# Patient Record
Sex: FEMALE | Race: WHITE | NOT HISPANIC OR LATINO | Employment: OTHER | URBAN - METROPOLITAN AREA
[De-identification: names, ages, dates, MRNs, and addresses within clinical notes are randomized per-mention and may not be internally consistent; named-entity substitution may affect disease eponyms.]

---

## 2017-03-01 ENCOUNTER — HOSPITAL ENCOUNTER (OUTPATIENT)
Dept: CT IMAGING | Facility: HOSPITAL | Age: 62
Discharge: HOME | End: 2017-03-01
Attending: UROLOGY | Admitting: UROLOGY

## 2017-03-16 ENCOUNTER — HOSPITAL ENCOUNTER (OUTPATIENT)
Dept: FAMILY MEDICINE CLINIC | Facility: CLINIC | Age: 62
Setting detail: SPECIMEN
Discharge: HOME | End: 2017-03-16
Attending: PREVENTIVE MEDICINE | Admitting: PREVENTIVE MEDICINE

## 2017-03-16 LAB
ALBUMIN SERPL-MCNC: 3.4 G/DL (ref 3.5–4.8)
ALBUMIN/GLOB SERPL: 1.1 {RATIO} (ref 1–1.7)
ALP SERPL-CCNC: 60 IU/L (ref 32–91)
ALT SERPL-CCNC: 21 IU/L (ref 14–54)
ANION GAP SERPL CALC-SCNC: 14.2 MMOL/L (ref 10–20)
AST SERPL-CCNC: 19 IU/L (ref 15–41)
BASOPHILS # BLD AUTO: 0.1 10*3/UL (ref 0–0.2)
BASOPHILS NFR BLD AUTO: 1 % (ref 0–2)
BILIRUB SERPL-MCNC: 0.6 MG/DL (ref 0.3–1.2)
BUN SERPL-MCNC: 13 MG/DL (ref 8–20)
BUN/CREAT SERPL: 32.5 (ref 5.4–26.2)
CALCIUM SERPL-MCNC: 8.7 MG/DL (ref 8.9–10.3)
CHLORIDE SERPL-SCNC: 101 MMOL/L (ref 101–111)
CHOLEST SERPL-MCNC: 146 MG/DL
CHOLEST/HDLC SERPL: 4 {RATIO}
CONV CO2: 24 MMOL/L (ref 22–32)
CONV LDL CHOLESTEROL DIRECT: 93 MG/DL (ref 0–100)
CONV TOTAL PROTEIN: 6.4 G/DL (ref 6.1–7.9)
CREAT UR-MCNC: 0.4 MG/DL (ref 0.4–1)
DIFFERENTIAL METHOD BLD: (no result)
EOSINOPHIL # BLD AUTO: 0.2 10*3/UL (ref 0–0.3)
EOSINOPHIL # BLD AUTO: 3 % (ref 0–3)
ERYTHROCYTE [DISTWIDTH] IN BLOOD BY AUTOMATED COUNT: 12.7 % (ref 11.5–14.5)
GLOBULIN UR ELPH-MCNC: 3 G/DL (ref 2.5–3.8)
GLUCOSE SERPL-MCNC: 258 MG/DL (ref 65–99)
HCT VFR BLD AUTO: 46.2 % (ref 35–49)
HDLC SERPL-MCNC: 37 MG/DL
HGB BLD-MCNC: 15.9 G/DL (ref 12–15)
LDLC/HDLC SERPL: 2.5 {RATIO}
LIPID INTERPRETATION: ABNORMAL
LYMPHOCYTES # BLD AUTO: 2.9 10*3/UL (ref 0.8–4.8)
LYMPHOCYTES NFR BLD AUTO: 35 % (ref 18–42)
MCH RBC QN AUTO: 30.8 PG (ref 26–32)
MCHC RBC AUTO-ENTMCNC: 34.4 G/DL (ref 32–36)
MCV RBC AUTO: 89.5 FL (ref 80–94)
MONOCYTES # BLD AUTO: 0.5 10*3/UL (ref 0.1–1.3)
MONOCYTES NFR BLD AUTO: 6 % (ref 2–11)
NEUTROPHILS # BLD AUTO: 4.5 10*3/UL (ref 2.3–8.6)
NEUTROPHILS NFR BLD AUTO: 55 % (ref 50–75)
NRBC BLD AUTO-RTO: 0 /100{WBCS}
NRBC/RBC NFR BLD MANUAL: 0 10*3/UL
PLATELET # BLD AUTO: 161 10*3/UL (ref 150–450)
PMV BLD AUTO: 11.5 FL (ref 7.4–10.4)
POTASSIUM SERPL-SCNC: 4.2 MMOL/L (ref 3.6–5.1)
RBC # BLD AUTO: 5.16 10*6/UL (ref 4–5.4)
SODIUM SERPL-SCNC: 135 MMOL/L (ref 136–144)
TRIGL SERPL-MCNC: 109 MG/DL
VIT B12 SERPL-MCNC: 456 PG/ML (ref 180–914)
VLDLC SERPL CALC-MCNC: 16.4 MG/DL
WBC # BLD AUTO: 8.1 10*3/UL (ref 4.5–11.5)

## 2017-03-17 ENCOUNTER — HOSPITAL ENCOUNTER (OUTPATIENT)
Dept: OTHER | Facility: HOSPITAL | Age: 62
Discharge: HOME | End: 2017-03-17
Attending: PREVENTIVE MEDICINE | Admitting: PREVENTIVE MEDICINE

## 2017-06-26 ENCOUNTER — HOSPITAL ENCOUNTER (OUTPATIENT)
Dept: FAMILY MEDICINE CLINIC | Facility: CLINIC | Age: 62
Setting detail: SPECIMEN
Discharge: HOME | End: 2017-06-26
Attending: PREVENTIVE MEDICINE | Admitting: PREVENTIVE MEDICINE

## 2017-06-26 LAB
ALBUMIN SERPL-MCNC: 3.7 G/DL (ref 3.5–4.8)
ALBUMIN/GLOB SERPL: 1.1 {RATIO} (ref 1–1.7)
ALP SERPL-CCNC: 63 IU/L (ref 32–91)
ALT SERPL-CCNC: 19 IU/L (ref 14–54)
ANION GAP SERPL CALC-SCNC: 12.5 MMOL/L (ref 10–20)
AST SERPL-CCNC: 19 IU/L (ref 15–41)
BASOPHILS # BLD AUTO: 0 10*3/UL (ref 0–0.2)
BASOPHILS NFR BLD AUTO: 1 % (ref 0–2)
BILIRUB SERPL-MCNC: 0.4 MG/DL (ref 0.3–1.2)
BUN SERPL-MCNC: 8 MG/DL (ref 8–20)
BUN/CREAT SERPL: 20 (ref 5.4–26.2)
CALCIUM SERPL-MCNC: 9 MG/DL (ref 8.9–10.3)
CHLORIDE SERPL-SCNC: 103 MMOL/L (ref 101–111)
CHOLEST SERPL-MCNC: 144 MG/DL
CHOLEST/HDLC SERPL: 3.7 {RATIO}
CONV CO2: 26 MMOL/L (ref 22–32)
CONV LDL CHOLESTEROL DIRECT: 90 MG/DL (ref 0–100)
CONV MICROALBUM.,U,RANDOM: 8 MG/L
CONV TOTAL PROTEIN: 7 G/DL (ref 6.1–7.9)
CREAT 24H UR-MCNC: 48.1 MG/DL
CREAT UR-MCNC: 0.4 MG/DL (ref 0.4–1)
DIFFERENTIAL METHOD BLD: (no result)
EOSINOPHIL # BLD AUTO: 0.2 10*3/UL (ref 0–0.3)
EOSINOPHIL # BLD AUTO: 2 % (ref 0–3)
ERYTHROCYTE [DISTWIDTH] IN BLOOD BY AUTOMATED COUNT: 13 % (ref 11.5–14.5)
GLOBULIN UR ELPH-MCNC: 3.3 G/DL (ref 2.5–3.8)
GLUCOSE SERPL-MCNC: 269 MG/DL (ref 65–99)
HCT VFR BLD AUTO: 45 % (ref 35–49)
HDLC SERPL-MCNC: 39 MG/DL
HGB BLD-MCNC: 15.4 G/DL (ref 12–15)
LDLC/HDLC SERPL: 2.3 {RATIO}
LIPID INTERPRETATION: ABNORMAL
LYMPHOCYTES # BLD AUTO: 2.5 10*3/UL (ref 0.8–4.8)
LYMPHOCYTES NFR BLD AUTO: 31 % (ref 18–42)
MCH RBC QN AUTO: 31 PG (ref 26–32)
MCHC RBC AUTO-ENTMCNC: 34.3 G/DL (ref 32–36)
MCV RBC AUTO: 90.3 FL (ref 80–94)
MICROALBUMIN/CREAT UR: 16.6 UG/MG
MONOCYTES # BLD AUTO: 0.4 10*3/UL (ref 0.1–1.3)
MONOCYTES NFR BLD AUTO: 5 % (ref 2–11)
NEUTROPHILS # BLD AUTO: 5.1 10*3/UL (ref 2.3–8.6)
NEUTROPHILS NFR BLD AUTO: 61 % (ref 50–75)
NRBC BLD AUTO-RTO: 0 /100{WBCS}
NRBC/RBC NFR BLD MANUAL: 0 10*3/UL
PLATELET # BLD AUTO: 153 10*3/UL (ref 150–450)
PMV BLD AUTO: 10.8 FL (ref 7.4–10.4)
POTASSIUM SERPL-SCNC: 4.5 MMOL/L (ref 3.6–5.1)
RBC # BLD AUTO: 4.98 10*6/UL (ref 4–5.4)
SODIUM SERPL-SCNC: 137 MMOL/L (ref 136–144)
TRIGL SERPL-MCNC: 113 MG/DL
VIT B12 SERPL-MCNC: 528 PG/ML (ref 180–914)
VLDLC SERPL CALC-MCNC: 15.1 MG/DL
WBC # BLD AUTO: 8.3 10*3/UL (ref 4.5–11.5)

## 2017-06-27 LAB
HIV1+2 AB SERPL QL IA: NORMAL

## 2017-06-28 LAB
HCV AB SER DONR QL: NORMAL
HCV AB SER DONR QL: NORMAL

## 2017-08-09 ENCOUNTER — HOSPITAL ENCOUNTER (OUTPATIENT)
Dept: CARDIOLOGY | Facility: HOSPITAL | Age: 62
Discharge: HOME | End: 2017-08-09
Attending: PREVENTIVE MEDICINE | Admitting: PREVENTIVE MEDICINE

## 2017-09-27 ENCOUNTER — HOSPITAL ENCOUNTER (OUTPATIENT)
Dept: LAB | Facility: HOSPITAL | Age: 62
Setting detail: SPECIMEN
Discharge: HOME | End: 2017-09-27
Attending: INTERNAL MEDICINE | Admitting: INTERNAL MEDICINE

## 2017-10-13 ENCOUNTER — HOSPITAL ENCOUNTER (OUTPATIENT)
Dept: FAMILY MEDICINE CLINIC | Facility: CLINIC | Age: 62
Setting detail: SPECIMEN
Discharge: HOME | End: 2017-10-13
Attending: PREVENTIVE MEDICINE | Admitting: PREVENTIVE MEDICINE

## 2017-10-13 LAB
ALBUMIN SERPL-MCNC: 3.9 G/DL (ref 3.5–4.8)
ALBUMIN/GLOB SERPL: 1.3 {RATIO} (ref 1–1.7)
ALP SERPL-CCNC: 58 IU/L (ref 32–91)
ALT SERPL-CCNC: 21 IU/L (ref 14–54)
ANION GAP SERPL CALC-SCNC: 12.2 MMOL/L (ref 10–20)
AST SERPL-CCNC: 21 IU/L (ref 15–41)
BASOPHILS # BLD AUTO: 0.1 10*3/UL (ref 0–0.2)
BASOPHILS NFR BLD AUTO: 1 % (ref 0–2)
BILIRUB SERPL-MCNC: 0.6 MG/DL (ref 0.3–1.2)
BUN SERPL-MCNC: 14 MG/DL (ref 8–20)
BUN/CREAT SERPL: 35 (ref 5.4–26.2)
CALCIUM SERPL-MCNC: 9.4 MG/DL (ref 8.9–10.3)
CHLORIDE SERPL-SCNC: 105 MMOL/L (ref 101–111)
CHOLEST SERPL-MCNC: 161 MG/DL
CHOLEST/HDLC SERPL: 3.7 {RATIO}
CONV CO2: 27 MMOL/L (ref 22–32)
CONV LDL CHOLESTEROL DIRECT: 104 MG/DL (ref 0–100)
CONV MICROALBUM.,U,RANDOM: 4 MG/L
CONV TOTAL PROTEIN: 6.8 G/DL (ref 6.1–7.9)
CREAT 24H UR-MCNC: 22.9 MG/DL
CREAT UR-MCNC: 0.4 MG/DL (ref 0.4–1)
DIFFERENTIAL METHOD BLD: (no result)
EOSINOPHIL # BLD AUTO: 0.2 10*3/UL (ref 0–0.3)
EOSINOPHIL # BLD AUTO: 2 % (ref 0–3)
ERYTHROCYTE [DISTWIDTH] IN BLOOD BY AUTOMATED COUNT: 13 % (ref 11.5–14.5)
GLOBULIN UR ELPH-MCNC: 2.9 G/DL (ref 2.5–3.8)
GLUCOSE SERPL-MCNC: 134 MG/DL (ref 65–99)
HCT VFR BLD AUTO: 44.2 % (ref 35–49)
HDLC SERPL-MCNC: 43 MG/DL
HGB BLD-MCNC: 14.9 G/DL (ref 12–15)
LDLC/HDLC SERPL: 2.4 {RATIO}
LIPID INTERPRETATION: ABNORMAL
LYMPHOCYTES # BLD AUTO: 2.8 10*3/UL (ref 0.8–4.8)
LYMPHOCYTES NFR BLD AUTO: 32 % (ref 18–42)
MCH RBC QN AUTO: 31.4 PG (ref 26–32)
MCHC RBC AUTO-ENTMCNC: 33.8 G/DL (ref 32–36)
MCV RBC AUTO: 93 FL (ref 80–94)
MICROALBUMIN/CREAT UR: 17.5 UG/MG
MONOCYTES # BLD AUTO: 0.4 10*3/UL (ref 0.1–1.3)
MONOCYTES NFR BLD AUTO: 5 % (ref 2–11)
NEUTROPHILS # BLD AUTO: 5.4 10*3/UL (ref 2.3–8.6)
NEUTROPHILS NFR BLD AUTO: 60 % (ref 50–75)
NRBC BLD AUTO-RTO: 0 /100{WBCS}
NRBC/RBC NFR BLD MANUAL: 0 10*3/UL
PLATELET # BLD AUTO: 177 10*3/UL (ref 150–450)
PMV BLD AUTO: 10.7 FL (ref 7.4–10.4)
POTASSIUM SERPL-SCNC: 4.2 MMOL/L (ref 3.6–5.1)
RBC # BLD AUTO: 4.75 10*6/UL (ref 4–5.4)
SODIUM SERPL-SCNC: 140 MMOL/L (ref 136–144)
TRIGL SERPL-MCNC: 124 MG/DL
VIT B12 SERPL-MCNC: 401 PG/ML (ref 180–914)
VLDLC SERPL CALC-MCNC: 13.7 MG/DL
WBC # BLD AUTO: 8.9 10*3/UL (ref 4.5–11.5)

## 2017-11-27 ENCOUNTER — HOSPITAL ENCOUNTER (OUTPATIENT)
Dept: FAMILY MEDICINE CLINIC | Facility: CLINIC | Age: 62
Setting detail: SPECIMEN
Discharge: HOME | End: 2017-11-27
Attending: PREVENTIVE MEDICINE | Admitting: PREVENTIVE MEDICINE

## 2017-11-27 LAB
CHOLEST SERPL-MCNC: 129 MG/DL
CHOLEST/HDLC SERPL: 3 {RATIO}
CONV LDL CHOLESTEROL DIRECT: 70 MG/DL (ref 0–100)
HDLC SERPL-MCNC: 43 MG/DL
LDLC/HDLC SERPL: 1.6 {RATIO}
LIPID INTERPRETATION: NORMAL
TRIGL SERPL-MCNC: 101 MG/DL
VLDLC SERPL CALC-MCNC: 16.3 MG/DL

## 2017-12-20 ENCOUNTER — HOSPITAL ENCOUNTER (OUTPATIENT)
Dept: ONCOLOGY | Facility: HOSPITAL | Age: 62
Discharge: HOME | End: 2017-12-20
Attending: PREVENTIVE MEDICINE | Admitting: PREVENTIVE MEDICINE

## 2018-01-10 ENCOUNTER — HOSPITAL ENCOUNTER (OUTPATIENT)
Dept: OTHER | Facility: HOSPITAL | Age: 63
Discharge: HOME | End: 2018-01-10
Attending: INTERNAL MEDICINE | Admitting: INTERNAL MEDICINE

## 2018-08-16 ENCOUNTER — HOSPITAL ENCOUNTER (OUTPATIENT)
Dept: FAMILY MEDICINE CLINIC | Facility: CLINIC | Age: 63
Setting detail: SPECIMEN
Discharge: HOME | End: 2018-08-16
Attending: PREVENTIVE MEDICINE | Admitting: PREVENTIVE MEDICINE

## 2018-08-16 LAB
ALBUMIN SERPL-MCNC: 3.8 G/DL (ref 3.5–4.8)
ALBUMIN/GLOB SERPL: 1.3 {RATIO} (ref 1–1.7)
ALP SERPL-CCNC: 74 IU/L (ref 32–91)
ALT SERPL-CCNC: 15 IU/L (ref 14–54)
ANION GAP SERPL CALC-SCNC: 12.7 MMOL/L (ref 10–20)
AST SERPL-CCNC: 16 IU/L (ref 15–41)
BASOPHILS # BLD AUTO: 0.1 10*3/UL (ref 0–0.2)
BASOPHILS NFR BLD AUTO: 1 % (ref 0–2)
BILIRUB SERPL-MCNC: 0.7 MG/DL (ref 0.3–1.2)
BUN SERPL-MCNC: 11 MG/DL (ref 8–20)
BUN/CREAT SERPL: 18.3 (ref 5.4–26.2)
CALCIUM SERPL-MCNC: 8.9 MG/DL (ref 8.9–10.3)
CHLORIDE SERPL-SCNC: 103 MMOL/L (ref 101–111)
CHOLEST SERPL-MCNC: 136 MG/DL
CHOLEST/HDLC SERPL: 3.1 {RATIO}
CONV CO2: 25 MMOL/L (ref 22–32)
CONV LDL CHOLESTEROL DIRECT: 75 MG/DL (ref 0–100)
CONV MICROALBUM.,U,RANDOM: 18 MG/L
CONV TOTAL PROTEIN: 6.8 G/DL (ref 6.1–7.9)
CREAT 24H UR-MCNC: 64 MG/DL
CREAT UR-MCNC: 0.6 MG/DL (ref 0.4–1)
DIFFERENTIAL METHOD BLD: (no result)
EOSINOPHIL # BLD AUTO: 0.1 10*3/UL (ref 0–0.3)
EOSINOPHIL # BLD AUTO: 2 % (ref 0–3)
ERYTHROCYTE [DISTWIDTH] IN BLOOD BY AUTOMATED COUNT: 12.7 % (ref 11.5–14.5)
GLOBULIN UR ELPH-MCNC: 3 G/DL (ref 2.5–3.8)
GLUCOSE SERPL-MCNC: 184 MG/DL (ref 65–99)
HCT VFR BLD AUTO: 44.2 % (ref 35–49)
HDLC SERPL-MCNC: 43 MG/DL
HGB BLD-MCNC: 15.2 G/DL (ref 12–15)
LDLC/HDLC SERPL: 1.7 {RATIO}
LIPID INTERPRETATION: NORMAL
LYMPHOCYTES # BLD AUTO: 2.8 10*3/UL (ref 0.8–4.8)
LYMPHOCYTES NFR BLD AUTO: 35 % (ref 18–42)
MAGNESIUM SERPL-MCNC: 1.7 MG/DL (ref 1.8–2.5)
MCH RBC QN AUTO: 31.2 PG (ref 26–32)
MCHC RBC AUTO-ENTMCNC: 34.5 G/DL (ref 32–36)
MCV RBC AUTO: 90.6 FL (ref 80–94)
MICROALBUMIN/CREAT UR: 28.1 UG/MG
MONOCYTES # BLD AUTO: 0.4 10*3/UL (ref 0.1–1.3)
MONOCYTES NFR BLD AUTO: 5 % (ref 2–11)
NEUTROPHILS # BLD AUTO: 4.6 10*3/UL (ref 2.3–8.6)
NEUTROPHILS NFR BLD AUTO: 57 % (ref 50–75)
NRBC BLD AUTO-RTO: 0 /100{WBCS}
NRBC/RBC NFR BLD MANUAL: 0 10*3/UL
PLATELET # BLD AUTO: 176 10*3/UL (ref 150–450)
PMV BLD AUTO: 11 FL (ref 7.4–10.4)
POTASSIUM SERPL-SCNC: 3.7 MMOL/L (ref 3.6–5.1)
RBC # BLD AUTO: 4.88 10*6/UL (ref 4–5.4)
SODIUM SERPL-SCNC: 137 MMOL/L (ref 136–144)
TRIGL SERPL-MCNC: 93 MG/DL
VLDLC SERPL CALC-MCNC: 18.2 MG/DL
WBC # BLD AUTO: 8.1 10*3/UL (ref 4.5–11.5)

## 2018-09-05 ENCOUNTER — HOSPITAL ENCOUNTER (OUTPATIENT)
Dept: GENERAL RADIOLOGY | Facility: HOSPITAL | Age: 63
Discharge: HOME | End: 2018-09-05
Attending: PREVENTIVE MEDICINE | Admitting: PREVENTIVE MEDICINE

## 2018-12-11 ENCOUNTER — HOSPITAL ENCOUNTER (OUTPATIENT)
Dept: FAMILY MEDICINE CLINIC | Facility: CLINIC | Age: 63
Setting detail: SPECIMEN
Discharge: HOME | End: 2018-12-11
Attending: PREVENTIVE MEDICINE | Admitting: PREVENTIVE MEDICINE

## 2018-12-11 LAB
ALBUMIN SERPL-MCNC: 3.9 G/DL (ref 3.5–4.8)
ALBUMIN/GLOB SERPL: 1.2 {RATIO} (ref 1–1.7)
ALP SERPL-CCNC: 75 IU/L (ref 32–91)
ALT SERPL-CCNC: 17 IU/L (ref 14–54)
ANION GAP SERPL CALC-SCNC: 10.9 MMOL/L (ref 10–20)
AST SERPL-CCNC: 17 IU/L (ref 15–41)
BASOPHILS # BLD AUTO: 0.1 10*3/UL (ref 0–0.2)
BASOPHILS NFR BLD AUTO: 1 % (ref 0–2)
BILIRUB SERPL-MCNC: 0.5 MG/DL (ref 0.3–1.2)
BUN SERPL-MCNC: 19 MG/DL (ref 8–20)
BUN/CREAT SERPL: 38 (ref 5.4–26.2)
CALCIUM SERPL-MCNC: 9.3 MG/DL (ref 8.9–10.3)
CHLORIDE SERPL-SCNC: 105 MMOL/L (ref 101–111)
CHOLEST SERPL-MCNC: 147 MG/DL
CHOLEST/HDLC SERPL: 3.1 {RATIO}
CONV CO2: 28 MMOL/L (ref 22–32)
CONV LDL CHOLESTEROL DIRECT: 97 MG/DL (ref 0–100)
CONV MICROALBUM.,U,RANDOM: 55 MG/L
CONV TOTAL PROTEIN: 7.2 G/DL (ref 6.1–7.9)
CREAT 24H UR-MCNC: 70.2 MG/DL
CREAT UR-MCNC: 0.5 MG/DL (ref 0.4–1)
DIFFERENTIAL METHOD BLD: (no result)
EOSINOPHIL # BLD AUTO: 0.2 10*3/UL (ref 0–0.3)
EOSINOPHIL # BLD AUTO: 2 % (ref 0–3)
ERYTHROCYTE [DISTWIDTH] IN BLOOD BY AUTOMATED COUNT: 13.3 % (ref 11.5–14.5)
GLOBULIN UR ELPH-MCNC: 3.3 G/DL (ref 2.5–3.8)
GLUCOSE SERPL-MCNC: 136 MG/DL (ref 65–99)
HCT VFR BLD AUTO: 43.5 % (ref 35–49)
HDLC SERPL-MCNC: 47 MG/DL
HGB BLD-MCNC: 14.9 G/DL (ref 12–15)
LDLC/HDLC SERPL: 2.1 {RATIO}
LIPID INTERPRETATION: NORMAL
LYMPHOCYTES # BLD AUTO: 2.6 10*3/UL (ref 0.8–4.8)
LYMPHOCYTES NFR BLD AUTO: 30 % (ref 18–42)
MAGNESIUM SERPL-MCNC: 1.9 MG/DL (ref 1.8–2.5)
MCH RBC QN AUTO: 30.9 PG (ref 26–32)
MCHC RBC AUTO-ENTMCNC: 34.3 G/DL (ref 32–36)
MCV RBC AUTO: 90.2 FL (ref 80–94)
MICROALBUMIN/CREAT UR: 78.3 UG/MG
MONOCYTES # BLD AUTO: 0.4 10*3/UL (ref 0.1–1.3)
MONOCYTES NFR BLD AUTO: 5 % (ref 2–11)
NEUTROPHILS # BLD AUTO: 5.4 10*3/UL (ref 2.3–8.6)
NEUTROPHILS NFR BLD AUTO: 62 % (ref 50–75)
NRBC BLD AUTO-RTO: 0 /100{WBCS}
NRBC/RBC NFR BLD MANUAL: 0 10*3/UL
PLATELET # BLD AUTO: 184 10*3/UL (ref 150–450)
PMV BLD AUTO: 11.3 FL (ref 7.4–10.4)
POTASSIUM SERPL-SCNC: 3.9 MMOL/L (ref 3.6–5.1)
RBC # BLD AUTO: 4.82 10*6/UL (ref 4–5.4)
SODIUM SERPL-SCNC: 140 MMOL/L (ref 136–144)
TRIGL SERPL-MCNC: 97 MG/DL
VIT B12 SERPL-MCNC: 955 PG/ML (ref 180–914)
VLDLC SERPL CALC-MCNC: 3.7 MG/DL
WBC # BLD AUTO: 8.6 10*3/UL (ref 4.5–11.5)

## 2018-12-12 LAB
25(OH)D3 SERPL-MCNC: 28 NG/ML (ref 30–100)
HBA1C MFR BLD: 7.4 % (ref 0–5.6)

## 2019-01-08 ENCOUNTER — HOSPITAL ENCOUNTER (OUTPATIENT)
Dept: GENERAL RADIOLOGY | Facility: HOSPITAL | Age: 64
Discharge: HOME | End: 2019-01-08
Attending: UROLOGY | Admitting: UROLOGY

## 2019-03-12 ENCOUNTER — HOSPITAL ENCOUNTER (OUTPATIENT)
Dept: FAMILY MEDICINE CLINIC | Facility: CLINIC | Age: 64
Setting detail: SPECIMEN
Discharge: HOME | End: 2019-03-12
Attending: PREVENTIVE MEDICINE | Admitting: PREVENTIVE MEDICINE

## 2019-03-12 LAB
ALBUMIN SERPL-MCNC: 4 G/DL (ref 3.5–4.8)
ALBUMIN/GLOB SERPL: 1.3 {RATIO} (ref 1–1.7)
ALP SERPL-CCNC: 76 IU/L (ref 32–91)
ALT SERPL-CCNC: 21 IU/L (ref 14–54)
ANION GAP SERPL CALC-SCNC: 16.8 MMOL/L (ref 10–20)
AST SERPL-CCNC: 22 IU/L (ref 15–41)
BASOPHILS # BLD AUTO: 0.1 10*3/UL (ref 0–0.2)
BASOPHILS NFR BLD AUTO: 1 % (ref 0–2)
BILIRUB SERPL-MCNC: 0.4 MG/DL (ref 0.3–1.2)
BUN SERPL-MCNC: 15 MG/DL (ref 8–20)
BUN/CREAT SERPL: 30 (ref 5.4–26.2)
CALCIUM SERPL-MCNC: 9.1 MG/DL (ref 8.9–10.3)
CHLORIDE SERPL-SCNC: 102 MMOL/L (ref 101–111)
CHOLEST SERPL-MCNC: 139 MG/DL
CHOLEST/HDLC SERPL: 3.1 {RATIO}
CONV CO2: 25 MMOL/L (ref 22–32)
CONV LDL CHOLESTEROL DIRECT: 82 MG/DL (ref 0–100)
CONV MICROALBUM.,U,RANDOM: 5 MG/L
CONV TOTAL PROTEIN: 7.2 G/DL (ref 6.1–7.9)
CREAT 24H UR-MCNC: 50.5 MG/DL
CREAT UR-MCNC: 0.5 MG/DL (ref 0.4–1)
DIFFERENTIAL METHOD BLD: (no result)
EOSINOPHIL # BLD AUTO: 0.3 10*3/UL (ref 0–0.3)
EOSINOPHIL # BLD AUTO: 3 % (ref 0–3)
ERYTHROCYTE [DISTWIDTH] IN BLOOD BY AUTOMATED COUNT: 13.3 % (ref 11.5–14.5)
GLOBULIN UR ELPH-MCNC: 3.2 G/DL (ref 2.5–3.8)
GLUCOSE SERPL-MCNC: 77 MG/DL (ref 65–99)
HCT VFR BLD AUTO: 45.1 % (ref 35–49)
HDLC SERPL-MCNC: 46 MG/DL
HGB BLD-MCNC: 15.2 G/DL (ref 12–15)
LDLC/HDLC SERPL: 1.8 {RATIO}
LIPID INTERPRETATION: NORMAL
LYMPHOCYTES # BLD AUTO: 2.7 10*3/UL (ref 0.8–4.8)
LYMPHOCYTES NFR BLD AUTO: 29 % (ref 18–42)
MAGNESIUM SERPL-MCNC: 2 MG/DL (ref 1.8–2.5)
MCH RBC QN AUTO: 31.1 PG (ref 26–32)
MCHC RBC AUTO-ENTMCNC: 33.8 G/DL (ref 32–36)
MCV RBC AUTO: 92.2 FL (ref 80–94)
MICROALBUMIN/CREAT UR: 9.9 UG/MG
MONOCYTES # BLD AUTO: 0.4 10*3/UL (ref 0.1–1.3)
MONOCYTES NFR BLD AUTO: 4 % (ref 2–11)
NEUTROPHILS # BLD AUTO: 5.8 10*3/UL (ref 2.3–8.6)
NEUTROPHILS NFR BLD AUTO: 63 % (ref 50–75)
NRBC BLD AUTO-RTO: 0 /100{WBCS}
NRBC/RBC NFR BLD MANUAL: 0 10*3/UL
PLATELET # BLD AUTO: 192 10*3/UL (ref 150–450)
PMV BLD AUTO: 11 FL (ref 7.4–10.4)
POTASSIUM SERPL-SCNC: 3.8 MMOL/L (ref 3.6–5.1)
RBC # BLD AUTO: 4.89 10*6/UL (ref 4–5.4)
SODIUM SERPL-SCNC: 140 MMOL/L (ref 136–144)
TRIGL SERPL-MCNC: 133 MG/DL
VLDLC SERPL CALC-MCNC: 11.5 MG/DL
WBC # BLD AUTO: 9.2 10*3/UL (ref 4.5–11.5)

## 2019-06-23 PROBLEM — J43.9 PULMONARY EMPHYSEMA: Status: ACTIVE | Noted: 2017-12-21

## 2019-06-23 PROBLEM — R06.83 SNORING: Status: ACTIVE | Noted: 2017-06-26

## 2019-06-23 PROBLEM — Z83.3 FAMILY HISTORY OF DIABETES MELLITUS: Status: ACTIVE | Noted: 2019-06-23

## 2019-06-23 PROBLEM — R91.8 MULTIPLE NODULES OF LUNG: Status: ACTIVE | Noted: 2017-12-21

## 2019-06-23 PROBLEM — R68.82 REDUCED LIBIDO: Status: ACTIVE | Noted: 2017-10-13

## 2019-06-23 PROBLEM — E78.5 HYPERLIPIDEMIA: Status: ACTIVE | Noted: 2017-06-26

## 2019-06-23 PROBLEM — E83.42 HYPOMAGNESEMIA: Status: ACTIVE | Noted: 2018-12-11

## 2019-06-23 PROBLEM — E53.8 B12 DEFICIENCY: Status: ACTIVE | Noted: 2017-10-13

## 2019-06-23 PROBLEM — Z83.3 FAMILY HISTORY OF DIABETES MELLITUS: Status: RESOLVED | Noted: 2019-06-23 | Resolved: 2019-06-23

## 2019-06-23 PROBLEM — M79.605 LEG PAIN, BILATERAL: Status: ACTIVE | Noted: 2018-08-16

## 2019-06-23 PROBLEM — E55.9 VITAMIN D DEFICIENCY: Status: ACTIVE | Noted: 2017-06-26

## 2019-06-23 PROBLEM — IMO0002 DIABETES MELLITUS TYPE II, UNCONTROLLED: Status: ACTIVE | Noted: 2017-06-27

## 2019-06-23 PROBLEM — I25.10 CHRONIC CORONARY ARTERY DISEASE: Status: ACTIVE | Noted: 2017-12-21

## 2019-06-23 PROBLEM — F17.200 TOBACCO DEPENDENCE SYNDROME: Status: ACTIVE | Noted: 2017-03-02

## 2019-06-23 PROBLEM — K76.0 FATTY LIVER: Status: ACTIVE | Noted: 2017-12-21

## 2019-06-23 PROBLEM — M79.604 LEG PAIN, BILATERAL: Status: ACTIVE | Noted: 2018-08-16

## 2019-06-23 PROBLEM — Z80.3 FAMILY HISTORY OF MALIGNANT NEOPLASM OF BREAST: Status: ACTIVE | Noted: 2019-06-23

## 2019-06-23 PROBLEM — H91.90 HEARING DEFICIT: Status: ACTIVE | Noted: 2017-03-02

## 2019-06-23 PROBLEM — I10 HYPERTENSION: Status: ACTIVE | Noted: 2019-06-23

## 2019-06-23 PROBLEM — Z82.3 FAMILY HISTORY OF CEREBROVASCULAR ACCIDENT (CVA): Status: ACTIVE | Noted: 2019-06-23

## 2019-06-24 ENCOUNTER — OFFICE VISIT (OUTPATIENT)
Dept: FAMILY MEDICINE CLINIC | Facility: CLINIC | Age: 64
End: 2019-06-24

## 2019-06-24 ENCOUNTER — TELEPHONE (OUTPATIENT)
Dept: FAMILY MEDICINE CLINIC | Facility: CLINIC | Age: 64
End: 2019-06-24

## 2019-06-24 VITALS
WEIGHT: 139.4 LBS | TEMPERATURE: 98 F | DIASTOLIC BLOOD PRESSURE: 70 MMHG | HEIGHT: 64 IN | OXYGEN SATURATION: 95 % | HEART RATE: 85 BPM | RESPIRATION RATE: 16 BRPM | SYSTOLIC BLOOD PRESSURE: 112 MMHG | BODY MASS INDEX: 23.8 KG/M2

## 2019-06-24 DIAGNOSIS — R91.1 LUNG NODULE: ICD-10-CM

## 2019-06-24 DIAGNOSIS — E55.9 VITAMIN D DEFICIENCY: ICD-10-CM

## 2019-06-24 DIAGNOSIS — I10 ESSENTIAL HYPERTENSION: ICD-10-CM

## 2019-06-24 DIAGNOSIS — E78.5 HYPERLIPIDEMIA, UNSPECIFIED HYPERLIPIDEMIA TYPE: ICD-10-CM

## 2019-06-24 DIAGNOSIS — D75.1 POLYCYTHEMIA: ICD-10-CM

## 2019-06-24 DIAGNOSIS — F17.200 TOBACCO DEPENDENCE SYNDROME: ICD-10-CM

## 2019-06-24 DIAGNOSIS — E11.65 UNCONTROLLED TYPE 2 DIABETES MELLITUS WITH HYPERGLYCEMIA (HCC): Primary | ICD-10-CM

## 2019-06-24 DIAGNOSIS — Z12.31 SCREENING MAMMOGRAM, ENCOUNTER FOR: ICD-10-CM

## 2019-06-24 DIAGNOSIS — M80.00XD AGE-RELATED OSTEOPOROSIS WITH CURRENT PATHOLOGICAL FRACTURE WITH ROUTINE HEALING, SUBSEQUENT ENCOUNTER: ICD-10-CM

## 2019-06-24 DIAGNOSIS — E53.8 B12 DEFICIENCY: ICD-10-CM

## 2019-06-24 LAB
ALBUMIN SERPL-MCNC: 3.8 G/DL (ref 3.5–4.8)
ALBUMIN UR-MCNC: <2 MG/L
ALBUMIN/GLOB SERPL: 1.2 G/DL (ref 1–1.7)
ALP SERPL-CCNC: 66 U/L (ref 32–91)
ALT SERPL W P-5'-P-CCNC: 16 U/L (ref 14–54)
ANION GAP SERPL CALCULATED.3IONS-SCNC: 11 MMOL/L (ref 10–20)
ARTICHOKE IGE QN: 65 MG/DL (ref 0–100)
AST SERPL-CCNC: 17 U/L (ref 15–41)
BASOPHILS # BLD AUTO: 0.1 10*3/MM3 (ref 0–0.2)
BASOPHILS NFR BLD AUTO: 1.1 % (ref 0–1.5)
BILIRUB SERPL-MCNC: 0.5 MG/DL (ref 0.3–1.2)
BUN BLD-MCNC: 16 MG/DL (ref 8–20)
BUN/CREAT SERPL: 32 (ref 5.4–26.2)
CALCIUM SPEC-SCNC: 9.1 MG/DL (ref 8.9–10.3)
CHLORIDE SERPL-SCNC: 103 MMOL/L (ref 101–111)
CHOLEST SERPL-MCNC: 125 MG/DL
CO2 SERPL-SCNC: 23 MMOL/L (ref 22–32)
CREAT BLD-MCNC: 0.5 MG/DL (ref 0.4–1)
CREAT UR-MCNC: 38.5 MG/DL
DEPRECATED RDW RBC AUTO: 42.4 FL (ref 37–54)
EOSINOPHIL # BLD AUTO: 0.2 10*3/MM3 (ref 0–0.4)
EOSINOPHIL NFR BLD AUTO: 1.9 % (ref 0.3–6.2)
ERYTHROCYTE [DISTWIDTH] IN BLOOD BY AUTOMATED COUNT: 13.2 % (ref 12.3–15.4)
GFR SERPL CREATININE-BSD FRML MDRD: 125 ML/MIN/1.73
GLOBULIN UR ELPH-MCNC: 3.3 GM/DL (ref 2.5–3.8)
GLUCOSE BLD-MCNC: 110 MG/DL (ref 65–99)
HCT VFR BLD AUTO: 41.9 % (ref 34–46.6)
HDLC SERPL QL: 2.78
HDLC SERPL-MCNC: 45 MG/DL
HGB BLD-MCNC: 14.4 G/DL (ref 12–15.9)
LDLC/HDLC SERPL: 1.17 {RATIO}
LYMPHOCYTES # BLD AUTO: 2.6 10*3/MM3 (ref 0.7–3.1)
LYMPHOCYTES NFR BLD AUTO: 30.8 % (ref 19.6–45.3)
MCH RBC QN AUTO: 31.3 PG (ref 26.6–33)
MCHC RBC AUTO-ENTMCNC: 34.3 G/DL (ref 31.5–35.7)
MCV RBC AUTO: 91.2 FL (ref 79–97)
MICROALBUMIN/CREAT UR: NORMAL MG/G
MONOCYTES # BLD AUTO: 0.4 10*3/MM3 (ref 0.1–0.9)
MONOCYTES NFR BLD AUTO: 4.5 % (ref 5–12)
NEUTROPHILS # BLD AUTO: 5.3 10*3/MM3 (ref 1.7–7)
NEUTROPHILS NFR BLD AUTO: 61.7 % (ref 42.7–76)
NRBC BLD AUTO-RTO: 0 /100 WBC (ref 0–0.2)
PLATELET # BLD AUTO: 179 10*3/MM3 (ref 140–450)
PMV BLD AUTO: 10.9 FL (ref 6–12)
POTASSIUM BLD-SCNC: 4.1 MMOL/L (ref 3.6–5.1)
PROT SERPL-MCNC: 7.1 G/DL (ref 6.1–7.9)
RBC # BLD AUTO: 4.59 10*6/MM3 (ref 3.77–5.28)
SODIUM BLD-SCNC: 137 MMOL/L (ref 136–144)
TRIGL SERPL-MCNC: 137 MG/DL
TSH SERPL DL<=0.05 MIU/L-ACNC: 1.46 MIU/ML (ref 0.34–5.6)
VLDLC SERPL-MCNC: 27.4 MG/DL
WBC NRBC COR # BLD: 8.6 10*3/MM3 (ref 3.4–10.8)

## 2019-06-24 PROCEDURE — 83036 HEMOGLOBIN GLYCOSYLATED A1C: CPT | Performed by: PREVENTIVE MEDICINE

## 2019-06-24 PROCEDURE — 80061 LIPID PANEL: CPT | Performed by: PREVENTIVE MEDICINE

## 2019-06-24 PROCEDURE — 36415 COLL VENOUS BLD VENIPUNCTURE: CPT | Performed by: PREVENTIVE MEDICINE

## 2019-06-24 PROCEDURE — 99213 OFFICE O/P EST LOW 20 MIN: CPT | Performed by: PREVENTIVE MEDICINE

## 2019-06-24 PROCEDURE — 84443 ASSAY THYROID STIM HORMONE: CPT | Performed by: PREVENTIVE MEDICINE

## 2019-06-24 PROCEDURE — 85025 COMPLETE CBC W/AUTO DIFF WBC: CPT | Performed by: PREVENTIVE MEDICINE

## 2019-06-24 PROCEDURE — 82570 ASSAY OF URINE CREATININE: CPT | Performed by: PREVENTIVE MEDICINE

## 2019-06-24 PROCEDURE — 80053 COMPREHEN METABOLIC PANEL: CPT | Performed by: PREVENTIVE MEDICINE

## 2019-06-24 PROCEDURE — 82043 UR ALBUMIN QUANTITATIVE: CPT | Performed by: PREVENTIVE MEDICINE

## 2019-06-24 RX ORDER — PRAMIPEXOLE DIHYDROCHLORIDE 0.12 MG/1
TABLET ORAL
Refills: 3 | COMMUNITY
Start: 2019-04-06 | End: 2019-07-04 | Stop reason: SDUPTHER

## 2019-06-24 RX ORDER — MULTIVITAMIN WITH IRON
1 TABLET ORAL DAILY
Refills: 3 | COMMUNITY
Start: 2019-05-14 | End: 2019-08-17 | Stop reason: SDUPTHER

## 2019-06-24 RX ORDER — LISINOPRIL 20 MG/1
20 TABLET ORAL DAILY
Refills: 3 | COMMUNITY
Start: 2019-04-21 | End: 2019-08-05 | Stop reason: SINTOL

## 2019-06-24 RX ORDER — SIMVASTATIN 40 MG
40 TABLET ORAL
Refills: 3 | COMMUNITY
Start: 2019-04-06 | End: 2019-08-29

## 2019-06-24 RX ORDER — MULTIVIT WITH MINERALS/LUTEIN
1 TABLET ORAL DAILY
COMMUNITY
Start: 2017-06-26 | End: 2019-09-11 | Stop reason: ALTCHOICE

## 2019-06-24 RX ORDER — GLIMEPIRIDE 2 MG/1
TABLET ORAL
Refills: 6 | COMMUNITY
Start: 2019-06-11 | End: 2019-09-07 | Stop reason: SDUPTHER

## 2019-06-24 RX ORDER — DILTIAZEM HYDROCHLORIDE 180 MG/1
1 CAPSULE, COATED, EXTENDED RELEASE ORAL 2 TIMES DAILY
Refills: 1 | COMMUNITY
Start: 2019-05-31 | End: 2019-08-29

## 2019-06-24 RX ORDER — SITAGLIPTIN AND METFORMIN HYDROCHLORIDE 1000; 50 MG/1; MG/1
1 TABLET, FILM COATED, EXTENDED RELEASE ORAL 2 TIMES DAILY
Refills: 1 | COMMUNITY
Start: 2019-04-08 | End: 2019-09-30 | Stop reason: SDUPTHER

## 2019-06-24 NOTE — TELEPHONE ENCOUNTER
Called spoke with Lynette at Dr Manzo's office she said she would fax the last visit and pap to [phone]

## 2019-06-24 NOTE — PROGRESS NOTES
"Subjective   Eugenia Madden is a 63 y.o. female presents for   Chief Complaint   Patient presents with   • Diabetes     Patient is 12 hour fasting    • Hypertension   • Hyperlipidemia     Blood sugars elevated as has been eating wrong.  Will consider eye exam as is overdue.  No low sugars, headaches and increase exercise.  Consider welbutrin to help stop smoking.  History of Present Illness     Vitals:    06/24/19 0850 06/24/19 0852   BP: 122/70 112/70   BP Location: Right arm Left arm   Patient Position: Sitting Sitting   Cuff Size: Adult Adult   Pulse: 85    Resp: 16    Temp: 98 °F (36.7 °C)    TempSrc: Oral    SpO2: 95%    Weight: 63.2 kg (139 lb 6.4 oz)    Height: 162.6 cm (64\")        Current Outpatient Medications on File Prior to Visit   Medication Sig Dispense Refill   • Cholecalciferol (VITAMIN D3) 74968 units tablet Take 1 tablet by mouth Daily.     • diltiaZEM CD (CARDIZEM CD) 180 MG 24 hr capsule Take 1 capsule by mouth 2 (Two) Times a Day.  1   • glimepiride (AMARYL) 2 MG tablet TAKE 1 TABLETS BY MOUTH BEFORE FIRST MAIN MEAL AND 2 TABLET WITH SUPPER.  6   • Ibuprofen-diphenhydrAMINE HCl (ADVIL PM) 200-25 MG capsule Take 1 capsule by mouth Every Night.     • JANUMET XR  MG tablet Take 1 tablet by mouth 2 (Two) Times a Day.  1   • lisinopril (PRINIVIL,ZESTRIL) 20 MG tablet Take 20 mg by mouth Daily.  3   • Magnesium 250 MG tablet Take 1 tablet by mouth Daily.  3   • Multiple Vitamins-Minerals (CENTRUM SILVER) tablet Take 1 tablet by mouth Daily.     • pramipexole (MIRAPEX) 0.125 MG tablet TAKE 1 TABLET BY MOUTH 2 HOURS BEFORE SLEEP & MAY INCREASE EVERY WEEK 1 TAB UP TO 4 TABS BEFORE BED  3   • simvastatin (ZOCOR) 40 MG tablet Take 40 mg by mouth every night at bedtime.  3     No current facility-administered medications on file prior to visit.        The following portions of the patient's history were reviewed and updated as appropriate: allergies, current medications, past family history, past " medical history, past social history, past surgical history and problem list.    Review of Systems   Constitutional: Negative.    HENT: Positive for dental problem. Negative for sinus pressure and sore throat.    Eyes: Negative.    Respiratory: Positive for cough.    Cardiovascular: Negative.    Gastrointestinal: Negative.    Endocrine: Negative.    Genitourinary: Negative.    Musculoskeletal: Negative.    Skin: Negative.    Allergic/Immunologic: Positive for environmental allergies.   Neurological: Negative.    Hematological: Negative.    Psychiatric/Behavioral: Negative.        Objective   Physical Exam   Constitutional: She is oriented to person, place, and time. She appears well-developed.   HENT:   Head: Normocephalic and atraumatic.   Eyes: Conjunctivae and EOM are normal. Pupils are equal, round, and reactive to light.   Neck: Normal range of motion.   Cardiovascular: Normal rate, regular rhythm and intact distal pulses.   Murmur heard.  Grade 1-2 systollic murmur LSB neg echo few years asgo-had since birth   Pulmonary/Chest: Effort normal and breath sounds normal.   Abdominal: Soft. Bowel sounds are normal.   Musculoskeletal: Normal range of motion.   Lymphadenopathy:     She has no cervical adenopathy.   Neurological: She is alert and oriented to person, place, and time.   Skin: Skin is warm and dry.   Psychiatric: She has a normal mood and affect.   Nursing note and vitals reviewed.    PHQ-9 Total Score: 0    Assessment/Plan   Eugenia was seen today for diabetes, hypertension and hyperlipidemia.    Diagnoses and all orders for this visit:    Uncontrolled type 2 diabetes mellitus with hyperglycemia (CMS/[facility])  -     Comprehensive Metabolic Panel  -     Hemoglobin A1c  -     Microalbumin / Creatinine Urine Ratio - Urine, Clean Catch  -     TSH    B12 deficiency  -     Vitamin B12; Future  -     CBC & Differential    Hyperlipidemia, unspecified hyperlipidemia type  -     Lipid Panel    Essential  hypertension    Polycythemia    Tobacco dependence syndrome    Vitamin D deficiency  -     Vitamin D 25 Hydroxy; Future    Other orders  -     DEXA Bone Density Axial        There are no Patient Instructions on file for this visit.

## 2019-06-25 LAB — HBA1C MFR BLD: 6.1 % (ref 3.5–5.6)

## 2019-07-05 RX ORDER — PRAMIPEXOLE DIHYDROCHLORIDE 0.12 MG/1
TABLET ORAL
Qty: 360 TABLET | Refills: 1 | Status: SHIPPED | OUTPATIENT
Start: 2019-07-05 | End: 2020-01-06

## 2019-07-30 ENCOUNTER — TELEPHONE (OUTPATIENT)
Dept: ENDOCRINOLOGY | Facility: CLINIC | Age: 64
End: 2019-07-30

## 2019-08-05 ENCOUNTER — OFFICE VISIT (OUTPATIENT)
Dept: FAMILY MEDICINE CLINIC | Facility: CLINIC | Age: 64
End: 2019-08-05

## 2019-08-05 VITALS
HEART RATE: 86 BPM | OXYGEN SATURATION: 94 % | RESPIRATION RATE: 16 BRPM | BODY MASS INDEX: 24.03 KG/M2 | WEIGHT: 140 LBS | DIASTOLIC BLOOD PRESSURE: 83 MMHG | TEMPERATURE: 98.2 F | SYSTOLIC BLOOD PRESSURE: 145 MMHG

## 2019-08-05 DIAGNOSIS — Z72.0 TOBACCO USE: Primary | ICD-10-CM

## 2019-08-05 DIAGNOSIS — Z12.39 SCREENING FOR BREAST CANCER: ICD-10-CM

## 2019-08-05 DIAGNOSIS — T46.4X5A COUGH DUE TO ACE INHIBITOR: ICD-10-CM

## 2019-08-05 DIAGNOSIS — L50.9 HIVES: ICD-10-CM

## 2019-08-05 DIAGNOSIS — R05.8 COUGH DUE TO ACE INHIBITOR: ICD-10-CM

## 2019-08-05 PROCEDURE — 99214 OFFICE O/P EST MOD 30 MIN: CPT | Performed by: PREVENTIVE MEDICINE

## 2019-08-05 RX ORDER — HYDROXYZINE HYDROCHLORIDE 25 MG/1
25 TABLET, FILM COATED ORAL NIGHTLY PRN
Qty: 30 TABLET | Refills: 1 | Status: SHIPPED | OUTPATIENT
Start: 2019-08-05 | End: 2019-09-02 | Stop reason: SDUPTHER

## 2019-08-05 RX ORDER — LOSARTAN POTASSIUM 50 MG/1
100 TABLET ORAL DAILY
Qty: 30 TABLET | Refills: 1 | Status: SHIPPED | OUTPATIENT
Start: 2019-08-05 | End: 2019-08-29

## 2019-08-05 RX ORDER — FERROUS SULFATE 325(65) MG
325 TABLET ORAL
COMMUNITY
End: 2019-09-11 | Stop reason: ALTCHOICE

## 2019-08-05 NOTE — PATIENT INSTRUCTIONS
Take one Zytec daily and then additiona;lHydroxyzine at night.  Call 3 weeks to report progress and 10 blood pressures.  If two weeks and worse call and we'll send to allergist  Needs eye exam.  Get Td and ShingRix at pharmacy

## 2019-08-05 NOTE — PROGRESS NOTES
Subjective   Eugenia Madden is a 63 y.o. female presents for   Chief Complaint   Patient presents with   • Urticaria     worse   • Cough     takes lisinopril   Urticaria on and off for one year. Cough on and off.    Health Maintenance Due   Topic Date Due   • TDAP/TD VACCINES (1 - Tdap) 09/06/1974   • ZOSTER VACCINE (1 of 2) 09/06/2005   • DIABETIC EYE EXAM  06/06/2019   • DXA SCAN  06/23/2019   • INFLUENZA VACCINE  08/01/2019       History of Present Illness     Vitals:    08/05/19 1521 08/05/19 1523   BP: 138/75 145/83   BP Location: Right arm Left arm   Patient Position: Sitting Sitting   Cuff Size: Adult Adult   Pulse: 86    Resp: 16    Temp: 98.2 °F (36.8 °C)    TempSrc: Oral    SpO2: 94%    Weight: 63.5 kg (140 lb)        Current Outpatient Medications on File Prior to Visit   Medication Sig Dispense Refill   • Cholecalciferol (VITAMIN D3) 05653 units tablet Take 1 tablet by mouth Daily.     • diltiaZEM CD (CARDIZEM CD) 180 MG 24 hr capsule Take 1 capsule by mouth 2 (Two) Times a Day.  1   • ferrous sulfate 325 (65 FE) MG tablet Take 325 mg by mouth Daily With Breakfast.     • glimepiride (AMARYL) 2 MG tablet TAKE 1 TABLETS BY MOUTH BEFORE FIRST MAIN MEAL AND 2 TABLET WITH SUPPER.  6   • Ibuprofen-diphenhydrAMINE HCl (ADVIL PM) 200-25 MG capsule Take 1 capsule by mouth Every Night.     • JANUMET XR  MG tablet Take 1 tablet by mouth 2 (Two) Times a Day.  1   • Magnesium 250 MG tablet Take 1 tablet by mouth Daily.  3   • Multiple Vitamins-Minerals (CENTRUM SILVER) tablet Take 1 tablet by mouth Daily.     • pramipexole (MIRAPEX) 0.125 MG tablet TAKE 1 TABLET BY MOUTH 2 HOURS BEFORE SLEEP & MAY INCREASE EVERY WEEK 1 TAB UP TO 4 TABS BEFORE  tablet 1   • simvastatin (ZOCOR) 40 MG tablet Take 40 mg by mouth every night at bedtime.  3   • [DISCONTINUED] lisinopril (PRINIVIL,ZESTRIL) 20 MG tablet Take 20 mg by mouth Daily.  3     No current facility-administered medications on file prior to visit.         The following portions of the patient's history were reviewed and updated as appropriate: allergies, current medications, past family history, past medical history, past social history, past surgical history and problem list.    Review of Systems   Musculoskeletal: Positive for arthralgias.   Allergic/Immunologic:        Hives       Objective   Physical Exam   Constitutional: She is oriented to person, place, and time. She appears well-developed.   HENT:   Head: Normocephalic and atraumatic.   Eyes: Conjunctivae and EOM are normal. Pupils are equal, round, and reactive to light.   Neck: Normal range of motion.   Cardiovascular: Normal rate and regular rhythm.   Pulmonary/Chest: Effort normal.   Decrease breath counds   Abdominal: Soft. Bowel sounds are normal.   Musculoskeletal: Normal range of motion.   Lymphadenopathy:     She has no cervical adenopathy.   Neurological: She is alert and oriented to person, place, and time.   Skin: Skin is warm and dry.   Hives back   Psychiatric: She has a normal mood and affect.   Nursing note and vitals reviewed.    PHQ-9 Total Score:      Assessment/Plan   Eugenia was seen today for urticaria and cough.    Diagnoses and all orders for this visit:    Tobacco use    Screening for breast cancer    Hives    Cough due to ACE inhibitor        Patient Instructions   Take one Zytec daily and then additiona;lHydroxyzine at night.  Call 3 weeks to report progress and 10 blood pressures.  If two weeks and worse call and we'll send to allergist  Needs eye exam.  Get Td and ShingRix at pharmacy

## 2019-08-18 RX ORDER — MULTIVITAMIN WITH IRON
TABLET ORAL
Qty: 90 EACH | Refills: 3 | Status: SHIPPED | OUTPATIENT
Start: 2019-08-18 | End: 2019-08-26 | Stop reason: SDUPTHER

## 2019-08-26 RX ORDER — MULTIVITAMIN WITH IRON
1 TABLET ORAL DAILY
Qty: 90 EACH | Refills: 3 | Status: SHIPPED | OUTPATIENT
Start: 2019-08-26 | End: 2019-09-11 | Stop reason: ALTCHOICE

## 2019-08-29 ENCOUNTER — OFFICE VISIT (OUTPATIENT)
Dept: FAMILY MEDICINE CLINIC | Facility: CLINIC | Age: 64
End: 2019-08-29

## 2019-08-29 VITALS
BODY MASS INDEX: 24.27 KG/M2 | TEMPERATURE: 98 F | SYSTOLIC BLOOD PRESSURE: 128 MMHG | DIASTOLIC BLOOD PRESSURE: 81 MMHG | RESPIRATION RATE: 16 BRPM | WEIGHT: 141.4 LBS | OXYGEN SATURATION: 94 % | HEART RATE: 88 BPM

## 2019-08-29 DIAGNOSIS — Z00.01 ENCOUNTER FOR ANNUAL GENERAL MEDICAL EXAMINATION WITH ABNORMAL FINDINGS IN ADULT: Primary | ICD-10-CM

## 2019-08-29 DIAGNOSIS — L50.9 HIVES: ICD-10-CM

## 2019-08-29 DIAGNOSIS — M81.0 OSTEOPOROSIS WITHOUT CURRENT PATHOLOGICAL FRACTURE, UNSPECIFIED OSTEOPOROSIS TYPE: ICD-10-CM

## 2019-08-29 PROCEDURE — 99214 OFFICE O/P EST MOD 30 MIN: CPT | Performed by: PREVENTIVE MEDICINE

## 2019-08-29 PROCEDURE — 99396 PREV VISIT EST AGE 40-64: CPT | Performed by: PREVENTIVE MEDICINE

## 2019-08-29 RX ORDER — ATORVASTATIN CALCIUM 40 MG/1
40 TABLET, FILM COATED ORAL DAILY
Qty: 90 TABLET | Refills: 3 | Status: SHIPPED | OUTPATIENT
Start: 2019-08-29 | End: 2019-09-03 | Stop reason: ALTCHOICE

## 2019-08-29 RX ORDER — DILTIAZEM HYDROCHLORIDE EXTENDED-RELEASE TABLETS 420 MG/1
420 TABLET, EXTENDED RELEASE ORAL DAILY
Qty: 30 TABLET | Refills: 11 | Status: SHIPPED | OUTPATIENT
Start: 2019-08-29 | End: 2020-01-05 | Stop reason: SDUPTHER

## 2019-08-29 NOTE — PATIENT INSTRUCTIONS
1/2 Losartan/day for 3 days then off.  Call 3 weeks re: cough and rash.  OK to start Diltiazem 420 when stops Losartan and diltiazem 180-2X/day.  Reschedule lung cancer CT for 2 days or more later.  Get DM eye exam and send me results.  Td, ShingRix, low dose flu in October.  STOP SMOKING-cut down method discussed. STOP SIMVASTATIN-start Atorvastin when get

## 2019-08-29 NOTE — PROGRESS NOTES
Subjective   Eugenia Madden is a 63 y.o. female presents for   Chief Complaint   Patient presents with   • Annual Exam   • Urticaria   Changed to Losartan not helping cough or hives.  Will advise off that and increase Diltiazem.  Have advised will considerstopping janumet if persists.  Will get Dexascan and vaccinations    Health Maintenance Due   Topic Date Due   • TDAP/TD VACCINES (1 - Tdap) 09/06/1974   • ZOSTER VACCINE (1 of 2) 09/06/2005   • DIABETIC EYE EXAM  06/06/2019   • DXA SCAN  06/23/2019   • ANNUAL PHYSICAL  08/17/2019   • INFLUENZA VACCINE  08/01/2019       History of Present Illness     Vitals:    08/29/19 1038 08/29/19 1043   BP: 130/78 128/81   BP Location: Right arm Left arm   Patient Position: Sitting Sitting   Cuff Size: Adult Adult   Pulse: 88    Resp: 16    Temp: 98 °F (36.7 °C)    TempSrc: Oral    SpO2: 94%    Weight: 64.1 kg (141 lb 6.4 oz)        Current Outpatient Medications on File Prior to Visit   Medication Sig Dispense Refill   • Cholecalciferol (VITAMIN D3) 67789 units tablet Take 1 tablet by mouth Daily.     • glimepiride (AMARYL) 2 MG tablet TAKE 1 TABLETS BY MOUTH BEFORE FIRST MAIN MEAL AND 2 TABLET WITH SUPPER.  6   • hydrOXYzine (ATARAX) 25 MG tablet Take 1 tablet by mouth At Night As Needed for Itching (one or two if itching). 30 tablet 1   • Ibuprofen-diphenhydrAMINE HCl (ADVIL PM) 200-25 MG capsule Take 1 capsule by mouth Every Night.     • JANUMET XR  MG tablet Take 1 tablet by mouth 2 (Two) Times a Day.  1   • Magnesium 250 MG tablet Take 1 tablet by mouth Daily. 90 each 3   • Multiple Vitamins-Minerals (CENTRUM SILVER) tablet Take 1 tablet by mouth Daily.     • pramipexole (MIRAPEX) 0.125 MG tablet TAKE 1 TABLET BY MOUTH 2 HOURS BEFORE SLEEP & MAY INCREASE EVERY WEEK 1 TAB UP TO 4 TABS BEFORE  tablet 1   • [DISCONTINUED] diltiaZEM CD (CARDIZEM CD) 180 MG 24 hr capsule Take 1 capsule by mouth 2 (Two) Times a Day.  1   • [DISCONTINUED] losartan (COZAAR) 50 MG  tablet Take 2 tablets by mouth Daily. (Patient taking differently: Take 25 mg by mouth Daily.) 30 tablet 1   • [DISCONTINUED] simvastatin (ZOCOR) 40 MG tablet Take 40 mg by mouth every night at bedtime.  3   • ferrous sulfate 325 (65 FE) MG tablet Take 325 mg by mouth Daily With Breakfast.       No current facility-administered medications on file prior to visit.        The following portions of the patient's history were reviewed and updated as appropriate: allergies, current medications, past family history, past medical history, past social history, past surgical history and problem list.    Review of Systems   Constitutional: Negative.         Joint redness and hives X1 yr.     HENT: Negative.  Negative for sinus pressure and sore throat.    Eyes: Negative.    Respiratory: Negative.  Negative for cough.    Cardiovascular: Negative.    Gastrointestinal: Negative.    Endocrine: Negative.    Genitourinary: Negative.    Musculoskeletal: Negative.    Skin: Negative.    Allergic/Immunologic: Positive for environmental allergies.   Neurological: Negative.    Hematological: Negative.    Psychiatric/Behavioral: Negative.        Objective   Physical Exam   Constitutional: She is oriented to person, place, and time. She appears well-developed.   HENT:   Head: Normocephalic and atraumatic.   Eyes: Conjunctivae and EOM are normal. Pupils are equal, round, and reactive to light.   Neck: Normal range of motion.   Cardiovascular: Normal rate and regular rhythm.   Pulmonary/Chest: Effort normal.   Decreased breath sound riley.   Abdominal: Soft. Bowel sounds are normal.   Musculoskeletal: Normal range of motion.   Lymphadenopathy:     She has no cervical adenopathy.   Neurological: She is alert and oriented to person, place, and time.   Skin: Skin is warm and dry. Rash noted.   Psychiatric: She has a normal mood and affect.   Nursing note and vitals reviewed.    PHQ-9 Total Score:      Assessment/Plan   Eugenia was seen today for  annual exam and urticaria.    Diagnoses and all orders for this visit:    Encounter for annual general medical examination with abnormal findings in adult    Osteoporosis without current pathological fracture, unspecified osteoporosis type  -     DEXA Bone Density Axial; Future    Hives    Other orders  -     diltiazem La (CARDIZEM LA) 420 MG 24 hr tablet; Take 1 tablet by mouth Daily.  -     atorvastatin (LIPITOR) 40 MG tablet; Take 1 tablet by mouth Daily.        Patient Instructions   1/2 Losartan/day for 3 days then off.  Call 3 weeks re: cough and rash.  OK to start Diltiazem 420 when stops Losartan and diltiazem 180-2X/day.  Reschedule lung cancer CT for 2 days or more later.  Get DM eye exam and send me results.  Td, ShingRix, low dose flu in October.  STOP SMOKING-cut down method discussed. STOP SIMVASTATIN-start Atorvastin when get

## 2019-09-03 ENCOUNTER — OFFICE VISIT (OUTPATIENT)
Dept: FAMILY MEDICINE CLINIC | Facility: CLINIC | Age: 64
End: 2019-09-03

## 2019-09-03 VITALS
TEMPERATURE: 98 F | SYSTOLIC BLOOD PRESSURE: 135 MMHG | DIASTOLIC BLOOD PRESSURE: 84 MMHG | RESPIRATION RATE: 16 BRPM | OXYGEN SATURATION: 95 % | BODY MASS INDEX: 24.13 KG/M2 | WEIGHT: 140.6 LBS | HEART RATE: 93 BPM

## 2019-09-03 DIAGNOSIS — L50.9 HIVES: Primary | ICD-10-CM

## 2019-09-03 PROCEDURE — 99213 OFFICE O/P EST LOW 20 MIN: CPT | Performed by: PREVENTIVE MEDICINE

## 2019-09-03 RX ORDER — HYDROXYZINE HYDROCHLORIDE 25 MG/1
25 TABLET, FILM COATED ORAL NIGHTLY PRN
Qty: 30 TABLET | Refills: 1 | Status: SHIPPED | OUTPATIENT
Start: 2019-09-03 | End: 2023-03-20 | Stop reason: SDUPTHER

## 2019-09-03 RX ORDER — ROSUVASTATIN CALCIUM 20 MG/1
20 TABLET, COATED ORAL NIGHTLY
COMMUNITY
Start: 2019-08-31 | End: 2020-03-05 | Stop reason: ALTCHOICE

## 2019-09-03 RX ORDER — CETIRIZINE HYDROCHLORIDE 10 MG/1
10 TABLET ORAL DAILY
COMMUNITY

## 2019-09-03 NOTE — PROGRESS NOTES
Subjective   Eugenia Madden is a 63 y.o. female presents for   Chief Complaint   Patient presents with   • Urticaria       Health Maintenance Due   Topic Date Due   • TDAP/TD VACCINES (1 - Tdap) 09/06/1974   • ZOSTER VACCINE (1 of 2) 09/06/2005   • DIABETIC EYE EXAM  06/06/2019   • DXA SCAN  06/23/2019   • INFLUENZA VACCINE  08/01/2019       History of Present Illness     Vitals:    09/03/19 0837 09/03/19 0841   BP: 143/82 135/84   BP Location: Right arm Left arm   Patient Position: Sitting Sitting   Cuff Size: Adult Adult   Pulse: 93    Resp: 16    Temp: 98 °F (36.7 °C)    TempSrc: Oral    SpO2: 95%    Weight: 63.8 kg (140 lb 9.6 oz)        Current Outpatient Medications on File Prior to Visit   Medication Sig Dispense Refill   • cetirizine (zyrTEC) 10 MG tablet Take 10 mg by mouth Daily.     • Cholecalciferol (VITAMIN D3) 29651 units tablet Take 1 tablet by mouth Daily.     • diltiazem La (CARDIZEM LA) 420 MG 24 hr tablet Take 1 tablet by mouth Daily. 30 tablet 11   • ferrous sulfate 325 (65 FE) MG tablet Take 325 mg by mouth Daily With Breakfast.     • glimepiride (AMARYL) 2 MG tablet TAKE 1 TABLETS BY MOUTH BEFORE FIRST MAIN MEAL AND 2 TABLET WITH SUPPER.  6   • hydrOXYzine (ATARAX) 25 MG tablet TAKE 1 TABLET BY MOUTH AT NIGHT AS NEEDED FOR ITCHING (ONE OR TWO IF ITCHING). 30 tablet 1   • Ibuprofen-diphenhydrAMINE HCl (ADVIL PM) 200-25 MG capsule Take 1 capsule by mouth Every Night.     • JANUMET XR  MG tablet Take 1 tablet by mouth 2 (Two) Times a Day.  1   • Magnesium 250 MG tablet Take 1 tablet by mouth Daily. 90 each 3   • Multiple Vitamins-Minerals (CENTRUM SILVER) tablet Take 1 tablet by mouth Daily.     • pramipexole (MIRAPEX) 0.125 MG tablet TAKE 1 TABLET BY MOUTH 2 HOURS BEFORE SLEEP & MAY INCREASE EVERY WEEK 1 TAB UP TO 4 TABS BEFORE  tablet 1   • rosuvastatin (CRESTOR) 20 MG tablet Take 20 mg by mouth Every Night.     • [DISCONTINUED] atorvastatin (LIPITOR) 40 MG tablet Take 1 tablet by  mouth Daily. 90 tablet 3     No current facility-administered medications on file prior to visit.        The following portions of the patient's history were reviewed and updated as appropriate: allergies, current medications, past family history, past medical history, past social history, past surgical history and problem list.    Review of Systems   Constitutional: Negative.    HENT: Negative.  Negative for sinus pressure and sore throat.    Eyes: Negative.    Respiratory: Positive for cough.    Cardiovascular: Negative.    Gastrointestinal: Negative.    Endocrine: Negative.    Genitourinary: Negative.    Musculoskeletal: Negative.    Skin: Negative.    Allergic/Immunologic: Positive for environmental allergies.   Neurological: Negative.    Hematological: Negative.    Psychiatric/Behavioral: Negative.        Objective   Physical Exam   Constitutional: She is oriented to person, place, and time. She appears well-developed.   HENT:   Head: Normocephalic and atraumatic.   Eyes: Conjunctivae and EOM are normal. Pupils are equal, round, and reactive to light.   Neck: Normal range of motion.   Cardiovascular: Normal rate and regular rhythm.   Pulmonary/Chest: Effort normal.   Decreased breath sound riley     Abdominal: Soft. Bowel sounds are normal.   Musculoskeletal: Normal range of motion.   Lymphadenopathy:     She has no cervical adenopathy.   Neurological: She is alert and oriented to person, place, and time.   Skin: Skin is warm and dry.   Generalized hives   Psychiatric: She has a normal mood and affect.   Nursing note and vitals reviewed.    PHQ-9 Total Score:      Assessment/Plan   Eugenia was seen today for urticaria.    Diagnoses and all orders for this visit:    Hives  -     Ambulatory Referral to Dermatology        There are no Patient Instructions on file for this visit.

## 2019-09-06 ENCOUNTER — TELEPHONE (OUTPATIENT)
Dept: FAMILY MEDICINE CLINIC | Facility: CLINIC | Age: 64
End: 2019-09-06

## 2019-09-06 DIAGNOSIS — L50.9 HIVES: Primary | ICD-10-CM

## 2019-09-06 RX ORDER — BENZONATATE 100 MG/1
100 CAPSULE ORAL 3 TIMES DAILY PRN
Qty: 30 CAPSULE | Refills: 1 | Status: SHIPPED | OUTPATIENT
Start: 2019-09-06 | End: 2020-02-25

## 2019-09-06 NOTE — TELEPHONE ENCOUNTER
Patient is now wanting labs because she was bitten by a spider a couple months ago and her son told her it could be from that. She wants to wait on the referral now please advise.

## 2019-09-06 NOTE — TELEPHONE ENCOUNTER
Referral placed to allergy.  If cough worsens to ER.  Tessalon sent.  Make sure off Losartan and Lisinopril

## 2019-09-06 NOTE — TELEPHONE ENCOUNTER
Patient wants to be referral to an allergic. She went to the dermatologist and they stuff they gave her is not working. She gave her a cream and upped her allergy pill to twice daily. She also wants to know if you would call her in something for the cough she has. She had it when she was here the other day.

## 2019-09-09 RX ORDER — GLIMEPIRIDE 2 MG/1
TABLET ORAL
Qty: 90 TABLET | Refills: 0 | Status: SHIPPED | OUTPATIENT
Start: 2019-09-09 | End: 2019-10-04 | Stop reason: SDUPTHER

## 2019-09-11 ENCOUNTER — HOSPITAL ENCOUNTER (OUTPATIENT)
Dept: PET IMAGING | Facility: HOSPITAL | Age: 64
Discharge: HOME | End: 2019-09-11
Admitting: PREVENTIVE MEDICINE

## 2019-09-11 DIAGNOSIS — Z72.0 TOBACCO USE: ICD-10-CM

## 2019-09-11 DIAGNOSIS — J18.9 PNEUMONIA OF LEFT LOWER LOBE DUE TO INFECTIOUS ORGANISM: Primary | ICD-10-CM

## 2019-09-11 PROCEDURE — G0297 LDCT FOR LUNG CA SCREEN: HCPCS

## 2019-09-11 RX ORDER — DOXYCYCLINE HYCLATE 100 MG
100 TABLET ORAL 2 TIMES DAILY
Qty: 20 TABLET | Refills: 0 | Status: SHIPPED | OUTPATIENT
Start: 2019-09-11 | End: 2019-12-02

## 2019-09-18 ENCOUNTER — HOSPITAL ENCOUNTER (OUTPATIENT)
Dept: BONE DENSITY | Facility: HOSPITAL | Age: 64
Discharge: HOME | End: 2019-09-18

## 2019-09-18 ENCOUNTER — HOSPITAL ENCOUNTER (OUTPATIENT)
Dept: MAMMOGRAPHY | Facility: HOSPITAL | Age: 64
Discharge: HOME | End: 2019-09-18
Admitting: PREVENTIVE MEDICINE

## 2019-09-18 ENCOUNTER — HOSPITAL ENCOUNTER (OUTPATIENT)
Dept: CT IMAGING | Facility: HOSPITAL | Age: 64
Discharge: HOME | End: 2019-09-18

## 2019-09-18 DIAGNOSIS — Z12.31 SCREENING MAMMOGRAM, ENCOUNTER FOR: ICD-10-CM

## 2019-09-18 DIAGNOSIS — R91.1 LUNG NODULE: ICD-10-CM

## 2019-09-18 PROCEDURE — 77080 DXA BONE DENSITY AXIAL: CPT

## 2019-09-18 PROCEDURE — 77067 SCR MAMMO BI INCL CAD: CPT

## 2019-09-18 PROCEDURE — 71250 CT THORAX DX C-: CPT

## 2019-09-18 PROCEDURE — 77063 BREAST TOMOSYNTHESIS BI: CPT

## 2019-09-19 DIAGNOSIS — J18.9 PNEUMONIA OF LEFT LOWER LOBE DUE TO INFECTIOUS ORGANISM: Primary | ICD-10-CM

## 2019-09-24 ENCOUNTER — OFFICE VISIT (OUTPATIENT)
Dept: FAMILY MEDICINE CLINIC | Facility: CLINIC | Age: 64
End: 2019-09-24

## 2019-09-24 VITALS
WEIGHT: 138.4 LBS | SYSTOLIC BLOOD PRESSURE: 154 MMHG | BODY MASS INDEX: 23.76 KG/M2 | DIASTOLIC BLOOD PRESSURE: 93 MMHG | OXYGEN SATURATION: 98 % | TEMPERATURE: 98 F | HEART RATE: 101 BPM | RESPIRATION RATE: 16 BRPM

## 2019-09-24 DIAGNOSIS — J18.9 PNEUMONIA OF RIGHT LOWER LOBE DUE TO INFECTIOUS ORGANISM: Primary | ICD-10-CM

## 2019-09-24 PROCEDURE — 99214 OFFICE O/P EST MOD 30 MIN: CPT | Performed by: PREVENTIVE MEDICINE

## 2019-09-24 RX ORDER — ATORVASTATIN CALCIUM 40 MG/1
40 TABLET, FILM COATED ORAL NIGHTLY
COMMUNITY
End: 2020-08-27

## 2019-09-24 RX ORDER — BENZONATATE 100 MG/1
100 CAPSULE ORAL 3 TIMES DAILY PRN
Qty: 30 CAPSULE | Refills: 0 | Status: SHIPPED | OUTPATIENT
Start: 2019-09-24 | End: 2019-12-02

## 2019-09-24 RX ORDER — DOXYCYCLINE HYCLATE 100 MG
100 TABLET ORAL 2 TIMES DAILY
Qty: 8 TABLET | Refills: 0 | Status: SHIPPED | OUTPATIENT
Start: 2019-09-24 | End: 2019-12-02

## 2019-09-24 RX ORDER — FAMOTIDINE 20 MG/1
20 TABLET, FILM COATED ORAL DAILY
COMMUNITY
Start: 2019-09-23

## 2019-09-24 NOTE — PROGRESS NOTES
Subjective   Eugenia Madden is a 64 y.o. female presents for   Chief Complaint   Patient presents with   • Pneumonia     listen to lungs   No fever but cough returned with med stoppage.  Hives still present.  Labs drawn.    Health Maintenance Due   Topic Date Due   • TDAP/TD VACCINES (1 - Tdap) 09/06/1974   • ZOSTER VACCINE (1 of 2) 09/06/2005   • DIABETIC EYE EXAM  06/06/2019   • INFLUENZA VACCINE  08/01/2019       History of Present Illness     Vitals:    09/24/19 0910 09/24/19 0916   BP: 129/91 154/93   BP Location: Right arm Left arm   Patient Position: Sitting Sitting   Cuff Size: Adult Adult   Pulse: 101    Resp: 16    Temp: 98 °F (36.7 °C)    TempSrc: Oral    SpO2: 98%    Weight: 62.8 kg (138 lb 6.4 oz)        Current Outpatient Medications on File Prior to Visit   Medication Sig Dispense Refill   • benzonatate (TESSALON PERLES) 100 MG capsule Take 1 capsule by mouth 3 (Three) Times a Day As Needed for Cough. 30 capsule 1   • cetirizine (zyrTEC) 10 MG tablet Take 10 mg by mouth Daily.     • famotidine (PEPCID) 20 MG tablet Take 20 mg by mouth Daily.     • atorvastatin (LIPITOR) 20 MG tablet Take 20 mg by mouth Every Night.     • Cholecalciferol (VITAMIN D3) 14703 units tablet Take 1 tablet by mouth Daily.     • diltiazem La (CARDIZEM LA) 420 MG 24 hr tablet Take 1 tablet by mouth Daily. 30 tablet 11   • doxycycline (VIBRAMYICN) 100 MG tablet Take 1 tablet by mouth 2 (Two) Times a Day. 20 tablet 0   • glimepiride (AMARYL) 2 MG tablet TAKE 1 TABLETS BY MOUTH BEFORE FIRST MAIN MEAL AND 2 TABLET WITH SUPPER. 90 tablet 0   • hydrOXYzine (ATARAX) 25 MG tablet TAKE 1 TABLET BY MOUTH AT NIGHT AS NEEDED FOR ITCHING (ONE OR TWO IF ITCHING). 30 tablet 1   • Ibuprofen-diphenhydrAMINE HCl (ADVIL PM) 200-25 MG capsule Take 1 capsule by mouth Every Night.     • JANUMET XR  MG tablet Take 1 tablet by mouth 2 (Two) Times a Day.  1   • pramipexole (MIRAPEX) 0.125 MG tablet TAKE 1 TABLET BY MOUTH 2 HOURS BEFORE SLEEP & MAY  INCREASE EVERY WEEK 1 TAB UP TO 4 TABS BEFORE  tablet 1   • rosuvastatin (CRESTOR) 20 MG tablet Take 20 mg by mouth Every Night.       No current facility-administered medications on file prior to visit.        The following portions of the patient's history were reviewed and updated as appropriate: allergies, current medications, past family history, past medical history, past social history, past surgical history and problem list.    Review of Systems   Constitutional: Negative.    HENT: Negative.  Negative for sinus pressure and sore throat.    Eyes: Negative.    Respiratory: Positive for cough.    Cardiovascular: Negative.    Gastrointestinal: Negative.    Endocrine: Negative.    Genitourinary: Negative.    Musculoskeletal: Negative.    Skin: Positive for rash.   Allergic/Immunologic: Positive for environmental allergies.   Neurological: Negative.    Hematological: Negative.    Psychiatric/Behavioral: Negative.        Objective   Physical Exam   Constitutional: She is oriented to person, place, and time. She appears well-developed.   HENT:   Head: Normocephalic and atraumatic.   Eyes: Conjunctivae and EOM are normal. Pupils are equal, round, and reactive to light.   Cardiovascular: Normal rate and regular rhythm.   Pulmonary/Chest: Effort normal and breath sounds normal.   R Lower lobe rhonchi   Abdominal: Soft. Bowel sounds are normal.   Lymphadenopathy:     She has no cervical adenopathy.   Neurological: She is alert and oriented to person, place, and time.   Skin: Skin is warm and dry. Rash noted.   hives   Psychiatric: She has a normal mood and affect.   Nursing note and vitals reviewed.    PHQ-9 Total Score:      Assessment/Plan   There are no diagnoses linked to this encounter.    Patient Instructions   Advise eye exam PA and lat chest end of next week

## 2019-09-25 RX ORDER — SIMVASTATIN 40 MG
TABLET ORAL
Qty: 90 TABLET | Refills: 3 | OUTPATIENT
Start: 2019-09-25

## 2019-09-25 NOTE — TELEPHONE ENCOUNTER
Please call patient and see if taking Diltiazem and which statin.  May need to change [id number] due to interaction with the two meds.

## 2019-09-25 NOTE — TELEPHONE ENCOUNTER
She is taking diltiazem 460 but remember her saying she was not taking the statin right now due to the hives

## 2019-09-30 RX ORDER — SITAGLIPTIN AND METFORMIN HYDROCHLORIDE 1000; 50 MG/1; MG/1
TABLET, FILM COATED, EXTENDED RELEASE ORAL
Qty: 60 TABLET | Refills: 0 | Status: SHIPPED | OUTPATIENT
Start: 2019-09-30 | End: 2019-12-27 | Stop reason: SDUPTHER

## 2019-10-02 ENCOUNTER — HOSPITAL ENCOUNTER (OUTPATIENT)
Dept: GENERAL RADIOLOGY | Facility: HOSPITAL | Age: 64
Discharge: HOME | End: 2019-10-02
Admitting: PREVENTIVE MEDICINE

## 2019-10-02 DIAGNOSIS — J18.9 PNEUMONIA OF LEFT LOWER LOBE DUE TO INFECTIOUS ORGANISM: ICD-10-CM

## 2019-10-02 PROCEDURE — 71046 X-RAY EXAM CHEST 2 VIEWS: CPT

## 2019-10-04 RX ORDER — GLIMEPIRIDE 2 MG/1
TABLET ORAL
Qty: 90 TABLET | Refills: 0 | Status: SHIPPED | OUTPATIENT
Start: 2019-10-04 | End: 2019-12-27 | Stop reason: SDUPTHER

## 2019-10-10 ENCOUNTER — TELEPHONE (OUTPATIENT)
Dept: FAMILY MEDICINE CLINIC | Facility: CLINIC | Age: 64
End: 2019-10-10

## 2019-10-10 NOTE — TELEPHONE ENCOUNTER
Patient called and states she found out the results of her allergy test and she ended up with a tick bite that cause her to be allergic to beef, pork and lamb.

## 2019-10-11 NOTE — TELEPHONE ENCOUNTER
She states she does [place] was [place]. She [place] was this spring

## 2019-10-29 RX ORDER — SITAGLIPTIN AND METFORMIN HYDROCHLORIDE 1000; 50 MG/1; MG/1
TABLET, FILM COATED, EXTENDED RELEASE ORAL
Qty: 60 TABLET | Refills: 0 | OUTPATIENT
Start: 2019-10-29

## 2019-12-01 RX ORDER — DILTIAZEM HYDROCHLORIDE 180 MG/1
CAPSULE, COATED, EXTENDED RELEASE ORAL
Qty: 180 CAPSULE | Refills: 2 | Status: SHIPPED | OUTPATIENT
Start: 2019-12-01 | End: 2020-03-05

## 2019-12-02 ENCOUNTER — OFFICE VISIT (OUTPATIENT)
Dept: FAMILY MEDICINE CLINIC | Facility: CLINIC | Age: 64
End: 2019-12-02

## 2019-12-02 VITALS
DIASTOLIC BLOOD PRESSURE: 81 MMHG | HEART RATE: 90 BPM | BODY MASS INDEX: 24.11 KG/M2 | SYSTOLIC BLOOD PRESSURE: 122 MMHG | TEMPERATURE: 98.1 F | HEIGHT: 64 IN | RESPIRATION RATE: 16 BRPM | OXYGEN SATURATION: 99 % | WEIGHT: 141.2 LBS

## 2019-12-02 DIAGNOSIS — M19.90 ARTHRITIS: ICD-10-CM

## 2019-12-02 DIAGNOSIS — E11.65 UNCONTROLLED TYPE 2 DIABETES MELLITUS WITH HYPERGLYCEMIA (HCC): ICD-10-CM

## 2019-12-02 DIAGNOSIS — I10 ESSENTIAL HYPERTENSION: ICD-10-CM

## 2019-12-02 DIAGNOSIS — F17.200 TOBACCO DEPENDENCE SYNDROME: ICD-10-CM

## 2019-12-02 DIAGNOSIS — E78.5 HYPERLIPIDEMIA, UNSPECIFIED HYPERLIPIDEMIA TYPE: ICD-10-CM

## 2019-12-02 DIAGNOSIS — E55.9 VITAMIN D DEFICIENCY: ICD-10-CM

## 2019-12-02 DIAGNOSIS — E53.8 B12 DEFICIENCY: Primary | ICD-10-CM

## 2019-12-02 DIAGNOSIS — D75.1 POLYCYTHEMIA: ICD-10-CM

## 2019-12-02 LAB
25(OH)D3 SERPL-MCNC: 41.5 NG/ML (ref 30–100)
ALBUMIN SERPL-MCNC: 4.1 G/DL (ref 3.5–5.2)
ALBUMIN/GLOB SERPL: 1.2 G/DL
ALP SERPL-CCNC: 102 U/L (ref 39–117)
ALT SERPL W P-5'-P-CCNC: 17 U/L (ref 1–33)
ANION GAP SERPL CALCULATED.3IONS-SCNC: 13.1 MMOL/L (ref 5–15)
AST SERPL-CCNC: 12 U/L (ref 1–32)
BASOPHILS # BLD AUTO: 0.08 10*3/MM3 (ref 0–0.2)
BASOPHILS NFR BLD AUTO: 1.2 % (ref 0–1.5)
BILIRUB SERPL-MCNC: 0.3 MG/DL (ref 0.2–1.2)
BUN BLD-MCNC: 17 MG/DL (ref 8–23)
BUN/CREAT SERPL: 27.9 (ref 7–25)
CALCIUM SPEC-SCNC: 9.4 MG/DL (ref 8.6–10.5)
CHLORIDE SERPL-SCNC: 98 MMOL/L (ref 98–107)
CHOLEST SERPL-MCNC: 124 MG/DL (ref 0–200)
CO2 SERPL-SCNC: 25.9 MMOL/L (ref 22–29)
CREAT BLD-MCNC: 0.61 MG/DL (ref 0.57–1)
DEPRECATED RDW RBC AUTO: 39.5 FL (ref 37–54)
EOSINOPHIL # BLD AUTO: 0.1 10*3/MM3 (ref 0–0.4)
EOSINOPHIL NFR BLD AUTO: 1.5 % (ref 0.3–6.2)
ERYTHROCYTE [DISTWIDTH] IN BLOOD BY AUTOMATED COUNT: 12.6 % (ref 12.3–15.4)
ERYTHROCYTE [SEDIMENTATION RATE] IN BLOOD: 21 MM/HR (ref 0–30)
GFR SERPL CREATININE-BSD FRML MDRD: 99 ML/MIN/1.73
GLOBULIN UR ELPH-MCNC: 3.3 GM/DL
GLUCOSE BLD-MCNC: 255 MG/DL (ref 65–99)
HBA1C MFR BLD: 11 % (ref 3.5–5.6)
HCT VFR BLD AUTO: 44.8 % (ref 34–46.6)
HDLC SERPL-MCNC: 40 MG/DL (ref 40–60)
HGB BLD-MCNC: 14.8 G/DL (ref 12–15.9)
IMM GRANULOCYTES # BLD AUTO: 0.19 10*3/MM3 (ref 0–0.05)
IMM GRANULOCYTES NFR BLD AUTO: 2.9 % (ref 0–0.5)
LDLC SERPL CALC-MCNC: 48 MG/DL (ref 0–100)
LDLC/HDLC SERPL: 1.2 {RATIO}
LYMPHOCYTES # BLD AUTO: 2.57 10*3/MM3 (ref 0.7–3.1)
LYMPHOCYTES NFR BLD AUTO: 39.3 % (ref 19.6–45.3)
MCH RBC QN AUTO: 28.7 PG (ref 26.6–33)
MCHC RBC AUTO-ENTMCNC: 33 G/DL (ref 31.5–35.7)
MCV RBC AUTO: 86.8 FL (ref 79–97)
MONOCYTES # BLD AUTO: 0.33 10*3/MM3 (ref 0.1–0.9)
MONOCYTES NFR BLD AUTO: 5 % (ref 5–12)
NEUTROPHILS # BLD AUTO: 3.27 10*3/MM3 (ref 1.7–7)
NEUTROPHILS NFR BLD AUTO: 50.1 % (ref 42.7–76)
NRBC BLD AUTO-RTO: 0 /100 WBC (ref 0–0.2)
PLATELET # BLD AUTO: 180 10*3/MM3 (ref 140–450)
PMV BLD AUTO: 12.5 FL (ref 6–12)
POTASSIUM BLD-SCNC: 4 MMOL/L (ref 3.5–5.2)
PROT SERPL-MCNC: 7.4 G/DL (ref 6–8.5)
RBC # BLD AUTO: 5.16 10*6/MM3 (ref 3.77–5.28)
SODIUM BLD-SCNC: 137 MMOL/L (ref 136–145)
TRIGL SERPL-MCNC: 181 MG/DL (ref 0–150)
TSH SERPL DL<=0.05 MIU/L-ACNC: 1.79 UIU/ML (ref 0.27–4.2)
VIT B12 BLD-MCNC: 511 PG/ML (ref 211–946)
VLDLC SERPL-MCNC: 36.2 MG/DL (ref 5–40)
WBC NRBC COR # BLD: 6.54 10*3/MM3 (ref 3.4–10.8)

## 2019-12-02 PROCEDURE — 82306 VITAMIN D 25 HYDROXY: CPT | Performed by: PREVENTIVE MEDICINE

## 2019-12-02 PROCEDURE — 90674 CCIIV4 VAC NO PRSV 0.5 ML IM: CPT | Performed by: PREVENTIVE MEDICINE

## 2019-12-02 PROCEDURE — 83036 HEMOGLOBIN GLYCOSYLATED A1C: CPT | Performed by: PREVENTIVE MEDICINE

## 2019-12-02 PROCEDURE — 82570 ASSAY OF URINE CREATININE: CPT | Performed by: PREVENTIVE MEDICINE

## 2019-12-02 PROCEDURE — 82043 UR ALBUMIN QUANTITATIVE: CPT | Performed by: PREVENTIVE MEDICINE

## 2019-12-02 PROCEDURE — 80061 LIPID PANEL: CPT | Performed by: PREVENTIVE MEDICINE

## 2019-12-02 PROCEDURE — 99213 OFFICE O/P EST LOW 20 MIN: CPT | Performed by: PREVENTIVE MEDICINE

## 2019-12-02 PROCEDURE — 80053 COMPREHEN METABOLIC PANEL: CPT | Performed by: PREVENTIVE MEDICINE

## 2019-12-02 PROCEDURE — 85025 COMPLETE CBC W/AUTO DIFF WBC: CPT | Performed by: PREVENTIVE MEDICINE

## 2019-12-02 PROCEDURE — 85652 RBC SED RATE AUTOMATED: CPT | Performed by: PREVENTIVE MEDICINE

## 2019-12-02 PROCEDURE — 90471 IMMUNIZATION ADMIN: CPT | Performed by: PREVENTIVE MEDICINE

## 2019-12-02 PROCEDURE — 84443 ASSAY THYROID STIM HORMONE: CPT | Performed by: PREVENTIVE MEDICINE

## 2019-12-02 PROCEDURE — 36415 COLL VENOUS BLD VENIPUNCTURE: CPT | Performed by: PREVENTIVE MEDICINE

## 2019-12-02 PROCEDURE — 82607 VITAMIN B-12: CPT | Performed by: PREVENTIVE MEDICINE

## 2019-12-02 RX ORDER — FERROUS SULFATE 325(65) MG
325 TABLET ORAL
COMMUNITY
End: 2021-12-16

## 2019-12-02 RX ORDER — MULTIVITAMIN WITH IRON
250 TABLET ORAL DAILY
COMMUNITY
End: 2021-12-16

## 2019-12-02 NOTE — PROGRESS NOTES
"Julito Madden is a 64 y.o. female presents for   Chief Complaint   Patient presents with   • Hypertension     fasting    • Hyperlipidemia     Blood pressures and sugars controlled.  Forgot to take BP med yesterday.  Will get eye exam  Health Maintenance Due   Topic Date Due   • TDAP/TD VACCINES (1 - Tdap) 09/06/1974   • ZOSTER VACCINE (1 of 2) 09/06/2005   • DIABETIC EYE EXAM  06/06/2019   • INFLUENZA VACCINE  08/01/2019       History of Present Illness     Vitals:    12/02/19 0807 12/02/19 0816   BP: 132/92 122/81   BP Location: Right arm Left arm   Patient Position: Sitting Sitting   Cuff Size: Adult Adult   Pulse: 90    Resp: 16    Temp: 98.1 °F (36.7 °C)    TempSrc: Oral    SpO2: 99%    Weight: 64 kg (141 lb 3.2 oz)    Height: 162.6 cm (64\")      Body mass index is 24.24 kg/m².    Current Outpatient Medications on File Prior to Visit   Medication Sig Dispense Refill   • atorvastatin (LIPITOR) 40 MG tablet Take 40 mg by mouth Every Night.     • cetirizine (zyrTEC) 10 MG tablet Take 10 mg by mouth Daily.     • Cholecalciferol (VITAMIN D3) 125 MCG (5000 UT) tablet tablet Take 1 tablet by mouth Daily.     • dilTIAZem CD (CARDIZEM CD) 180 MG 24 hr capsule TAKE ONE CAPSULE BY MOUTH EVERY MORNING AND EVERY EVENING 180 capsule 2   • diltiazem La (CARDIZEM LA) 420 MG 24 hr tablet Take 1 tablet by mouth Daily. 30 tablet 11   • famotidine (PEPCID) 20 MG tablet Take 20 mg by mouth 2 (Two) Times a Day.     • ferrous sulfate 325 (65 FE) MG tablet Take 325 mg by mouth Daily With Breakfast.     • glimepiride (AMARYL) 2 MG tablet Take 1  tablets by mouth before first main meal  and 2 tablet with supper.([facility]) 90 tablet 0   • hydrOXYzine (ATARAX) 25 MG tablet TAKE 1 TABLET BY MOUTH AT NIGHT AS NEEDED FOR ITCHING (ONE OR TWO IF ITCHING). 30 tablet 1   • Ibuprofen-diphenhydrAMINE HCl (ADVIL PM) 200-25 MG capsule Take 1 capsule by mouth Every Night.     • JANUMET XR  " MG tablet TAKE 1 TABLET BY MOUTH TWICE A DAY 60 tablet 0   • Magnesium 250 MG tablet Take 250 mg by mouth Daily.     • pramipexole (MIRAPEX) 0.125 MG tablet TAKE 1 TABLET BY MOUTH 2 HOURS BEFORE SLEEP & MAY INCREASE EVERY WEEK 1 TAB UP TO 4 TABS BEFORE  tablet 1   • benzonatate (TESSALON PERLES) 100 MG capsule Take 1 capsule by mouth 3 (Three) Times a Day As Needed for Cough. 30 capsule 1   • rosuvastatin (CRESTOR) 20 MG tablet Take 20 mg by mouth Every Night.     • [DISCONTINUED] benzonatate (TESSALON PERLES) 100 MG capsule Take 1 capsule by mouth 3 (Three) Times a Day As Needed for Cough. 30 capsule 0   • [DISCONTINUED] doxycycline (VIBRAMYICN) 100 MG tablet Take 1 tablet by mouth 2 (Two) Times a Day. 20 tablet 0   • [DISCONTINUED] doxycycline (VIBRAMYICN) 100 MG tablet Take 1 tablet by mouth 2 (Two) Times a Day. 8 tablet 0     No current facility-administered medications on file prior to visit.        The following portions of the patient's history were reviewed and updated as appropriate: allergies, current medications, past family history, past medical history, past social history, past surgical history and problem list.    Review of Systems   Constitutional: Negative.    HENT: Negative.  Negative for sinus pressure and sore throat.    Eyes: Negative.    Respiratory: Negative.  Negative for cough.    Cardiovascular: Negative.    Gastrointestinal: Negative.    Endocrine: Negative.    Genitourinary: Negative.    Musculoskeletal: Negative.    Skin: Negative.         hives   Allergic/Immunologic: Positive for environmental allergies.   Neurological: Negative.    Hematological: Negative.    Psychiatric/Behavioral: Negative.        Objective   Physical Exam   Constitutional: She is oriented to person, place, and time. She appears well-developed.   HENT:   Head: Normocephalic and atraumatic.   Eyes: Conjunctivae and EOM are normal. Pupils are equal, round, and reactive to light.   Neck: Normal range of motion.    Cardiovascular: Normal rate and regular rhythm.   Pulmonary/Chest: Effort normal and breath sounds normal.   Abdominal: Soft. Bowel sounds are normal.   Musculoskeletal: Normal range of motion.   R thumb pain and swelling-FROM  Off for 3 weeks.   Lymphadenopathy:     She has no cervical adenopathy.   Neurological: She is alert and oriented to person, place, and time.   Skin: Skin is warm and dry.   Psychiatric: She has a normal mood and affect.   Nursing note and vitals reviewed.    PHQ-9 Total Score:      Assessment/Plan   Eugenia was seen today for hypertension and hyperlipidemia.    Diagnoses and all orders for this visit:    B12 deficiency  -     Vitamin B12    Uncontrolled type 2 diabetes mellitus with hyperglycemia (CMS/[facility])/advise eye exam  -     Hemoglobin A1c  -     Comprehensive Metabolic Panel  -     Microalbumin / Creatinine Urine Ratio - Urine, Clean Catch  -     TSH    Hyperlipidemia, unspecified hyperlipidemia type  -     Lipid Panel    Essential hypertension/controlled if takes meds    Polycythemia  -     CBC Auto Differential    Tobacco dependence syndrome/Encouraged to stop smoking                                                                                                            Vitamin D deficiency  -     Vitamin D 25 Hydroxy    Arthritis  -     Sedimentation Rate    Other orders  -     Zoster Vac Recomb Adjuvanted (SHINGRIX) 50 MCG/0.5ML reconstituted suspension; Inject 0.5 mL into the appropriate muscle as directed by prescriber 1 (One) Time for 1 dose. To be administered at the pharmacy  -     Tdap (ADACEL) 5-2-15.5 LF-MCG/0.5 injection; Inject 0.5 mL into the appropriate muscle as directed by prescriber 1 (One) Time for 1 dose. To be administered at the pharmacy  -     Flucelvax Quad=>4Years ([id number])        Patient Instructions   Advise eye exam this month.  STOP SMOKING

## 2019-12-03 LAB
ALBUMIN UR-MCNC: <1.2 MG/DL
CREAT UR-MCNC: 97.3 MG/DL
MICROALBUMIN/CREAT UR: NORMAL MG/G{CREAT}

## 2019-12-09 RX ORDER — GLIMEPIRIDE 2 MG/1
TABLET ORAL
Qty: 90 TABLET | Refills: 0 | OUTPATIENT
Start: 2019-12-09

## 2019-12-27 RX ORDER — GLIMEPIRIDE 2 MG/1
TABLET ORAL
Qty: 90 TABLET | Refills: 0 | Status: SHIPPED | OUTPATIENT
Start: 2019-12-27 | End: 2020-01-20

## 2020-01-06 RX ORDER — DILTIAZEM HYDROCHLORIDE EXTENDED-RELEASE TABLETS 420 MG/1
420 TABLET, EXTENDED RELEASE ORAL DAILY
Qty: 90 TABLET | Refills: 2 | Status: SHIPPED | OUTPATIENT
Start: 2020-01-06 | End: 2020-03-05

## 2020-01-06 RX ORDER — PRAMIPEXOLE DIHYDROCHLORIDE 0.12 MG/1
TABLET ORAL
Qty: 360 TABLET | Refills: 1 | Status: SHIPPED | OUTPATIENT
Start: 2020-01-06 | End: 2020-07-02

## 2020-01-20 RX ORDER — GLIMEPIRIDE 2 MG/1
TABLET ORAL
Qty: 90 TABLET | Refills: 3 | Status: SHIPPED | OUTPATIENT
Start: 2020-01-20 | End: 2020-04-09 | Stop reason: SDUPTHER

## 2020-02-25 ENCOUNTER — OFFICE VISIT (OUTPATIENT)
Dept: FAMILY MEDICINE CLINIC | Facility: CLINIC | Age: 65
End: 2020-02-25

## 2020-02-25 VITALS
SYSTOLIC BLOOD PRESSURE: 129 MMHG | OXYGEN SATURATION: 97 % | TEMPERATURE: 98.2 F | DIASTOLIC BLOOD PRESSURE: 69 MMHG | HEART RATE: 74 BPM | WEIGHT: 143.6 LBS | BODY MASS INDEX: 24.52 KG/M2 | HEIGHT: 64 IN | RESPIRATION RATE: 16 BRPM

## 2020-02-25 DIAGNOSIS — J40 BRONCHITIS: Primary | ICD-10-CM

## 2020-02-25 PROCEDURE — 99213 OFFICE O/P EST LOW 20 MIN: CPT | Performed by: NURSE PRACTITIONER

## 2020-02-25 RX ORDER — AMOXICILLIN 875 MG/1
875 TABLET, COATED ORAL 2 TIMES DAILY
Qty: 20 TABLET | Refills: 0 | Status: SHIPPED | OUTPATIENT
Start: 2020-02-25 | End: 2020-04-09

## 2020-02-25 RX ORDER — METHYLPREDNISOLONE 4 MG/1
TABLET ORAL
Qty: 1 EACH | Refills: 0 | Status: SHIPPED | OUTPATIENT
Start: 2020-02-25 | End: 2020-03-05

## 2020-02-25 RX ORDER — BENZONATATE 100 MG/1
100 CAPSULE ORAL 3 TIMES DAILY PRN
Qty: 30 CAPSULE | Refills: 1 | Status: SHIPPED | OUTPATIENT
Start: 2020-02-25 | End: 2020-08-06

## 2020-02-25 NOTE — PATIENT INSTRUCTIONS
Cough, Adult    A cough helps to clear your throat and lungs. A cough may last only 2-3 weeks (acute), or it may last longer than 8 weeks (chronic). Many different things can cause a cough. A cough may be a sign of an illness or another medical condition.  Follow these instructions at home:  · Pay attention to any changes in your cough.  · Take medicines only as told by your doctor.  ? If you were prescribed an antibiotic medicine, take it as told by your doctor. Do not stop taking it even if you start to feel better.  ? Talk with your doctor before you try using a cough medicine.  · Drink enough fluid to keep your pee (urine) clear or pale yellow.  · If the air is dry, use a cold steam vaporizer or humidifier in your home.  · Stay away from things that make you cough at work or at home.  · If your cough is worse at night, try using extra pillows to raise your head up higher while you sleep.  · Do not smoke, and try not to be around smoke. If you need help quitting, ask your doctor.  · Do not have caffeine.  · Do not drink alcohol.  · Rest as needed.  Contact a doctor if:  · You have new problems (symptoms).  · You cough up yellow fluid (pus).  · Your cough does not get better after 2-3 weeks, or your cough gets worse.  · Medicine does not help your cough and you are not sleeping well.  · You have pain that gets worse or pain that is not helped with medicine.  · You have a fever.  · You are losing weight and you do not know why.  · You have night sweats.  Get help right away if:  · You cough up blood.  · You have trouble breathing.  · Your heartbeat is very fast.  This information is not intended to replace advice given to you by your health care provider. Make sure you discuss any questions you have with your health care provider.  Document Released: 08/30/2012 Document Revised: 05/25/2017 Document Reviewed: 02/24/2016  [employer] Interactive Patient Education © 2019 [employer]

## 2020-02-25 NOTE — PROGRESS NOTES
"Subjective   Eugenia Madden is a 64 y.o. female presents for   Chief Complaint   Patient presents with   • URI       Health Maintenance Due   Topic Date Due   • DIABETIC EYE EXAM  06/06/2019   • ZOSTER VACCINE (2 of 2) 01/27/2020       History of Present Illness   Pt reports new onset of cough and shortness of breath 2 days ago.  Pt reports no improvement despite taking mucinex and zyrtec.  Pt also reports congestion and runny nose but denies fever.  Pt treated for pneumonia in the fall 2019 but reports she waited several days to seek treatment.    Vitals:    02/25/20 1150 02/25/20 1153   BP: 123/71 129/69   BP Location: Left arm Right arm   Patient Position: Sitting Sitting   Cuff Size: Adult Adult   Pulse: 78 74   Resp: 16    Temp: 98.2 °F (36.8 °C)    TempSrc: Oral    SpO2: 97%    Weight: 65.1 kg (143 lb 9.6 oz)    Height: 162.6 cm (64\")      Body mass index is 24.65 kg/m².    Current Outpatient Medications on File Prior to Visit   Medication Sig Dispense Refill   • atorvastatin (LIPITOR) 40 MG tablet Take 40 mg by mouth Every Night.     • cetirizine (zyrTEC) 10 MG tablet Take 10 mg by mouth Daily.     • Cholecalciferol (VITAMIN D3) 125 MCG (5000 UT) tablet tablet Take 1 tablet by mouth Daily.     • dilTIAZem CD (CARDIZEM CD) 180 MG 24 hr capsule TAKE ONE CAPSULE BY MOUTH EVERY MORNING AND EVERY EVENING 180 capsule 2   • dilTIAZem La (CARDIZEM LA) 420 MG 24 hr tablet Take 1 tablet by mouth Daily. 90 tablet 2   • famotidine (PEPCID) 20 MG tablet Take 20 mg by mouth 2 (Two) Times a Day.     • ferrous sulfate 325 (65 FE) MG tablet Take 325 mg by mouth Daily With Breakfast.     • glimepiride (AMARYL) 2 MG tablet TAKE 1 TABLETS BY MOUTH BEFORE FIRST MAIN MEAL AND 2 TABLET WITH SUPPER 90 tablet 3   • hydrOXYzine (ATARAX) 25 MG tablet TAKE 1 TABLET BY MOUTH AT NIGHT AS NEEDED FOR ITCHING (ONE OR TWO IF ITCHING). 30 tablet 1   • Ibuprofen-diphenhydrAMINE HCl (ADVIL PM) 200-25 MG capsule Take 1 capsule by mouth Every Night. "     • Magnesium 250 MG tablet Take 250 mg by mouth Daily.     • pramipexole (MIRAPEX) 0.125 MG tablet TAKE 1 TABLET BY MOUTH 2 HOURS BEFORE SLEEP & MAY INCREASE EVERY WEEK 1 TAB UP TO 4 TABS BEFORE  tablet 1   • rosuvastatin (CRESTOR) 20 MG tablet Take 20 mg by mouth Every Night.     • SITagliptin-metFORMIN HCl ER (JANUMET XR)  MG tablet Take 1 tablet by mouth 2 (Two) Times a Day. 180 tablet 0   • [DISCONTINUED] benzonatate (TESSALON PERLES) 100 MG capsule Take 1 capsule by mouth 3 (Three) Times a Day As Needed for Cough. 30 capsule 1     No current facility-administered medications on file prior to visit.        The following portions of the patient's history were reviewed and updated as appropriate: allergies, current medications, past family history, past medical history, past social history, past surgical history and problem list.    Review of Systems   Constitutional: Positive for fatigue. Negative for chills and fever.   HENT: Positive for congestion, rhinorrhea and sinus pressure. Negative for sore throat.    Eyes: Negative for blurred vision.   Respiratory: Positive for cough and shortness of breath.    Cardiovascular: Negative for chest pain.   Gastrointestinal: Negative for abdominal pain.   Endocrine: Negative.    Genitourinary: Negative.    Musculoskeletal: Negative for arthralgias and joint swelling.   Skin: Negative for color change.   Allergic/Immunologic: Positive for environmental allergies and food allergies.   Neurological: Positive for headache. Negative for dizziness.   Psychiatric/Behavioral: Negative for behavioral problems.       Objective   Physical Exam   Constitutional: She is oriented to person, place, and time. She appears well-developed and well-nourished.   HENT:   Head: Normocephalic and atraumatic.   Right Ear: Hearing, tympanic membrane, external ear and ear canal normal.   Left Ear: Hearing, tympanic membrane, external ear and ear canal normal.   Nose: Mucosal edema  and rhinorrhea present. Right sinus exhibits no maxillary sinus tenderness and no frontal sinus tenderness. Left sinus exhibits no maxillary sinus tenderness and no frontal sinus tenderness.   Mouth/Throat: Uvula is midline and mucous membranes are normal. Posterior oropharyngeal erythema present.   Eyes: Pupils are equal, round, and reactive to light. Conjunctivae and EOM are normal.   Neck: Normal range of motion. Neck supple.   Cardiovascular: Normal rate, regular rhythm, normal heart sounds and intact distal pulses.   Pulmonary/Chest: Effort normal. She has wheezes (throughout).   Abdominal: Soft.   Musculoskeletal: Normal range of motion.   Lymphadenopathy:     She has no cervical adenopathy.   Neurological: She is alert and oriented to person, place, and time.   Skin: Skin is warm and dry.   Psychiatric: She has a normal mood and affect. Her behavior is normal. Judgment and thought content normal.   Nursing note and vitals reviewed.    PHQ-9 Total Score:      Assessment/Plan   Eugenia was seen today for uri.    Diagnoses and all orders for this visit:    Bronchitis    Other orders  -     amoxicillin (AMOXIL) 875 MG tablet; Take 1 tablet by mouth 2 (Two) Times a Day.  -     methylPREDNISolone (MEDROL, RADHA,) 4 MG tablet; Take as directed on package instructions.  -     benzonatate (TESSALON PERLES) 100 MG capsule; Take 1 capsule by mouth 3 (Three) Times a Day As Needed for Cough.        Patient Instructions   Cough, Adult    A cough helps to clear your throat and lungs. A cough may last only 2-3 weeks (acute), or it may last longer than 8 weeks (chronic). Many different things can cause a cough. A cough may be a sign of an illness or another medical condition.  Follow these instructions at home:  · Pay attention to any changes in your cough.  · Take medicines only as told by your doctor.  ? If you were prescribed an antibiotic medicine, take it as told by your doctor. Do not stop taking it even if you start to  feel better.  ? Talk with your doctor before you try using a cough medicine.  · Drink enough fluid to keep your pee (urine) clear or pale yellow.  · If the air is dry, use a cold steam vaporizer or humidifier in your home.  · Stay away from things that make you cough at work or at home.  · If your cough is worse at night, try using extra pillows to raise your head up higher while you sleep.  · Do not smoke, and try not to be around smoke. If you need help quitting, ask your doctor.  · Do not have caffeine.  · Do not drink alcohol.  · Rest as needed.  Contact a doctor if:  · You have new problems (symptoms).  · You cough up yellow fluid (pus).  · Your cough does not get better after 2-3 weeks, or your cough gets worse.  · Medicine does not help your cough and you are not sleeping well.  · You have pain that gets worse or pain that is not helped with medicine.  · You have a fever.  · You are losing weight and you do not know why.  · You have night sweats.  Get help right away if:  · You cough up blood.  · You have trouble breathing.  · Your heartbeat is very fast.  This information is not intended to replace advice given to you by your health care provider. Make sure you discuss any questions you have with your health care provider.  Document Released: 08/30/2012 Document Revised: 05/25/2017 Document Reviewed: 02/24/2016  [employer] Interactive Patient Education © 2019 [employer]

## 2020-02-27 ENCOUNTER — HOSPITAL ENCOUNTER (OUTPATIENT)
Dept: GENERAL RADIOLOGY | Facility: HOSPITAL | Age: 65
Discharge: HOME | End: 2020-02-27
Admitting: NURSE PRACTITIONER

## 2020-02-27 ENCOUNTER — TELEPHONE (OUTPATIENT)
Dept: FAMILY MEDICINE CLINIC | Facility: CLINIC | Age: 65
End: 2020-02-27

## 2020-02-27 DIAGNOSIS — R06.89 BREATHING DIFFICULTY: ICD-10-CM

## 2020-02-27 DIAGNOSIS — R05.9 COUGH: ICD-10-CM

## 2020-02-27 DIAGNOSIS — R05.9 COUGH: Primary | ICD-10-CM

## 2020-02-27 PROCEDURE — 71046 X-RAY EXAM CHEST 2 VIEWS: CPT

## 2020-03-05 ENCOUNTER — OFFICE VISIT (OUTPATIENT)
Dept: FAMILY MEDICINE CLINIC | Facility: CLINIC | Age: 65
End: 2020-03-05

## 2020-03-05 VITALS
WEIGHT: 142.8 LBS | OXYGEN SATURATION: 95 % | HEART RATE: 83 BPM | TEMPERATURE: 98.2 F | BODY MASS INDEX: 24.38 KG/M2 | DIASTOLIC BLOOD PRESSURE: 69 MMHG | HEIGHT: 64 IN | RESPIRATION RATE: 16 BRPM | SYSTOLIC BLOOD PRESSURE: 105 MMHG

## 2020-03-05 DIAGNOSIS — K76.0 FATTY LIVER: ICD-10-CM

## 2020-03-05 DIAGNOSIS — R01.1 CARDIAC MURMUR: ICD-10-CM

## 2020-03-05 DIAGNOSIS — F17.200 TOBACCO DEPENDENCE SYNDROME: ICD-10-CM

## 2020-03-05 DIAGNOSIS — I71.20 THORACIC AORTIC ANEURYSM WITHOUT RUPTURE (HCC): ICD-10-CM

## 2020-03-05 DIAGNOSIS — R91.8 MULTIPLE NODULES OF LUNG: ICD-10-CM

## 2020-03-05 DIAGNOSIS — E78.5 HYPERLIPIDEMIA, UNSPECIFIED HYPERLIPIDEMIA TYPE: ICD-10-CM

## 2020-03-05 DIAGNOSIS — G25.81 RESTLESS LEG: ICD-10-CM

## 2020-03-05 DIAGNOSIS — R06.83 SNORING: ICD-10-CM

## 2020-03-05 DIAGNOSIS — E11.65 UNCONTROLLED TYPE 2 DIABETES MELLITUS WITH HYPERGLYCEMIA (HCC): Primary | ICD-10-CM

## 2020-03-05 DIAGNOSIS — D75.1 POLYCYTHEMIA: ICD-10-CM

## 2020-03-05 DIAGNOSIS — I25.10 CHRONIC CORONARY ARTERY DISEASE: ICD-10-CM

## 2020-03-05 DIAGNOSIS — I10 ESSENTIAL HYPERTENSION: ICD-10-CM

## 2020-03-05 PROBLEM — N20.0 KIDNEY STONES: Status: ACTIVE | Noted: 2020-03-05

## 2020-03-05 PROBLEM — M19.90 ARTHRITIS: Status: ACTIVE | Noted: 2020-03-05

## 2020-03-05 LAB — HBA1C MFR BLD: 10.9 % (ref 3.5–5.6)

## 2020-03-05 PROCEDURE — 82043 UR ALBUMIN QUANTITATIVE: CPT | Performed by: PREVENTIVE MEDICINE

## 2020-03-05 PROCEDURE — 80061 LIPID PANEL: CPT | Performed by: PREVENTIVE MEDICINE

## 2020-03-05 PROCEDURE — 82570 ASSAY OF URINE CREATININE: CPT | Performed by: PREVENTIVE MEDICINE

## 2020-03-05 PROCEDURE — 99214 OFFICE O/P EST MOD 30 MIN: CPT | Performed by: PREVENTIVE MEDICINE

## 2020-03-05 PROCEDURE — 83036 HEMOGLOBIN GLYCOSYLATED A1C: CPT | Performed by: PREVENTIVE MEDICINE

## 2020-03-05 PROCEDURE — 85025 COMPLETE CBC W/AUTO DIFF WBC: CPT | Performed by: PREVENTIVE MEDICINE

## 2020-03-05 PROCEDURE — 80053 COMPREHEN METABOLIC PANEL: CPT | Performed by: PREVENTIVE MEDICINE

## 2020-03-05 PROCEDURE — 36415 COLL VENOUS BLD VENIPUNCTURE: CPT | Performed by: PREVENTIVE MEDICINE

## 2020-03-05 RX ORDER — DILTIAZEM HYDROCHLORIDE 180 MG/1
CAPSULE, COATED, EXTENDED RELEASE ORAL
Qty: 270 CAPSULE | Refills: 3
Start: 2020-03-05 | End: 2021-03-04

## 2020-03-05 NOTE — PATIENT INSTRUCTIONS
Call when runs out of Diltiazem  180 and will send 60 mg to take with 420 at night. Take 180 Diltiazem 3 capsules at night.  Call if cough worsens or in one week if we haven't called about echocardiogram.

## 2020-03-05 NOTE — PROGRESS NOTES
"Julito Madden is a 64 y.o. female presents for   Chief Complaint   Patient presents with   • Diabetes     fasting    • Hypertension   • Hyperlipidemia       Health Maintenance Due   Topic Date Due   • DIABETIC EYE EXAM  06/06/2019   • ZOSTER VACCINE (2 of 2) 01/27/2020       Cough is improving and xray normal.  Not checking blood sugars and taking 600Diltiazem. Will decrease to 540.  Also checking on Statin.  Rash improving.  Explained does have smallthorasic aneurysm and wikll follow 9/2020       Vitals:    03/05/20 0822 03/05/20 0829   BP: 112/71 105/69   BP Location: Right arm Left arm   Patient Position: Sitting Sitting   Cuff Size: Adult Adult   Pulse: 82 83   Resp: 16    Temp: 98.2 °F (36.8 °C)    TempSrc: Oral    SpO2: 95%    Weight: 64.8 kg (142 lb 12.8 oz)    Height: 162.6 cm (64\")      Body mass index is 24.51 kg/m².    Current Outpatient Medications on File Prior to Visit   Medication Sig Dispense Refill   • amoxicillin (AMOXIL) 875 MG tablet Take 1 tablet by mouth 2 (Two) Times a Day. 20 tablet 0   • atorvastatin (LIPITOR) 40 MG tablet Take 40 mg by mouth Every Night.     • benzonatate (TESSALON PERLES) 100 MG capsule Take 1 capsule by mouth 3 (Three) Times a Day As Needed for Cough. 30 capsule 1   • cetirizine (zyrTEC) 10 MG tablet Take 10 mg by mouth Daily.     • Cholecalciferol (VITAMIN D3) 125 MCG (5000 UT) tablet tablet Take 1 tablet by mouth Daily.     • famotidine (PEPCID) 20 MG tablet Take 20 mg by mouth 2 (Two) Times a Day.     • ferrous sulfate 325 (65 FE) MG tablet Take 325 mg by mouth Daily With Breakfast.     • glimepiride (AMARYL) 2 MG tablet TAKE 1 TABLETS BY MOUTH BEFORE FIRST MAIN MEAL AND 2 TABLET WITH SUPPER 90 tablet 3   • hydrOXYzine (ATARAX) 25 MG tablet TAKE 1 TABLET BY MOUTH AT NIGHT AS NEEDED FOR ITCHING (ONE OR TWO IF ITCHING). 30 tablet 1   • Ibuprofen-diphenhydrAMINE HCl (ADVIL PM) 200-25 MG capsule Take 1 capsule by mouth Every Night.     • Magnesium 250 MG tablet " Take 250 mg by mouth Daily.     • pramipexole (MIRAPEX) 0.125 MG tablet TAKE 1 TABLET BY MOUTH 2 HOURS BEFORE SLEEP & MAY INCREASE EVERY WEEK 1 TAB UP TO 4 TABS BEFORE  tablet 1   • SITagliptin-metFORMIN HCl ER (JANUMET XR)  MG tablet Take 1 tablet by mouth 2 (Two) Times a Day. 180 tablet 0   • [DISCONTINUED] dilTIAZem CD (CARDIZEM CD) 180 MG 24 hr capsule TAKE ONE CAPSULE BY MOUTH EVERY MORNING AND EVERY EVENING (Patient taking differently: Take 180 mg by mouth Daily.) 180 capsule 2   • [DISCONTINUED] dilTIAZem La (CARDIZEM LA) 420 MG 24 hr tablet Take 1 tablet by mouth Daily. 90 tablet 2   • [DISCONTINUED] methylPREDNISolone (MEDROL, RADHA,) 4 MG tablet Take as directed on package instructions. 1 each 0   • [DISCONTINUED] rosuvastatin (CRESTOR) 20 MG tablet Take 20 mg by mouth Every Night.       No current facility-administered medications on file prior to visit.        The following portions of the patient's history were reviewed and updated as appropriate: allergies, current medications, past family history, past medical history, past social history, past surgical history and problem list.    Review of Systems   Constitutional: Negative.    HENT: Negative.  Negative for sinus pressure and sore throat.    Eyes: Negative.    Respiratory: Positive for cough.    Cardiovascular: Negative.    Gastrointestinal: Negative.    Endocrine: Negative.    Genitourinary: Negative.    Musculoskeletal: Negative.    Skin: Negative.    Allergic/Immunologic: Positive for environmental allergies.   Neurological: Negative.    Hematological: Negative.    Psychiatric/Behavioral: Negative.        Objective   Physical Exam   Constitutional: She is oriented to person, place, and time. She appears well-developed.   HENT:   Head: Normocephalic and atraumatic.   Eyes: Pupils are equal, round, and reactive to light. Conjunctivae and EOM are normal.   Neck: Normal range of motion.   Cardiovascular: Normal rate and regular rhythm.    Blowing heartmurmur L sternum-nonradiating   Pulmonary/Chest: Effort normal and breath sounds normal.   Decreased breath sound riley     Abdominal: Soft. Bowel sounds are normal.   Musculoskeletal: Normal range of motion.   Lymphadenopathy:     She has no cervical adenopathy.   Neurological: She is alert and oriented to person, place, and time.   Skin: Skin is warm and dry.   Psychiatric: She has a normal mood and affect.   Nursing note and vitals reviewed.    PHQ-9 Total Score:      Assessment/Plan   Eugenia was seen today for diabetes, hypertension and hyperlipidemia.    Diagnoses and all orders for this visit:    Uncontrolled type 2 diabetes mellitus with hyperglycemia (CMS/HCC)  Comments:  Blood sugars not checked  Orders:  -     Comprehensive Metabolic Panel  -     Hemoglobin A1c  -     Microalbumin / Creatinine Urine Ratio - Urine, Clean Catch    Hyperlipidemia, unspecified hyperlipidemia type  -     Lipid Panel    Essential hypertension  Comments:  No HA or chest pain.  Has been on 600 of Diltiazem and will decrease to 540    Polycythemia  -     CBC Auto Differential    Tobacco dependence syndrome  Comments:  Trying to stop on own    Cardiac murmur  Comments:  Present since teen and two E[facility] in [employer].  No chest pain or SOA.    Chronic coronary artery disease  Comments:  [place]    Fatty liver  Comments:  Cut fat in diet    Thoracic aortic aneurysm without rupture (CMS/HCC)  Comments:  Discussed and advise stopp smoking    Multiple nodules of lung    Snoring  Comments:  Only when on back    Restless leg  Comments:  controlled    Other orders  -     dilTIAZem CD (CARDIZEM CD) 180 MG 24 hr capsule; Take three capsules at night        Patient Instructions   Call when runs out of Diltiazem  180 and will send 60 mg to take with 420 at night. Take 180 Diltiazem 3 capsules at night.  Call if cough worsens or in one week if we haven't called about echocardiogram.

## 2020-03-06 ENCOUNTER — TELEPHONE (OUTPATIENT)
Dept: FAMILY MEDICINE CLINIC | Facility: CLINIC | Age: 65
End: 2020-03-06

## 2020-03-06 LAB
ALBUMIN SERPL-MCNC: 3.9 G/DL (ref 3.5–5.2)
ALBUMIN UR-MCNC: 3.5 MG/DL
ALBUMIN/GLOB SERPL: 1.3 G/DL
ALP SERPL-CCNC: 92 U/L (ref 39–117)
ALT SERPL W P-5'-P-CCNC: 14 U/L (ref 1–33)
ANION GAP SERPL CALCULATED.3IONS-SCNC: 11.5 MMOL/L (ref 5–15)
AST SERPL-CCNC: 11 U/L (ref 1–32)
BASOPHILS # BLD AUTO: 0.05 10*3/MM3 (ref 0–0.2)
BASOPHILS NFR BLD AUTO: 0.6 % (ref 0–1.5)
BILIRUB SERPL-MCNC: 0.7 MG/DL (ref 0.2–1.2)
BUN BLD-MCNC: 13 MG/DL (ref 8–23)
BUN/CREAT SERPL: 27.1 (ref 7–25)
CALCIUM SPEC-SCNC: 9.4 MG/DL (ref 8.6–10.5)
CHLORIDE SERPL-SCNC: 97 MMOL/L (ref 98–107)
CHOLEST SERPL-MCNC: 103 MG/DL (ref 0–200)
CO2 SERPL-SCNC: 28.5 MMOL/L (ref 22–29)
CREAT BLD-MCNC: 0.48 MG/DL (ref 0.57–1)
CREAT UR-MCNC: 153.8 MG/DL
DEPRECATED RDW RBC AUTO: 41.1 FL (ref 37–54)
EOSINOPHIL # BLD AUTO: 0.12 10*3/MM3 (ref 0–0.4)
EOSINOPHIL NFR BLD AUTO: 1.5 % (ref 0.3–6.2)
ERYTHROCYTE [DISTWIDTH] IN BLOOD BY AUTOMATED COUNT: 12.7 % (ref 12.3–15.4)
GFR SERPL CREATININE-BSD FRML MDRD: 130 ML/MIN/1.73
GLOBULIN UR ELPH-MCNC: 2.9 GM/DL
GLUCOSE BLD-MCNC: 272 MG/DL (ref 65–99)
HCT VFR BLD AUTO: 49.7 % (ref 34–46.6)
HDLC SERPL-MCNC: 37 MG/DL (ref 40–60)
HGB BLD-MCNC: 16.7 G/DL (ref 12–15.9)
LDLC SERPL CALC-MCNC: 44 MG/DL (ref 0–100)
LDLC/HDLC SERPL: 1.18 {RATIO}
LYMPHOCYTES # BLD AUTO: 2.76 10*3/MM3 (ref 0.7–3.1)
LYMPHOCYTES NFR BLD AUTO: 33.5 % (ref 19.6–45.3)
MCH RBC QN AUTO: 29.6 PG (ref 26.6–33)
MCHC RBC AUTO-ENTMCNC: 33.6 G/DL (ref 31.5–35.7)
MCV RBC AUTO: 88 FL (ref 79–97)
MICROALBUMIN/CREAT UR: 22.8 MG/G
MONOCYTES # BLD AUTO: 0.51 10*3/MM3 (ref 0.1–0.9)
MONOCYTES NFR BLD AUTO: 6.2 % (ref 5–12)
NEUTROPHILS # BLD AUTO: 4.68 10*3/MM3 (ref 1.7–7)
NEUTROPHILS NFR BLD AUTO: 56.9 % (ref 42.7–76)
PLATELET # BLD AUTO: 155 10*3/MM3 (ref 140–450)
PMV BLD AUTO: 12.8 FL (ref 6–12)
POTASSIUM BLD-SCNC: 4.2 MMOL/L (ref 3.5–5.2)
PROT SERPL-MCNC: 6.8 G/DL (ref 6–8.5)
RBC # BLD AUTO: 5.65 10*6/MM3 (ref 3.77–5.28)
SODIUM BLD-SCNC: 137 MMOL/L (ref 136–145)
TRIGL SERPL-MCNC: 112 MG/DL (ref 0–150)
VLDLC SERPL-MCNC: 22.4 MG/DL (ref 5–40)
WBC NRBC COR # BLD: 8.23 10*3/MM3 (ref 3.4–10.8)

## 2020-03-06 NOTE — TELEPHONE ENCOUNTER
----- Message from Melisa Taveras MD sent at 3/6/2020  6:41 AM EST -----  Glucose 272 with A1C showing poor control at 10.9.  Would she consider once weekly shot of Ozempic-helps with weightloss-make sure no history of medullary thyroid or multiple endocrine neoplasia.  She can discuss with Dr. Kim 4/9/2020 visit or consider starting now.  HGB is elevated-probably from smoking but make sure labs get repeated in 3 months as if blood becomes too thick, may have to remove some of it.

## 2020-03-06 NOTE — TELEPHONE ENCOUNTER
Spoke with patient voiced she is going to think about the ozempic with dr plascencia in April and she voiced she understood the rest.

## 2020-03-20 RX ORDER — SITAGLIPTIN AND METFORMIN HYDROCHLORIDE 1000; 50 MG/1; MG/1
TABLET, FILM COATED, EXTENDED RELEASE ORAL
Qty: 180 TABLET | Refills: 0 | Status: SHIPPED | OUTPATIENT
Start: 2020-03-20 | End: 2020-06-15

## 2020-04-09 ENCOUNTER — TELEMEDICINE (OUTPATIENT)
Dept: ENDOCRINOLOGY | Facility: CLINIC | Age: 65
End: 2020-04-09

## 2020-04-09 VITALS — HEIGHT: 64 IN | BODY MASS INDEX: 24.51 KG/M2

## 2020-04-09 DIAGNOSIS — I10 ESSENTIAL HYPERTENSION: ICD-10-CM

## 2020-04-09 DIAGNOSIS — E78.2 MIXED HYPERLIPIDEMIA: ICD-10-CM

## 2020-04-09 DIAGNOSIS — E11.65 UNCONTROLLED TYPE 2 DIABETES MELLITUS WITH HYPERGLYCEMIA (HCC): Primary | ICD-10-CM

## 2020-04-09 PROCEDURE — 99214 OFFICE O/P EST MOD 30 MIN: CPT | Performed by: INTERNAL MEDICINE

## 2020-04-09 RX ORDER — CETIRIZINE HYDROCHLORIDE 10 MG/1
10 TABLET ORAL DAILY
COMMUNITY
End: 2020-08-06

## 2020-04-09 RX ORDER — GLIMEPIRIDE 4 MG/1
TABLET ORAL
Qty: 60 TABLET | Refills: 4 | Status: SHIPPED | OUTPATIENT
Start: 2020-04-09 | End: 2020-07-28

## 2020-04-09 NOTE — PROGRESS NOTES
Endocrine Progress Note Outpatient     Patient Care Team:  Melisa Taveras MD as PCP - General    Chief Complaint: Follow-up type 2 diabetes: Visit was conducted through telehealth due to coronavirus pandemic  Unable to complete visit using a video connection to the patient. A phone visit was used to complete this visits. Total time of discussion was 15 minutes.    HPI: 64-year-old female with history of type 2 diabetes, hypertension and hyperlipidemia was last seen in February 2019 was followed through telehealth.  For type 2 diabetes: She is currently on Janumet XR 50/1000, 2 tablets daily with glimepiride 4 mg in the evening with supper and 2 mg with breakfast.  She tells me her blood sugars are running higher than 200 most of the time and has not been following her diet.  She has done diet class in the past.  Hypertension: She tells me her blood pressure has been doing very well  Hyperlipidemia: She is currently on atorvastatin.    Past Medical History:   Diagnosis Date   • Arthritis    • Hyperlipidemia    • Hypertension    • Kidney stones    • Restless leg        Social History     Socioeconomic History   • Marital status:      Spouse name: Not on file   • Number of children: Not on file   • Years of education: Not on file   • Highest education level: Not on file   Tobacco Use   • Smoking status: Current Every Day Smoker     Packs/day: 0.50     Types: Cigarettes   • Smokeless tobacco: Never Used   • Tobacco comment: Passive Smoke: Y   Substance and Sexual Activity   • Alcohol use: No     Frequency: Never     Binge frequency: Never   • Drug use: No   • Sexual activity: Yes     Partners: Male     Birth control/protection: None       Family History   Problem Relation Age of Onset   • Heart disease Mother    • Cancer Mother         Breast Cancer   • Breast cancer Mother 55   • Diabetes Father    • Stroke Father    • Heart disease Father    • Hypertension Father    • Hypertension Sister    • Diabetes  Sister    • Heart disease Sister    • Diabetes Brother    • Hypertension Brother    • Heart disease Brother    • Other Other         Abstraction from [facility] Cholesterol   • Diabetes Brother    • Hypertension Brother    • No Known Problems Brother    • Heart disease Brother    • Cancer Brother         colon   • Other Brother        No Known Allergies    ROS:   Constitutional:  Denies fatigue, tiredness.    Eyes:  Denies change in visual acuity   HENT:  Denies nasal congestion or sore throat   Respiratory: denies cough, shortness of breath.   Cardiovascular:  denies chest pain, edema   GI:  Denies abdominal pain, nausea, vomiting.   Musculoskeletal:  Denies back pain or joint pain   Integument:  Denies dry skin and rash   Neurologic:  Denies headache, focal weakness or sensory changes   Endocrine:  Denies polyuria or polydipsia   Psychiatric:  Denies depression or anxiety      There were no vitals filed for this visit.    Physical Exam:  GEN: NAD, alert and oriented and able to carry on a conversation.  Rest of the exam was deferred due to this being a telehealth visit.      Results Review:     I reviewed the patient's new clinical results.    Lab Results   Component Value Date    HGBA1C 10.9 (H) 03/05/2020    HGBA1C 11.0 (H) 12/02/2019    HGBA1C 6.1 (H) 06/24/2019      Lab Results   Component Value Date    GLUCOSE 272 (H) 03/05/2020    BUN 13 03/05/2020    CREATININE 0.48 (L) 03/05/2020    EGFRIFNONA 130 03/05/2020    BCR 27.1 (H) 03/05/2020    K 4.2 03/05/2020    CO2 28.5 03/05/2020    CALCIUM 9.4 03/05/2020    ALBUMIN 3.90 03/05/2020    LABIL2 1.3 03/12/2019    AST 11 03/05/2020    ALT 14 03/05/2020    CHOL 103 03/05/2020    TRIG 112 03/05/2020    LDL 44 03/05/2020    HDL 37 (L) 03/05/2020     Lab Results   Component Value Date    TSH 1.790 12/02/2019         Medication Review: Reviewed.       Current Outpatient Medications:   •  atorvastatin (LIPITOR) 40 MG tablet, Take 40 mg by mouth Every Night., Disp: ,  Rfl:   •  benzonatate (TESSALON PERLES) 100 MG capsule, Take 1 capsule by mouth 3 (Three) Times a Day As Needed for Cough., Disp: 30 capsule, Rfl: 1  •  cetirizine (zyrTEC) 10 MG tablet, Take 10 mg by mouth Daily., Disp: , Rfl:   •  cetirizine (zyrTEC) 10 MG tablet, Take 10 mg by mouth Daily., Disp: , Rfl:   •  Cholecalciferol (VITAMIN D3) 125 MCG (5000 UT) tablet tablet, Take 1 tablet by mouth Daily., Disp: , Rfl:   •  dilTIAZem CD (CARDIZEM CD) 180 MG 24 hr capsule, Take three capsules at night, Disp: 270 capsule, Rfl: 3  •  famotidine (PEPCID) 20 MG tablet, Take 20 mg by mouth 2 (Two) Times a Day., Disp: , Rfl:   •  ferrous sulfate 325 (65 FE) MG tablet, Take 325 mg by mouth Daily With Breakfast., Disp: , Rfl:   •  glimepiride (AMARYL) 4 MG tablet, 1 tablet twice a day with meals., Disp: 60 tablet, Rfl: 4  •  hydrOXYzine (ATARAX) 25 MG tablet, TAKE 1 TABLET BY MOUTH AT NIGHT AS NEEDED FOR ITCHING (ONE OR TWO IF ITCHING)., Disp: 30 tablet, Rfl: 1  •  Ibuprofen-diphenhydrAMINE HCl (ADVIL PM) 200-25 MG capsule, Take 1 capsule by mouth Every Night., Disp: , Rfl:   •  JANUMET XR  MG tablet, TAKE 1 TABLET BY MOUTH TWICE A DAY, Disp: 180 tablet, Rfl: 0  •  Magnesium 250 MG tablet, Take 250 mg by mouth Daily., Disp: , Rfl:   •  pramipexole (MIRAPEX) 0.125 MG tablet, TAKE 1 TABLET BY MOUTH 2 HOURS BEFORE SLEEP & MAY INCREASE EVERY WEEK 1 TAB UP TO 4 TABS BEFORE BED, Disp: 360 tablet, Rfl: 1  •  Empagliflozin (Jardiance) 10 MG tablet, Take 10 mg by mouth Daily., Disp: 30 tablet, Rfl: 4      Assessment/Plan   1.  Diabetes mellitus type 2: Uncontrolled with worsening of A1c at 10.9%.  We talked about diet, she is going to pay attention to the diet.  I will also add Jardiance 10 mg p.o. daily.  Side effects of yeast infection and dehydration discussed with her and advised to drink plenty of water and keep the genital area clean and dry.  Also will change glimepiride to 4 mg twice a day and continue Janumet XR  50/thousand, 1 tablet twice a day.  She is advised to get regular eye exam.  She is also advised to keep regular glucose source with her in case of low blood sugar.  2.  Hypertension: Well-controlled, continue current medication  3.  Hyperlipidemia: On atorvastatin and well-controlled with LDL 44 and triglycerides 112.  We will continue current dose of atorvastatin.            Lei Kim MD FACE.

## 2020-04-09 NOTE — PATIENT INSTRUCTIONS
Please start Jardiance 10 mg p.o. daily  Please continue Janumet XR 50/1000, 2 tablets p.o. daily  Please change glimepiride to 4 mg twice a day with meals  Always keep glucose source with you in case of low blood sugar  Please work on your diet  Please get follow-up labs in 3 months with follow-up in 3 months  Please get regular eye exam and flu vaccine.

## 2020-04-15 RX ORDER — GLIMEPIRIDE 2 MG/1
TABLET ORAL
Qty: 270 TABLET | Refills: 1 | OUTPATIENT
Start: 2020-04-15

## 2020-06-15 RX ORDER — SITAGLIPTIN AND METFORMIN HYDROCHLORIDE 1000; 50 MG/1; MG/1
TABLET, FILM COATED, EXTENDED RELEASE ORAL
Qty: 180 TABLET | Refills: 1 | Status: SHIPPED | OUTPATIENT
Start: 2020-06-15 | End: 2020-08-06

## 2020-07-02 RX ORDER — PRAMIPEXOLE DIHYDROCHLORIDE 0.12 MG/1
TABLET ORAL
Qty: 360 TABLET | Refills: 1 | Status: SHIPPED | OUTPATIENT
Start: 2020-07-02 | End: 2020-08-06

## 2020-07-28 RX ORDER — GLIMEPIRIDE 4 MG/1
TABLET ORAL
Qty: 180 TABLET | Refills: 1 | Status: SHIPPED | OUTPATIENT
Start: 2020-07-28 | End: 2021-06-10 | Stop reason: DRUGHIGH

## 2020-07-30 ENCOUNTER — LAB (OUTPATIENT)
Dept: LAB | Facility: HOSPITAL | Age: 65
End: 2020-07-30

## 2020-07-30 DIAGNOSIS — E11.65 UNCONTROLLED TYPE 2 DIABETES MELLITUS WITH HYPERGLYCEMIA (HCC): ICD-10-CM

## 2020-07-30 DIAGNOSIS — E78.2 MIXED HYPERLIPIDEMIA: ICD-10-CM

## 2020-07-30 DIAGNOSIS — I10 ESSENTIAL HYPERTENSION: ICD-10-CM

## 2020-07-30 LAB
ALBUMIN SERPL-MCNC: 3.9 G/DL (ref 3.5–5.2)
ALBUMIN UR-MCNC: <1.2 MG/DL
ALBUMIN/GLOB SERPL: 1.4 G/DL
ALP SERPL-CCNC: 103 U/L (ref 39–117)
ALT SERPL W P-5'-P-CCNC: 17 U/L (ref 1–33)
ANION GAP SERPL CALCULATED.3IONS-SCNC: 11.9 MMOL/L (ref 5–15)
AST SERPL-CCNC: 12 U/L (ref 1–32)
BILIRUB SERPL-MCNC: 0.5 MG/DL (ref 0–1.2)
BUN SERPL-MCNC: 12 MG/DL (ref 8–23)
BUN/CREAT SERPL: 23.1 (ref 7–25)
CALCIUM SPEC-SCNC: 9.2 MG/DL (ref 8.6–10.5)
CHLORIDE SERPL-SCNC: 100 MMOL/L (ref 98–107)
CHOLEST SERPL-MCNC: 125 MG/DL (ref 0–200)
CO2 SERPL-SCNC: 25.1 MMOL/L (ref 22–29)
CREAT SERPL-MCNC: 0.52 MG/DL (ref 0.57–1)
CREAT UR-MCNC: 66.2 MG/DL
GFR SERPL CREATININE-BSD FRML MDRD: 119 ML/MIN/1.73
GLOBULIN UR ELPH-MCNC: 2.8 GM/DL
GLUCOSE SERPL-MCNC: 183 MG/DL (ref 65–99)
HBA1C MFR BLD: 10.2 % (ref 3.5–5.6)
HDLC SERPL-MCNC: 32 MG/DL (ref 40–60)
LDLC SERPL CALC-MCNC: 59 MG/DL (ref 0–100)
LDLC/HDLC SERPL: 1.85 {RATIO}
MICROALBUMIN/CREAT UR: NORMAL MG/G{CREAT}
POTASSIUM SERPL-SCNC: 3.7 MMOL/L (ref 3.5–5.2)
PROT SERPL-MCNC: 6.7 G/DL (ref 6–8.5)
SODIUM SERPL-SCNC: 137 MMOL/L (ref 136–145)
TRIGL SERPL-MCNC: 169 MG/DL (ref 0–150)
VLDLC SERPL-MCNC: 33.8 MG/DL (ref 5–40)

## 2020-07-30 PROCEDURE — 80061 LIPID PANEL: CPT

## 2020-07-30 PROCEDURE — 36415 COLL VENOUS BLD VENIPUNCTURE: CPT

## 2020-07-30 PROCEDURE — 83036 HEMOGLOBIN GLYCOSYLATED A1C: CPT

## 2020-07-30 PROCEDURE — 82570 ASSAY OF URINE CREATININE: CPT

## 2020-07-30 PROCEDURE — 82043 UR ALBUMIN QUANTITATIVE: CPT

## 2020-07-30 PROCEDURE — 80053 COMPREHEN METABOLIC PANEL: CPT

## 2020-08-06 ENCOUNTER — TELEMEDICINE (OUTPATIENT)
Dept: ENDOCRINOLOGY | Facility: CLINIC | Age: 65
End: 2020-08-06

## 2020-08-06 VITALS — HEIGHT: 64 IN | BODY MASS INDEX: 23.73 KG/M2 | WEIGHT: 139 LBS

## 2020-08-06 DIAGNOSIS — E78.2 MIXED HYPERLIPIDEMIA: ICD-10-CM

## 2020-08-06 DIAGNOSIS — I10 ESSENTIAL HYPERTENSION: ICD-10-CM

## 2020-08-06 DIAGNOSIS — E11.65 UNCONTROLLED TYPE 2 DIABETES MELLITUS WITH HYPERGLYCEMIA (HCC): Primary | ICD-10-CM

## 2020-08-06 PROCEDURE — 99214 OFFICE O/P EST MOD 30 MIN: CPT | Performed by: INTERNAL MEDICINE

## 2020-08-06 RX ORDER — METFORMIN HYDROCHLORIDE 500 MG/1
TABLET, EXTENDED RELEASE ORAL
Qty: 60 TABLET | Refills: 6 | Status: SHIPPED | OUTPATIENT
Start: 2020-08-06 | End: 2021-02-23

## 2020-08-06 NOTE — PROGRESS NOTES
Endocrine Progress Note Outpatient     Patient Care Team:  Melisa Taveras MD as PCP - General  You have chosen to receive care through a telehealth visit.  Do you consent to use a video/audio connection for your medical care today? Yes.     Chief Complaint: Follow-up type 2 diabetes: Visit was conducted through audio and video conference utilizing [employer].       HPI: 64-year-old female with history of type 2 diabetes, hypertension and hyperlipidemia was last seen in February 2019 was followed through telehealth.    For type 2 diabetes: She is currently on Janumet XR 50/1000, 2 tablets daily with glimepiride 4 mg twice a day with meals along with Jardiance 10 mg p.o. daily.  She has not been following her diet very well.   Hypertension: She tells me her blood pressure has been doing very well  Hyperlipidemia: She is currently on atorvastatin.    Past Medical History:   Diagnosis Date   • Arthritis    • Hyperlipidemia    • Hypertension    • Kidney stones    • Restless leg        Social History     Socioeconomic History   • Marital status:      Spouse name: Not on file   • Number of children: Not on file   • Years of education: Not on file   • Highest education level: Not on file   Tobacco Use   • Smoking status: Current Every Day Smoker     Packs/day: 0.50     Types: Cigarettes   • Smokeless tobacco: Never Used   • Tobacco comment: Passive Smoke: Y   Substance and Sexual Activity   • Alcohol use: No     Frequency: Never     Binge frequency: Never   • Drug use: No   • Sexual activity: Yes     Partners: Male     Birth control/protection: None       Family History   Problem Relation Age of Onset   • Heart disease Mother    • Cancer Mother         Breast Cancer   • Breast cancer Mother 55   • Diabetes Father    • Stroke Father    • Heart disease Father    • Hypertension Father    • Hypertension Sister    • Diabetes Sister    • Heart disease Sister    • Diabetes Brother    • Hypertension Brother    • Heart  disease Brother    • Other Other         [facility] Cholesterol   • Diabetes Brother    • Hypertension Brother    • No Known Problems Brother    • Heart disease Brother    • Cancer Brother         colon   • Other Brother        No Known Allergies    ROS:   Constitutional:  Denies fatigue, tiredness.    Eyes:  Denies change in visual acuity   HENT:  Denies nasal congestion or sore throat   Respiratory: denies cough, shortness of breath.   Cardiovascular:  denies chest pain, edema   GI:  Denies abdominal pain, nausea, vomiting.   Musculoskeletal:  Denies back pain or joint pain   Integument:  Denies dry skin and rash   Neurologic:  Denies headache, focal weakness or sensory changes   Endocrine:  Denies polyuria or polydipsia   Psychiatric:  Denies depression or anxiety      There were no vitals filed for this visit.    Physical Exam:  GEN: NAD, alert and oriented and able to carry on a conversation.  Looks healthy on video.  Alert and oriented and able to carry on conversation.  Breathing effort was normal.      Results Review:     I reviewed the patient's new clinical results.    Lab Results   Component Value Date    HGBA1C 10.2 (H) 07/30/2020    HGBA1C 10.9 (H) 03/05/2020    HGBA1C 11.0 (H) 12/02/2019      Lab Results   Component Value Date    GLUCOSE 183 (H) 07/30/2020    BUN 12 07/30/2020    CREATININE 0.52 (L) 07/30/2020    EGFRIFNONA 119 07/30/2020    BCR 23.1 07/30/2020    K 3.7 07/30/2020    CO2 25.1 07/30/2020    CALCIUM 9.2 07/30/2020    ALBUMIN 3.90 07/30/2020    LABIL2 1.3 03/12/2019    AST 12 07/30/2020    ALT 17 07/30/2020    CHOL 125 07/30/2020    TRIG 169 (H) 07/30/2020    LDL 59 07/30/2020    HDL 32 (L) 07/30/2020     Lab Results   Component Value Date    TSH 1.790 12/02/2019         Medication Review: Reviewed.       Current Outpatient Medications:   •  atorvastatin (LIPITOR) 40 MG tablet, Take 40 mg by mouth Every Night., Disp: , Rfl:   •  benzonatate (TESSALON PERLES) 100 MG  capsule, Take 1 capsule by mouth 3 (Three) Times a Day As Needed for Cough., Disp: 30 capsule, Rfl: 1  •  cetirizine (zyrTEC) 10 MG tablet, Take 10 mg by mouth Daily., Disp: , Rfl:   •  Cholecalciferol (VITAMIN D3) 125 MCG (5000 UT) tablet tablet, Take 1 tablet by mouth Daily., Disp: , Rfl:   •  dilTIAZem CD (CARDIZEM CD) 180 MG 24 hr capsule, Take three capsules at night, Disp: 270 capsule, Rfl: 3  •  Empagliflozin (Jardiance) 10 MG tablet, Take 10 mg by mouth Daily., Disp: 30 tablet, Rfl: 4  •  famotidine (PEPCID) 20 MG tablet, Take 20 mg by mouth Daily., Disp: , Rfl:   •  ferrous sulfate 325 (65 FE) MG tablet, Take 325 mg by mouth Daily With Breakfast., Disp: , Rfl:   •  glimepiride (AMARYL) 4 MG tablet, TAKE 1 TABLET BY MOUTH TWICE A DAY WITH MEALS (Patient taking differently: Take 4 mg by mouth. TAKE 2 TABLET BY MOUTH TWICE A DAY WITH MEALS), Disp: 180 tablet, Rfl: 1  •  hydrOXYzine (ATARAX) 25 MG tablet, TAKE 1 TABLET BY MOUTH AT NIGHT AS NEEDED FOR ITCHING (ONE OR TWO IF ITCHING)., Disp: 30 tablet, Rfl: 1  •  Ibuprofen-diphenhydrAMINE HCl (ADVIL PM) 200-25 MG capsule, Take 1 capsule by mouth Every Night., Disp: , Rfl:   •  JANUMET XR  MG tablet, TAKE 1 TABLET BY MOUTH TWICE A DAY, Disp: 180 tablet, Rfl: 1  •  Magnesium 250 MG tablet, Take 250 mg by mouth Daily., Disp: , Rfl:   •  cetirizine (zyrTEC) 10 MG tablet, Take 10 mg by mouth Daily., Disp: , Rfl:   •  pramipexole (MIRAPEX) 0.125 MG tablet, TAKE 1 TABLET BY MOUTH 2 HOURS BEFORE SLEEP & MAY INCREASE EVERY WEEK 1 TAB UP TO 4 TABS BEFORE BED, Disp: 360 tablet, Rfl: 1      Assessment/Plan   1.  Diabetes mellitus type 2: Uncontrolled with A1c of 10.2%.  Will add Ozempic 0.25 mg subcu weekly for 4 weeks and if able to tolerate then increase the dose to 0.5 mg subcu weekly.  Will DC Janumet and add metformin extended release 500 mg 2 tablets twice a day.  Continue Jardiance and glimepiride.  Continue to work on your diet and activity.  Get annual eye  exam.  She is also advised to keep regular glucose source with her in case of low blood sugar.  2.  Hypertension: Check blood pressure at home and send readings for review.  3.  Hyperlipidemia: On atorvastatin and well-controlled with LDL 59 and triglycerides 169.  We will continue current dose of atorvastatin.            Lei Kim MD FACE.

## 2020-08-06 NOTE — PATIENT INSTRUCTIONS
DC Janumet  Start Ozempic 0.25 mg subcu once a week for 4 weeks and if able to tolerate then increase the dose to 0.5 mg subcu weekly.  Start metformin extended release 500 mg, 2 tablets twice a day  Continue Jardiance and glimepiride  Please work on your diet  Always keep glucose source in case of low blood sugar  Get annual eye exam  Follow-up in 4 months with labs.  Check blood pressure at home and send readings for review.

## 2020-08-20 ENCOUNTER — OFFICE (AMBULATORY)
Dept: URBAN - METROPOLITAN AREA PATHOLOGY 4 | Facility: PATHOLOGY | Age: 65
End: 2020-08-20

## 2020-08-20 ENCOUNTER — ON CAMPUS - OUTPATIENT (AMBULATORY)
Dept: URBAN - METROPOLITAN AREA HOSPITAL 2 | Facility: HOSPITAL | Age: 65
End: 2020-08-20
Payer: COMMERCIAL

## 2020-08-20 VITALS
HEART RATE: 85 BPM | OXYGEN SATURATION: 96 % | SYSTOLIC BLOOD PRESSURE: 111 MMHG | WEIGHT: 135 LBS | SYSTOLIC BLOOD PRESSURE: 110 MMHG | SYSTOLIC BLOOD PRESSURE: 122 MMHG | HEIGHT: 63 IN | DIASTOLIC BLOOD PRESSURE: 84 MMHG | HEART RATE: 99 BPM | SYSTOLIC BLOOD PRESSURE: 158 MMHG | HEART RATE: 86 BPM | HEART RATE: 93 BPM | OXYGEN SATURATION: 100 % | HEART RATE: 90 BPM | OXYGEN SATURATION: 97 % | DIASTOLIC BLOOD PRESSURE: 81 MMHG | DIASTOLIC BLOOD PRESSURE: 90 MMHG | DIASTOLIC BLOOD PRESSURE: 71 MMHG | HEART RATE: 97 BPM | HEART RATE: 89 BPM | DIASTOLIC BLOOD PRESSURE: 80 MMHG | OXYGEN SATURATION: 99 % | RESPIRATION RATE: 16 BRPM | SYSTOLIC BLOOD PRESSURE: 112 MMHG | TEMPERATURE: 96.9 F | SYSTOLIC BLOOD PRESSURE: 150 MMHG | OXYGEN SATURATION: 98 % | DIASTOLIC BLOOD PRESSURE: 79 MMHG | SYSTOLIC BLOOD PRESSURE: 121 MMHG | SYSTOLIC BLOOD PRESSURE: 126 MMHG | RESPIRATION RATE: 18 BRPM | HEART RATE: 88 BPM | SYSTOLIC BLOOD PRESSURE: 125 MMHG | HEART RATE: 80 BPM | DIASTOLIC BLOOD PRESSURE: 72 MMHG | HEART RATE: 87 BPM

## 2020-08-20 DIAGNOSIS — K63.5 POLYP OF COLON: ICD-10-CM

## 2020-08-20 DIAGNOSIS — K64.1 SECOND DEGREE HEMORRHOIDS: ICD-10-CM

## 2020-08-20 DIAGNOSIS — D12.3 BENIGN NEOPLASM OF TRANSVERSE COLON: ICD-10-CM

## 2020-08-20 DIAGNOSIS — Z86.010 PERSONAL HISTORY OF COLONIC POLYPS: ICD-10-CM

## 2020-08-20 DIAGNOSIS — K64.4 RESIDUAL HEMORRHOIDAL SKIN TAGS: ICD-10-CM

## 2020-08-20 DIAGNOSIS — D12.2 BENIGN NEOPLASM OF ASCENDING COLON: ICD-10-CM

## 2020-08-20 DIAGNOSIS — K57.30 DIVERTICULOSIS OF LARGE INTESTINE WITHOUT PERFORATION OR ABS: ICD-10-CM

## 2020-08-20 DIAGNOSIS — K62.1 RECTAL POLYP: ICD-10-CM

## 2020-08-20 LAB
GI HISTOLOGY: A. UNSPECIFIED: (no result)
GI HISTOLOGY: B. UNSPECIFIED: (no result)
GI HISTOLOGY: C. UNSPECIFIED: (no result)
GI HISTOLOGY: PDF REPORT: (no result)

## 2020-08-20 PROCEDURE — 45385 COLONOSCOPY W/LESION REMOVAL: CPT | Mod: 33 | Performed by: INTERNAL MEDICINE

## 2020-08-20 PROCEDURE — 88305 TISSUE EXAM BY PATHOLOGIST: CPT | Mod: 33 | Performed by: INTERNAL MEDICINE

## 2020-08-20 RX ADMIN — INSULIN HUMAN 7 UNITS: 100 INJECTION, SOLUTION PARENTERAL at 11:50

## 2020-08-27 RX ORDER — ATORVASTATIN CALCIUM 40 MG/1
TABLET, FILM COATED ORAL
Qty: 90 TABLET | Refills: 3 | Status: SHIPPED | OUTPATIENT
Start: 2020-08-27 | End: 2021-08-20

## 2020-09-02 ENCOUNTER — OFFICE VISIT (OUTPATIENT)
Dept: FAMILY MEDICINE CLINIC | Facility: CLINIC | Age: 65
End: 2020-09-02

## 2020-09-02 VITALS
DIASTOLIC BLOOD PRESSURE: 87 MMHG | OXYGEN SATURATION: 95 % | TEMPERATURE: 98.2 F | SYSTOLIC BLOOD PRESSURE: 149 MMHG | BODY MASS INDEX: 23.05 KG/M2 | HEIGHT: 64 IN | HEART RATE: 102 BPM | RESPIRATION RATE: 16 BRPM | WEIGHT: 135 LBS

## 2020-09-02 DIAGNOSIS — E11.65 UNCONTROLLED TYPE 2 DIABETES MELLITUS WITH HYPERGLYCEMIA (HCC): ICD-10-CM

## 2020-09-02 DIAGNOSIS — E78.2 MIXED HYPERLIPIDEMIA: Primary | ICD-10-CM

## 2020-09-02 DIAGNOSIS — F17.200 TOBACCO DEPENDENCE SYNDROME: ICD-10-CM

## 2020-09-02 DIAGNOSIS — Z23 NEED FOR IMMUNIZATION AGAINST INFLUENZA: ICD-10-CM

## 2020-09-02 DIAGNOSIS — E53.8 B12 DEFICIENCY: ICD-10-CM

## 2020-09-02 DIAGNOSIS — I10 ESSENTIAL HYPERTENSION: ICD-10-CM

## 2020-09-02 DIAGNOSIS — E55.9 VITAMIN D DEFICIENCY: ICD-10-CM

## 2020-09-02 LAB
25(OH)D3 SERPL-MCNC: 33.9 NG/ML (ref 30–100)
ALBUMIN SERPL-MCNC: 4.1 G/DL (ref 3.5–5.2)
ALBUMIN/GLOB SERPL: 1.3 G/DL
ALP SERPL-CCNC: 122 U/L (ref 39–117)
ALT SERPL W P-5'-P-CCNC: 18 U/L (ref 1–33)
ANION GAP SERPL CALCULATED.3IONS-SCNC: 9.2 MMOL/L (ref 5–15)
AST SERPL-CCNC: 16 U/L (ref 1–32)
BASOPHILS # BLD AUTO: 0.05 10*3/MM3 (ref 0–0.2)
BASOPHILS NFR BLD AUTO: 0.6 % (ref 0–1.5)
BILIRUB SERPL-MCNC: 0.4 MG/DL (ref 0–1.2)
BUN SERPL-MCNC: 13 MG/DL (ref 8–23)
BUN/CREAT SERPL: 21.7 (ref 7–25)
CALCIUM SPEC-SCNC: 9.8 MG/DL (ref 8.6–10.5)
CHLORIDE SERPL-SCNC: 104 MMOL/L (ref 98–107)
CHOLEST SERPL-MCNC: 168 MG/DL (ref 0–200)
CO2 SERPL-SCNC: 26.8 MMOL/L (ref 22–29)
CREAT SERPL-MCNC: 0.6 MG/DL (ref 0.57–1)
DEPRECATED RDW RBC AUTO: 37.9 FL (ref 37–54)
DIFFERENTIAL METHOD BLD: ABNORMAL
EOSINOPHIL # BLD AUTO: 0.24 10*3/MM3 (ref 0–0.4)
EOSINOPHIL NFR BLD AUTO: 3 % (ref 0.3–6.2)
ERYTHROCYTE [DISTWIDTH] IN BLOOD BY AUTOMATED COUNT: 12 % (ref 12.3–15.4)
GFR SERPL CREATININE-BSD FRML MDRD: 101 ML/MIN/1.73
GLOBULIN UR ELPH-MCNC: 3.1 GM/DL
GLUCOSE SERPL-MCNC: 171 MG/DL (ref 65–99)
HCT VFR BLD AUTO: 49.1 % (ref 34–46.6)
HDLC SERPL-MCNC: 38 MG/DL (ref 40–60)
HGB BLD-MCNC: 16.9 G/DL (ref 12–15.9)
IMM GRANULOCYTES # BLD AUTO: 0.02 10*3/MM3 (ref 0–0.05)
IMM GRANULOCYTES NFR BLD AUTO: 0.3 % (ref 0–0.5)
LDLC SERPL CALC-MCNC: 102 MG/DL (ref 0–100)
LDLC/HDLC SERPL: 2.69 {RATIO}
LYMPHOCYTES # BLD AUTO: 2.93 10*3/MM3 (ref 0.7–3.1)
LYMPHOCYTES NFR BLD AUTO: 37.2 % (ref 19.6–45.3)
MCH RBC QN AUTO: 29.9 PG (ref 26.6–33)
MCHC RBC AUTO-ENTMCNC: 34.4 G/DL (ref 31.5–35.7)
MCV RBC AUTO: 86.9 FL (ref 79–97)
MONOCYTES # BLD AUTO: 0.38 10*3/MM3 (ref 0.1–0.9)
MONOCYTES NFR BLD AUTO: 4.8 % (ref 5–12)
NEUTROPHILS NFR BLD AUTO: 4.25 10*3/MM3 (ref 1.7–7)
NEUTROPHILS NFR BLD AUTO: 54.1 % (ref 42.7–76)
NRBC BLD AUTO-RTO: 0 /100 WBC (ref 0–0.2)
PLATELET # BLD AUTO: 183 10*3/MM3 (ref 140–450)
PMV BLD AUTO: 13.2 FL (ref 6–12)
POTASSIUM SERPL-SCNC: 4.7 MMOL/L (ref 3.5–5.2)
PROT SERPL-MCNC: 7.2 G/DL (ref 6–8.5)
RBC # BLD AUTO: 5.65 10*6/MM3 (ref 3.77–5.28)
SODIUM SERPL-SCNC: 140 MMOL/L (ref 136–145)
TRIGL SERPL-MCNC: 138 MG/DL (ref 0–150)
TSH SERPL DL<=0.05 MIU/L-ACNC: 0.99 UIU/ML (ref 0.27–4.2)
VIT B12 BLD-MCNC: 419 PG/ML (ref 211–946)
VLDLC SERPL-MCNC: 27.6 MG/DL (ref 5–40)
WBC # BLD AUTO: 7.87 10*3/MM3 (ref 3.4–10.8)
WBC # BLD AUTO: 7.87 10*3/MM3 (ref 3.4–10.8)

## 2020-09-02 PROCEDURE — G0403 EKG FOR INITIAL PREVENT EXAM: HCPCS | Performed by: NURSE PRACTITIONER

## 2020-09-02 PROCEDURE — 80053 COMPREHEN METABOLIC PANEL: CPT | Performed by: NURSE PRACTITIONER

## 2020-09-02 PROCEDURE — 82570 ASSAY OF URINE CREATININE: CPT | Performed by: NURSE PRACTITIONER

## 2020-09-02 PROCEDURE — 82043 UR ALBUMIN QUANTITATIVE: CPT | Performed by: NURSE PRACTITIONER

## 2020-09-02 PROCEDURE — 83036 HEMOGLOBIN GLYCOSYLATED A1C: CPT | Performed by: NURSE PRACTITIONER

## 2020-09-02 PROCEDURE — 82607 VITAMIN B-12: CPT | Performed by: NURSE PRACTITIONER

## 2020-09-02 PROCEDURE — 90694 VACC AIIV4 NO PRSRV 0.5ML IM: CPT | Performed by: NURSE PRACTITIONER

## 2020-09-02 PROCEDURE — 85025 COMPLETE CBC W/AUTO DIFF WBC: CPT | Performed by: NURSE PRACTITIONER

## 2020-09-02 PROCEDURE — 84443 ASSAY THYROID STIM HORMONE: CPT | Performed by: NURSE PRACTITIONER

## 2020-09-02 PROCEDURE — 99213 OFFICE O/P EST LOW 20 MIN: CPT | Performed by: NURSE PRACTITIONER

## 2020-09-02 PROCEDURE — 80061 LIPID PANEL: CPT | Performed by: NURSE PRACTITIONER

## 2020-09-02 PROCEDURE — 36415 COLL VENOUS BLD VENIPUNCTURE: CPT | Performed by: NURSE PRACTITIONER

## 2020-09-02 PROCEDURE — G0008 ADMIN INFLUENZA VIRUS VAC: HCPCS | Performed by: NURSE PRACTITIONER

## 2020-09-02 PROCEDURE — 82306 VITAMIN D 25 HYDROXY: CPT | Performed by: NURSE PRACTITIONER

## 2020-09-02 PROCEDURE — G0402 INITIAL PREVENTIVE EXAM: HCPCS | Performed by: NURSE PRACTITIONER

## 2020-09-02 NOTE — PATIENT INSTRUCTIONS
Diabetes Mellitus and Exercise  Exercising regularly is important for your overall health, especially when you have diabetes (diabetes mellitus). Exercising is not only about losing weight. It has many other health benefits, such as increasing muscle strength and bone density and reducing body fat and stress. This leads to improved fitness, flexibility, and endurance, all of which result in better overall health.  Exercise has additional benefits for people with diabetes, including:  · Reducing appetite.  · Helping to lower and control blood glucose.  · Lowering blood pressure.  · Helping to control amounts of fatty substances (lipids) in the blood, such as cholesterol and triglycerides.  · Helping the body to respond better to insulin (improving insulin sensitivity).  · Reducing how much insulin the body needs.  · Decreasing the risk for heart disease by:  ? Lowering cholesterol and triglyceride levels.  ? Increasing the levels of good cholesterol.  ? Lowering blood glucose levels.  What is my activity plan?  Your health care provider or certified diabetes educator can help you make a plan for the type and frequency of exercise (activity plan) that works for you. Make sure that you:  · Do at least 150 minutes of moderate-intensity or vigorous-intensity exercise each week. This could be brisk walking, biking, or water aerobics.  ? Do stretching and strength exercises, such as yoga or weightlifting, at least 2 times a week.  ? Spread out your activity over at least 3 days of the week.  · Get some form of physical activity every day.  ? Do not go more than 2 days in a row without some kind of physical activity.  ? Avoid being inactive for more than 30 minutes at a time. Take frequent breaks to walk or stretch.  · Choose a type of exercise or activity that you enjoy, and set realistic goals.  · Start slowly, and gradually increase the intensity of your exercise over time.  What do I need to know about managing my  diabetes?    · Check your blood glucose before and after exercising.  ? If your blood glucose is 240 mg/dL (13.3 mmol/L) or higher before you exercise, check your urine for ketones. If you have ketones in your urine, do not exercise until your blood glucose returns to normal.  ? If your blood glucose is 100 mg/dL (5.6 mmol/L) or lower, eat a snack containing 15-20 grams of carbohydrate. Check your blood glucose 15 minutes after the snack to make sure that your level is above 100 mg/dL (5.6 mmol/L) before you start your exercise.  · Know the symptoms of low blood glucose (hypoglycemia) and how to treat it. Your risk for hypoglycemia increases during and after exercise. Common symptoms of hypoglycemia can include:  ? Hunger.  ? Anxiety.  ? Sweating and feeling clammy.  ? Confusion.  ? Dizziness or feeling light-headed.  ? Increased heart rate or palpitations.  ? Blurry vision.  ? Tingling or numbness around the mouth, lips, or tongue.  ? Tremors or shakes.  ? Irritability.  · Keep a rapid-acting carbohydrate snack available before, during, and after exercise to help prevent or treat hypoglycemia.  · Avoid injecting insulin into areas of the body that are going to be exercised. For example, avoid injecting insulin into:  ? The arms, when playing tennis.  ? The legs, when jogging.  · Keep records of your exercise habits. Doing this can help you and your health care provider adjust your diabetes management plan as needed. Write down:  ? Food that you eat before and after you exercise.  ? Blood glucose levels before and after you exercise.  ? The type and amount of exercise you have done.  ? When your insulin is expected to peak, if you use insulin. Avoid exercising at times when your insulin is peaking.  · When you start a new exercise or activity, work with your health care provider to make sure the activity is safe for you, and to adjust your insulin, medicines, or food intake as needed.  · Drink plenty of water while  you exercise to prevent dehydration or heat stroke. Drink enough fluid to keep your urine clear or pale yellow.  Summary  · Exercising regularly is important for your overall health, especially when you have diabetes (diabetes mellitus).  · Exercising has many health benefits, such as increasing muscle strength and bone density and reducing body fat and stress.  · Your health care provider or certified diabetes educator can help you make a plan for the type and frequency of exercise (activity plan) that works for you.  · When you start a new exercise or activity, work with your health care provider to make sure the activity is safe for you, and to adjust your insulin, medicines, or food intake as needed.  This information is not intended to replace advice given to you by your health care provider. Make sure you discuss any questions you have with your health care provider.  Document Released: 03/09/2005 Document Revised: 07/12/2018 Document Reviewed: 05/29/2017  [employer]vier Patient Education © 2020 [employer]

## 2020-09-02 NOTE — PROGRESS NOTES
"Subjective   Eugenia Madden is a 64 y.o. female presents for   Chief Complaint   Patient presents with   • Welcome To Medicare     fasting   • Diabetes   • Hypertension   • Hyperlipidemia       Health Maintenance Due   Topic Date Due   • DIABETIC EYE EXAM  06/06/2019       History of Present Illness   Patient presents for follow-up on stable chronic medical problems including diabetes, hypertension and hyperlipidemia. Patient denies chest pain and shortness of breath. Patient complains of no significant issue. Patient is here for monitoring of chronic issues due to need for monitoring of renal function, liver function and blood pressure effects of meds.   Pt reports she forgot medication last night, and BP elevated this am.  Has been normal at other recent appointments.     Vitals:    09/02/20 0923 09/02/20 0930   BP: 156/88 149/87   BP Location: Left arm Right arm   Patient Position: Sitting Sitting   Cuff Size: Adult Adult   Pulse: 90 102   Resp: 16    Temp: 98.2 °F (36.8 °C)    TempSrc: Infrared    SpO2: 95%    Weight: 61.2 kg (135 lb)    Height: 162.6 cm (64\")      Body mass index is 23.17 kg/m².    Current Outpatient Medications on File Prior to Visit   Medication Sig Dispense Refill   • atorvastatin (LIPITOR) 40 MG tablet TAKE 1 TABLET BY MOUTH EVERY DAY 90 tablet 3   • cetirizine (zyrTEC) 10 MG tablet Take 10 mg by mouth Daily.     • Cholecalciferol (VITAMIN D3) 125 MCG (5000 UT) tablet tablet Take 1 tablet by mouth Daily.     • dilTIAZem CD (CARDIZEM CD) 180 MG 24 hr capsule Take three capsules at night 270 capsule 3   • famotidine (PEPCID) 20 MG tablet Take 20 mg by mouth Daily.     • ferrous sulfate 325 (65 FE) MG tablet Take 325 mg by mouth Daily With Breakfast.     • glimepiride (AMARYL) 4 MG tablet TAKE 1 TABLET BY MOUTH TWICE A DAY WITH MEALS (Patient taking differently: Take 4 mg by mouth. TAKE 2 TABLET BY MOUTH TWICE A DAY WITH MEALS) 180 tablet 1   • hydrOXYzine (ATARAX) 25 MG tablet TAKE 1 TABLET BY " MOUTH AT NIGHT AS NEEDED FOR ITCHING (ONE OR TWO IF ITCHING). 30 tablet 1   • Ibuprofen-diphenhydrAMINE HCl (ADVIL PM) 200-25 MG capsule Take 1 capsule by mouth Every Night.     • Magnesium 250 MG tablet Take 250 mg by mouth Daily.     • metFORMIN ER (GLUCOPHAGE-XR) 500 MG 24 hr tablet ToothTake 2 tablets p.o. daily 60 tablet 6   • Semaglutide,0.25 or 0.5MG/DOS, (Ozempic, 0.25 or 0.5 MG/DOSE,) 2 MG/1.5ML solution pen-injector 0.25 mg subcu weekly for 4 weeks then increase to 0.5 mg subcu weekly if tolerated. 2 mL 6   • [DISCONTINUED] Empagliflozin (Jardiance) 10 MG tablet Take 10 mg by mouth Daily. 30 tablet 4     No current facility-administered medications on file prior to visit.        The following portions of the patient's history were reviewed and updated as appropriate: allergies, current medications, past family history, past medical history, past social history, past surgical history and problem list.    Review of Systems   Constitutional: Negative for chills and fever.   HENT: Negative for sinus pressure and sore throat.    Eyes: Negative for blurred vision.   Respiratory: Negative for cough and shortness of breath.    Cardiovascular: Negative for chest pain.   Gastrointestinal: Negative for abdominal pain.   Endocrine: Negative.    Genitourinary: Negative.    Musculoskeletal: Negative for arthralgias and joint swelling.   Skin: Negative for color change.   Allergic/Immunologic: Negative.    Neurological: Negative for dizziness.   Hematological: Negative.    Psychiatric/Behavioral: Negative for behavioral problems.       Objective   Physical Exam   Constitutional: She is oriented to person, place, and time. She appears well-developed and well-nourished.   HENT:   Head: Normocephalic and atraumatic.   Right Ear: External ear normal.   Left Ear: External ear normal.   Nose: Nose normal.   Eyes: Pupils are equal, round, and reactive to light. Conjunctivae and EOM are normal.   Neck: Normal range of motion.  Neck supple. No thyromegaly present.   Cardiovascular: Normal rate, regular rhythm, normal heart sounds and intact distal pulses.   Pulmonary/Chest: Effort normal and breath sounds normal.   Abdominal: Soft. Bowel sounds are normal.   Musculoskeletal: Normal range of motion.    Eugenia had a diabetic foot exam performed today.   During the foot exam she had a monofilament test performed.    Neurological Sensory Findings - Unaltered hot/cold right ankle/foot discrimination and unaltered hot/cold left ankle/foot discrimination. Unaltered sharp/dull right ankle/foot discrimination and unaltered sharp/dull left ankle/foot discrimination. No right ankle/foot altered proprioception and no left ankle/foot altered proprioception  Vascular Status -  Her right foot exhibits normal foot vasculature  and no edema. Her left foot exhibits normal foot vasculature  and no edema.  Skin Integrity  -  Her right foot skin is intact.  She has non-callous right foot, no ingrown toenail on right foot and right heel is not dry and cracked.Her left foot skin is intact. She has non-callous left foot, no left ingrown toenail and no left heel dry and cracked..   Foot Structure and Biomechanics -  Her right foot has no hallux valgus present. Her left foot has no hallux valgus.  Lymphadenopathy:     She has no cervical adenopathy.   Neurological: She is alert and oriented to person, place, and time.   Skin: Skin is warm and dry.   Psychiatric: She has a normal mood and affect. Her behavior is normal. Judgment and thought content normal.   Nursing note and vitals reviewed.    PHQ-9 Total Score: 0    Assessment/Plan   Eugenia was seen today for welcome to medicare, diabetes, hypertension and hyperlipidemia.    Diagnoses and all orders for this visit:    Mixed hyperlipidemia  -     Comprehensive Metabolic Panel  -     Lipid Panel    Essential hypertension  Comments:  instructed on importance of taking medication the same time daily and limiting sodium  intake.  pt instructed to monitor outside to appt to monitor for elevation  Orders:  -     CBC Auto Differential  -     Comprehensive Metabolic Panel  -     ECG 12 Lead    Uncontrolled type 2 diabetes mellitus with hyperglycemia (CMS/HCC)  Comments:  pt has stopped drinking soda-encouraged her to continue to limit carbs and exercise  Orders:  -     Comprehensive Metabolic Panel  -     Hemoglobin A1c  -     TSH  -     Microalbumin / Creatinine Urine Ratio - Urine, Clean Catch    B12 deficiency  -     Vitamin B12    Vitamin D deficiency  -     Vitamin D 25 Hydroxy    Need for immunization against influenza  -     Fluad Quad 65+ yrs ([id number])    Tobacco dependence syndrome  Comments:  counseled on smoking cessation.  pt states she will work on the soon as she just gave up soda.        Patient Instructions   Diabetes Mellitus and Exercise  Exercising regularly is important for your overall health, especially when you have diabetes (diabetes mellitus). Exercising is not only about losing weight. It has many other health benefits, such as increasing muscle strength and bone density and reducing body fat and stress. This leads to improved fitness, flexibility, and endurance, all of which result in better overall health.  Exercise has additional benefits for people with diabetes, including:  · Reducing appetite.  · Helping to lower and control blood glucose.  · Lowering blood pressure.  · Helping to control amounts of fatty substances (lipids) in the blood, such as cholesterol and triglycerides.  · Helping the body to respond better to insulin (improving insulin sensitivity).  · Reducing how much insulin the body needs.  · Decreasing the risk for heart disease by:  ? Lowering cholesterol and triglyceride levels.  ? Increasing the levels of good cholesterol.  ? Lowering blood glucose levels.  What is my activity plan?  Your health care provider or certified diabetes educator can help you make a plan for the type and  frequency of exercise (activity plan) that works for you. Make sure that you:  · Do at least 150 minutes of moderate-intensity or vigorous-intensity exercise each week. This could be brisk walking, biking, or water aerobics.  ? Do stretching and strength exercises, such as yoga or weightlifting, at least 2 times a week.  ? Spread out your activity over at least 3 days of the week.  · Get some form of physical activity every day.  ? Do not go more than 2 days in a row without some kind of physical activity.  ? Avoid being inactive for more than 30 minutes at a time. Take frequent breaks to walk or stretch.  · Choose a type of exercise or activity that you enjoy, and set realistic goals.  · Start slowly, and gradually increase the intensity of your exercise over time.  What do I need to know about managing my diabetes?    · Check your blood glucose before and after exercising.  ? If your blood glucose is 240 mg/dL (13.3 mmol/L) or higher before you exercise, check your urine for ketones. If you have ketones in your urine, do not exercise until your blood glucose returns to normal.  ? If your blood glucose is 100 mg/dL (5.6 mmol/L) or lower, eat a snack containing 15-20 grams of carbohydrate. Check your blood glucose 15 minutes after the snack to make sure that your level is above 100 mg/dL (5.6 mmol/L) before you start your exercise.  · Know the symptoms of low blood glucose (hypoglycemia) and how to treat it. Your risk for hypoglycemia increases during and after exercise. Common symptoms of hypoglycemia can include:  ? Hunger.  ? Anxiety.  ? Sweating and feeling clammy.  ? Confusion.  ? Dizziness or feeling light-headed.  ? Increased heart rate or palpitations.  ? Blurry vision.  ? Tingling or numbness around the mouth, lips, or tongue.  ? Tremors or shakes.  ? Irritability.  · Keep a rapid-acting carbohydrate snack available before, during, and after exercise to help prevent or treat hypoglycemia.  · Avoid injecting  insulin into areas of the body that are going to be exercised. For example, avoid injecting insulin into:  ? The arms, when playing tennis.  ? The legs, when jogging.  · Keep records of your exercise habits. Doing this can help you and your health care provider adjust your diabetes management plan as needed. Write down:  ? Food that you eat before and after you exercise.  ? Blood glucose levels before and after you exercise.  ? The type and amount of exercise you have done.  ? When your insulin is expected to peak, if you use insulin. Avoid exercising at times when your insulin is peaking.  · When you start a new exercise or activity, work with your health care provider to make sure the activity is safe for you, and to adjust your insulin, medicines, or food intake as needed.  · Drink plenty of water while you exercise to prevent dehydration or heat stroke. Drink enough fluid to keep your urine clear or pale yellow.  Summary  · Exercising regularly is important for your overall health, especially when you have diabetes (diabetes mellitus).  · Exercising has many health benefits, such as increasing muscle strength and bone density and reducing body fat and stress.  · Your health care provider or certified diabetes educator can help you make a plan for the type and frequency of exercise (activity plan) that works for you.  · When you start a new exercise or activity, work with your health care provider to make sure the activity is safe for you, and to adjust your insulin, medicines, or food intake as needed.  This information is not intended to replace advice given to you by your health care provider. Make sure you discuss any questions you have with your health care provider.  Document Released: 03/09/2005 Document Revised: 07/12/2018 Document Reviewed: 05/29/2017  Elsevier Patient Education © 2020 [employer]

## 2020-09-02 NOTE — PROGRESS NOTES
"Julito Madden is a 64 y.o. female presents for   Chief Complaint   Patient presents with   • Welcome To Medicare     fasting       Health Maintenance Due   Topic Date Due   • DIABETIC EYE EXAM  06/06/2019   • DIABETIC FOOT EXAM  03/12/2020   • INFLUENZA VACCINE  08/01/2020   • ANNUAL PHYSICAL  08/30/2020       History of Present Illness     Vitals:    09/02/20 0923 09/02/20 0930   BP: 156/88 149/87   BP Location: Left arm Right arm   Patient Position: Sitting Sitting   Cuff Size: Adult Adult   Pulse: 90 102   Resp: 16    Temp: 98.2 °F (36.8 °C)    TempSrc: Infrared    SpO2: 95%    Weight: 61.2 kg (135 lb)    Height: 162.6 cm (64\")      Body mass index is 23.17 kg/m².    Current Outpatient Medications on File Prior to Visit   Medication Sig Dispense Refill   • atorvastatin (LIPITOR) 40 MG tablet TAKE 1 TABLET BY MOUTH EVERY DAY 90 tablet 3   • cetirizine (zyrTEC) 10 MG tablet Take 10 mg by mouth Daily.     • Cholecalciferol (VITAMIN D3) 125 MCG (5000 UT) tablet tablet Take 1 tablet by mouth Daily.     • dilTIAZem CD (CARDIZEM CD) 180 MG 24 hr capsule Take three capsules at night 270 capsule 3   • famotidine (PEPCID) 20 MG tablet Take 20 mg by mouth Daily.     • ferrous sulfate 325 (65 FE) MG tablet Take 325 mg by mouth Daily With Breakfast.     • glimepiride (AMARYL) 4 MG tablet TAKE 1 TABLET BY MOUTH TWICE A DAY WITH MEALS (Patient taking differently: Take 4 mg by mouth. TAKE 2 TABLET BY MOUTH TWICE A DAY WITH MEALS) 180 tablet 1   • hydrOXYzine (ATARAX) 25 MG tablet TAKE 1 TABLET BY MOUTH AT NIGHT AS NEEDED FOR ITCHING (ONE OR TWO IF ITCHING). 30 tablet 1   • Ibuprofen-diphenhydrAMINE HCl (ADVIL PM) 200-25 MG capsule Take 1 capsule by mouth Every Night.     • Magnesium 250 MG tablet Take 250 mg by mouth Daily.     • metFORMIN ER (GLUCOPHAGE-XR) 500 MG 24 hr tablet ToothTake 2 tablets p.o. daily 60 tablet 6   • Semaglutide,0.25 or 0.5MG/DOS, (Ozempic, 0.25 or 0.5 MG/DOSE,) 2 MG/1.5ML solution " "pen-injector 0.25 mg subcu weekly for 4 weeks then increase to 0.5 mg subcu weekly if tolerated. 2 mL 6   • [DISCONTINUED] Empagliflozin (Jardiance) 10 MG tablet Take 10 mg by mouth Daily. 30 tablet 4     No current facility-administered medications on file prior to visit.        The following portions of the patient's history were reviewed and updated as appropriate: {history reviewed:20406::\"allergies\",\"current medications\",\"past family history\",\"past medical history\",\"past social history\",\"past surgical history\",\"problem list\"}.    Review of Systems    Objective   Physical Exam  PHQ-9 Total Score: 0    Assessment/Plan   There are no diagnoses linked to this encounter.    There are no Patient Instructions on file for this visit.       "

## 2020-09-02 NOTE — PROGRESS NOTES
The ABCs of the Annual Wellness Visit  Welcome to Medicare Visit    Chief Complaint   Patient presents with   • Welcome To Medicare     fasting       Subjective   History of Present Illness:  Eugenia Madden is a 64 y.o. female who presents for a  Welcome to Medicare Visit.    HEALTH RISK ASSESSMENT    Recent Hospitalizations:  No hospitalization(s) within the last year.    Current Medical Providers:  Patient Care Team:  Melisa Taveras MD as PCP - General    Smoking Status:  Social History     Tobacco Use   Smoking Status Current Every Day Smoker   • Packs/day: 0.50   • Types: Cigarettes   Smokeless Tobacco Never Used   Tobacco Comment    Passive Smoke: Y       Alcohol Consumption:  Social History     Substance and Sexual Activity   Alcohol Use No   • Frequency: Never   • Binge frequency: Never       Depression Screen:   PHQ-2/PHQ-9 Depression Screening 9/2/2020   Little interest or pleasure in doing things 0   Feeling down, depressed, or hopeless 0   Total Score 0       Fall Risk Screen:  ALEKSADI Fall Risk Assessment has not been completed.    Health Habits and Functional and Cognitive Screening:  Functional & Cognitive Status 9/2/2020   Do you have difficulty preparing food and eating? No   Do you have difficulty using the toilet? No   Do you have difficulty moving around from place to place? No   Do you have trouble with steps or getting out of a bed or a chair? No   Current Diet Well Balanced Diet   Dental Exam Not up to date   Eye Exam Not up to date   Exercise (times per week) 7 times per week   Current Exercise Activities Include Walking   Do you need help using the phone?  No   Are you deaf or do you have serious difficulty hearing?  No   Do you need help with transportation? No   Do you need help shopping? No   Do you need help preparing meals?  No   Do you need help with housework?  No   Do you need help with laundry? No   Do you need help taking your medications? No   Do you need help managing money?  No   Do you ever drive or ride in a car without wearing a seat belt? No   Have you felt unusual stress, anger or loneliness in the last month? No   Who do you live with? Spouse   If you need help, do you have trouble finding someone available to you? No   Have you been bothered in the last four weeks by sexual problems? No   Do you have difficulty concentrating, remembering or making decisions? No         Does the patient have evidence of cognitive impairment? No    Asprin use counseling:Start ASA 81 mg daily     Visual Acuity:     Visual Acuity Screening    Right eye Left eye Both eyes   Without correction: 20/25 20/25 20/25   With correction:          Age-appropriate Screening Schedule:  Refer to the list below for future screening recommendations based on patient's age, sex and/or medical conditions. Orders for these recommended tests are listed in the plan section. The patient has been provided with a written plan.    Health Maintenance   Topic Date Due   • DIABETIC EYE EXAM  06/06/2019   • DIABETIC FOOT EXAM  03/12/2020   • INFLUENZA VACCINE  08/01/2020   • MAMMOGRAM  09/19/2020   • HEMOGLOBIN A1C  01/30/2021   • LIPID PANEL  07/30/2021   • URINE MICROALBUMIN  07/30/2021   • DXA SCAN  09/18/2021   • PAP SMEAR  03/13/2022   • TDAP/TD VACCINES (2 - Td) 12/02/2029   • COLONOSCOPY  08/20/2030   • PNEUMOCOCCAL VACCINE (19-64 MEDIUM RISK)  Completed   • ZOSTER VACCINE  Completed          The following portions of the patient's history were reviewed and updated as appropriate: allergies, current medications, past family history, past medical history, past social history, past surgical history and problem list.    Outpatient Medications Prior to Visit   Medication Sig Dispense Refill   • atorvastatin (LIPITOR) 40 MG tablet TAKE 1 TABLET BY MOUTH EVERY DAY 90 tablet 3   • cetirizine (zyrTEC) 10 MG tablet Take 10 mg by mouth Daily.     • Cholecalciferol (VITAMIN D3) 125 MCG (5000 UT) tablet tablet Take 1 tablet by mouth  Daily.     • dilTIAZem CD (CARDIZEM CD) 180 MG 24 hr capsule Take three capsules at night 270 capsule 3   • famotidine (PEPCID) 20 MG tablet Take 20 mg by mouth Daily.     • ferrous sulfate 325 (65 FE) MG tablet Take 325 mg by mouth Daily With Breakfast.     • glimepiride (AMARYL) 4 MG tablet TAKE 1 TABLET BY MOUTH TWICE A DAY WITH MEALS (Patient taking differently: Take 4 mg by mouth. TAKE 2 TABLET BY MOUTH TWICE A DAY WITH MEALS) 180 tablet 1   • hydrOXYzine (ATARAX) 25 MG tablet TAKE 1 TABLET BY MOUTH AT NIGHT AS NEEDED FOR ITCHING (ONE OR TWO IF ITCHING). 30 tablet 1   • Ibuprofen-diphenhydrAMINE HCl (ADVIL PM) 200-25 MG capsule Take 1 capsule by mouth Every Night.     • Magnesium 250 MG tablet Take 250 mg by mouth Daily.     • metFORMIN ER (GLUCOPHAGE-XR) 500 MG 24 hr tablet ToothTake 2 tablets p.o. daily 60 tablet 6   • Semaglutide,0.25 or 0.5MG/DOS, (Ozempic, 0.25 or 0.5 MG/DOSE,) 2 MG/1.5ML solution pen-injector 0.25 mg subcu weekly for 4 weeks then increase to 0.5 mg subcu weekly if tolerated. 2 mL 6   • Empagliflozin (Jardiance) 10 MG tablet Take 10 mg by mouth Daily. 30 tablet 4     No facility-administered medications prior to visit.        Patient Active Problem List   Diagnosis   • B12 deficiency   • Body mass index (BMI) of 24.0-24.9 in adult   • Cardiac murmur   • Chronic coronary artery disease   • Uncontrolled type 2 diabetes mellitus with hyperglycemia (CMS/HCC)   • Family history of cerebrovascular accident (CVA)   • Family history of malignant neoplasm of breast   • Fatty liver   • Hearing deficit   • Mixed hyperlipidemia   • Essential hypertension   • Hypomagnesemia   • Leg pain, bilateral   • Multiple nodules of lung   • Osteoporosis   • Polycythemia   • Polyp of colon   • Pulmonary emphysema (CMS/HCC)   • Reduced libido   • Restless leg   • Snoring   • Tobacco dependence syndrome   • Vitamin D deficiency   • Arthritis   • Kidney stones   • Thoracic aortic aneurysm without rupture (CMS/HCC)  "      Advanced Care Planning:  ACP discussion was held with the patient during this visit. Patient does not have an advance directive, information provided.    Review of Systems    Compared to one year ago, the patient feels her physical health is better.  Compared to one year ago, the patient feels her mental health is better.    Reviewed chart for potential of high risk medication in the elderly: yes  Reviewed chart for potential of harmful drug interactions in the elderly:yes    Objective         Vitals:    09/02/20 0923 09/02/20 0930   BP: 156/88 149/87   BP Location: Left arm Right arm   Patient Position: Sitting Sitting   Cuff Size: Adult Adult   Pulse: 90 102   Resp: 16    Temp: 98.2 °F (36.8 °C)    TempSrc: Infrared    SpO2: 95%    Weight: 61.2 kg (135 lb)    Height: 162.6 cm (64\")    PainSc: 0-No pain        Body mass index is 23.17 kg/m².  Discussed the patient's BMI with her. The BMI is in the acceptable range.    Physical Exam    Lab Results   Component Value Date    TRIG 169 (H) 07/30/2020    HDL 32 (L) 07/30/2020    LDL 59 07/30/2020    VLDL 33.8 07/30/2020    HGBA1C 10.2 (H) 07/30/2020        Procedures    Assessment/Plan   Medicare Risks and Personalized Health Plan  CMS Preventative Services Quick Reference  Advance Directive Discussion  Cardiovascular risk  Diabetic Lab Screening   Immunizations Discussed/Encouraged (specific immunizations; Influenza )    The above risks/problems have been discussed with the patient.  Pertinent information has been shared with the patient in the After Visit Summary.  Follow up plans and orders are seen below in the Assessment/Plan Section.    There are no diagnoses linked to this encounter.    Follow Up:  No follow-ups on file.     An After Visit Summary and PPPS were given to the patient.         "

## 2020-09-02 NOTE — PROGRESS NOTES
Procedure     ECG 12 Lead  Date/Time: 9/2/2020 10:12 AM  Performed by: Bisi Ordaz APRN  Authorized by: Bisi Ordaz APRN   Previous ECG: no previous ECG available  Rhythm: sinus rhythm  Rate: normal  ST Segments: ST segments normal  T Waves: T waves normal  QRS axis: left  Other: no other findings    Clinical impression: abnormal EKG

## 2020-09-03 DIAGNOSIS — D75.1 POLYCYTHEMIA: ICD-10-CM

## 2020-09-03 DIAGNOSIS — R71.8 ELEVATED HEMATOCRIT: Primary | ICD-10-CM

## 2020-09-03 DIAGNOSIS — Z12.39 SCREENING FOR BREAST CANCER: ICD-10-CM

## 2020-09-03 DIAGNOSIS — F17.200 TOBACCO DEPENDENCE SYNDROME: ICD-10-CM

## 2020-09-03 LAB
ALBUMIN UR-MCNC: 1.3 MG/DL
CREAT UR-MCNC: 162.6 MG/DL
HBA1C MFR BLD: 10.2 % (ref 3.5–5.6)
MICROALBUMIN/CREAT UR: 8 MG/G

## 2020-09-09 ENCOUNTER — CLINICAL SUPPORT (OUTPATIENT)
Dept: FAMILY MEDICINE CLINIC | Facility: CLINIC | Age: 65
End: 2020-09-09

## 2020-09-09 DIAGNOSIS — D75.1 POLYCYTHEMIA: ICD-10-CM

## 2020-09-09 PROCEDURE — 81270 JAK2 GENE: CPT

## 2020-09-09 PROCEDURE — 81403 MOPATH PROCEDURE LEVEL 4: CPT | Performed by: NURSE PRACTITIONER

## 2020-09-09 PROCEDURE — 36415 COLL VENOUS BLD VENIPUNCTURE: CPT | Performed by: NURSE PRACTITIONER

## 2020-09-09 NOTE — PROGRESS NOTES
Venipuncture Blood Specimen Collection  Venipuncture performed in right arm by Lynette Bowie MA with good hemostasis. Patient tolerated the procedure well without complications.   09/09/20   TRENT Morales, APRN

## 2020-09-10 ENCOUNTER — HOSPITAL ENCOUNTER (OUTPATIENT)
Dept: MAMMOGRAPHY | Facility: HOSPITAL | Age: 65
Discharge: HOME | End: 2020-09-10
Admitting: NURSE PRACTITIONER

## 2020-09-10 DIAGNOSIS — Z12.39 SCREENING FOR BREAST CANCER: ICD-10-CM

## 2020-09-10 PROCEDURE — 77063 BREAST TOMOSYNTHESIS BI: CPT

## 2020-09-10 PROCEDURE — 77067 SCR MAMMO BI INCL CAD: CPT

## 2020-09-13 RX ORDER — DILTIAZEM HYDROCHLORIDE 420 MG/1
TABLET, EXTENDED RELEASE ORAL
Qty: 90 TABLET | Refills: 2 | OUTPATIENT
Start: 2020-09-13

## 2020-09-13 RX ORDER — LOSARTAN POTASSIUM 50 MG/1
TABLET ORAL
Qty: 30 TABLET | Refills: 0 | OUTPATIENT
Start: 2020-09-13

## 2020-09-14 RX ORDER — LOSARTAN POTASSIUM 50 MG/1
50 TABLET ORAL 2 TIMES DAILY
COMMUNITY
End: 2020-09-14 | Stop reason: SDUPTHER

## 2020-09-14 RX ORDER — LOSARTAN POTASSIUM 50 MG/1
50 TABLET ORAL 2 TIMES DAILY
Qty: 180 TABLET | Refills: 3 | Status: CANCELLED | OUTPATIENT
Start: 2020-09-14

## 2020-09-14 RX ORDER — LOSARTAN POTASSIUM 50 MG/1
50 TABLET ORAL 2 TIMES DAILY
Qty: 180 TABLET | Refills: 3 | Status: SHIPPED | OUTPATIENT
Start: 2020-09-14 | End: 2020-09-24

## 2020-09-14 NOTE — TELEPHONE ENCOUNTER
Jason KENNEDY is the Diltiazem- she does need a refill on this yet but does need refill on the Losartan

## 2020-09-18 LAB
CITATION REF LAB TEST: NORMAL
JAK2 GENE MUT ANL BLD/T: NORMAL
JAK2 P.V617F BLD/T QL: NORMAL
LAB DIRECTOR NAME PROVIDER: NORMAL
LAB DIRECTOR NAME PROVIDER: NORMAL
LABORATORY COMMENT REPORT: NORMAL
LABORATORY COMMENT REPORT: NORMAL
REF LAB TEST METHOD: NORMAL
REFLEX: NORMAL
SPECIMEN PREPARATION: NORMAL

## 2020-09-23 ENCOUNTER — TELEPHONE (OUTPATIENT)
Dept: FAMILY MEDICINE CLINIC | Facility: CLINIC | Age: 65
End: 2020-09-23

## 2020-09-23 ENCOUNTER — HOSPITAL ENCOUNTER (OUTPATIENT)
Dept: PET IMAGING | Facility: HOSPITAL | Age: 65
Discharge: HOME | End: 2020-09-23
Admitting: NURSE PRACTITIONER

## 2020-09-23 DIAGNOSIS — R91.8 MULTIPLE NODULES OF LUNG: ICD-10-CM

## 2020-09-23 DIAGNOSIS — F17.200 TOBACCO DEPENDENCE SYNDROME: ICD-10-CM

## 2020-09-23 DIAGNOSIS — F17.200 TOBACCO DEPENDENCE SYNDROME: Primary | ICD-10-CM

## 2020-09-23 PROCEDURE — G0297 LDCT FOR LUNG CA SCREEN: HCPCS

## 2020-09-23 NOTE — TELEPHONE ENCOUNTER
----- Message from JONATHAN Brown sent at 9/23/2020  2:32 PM EDT -----  Please notify pt that a new pulmonary nodule was found on her CT scan and recommend follow up CT in 6 months to reevaluate.

## 2020-09-23 NOTE — TELEPHONE ENCOUNTER
----- Message from JONATHAN Brown sent at 9/23/2020  2:34 PM EDT -----  Please notify pt that the additional labwork that was drawn to assess her elevated RBC and H and H counts were negative, meaning her labs are likely elevated due to smoking.  Encourage smoking cessation and follow up as needed.

## 2020-09-24 ENCOUNTER — TELEPHONE (OUTPATIENT)
Dept: FAMILY MEDICINE CLINIC | Facility: CLINIC | Age: 65
End: 2020-09-24

## 2020-09-24 RX ORDER — LOSARTAN POTASSIUM 100 MG/1
100 TABLET ORAL DAILY
Qty: 90 TABLET | Refills: 3 | Status: SHIPPED | OUTPATIENT
Start: 2020-09-24 | End: 2021-07-13 | Stop reason: ALTCHOICE

## 2020-09-24 NOTE — TELEPHONE ENCOUNTER
Please call patient and see if wants to try taking Losartan 100 daily instead of 50 mg twice daily as will save money if she can tolerate

## 2020-12-03 ENCOUNTER — LAB (OUTPATIENT)
Dept: LAB | Facility: HOSPITAL | Age: 65
End: 2020-12-03

## 2020-12-03 DIAGNOSIS — I10 ESSENTIAL HYPERTENSION: ICD-10-CM

## 2020-12-03 DIAGNOSIS — E78.2 MIXED HYPERLIPIDEMIA: ICD-10-CM

## 2020-12-03 DIAGNOSIS — E11.65 UNCONTROLLED TYPE 2 DIABETES MELLITUS WITH HYPERGLYCEMIA (HCC): ICD-10-CM

## 2020-12-03 LAB
ALBUMIN SERPL-MCNC: 4.3 G/DL (ref 3.5–5.2)
ALBUMIN UR-MCNC: 1.6 MG/DL
ALBUMIN/GLOB SERPL: 1.8 G/DL
ALP SERPL-CCNC: 96 U/L (ref 39–117)
ALT SERPL W P-5'-P-CCNC: 18 U/L (ref 1–33)
ANION GAP SERPL CALCULATED.3IONS-SCNC: 11.1 MMOL/L (ref 5–15)
AST SERPL-CCNC: 13 U/L (ref 1–32)
BILIRUB SERPL-MCNC: 0.4 MG/DL (ref 0–1.2)
BUN SERPL-MCNC: 25 MG/DL (ref 8–23)
BUN/CREAT SERPL: 43.1 (ref 7–25)
CALCIUM SPEC-SCNC: 9.9 MG/DL (ref 8.6–10.5)
CHLORIDE SERPL-SCNC: 98 MMOL/L (ref 98–107)
CHOLEST SERPL-MCNC: 127 MG/DL (ref 0–200)
CO2 SERPL-SCNC: 25.9 MMOL/L (ref 22–29)
CREAT SERPL-MCNC: 0.58 MG/DL (ref 0.57–1)
CREAT UR-MCNC: 59.6 MG/DL
GFR SERPL CREATININE-BSD FRML MDRD: 104 ML/MIN/1.73
GLOBULIN UR ELPH-MCNC: 2.4 GM/DL
GLUCOSE SERPL-MCNC: 341 MG/DL (ref 65–99)
HBA1C MFR BLD: 9.9 % (ref 3.5–5.6)
HDLC SERPL-MCNC: 42 MG/DL (ref 40–60)
LDLC SERPL CALC-MCNC: 54 MG/DL (ref 0–100)
LDLC/HDLC SERPL: 1.12 {RATIO}
MICROALBUMIN/CREAT UR: 26.8 MG/G
POTASSIUM SERPL-SCNC: 4.3 MMOL/L (ref 3.5–5.2)
PROT SERPL-MCNC: 6.7 G/DL (ref 6–8.5)
SODIUM SERPL-SCNC: 135 MMOL/L (ref 136–145)
TRIGL SERPL-MCNC: 189 MG/DL (ref 0–150)
VLDLC SERPL-MCNC: 31 MG/DL (ref 5–40)

## 2020-12-03 PROCEDURE — 80061 LIPID PANEL: CPT

## 2020-12-03 PROCEDURE — 82570 ASSAY OF URINE CREATININE: CPT

## 2020-12-03 PROCEDURE — 80053 COMPREHEN METABOLIC PANEL: CPT

## 2020-12-03 PROCEDURE — 36415 COLL VENOUS BLD VENIPUNCTURE: CPT

## 2020-12-03 PROCEDURE — 82043 UR ALBUMIN QUANTITATIVE: CPT

## 2020-12-03 PROCEDURE — 83036 HEMOGLOBIN GLYCOSYLATED A1C: CPT

## 2020-12-08 ENCOUNTER — TELEMEDICINE (OUTPATIENT)
Dept: ENDOCRINOLOGY | Facility: CLINIC | Age: 65
End: 2020-12-08

## 2020-12-08 VITALS — HEIGHT: 64 IN | WEIGHT: 136 LBS | BODY MASS INDEX: 23.22 KG/M2

## 2020-12-08 DIAGNOSIS — I10 ESSENTIAL HYPERTENSION: ICD-10-CM

## 2020-12-08 DIAGNOSIS — E78.2 MIXED HYPERLIPIDEMIA: ICD-10-CM

## 2020-12-08 DIAGNOSIS — E11.65 UNCONTROLLED TYPE 2 DIABETES MELLITUS WITH HYPERGLYCEMIA (HCC): Primary | ICD-10-CM

## 2020-12-08 PROCEDURE — 99214 OFFICE O/P EST MOD 30 MIN: CPT | Performed by: INTERNAL MEDICINE

## 2020-12-08 RX ORDER — PRAMIPEXOLE DIHYDROCHLORIDE 0.12 MG/1
TABLET ORAL
COMMUNITY
Start: 2020-10-07 | End: 2021-01-04

## 2020-12-08 RX ORDER — EMPAGLIFLOZIN 10 MG/1
10 TABLET, FILM COATED ORAL DAILY
Qty: 30 TABLET | Refills: 6 | Status: SHIPPED | OUTPATIENT
Start: 2020-12-08 | End: 2021-06-28

## 2020-12-08 RX ORDER — SEMAGLUTIDE 1.34 MG/ML
INJECTION, SOLUTION SUBCUTANEOUS
Qty: 2 ML | Refills: 6 | Status: SHIPPED | OUTPATIENT
Start: 2020-12-08 | End: 2021-04-01

## 2020-12-08 NOTE — PATIENT INSTRUCTIONS
Please resume Jardiance at 10 mg p.o. daily  Please continue Ozempic 0.5 mg subcu weekly  Please continue Metformin and glimepiride  Please continue to work on your diet and activity  Always keep glucose source in case of low blood sugar  Annual eye exam and flu vaccine  Labs before follow-up.

## 2020-12-08 NOTE — PROGRESS NOTES
Endocrine Progress Note Outpatient     Patient Care Team:  Melisa Taveras MD as PCP - General  You have chosen to receive care through a telehealth visit.  Do you consent to use a video/audio connection for your medical care today? Yes.     Chief Complaint: Follow-up type 2 diabetes: Visit was conducted through audio and video conference utilizing [employer].       HPI: 65-year-old female with history of type 2 diabetes, hypertension and hyperlipidemia was last seen in February 2019 was followed through telehealth.    For type 2 diabetes: She is currently on Ozempic 0.25 mg subcu weekly, Metformin and glimepiride.  She stopped Jardiance and did not increase Ozempic as advised on the last visit.  She is tolerating her medications well.       Hypertension: She tells me her blood pressure has been doing very well    Hyperlipidemia: She is currently on atorvastatin.    Past Medical History:   Diagnosis Date   • Arthritis    • Hyperlipidemia    • Hypertension    • Kidney stones    • Restless leg        Social History     Socioeconomic History   • Marital status:      Spouse name: Not on file   • Number of children: Not on file   • Years of education: Not on file   • Highest education level: Not on file   Tobacco Use   • Smoking status: Former Smoker     Packs/day: 0.50     Types: Cigarettes   • Smokeless tobacco: Never Used   • Tobacco comment: Passive Smoke: Y   Substance and Sexual Activity   • Alcohol use: No     Frequency: Never     Binge frequency: Never   • Drug use: No   • Sexual activity: Yes     Partners: Male     Birth control/protection: None       Family History   Problem Relation Age of Onset   • Heart disease Mother    • Cancer Mother         Breast Cancer   • Breast cancer Mother 55   • Diabetes Father    • Stroke Father    • Heart disease Father    • Hypertension Father    • Hypertension Sister    • Diabetes Sister    • Heart disease Sister    • Diabetes Brother    • Hypertension Brother    •  Heart disease Brother    • Other Other         Abstraction from [facility] Cholesterol   • Diabetes Brother    • Hypertension Brother    • No Known Problems Brother    • Heart disease Brother    • Cancer Brother         colon   • Other Brother        No Known Allergies    ROS:   Constitutional:  Denies fatigue, tiredness.    Eyes:  Denies change in visual acuity   HENT:  Denies nasal congestion or sore throat   Respiratory: denies cough, shortness of breath.   Cardiovascular:  denies chest pain, edema   GI:  Denies abdominal pain, nausea, vomiting.   Musculoskeletal:  Denies back pain or joint pain   Integument:  Denies dry skin and rash   Neurologic:  Denies headache, focal weakness or sensory changes   Endocrine:  Denies polyuria or polydipsia   Psychiatric:  Denies depression or anxiety      There were no vitals filed for this visit.    Physical Exam:  GEN: NAD, alert and oriented and able to carry on a conversation.  Looks healthy on video.  Alert and oriented and able to carry on conversation.  Breathing effort was normal.      Results Review:     I reviewed the patient's new clinical results.    Lab Results   Component Value Date    HGBA1C 9.9 (H) 12/03/2020    HGBA1C 10.2 (H) 09/02/2020    HGBA1C 10.2 (H) 07/30/2020      Lab Results   Component Value Date    GLUCOSE 341 (H) 12/03/2020    BUN 25 (H) 12/03/2020    CREATININE 0.58 12/03/2020    EGFRIFNONA 104 12/03/2020    BCR 43.1 (H) 12/03/2020    K 4.3 12/03/2020    CO2 25.9 12/03/2020    CALCIUM 9.9 12/03/2020    ALBUMIN 4.30 12/03/2020    LABIL2 1.3 03/12/2019    AST 13 12/03/2020    ALT 18 12/03/2020    CHOL 127 12/03/2020    TRIG 189 (H) 12/03/2020    LDL 54 12/03/2020    HDL 42 12/03/2020     Lab Results   Component Value Date    TSH 0.987 09/02/2020         Medication Review: Reviewed.       Current Outpatient Medications:   •  atorvastatin (LIPITOR) 40 MG tablet, TAKE 1 TABLET BY MOUTH EVERY DAY, Disp: 90 tablet, Rfl: 3  •  cetirizine (zyrTEC) 10 MG  tablet, Take 10 mg by mouth Daily., Disp: , Rfl:   •  Cholecalciferol (VITAMIN D3) 125 MCG (5000 UT) tablet tablet, Take 5,000 Units by mouth Daily., Disp: , Rfl:   •  dilTIAZem CD (CARDIZEM CD) 180 MG 24 hr capsule, Take three capsules at night, Disp: 270 capsule, Rfl: 3  •  ELDERBERRY PO, Take  by mouth., Disp: , Rfl:   •  famotidine (PEPCID) 20 MG tablet, Take 20 mg by mouth Daily., Disp: , Rfl:   •  ferrous sulfate 325 (65 FE) MG tablet, Take 325 mg by mouth Daily With Breakfast., Disp: , Rfl:   •  glimepiride (AMARYL) 4 MG tablet, TAKE 1 TABLET BY MOUTH TWICE A DAY WITH MEALS (Patient taking differently: Take 4 mg by mouth. TAKE 2 TABLET BY MOUTH TWICE A DAY WITH MEALS), Disp: 180 tablet, Rfl: 1  •  hydrOXYzine (ATARAX) 25 MG tablet, TAKE 1 TABLET BY MOUTH AT NIGHT AS NEEDED FOR ITCHING (ONE OR TWO IF ITCHING)., Disp: 30 tablet, Rfl: 1  •  Ibuprofen-diphenhydrAMINE HCl (ADVIL PM) 200-25 MG capsule, Take 1 capsule by mouth Every Night., Disp: , Rfl:   •  losartan (COZAAR) 100 MG tablet, Take 1 tablet by mouth Daily., Disp: 90 tablet, Rfl: 3  •  Magnesium 250 MG tablet, Take 250 mg by mouth Daily., Disp: , Rfl:   •  metFORMIN ER (GLUCOPHAGE-XR) 500 MG 24 hr tablet, ToothTake 2 tablets p.o. daily, Disp: 60 tablet, Rfl: 6  •  pramipexole (MIRAPEX) 0.125 MG tablet, TAKE 1 TABLET BY MOUTH 2 HOURS BEFORE SLEEP & MAY INCREASE EVERY WEEK 1 TAB UP TO 4 TABS BEFORE BED, Disp: , Rfl:   •  Semaglutide,0.25 or 0.5MG/DOS, (Ozempic, 0.25 or 0.5 MG/DOSE,) 2 MG/1.5ML solution pen-injector, Inject 0.5 mg subcu weekly if tolerated., Disp: 2 mL, Rfl: 6  •  Empagliflozin (Jardiance) 10 MG tablet, Take 10 mg by mouth Daily., Disp: 30 tablet, Rfl: 6      Assessment/Plan   1.  Diabetes mellitus type 2: Uncontrolled with A1c of 9.9%, advised to continue Ozempic 0.5 mg subcu weekly and resume Jardiance at 10 mg p.o. daily and continue Metformin with glimepiride.  She is advised to continue to work on diet and activity.  Get annual eye  exam and flu vaccine.  Labs before follow-up.  She verbalized understanding.    2.  Hypertension: Check blood pressure at home and send readings for review.    3.  Hyperlipidemia: On atorvastatin and well-controlled with LDL 54 and triglycerides 189.  We will continue current dose of atorvastatin.            Lei Kim MD FACE.

## 2021-01-04 RX ORDER — PRAMIPEXOLE DIHYDROCHLORIDE 0.12 MG/1
TABLET ORAL
Qty: 360 TABLET | Refills: 1 | Status: SHIPPED | OUTPATIENT
Start: 2021-01-04 | End: 2021-06-27

## 2021-02-23 RX ORDER — METFORMIN HYDROCHLORIDE 500 MG/1
TABLET, EXTENDED RELEASE ORAL
Qty: 180 TABLET | Refills: 2 | Status: SHIPPED | OUTPATIENT
Start: 2021-02-23 | End: 2021-11-15

## 2021-03-02 NOTE — PATIENT INSTRUCTIONS
Health Maintenance Due   Topic Date Due   • DIABETIC EYE EXAM  06/06/2019     Get CT of chest due to lung nodules and echocardiogram due to heart murmur    Call in one week if we have not called about stress test from 2018.    Stop Diltiazem 420-  Start Diltiazem 180 am and pm and after 1 week.  Send 10 blood pressures and pulses over 2 weeks.    Staff to change CT chest lowdose lung cancer screening to Thorasic aneurysm and lung nodule followup

## 2021-03-04 ENCOUNTER — OFFICE VISIT (OUTPATIENT)
Dept: FAMILY MEDICINE CLINIC | Facility: CLINIC | Age: 66
End: 2021-03-04

## 2021-03-04 VITALS
DIASTOLIC BLOOD PRESSURE: 71 MMHG | WEIGHT: 139.4 LBS | HEIGHT: 64 IN | SYSTOLIC BLOOD PRESSURE: 103 MMHG | BODY MASS INDEX: 23.8 KG/M2 | OXYGEN SATURATION: 96 % | TEMPERATURE: 97.5 F | HEART RATE: 87 BPM

## 2021-03-04 DIAGNOSIS — E53.8 B12 DEFICIENCY: ICD-10-CM

## 2021-03-04 DIAGNOSIS — I71.20 THORACIC AORTIC ANEURYSM WITHOUT RUPTURE (HCC): ICD-10-CM

## 2021-03-04 DIAGNOSIS — R91.8 MULTIPLE NODULES OF LUNG: ICD-10-CM

## 2021-03-04 DIAGNOSIS — E78.2 MIXED HYPERLIPIDEMIA: ICD-10-CM

## 2021-03-04 DIAGNOSIS — R06.83 SNORING: ICD-10-CM

## 2021-03-04 DIAGNOSIS — E11.65 UNCONTROLLED TYPE 2 DIABETES MELLITUS WITH HYPERGLYCEMIA (HCC): Primary | ICD-10-CM

## 2021-03-04 DIAGNOSIS — N20.0 KIDNEY STONES: ICD-10-CM

## 2021-03-04 DIAGNOSIS — I25.10 CHRONIC CORONARY ARTERY DISEASE: ICD-10-CM

## 2021-03-04 DIAGNOSIS — R01.1 HEART MURMUR: ICD-10-CM

## 2021-03-04 DIAGNOSIS — I10 ESSENTIAL HYPERTENSION: ICD-10-CM

## 2021-03-04 DIAGNOSIS — F17.200 TOBACCO DEPENDENCE SYNDROME: ICD-10-CM

## 2021-03-04 DIAGNOSIS — J43.9 PULMONARY EMPHYSEMA, UNSPECIFIED EMPHYSEMA TYPE (HCC): ICD-10-CM

## 2021-03-04 DIAGNOSIS — E83.42 HYPOMAGNESEMIA: ICD-10-CM

## 2021-03-04 LAB
BASOPHILS # BLD AUTO: 0.03 10*3/MM3 (ref 0–0.2)
BASOPHILS NFR BLD AUTO: 0.4 % (ref 0–1.5)
DEPRECATED RDW RBC AUTO: 39.7 FL (ref 37–54)
EOSINOPHIL # BLD AUTO: 0.12 10*3/MM3 (ref 0–0.4)
EOSINOPHIL NFR BLD AUTO: 1.7 % (ref 0.3–6.2)
ERYTHROCYTE [DISTWIDTH] IN BLOOD BY AUTOMATED COUNT: 12.1 % (ref 12.3–15.4)
HBA1C MFR BLD: 9.5 % (ref 3.5–5.6)
HCT VFR BLD AUTO: 47.2 % (ref 34–46.6)
HGB BLD-MCNC: 16.2 G/DL (ref 12–15.9)
IMM GRANULOCYTES # BLD AUTO: 0.05 10*3/MM3 (ref 0–0.05)
IMM GRANULOCYTES NFR BLD AUTO: 0.7 % (ref 0–0.5)
LYMPHOCYTES # BLD AUTO: 1.99 10*3/MM3 (ref 0.7–3.1)
LYMPHOCYTES NFR BLD AUTO: 27.9 % (ref 19.6–45.3)
MCH RBC QN AUTO: 31.3 PG (ref 26.6–33)
MCHC RBC AUTO-ENTMCNC: 34.3 G/DL (ref 31.5–35.7)
MCV RBC AUTO: 91.3 FL (ref 79–97)
MONOCYTES # BLD AUTO: 0.37 10*3/MM3 (ref 0.1–0.9)
MONOCYTES NFR BLD AUTO: 5.2 % (ref 5–12)
NEUTROPHILS NFR BLD AUTO: 4.56 10*3/MM3 (ref 1.7–7)
NEUTROPHILS NFR BLD AUTO: 64.1 % (ref 42.7–76)
NRBC BLD AUTO-RTO: 0 /100 WBC (ref 0–0.2)
PLATELET # BLD AUTO: 231 10*3/MM3 (ref 140–450)
PMV BLD AUTO: 12.2 FL (ref 6–12)
RBC # BLD AUTO: 5.17 10*6/MM3 (ref 3.77–5.28)
WBC # BLD AUTO: 7.12 10*3/MM3 (ref 3.4–10.8)

## 2021-03-04 PROCEDURE — 83036 HEMOGLOBIN GLYCOSYLATED A1C: CPT | Performed by: INTERNAL MEDICINE

## 2021-03-04 PROCEDURE — 82570 ASSAY OF URINE CREATININE: CPT | Performed by: INTERNAL MEDICINE

## 2021-03-04 PROCEDURE — 99214 OFFICE O/P EST MOD 30 MIN: CPT | Performed by: PREVENTIVE MEDICINE

## 2021-03-04 PROCEDURE — 83735 ASSAY OF MAGNESIUM: CPT | Performed by: PREVENTIVE MEDICINE

## 2021-03-04 PROCEDURE — 82607 VITAMIN B-12: CPT | Performed by: PREVENTIVE MEDICINE

## 2021-03-04 PROCEDURE — 80061 LIPID PANEL: CPT | Performed by: PREVENTIVE MEDICINE

## 2021-03-04 PROCEDURE — 80053 COMPREHEN METABOLIC PANEL: CPT | Performed by: PREVENTIVE MEDICINE

## 2021-03-04 PROCEDURE — 84550 ASSAY OF BLOOD/URIC ACID: CPT | Performed by: PREVENTIVE MEDICINE

## 2021-03-04 PROCEDURE — 85025 COMPLETE CBC W/AUTO DIFF WBC: CPT | Performed by: PREVENTIVE MEDICINE

## 2021-03-04 PROCEDURE — 84443 ASSAY THYROID STIM HORMONE: CPT | Performed by: PREVENTIVE MEDICINE

## 2021-03-04 PROCEDURE — 82043 UR ALBUMIN QUANTITATIVE: CPT | Performed by: INTERNAL MEDICINE

## 2021-03-04 RX ORDER — DILTIAZEM HYDROCHLORIDE 180 MG/1
CAPSULE, COATED, EXTENDED RELEASE ORAL
Qty: 270 CAPSULE | Refills: 3
Start: 2021-03-04 | End: 2021-06-10

## 2021-03-04 NOTE — PROGRESS NOTES
"Subjective   Eugenia Madden is a 65 y.o. female presents for   Chief Complaint   Patient presents with   • Diabetes     6 month follow up       Health Maintenance Due   Topic Date Due   • DIABETIC EYE EXAM  06/06/2019       65-year-old white female following up on multiple chronic diseases.  Blood pressure has been low so we have evaluated cutting her diltiazem today.  Patient's heart murmur was also more significant today echocardiogram to reevaluate was ordered today.  Covid vaccination is up-to-date.  Blood sugars are not well controlled and she is following up with endocrinologist.       Vitals:    03/04/21 0854 03/04/21 0855   BP: 98/63 103/71   BP Location: Right arm Left arm   Patient Position: Sitting Sitting   Cuff Size: Adult Adult   Pulse: 87    Temp: 97.5 °F (36.4 °C)    SpO2: 96%    Weight: 63.2 kg (139 lb 6.4 oz)    Height: 162.6 cm (64.02\")      Body mass index is 23.92 kg/m².    Current Outpatient Medications on File Prior to Visit   Medication Sig Dispense Refill   • atorvastatin (LIPITOR) 40 MG tablet TAKE 1 TABLET BY MOUTH EVERY DAY 90 tablet 3   • cetirizine (zyrTEC) 10 MG tablet Take 10 mg by mouth Daily.     • Cholecalciferol (VITAMIN D3) 125 MCG (5000 UT) tablet tablet Take 5,000 Units by mouth Daily.     • ELDERBERRY PO Take  by mouth.     • Empagliflozin (Jardiance) 10 MG tablet Take 10 mg by mouth Daily. 30 tablet 6   • famotidine (PEPCID) 20 MG tablet Take 20 mg by mouth Daily.     • ferrous sulfate 325 (65 FE) MG tablet Take 325 mg by mouth Daily With Breakfast.     • glimepiride (AMARYL) 4 MG tablet TAKE 1 TABLET BY MOUTH TWICE A DAY WITH MEALS (Patient taking differently: Take 4 mg by mouth. TAKE 2 TABLET BY MOUTH TWICE A DAY WITH MEALS) 180 tablet 1   • hydrOXYzine (ATARAX) 25 MG tablet TAKE 1 TABLET BY MOUTH AT NIGHT AS NEEDED FOR ITCHING (ONE OR TWO IF ITCHING). 30 tablet 1   • Ibuprofen-diphenhydrAMINE HCl (ADVIL PM) 200-25 MG capsule Take 1 capsule by mouth Every Night.     • " losartan (COZAAR) 100 MG tablet Take 1 tablet by mouth Daily. 90 tablet 3   • Magnesium 250 MG tablet Take 250 mg by mouth Daily.     • metFORMIN ER (GLUCOPHAGE-XR) 500 MG 24 hr tablet TAKE 2 TABLETS BY MOUTH EVERY  tablet 2   • pramipexole (MIRAPEX) 0.125 MG tablet TAKE 1 TABLET BY MOUTH 2 HOURS BEFORE SLEEP & MAY INCREASE EVERY WEEK 1 TAB UP TO 4 TABS BEFORE  tablet 1   • Semaglutide,0.25 or 0.5MG/DOS, (Ozempic, 0.25 or 0.5 MG/DOSE,) 2 MG/1.5ML solution pen-injector Inject 0.5 mg subcu weekly if tolerated. 2 mL 6   • [DISCONTINUED] dilTIAZem CD (CARDIZEM CD) 180 MG 24 hr capsule Take three capsules at night 270 capsule 3     No current facility-administered medications on file prior to visit.        The following portions of the patient's history were reviewed and updated as appropriate: allergies, current medications, past family history, past medical history, past social history, past surgical history and problem list.    Review of Systems   Constitutional: Negative.    HENT: Negative.  Negative for sinus pressure and sore throat.    Eyes: Negative.    Respiratory: Positive for shortness of breath. Negative for cough.    Cardiovascular: Negative.  Negative for chest pain.   Gastrointestinal: Negative.    Endocrine: Negative.    Genitourinary: Negative.    Musculoskeletal: Negative.    Skin: Negative.    Allergic/Immunologic: Positive for environmental allergies.   Neurological: Positive for light-headedness.   Hematological: Negative.    Psychiatric/Behavioral: Negative.        Objective   Physical Exam  Vitals signs reviewed.   Constitutional:       General: She is not in acute distress.     Appearance: Normal appearance. She is well-developed. She is not ill-appearing or toxic-appearing.   HENT:      Head: Normocephalic and atraumatic.      Right Ear: Tympanic membrane, ear canal and external ear normal.      Left Ear: Tympanic membrane, ear canal and external ear normal.      Nose: Nose normal.    Eyes:      Extraocular Movements: Extraocular movements intact.      Conjunctiva/sclera: Conjunctivae normal.      Pupils: Pupils are equal, round, and reactive to light.   Neck:      Musculoskeletal: Neck supple.   Cardiovascular:      Rate and Rhythm: Normal rate and regular rhythm.      Pulses:           Dorsalis pedis pulses are 1+ on the right side and 1+ on the left side.        Posterior tibial pulses are 1+ on the right side and 1+ on the left side.      Heart sounds: Normal heart sounds.   Pulmonary:      Effort: Pulmonary effort is normal.      Breath sounds: Normal breath sounds.   Abdominal:      General: Bowel sounds are normal. There is no distension.      Palpations: Abdomen is soft. There is no mass.      Tenderness: There is no abdominal tenderness.   Musculoskeletal: Normal range of motion.   Feet:      Right foot:      Skin integrity: Skin integrity normal.      Toenail Condition: Right toenails are normal.      Left foot:      Skin integrity: Skin integrity normal.      Toenail Condition: Left toenails are normal.      Comments: Diabetic Foot Exam Performed    Skin:     General: Skin is warm.   Neurological:      General: No focal deficit present.      Mental Status: She is alert and oriented to person, place, and time.   Psychiatric:         Mood and Affect: Mood normal.         Behavior: Behavior normal.       PHQ-9 Total Score:      Assessment/Plan   Diagnoses and all orders for this visit:    1. Uncontrolled type 2 diabetes mellitus with hyperglycemia (CMS/HCC) (Primary)  Comments:  100-250.. Eye exam UTD follow-up endocrinologist  Orders:  -     Cancel: Comprehensive Metabolic Panel  -     Cancel: Hemoglobin A1c  -     Cancel: Microalbumin / Creatinine Urine Ratio - Urine, Clean Catch  -     TSH  -     Hemoglobin A1c  -     Lipid Panel  -     Comprehensive Metabolic Panel  -     Microalbumin / Creatinine Urine Ratio - Urine, Clean Catch    2. B12 deficiency  Comments:  Takes vitamin B12  daily  Orders:  -     CBC Auto Differential  -     Vitamin B12    3. Mixed hyperlipidemia  Comments:  Trying to eat less saturated fat  Orders:  -     Cancel: Lipid Panel    4. Essential hypertension  Comments:  Light headed in morning will cut Diltiazem dose    5. Tobacco dependence syndrome  Comments:  Stopped 11/20  Orders:  -     CT Chest With Contrast; Future    6. Thoracic aortic aneurysm without rupture (CMS/HCC)  Comments:  Will be surveilled.  Not mentioned on the last CT of the chest but was 3.2 on that CT prior to that CT ordered with contrast  Orders:  -     CT Chest With Contrast; Future    7. Chronic coronary artery disease  Comments:  No chest pain or shortness of breath    8. Multiple nodules of lung  Comments:  CT with contrast pending  Orders:  -     CT Chest With Contrast; Future    9. Pulmonary emphysema, unspecified emphysema type (CMS/HCC)  Comments:  Breathing has been doing fairly well.    10. Snoring  Comments:  Snoring has resolved.    11. Hypomagnesemia  -     Magnesium    12. Kidney stones  Comments:  No recent kidney stones.  Orders:  -     Uric Acid    13. Heart murmur  Comments:  Grade 2 systolic murmur today echocardiogram to reevaluate last was 2019.  Showed trace mitral and tricuspid regurgitation.  Orders:  -     Adult Transthoracic Echo Limited W/ Cont if Necessary Per Protocol; Future    Other orders  -     dilTIAZem CD (Cardizem CD) 180 MG 24 hr capsule; Take one am and pm  Dispense: 270 capsule; Refill: 3        Patient Instructions     Health Maintenance Due   Topic Date Due   • DIABETIC EYE EXAM  06/06/2019     Get CT of chest due to lung nodules and echocardiogram due to heart murmur    Call in one week if we have not called about stress test from 2018.    Stop Diltiazem 420-  Start Diltiazem 180 am and pm and after 1 week.  Send 10 blood pressures and pulses over 2 weeks.    Staff to change CT chest lowdose lung cancer screening to Thorasic aneurysm and lung nodule  followup

## 2021-03-05 ENCOUNTER — TELEPHONE (OUTPATIENT)
Dept: FAMILY MEDICINE CLINIC | Facility: CLINIC | Age: 66
End: 2021-03-05

## 2021-03-05 LAB
ALBUMIN SERPL-MCNC: 4.1 G/DL (ref 3.5–5.2)
ALBUMIN UR-MCNC: <1.2 MG/DL
ALBUMIN/GLOB SERPL: 1.6 G/DL
ALP SERPL-CCNC: 72 U/L (ref 39–117)
ALT SERPL W P-5'-P-CCNC: 23 U/L (ref 1–33)
ANION GAP SERPL CALCULATED.3IONS-SCNC: 12.2 MMOL/L (ref 5–15)
AST SERPL-CCNC: 17 U/L (ref 1–32)
BILIRUB SERPL-MCNC: 0.4 MG/DL (ref 0–1.2)
BUN SERPL-MCNC: 15 MG/DL (ref 8–23)
BUN/CREAT SERPL: 25.9 (ref 7–25)
CALCIUM SPEC-SCNC: 9 MG/DL (ref 8.6–10.5)
CHLORIDE SERPL-SCNC: 102 MMOL/L (ref 98–107)
CHOLEST SERPL-MCNC: 102 MG/DL (ref 0–200)
CO2 SERPL-SCNC: 23.8 MMOL/L (ref 22–29)
CREAT SERPL-MCNC: 0.58 MG/DL (ref 0.57–1)
CREAT UR-MCNC: 67.7 MG/DL
GFR SERPL CREATININE-BSD FRML MDRD: 104 ML/MIN/1.73
GLOBULIN UR ELPH-MCNC: 2.6 GM/DL
GLUCOSE SERPL-MCNC: 133 MG/DL (ref 65–99)
HDLC SERPL-MCNC: 32 MG/DL (ref 40–60)
LDLC SERPL CALC-MCNC: 45 MG/DL (ref 0–100)
LDLC/HDLC SERPL: 1.31 {RATIO}
MAGNESIUM SERPL-MCNC: 1.6 MG/DL (ref 1.6–2.4)
MICROALBUMIN/CREAT UR: NORMAL MG/G{CREAT}
POTASSIUM SERPL-SCNC: 3.4 MMOL/L (ref 3.5–5.2)
PROT SERPL-MCNC: 6.7 G/DL (ref 6–8.5)
SODIUM SERPL-SCNC: 138 MMOL/L (ref 136–145)
TRIGL SERPL-MCNC: 141 MG/DL (ref 0–150)
TSH SERPL DL<=0.05 MIU/L-ACNC: 1.95 UIU/ML (ref 0.27–4.2)
URATE SERPL-MCNC: 2.7 MG/DL (ref 2.4–5.7)
VIT B12 BLD-MCNC: 517 PG/ML (ref 211–946)
VLDLC SERPL-MCNC: 25 MG/DL (ref 5–40)

## 2021-03-05 RX ORDER — POTASSIUM CHLORIDE 750 MG/1
10 TABLET, FILM COATED, EXTENDED RELEASE ORAL DAILY
Qty: 90 TABLET | Refills: 1 | Status: SHIPPED | OUTPATIENT
Start: 2021-03-05 | End: 2021-08-30

## 2021-03-05 NOTE — TELEPHONE ENCOUNTER
HUB TO READ  Glucose 133 but A1C is still 9.5-part of that is coming from large RBC count due to prior smoking-it is improved since she stopped in November but not yet at normal level.  How much Ozempic is she taking weekly?  How long has she been at that level.  She can increase foods rich in potassium-google for a list- or we can give her small amount daily by mouth-let me know what she decides

## 2021-03-05 NOTE — TELEPHONE ENCOUNTER
Patient was on steroids last week. She feels like she ate everything in sight. Ozempic 0.05 for about 2 mths  She will need a RX for the potassium. I googled foods and she said she doesn't eat any of them..

## 2021-03-05 NOTE — PROGRESS NOTES
Glucose 133 but A1C is still 9.5-part of that is coming from large RBC count due to prior smoking-it is improved since she stopped in November but not yet at normal level.  How much Ozempic is she taking weekly?  How long has she been at that level.  She can increase foods rich in potassium-google for a list- or we can give her small amount daily by mouth-let me know what she decides

## 2021-03-12 ENCOUNTER — HOSPITAL ENCOUNTER (OUTPATIENT)
Dept: CARDIOLOGY | Facility: HOSPITAL | Age: 66
Discharge: HOME | End: 2021-03-12

## 2021-03-12 ENCOUNTER — HOSPITAL ENCOUNTER (OUTPATIENT)
Dept: CT IMAGING | Facility: HOSPITAL | Age: 66
Discharge: HOME | End: 2021-03-12

## 2021-03-12 ENCOUNTER — TELEPHONE (OUTPATIENT)
Dept: FAMILY MEDICINE CLINIC | Facility: CLINIC | Age: 66
End: 2021-03-12

## 2021-03-12 VITALS
DIASTOLIC BLOOD PRESSURE: 69 MMHG | BODY MASS INDEX: 23.79 KG/M2 | SYSTOLIC BLOOD PRESSURE: 155 MMHG | WEIGHT: 139.33 LBS | HEIGHT: 64 IN

## 2021-03-12 DIAGNOSIS — F17.200 TOBACCO DEPENDENCE SYNDROME: ICD-10-CM

## 2021-03-12 DIAGNOSIS — R91.8 MULTIPLE NODULES OF LUNG: ICD-10-CM

## 2021-03-12 DIAGNOSIS — I71.20 THORACIC AORTIC ANEURYSM WITHOUT RUPTURE (HCC): ICD-10-CM

## 2021-03-12 DIAGNOSIS — R01.1 HEART MURMUR: ICD-10-CM

## 2021-03-12 PROCEDURE — 93306 TTE W/DOPPLER COMPLETE: CPT

## 2021-03-12 PROCEDURE — 71260 CT THORAX DX C+: CPT

## 2021-03-12 PROCEDURE — 93306 TTE W/DOPPLER COMPLETE: CPT | Performed by: INTERNAL MEDICINE

## 2021-03-12 PROCEDURE — 0 IOPAMIDOL PER 1 ML: Performed by: PREVENTIVE MEDICINE

## 2021-03-12 RX ADMIN — IOPAMIDOL 100 ML: 755 INJECTION, SOLUTION INTRAVENOUS at 12:48

## 2021-03-12 NOTE — PROGRESS NOTES
Aneurysm is same or minimally increased in size 3.2-3.3 and concern begins at 5.0-so will moniter yearly.  Nodules are stable and axillary nodes have decreased in size.

## 2021-03-12 NOTE — TELEPHONE ENCOUNTER
----- Message from Melisa Taveras MD sent at 3/12/2021  1:40 PM EST -----  Aneurysm is same or minimally increased in size 3.2-3.3 and concern begins at 5.0-so will moniter yearly.  Nodules are stable and axillary nodes have decreased in size.

## 2021-03-14 LAB
BH CV ECHO MEAS - ACS: 1.4 CM
BH CV ECHO MEAS - AI DEC SLOPE: 224.5 CM/SEC^2
BH CV ECHO MEAS - AI DEC TIME: 1.9 SEC
BH CV ECHO MEAS - AI MAX PG: 71.7 MMHG
BH CV ECHO MEAS - AI MAX VEL: 423.1 CM/SEC
BH CV ECHO MEAS - AI P1/2T: 552 MSEC
BH CV ECHO MEAS - AO MAX PG (FULL): 7.7 MMHG
BH CV ECHO MEAS - AO MAX PG: 17.5 MMHG
BH CV ECHO MEAS - AO MEAN PG (FULL): 2.7 MMHG
BH CV ECHO MEAS - AO MEAN PG: 8.8 MMHG
BH CV ECHO MEAS - AO ROOT AREA (BSA CORRECTED): 1.8
BH CV ECHO MEAS - AO ROOT AREA: 6.9 CM^2
BH CV ECHO MEAS - AO ROOT DIAM: 3 CM
BH CV ECHO MEAS - AO V2 MAX: 209.1 CM/SEC
BH CV ECHO MEAS - AO V2 MEAN: 139.7 CM/SEC
BH CV ECHO MEAS - AO V2 VTI: 43.9 CM
BH CV ECHO MEAS - ASC AORTA: 3.4 CM
BH CV ECHO MEAS - AVA(I,A): 2.6 CM^2
BH CV ECHO MEAS - AVA(I,D): 2.6 CM^2
BH CV ECHO MEAS - AVA(V,A): 2.4 CM^2
BH CV ECHO MEAS - AVA(V,D): 2.4 CM^2
BH CV ECHO MEAS - BSA(HAYCOCK): 1.7 M^2
BH CV ECHO MEAS - BSA: 1.7 M^2
BH CV ECHO MEAS - BZI_BMI: 23.9 KILOGRAMS/M^2
BH CV ECHO MEAS - BZI_METRIC_HEIGHT: 162.6 CM
BH CV ECHO MEAS - BZI_METRIC_WEIGHT: 63 KG
BH CV ECHO MEAS - EDV(CUBED): 66.2 ML
BH CV ECHO MEAS - EDV(MOD-SP4): 69.8 ML
BH CV ECHO MEAS - EDV(TEICH): 71.9 ML
BH CV ECHO MEAS - EF(CUBED): 80.5 %
BH CV ECHO MEAS - EF(MOD-BP): 70 %
BH CV ECHO MEAS - EF(MOD-SP4): 75.6 %
BH CV ECHO MEAS - EF(TEICH): 73.5 %
BH CV ECHO MEAS - ESV(CUBED): 12.9 ML
BH CV ECHO MEAS - ESV(MOD-SP4): 17 ML
BH CV ECHO MEAS - ESV(TEICH): 19.1 ML
BH CV ECHO MEAS - FS: 42 %
BH CV ECHO MEAS - IVS/LVPW: 1.1
BH CV ECHO MEAS - IVSD: 1.1 CM
BH CV ECHO MEAS - LA DIMENSION: 3.3 CM
BH CV ECHO MEAS - LA/AO: 1.1
BH CV ECHO MEAS - LV DIASTOLIC VOL/BSA (35-75): 41.6 ML/M^2
BH CV ECHO MEAS - LV MASS(C)D: 141.1 GRAMS
BH CV ECHO MEAS - LV MASS(C)DI: 84.2 GRAMS/M^2
BH CV ECHO MEAS - LV MAX PG: 9.8 MMHG
BH CV ECHO MEAS - LV MEAN PG: 6.1 MMHG
BH CV ECHO MEAS - LV SYSTOLIC VOL/BSA (12-30): 10.2 ML/M^2
BH CV ECHO MEAS - LV V1 MAX: 156.5 CM/SEC
BH CV ECHO MEAS - LV V1 MEAN: 116.4 CM/SEC
BH CV ECHO MEAS - LV V1 VTI: 36.3 CM
BH CV ECHO MEAS - LVIDD: 4 CM
BH CV ECHO MEAS - LVIDS: 2.3 CM
BH CV ECHO MEAS - LVOT AREA: 3.2 CM^2
BH CV ECHO MEAS - LVOT DIAM: 2 CM
BH CV ECHO MEAS - LVPWD: 0.99 CM
BH CV ECHO MEAS - MR MAX PG: 157.6 MMHG
BH CV ECHO MEAS - MR MAX VEL: 627.1 CM/SEC
BH CV ECHO MEAS - MV A MAX VEL: 184.8 CM/SEC
BH CV ECHO MEAS - MV DEC SLOPE: 173.6 CM/SEC^2
BH CV ECHO MEAS - MV DEC TIME: 0.6 SEC
BH CV ECHO MEAS - MV E MAX VEL: 104.6 CM/SEC
BH CV ECHO MEAS - MV E/A: 0.57
BH CV ECHO MEAS - MV MAX PG: 12.9 MMHG
BH CV ECHO MEAS - MV MEAN PG: 4.2 MMHG
BH CV ECHO MEAS - MV V2 MAX: 179.5 CM/SEC
BH CV ECHO MEAS - MV V2 MEAN: 92.3 CM/SEC
BH CV ECHO MEAS - MV V2 VTI: 40.1 CM
BH CV ECHO MEAS - MVA(VTI): 2.9 CM^2
BH CV ECHO MEAS - PA ACC TIME: 0.07 SEC
BH CV ECHO MEAS - PA MAX PG: 5.7 MMHG
BH CV ECHO MEAS - PA PR(ACCEL): 48.2 MMHG
BH CV ECHO MEAS - PA V2 MAX: 118.9 CM/SEC
BH CV ECHO MEAS - PI END-D VEL: 129 CM/SEC
BH CV ECHO MEAS - PULM A REVS DUR: 0.12 SEC
BH CV ECHO MEAS - PULM A REVS VEL: 31.2 CM/SEC
BH CV ECHO MEAS - PULM DIAS VEL: 33 CM/SEC
BH CV ECHO MEAS - PULM S/D: 1.8
BH CV ECHO MEAS - PULM SYS VEL: 57.9 CM/SEC
BH CV ECHO MEAS - RAP SYSTOLE: 10 MMHG
BH CV ECHO MEAS - RVSP: 36.8 MMHG
BH CV ECHO MEAS - SI(AO): 181.3 ML/M^2
BH CV ECHO MEAS - SI(CUBED): 31.8 ML/M^2
BH CV ECHO MEAS - SI(LVOT): 68.3 ML/M^2
BH CV ECHO MEAS - SI(MOD-SP4): 31.5 ML/M^2
BH CV ECHO MEAS - SI(TEICH): 31.5 ML/M^2
BH CV ECHO MEAS - SV(AO): 303.8 ML
BH CV ECHO MEAS - SV(CUBED): 53.2 ML
BH CV ECHO MEAS - SV(LVOT): 114.4 ML
BH CV ECHO MEAS - SV(MOD-SP4): 52.8 ML
BH CV ECHO MEAS - SV(TEICH): 52.8 ML
BH CV ECHO MEAS - TAPSE (>1.6): 1.8 CM
BH CV ECHO MEAS - TR MAX VEL: 255.4 CM/SEC
BH CV XLRA - RV BASE: 3.9 CM
BH CV XLRA - RV MID: 1.9 CM
MAXIMAL PREDICTED HEART RATE: 155 BPM
STRESS TARGET HR: 132 BPM

## 2021-03-31 ENCOUNTER — TELEPHONE (OUTPATIENT)
Dept: FAMILY MEDICINE CLINIC | Facility: CLINIC | Age: 66
End: 2021-03-31

## 2021-04-01 RX ORDER — SEMAGLUTIDE 1.34 MG/ML
INJECTION, SOLUTION SUBCUTANEOUS
Qty: 4.5 ML | Refills: 2 | Status: SHIPPED | OUTPATIENT
Start: 2021-04-01 | End: 2022-02-28

## 2021-04-01 NOTE — TELEPHONE ENCOUNTER
HUB TO READ   left message    Valve function is unchanged and calcification rating same.  Pumping better now but thickening of muscle has increased.  All in all about the same

## 2021-04-01 NOTE — TELEPHONE ENCOUNTER
I called to notify pt of recommendations. She is wanting to know if it is stable compared to last result? Or if it has worsened?? She said that knowing this would make it easier to make the decision on whether to just monitor symptoms or if she would like to proceed with seeing the cardiologist. I advised that I would put in a note for clarification.

## 2021-04-01 NOTE — TELEPHONE ENCOUNTER
Valve function is unchanged and calcification rating same.  Pumping better now but thickening of muscle has increased.  All in all about the same

## 2021-04-20 RX ORDER — LOSARTAN POTASSIUM 50 MG/1
TABLET ORAL
COMMUNITY
Start: 2021-03-11 | End: 2021-06-10 | Stop reason: DRUGHIGH

## 2021-05-13 ENCOUNTER — OFFICE VISIT (OUTPATIENT)
Dept: FAMILY MEDICINE CLINIC | Facility: CLINIC | Age: 66
End: 2021-05-13

## 2021-05-13 ENCOUNTER — TELEPHONE (OUTPATIENT)
Dept: FAMILY MEDICINE CLINIC | Facility: CLINIC | Age: 66
End: 2021-05-13

## 2021-05-13 ENCOUNTER — HOSPITAL ENCOUNTER (OUTPATIENT)
Dept: GENERAL RADIOLOGY | Facility: HOSPITAL | Age: 66
Discharge: HOME | End: 2021-05-13
Admitting: NURSE PRACTITIONER

## 2021-05-13 VITALS
TEMPERATURE: 98 F | HEIGHT: 64 IN | SYSTOLIC BLOOD PRESSURE: 130 MMHG | OXYGEN SATURATION: 95 % | DIASTOLIC BLOOD PRESSURE: 69 MMHG | HEART RATE: 93 BPM | BODY MASS INDEX: 23.39 KG/M2 | WEIGHT: 137 LBS

## 2021-05-13 DIAGNOSIS — R35.0 FREQUENT URINATION: ICD-10-CM

## 2021-05-13 DIAGNOSIS — E11.65 UNCONTROLLED TYPE 2 DIABETES MELLITUS WITH HYPERGLYCEMIA (HCC): ICD-10-CM

## 2021-05-13 DIAGNOSIS — M54.50 ACUTE BILATERAL LOW BACK PAIN WITHOUT SCIATICA: ICD-10-CM

## 2021-05-13 DIAGNOSIS — M54.50 ACUTE BILATERAL LOW BACK PAIN WITHOUT SCIATICA: Primary | ICD-10-CM

## 2021-05-13 LAB
BILIRUB BLD-MCNC: NEGATIVE MG/DL
CLARITY, POC: CLEAR
COLOR UR: YELLOW
GLUCOSE UR STRIP-MCNC: ABNORMAL MG/DL
KETONES UR QL: ABNORMAL
LEUKOCYTE EST, POC: NEGATIVE
NITRITE UR-MCNC: NEGATIVE MG/ML
PH UR: 5 [PH] (ref 5–8)
PROT UR STRIP-MCNC: NEGATIVE MG/DL
RBC # UR STRIP: NEGATIVE /UL
SP GR UR: 1.02 (ref 1–1.03)
UROBILINOGEN UR QL: NORMAL

## 2021-05-13 PROCEDURE — 72110 X-RAY EXAM L-2 SPINE 4/>VWS: CPT

## 2021-05-13 PROCEDURE — 81003 URINALYSIS AUTO W/O SCOPE: CPT | Performed by: NURSE PRACTITIONER

## 2021-05-13 PROCEDURE — 99213 OFFICE O/P EST LOW 20 MIN: CPT | Performed by: NURSE PRACTITIONER

## 2021-05-13 RX ORDER — CYCLOBENZAPRINE HCL 10 MG
10 TABLET ORAL 3 TIMES DAILY PRN
Qty: 15 TABLET | Refills: 0 | Status: SHIPPED | OUTPATIENT
Start: 2021-05-13 | End: 2021-07-13

## 2021-05-13 RX ORDER — BLOOD-GLUCOSE METER
1 KIT MISCELLANEOUS DAILY
Qty: 1 EACH | Refills: 0 | Status: SHIPPED | OUTPATIENT
Start: 2021-05-13 | End: 2021-05-13

## 2021-05-13 RX ORDER — BLOOD SUGAR DIAGNOSTIC
STRIP MISCELLANEOUS
Qty: 100 EACH | Refills: 12 | Status: SHIPPED | OUTPATIENT
Start: 2021-05-13 | End: 2021-05-13

## 2021-05-13 RX ORDER — LANCETS
EACH MISCELLANEOUS
Qty: 102 EACH | Refills: 12 | Status: SHIPPED | OUTPATIENT
Start: 2021-05-13 | End: 2021-05-14 | Stop reason: CLARIF

## 2021-05-13 RX ORDER — LANCETS 28 GAUGE
EACH MISCELLANEOUS
Qty: 100 EACH | Refills: 12 | Status: SHIPPED | OUTPATIENT
Start: 2021-05-13 | End: 2021-05-13

## 2021-05-13 RX ORDER — BLOOD SUGAR DIAGNOSTIC
STRIP MISCELLANEOUS
Qty: 100 EACH | Refills: 12 | Status: SHIPPED | OUTPATIENT
Start: 2021-05-13 | End: 2021-05-14 | Stop reason: CLARIF

## 2021-05-13 NOTE — PROGRESS NOTES
"Subjective   Eugenia Madden is a 65 y.o. female presents for   Chief Complaint   Patient presents with   • Back Pain   • Urinary Frequency       Health Maintenance Due   Topic Date Due   • COVID-19 Vaccine (1) Never done   • DIABETIC EYE EXAM  06/06/2019       History of Present Illness   Pt states 3 months ago, she had bronchitis.  Was seen and tested for covid and it was negative.  She was prescribed steroids for bronchitis and states blood sugar was extremely high.  She states after completing steroids, she started to experience back pain.  She states pain was 10/10 2 days ago when leaving work, and took aleve, and states it didn't significantly help with pain.  Pain is in lower back and radiates up to her mid-back.  She denies radiation down her legs.   She states her blood sugars have been better since completing the steroids, but ran out of strips last week.  She also requests prescription for a new meter.      Vitals:    05/13/21 1150 05/13/21 1153   BP: 90/49 130/69   BP Location: Right arm Left arm   Patient Position: Sitting Sitting   Cuff Size: Adult Adult   Pulse: 93    Temp: 98 °F (36.7 °C)    SpO2: 95%    Weight: 62.1 kg (137 lb)    Height: 162.6 cm (64.02\")      Body mass index is 23.5 kg/m².    Current Outpatient Medications on File Prior to Visit   Medication Sig Dispense Refill   • atorvastatin (LIPITOR) 40 MG tablet TAKE 1 TABLET BY MOUTH EVERY DAY 90 tablet 3   • cetirizine (zyrTEC) 10 MG tablet Take 10 mg by mouth Daily.     • Cholecalciferol (VITAMIN D3) 125 MCG (5000 UT) tablet tablet Take 5,000 Units by mouth Daily.     • dilTIAZem CD (Cardizem CD) 180 MG 24 hr capsule Take one am and pm (Patient taking differently: 180 mg Daily. Take one am and pm) 270 capsule 3   • ELDERBERRY PO Take  by mouth.     • Empagliflozin (Jardiance) 10 MG tablet Take 10 mg by mouth Daily. 30 tablet 6   • famotidine (PEPCID) 20 MG tablet Take 20 mg by mouth Daily.     • ferrous sulfate 325 (65 FE) MG tablet Take 325 " mg by mouth Daily With Breakfast.     • glimepiride (AMARYL) 4 MG tablet TAKE 1 TABLET BY MOUTH TWICE A DAY WITH MEALS (Patient taking differently: Take 4 mg by mouth. TAKE 2 TABLET BY MOUTH TWICE A DAY WITH MEALS) 180 tablet 1   • hydrOXYzine (ATARAX) 25 MG tablet TAKE 1 TABLET BY MOUTH AT NIGHT AS NEEDED FOR ITCHING (ONE OR TWO IF ITCHING). 30 tablet 1   • Ibuprofen-diphenhydrAMINE HCl (ADVIL PM) 200-25 MG capsule Take 1 capsule by mouth Every Night.     • Magnesium 250 MG tablet Take 250 mg by mouth Daily.     • metFORMIN ER (GLUCOPHAGE-XR) 500 MG 24 hr tablet TAKE 2 TABLETS BY MOUTH EVERY  tablet 2   • potassium chloride 10 MEQ CR tablet Take 1 tablet by mouth Daily. 90 tablet 1   • pramipexole (MIRAPEX) 0.125 MG tablet TAKE 1 TABLET BY MOUTH 2 HOURS BEFORE SLEEP & MAY INCREASE EVERY WEEK 1 TAB UP TO 4 TABS BEFORE  tablet 1   • Semaglutide,0.25 or 0.5MG/DOS, (Ozempic, 0.25 or 0.5 MG/DOSE,) 2 MG/1.5ML solution pen-injector Inject .05 mg weekly 4.5 mL 2   • losartan (COZAAR) 100 MG tablet Take 1 tablet by mouth Daily. 90 tablet 3   • losartan (COZAAR) 50 MG tablet        No current facility-administered medications on file prior to visit.       The following portions of the patient's history were reviewed and updated as appropriate: allergies, current medications, past family history, past medical history, past social history, past surgical history, and problem list.    Review of Systems   Constitutional: Negative for chills and fever.   HENT: Negative for sinus pressure and sore throat.    Eyes: Negative for blurred vision.   Respiratory: Negative for cough and shortness of breath.    Cardiovascular: Negative for chest pain.   Gastrointestinal: Negative for abdominal pain.   Endocrine: Negative.    Genitourinary: Negative.    Musculoskeletal: Positive for back pain. Negative for arthralgias and joint swelling.   Skin: Negative for color change.   Allergic/Immunologic: Negative.    Neurological:  Negative for dizziness.   Psychiatric/Behavioral: Negative for behavioral problems.       Objective   Physical Exam  Vitals and nursing note reviewed.   Constitutional:       Appearance: Normal appearance. She is well-developed and normal weight.   HENT:      Head: Normocephalic and atraumatic.      Right Ear: Tympanic membrane, ear canal and external ear normal.      Left Ear: Tympanic membrane, ear canal and external ear normal.      Nose: Nose normal.   Eyes:      Extraocular Movements: Extraocular movements intact.      Conjunctiva/sclera: Conjunctivae normal.      Pupils: Pupils are equal, round, and reactive to light.   Cardiovascular:      Rate and Rhythm: Normal rate.   Pulmonary:      Effort: Pulmonary effort is normal.   Genitourinary:     Vagina: Normal.   Musculoskeletal:         General: Tenderness (low back, primarily over bilateral SI joints) present. Normal range of motion.      Cervical back: Normal range of motion and neck supple.      Comments: Lumbar ROM slightly limited in all fields due to pain   Skin:     General: Skin is warm and dry.   Neurological:      General: No focal deficit present.      Mental Status: She is alert and oriented to person, place, and time. Mental status is at baseline.   Psychiatric:         Mood and Affect: Mood normal.         Behavior: Behavior normal.         Thought Content: Thought content normal.         Judgment: Judgment normal.       PHQ-9 Total Score:      Assessment/Plan   Diagnoses and all orders for this visit:    1. Acute bilateral low back pain without sciatica (Primary)  Comments:  back exercises/stretches given, instructed to only take flexeril when at home, also instructed to apply ice/heat  Orders:  -     XR Spine Lumbar Complete 4+VW; Future    2. Uncontrolled type 2 diabetes mellitus with hyperglycemia (CMS/[facility])  -     Blood Glucose Monitoring Suppl (FreeStyle Freedom Lite) w/Device kit; 1 each Daily.  Dispense: 1 each; Refill: 0  -     glucose  blood (FREESTYLE TEST STRIPS) test strip; Check blood sugar twice daily and prn  Dispense: 100 each; Refill: 12  -     Lancets (freestyle) lancets; check twice daily and prn  Dispense: 100 each; Refill: 12  -     cyclobenzaprine (FLEXERIL) 10 MG tablet; Take 1 tablet by mouth 3 (Three) Times a Day As Needed for Muscle Spasms.  Dispense: 15 tablet; Refill: 0    3. Frequent urination  -     POCT urinalysis dipstick, automated        Patient Instructions   Acute Back Pain, Adult  Acute back pain is sudden and usually short-lived. It is often caused by an injury to the muscles and tissues in the back. The injury may result from:  · A muscle or ligament getting overstretched or torn (strained). Ligaments are tissues that connect bones to each other. Lifting something improperly can cause a back strain.  · Wear and tear (degeneration) of the spinal disks. Spinal disks are circular tissue that provide cushioning between the bones of the spine (vertebrae).  · Twisting motions, such as while playing sports or doing yard work.  · A hit to the back.  · Arthritis.  You may have a physical exam, lab tests, and imaging tests to find the cause of your pain. Acute back pain usually goes away with rest and home care.  Follow these instructions at home:  Managing pain, stiffness, and swelling  · Treatment may include medicines for pain and inflammation that are taken by mouth or applied to the skin, prescription pain medicine, or muscle relaxants. Take over-the-counter and prescription medicines only as told by your health care provider.  · Your health care provider may recommend applying ice during the first 24-48 hours after your pain starts. To do this:  ? Put ice in a plastic bag.  ? Place a towel between your skin and the bag.  ? Leave the ice on for 20 minutes, 2-3 times a day.  · If directed, apply heat to the affected area as often as told by your health care provider. Use the heat source that your health care provider  recommends, such as a moist heat pack or a heating pad.  ? Place a towel between your skin and the heat source.  ? Leave the heat on for 20-30 minutes.  ? Remove the heat if your skin turns bright red. This is especially important if you are unable to feel pain, heat, or cold. You have a greater risk of getting burned.  Activity    · Do not stay in bed. Staying in bed for more than 1-2 days can delay your recovery.  · Sit up and stand up straight. Avoid leaning forward when you sit or hunching over when you stand.  ? If you work at a desk, sit close to it so you do not need to lean over. Keep your chin tucked in. Keep your neck drawn back, and keep your elbows bent at a 90-degree angle (right angle).  ? Sit high and close to the steering wheel when you drive. Add lower back (lumbar) support to your car seat, if needed.  · Take short walks on even surfaces as soon as you are able. Try to increase the length of time you walk each day.  · Do not sit, drive, or  one place for more than 30 minutes at a time. Sitting or standing for long periods of time can put stress on your back.  · Do not drive or use heavy machinery while taking prescription pain medicine.  · Use proper lifting techniques. When you bend and lift, use positions that put less stress on your back:  ? Bend your knees.  ? Keep the load close to your body.  ? Avoid twisting.  · Exercise regularly as told by your health care provider. Exercising helps your back heal faster and helps prevent back injuries by keeping muscles strong and flexible.  · Work with a physical therapist to make a safe exercise program, as recommended by your health care provider. Do any exercises as told by your physical therapist.  Lifestyle  · Maintain a healthy weight. Extra weight puts stress on your back and makes it difficult to have good posture.  · Avoid activities or situations that make you feel anxious or stressed. Stress and anxiety increase muscle tension and can  make back pain worse. Learn ways to manage anxiety and stress, such as through exercise.  General instructions  · Sleep on a firm mattress in a comfortable position. Try lying on your side with your knees slightly bent. If you lie on your back, put a pillow under your knees.  · Follow your treatment plan as told by your health care provider. This may include:  ? Cognitive or behavioral therapy.  ? Acupuncture or massage therapy.  ? Meditation or yoga.  Contact a health care provider if:  · You have pain that is not relieved with rest or medicine.  · You have increasing pain going down into your legs or buttocks.  · Your pain does not improve after 2 weeks.  · You have pain at night.  · You lose weight without trying.  · You have a fever or chills.  Get help right away if:  · You develop new bowel or bladder control problems.  · You have unusual weakness or numbness in your arms or legs.  · You develop nausea or vomiting.  · You develop abdominal pain.  · You feel faint.  Summary  · Acute back pain is sudden and usually short-lived.  · Use proper lifting techniques. When you bend and lift, use positions that put less stress on your back.  · Take over-the-counter and prescription medicines and apply heat or ice as directed by your health care provider.  This information is not intended to replace advice given to you by your health care provider. Make sure you discuss any questions you have with your health care provider.  Document Revised: 12/11/2020 Document Reviewed: 08/01/2018  Elsevier Patient Education © 2021 [employer]

## 2021-05-13 NOTE — TELEPHONE ENCOUNTER
I called and notified pt of this. She verbalized understanding. She said that she would like to give the exercises a try before a referral to Physical Therapy. She would like to hold off on that for now.

## 2021-05-13 NOTE — TELEPHONE ENCOUNTER
----- Message from JONATHAN Brown sent at 5/13/2021  2:00 PM EDT -----  Please notify pt her xray indicates advanced degenerative disc disease and arthritis.  She also has mild scoliosis.  Continue treatment discussed at appointment - heat/ice, stretching, aleve prn, and muscle relaxer when at home.  I can also refer to physical therapy, which would likely be beneficial for pain reduction.

## 2021-05-13 NOTE — PATIENT INSTRUCTIONS
Acute Back Pain, Adult  Acute back pain is sudden and usually short-lived. It is often caused by an injury to the muscles and tissues in the back. The injury may result from:  · A muscle or ligament getting overstretched or torn (strained). Ligaments are tissues that connect bones to each other. Lifting something improperly can cause a back strain.  · Wear and tear (degeneration) of the spinal disks. Spinal disks are circular tissue that provide cushioning between the bones of the spine (vertebrae).  · Twisting motions, such as while playing sports or doing yard work.  · A hit to the back.  · Arthritis.  You may have a physical exam, lab tests, and imaging tests to find the cause of your pain. Acute back pain usually goes away with rest and home care.  Follow these instructions at home:  Managing pain, stiffness, and swelling  · Treatment may include medicines for pain and inflammation that are taken by mouth or applied to the skin, prescription pain medicine, or muscle relaxants. Take over-the-counter and prescription medicines only as told by your health care provider.  · Your health care provider may recommend applying ice during the first 24-48 hours after your pain starts. To do this:  ? Put ice in a plastic bag.  ? Place a towel between your skin and the bag.  ? Leave the ice on for 20 minutes, 2-3 times a day.  · If directed, apply heat to the affected area as often as told by your health care provider. Use the heat source that your health care provider recommends, such as a moist heat pack or a heating pad.  ? Place a towel between your skin and the heat source.  ? Leave the heat on for 20-30 minutes.  ? Remove the heat if your skin turns bright red. This is especially important if you are unable to feel pain, heat, or cold. You have a greater risk of getting burned.  Activity    · Do not stay in bed. Staying in bed for more than 1-2 days can delay your recovery.  · Sit up and stand up straight. Avoid  leaning forward when you sit or hunching over when you stand.  ? If you work at a desk, sit close to it so you do not need to lean over. Keep your chin tucked in. Keep your neck drawn back, and keep your elbows bent at a 90-degree angle (right angle).  ? Sit high and close to the steering wheel when you drive. Add lower back (lumbar) support to your car seat, if needed.  · Take short walks on even surfaces as soon as you are able. Try to increase the length of time you walk each day.  · Do not sit, drive, or  one place for more than 30 minutes at a time. Sitting or standing for long periods of time can put stress on your back.  · Do not drive or use heavy machinery while taking prescription pain medicine.  · Use proper lifting techniques. When you bend and lift, use positions that put less stress on your back:  ? Bend your knees.  ? Keep the load close to your body.  ? Avoid twisting.  · Exercise regularly as told by your health care provider. Exercising helps your back heal faster and helps prevent back injuries by keeping muscles strong and flexible.  · Work with a physical therapist to make a safe exercise program, as recommended by your health care provider. Do any exercises as told by your physical therapist.  Lifestyle  · Maintain a healthy weight. Extra weight puts stress on your back and makes it difficult to have good posture.  · Avoid activities or situations that make you feel anxious or stressed. Stress and anxiety increase muscle tension and can make back pain worse. Learn ways to manage anxiety and stress, such as through exercise.  General instructions  · Sleep on a firm mattress in a comfortable position. Try lying on your side with your knees slightly bent. If you lie on your back, put a pillow under your knees.  · Follow your treatment plan as told by your health care provider. This may include:  ? Cognitive or behavioral therapy.  ? Acupuncture or massage therapy.  ? Meditation or  yoga.  Contact a health care provider if:  · You have pain that is not relieved with rest or medicine.  · You have increasing pain going down into your legs or buttocks.  · Your pain does not improve after 2 weeks.  · You have pain at night.  · You lose weight without trying.  · You have a fever or chills.  Get help right away if:  · You develop new bowel or bladder control problems.  · You have unusual weakness or numbness in your arms or legs.  · You develop nausea or vomiting.  · You develop abdominal pain.  · You feel faint.  Summary  · Acute back pain is sudden and usually short-lived.  · Use proper lifting techniques. When you bend and lift, use positions that put less stress on your back.  · Take over-the-counter and prescription medicines and apply heat or ice as directed by your health care provider.  This information is not intended to replace advice given to you by your health care provider. Make sure you discuss any questions you have with your health care provider.  Document Revised: 12/11/2020 Document Reviewed: 08/01/2018  Elsevier Patient Education © 2021 [employer]     Cyclosporine Counseling:  I discussed with the patient the risks of cyclosporine including but not limited to hypertension, gingival hyperplasia,myelosuppression, immunosuppression, liver damage, kidney damage, neurotoxicity, lymphoma, and serious infections. The patient understands that monitoring is required including baseline blood pressure, CBC, CMP, lipid panel and uric acid, and then 1-2 times monthly CMP and blood pressure.

## 2021-06-08 PROBLEM — R91.8 MULTIPLE NODULES OF LUNG: Status: RESOLVED | Noted: 2017-12-21 | Resolved: 2021-06-08

## 2021-06-08 NOTE — PATIENT INSTRUCTIONS
Health Maintenance Due   Topic Date Due   • COVID-19 Vaccine (1) Never done   • DIABETIC EYE EXAM  06/06/2019     Patient to call with list ofmeds    Mri back when approved

## 2021-06-10 ENCOUNTER — OFFICE VISIT (OUTPATIENT)
Dept: FAMILY MEDICINE CLINIC | Facility: CLINIC | Age: 66
End: 2021-06-10

## 2021-06-10 VITALS
TEMPERATURE: 97.4 F | HEART RATE: 89 BPM | SYSTOLIC BLOOD PRESSURE: 125 MMHG | HEIGHT: 64 IN | WEIGHT: 134.2 LBS | BODY MASS INDEX: 22.91 KG/M2 | OXYGEN SATURATION: 94 % | DIASTOLIC BLOOD PRESSURE: 76 MMHG

## 2021-06-10 DIAGNOSIS — I10 ESSENTIAL HYPERTENSION: ICD-10-CM

## 2021-06-10 DIAGNOSIS — F17.200 TOBACCO DEPENDENCE SYNDROME: ICD-10-CM

## 2021-06-10 DIAGNOSIS — R06.83 SNORING: ICD-10-CM

## 2021-06-10 DIAGNOSIS — E83.42 HYPOMAGNESEMIA: ICD-10-CM

## 2021-06-10 DIAGNOSIS — M54.50 ACUTE BILATERAL LOW BACK PAIN WITHOUT SCIATICA: ICD-10-CM

## 2021-06-10 DIAGNOSIS — E78.2 MIXED HYPERLIPIDEMIA: ICD-10-CM

## 2021-06-10 DIAGNOSIS — D75.1 POLYCYTHEMIA: ICD-10-CM

## 2021-06-10 DIAGNOSIS — E11.65 UNCONTROLLED TYPE 2 DIABETES MELLITUS WITH HYPERGLYCEMIA (HCC): Primary | ICD-10-CM

## 2021-06-10 DIAGNOSIS — N20.0 KIDNEY STONES: ICD-10-CM

## 2021-06-10 DIAGNOSIS — J43.9 PULMONARY EMPHYSEMA, UNSPECIFIED EMPHYSEMA TYPE (HCC): ICD-10-CM

## 2021-06-10 DIAGNOSIS — I25.10 CHRONIC CORONARY ARTERY DISEASE: ICD-10-CM

## 2021-06-10 LAB
ALBUMIN SERPL-MCNC: 4.3 G/DL (ref 3.5–5.2)
ALBUMIN/GLOB SERPL: 1.7 G/DL
ALP SERPL-CCNC: 72 U/L (ref 39–117)
ALT SERPL W P-5'-P-CCNC: 16 U/L (ref 1–33)
ANION GAP SERPL CALCULATED.3IONS-SCNC: 11.1 MMOL/L (ref 5–15)
AST SERPL-CCNC: 16 U/L (ref 1–32)
BASOPHILS # BLD AUTO: 0.06 10*3/MM3 (ref 0–0.2)
BASOPHILS NFR BLD AUTO: 0.8 % (ref 0–1.5)
BILIRUB SERPL-MCNC: 0.4 MG/DL (ref 0–1.2)
BUN SERPL-MCNC: 15 MG/DL (ref 8–23)
BUN/CREAT SERPL: 34.9 (ref 7–25)
CALCIUM SPEC-SCNC: 9.3 MG/DL (ref 8.6–10.5)
CHLORIDE SERPL-SCNC: 105 MMOL/L (ref 98–107)
CHOLEST SERPL-MCNC: 106 MG/DL (ref 0–200)
CO2 SERPL-SCNC: 24.9 MMOL/L (ref 22–29)
CREAT SERPL-MCNC: 0.43 MG/DL (ref 0.57–1)
DEPRECATED RDW RBC AUTO: 40.3 FL (ref 37–54)
EOSINOPHIL # BLD AUTO: 0.31 10*3/MM3 (ref 0–0.4)
EOSINOPHIL NFR BLD AUTO: 4 % (ref 0.3–6.2)
ERYTHROCYTE [DISTWIDTH] IN BLOOD BY AUTOMATED COUNT: 12.6 % (ref 12.3–15.4)
GFR SERPL CREATININE-BSD FRML MDRD: 147 ML/MIN/1.73
GLOBULIN UR ELPH-MCNC: 2.6 GM/DL
GLUCOSE SERPL-MCNC: 70 MG/DL (ref 65–99)
HBA1C MFR BLD: 6.5 % (ref 3.5–5.6)
HCT VFR BLD AUTO: 44.8 % (ref 34–46.6)
HDLC SERPL-MCNC: 41 MG/DL (ref 40–60)
HGB BLD-MCNC: 15.3 G/DL (ref 12–15.9)
IMM GRANULOCYTES # BLD AUTO: 0.02 10*3/MM3 (ref 0–0.05)
IMM GRANULOCYTES NFR BLD AUTO: 0.3 % (ref 0–0.5)
LDLC SERPL CALC-MCNC: 50 MG/DL (ref 0–100)
LDLC/HDLC SERPL: 1.24 {RATIO}
LYMPHOCYTES # BLD AUTO: 2.25 10*3/MM3 (ref 0.7–3.1)
LYMPHOCYTES NFR BLD AUTO: 29.4 % (ref 19.6–45.3)
MAGNESIUM SERPL-MCNC: 2 MG/DL (ref 1.6–2.4)
MCH RBC QN AUTO: 30.2 PG (ref 26.6–33)
MCHC RBC AUTO-ENTMCNC: 34.2 G/DL (ref 31.5–35.7)
MCV RBC AUTO: 88.4 FL (ref 79–97)
MONOCYTES # BLD AUTO: 0.5 10*3/MM3 (ref 0.1–0.9)
MONOCYTES NFR BLD AUTO: 6.5 % (ref 5–12)
NEUTROPHILS NFR BLD AUTO: 4.52 10*3/MM3 (ref 1.7–7)
NEUTROPHILS NFR BLD AUTO: 59 % (ref 42.7–76)
NRBC BLD AUTO-RTO: 0 /100 WBC (ref 0–0.2)
PLATELET # BLD AUTO: 203 10*3/MM3 (ref 140–450)
PMV BLD AUTO: 12.6 FL (ref 6–12)
POTASSIUM SERPL-SCNC: 3.8 MMOL/L (ref 3.5–5.2)
PROT SERPL-MCNC: 6.9 G/DL (ref 6–8.5)
RBC # BLD AUTO: 5.07 10*6/MM3 (ref 3.77–5.28)
SODIUM SERPL-SCNC: 141 MMOL/L (ref 136–145)
TRIGL SERPL-MCNC: 71 MG/DL (ref 0–150)
URATE SERPL-MCNC: 2.7 MG/DL (ref 2.4–5.7)
VLDLC SERPL-MCNC: 15 MG/DL (ref 5–40)
WBC # BLD AUTO: 7.66 10*3/MM3 (ref 3.4–10.8)

## 2021-06-10 PROCEDURE — 36415 COLL VENOUS BLD VENIPUNCTURE: CPT | Performed by: PREVENTIVE MEDICINE

## 2021-06-10 PROCEDURE — 80061 LIPID PANEL: CPT | Performed by: PREVENTIVE MEDICINE

## 2021-06-10 PROCEDURE — 82570 ASSAY OF URINE CREATININE: CPT | Performed by: PREVENTIVE MEDICINE

## 2021-06-10 PROCEDURE — 83735 ASSAY OF MAGNESIUM: CPT | Performed by: PREVENTIVE MEDICINE

## 2021-06-10 PROCEDURE — 82043 UR ALBUMIN QUANTITATIVE: CPT | Performed by: PREVENTIVE MEDICINE

## 2021-06-10 PROCEDURE — 84550 ASSAY OF BLOOD/URIC ACID: CPT | Performed by: PREVENTIVE MEDICINE

## 2021-06-10 PROCEDURE — 99214 OFFICE O/P EST MOD 30 MIN: CPT | Performed by: PREVENTIVE MEDICINE

## 2021-06-10 PROCEDURE — 85025 COMPLETE CBC W/AUTO DIFF WBC: CPT | Performed by: PREVENTIVE MEDICINE

## 2021-06-10 PROCEDURE — 80053 COMPREHEN METABOLIC PANEL: CPT | Performed by: PREVENTIVE MEDICINE

## 2021-06-10 PROCEDURE — 83036 HEMOGLOBIN GLYCOSYLATED A1C: CPT | Performed by: PREVENTIVE MEDICINE

## 2021-06-10 RX ORDER — GLIMEPIRIDE 4 MG/1
4 TABLET ORAL
Qty: 180 TABLET | Refills: 3 | Status: SHIPPED | OUTPATIENT
Start: 2021-06-10 | End: 2022-08-11

## 2021-06-10 NOTE — PROGRESS NOTES
"Subjective   Eugenia Madden is a 65 y.o. female presents for   Chief Complaint   Patient presents with   • Diabetes     3 month follow up   • Hypertension   • Hyperlipidemia       Health Maintenance Due   Topic Date Due   • DIABETIC EYE EXAM  06/06/2019       65-year-old white female presents today for follow-up of multiple chronic health conditions.  Patient states that she has noted low blood sugars on several occasions.  She does need to get an eye exam.  She is not sure what medication she is taking for diabetes and will call back with a list of medicines rest of chronic diseases seem to be stable.  Patient is still having low back pain a couple of months after symptoms started she is over 55 and we have advised that if she is still having discomfort that she should undergo an MRI evaluation.  This was ordered today she does understand agree and accept this.  She has had no treatment trouble controlling bowel or bladder no pain down into the legs no fever chills night sweats or weight loss..    Diabetes    Hypertension    Hyperlipidemia         Vitals:    06/10/21 0848 06/10/21 0849   BP: 121/69 125/76   BP Location: Right arm Left arm   Patient Position: Sitting Sitting   Cuff Size: Adult Adult   Pulse: 89    Temp: 97.4 °F (36.3 °C)    SpO2: 94%    Weight: 60.9 kg (134 lb 3.2 oz)    Height: 162.6 cm (64.02\")      Body mass index is 23.02 kg/m².    Current Outpatient Medications on File Prior to Visit   Medication Sig Dispense Refill   • atorvastatin (LIPITOR) 40 MG tablet TAKE 1 TABLET BY MOUTH EVERY DAY 90 tablet 3   • Blood Glucose Monitoring Suppl (Accu-Chek Umu Plus) w/Device kit 1 strip 2 (two) times a day.     • cetirizine (zyrTEC) 10 MG tablet Take 10 mg by mouth Daily.     • Cholecalciferol (VITAMIN D3) 125 MCG (5000 UT) tablet tablet Take 5,000 Units by mouth Daily.     • cyclobenzaprine (FLEXERIL) 10 MG tablet Take 1 tablet by mouth 3 (Three) Times a Day As Needed for Muscle Spasms. (Patient taking " differently: Take 10 mg by mouth 3 (Three) Times a Day As Needed for Muscle Spasms. As needed) 15 tablet 0   • ELDERBERRY PO Take  by mouth.     • Empagliflozin (Jardiance) 10 MG tablet Take 10 mg by mouth Daily. 30 tablet 6   • famotidine (PEPCID) 20 MG tablet Take 20 mg by mouth Daily.     • ferrous sulfate 325 (65 FE) MG tablet Take 325 mg by mouth Daily With Breakfast.     • glucose blood test strip 1 each by Other route 2 (two) times a day.     • hydrOXYzine (ATARAX) 25 MG tablet TAKE 1 TABLET BY MOUTH AT NIGHT AS NEEDED FOR ITCHING (ONE OR TWO IF ITCHING). 30 tablet 1   • Ibuprofen-diphenhydrAMINE HCl (ADVIL PM) 200-25 MG capsule Take 1 capsule by mouth Every Night.     • losartan (COZAAR) 100 MG tablet Take 1 tablet by mouth Daily. 90 tablet 3   • Magnesium 250 MG tablet Take 250 mg by mouth Daily.     • metFORMIN ER (GLUCOPHAGE-XR) 500 MG 24 hr tablet TAKE 2 TABLETS BY MOUTH EVERY  tablet 2   • potassium chloride 10 MEQ CR tablet Take 1 tablet by mouth Daily. 90 tablet 1   • pramipexole (MIRAPEX) 0.125 MG tablet TAKE 1 TABLET BY MOUTH 2 HOURS BEFORE SLEEP & MAY INCREASE EVERY WEEK 1 TAB UP TO 4 TABS BEFORE  tablet 1   • Semaglutide,0.25 or 0.5MG/DOS, (Ozempic, 0.25 or 0.5 MG/DOSE,) 2 MG/1.5ML solution pen-injector Inject .05 mg weekly 4.5 mL 2   • [DISCONTINUED] glimepiride (AMARYL) 4 MG tablet TAKE 1 TABLET BY MOUTH TWICE A DAY WITH MEALS (Patient taking differently: Take 4 mg by mouth. TAKE 2 TABLET BY MOUTH TWICE A DAY WITH MEALS) 180 tablet 1   • losartan (COZAAR) 50 MG tablet      • [DISCONTINUED] dilTIAZem CD (Cardizem CD) 180 MG 24 hr capsule Take one am and pm (Patient taking differently: 180 mg Daily. Take one am and pm) 270 capsule 3     No current facility-administered medications on file prior to visit.       The following portions of the patient's history were reviewed and updated as appropriate: allergies, current medications, past family history, past medical history, past social  history, past surgical history and problem list.    Review of Systems   Constitutional: Negative.    HENT: Negative.  Negative for sinus pressure and sore throat.    Eyes: Negative.    Respiratory: Negative.  Negative for cough.    Cardiovascular: Negative.    Gastrointestinal: Negative.    Endocrine: Negative.         Weak and tremulous at times from low blood sugar   Genitourinary: Negative.    Musculoskeletal: Positive for back pain and gait problem.   Skin: Negative.    Allergic/Immunologic: Positive for environmental allergies.   Hematological: Negative.    Psychiatric/Behavioral: Negative.        Objective   Physical Exam  Vitals reviewed.   Constitutional:       General: She is not in acute distress.     Appearance: Normal appearance. She is well-developed. She is not ill-appearing or toxic-appearing.   HENT:      Head: Normocephalic and atraumatic.      Right Ear: Tympanic membrane, ear canal and external ear normal.      Left Ear: Tympanic membrane, ear canal and external ear normal.      Nose: Nose normal.   Eyes:      Extraocular Movements: Extraocular movements intact.      Conjunctiva/sclera: Conjunctivae normal.      Pupils: Pupils are equal, round, and reactive to light.   Cardiovascular:      Rate and Rhythm: Normal rate and regular rhythm.      Pulses:           Dorsalis pedis pulses are 1+ on the right side and 1+ on the left side.        Posterior tibial pulses are 1+ on the right side and 1+ on the left side.      Heart sounds: Normal heart sounds.   Pulmonary:      Effort: Pulmonary effort is normal.      Comments: Decreased breath sounds bilaterally  Abdominal:      General: Bowel sounds are normal. There is no distension.      Palpations: Abdomen is soft. There is no mass.      Tenderness: There is no abdominal tenderness.   Musculoskeletal:         General: Normal range of motion.      Cervical back: Neck supple.      Comments: Palpable discomfort in bandlike fashion around L5 4.  Toe heel  walk squat was strong straight leg raising was pain positive was negative no pain with hip extension leg sense and adduction was normal.  Right knee felt reflexes plus trace left knee reflex was absent.   Feet:      Right foot:      Skin integrity: Skin integrity normal.      Toenail Condition: Right toenails are normal.      Left foot:      Skin integrity: Skin integrity normal.      Toenail Condition: Left toenails are normal.      Comments: Diabetic Foot Exam Performed    Skin:     General: Skin is warm.   Neurological:      General: No focal deficit present.      Mental Status: She is alert and oriented to person, place, and time.   Psychiatric:         Mood and Affect: Mood normal.         Behavior: Behavior normal.       PHQ-9 Total Score:      Assessment/Plan   Diagnoses and all orders for this visit:    1. Uncontrolled type 2 diabetes mellitus with hyperglycemia (CMS/HCC) (Primary)  Comments:  Blood sugar 84 today and low at times. Just get eye exam.  Patient not sure what medicine she is taking she will call for a list that we may need to reevaluate  Orders:  -     Comprehensive Metabolic Panel  -     Hemoglobin A1c  -     Microalbumin / Creatinine Urine Ratio - Urine, Clean Catch    2. Chronic coronary artery disease  Comments:  No chest pain    3. Mixed hyperlipidemia  Comments:  Trying to eat less saturated fats  Orders:  -     Lipid Panel    4. Essential hypertension  Comments:  Controlled  Orders:  -     CBC Auto Differential    5. Hypomagnesemia  -     Magnesium    6. Polycythemia  Comments:  Stop smoking 7 months ago we will check CBC today    7. Pulmonary emphysema, unspecified emphysema type (CMS/HCC)  Comments:  Breathing has been okay    8. Snoring    9. Tobacco dependence syndrome  Comments:  Stopped 11/2020    10. Kidney stones  Comments:  No known kidney stones  Orders:  -     Uric Acid    11. Acute bilateral low back pain without sciatica  Comments:  MRI as not painfree at rest.  Patient's  back is better no fever chills night sweats or weight loss but still having pain several months after and 65-year-old  Orders:  -     MRI Lumbar Spine Without Contrast; Future    Other orders  -     glimepiride (Amaryl) 4 MG tablet; Take 1 tablet by mouth Every Morning Before Breakfast.  Dispense: 180 tablet; Refill: 3        Patient Instructions     Health Maintenance Due   Topic Date Due   • COVID-19 Vaccine (1) Never done   • DIABETIC EYE EXAM  06/06/2019     Patient to call with list [facility]    Mri back when approved

## 2021-06-11 ENCOUNTER — TELEPHONE (OUTPATIENT)
Dept: FAMILY MEDICINE CLINIC | Facility: CLINIC | Age: 66
End: 2021-06-11

## 2021-06-11 DIAGNOSIS — E11.65 UNCONTROLLED TYPE 2 DIABETES MELLITUS WITH HYPERGLYCEMIA (HCC): Primary | ICD-10-CM

## 2021-06-11 LAB
ALBUMIN UR-MCNC: <1.2 MG/DL
CREAT UR-MCNC: 131.3 MG/DL
MICROALBUMIN/CREAT UR: NORMAL MG/G{CREAT}

## 2021-06-11 NOTE — TELEPHONE ENCOUNTER
HUB TO READ  ----- Message from Melisa Taveras MD sent at 6/11/2021  7:03 AM EDT -----  Glucose 70 and A1C shows good control at 6.5

## 2021-06-11 NOTE — TELEPHONE ENCOUNTER
Caller: Eugenia Madden    Relationship: Self    Best call back number: [phone]    Medication needed:   Requested Prescriptions     Pending Prescriptions Disp Refills   • glucose blood test strip       Si each by Other route 2 (two) times a day.       When do you need the refill by: 21    What additional details did the patient provide when requesting the medication: PATIENT IS REQUESTING TEST STRIPS FOR HER ACCU-CHECK AVIA  MONITOR. PATIENT TAKES BLOOD SUGAR TWICE A DAY SO SHE WOULD NEED HER SUPPLY TO REFLECT THAT.     Does the patient have less than a 3 day supply:  [x] Yes  [] No    What is the patient's preferred pharmacy: [facility]/PHARMACY #[id number] - [place], [place]  [address] - [phone]  - [phone] FX

## 2021-06-14 ENCOUNTER — TELEPHONE (OUTPATIENT)
Dept: FAMILY MEDICINE CLINIC | Facility: CLINIC | Age: 66
End: 2021-06-14

## 2021-06-14 NOTE — TELEPHONE ENCOUNTER
Caller: Eugenia Madden    Relationship: Self    Best call back number: [phone]    What medication are you requesting: ANTIBIOTIC     What are your current symptoms: COUGHING AND WHEEZING       Have you had these symptoms before:    [x] Yes  [] No    Have you been treated for these symptoms before:   [x] Yes  [] No    If a prescription is needed, what is your preferred pharmacy and phone number:      [facility]/pharmacy #[id number] - [place], [place] - [address] - [phone] [facility] [phone]   [phone]

## 2021-06-14 NOTE — TELEPHONE ENCOUNTER
HUB TO READ  Left message to see Bisi Ordaz or to bee seen by [facility] [place]. We do not call out Antibiotic without an Evaluation

## 2021-06-18 ENCOUNTER — OFFICE VISIT (OUTPATIENT)
Dept: FAMILY MEDICINE CLINIC | Facility: CLINIC | Age: 66
End: 2021-06-18

## 2021-06-18 VITALS
SYSTOLIC BLOOD PRESSURE: 127 MMHG | DIASTOLIC BLOOD PRESSURE: 80 MMHG | WEIGHT: 132.4 LBS | TEMPERATURE: 98.2 F | HEIGHT: 64 IN | BODY MASS INDEX: 22.61 KG/M2 | HEART RATE: 91 BPM | OXYGEN SATURATION: 95 %

## 2021-06-18 DIAGNOSIS — R05.9 COUGH: Primary | ICD-10-CM

## 2021-06-18 DIAGNOSIS — M54.50 ACUTE BILATERAL LOW BACK PAIN WITHOUT SCIATICA: ICD-10-CM

## 2021-06-18 DIAGNOSIS — E11.65 UNCONTROLLED TYPE 2 DIABETES MELLITUS WITH HYPERGLYCEMIA (HCC): ICD-10-CM

## 2021-06-18 DIAGNOSIS — J43.9 PULMONARY EMPHYSEMA, UNSPECIFIED EMPHYSEMA TYPE (HCC): ICD-10-CM

## 2021-06-18 PROCEDURE — 99214 OFFICE O/P EST MOD 30 MIN: CPT | Performed by: PREVENTIVE MEDICINE

## 2021-06-18 RX ORDER — ALBUTEROL SULFATE 90 UG/1
2 AEROSOL, METERED RESPIRATORY (INHALATION) EVERY 4 HOURS PRN
Qty: 1 G | Refills: 3 | Status: SHIPPED | OUTPATIENT
Start: 2021-06-18

## 2021-06-18 RX ORDER — BLOOD SUGAR DIAGNOSTIC
STRIP MISCELLANEOUS
Qty: 100 EACH | Refills: 12 | Status: SHIPPED | OUTPATIENT
Start: 2021-06-18 | End: 2021-07-13

## 2021-06-18 RX ORDER — AMOXICILLIN AND CLAVULANATE POTASSIUM 875; 125 MG/1; MG/1
1 TABLET, FILM COATED ORAL 2 TIMES DAILY
Qty: 20 TABLET | Refills: 0 | Status: SHIPPED | OUTPATIENT
Start: 2021-06-18 | End: 2021-07-07 | Stop reason: HOSPADM

## 2021-06-18 NOTE — PROGRESS NOTES
"Subjective   Eugenia Madden is a 65 y.o. female presents for   Chief Complaint   Patient presents with   • Cough     worsened since last week       Health Maintenance Due   Topic Date Due   • DIABETIC EYE EXAM  06/06/2019       Patient has had a cough for the last couple of weeks this has increased in its congestion and she is now coughing up some yellow streaked sputum.  She does feel as though her sinuses are congested does have a mild sore throat.  Has not had a fever with this at all has been doing some wheezing.  She has stopped smoking and has no diarrhea or dysuria.  She had the same sort of thing approximately 6 weeks ago improved with antibiotics but the steroids that she was given caused her blood sugar to go crazy.  Patient has had Covid vaccination.    Cough  Associated symptoms include a sore throat and wheezing. Pertinent negatives include no myalgias or rash.        Vitals:    06/18/21 1530 06/18/21 1533 06/18/21 1825   BP: 124/78 127/80    BP Location: Right arm Left arm    Patient Position: Sitting Sitting    Cuff Size: Adult Adult    Pulse: 91     Temp: 98.2 °F (36.8 °C)     TempSrc: Oral     SpO2: 91%  95%   Weight: 60.1 kg (132 lb 6.4 oz)     Height: 162.6 cm (64.02\")       Body mass index is 22.72 kg/m².    Current Outpatient Medications on File Prior to Visit   Medication Sig Dispense Refill   • atorvastatin (LIPITOR) 40 MG tablet TAKE 1 TABLET BY MOUTH EVERY DAY 90 tablet 3   • Blood Glucose Monitoring Suppl (Accu-Chek Umu Plus) w/Device kit 1 strip 2 (two) times a day.     • cetirizine (zyrTEC) 10 MG tablet Take 10 mg by mouth Daily.     • Cholecalciferol (VITAMIN D3) 125 MCG (5000 UT) tablet tablet Take 5,000 Units by mouth Daily.     • cyclobenzaprine (FLEXERIL) 10 MG tablet Take 1 tablet by mouth 3 (Three) Times a Day As Needed for Muscle Spasms. (Patient taking differently: Take 10 mg by mouth 3 (Three) Times a Day As Needed for Muscle Spasms. As needed) 15 tablet 0   • ELDERBERRY PO Take "  by mouth.     • Empagliflozin (Jardiance) 10 MG tablet Take 10 mg by mouth Daily. 30 tablet 6   • famotidine (PEPCID) 20 MG tablet Take 20 mg by mouth Daily.     • ferrous sulfate 325 (65 FE) MG tablet Take 325 mg by mouth Daily With Breakfast.     • glimepiride (Amaryl) 4 MG tablet Take 1 tablet by mouth Every Morning Before Breakfast. 180 tablet 3   • glucose blood test strip 1 each by Other route 2 (two) times a day. 100 each 3   • hydrOXYzine (ATARAX) 25 MG tablet TAKE 1 TABLET BY MOUTH AT NIGHT AS NEEDED FOR ITCHING (ONE OR TWO IF ITCHING). 30 tablet 1   • Ibuprofen-diphenhydrAMINE HCl (ADVIL PM) 200-25 MG capsule Take 1 capsule by mouth Every Night.     • losartan (COZAAR) 100 MG tablet Take 1 tablet by mouth Daily. 90 tablet 3   • Magnesium 250 MG tablet Take 250 mg by mouth Daily.     • metFORMIN ER (GLUCOPHAGE-XR) 500 MG 24 hr tablet TAKE 2 TABLETS BY MOUTH EVERY  tablet 2   • potassium chloride 10 MEQ CR tablet Take 1 tablet by mouth Daily. 90 tablet 1   • pramipexole (MIRAPEX) 0.125 MG tablet TAKE 1 TABLET BY MOUTH 2 HOURS BEFORE SLEEP & MAY INCREASE EVERY WEEK 1 TAB UP TO 4 TABS BEFORE BED (Patient taking differently: Take 0.125 mg by mouth. Take 4 tablets daily at bedtime.) 360 tablet 1   • Semaglutide,0.25 or 0.5MG/DOS, (Ozempic, 0.25 or 0.5 MG/DOSE,) 2 MG/1.5ML solution pen-injector Inject .05 mg weekly 4.5 mL 2     No current facility-administered medications on file prior to visit.       The following portions of the patient's history were reviewed and updated as appropriate: allergies, current medications, past family history, past medical history, past social history, past surgical history and problem list.    Review of Systems   HENT: Positive for congestion and sore throat.    Respiratory: Positive for cough and wheezing.    Gastrointestinal: Negative for diarrhea.   Genitourinary: Positive for difficulty urinating.   Musculoskeletal: Negative for myalgias.   Skin: Negative for rash.        Objective   Physical Exam  Vitals reviewed.   Constitutional:       General: She is not in acute distress.     Appearance: Normal appearance. She is well-developed. She is not ill-appearing or toxic-appearing.   HENT:      Head: Normocephalic and atraumatic.      Right Ear: Tympanic membrane, ear canal and external ear normal.      Left Ear: Tympanic membrane, ear canal and external ear normal.      Nose: Nose normal.   Eyes:      Extraocular Movements: Extraocular movements intact.      Conjunctiva/sclera: Conjunctivae normal.      Pupils: Pupils are equal, round, and reactive to light.   Cardiovascular:      Rate and Rhythm: Normal rate and regular rhythm.      Heart sounds: Normal heart sounds.   Pulmonary:      Effort: Pulmonary effort is normal.      Breath sounds: Wheezing and rhonchi present.   Abdominal:      General: Bowel sounds are normal. There is no distension.      Palpations: Abdomen is soft. There is no mass.      Tenderness: There is no abdominal tenderness.   Musculoskeletal:         General: Normal range of motion.      Cervical back: Neck supple.   Skin:     General: Skin is warm.   Neurological:      General: No focal deficit present.      Mental Status: She is alert and oriented to person, place, and time.   Psychiatric:         Mood and Affect: Mood normal.         Behavior: Behavior normal.       PHQ-9 Total Score:      Assessment/Plan   Diagnoses and all orders for this visit:    1. Cough (Primary)  Comments:  Cough with yellow-tinged sputum no fever slight sore throat began approximately 2 weeks ago.  Is continuing to get worse no diarrhea or dysuria.    2. Acute bilateral low back pain without sciatica  Comments:  Back pain is resolving    3. Uncontrolled type 2 diabetes mellitus with hyperglycemia (CMS/HCC)  Comments:  Blood sugars have remained stable.    4. Pulmonary emphysema, unspecified emphysema type (CMS/HCC)  Comments:  We have advised her to try starting an oral on a  regular basis and she can use her albuterol as a rescue inhaler.    Other orders  -     glucose blood (Accu-Chek Umu Plus) test strip; Use to test blood sugars twice daily.  Dispense: 100 each; Refill: 12  -     amoxicillin-clavulanate (AUGMENTIN) 875-125 MG per tablet; Take 1 tablet by mouth 2 (Two) Times a Day.  Dispense: 20 tablet; Refill: 0  -     albuterol sulfate  (90 Base) MCG/ACT inhaler; Inhale 2 puffs Every 4 (Four) Hours As Needed for Wheezing.  Dispense: 1 g; Refill: 3  -     umeclidinium-vilanterol (ANORO ELLIPTA) 62.5-25 MCG/INH aerosol powder  inhaler; Inhale 1 puff Daily.  Dispense: 1 each; Refill: 1        Patient Instructions   Rest and drink plenty of fluids.   Take antibiotics till gone.  Call Monday to report progress.

## 2021-06-21 ENCOUNTER — TELEPHONE (OUTPATIENT)
Dept: FAMILY MEDICINE CLINIC | Facility: CLINIC | Age: 66
End: 2021-06-21

## 2021-06-21 NOTE — TELEPHONE ENCOUNTER
Please let patient know that her insurance wants her to try Stiolto instead-let me know what she thinks  Still 2 puffs once daily

## 2021-06-22 ENCOUNTER — TELEPHONE (OUTPATIENT)
Dept: FAMILY MEDICINE CLINIC | Facility: CLINIC | Age: 66
End: 2021-06-22

## 2021-06-22 DIAGNOSIS — R05.9 COUGH: Primary | ICD-10-CM

## 2021-06-22 NOTE — TELEPHONE ENCOUNTER
Xray order placed.  If not improving by Thursday, and no pneumonia, may still want to consider steroid-despite what it will do to blood sugar

## 2021-06-22 NOTE — TELEPHONE ENCOUNTER
Caller: Eugenia Madden    Relationship: Self    Best call back number: [phone]    What orders are you requesting (i.e. lab or imaging): CHEST X-RAY    In what timeframe would the patient need to come in: TODAY    Where will you receive your lab/imaging services: [facility]    Additional notes: PATIENT SAID THAT SHE IS NOT FEELING BETTER, SHE IS ACTUALLY FEELING WORSE. SHE ASKED IF DR. SPEARS COULD ORDER A CHEST X-RAY FOR TODAY.

## 2021-06-23 ENCOUNTER — HOSPITAL ENCOUNTER (OUTPATIENT)
Dept: GENERAL RADIOLOGY | Facility: HOSPITAL | Age: 66
Discharge: HOME | End: 2021-06-23
Admitting: PREVENTIVE MEDICINE

## 2021-06-23 PROCEDURE — 71046 X-RAY EXAM CHEST 2 VIEWS: CPT

## 2021-06-24 NOTE — PROGRESS NOTES
No pneumonia-some left lung scarring versus just not taken a full enough breath.  Rest and fluids and call next week to report progress.  Would advise Covid test today if still severe coughing-come before 3 pm and call when you get to back door-stay in your car and we'll come out to swab.  You have had Covid vaccine, correct?

## 2021-06-27 RX ORDER — PRAMIPEXOLE DIHYDROCHLORIDE 0.12 MG/1
0.12 TABLET ORAL NIGHTLY
Qty: 120 TABLET | Refills: 1 | Status: SHIPPED | OUTPATIENT
Start: 2021-06-27 | End: 2021-07-13 | Stop reason: ALTCHOICE

## 2021-06-28 RX ORDER — EMPAGLIFLOZIN 10 MG/1
TABLET, FILM COATED ORAL
Qty: 30 TABLET | Refills: 11 | Status: SHIPPED | OUTPATIENT
Start: 2021-06-28 | End: 2022-05-31 | Stop reason: SDUPTHER

## 2021-07-02 ENCOUNTER — APPOINTMENT (OUTPATIENT)
Dept: CT IMAGING | Facility: HOSPITAL | Age: 66
End: 2021-07-02

## 2021-07-02 ENCOUNTER — HOSPITAL ENCOUNTER (INPATIENT)
Facility: HOSPITAL | Age: 66
LOS: 2 days | Discharge: HOME | End: 2021-07-07
Attending: EMERGENCY MEDICINE | Admitting: HOSPITALIST

## 2021-07-02 DIAGNOSIS — R09.02 HYPOXEMIA: ICD-10-CM

## 2021-07-02 DIAGNOSIS — J44.1 ACUTE EXACERBATION OF CHRONIC OBSTRUCTIVE PULMONARY DISEASE (COPD) (HCC): Primary | ICD-10-CM

## 2021-07-02 DIAGNOSIS — J98.11: ICD-10-CM

## 2021-07-02 DIAGNOSIS — J18.9 PNEUMONIA OF RIGHT MIDDLE LOBE DUE TO INFECTIOUS ORGANISM: ICD-10-CM

## 2021-07-02 PROBLEM — K21.9 GERD WITHOUT ESOPHAGITIS: Chronic | Status: ACTIVE | Noted: 2021-07-02

## 2021-07-02 PROBLEM — E11.9 TYPE 2 DIABETES MELLITUS: Chronic | Status: ACTIVE | Noted: 2021-07-02

## 2021-07-02 LAB
ALBUMIN SERPL-MCNC: 4.1 G/DL (ref 3.5–5.2)
ALBUMIN/GLOB SERPL: 1.4 G/DL
ALP SERPL-CCNC: 93 U/L (ref 39–117)
ALT SERPL W P-5'-P-CCNC: 16 U/L (ref 1–33)
ANION GAP SERPL CALCULATED.3IONS-SCNC: 12 MMOL/L (ref 5–15)
APTT PPP: 25.9 SECONDS (ref 24–31)
AST SERPL-CCNC: 13 U/L (ref 1–32)
B PARAPERT DNA SPEC QL NAA+PROBE: NOT DETECTED
B PERT DNA SPEC QL NAA+PROBE: NOT DETECTED
BASOPHILS # BLD AUTO: 0.1 10*3/MM3 (ref 0–0.2)
BASOPHILS NFR BLD AUTO: 1.4 % (ref 0–1.5)
BILIRUB SERPL-MCNC: 0.7 MG/DL (ref 0–1.2)
BUN SERPL-MCNC: 20 MG/DL (ref 8–23)
BUN/CREAT SERPL: 38.5 (ref 7–25)
C PNEUM DNA NPH QL NAA+NON-PROBE: NOT DETECTED
CALCIUM SPEC-SCNC: 9.4 MG/DL (ref 8.6–10.5)
CHLORIDE SERPL-SCNC: 106 MMOL/L (ref 98–107)
CO2 SERPL-SCNC: 24 MMOL/L (ref 22–29)
CREAT SERPL-MCNC: 0.52 MG/DL (ref 0.57–1)
D-LACTATE SERPL-SCNC: 1 MMOL/L (ref 0.5–2)
DEPRECATED RDW RBC AUTO: 43.8 FL (ref 37–54)
EOSINOPHIL # BLD AUTO: 0.4 10*3/MM3 (ref 0–0.4)
EOSINOPHIL NFR BLD AUTO: 4.4 % (ref 0.3–6.2)
ERYTHROCYTE [DISTWIDTH] IN BLOOD BY AUTOMATED COUNT: 13.7 % (ref 12.3–15.4)
FLUAV SUBTYP SPEC NAA+PROBE: NOT DETECTED
FLUBV RNA ISLT QL NAA+PROBE: NOT DETECTED
GFR SERPL CREATININE-BSD FRML MDRD: 118 ML/MIN/1.73
GLOBULIN UR ELPH-MCNC: 2.9 GM/DL
GLUCOSE BLDC GLUCOMTR-MCNC: 146 MG/DL (ref 70–105)
GLUCOSE BLDC GLUCOMTR-MCNC: 313 MG/DL (ref 70–105)
GLUCOSE SERPL-MCNC: 183 MG/DL (ref 65–99)
HADV DNA SPEC NAA+PROBE: NOT DETECTED
HCOV 229E RNA SPEC QL NAA+PROBE: NOT DETECTED
HCOV HKU1 RNA SPEC QL NAA+PROBE: NOT DETECTED
HCOV NL63 RNA SPEC QL NAA+PROBE: NOT DETECTED
HCOV OC43 RNA SPEC QL NAA+PROBE: NOT DETECTED
HCT VFR BLD AUTO: 48.9 % (ref 34–46.6)
HGB BLD-MCNC: 16.5 G/DL (ref 12–15.9)
HMPV RNA NPH QL NAA+NON-PROBE: NOT DETECTED
HPIV1 RNA SPEC QL NAA+PROBE: NOT DETECTED
HPIV2 RNA SPEC QL NAA+PROBE: NOT DETECTED
HPIV3 RNA NPH QL NAA+PROBE: NOT DETECTED
HPIV4 P GENE NPH QL NAA+PROBE: NOT DETECTED
INR PPP: 1.02 (ref 0.93–1.1)
L PNEUMO1 AG UR QL IA: NEGATIVE
LYMPHOCYTES # BLD AUTO: 1.4 10*3/MM3 (ref 0.7–3.1)
LYMPHOCYTES NFR BLD AUTO: 17.6 % (ref 19.6–45.3)
M PNEUMO IGG SER IA-ACNC: NOT DETECTED
MCH RBC QN AUTO: 30.3 PG (ref 26.6–33)
MCHC RBC AUTO-ENTMCNC: 33.7 G/DL (ref 31.5–35.7)
MCV RBC AUTO: 89.8 FL (ref 79–97)
MONOCYTES # BLD AUTO: 0.3 10*3/MM3 (ref 0.1–0.9)
MONOCYTES NFR BLD AUTO: 3.9 % (ref 5–12)
NEUTROPHILS NFR BLD AUTO: 5.9 10*3/MM3 (ref 1.7–7)
NEUTROPHILS NFR BLD AUTO: 72.7 % (ref 42.7–76)
NRBC BLD AUTO-RTO: 0.1 /100 WBC (ref 0–0.2)
NT-PROBNP SERPL-MCNC: 127 PG/ML (ref 0–900)
PLATELET # BLD AUTO: 182 10*3/MM3 (ref 140–450)
PMV BLD AUTO: 9.7 FL (ref 6–12)
POTASSIUM SERPL-SCNC: 4.1 MMOL/L (ref 3.5–5.2)
PROT SERPL-MCNC: 7 G/DL (ref 6–8.5)
PROTHROMBIN TIME: 11.3 SECONDS (ref 9.6–11.7)
RBC # BLD AUTO: 5.45 10*6/MM3 (ref 3.77–5.28)
RHINOVIRUS RNA SPEC NAA+PROBE: NOT DETECTED
RSV RNA NPH QL NAA+NON-PROBE: NOT DETECTED
S PNEUM AG SPEC QL LA: NEGATIVE
SARS-COV-2 RNA NPH QL NAA+NON-PROBE: NOT DETECTED
SODIUM SERPL-SCNC: 142 MMOL/L (ref 136–145)
TROPONIN T SERPL-MCNC: <0.01 NG/ML (ref 0–0.03)
WBC # BLD AUTO: 8.2 10*3/MM3 (ref 3.4–10.8)

## 2021-07-02 PROCEDURE — 94799 UNLISTED PULMONARY SVC/PX: CPT

## 2021-07-02 PROCEDURE — G0378 HOSPITAL OBSERVATION PER HR: HCPCS

## 2021-07-02 PROCEDURE — 0 IOPAMIDOL PER 1 ML: Performed by: EMERGENCY MEDICINE

## 2021-07-02 PROCEDURE — 87899 AGENT NOS ASSAY W/OPTIC: CPT | Performed by: PHYSICIAN ASSISTANT

## 2021-07-02 PROCEDURE — 85025 COMPLETE CBC W/AUTO DIFF WBC: CPT | Performed by: EMERGENCY MEDICINE

## 2021-07-02 PROCEDURE — 87040 BLOOD CULTURE FOR BACTERIA: CPT | Performed by: EMERGENCY MEDICINE

## 2021-07-02 PROCEDURE — 99284 EMERGENCY DEPT VISIT MOD MDM: CPT

## 2021-07-02 PROCEDURE — 83880 ASSAY OF NATRIURETIC PEPTIDE: CPT | Performed by: EMERGENCY MEDICINE

## 2021-07-02 PROCEDURE — 94640 AIRWAY INHALATION TREATMENT: CPT

## 2021-07-02 PROCEDURE — 71275 CT ANGIOGRAPHY CHEST: CPT

## 2021-07-02 PROCEDURE — 93005 ELECTROCARDIOGRAM TRACING: CPT | Performed by: EMERGENCY MEDICINE

## 2021-07-02 PROCEDURE — 25010000002 CEFTRIAXONE PER 250 MG: Performed by: EMERGENCY MEDICINE

## 2021-07-02 PROCEDURE — 25010000002 METHYLPREDNISOLONE PER 125 MG: Performed by: EMERGENCY MEDICINE

## 2021-07-02 PROCEDURE — 25010000002 ENOXAPARIN PER 10 MG: Performed by: PHYSICIAN ASSISTANT

## 2021-07-02 PROCEDURE — 83605 ASSAY OF LACTIC ACID: CPT

## 2021-07-02 PROCEDURE — 93005 ELECTROCARDIOGRAM TRACING: CPT

## 2021-07-02 PROCEDURE — 84484 ASSAY OF TROPONIN QUANT: CPT | Performed by: EMERGENCY MEDICINE

## 2021-07-02 PROCEDURE — 82962 GLUCOSE BLOOD TEST: CPT

## 2021-07-02 PROCEDURE — 80053 COMPREHEN METABOLIC PANEL: CPT | Performed by: EMERGENCY MEDICINE

## 2021-07-02 PROCEDURE — 0202U NFCT DS 22 TRGT SARS-COV-2: CPT | Performed by: EMERGENCY MEDICINE

## 2021-07-02 PROCEDURE — 85730 THROMBOPLASTIN TIME PARTIAL: CPT | Performed by: EMERGENCY MEDICINE

## 2021-07-02 PROCEDURE — 85610 PROTHROMBIN TIME: CPT | Performed by: EMERGENCY MEDICINE

## 2021-07-02 RX ORDER — SODIUM CHLORIDE 0.9 % (FLUSH) 0.9 %
10 SYRINGE (ML) INJECTION AS NEEDED
Status: DISCONTINUED | OUTPATIENT
Start: 2021-07-02 | End: 2021-07-07 | Stop reason: HOSPADM

## 2021-07-02 RX ORDER — DEXTROSE MONOHYDRATE 25 G/50ML
25 INJECTION, SOLUTION INTRAVENOUS
Status: DISCONTINUED | OUTPATIENT
Start: 2021-07-02 | End: 2021-07-07 | Stop reason: HOSPADM

## 2021-07-02 RX ORDER — SODIUM CHLORIDE 0.9 % (FLUSH) 0.9 %
10 SYRINGE (ML) INJECTION EVERY 12 HOURS SCHEDULED
Status: DISCONTINUED | OUTPATIENT
Start: 2021-07-02 | End: 2021-07-07 | Stop reason: HOSPADM

## 2021-07-02 RX ORDER — ACETAMINOPHEN 160 MG/5ML
650 SOLUTION ORAL EVERY 4 HOURS PRN
Status: DISCONTINUED | OUTPATIENT
Start: 2021-07-02 | End: 2021-07-07 | Stop reason: HOSPADM

## 2021-07-02 RX ORDER — NITROGLYCERIN 0.4 MG/1
0.4 TABLET SUBLINGUAL
Status: DISCONTINUED | OUTPATIENT
Start: 2021-07-02 | End: 2021-07-07 | Stop reason: HOSPADM

## 2021-07-02 RX ORDER — IPRATROPIUM BROMIDE AND ALBUTEROL SULFATE 2.5; .5 MG/3ML; MG/3ML
3 SOLUTION RESPIRATORY (INHALATION) ONCE
Status: COMPLETED | OUTPATIENT
Start: 2021-07-02 | End: 2021-07-02

## 2021-07-02 RX ORDER — NICOTINE POLACRILEX 4 MG
15 LOZENGE BUCCAL
Status: DISCONTINUED | OUTPATIENT
Start: 2021-07-02 | End: 2021-07-07 | Stop reason: HOSPADM

## 2021-07-02 RX ORDER — METHYLPREDNISOLONE SODIUM SUCCINATE 125 MG/2ML
125 INJECTION, POWDER, LYOPHILIZED, FOR SOLUTION INTRAMUSCULAR; INTRAVENOUS ONCE
Status: COMPLETED | OUTPATIENT
Start: 2021-07-02 | End: 2021-07-02

## 2021-07-02 RX ORDER — BUDESONIDE 0.5 MG/2ML
0.5 INHALANT ORAL
Status: DISCONTINUED | OUTPATIENT
Start: 2021-07-02 | End: 2021-07-07 | Stop reason: HOSPADM

## 2021-07-02 RX ORDER — ONDANSETRON 4 MG/1
4 TABLET, FILM COATED ORAL EVERY 6 HOURS PRN
Status: DISCONTINUED | OUTPATIENT
Start: 2021-07-02 | End: 2021-07-07 | Stop reason: HOSPADM

## 2021-07-02 RX ORDER — ACETAMINOPHEN 650 MG/1
650 SUPPOSITORY RECTAL EVERY 4 HOURS PRN
Status: DISCONTINUED | OUTPATIENT
Start: 2021-07-02 | End: 2021-07-07 | Stop reason: HOSPADM

## 2021-07-02 RX ORDER — BENZONATATE 100 MG/1
200 CAPSULE ORAL 3 TIMES DAILY PRN
Status: DISCONTINUED | OUTPATIENT
Start: 2021-07-02 | End: 2021-07-07 | Stop reason: HOSPADM

## 2021-07-02 RX ORDER — ONDANSETRON 2 MG/ML
4 INJECTION INTRAMUSCULAR; INTRAVENOUS EVERY 6 HOURS PRN
Status: DISCONTINUED | OUTPATIENT
Start: 2021-07-02 | End: 2021-07-07 | Stop reason: HOSPADM

## 2021-07-02 RX ORDER — PREDNISONE 20 MG/1
40 TABLET ORAL
Status: DISCONTINUED | OUTPATIENT
Start: 2021-07-03 | End: 2021-07-03

## 2021-07-02 RX ORDER — CHOLECALCIFEROL (VITAMIN D3) 125 MCG
5 CAPSULE ORAL NIGHTLY PRN
Status: DISCONTINUED | OUTPATIENT
Start: 2021-07-02 | End: 2021-07-07 | Stop reason: HOSPADM

## 2021-07-02 RX ORDER — INSULIN LISPRO 100 [IU]/ML
0-9 INJECTION, SOLUTION INTRAVENOUS; SUBCUTANEOUS
Status: DISCONTINUED | OUTPATIENT
Start: 2021-07-02 | End: 2021-07-04

## 2021-07-02 RX ORDER — AZITHROMYCIN 250 MG/1
250 TABLET, FILM COATED ORAL
Status: COMPLETED | OUTPATIENT
Start: 2021-07-03 | End: 2021-07-06

## 2021-07-02 RX ORDER — GUAIFENESIN 600 MG/1
1200 TABLET, EXTENDED RELEASE ORAL EVERY 12 HOURS
Status: DISCONTINUED | OUTPATIENT
Start: 2021-07-02 | End: 2021-07-07 | Stop reason: HOSPADM

## 2021-07-02 RX ORDER — IPRATROPIUM BROMIDE AND ALBUTEROL SULFATE 2.5; .5 MG/3ML; MG/3ML
3 SOLUTION RESPIRATORY (INHALATION)
Status: DISCONTINUED | OUTPATIENT
Start: 2021-07-02 | End: 2021-07-07 | Stop reason: HOSPADM

## 2021-07-02 RX ORDER — INSULIN LISPRO 100 [IU]/ML
0-9 INJECTION, SOLUTION INTRAVENOUS; SUBCUTANEOUS AS NEEDED
Status: DISCONTINUED | OUTPATIENT
Start: 2021-07-02 | End: 2021-07-04

## 2021-07-02 RX ORDER — AZITHROMYCIN 250 MG/1
500 TABLET, FILM COATED ORAL ONCE
Status: COMPLETED | OUTPATIENT
Start: 2021-07-02 | End: 2021-07-02

## 2021-07-02 RX ORDER — ACETAMINOPHEN 325 MG/1
650 TABLET ORAL EVERY 4 HOURS PRN
Status: DISCONTINUED | OUTPATIENT
Start: 2021-07-02 | End: 2021-07-07 | Stop reason: HOSPADM

## 2021-07-02 RX ADMIN — Medication 10 ML: at 20:30

## 2021-07-02 RX ADMIN — IPRATROPIUM BROMIDE AND ALBUTEROL SULFATE 3 ML: 2.5; .5 SOLUTION RESPIRATORY (INHALATION) at 19:30

## 2021-07-02 RX ADMIN — AZITHROMYCIN MONOHYDRATE 500 MG: 250 TABLET ORAL at 17:08

## 2021-07-02 RX ADMIN — ENOXAPARIN SODIUM 40 MG: 40 INJECTION SUBCUTANEOUS at 18:39

## 2021-07-02 RX ADMIN — METHYLPREDNISOLONE SODIUM SUCCINATE 125 MG: 125 INJECTION, POWDER, FOR SOLUTION INTRAMUSCULAR; INTRAVENOUS at 11:56

## 2021-07-02 RX ADMIN — BENZONATATE 200 MG: 100 CAPSULE ORAL at 20:30

## 2021-07-02 RX ADMIN — GUAIFENESIN 1200 MG: 600 TABLET, EXTENDED RELEASE ORAL at 20:30

## 2021-07-02 RX ADMIN — SODIUM CHLORIDE, POTASSIUM CHLORIDE, SODIUM LACTATE AND CALCIUM CHLORIDE 500 ML: 600; 310; 30; 20 INJECTION, SOLUTION INTRAVENOUS at 16:55

## 2021-07-02 RX ADMIN — IPRATROPIUM BROMIDE AND ALBUTEROL SULFATE 3 ML: 2.5; .5 SOLUTION RESPIRATORY (INHALATION) at 23:52

## 2021-07-02 RX ADMIN — ACETAMINOPHEN 650 MG: 325 TABLET, FILM COATED ORAL at 20:30

## 2021-07-02 RX ADMIN — IOPAMIDOL 100 ML: 755 INJECTION, SOLUTION INTRAVENOUS at 13:04

## 2021-07-02 RX ADMIN — BUDESONIDE 0.5 MG: 0.5 INHALANT RESPIRATORY (INHALATION) at 19:36

## 2021-07-02 RX ADMIN — CEFTRIAXONE SODIUM 1 G: 1 INJECTION, POWDER, FOR SOLUTION INTRAMUSCULAR; INTRAVENOUS at 16:03

## 2021-07-02 RX ADMIN — IPRATROPIUM BROMIDE AND ALBUTEROL SULFATE 3 ML: 2.5; .5 SOLUTION RESPIRATORY (INHALATION) at 12:09

## 2021-07-02 NOTE — ED PROVIDER NOTES
Subjective   65-year-old female with the onset of increasing respiratory problems over the last 10 days.  Patient states she has steadily become more short of breath.  She states she has had a nonproductive cough throughout this.  But has had the onset of yellow sputum in the last 24 hours.  The patient saw her primary care doctor but has not taken steroids or antibiotics with this episode.  The patient reports that she suspects that she has some environmental allergens.  She denies PND or orthopnea.  She denies night sweats or hemoptysis.  She reports that she has had some intermittent pleuritic type pain in the last 10 days          Review of Systems   Constitutional: Positive for fatigue and fever.   HENT: Negative for sore throat.    Eyes: Negative for discharge.   Respiratory: Positive for cough, chest tightness, shortness of breath and wheezing. Negative for choking and stridor.    Cardiovascular: Positive for chest pain. Negative for palpitations and leg swelling.   Gastrointestinal: Negative for nausea.   Hematological: Does not bruise/bleed easily.   All other systems reviewed and are negative.      Past Medical History:   Diagnosis Date   • Arthritis    • Hyperlipidemia    • Hypertension    • Kidney stones    • Restless leg      Patient received 2 shot Covid vaccine in March  No Known Allergies    Past Surgical History:   Procedure Laterality Date   • CERVIX SURGERY  1983    Dysplasia removed from Cervix   • KIDNEY STONE SURGERY  2016    Lithotripsy   • OTHER SURGICAL HISTORY  2007    Pinched nerve arm    • TUBAL ABDOMINAL LIGATION Bilateral        Family History   Problem Relation Age of Onset   • Heart disease Mother    • Cancer Mother         Breast Cancer   • Breast cancer Mother 55   • Diabetes Father    • Stroke Father    • Heart disease Father    • Hypertension Father    • Hypertension Sister    • Diabetes Sister    • Heart disease Sister    • Diabetes Brother    • Hypertension Brother    • Heart  disease Brother    • Other Other         Abstraction from [facility] Cholesterol   • Diabetes Brother    • Hypertension Brother    • No Known Problems Brother    • Heart disease Brother    • Cancer Brother         colon   • Other Brother        Social History     Socioeconomic History   • Marital status:      Spouse name: Not on file   • Number of children: Not on file   • Years of education: Not on file   • Highest education level: Not on file   Tobacco Use   • Smoking status: Former Smoker     Packs/day: 0.50     Years: 46.00     Pack years: 23.00     Types: Cigarettes     Quit date: 2020     Years since quittin.6   • Smokeless tobacco: Never Used   • Tobacco comment: Passive Smoke: Y   Vaping Use   • Vaping Use: Some days   Substance and Sexual Activity   • Alcohol use: No   • Drug use: No   • Sexual activity: Yes     Partners: Male     Birth control/protection: None     Ports no unusual food water travel or activity.      Objective   Physical Exam   on arrival O2 saturations were noted to be in the upper 80s on room air  Alert Manuel Coma Scale 15   HEENT: Pupils equal and reactive to light. Conjunctivae are not injected. normal tympanic membranes. Oropharynx and nares are normal.   Neck: Supple. Midline trachea. No JVD. No goiter.   Chest: Expiratory wheezes and rhonchi bilaterally.  No accessory muscle use or retraction and equal breath sounds bilaterally regular rate and rhythm without murmur or rub.   Abdomen: Positive bowel sounds nontender nondistended. No rebound or peritoneal signs. No CVA tenderness.   Extremities no clubbing cyanosis or edema motor sensory exam is normal the full range of motion is intact   skin: Warm and dry, no rashes or petechia.   Lymphatic: No regional lymphadenopathy. No calf pain, swelling or Shaneka's sign    Procedures           ED Course                                   Labs Reviewed   COMPREHENSIVE METABOLIC PANEL - Abnormal; Notable for the following  components:       Result Value    Glucose 183 (*)     Creatinine 0.52 (*)     BUN/Creatinine Ratio 38.5 (*)     All other components within normal limits    Narrative:     GFR Normal >60  Chronic Kidney Disease <60  Kidney Failure <15     CBC WITH AUTO DIFFERENTIAL - Abnormal; Notable for the following components:    RBC 5.45 (*)     Hemoglobin 16.5 (*)     Hematocrit 48.9 (*)     Lymphocyte % 17.6 (*)     Monocyte % 3.9 (*)     All other components within normal limits   PROTIME-INR - Normal   APTT - Normal   BNP (IN-HOUSE) - Normal    Narrative:     Among patients with dyspnea, NT-proBNP is highly sensitive for the detection of acute congestive heart failure. In addition NT-proBNP of <300 pg/ml effectively rules out acute congestive heart failure with 99% negative predictive value.    Results may be falsely decreased if patient taking Biotin.     TROPONIN (IN-HOUSE) - Normal    Narrative:     Troponin T Reference Range:  <= 0.03 ng/mL-   Negative for AMI  >0.03 ng/mL-     Abnormal for myocardial necrosis.  Clinicians would have to utilize clinical acumen, EKG, Troponin and serial changes to determine if it is an Acute Myocardial Infarction or myocardial injury due to an underlying chronic condition.       Results may be falsely decreased if patient taking Biotin.     POC LACTATE - Normal   BLOOD CULTURE   BLOOD CULTURE   POC LACTATE   CBC AND DIFFERENTIAL    Narrative:     The following orders were created for panel order CBC & Differential.  Procedure                               Abnormality         Status                     ---------                               -----------         ------                     CBC Auto Differential[[id number]]        Abnormal            Final result                 Please view results for these tests on the individual orders.     Medications   azithromycin (ZITHROMAX) tablet 500 mg (has no administration in time range)   cefTRIAXone (ROCEPHIN) 1 g in sodium chloride 0.9 % 100 mL  IVPB (has no administration in time range)   lactated ringers bolus 500 mL (has no administration in time range)   ipratropium-albuterol (DUO-NEB) nebulizer solution 3 mL (3 mL Nebulization Given 7/2/21 1209)   methylPREDNISolone sodium succinate (SOLU-Medrol) injection 125 mg (125 mg Intravenous Given 7/2/21 1156)   iopamidol (ISOVUE-370) 76 % injection 100 mL (100 mL Intravenous Given 7/2/21 1304)     CT Chest Pulmonary Embolism    Result Date: 7/2/2021   1. No evidence of pulmonary embolism 2. Left basilar atelectasis is noted with right middle lobe atelectasis. No obvious pneumonia or gross congestive changes noted. No adenopathy is noted.    Electronically Signed By-Aden Gaffney MD On:7/2/2021 1:14 PM This report was finalized on [id number] by  Aden Gaffney MD.            MDM  Number of Diagnoses or Management Options     Amount and/or Complexity of Data Reviewed  Clinical lab tests: ordered and reviewed  Tests in the radiology section of CPT®: ordered and reviewed  Discuss the patient with other providers: yes  Independent visualization of images, tracings, or specimens: yes    Risk of Complications, Morbidity, and/or Mortality  Presenting problems: high  Diagnostic procedures: high  Management options: high  General comments: Patient was given breathing treatments and steroids emergency department the patient had cultures obtained and will received oral Zithromax and IV ceftriaxone.  The patient will be admitted to ED observations we will continue IV antibiotics and steroids as well as inhalation bronchodilators with oxygen support.  The patient was agreeable to this plan of treatment        Final diagnoses:   Acute exacerbation of chronic obstructive pulmonary disease (COPD) (CMS/HCC)   Pneumonia of right middle lobe due to infectious organism   Plate-like atelectasis   Hypoxemia       ED Disposition  ED Disposition     ED Disposition Condition Comment    Decision to Admit            No follow-up  provider specified.       Medication List      No changes were made to your prescriptions during this visit.          Dillan Hernandez MD  07/02/21 1521

## 2021-07-02 NOTE — CONSULTS
"Diabetes Education  Assessment/Teaching    Patient Name:  Eugenia Madden  YOB: 1955  MRN: [id number]  Admit Date:  7/2/2021      Assessment Date:  7/2/2021    Most Recent Value   General Information    Referral From:  MD martinez [MD consult to teach blood glucose monitoring.]   Height  160 cm (63\")   Height Method  Stated   Weight  57.6 kg (126 lb 15.8 oz)   Weight Method  Standing scale   Pregnancy Assessment   Diabetes History   What type of diabetes do you have?  Type 2   Current DM knowledge  fair   Do you test your blood sugar at home?  yes   Frequency of checks  2X a day   Meter type  using her 's meter   Who performs the test?  patient   Typical readings  <150   Have you had high blood sugar? (>140mg/dl)  yes   How often do you have high blood sugar?  unknown   When was your last high blood sugar?  Admission blood sugar 183   Education Preferences   What areas of diabetes would you like to learn about?  testing my blood sugar at home, diabetes complications   Nutrition Information   Assessment Topics   Reducing Risk - Assessment  Needs education   Monitoring - Assessment  Needs education   DM Goals   Reducing Risk - Goal  Today   Monitoring - Goal  Today            Most Recent Value   DM Education Needs   Meter  Meter provided   Meter Type  Accuchek [Accu-chek Guide Me glucometer given to patient with patient doing return demonstration of using the lancing device, inserting the test strip into the meter, and applying blood to the test strip. Blood sugar was 170.]   Frequency of Testing  2 times a day [Log book given to patient.]   Medication  Oral, Other injectables [Patient stated she takes Glimepiride 4 mg every morning, Jardiance 10 daily, and Ozempic 0.5 mg weekly on Sundays at home.]   Reducing Risks  A1C testing [On 6/10/2021 A1c was 6.5%.]   Discharge Plan  Home   Motivation  Moderate   Teaching Method  Explanation, Discussion, Demonstration, Handouts, Teach back   Patient Response  " Demonstrates adequately, Verbalized understanding            Other Comments:  A1c info sheet given with discussion on A1c target and healthy blood sugar range. Patient stated that she was on Metformin but quit taking when she was getting blood sugar readings in the 70s a couple of days a week.  Discussed with patient that she needed to inform her doctor of discontinuing the Metformin.  Prescriptions started in discharge orders for lancets, test strips, and alcohol wipes. Patient has no further questions or concerns related to diabetes at this time.          Electronically signed by:  Parvin Penny RN  07/02/21 19:19 EDT

## 2021-07-02 NOTE — H&P
[facility] H&P    Patient Name: Eugenia Madden  : 1955  MRN: [id number]  Primary Care Physician: Melisa Taveras MD  Date of admission: 2021     Patient Care Team:  Melisa Taveras MD as PCP - General          Subjective   History Present Illness     Chief Complaint:   Chief Complaint   Patient presents with   • Shortness of Breath     pt c/o SOA for past 2 weeks that has progressed. pt reports recent cough, congestion.      Dyspnea    Obtained from admitting provider note on 2021:  65-year-old female with the onset of increasing respiratory problems over the last 10 days.  Patient states she has steadily become more short of breath.  She states she has had a nonproductive cough throughout this.  But has had the onset of yellow sputum in the last 24 hours.  The patient saw her primary care doctor but has not taken steroids or antibiotics with this episode.  The patient reports that she suspects that she has some environmental allergens.  She denies PND or orthopnea.  She denies night sweats or hemoptysis.  She reports that she has had some intermittent pleuritic type pain in the last 10 days    2021 (post observation admission): Patient confirms HPI noted above including 1 week history of worsening dyspnea with a cough productive of small amount of thick sputum.  She notes that she has become increasingly short of air made worse with exertion and has occasional palpitations when her symptoms are at their most profound.  She denies any fever, chills, nausea or vomiting, peripheral edema.  Patient does not smoke or drink alcohol and does not use oxygen at home.  She reports she has recently been started on a scheduled inhaler by her primary care provider to help manage COPD.         History of Present Illness    Review of Systems   Constitutional: Positive for malaise/fatigue. Negative for chills and fever.   HENT: Negative.    Eyes: Negative.    Cardiovascular: Positive for  palpitations. Negative for chest pain, irregular heartbeat, leg swelling, near-syncope and syncope.   Respiratory: Positive for cough, shortness of breath and sputum production.    Endocrine: Negative.    Skin: Negative.    Musculoskeletal: Negative.    Gastrointestinal: Negative.    Genitourinary: Negative.    Neurological: Negative.    Psychiatric/Behavioral: Negative.            Personal History     Past Medical History:   Past Medical History:   Diagnosis Date   • Arthritis    • Hyperlipidemia    • Hypertension    • Kidney stones    • Restless leg        Surgical History:      Past Surgical History:   Procedure Laterality Date   • CERVIX SURGERY  1983    Dysplasia removed from Cervix   • KIDNEY STONE SURGERY  2016    Lithotripsy   • OTHER SURGICAL HISTORY  2007    Pinched nerve arm    • TUBAL ABDOMINAL LIGATION Bilateral            Family History: family history includes Breast cancer (age of onset: 55) in her mother; Cancer in her brother and mother; Diabetes in her brother, brother, father, and sister; Heart disease in her brother, brother, father, mother, and sister; Hypertension in her brother, brother, father, and sister; No Known Problems in her brother; Other in her brother and another family member; Stroke in her father. Otherwise pertinent FHx was reviewed and unremarkable.     Social History:  reports that she quit smoking about 7 months ago. Her smoking use included cigarettes. She has a 23.00 pack-year smoking history. She has never used smokeless tobacco. She reports that she does not drink alcohol and does not use drugs.      Medications:  Prior to Admission medications    Medication Sig Start Date End Date Taking? Authorizing Provider   albuterol sulfate  (90 Base) MCG/ACT inhaler Inhale 2 puffs Every 4 (Four) Hours As Needed for Wheezing. 6/18/21   Melisa Taveras MD   amoxicillin-clavulanate (AUGMENTIN) 875-125 MG per tablet Take 1 tablet by mouth 2 (Two) Times a Day. 6/18/21    Melisa Taveras MD   atorvastatin (LIPITOR) 40 MG tablet TAKE 1 TABLET BY MOUTH EVERY DAY 8/27/20   Melisa Taveras MD   Blood Glucose Monitoring Suppl (Accu-Chek Umu Plus) w/Device kit 1 strip 2 (two) times a day.    Bisi Ordaz APRN   cetirizine (zyrTEC) 10 MG tablet Take 10 mg by mouth Daily.    Anthony Tapia MD   Cholecalciferol (VITAMIN D3) 125 MCG (5000 UT) tablet tablet Take 5,000 Units by mouth Daily. 8/10/17   Anthony Tapia MD   cyclobenzaprine (FLEXERIL) 10 MG tablet Take 1 tablet by mouth 3 (Three) Times a Day As Needed for Muscle Spasms.  Patient taking differently: Take 10 mg by mouth 3 (Three) Times a Day As Needed for Muscle Spasms. As needed 5/13/21   Bisi Ordaz APRN   ELDERBERRY PO Take  by mouth.    Anthony Tapia MD   famotidine (PEPCID) 20 MG tablet Take 20 mg by mouth Daily. 9/23/19   Anthony Tapia MD   ferrous sulfate 325 (65 FE) MG tablet Take 325 mg by mouth Daily With Breakfast.    Anthony Tapia MD   glimepiride (Amaryl) 4 MG tablet Take 1 tablet by mouth Every Morning Before Breakfast. 6/10/21   Melisa Taveras MD   glucose blood (Accu-Chek Umu Plus) test strip Use to test blood sugars twice daily. 6/18/21   Melisa Taveras MD   glucose blood test strip 1 each by Other route 2 (two) times a day. 6/11/21   Melisa Taveras MD   hydrOXYzine (ATARAX) 25 MG tablet TAKE 1 TABLET BY MOUTH AT NIGHT AS NEEDED FOR ITCHING (ONE OR TWO IF ITCHING). 9/3/19   Melisa Taveras MD   Ibuprofen-diphenhydrAMINE HCl (ADVIL PM) 200-25 MG capsule Take 1 capsule by mouth Every Night. 6/26/17   Anthony Tapia MD   Jardiance 10 MG tablet tablet TAKE 1 TABLET BY MOUTH DAILY 6/28/21   Lei Kim MD   losartan (COZAAR) 100 MG tablet Take 1 tablet by mouth Daily. 9/24/20   Melisa Taveras MD   Magnesium 250 MG tablet Take 250 mg by mouth Daily.    Provider, MD Anthony   metFORMIN ER (GLUCOPHAGE-XR) 500 MG 24  hr tablet TAKE 2 TABLETS BY MOUTH EVERY DAY 2/23/21   Lei Kim MD   potassium chloride 10 MEQ CR tablet Take 1 tablet by mouth Daily. 3/5/21   Melisa Taveras MD   pramipexole (MIRAPEX) 0.125 MG tablet Take 1 tablet by mouth Every Night. Take 4 tablets daily at bedtime. 6/27/21   Melisa Taveras MD   Semaglutide,0.25 or 0.5MG/DOS, (Ozempic, 0.25 or 0.5 MG/DOSE,) 2 MG/1.5ML solution pen-injector Inject .05 mg weekly 4/1/21   Lei Kim MD   tiotropium bromide-olodaterol (STIOLTO RESPIMAT) 2.5-2.5 MCG/ACT aerosol solution inhaler Inhale 2 puffs Daily. 6/21/21   Melisa Taveras MD       Allergies:  No Known Allergies    Objective   Objective     Vital Signs  Temp:  [98.5 °F (36.9 °C)] 98.5 °F (36.9 °C)  Heart Rate:  [] 94  Resp:  [17-22] 17  BP: (120-147)/(55-80) 123/55  SpO2:  [89 %-97 %] 92 %  on  Flow (L/min):  [4-5] 5;   Device (Oxygen Therapy): nasal cannula  Body mass index is 22.42 kg/m².    Physical Exam  Vitals reviewed.   Constitutional:       General: She is not in acute distress.     Appearance: Normal appearance. She is normal weight. She is not ill-appearing, toxic-appearing or diaphoretic.   HENT:      Head: Normocephalic and atraumatic.      Right Ear: External ear normal.      Left Ear: External ear normal.      Nose: Nose normal.      Mouth/Throat:      Mouth: Mucous membranes are moist.   Eyes:      Extraocular Movements: Extraocular movements intact.   Cardiovascular:      Rate and Rhythm: Normal rate and regular rhythm.      Pulses: Normal pulses.      Heart sounds: Normal heart sounds.   Pulmonary:      Effort: Pulmonary effort is normal. No respiratory distress.      Breath sounds: Wheezing present.      Comments: Breath sounds decreased on left  Abdominal:      General: Bowel sounds are normal. There is no distension.      Tenderness: There is no abdominal tenderness.   Musculoskeletal:         General: Normal range of motion.   Skin:     General: Skin is warm  and dry.      Capillary Refill: Capillary refill takes less than 2 seconds.   Neurological:      General: No focal deficit present.      Mental Status: She is alert and oriented to person, place, and time.   Psychiatric:         Mood and Affect: Mood normal.         Behavior: Behavior normal.         Thought Content: Thought content normal.         Judgment: Judgment normal.           Results Review:  I have personally reviewed most recent cardiac tracings, lab results and radiology images and interpretations and agree with findings, most notably: Troponin, proBNP, CBC, CMP, INR/PT, PTT, lactate, chest x-ray, CT of chest and EKG.    Results from last 7 days   Lab Units 07/02/21  1137   WBC 10*3/mm3 8.20   HEMOGLOBIN g/dL 16.5*   HEMATOCRIT % 48.9*   PLATELETS 10*3/mm3 182   INR  1.02     Results from last 7 days   Lab Units 07/02/21  1151 07/02/21  1138   SODIUM mmol/L  --  142   POTASSIUM mmol/L  --  4.1   CHLORIDE mmol/L  --  106   CO2 mmol/L  --  24.0   BUN mg/dL  --  20   CREATININE mg/dL  --  0.52*   GLUCOSE mg/dL  --  183*   CALCIUM mg/dL  --  9.4   ALT (SGPT) U/L  --  16   AST (SGOT) U/L  --  13   TROPONIN T ng/mL  --  <0.010   PROBNP pg/mL  --  127.0   LACTATE mmol/L 1.0  --      Estimated Creatinine Clearance: 63.5 mL/min (A) (by C-G formula based on SCr of 0.52 mg/dL (L)).  Brief Urine Lab Results  (Last result in the past 365 days)      Color   Clarity   Blood   Leuk Est   Nitrite   Protein   CREAT   Urine HCG        06/10/21 0929             131.3             Microbiology Results (last 10 days)     ** No results found for the last 240 hours. **          ECG/EMG Results (most recent)     Procedure Component Value Units Date/Time    ECG 12 Lead [[id number]] Collected: 07/02/21 1115     Updated: 07/02/21 1116     QT Interval 359 ms     Narrative:      HEART RATE= 106  bpm  RR Interval= 568  ms  CA Interval= 170  ms  P Horizontal Axis= 17  deg  P Front Axis= 75  deg  QRSD Interval= 82  ms  QT Interval= 359   ms  QRS Axis= 22  deg  T Wave Axis= 57  deg  - OTHERWISE NORMAL ECG -  Sinus tachycardia  Electronically Signed By:   Date and Time of Study: 2021-07-02 11:15:09              Results for orders placed during the hospital encounter of 03/12/21    Adult Transthoracic Echo Complete w/ Color, Spectral and Contrast if Necessary Per Protocol    Interpretation Summary  · Left ventricular systolic function is normal.  · Left ventricular ejection fraction is 60 to 65%  · Left ventricular wall thickness is consistent with mild concentric hypertrophy.  · Left ventricular diastolic function is consistent with (grade I) impaired relaxation.      CT Chest Pulmonary Embolism    Result Date: 7/2/2021   1. No evidence of pulmonary embolism 2. Left basilar atelectasis is noted with right middle lobe atelectasis. No obvious pneumonia or gross congestive changes noted. No adenopathy is noted.    Electronically Signed By-Aden Gaffney MD On:7/2/2021 1:14 PM This report was finalized on [id number] by  Aden Gaffney MD.        Estimated Creatinine Clearance: 63.5 mL/min (A) (by C-G formula based on SCr of 0.52 mg/dL (L)).    Assessment/Plan   Assessment/Plan       Active Hospital Problems    Diagnosis  POA   • Hypoxemia [R09.02]  Yes     Priority: High   • Plate-like atelectasis [J98.11]  Yes     Priority: High   • Pneumonia of right middle lobe due to infectious organism [J18.9]  Yes     Priority: High   • Acute exacerbation of chronic obstructive pulmonary disease (COPD) (CMS/HCC) [J44.1]  Yes     Priority: High   • Type 2 diabetes mellitus (CMS/[facility]) [E11.9]  Yes     Priority: Medium   • Essential hypertension [I10]  Yes     Priority: Medium   • Pulmonary emphysema (CMS/HCC) [J43.9]  Yes     Priority: Medium   • GERD without esophagitis [K21.9]  Yes     Priority: Low   • Mixed hyperlipidemia [E78.2]  Yes     Priority: Low      Resolved Hospital Problems   No resolved problems to display.     Dyspnea  -Likely multifactorial  related to acute exacerbation of COPD and possible underlying pneumonia  -CT PE protocol shows no evidence of pulmonary embolism with left basilar atelectasis noted with right middle lobe atelectasis with radiologist noting no obvious pneumonia or gross congestive changes or adenopathy.  ED provider notes area of right middle lobe suspicious for pneumonia  -WBCs: 8.20 with a lactate of 1.0  -Check procalcitonin, respiratory viral panel and urine antigens for strep pneumo and Legionella  -Rocephin and azithromycin started in the ED, continue  -Prednisone, DuoNeb and Pulmicort ordered  -Incentive spirometry  -Mucinex and Tessalon as needed  -Continue telemetry, O2 and pulse oximetry    Diabetes mellitus  -Poorly controlled with most recent blood glucose of 183  -Hold metformin and Ozempic  -SSI  -Diabetic diet  -Monitor before meals and at bedtime    Hypertension  -Well controlled with most recent blood pressure of 123/55  -Continue losartan    GERD  -Famotidine    Hyperlipidemia  -Continue statin            VTE Prophylaxis -   Mechanical Order History:     None      Pharmalogical Order History:      Ordered     Dose Route Frequency Stop    Signed and Held  enoxaparin (LOVENOX) syringe 40 mg      40 mg SC Every 24 Hours --                CODE STATUS:    Code Status and Medical Interventions:   Ordered at: 07/02/21 1552     Code Status:    CPR     Medical Interventions (Level of Support Prior to Arrest):    Full           I discussed the patient's findings and my recommendations with patient and nursing staff.      Signature:Electronically signed by Aden Bowie PA-C, 07/02/21, 4:02 PM EDT.

## 2021-07-02 NOTE — PLAN OF CARE
Goal Outcome Evaluation:  Plan of Care Reviewed With: patient        Progress: no change  Outcome Summary: New admit from ED.

## 2021-07-03 ENCOUNTER — APPOINTMENT (OUTPATIENT)
Dept: GENERAL RADIOLOGY | Facility: HOSPITAL | Age: 66
End: 2021-07-03

## 2021-07-03 LAB
ANION GAP SERPL CALCULATED.3IONS-SCNC: 13 MMOL/L (ref 5–15)
ARTERIAL PATENCY WRIST A: POSITIVE
ATMOSPHERIC PRESS: ABNORMAL MM[HG]
BASE EXCESS BLDA CALC-SCNC: 2 MMOL/L (ref 0–3)
BASOPHILS # BLD AUTO: 0.1 10*3/MM3 (ref 0–0.2)
BASOPHILS NFR BLD AUTO: 1.2 % (ref 0–1.5)
BDY SITE: ABNORMAL
BUN SERPL-MCNC: 21 MG/DL (ref 8–23)
BUN/CREAT SERPL: 35 (ref 7–25)
CALCIUM SPEC-SCNC: 9.2 MG/DL (ref 8.6–10.5)
CHLORIDE SERPL-SCNC: 105 MMOL/L (ref 98–107)
CO2 BLDA-SCNC: 28 MMOL/L (ref 22–29)
CO2 SERPL-SCNC: 24 MMOL/L (ref 22–29)
CREAT SERPL-MCNC: 0.6 MG/DL (ref 0.57–1)
D DIMER PPP FEU-MCNC: 0.4 MG/L (FEU) (ref 0–0.59)
DEPRECATED RDW RBC AUTO: 43.8 FL (ref 37–54)
EOSINOPHIL # BLD AUTO: 0 10*3/MM3 (ref 0–0.4)
EOSINOPHIL NFR BLD AUTO: 0.2 % (ref 0.3–6.2)
ERYTHROCYTE [DISTWIDTH] IN BLOOD BY AUTOMATED COUNT: 13.9 % (ref 12.3–15.4)
GFR SERPL CREATININE-BSD FRML MDRD: 100 ML/MIN/1.73
GLUCOSE BLDC GLUCOMTR-MCNC: 172 MG/DL (ref 70–105)
GLUCOSE BLDC GLUCOMTR-MCNC: 270 MG/DL (ref 70–105)
GLUCOSE BLDC GLUCOMTR-MCNC: 324 MG/DL (ref 70–105)
GLUCOSE BLDC GLUCOMTR-MCNC: 85 MG/DL (ref 70–105)
GLUCOSE SERPL-MCNC: 67 MG/DL (ref 65–99)
HCO3 BLDA-SCNC: 26.7 MMOL/L (ref 21–28)
HCT VFR BLD AUTO: 47.7 % (ref 34–46.6)
HEMODILUTION: NO
HGB BLD-MCNC: 16.3 G/DL (ref 12–15.9)
INHALED O2 CONCENTRATION: 34 %
LYMPHOCYTES # BLD AUTO: 2.2 10*3/MM3 (ref 0.7–3.1)
LYMPHOCYTES NFR BLD AUTO: 21.1 % (ref 19.6–45.3)
MAGNESIUM SERPL-MCNC: 2 MG/DL (ref 1.6–2.4)
MCH RBC QN AUTO: 30.7 PG (ref 26.6–33)
MCHC RBC AUTO-ENTMCNC: 34.1 G/DL (ref 31.5–35.7)
MCV RBC AUTO: 90 FL (ref 79–97)
MODALITY: ABNORMAL
MONOCYTES # BLD AUTO: 0.5 10*3/MM3 (ref 0.1–0.9)
MONOCYTES NFR BLD AUTO: 5.1 % (ref 5–12)
NEUTROPHILS NFR BLD AUTO: 7.5 10*3/MM3 (ref 1.7–7)
NEUTROPHILS NFR BLD AUTO: 72.4 % (ref 42.7–76)
NRBC BLD AUTO-RTO: 0.1 /100 WBC (ref 0–0.2)
PCO2 BLDA: 41 MM HG (ref 35–48)
PH BLDA: 7.42 PH UNITS (ref 7.35–7.45)
PLATELET # BLD AUTO: 197 10*3/MM3 (ref 140–450)
PMV BLD AUTO: 10.1 FL (ref 6–12)
PO2 BLDA: 53.4 MM HG (ref 83–108)
POTASSIUM SERPL-SCNC: 3.7 MMOL/L (ref 3.5–5.2)
PROCALCITONIN SERPL-MCNC: 0.06 NG/ML (ref 0–0.25)
RBC # BLD AUTO: 5.3 10*6/MM3 (ref 3.77–5.28)
SAO2 % BLDCOA: 87.9 % (ref 94–98)
SODIUM SERPL-SCNC: 142 MMOL/L (ref 136–145)
TROPONIN T SERPL-MCNC: <0.01 NG/ML (ref 0–0.03)
WBC # BLD AUTO: 10.4 10*3/MM3 (ref 3.4–10.8)

## 2021-07-03 PROCEDURE — 84484 ASSAY OF TROPONIN QUANT: CPT | Performed by: INTERNAL MEDICINE

## 2021-07-03 PROCEDURE — 99223 1ST HOSP IP/OBS HIGH 75: CPT | Performed by: INTERNAL MEDICINE

## 2021-07-03 PROCEDURE — 25010000002 ENOXAPARIN PER 10 MG: Performed by: PHYSICIAN ASSISTANT

## 2021-07-03 PROCEDURE — 85379 FIBRIN DEGRADATION QUANT: CPT | Performed by: PHYSICIAN ASSISTANT

## 2021-07-03 PROCEDURE — 94799 UNLISTED PULMONARY SVC/PX: CPT

## 2021-07-03 PROCEDURE — 83735 ASSAY OF MAGNESIUM: CPT | Performed by: PHYSICIAN ASSISTANT

## 2021-07-03 PROCEDURE — 36600 WITHDRAWAL OF ARTERIAL BLOOD: CPT

## 2021-07-03 PROCEDURE — 82803 BLOOD GASES ANY COMBINATION: CPT

## 2021-07-03 PROCEDURE — 71045 X-RAY EXAM CHEST 1 VIEW: CPT

## 2021-07-03 PROCEDURE — 94640 AIRWAY INHALATION TREATMENT: CPT

## 2021-07-03 PROCEDURE — 94760 N-INVAS EAR/PLS OXIMETRY 1: CPT

## 2021-07-03 PROCEDURE — 25010000002 FUROSEMIDE PER 20 MG: Performed by: INTERNAL MEDICINE

## 2021-07-03 PROCEDURE — 84145 PROCALCITONIN (PCT): CPT | Performed by: PHYSICIAN ASSISTANT

## 2021-07-03 PROCEDURE — G0378 HOSPITAL OBSERVATION PER HR: HCPCS

## 2021-07-03 PROCEDURE — 25010000002 METHYLPREDNISOLONE PER 125 MG: Performed by: INTERNAL MEDICINE

## 2021-07-03 PROCEDURE — 85025 COMPLETE CBC W/AUTO DIFF WBC: CPT | Performed by: PHYSICIAN ASSISTANT

## 2021-07-03 PROCEDURE — 63710000001 INSULIN LISPRO (HUMAN) PER 5 UNITS: Performed by: PHYSICIAN ASSISTANT

## 2021-07-03 PROCEDURE — 82962 GLUCOSE BLOOD TEST: CPT

## 2021-07-03 PROCEDURE — 86431 RHEUMATOID FACTOR QUANT: CPT | Performed by: INTERNAL MEDICINE

## 2021-07-03 PROCEDURE — 25010000002 CEFTRIAXONE PER 250 MG: Performed by: PHYSICIAN ASSISTANT

## 2021-07-03 PROCEDURE — 63710000001 PREDNISONE PER 1 MG: Performed by: PHYSICIAN ASSISTANT

## 2021-07-03 PROCEDURE — 86038 ANTINUCLEAR ANTIBODIES: CPT | Performed by: INTERNAL MEDICINE

## 2021-07-03 PROCEDURE — 80048 BASIC METABOLIC PNL TOTAL CA: CPT | Performed by: PHYSICIAN ASSISTANT

## 2021-07-03 RX ORDER — HYDROXYZINE HYDROCHLORIDE 25 MG/1
25 TABLET, FILM COATED ORAL NIGHTLY PRN
Status: DISCONTINUED | OUTPATIENT
Start: 2021-07-03 | End: 2021-07-04

## 2021-07-03 RX ORDER — PRAMIPEXOLE DIHYDROCHLORIDE 0.25 MG/1
0.5 TABLET ORAL NIGHTLY
Status: DISCONTINUED | OUTPATIENT
Start: 2021-07-03 | End: 2021-07-07 | Stop reason: HOSPADM

## 2021-07-03 RX ORDER — FAMOTIDINE 20 MG/1
20 TABLET, FILM COATED ORAL DAILY
Status: DISCONTINUED | OUTPATIENT
Start: 2021-07-03 | End: 2021-07-07 | Stop reason: HOSPADM

## 2021-07-03 RX ORDER — ATORVASTATIN CALCIUM 40 MG/1
40 TABLET, FILM COATED ORAL NIGHTLY
Status: DISCONTINUED | OUTPATIENT
Start: 2021-07-03 | End: 2021-07-07 | Stop reason: HOSPADM

## 2021-07-03 RX ORDER — FUROSEMIDE 10 MG/ML
20 INJECTION INTRAMUSCULAR; INTRAVENOUS ONCE
Status: COMPLETED | OUTPATIENT
Start: 2021-07-03 | End: 2021-07-03

## 2021-07-03 RX ORDER — CETIRIZINE HYDROCHLORIDE 10 MG/1
10 TABLET ORAL DAILY
Status: DISCONTINUED | OUTPATIENT
Start: 2021-07-03 | End: 2021-07-07 | Stop reason: HOSPADM

## 2021-07-03 RX ORDER — LOSARTAN POTASSIUM 50 MG/1
100 TABLET ORAL DAILY
Status: DISCONTINUED | OUTPATIENT
Start: 2021-07-03 | End: 2021-07-07 | Stop reason: HOSPADM

## 2021-07-03 RX ORDER — METHYLPREDNISOLONE SODIUM SUCCINATE 125 MG/2ML
60 INJECTION, POWDER, LYOPHILIZED, FOR SOLUTION INTRAMUSCULAR; INTRAVENOUS EVERY 12 HOURS
Status: DISCONTINUED | OUTPATIENT
Start: 2021-07-03 | End: 2021-07-06

## 2021-07-03 RX ORDER — GUAIFENESIN/DEXTROMETHORPHAN 100-10MG/5
10 SYRUP ORAL EVERY 4 HOURS PRN
Status: DISCONTINUED | OUTPATIENT
Start: 2021-07-03 | End: 2021-07-07 | Stop reason: HOSPADM

## 2021-07-03 RX ORDER — MONTELUKAST SODIUM 10 MG/1
10 TABLET ORAL NIGHTLY
Status: DISCONTINUED | OUTPATIENT
Start: 2021-07-03 | End: 2021-07-07 | Stop reason: HOSPADM

## 2021-07-03 RX ORDER — POTASSIUM CHLORIDE 750 MG/1
10 TABLET, FILM COATED, EXTENDED RELEASE ORAL DAILY
Refills: 1 | Status: DISCONTINUED | OUTPATIENT
Start: 2021-07-03 | End: 2021-07-07 | Stop reason: HOSPADM

## 2021-07-03 RX ORDER — CYCLOBENZAPRINE HCL 10 MG
10 TABLET ORAL 3 TIMES DAILY PRN
Status: DISCONTINUED | OUTPATIENT
Start: 2021-07-03 | End: 2021-07-07 | Stop reason: HOSPADM

## 2021-07-03 RX ADMIN — BUDESONIDE 0.5 MG: 0.5 INHALANT RESPIRATORY (INHALATION) at 18:51

## 2021-07-03 RX ADMIN — IPRATROPIUM BROMIDE AND ALBUTEROL SULFATE 3 ML: 2.5; .5 SOLUTION RESPIRATORY (INHALATION) at 23:32

## 2021-07-03 RX ADMIN — FUROSEMIDE 20 MG: 10 INJECTION, SOLUTION INTRAMUSCULAR; INTRAVENOUS at 16:04

## 2021-07-03 RX ADMIN — IPRATROPIUM BROMIDE AND ALBUTEROL SULFATE 3 ML: 2.5; .5 SOLUTION RESPIRATORY (INHALATION) at 18:40

## 2021-07-03 RX ADMIN — ENOXAPARIN SODIUM 40 MG: 40 INJECTION SUBCUTANEOUS at 16:03

## 2021-07-03 RX ADMIN — IPRATROPIUM BROMIDE AND ALBUTEROL SULFATE 3 ML: 2.5; .5 SOLUTION RESPIRATORY (INHALATION) at 08:09

## 2021-07-03 RX ADMIN — GUAIFENESIN 1200 MG: 600 TABLET, EXTENDED RELEASE ORAL at 09:18

## 2021-07-03 RX ADMIN — PREDNISONE 40 MG: 20 TABLET ORAL at 09:18

## 2021-07-03 RX ADMIN — CETIRIZINE HYDROCHLORIDE 10 MG: 10 TABLET, FILM COATED ORAL at 16:03

## 2021-07-03 RX ADMIN — BUDESONIDE 0.5 MG: 0.5 INHALANT RESPIRATORY (INHALATION) at 08:06

## 2021-07-03 RX ADMIN — METHYLPREDNISOLONE SODIUM SUCCINATE 60 MG: 125 INJECTION, POWDER, FOR SOLUTION INTRAMUSCULAR; INTRAVENOUS at 16:04

## 2021-07-03 RX ADMIN — IPRATROPIUM BROMIDE AND ALBUTEROL SULFATE 3 ML: 2.5; .5 SOLUTION RESPIRATORY (INHALATION) at 16:17

## 2021-07-03 RX ADMIN — Medication 10 ML: at 09:18

## 2021-07-03 RX ADMIN — FAMOTIDINE 20 MG: 20 TABLET, FILM COATED ORAL at 16:03

## 2021-07-03 RX ADMIN — MONTELUKAST 10 MG: 10 TABLET, FILM COATED ORAL at 20:09

## 2021-07-03 RX ADMIN — MAGNESIUM OXIDE TAB 400 MG (241.3 MG ELEMENTAL MG) 400 MG: 400 (241.3 MG) TAB at 16:03

## 2021-07-03 RX ADMIN — LOSARTAN POTASSIUM 100 MG: 50 TABLET, FILM COATED ORAL at 16:03

## 2021-07-03 RX ADMIN — INSULIN LISPRO 7 UNITS: 100 INJECTION, SOLUTION INTRAVENOUS; SUBCUTANEOUS at 18:45

## 2021-07-03 RX ADMIN — Medication 10 ML: at 20:10

## 2021-07-03 RX ADMIN — THEOPHYLLINE ANHYDROUS 300 MG: 300 CAPSULE, EXTENDED RELEASE ORAL at 16:15

## 2021-07-03 RX ADMIN — ATORVASTATIN CALCIUM 40 MG: 40 TABLET, FILM COATED ORAL at 20:10

## 2021-07-03 RX ADMIN — Medication 10 ML: at 16:03

## 2021-07-03 RX ADMIN — AZITHROMYCIN MONOHYDRATE 250 MG: 250 TABLET ORAL at 16:03

## 2021-07-03 RX ADMIN — CEFTRIAXONE SODIUM 1 G: 1 INJECTION, POWDER, FOR SOLUTION INTRAMUSCULAR; INTRAVENOUS at 16:10

## 2021-07-03 RX ADMIN — PRAMIPEXOLE DIHYDROCHLORIDE 0.5 MG: 0.25 TABLET ORAL at 20:09

## 2021-07-03 RX ADMIN — GUAIFENESIN 1200 MG: 600 TABLET, EXTENDED RELEASE ORAL at 20:09

## 2021-07-03 RX ADMIN — POTASSIUM CHLORIDE 10 MEQ: 750 TABLET, EXTENDED RELEASE ORAL at 16:03

## 2021-07-03 RX ADMIN — BENZONATATE 200 MG: 100 CAPSULE ORAL at 09:26

## 2021-07-03 RX ADMIN — EMPAGLIFLOZIN 10 MG: 10 TABLET, FILM COATED ORAL at 18:45

## 2021-07-03 NOTE — PLAN OF CARE
Problem: Adult Inpatient Plan of Care  Goal: Plan of Care Review  Outcome: Ongoing, Progressing  Flowsheets  Taken 7/3/2021 0315 by Mykel Hsieh, RN  Plan of Care Reviewed With: patient  Taken 7/2/2021 1747 by Aurelia Garcia, RN  Outcome Summary: New admit from ED.   Goal Outcome Evaluation:  Plan of Care Reviewed With: patient            Br tx will continue to monitor

## 2021-07-03 NOTE — PLAN OF CARE
Problem: Adult Inpatient Plan of Care  Goal: Plan of Care Review  Outcome: Met  Flowsheets (Taken 7/3/2021 1804)  Progress: no change  Plan of Care Reviewed With: patient  Outcome Summary: Still on 4L NC, still SOA and coughing, getting steroids/antibiotics/cough meds, SR/ST on the monitor, pulmonology consulted, will monitor  Goal: Patient-Specific Goal (Individualized)  Outcome: Met  Goal: Absence of Hospital-Acquired Illness or Injury  Outcome: Met  Intervention: Identify and Manage Fall Risk  Recent Flowsheet Documentation  Taken 7/3/2021 1500 by Kenia Lemus, RN  Safety Promotion/Fall Prevention: safety round/check completed  Taken 7/3/2021 1300 by Kenia Lemus RN  Safety Promotion/Fall Prevention: safety round/check completed  Taken 7/3/2021 1100 by Kenia Lemus, RN  Safety Promotion/Fall Prevention: safety round/check completed  Taken 7/3/2021 0900 by Kenia Lemus RN  Safety Promotion/Fall Prevention: safety round/check completed  Taken 7/3/2021 0703 by Kenia Lemus RN  Safety Promotion/Fall Prevention:   safety round/check completed   clutter free environment maintained   assistive device/personal items within reach   lighting adjusted   nonskid shoes/slippers when out of bed   room organization consistent  Intervention: Prevent Skin Injury  Recent Flowsheet Documentation  Taken 7/3/2021 0703 by Kenia Lemus, RN  Body Position:   supine   sitting up in bed   position changed independently  Goal: Optimal Comfort and Wellbeing  Outcome: Met  Intervention: Provide Person-Centered Care  Recent Flowsheet Documentation  Taken 7/3/2021 0703 by Kenia Lemus RN  Trust Relationship/Rapport:   care explained   choices provided   emotional support provided   empathic listening provided   questions answered   questions encouraged   reassurance provided   thoughts/feelings acknowledged  Goal: Readiness for Transition of Care  Outcome: Met     Problem: Diabetes Comorbidity  Goal: Blood Glucose  Level Within Desired Range  Outcome: Met  Intervention: Maintain Glycemic Control  Recent Flowsheet Documentation  Taken 7/3/2021 0703 by Kenia Lemus, RN  Glycemic Management:   oral hydration promoted   blood glucose monitoring     Problem: Adjustment to Illness COPD (Chronic Obstructive Pulmonary Disease)  Goal: Optimal Chronic Illness Coping  Outcome: Met  Intervention: Support and Optimize Psychosocial Response  Recent Flowsheet Documentation  Taken 7/3/2021 0703 by Kenia Lemus RN  Supportive Measures: active listening utilized  Family/Support System Care:   self-care encouraged   support provided     Problem: Functional Ability Impaired COPD (Chronic Obstructive Pulmonary Disease)  Goal: Optimal Level of Functional [place]  Outcome: Met  Intervention: Optimize Functional Ability  Recent Flowsheet Documentation  Taken 7/3/2021 0703 by Kenia Lemus, RN  Activity Management:   up ad sawyer   activity adjusted per tolerance   ambulated in [place]   ambulated to bathroom  Environmental Support: calm environment promoted     Problem: Infection COPD (Chronic Obstructive Pulmonary Disease)  Goal: Absence of Infection Signs and Symptoms  Outcome: Met     Problem: Oral Intake Inadequate COPD (Chronic Obstructive Pulmonary Disease)  Goal: Improved Nutrition Intake  Outcome: Met     Problem: Respiratory Compromise COPD (Chronic Obstructive Pulmonary Disease)  Goal: Effective Oxygenation and Ventilation  Outcome: Met  Intervention: Promote Airway Secretion Clearance  Recent Flowsheet Documentation  Taken 7/3/2021 0703 by Kenia Lemus, RN  Activity Management:   up ad sawyer   activity adjusted per tolerance   ambulated in [place]   ambulated to bathroom  Breathing Techniques/Airway Clearance: deep/controlled cough encouraged  Cough And Deep Breathing: done independently per patient  Intervention: Optimize Oxygenation and Ventilation  Recent Flowsheet Documentation  Taken 7/3/2021 0703 by Kenia Lemus RN  Head of  Bed (HOB): HOB at 30-45 degrees  Airway/Ventilation Management:   airway patency maintained   calming measures promoted   Goal Outcome Evaluation:  Plan of Care Reviewed With: patient        Progress: no change  Outcome Summary: Still on 4L NC, still SOA and coughing, getting steroids/antibiotics/cough meds, SR/ST on the monitor, pulmonology consulted, will monitor

## 2021-07-03 NOTE — CONSULTS
[facility]      Patient Name: Eugenia Madden  : 1955  MRN: [id number]  Primary Care Dr. Hernandez ER doctor next: Melisa Taveras MD  Date of admission: 2021    Patient Care Team:  Melisa Taveras MD as PCP - General          Subjective   History Present Illness     Chief Complaint:   Chief Complaint   Patient presents with   • Shortness of Breath     pt c/o SOA for past 2 weeks that has progressed. pt reports recent cough, congestion.      Consulting physician Dr. Hernandez from ER  Reason for consult persistent hypoxemia and respiratory distress      Ms. Madden is a 65 y.o.  presents to [facility] complaining of          History of Present Illness  65-year-old female with history of COPD, DM 2, hypertension, kidney stones, restless leg syndrome, who had worsening respiratory distress 14 days with nonproductive cough but with small amount of yellow sputum in the last 24 hours.  She had seen her primary care physician recently.  She had suspicion of allergies to environmental antigens.  She denies shortness of breath on lying down or by night  She denies any night sweats or hemoptysis.  She reports intermittent chest pleuritic pain.  Work-up in the ER with CT angiogram was negative for PE but with left basilar atelectatic changes right middle lobe without obvious pneumonia or congestive heart failure arthropathy.  She had normal proBNP and negative troponin x1  No leukocytosis but she had erythrocytosis and normal platelet count.  EKG shows sinus tachycardia  She had an echo in March showing normal EF 60% with LVH and diastolic dysfunction grade 1  Respiratory panel was negative on admission including Covid      Review of Systems   All other systems reviewed and are negative.            Personal History     Past Medical History:   Past Medical History:   Diagnosis Date   • Arthritis    • Diabetes mellitus (CMS/HCC)    • Hyperlipidemia    •  Hypertension    • Kidney stones    • Restless leg        Surgical History:      Past Surgical History:   Procedure Laterality Date   • CERVIX SURGERY  1983    Dysplasia removed from Cervix   • KIDNEY STONE SURGERY  2016    Lithotripsy   • OTHER SURGICAL HISTORY  2007    Pinched nerve arm    • TUBAL ABDOMINAL LIGATION Bilateral            Family History: family history includes Breast cancer (age of onset: 55) in her mother; Cancer in her brother and mother; Diabetes in her brother, brother, father, and sister; Heart disease in her brother, brother, father, mother, and sister; Hypertension in her brother, brother, father, and sister; No Known Problems in her brother; Other in her brother and another family member; Stroke in her father. Family History was reviewed.     Social History:  reports that she quit smoking about 8 months ago. Her smoking use included cigarettes. She has a 23.00 pack-year smoking history. She has never used smokeless tobacco. She reports that she does not drink alcohol and does not use drugs.      Medications:  Prior to Admission medications    Medication Sig Start Date End Date Taking? Authorizing Provider   atorvastatin (LIPITOR) 40 MG tablet TAKE 1 TABLET BY MOUTH EVERY DAY 8/27/20  Yes Melisa Taveras MD   cetirizine (zyrTEC) 10 MG tablet Take 10 mg by mouth Daily.   Yes Anthony Tapia MD   Cholecalciferol (VITAMIN D3) 125 MCG (5000 UT) tablet tablet Take 5,000 Units by mouth Daily. 8/10/17  Yes Anthony Tapia MD   cyclobenzaprine (FLEXERIL) 10 MG tablet Take 1 tablet by mouth 3 (Three) Times a Day As Needed for Muscle Spasms.  Patient taking differently: Take 10 mg by mouth 3 (Three) Times a Day As Needed for Muscle Spasms. As needed 5/13/21  Yes Bisi Ordaz APRN ELDERBERRY PO Take  by mouth.   Yes Anthony Tapia MD   famotidine (PEPCID) 20 MG tablet Take 20 mg by mouth Daily. 9/23/19  Yes Anthony Tapia MD   ferrous sulfate 325 (65 FE) MG tablet  Take 325 mg by mouth Daily With Breakfast.   Yes Anthony Tapia MD   glimepiride (Amaryl) 4 MG tablet Take 1 tablet by mouth Every Morning Before Breakfast. 6/10/21  Yes Melisa Taveras MD   hydrOXYzine (ATARAX) 25 MG tablet TAKE 1 TABLET BY MOUTH AT NIGHT AS NEEDED FOR ITCHING (ONE OR TWO IF ITCHING). 9/3/19  Yes Melisa Taveras MD   Ibuprofen-diphenhydrAMINE HCl (ADVIL PM) 200-25 MG capsule Take 1 capsule by mouth Every Night. 6/26/17  Yes Anthony Tapia MD   Jardiance 10 MG tablet tablet TAKE 1 TABLET BY MOUTH DAILY 6/28/21  Yes Lei Kim MD   losartan (COZAAR) 100 MG tablet Take 1 tablet by mouth Daily. 9/24/20  Yes Melisa Taveras MD   Magnesium 250 MG tablet Take 250 mg by mouth Daily.   Yes Anthony Tapia MD   potassium chloride 10 MEQ CR tablet Take 1 tablet by mouth Daily. 3/5/21  Yes Melisa Taveras MD   pramipexole (MIRAPEX) 0.125 MG tablet Take 1 tablet by mouth Every Night. Take 4 tablets daily at bedtime. 6/27/21  Yes Melisa Taveras MD   Semaglutide,0.25 or 0.5MG/DOS, (Ozempic, 0.25 or 0.5 MG/DOSE,) 2 MG/1.5ML solution pen-injector Inject .05 mg weekly  Patient taking differently: Inject .05 mg weekly on sunday 4/1/21  Yes Lei Kim MD   tiotropium bromide-olodaterol (STIOLTO RESPIMAT) 2.5-2.5 MCG/ACT aerosol solution inhaler Inhale 2 puffs Daily. 6/21/21  Yes Melisa Taveras MD   albuterol sulfate  (90 Base) MCG/ACT inhaler Inhale 2 puffs Every 4 (Four) Hours As Needed for Wheezing. 6/18/21   Melisa Taveras MD   amoxicillin-clavulanate (AUGMENTIN) 875-125 MG per tablet Take 1 tablet by mouth 2 (Two) Times a Day. 6/18/21   Melisa Taveras MD   Blood Glucose Monitoring Suppl (Accu-Chek Umu Plus) w/Device kit 1 strip 2 (two) times a day.    Bisi Ordaz APRN   glucose blood (Accu-Chek Umu Plus) test strip Use to test blood sugars twice daily. 6/18/21   Melisa Taveras MD   glucose blood test strip 1  each by Other route 2 (two) times a day. 6/11/21   Melisa Taveras MD   metFORMIN ER (GLUCOPHAGE-XR) 500 MG 24 hr tablet TAKE 2 TABLETS BY MOUTH EVERY DAY 2/23/21   Lei Kim MD       Allergies:  No Known Allergies    Objective   Objective     Vital Signs  Temp:  [97.6 °F (36.4 °C)-98.2 °F (36.8 °C)] 98.2 °F (36.8 °C)  Heart Rate:  [] 102  Resp:  [16-20] 16  BP: (102-168)/(67-88) 145/74  SpO2:  [90 %-97 %] 92 %  on  Flow (L/min):  [4] 4;   Device (Oxygen Therapy): nasal cannula  Body mass index is 22.6 kg/m².    Physical Exam  Vitals and nursing note reviewed.   Constitutional:       General: She is not in acute distress.     Appearance: She is well-developed. She is not ill-appearing, toxic-appearing or diaphoretic.      Comments: tachypnic   HENT:      Head: Normocephalic and atraumatic.      Right Ear: Ear canal and external ear normal.      Left Ear: Ear canal and external ear normal.      Nose: Nose normal. No congestion or rhinorrhea.      Mouth/Throat:      Mouth: Mucous membranes are moist.      Pharynx: No oropharyngeal exudate.   Eyes:      General: No scleral icterus.        Right eye: No discharge.         Left eye: No discharge.      Extraocular Movements: Extraocular movements intact.      Conjunctiva/sclera: Conjunctivae normal.      Pupils: Pupils are equal, round, and reactive to light.   Neck:      Thyroid: No thyromegaly.      Vascular: No carotid bruit or JVD.      Trachea: No tracheal deviation.   Cardiovascular:      Rate and Rhythm: Normal rate and regular rhythm.      Pulses: Normal pulses.      Heart sounds: Normal heart sounds. No murmur heard.   No friction rub. No gallop.    Pulmonary:      Effort: Pulmonary effort is normal. No respiratory distress.      Breath sounds: No stridor. Examination of the right-lower field reveals wheezing. Examination of the left-lower field reveals wheezing. Wheezing present. No rhonchi or rales.   Chest:      Chest wall: No tenderness.    Abdominal:      General: Bowel sounds are normal. There is no distension.      Palpations: Abdomen is soft. There is no mass.      Tenderness: There is no abdominal tenderness. There is no guarding or rebound.      Hernia: No hernia is present.   Musculoskeletal:         General: No swelling, tenderness, deformity or signs of injury. Normal range of motion.      Cervical back: Normal range of motion and neck supple. No rigidity. No muscular tenderness.      Right lower leg: No edema.      Left lower leg: No edema.   Lymphadenopathy:      Cervical: No cervical adenopathy.   Skin:     General: Skin is warm and dry.      Coloration: Skin is not jaundiced or pale.      Findings: No bruising, erythema or rash.   Neurological:      General: No focal deficit present.      Mental Status: She is alert and oriented to person, place, and time. Mental status is at baseline.      Cranial Nerves: No cranial nerve deficit.      Sensory: No sensory deficit.      Motor: No weakness or abnormal muscle tone.      Coordination: Coordination normal.   Psychiatric:         Mood and Affect: Mood normal.         Behavior: Behavior normal.         Thought Content: Thought content normal.         Judgment: Judgment normal.           Results Review:  I have personally reviewed most recent cardiac tracings, lab results, microbiology results, pathology results, radiology images and interpretations and vascular studies and agree with finding.    Results from last 7 days   Lab Units 07/03/21  0459 07/02/21  1137   WBC 10*3/mm3 10.40 8.20   HEMOGLOBIN g/dL 16.3* 16.5*   HEMATOCRIT % 47.7* 48.9*   PLATELETS 10*3/mm3 197 182   INR   --  1.02     Results from last 7 days   Lab Units 07/03/21  0459 07/02/21  1151 07/02/21  1138   SODIUM mmol/L 142  --  142   POTASSIUM mmol/L 3.7  --  4.1   CHLORIDE mmol/L 105  --  106   CO2 mmol/L 24.0  --  24.0   BUN mg/dL 21  --  20   CREATININE mg/dL 0.60  --  0.52*   GLUCOSE mg/dL 67  --  183*   CALCIUM mg/dL  9.2  --  9.4   ALT (SGPT) U/L  --   --  16   AST (SGOT) U/L  --   --  13   TROPONIN T ng/mL  --   --  <0.010   PROBNP pg/mL  --   --  127.0   LACTATE mmol/L  --  1.0  --    PROCALCITONIN ng/mL 0.06  --   --      Estimated Creatinine Clearance: 64.1 mL/min (by C-G formula based on SCr of 0.6 mg/dL).  Brief Urine Lab Results  (Last result in the past 365 days)      Color   Clarity   Blood   Leuk Est   Nitrite   Protein   CREAT   Urine HCG        06/10/21 0929             131.3             Microbiology Results (last 10 days)     Procedure Component Value - Date/Time    S. Pneumo Ag Urine or CSF - Urine, Urine, Clean Catch [[id number]]  (Normal) Collected: 07/02/21 1609    Lab Status: Final result Specimen: Urine, Clean Catch Updated: 07/02/21 1635     Strep Pneumo Ag Negative    Legionella Antigen, Urine - Urine, Urine, Clean Catch [[id number]]  (Normal) Collected: 07/02/21 1609    Lab Status: Final result Specimen: Urine, Clean Catch Updated: 07/02/21 1633     LEGIONELLA ANTIGEN, URINE Negative    Respiratory Panel PCR w/COVID-19(SARS-CoV-2) LAURI/SERENE/PRAVEEN/PAD/COR/MAD/DOROTHY In-House, NP Swab in UTM/VTM, 3-4 HR TAT - Swab, Nasopharynx [[id number]]  (Normal) Collected: 07/02/21 1607    Lab Status: Final result Specimen: Swab from Nasopharynx Updated: 07/02/21 1706     ADENOVIRUS, PCR Not Detected     Coronavirus 229E Not Detected     Coronavirus HKU1 Not Detected     Coronavirus NL63 Not Detected     Coronavirus OC43 Not Detected     COVID19 Not Detected     Human Metapneumovirus Not Detected     Human Rhinovirus/Enterovirus Not Detected     Influenza A PCR Not Detected     Influenza B PCR Not Detected     Parainfluenza Virus 1 Not Detected     Parainfluenza Virus 2 Not Detected     Parainfluenza Virus 3 Not Detected     Parainfluenza Virus 4 Not Detected     RSV, PCR Not Detected     Bordetella pertussis pcr Not Detected     Bordetella parapertussis PCR Not Detected     Chlamydophila pneumoniae PCR Not Detected     Mycoplasma  pneumo by PCR Not Detected    Narrative:      In the setting of a positive respiratory panel with a viral infection PLUS a negative procalcitonin without other underlying concern for bacterial infection, consider observing off antibiotics or discontinuation of antibiotics and continue supportive care. If the respiratory panel is positive for atypical bacterial infection (Bordetella pertussis, Chlamydophila pneumoniae, or Mycoplasma pneumoniae), consider antibiotic de-escalation to target atypical bacterial infection.    Blood Culture - Blood, Arm, Right [[id number]] Collected: 07/02/21 1138    Lab Status: Preliminary result Specimen: Blood from Arm, Right Updated: 07/03/21 1200     Blood Culture No growth at 24 hours    Blood Culture - Blood, Arm, Left [[id number]] Collected: 07/02/21 1137    Lab Status: Preliminary result Specimen: Blood from Arm, Left Updated: 07/03/21 1200     Blood Culture No growth at 24 hours          ECG/EMG Results (most recent)     Procedure Component Value Units Date/Time    ECG 12 Lead [[id number]] Collected: 07/02/21 1115     Updated: 07/02/21 1116     QT Interval 359 ms     Narrative:      HEART RATE= 106  bpm  RR Interval= 568  ms  ND Interval= 170  ms  P Horizontal Axis= 17  deg  P Front Axis= 75  deg  QRSD Interval= 82  ms  QT Interval= 359  ms  QRS Axis= 22  deg  T Wave Axis= 57  deg  - OTHERWISE NORMAL ECG -  Sinus tachycardia  Electronically Signed By:   Date and Time of Study: 2021-07-02 11:15:09              Results for orders placed during the hospital encounter of 03/12/21    Adult Transthoracic Echo Complete w/ Color, Spectral and Contrast if Necessary Per Protocol    Interpretation Summary  · Left ventricular systolic function is normal.  · Left ventricular ejection fraction is 60 to 65%  · Left ventricular wall thickness is consistent with mild concentric hypertrophy.  · Left ventricular diastolic function is consistent with (grade I) impaired relaxation.      CT Chest  Pulmonary Embolism    Result Date: 7/2/2021   1. No evidence of pulmonary embolism 2. Left basilar atelectasis is noted with right middle lobe atelectasis. No obvious pneumonia or gross congestive changes noted. No adenopathy is noted.    Electronically Signed By-Aden Gaffney MD On:7/2/2021 1:14 PM This report was finalized on [id number] by  Aden Gaffney MD.        Estimated Creatinine Clearance: 64.1 mL/min (by C-G formula based on SCr of 0.6 mg/dL).    Assessment/Plan   Assessment/Plan       Active Hospital Problems    Diagnosis  POA   • Hypoxemia [R09.02]  Yes   • Plate-like atelectasis [J98.11]  Yes   • Pneumonia of right middle lobe due to infectious organism [J18.9]  Yes   • Acute exacerbation of chronic obstructive pulmonary disease (COPD) (CMS/HCC) [J44.1]  Yes   • GERD without esophagitis [K21.9]  Yes   • Type 2 diabetes mellitus (CMS/HCC) [E11.9]  Yes   • COPD exacerbation (CMS/HCC) [J44.1]  Yes   • Essential hypertension [I10]  Yes   • Pulmonary emphysema (CMS/HCC) [J43.9]  Yes   • Mixed hyperlipidemia [E78.2]  Yes      Resolved Hospital Problems   No resolved problems to display.         COPD exacerbation  Acute respiratory failure with hypoxemia  No PE on CT angiogram admission.  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  Brother passed away w CF      DM2  Continue correction insulin  Hold glimepiride and Metformin  Continue Jardiance  Ozempic at home    Hypertension on losartan    History of kidney stones  Restless legs syndrome pramipexole    Chronic muscle spasms on Flexeril    Dyslipidemia on Lipitor    VTE Prophylaxis -   Mechanical Order History:     None      Pharmalogical Order History:      Ordered     Dose Route Frequency Stop    07/02/21 9912  enoxaparin (LOVENOX) syringe 40 mg      40 mg  SC Daily --                CODE STATUS:    Code Status and Medical Interventions:   Ordered at: 07/02/21 1552     Code Status:    CPR     Medical Interventions (Level of Support Prior to Arrest):    Full       This patient has been examined wearing appropriate Personal Protective Equipment and discussed with hospital infection control department. 07/03/21      I discussed the patient's findings and my recommendations with patient, family, nursing staff and consulting provider.      Signature:Electronically signed by Nam Momin MD, 07/03/21, 3:02 PM EDT.  [facility]      [facility]

## 2021-07-03 NOTE — CONSULTS
Group: [facility]         CONSULT NOTE    Patient Identification:  Eugenia Madden  65 y.o.  female  1955  [id number]            Requesting physician: Attending physician    Reason for Consultation: Dyspnea      History of Present Illness:  65-year-old female with the onset of increasing respiratory problems over the last 10 days.  Patient states she has steadily become more short of breath.  She states she has had a nonproductive cough throughout this.  But has had the onset of yellow sputum in the last 24 hours.  The patient saw her primary care doctor but has not taken steroids or antibiotics with this episode.  The patient reports that she suspects that she has some environmental allergens.  She denies PND or orthopnea.  She denies night sweats or hemoptysis.  She reports that she has had some intermittent pleuritic type pain in the last 10 days   She denies any fever, chills, nausea or vomiting, peripheral edema.  Patient does not smoke or drink alcohol and does not use oxygen at home.  She reports she has recently been started on a scheduled inhaler by her primary care provider to help manage COPD.        Assessment:    Acute hypoxic respiratory insufficiency  ABG on 7/3/2021, pH 7.42/PCO2 41/PO2 53/HCO3 26   acute COPD exacerbation  Acute bronchitis  No PE on CT 7/2/2021  Fibronodular disease possible related to history of smoking     Echo 3/12/2021  Mild pulmonary hypertension RVSP 36, EF 60% with diastolic dysfunction  History of hyperlipidemia, hypertension, diabetes mellitus, arthritis      Recommendations:    Hypoxia is multifactorial   antibiotics continue azithromycin and Rocephin  Steroids IV Solu-Medrol 60 mg every 12  Start Mucinex  Theophylline  Bronchodilators nebulized Pulmicort and DuoNeb  Patient will benefit from PFT as an outpatient as well as sleep study            Review of Sytems:  Review of Systems   Respiratory: Positive for cough and shortness of breath.        Past  Medical History:  Past Medical History:   Diagnosis Date   • Arthritis    • Diabetes mellitus (CMS/HCC)    • Hyperlipidemia    • Hypertension    • Kidney stones    • Restless leg        Past Surgical History:  Past Surgical History:   Procedure Laterality Date   • CERVIX SURGERY  1983    Dysplasia removed from Cervix   • KIDNEY STONE SURGERY  2016    Lithotripsy   • OTHER SURGICAL HISTORY  2007    Pinched nerve arm    • TUBAL ABDOMINAL LIGATION Bilateral         Home Meds:  Medications Prior to Admission   Medication Sig Dispense Refill Last Dose   • atorvastatin (LIPITOR) 40 MG tablet TAKE 1 TABLET BY MOUTH EVERY DAY 90 tablet 3 7/1/2021 at Unknown time   • cetirizine (zyrTEC) 10 MG tablet Take 10 mg by mouth Daily.   7/1/2021 at Unknown time   • Cholecalciferol (VITAMIN D3) 125 MCG (5000 UT) tablet tablet Take 5,000 Units by mouth Daily.   7/1/2021 at Unknown time   • cyclobenzaprine (FLEXERIL) 10 MG tablet Take 1 tablet by mouth 3 (Three) Times a Day As Needed for Muscle Spasms. (Patient taking differently: Take 10 mg by mouth 3 (Three) Times a Day As Needed for Muscle Spasms. As needed) 15 tablet 0 7/1/2021 at Unknown time   • ELDERBERRY PO Take  by mouth.   7/1/2021 at Unknown time   • famotidine (PEPCID) 20 MG tablet Take 20 mg by mouth Daily.   7/1/2021 at Unknown time   • ferrous sulfate 325 (65 FE) MG tablet Take 325 mg by mouth Daily With Breakfast.   7/1/2021 at Unknown time   • glimepiride (Amaryl) 4 MG tablet Take 1 tablet by mouth Every Morning Before Breakfast. 180 tablet 3 7/1/2021 at Unknown time   • hydrOXYzine (ATARAX) 25 MG tablet TAKE 1 TABLET BY MOUTH AT NIGHT AS NEEDED FOR ITCHING (ONE OR TWO IF ITCHING). 30 tablet 1 7/1/2021 at Unknown time   • Ibuprofen-diphenhydrAMINE HCl (ADVIL PM) 200-25 MG capsule Take 1 capsule by mouth Every Night.   7/1/2021 at Unknown time   • Jardiance 10 MG tablet tablet TAKE 1 TABLET BY MOUTH DAILY 30 tablet 11 7/1/2021 at Unknown time   • losartan (COZAAR) 100 MG  tablet Take 1 tablet by mouth Daily. 90 tablet 3 2021 at Unknown time   • Magnesium 250 MG tablet Take 250 mg by mouth Daily.   2021 at Unknown time   • potassium chloride 10 MEQ CR tablet Take 1 tablet by mouth Daily. 90 tablet 1 2021 at Unknown time   • pramipexole (MIRAPEX) 0.125 MG tablet Take 1 tablet by mouth Every Night. Take 4 tablets daily at bedtime. 120 tablet 1 2021 at Unknown time   • Semaglutide,0.25 or 0.5MG/DOS, (Ozempic, 0.25 or 0.5 MG/DOSE,) 2 MG/1.5ML solution pen-injector Inject .05 mg weekly (Patient taking differently: Inject .05 mg weekly on ) 4.5 mL 2 Past Week at Unknown time   • tiotropium bromide-olodaterol (STIOLTO RESPIMAT) 2.5-2.5 MCG/ACT aerosol solution inhaler Inhale 2 puffs Daily. 1 each 1 2021 at Unknown time   • albuterol sulfate  (90 Base) MCG/ACT inhaler Inhale 2 puffs Every 4 (Four) Hours As Needed for Wheezing. 1 g 3    • amoxicillin-clavulanate (AUGMENTIN) 875-125 MG per tablet Take 1 tablet by mouth 2 (Two) Times a Day. 20 tablet 0    • Blood Glucose Monitoring Suppl (Accu-Chek Umu Plus) w/Device kit 1 strip 2 (two) times a day.      • glucose blood (Accu-Chek Umu Plus) test strip Use to test blood sugars twice daily. 100 each 12    • glucose blood test strip 1 each by Other route 2 (two) times a day. 100 each 3    • metFORMIN ER (GLUCOPHAGE-XR) 500 MG 24 hr tablet TAKE 2 TABLETS BY MOUTH EVERY  tablet 2        Allergies:  No Known Allergies    Social History:   Social History     Socioeconomic History   • Marital status:      Spouse name: Not on file   • Number of children: Not on file   • Years of education: Not on file   • Highest education level: Not on file   Tobacco Use   • Smoking status: Former Smoker     Packs/day: 0.50     Years: 46.00     Pack years: 23.00     Types: Cigarettes     Quit date: 2020     Years since quittin.6   • Smokeless tobacco: Never Used   • Tobacco comment: Passive Smoke: Y   Vaping Use  "  • Vaping Use: Never used   Substance and Sexual Activity   • Alcohol use: No   • Drug use: No   • Sexual activity: Yes     Partners: Male     Birth control/protection: None       Family History:  Family History   Problem Relation Age of Onset   • Heart disease Mother    • Cancer Mother         Breast Cancer   • Breast cancer Mother 55   • Diabetes Father    • Stroke Father    • Heart disease Father    • Hypertension Father    • Hypertension Sister    • Diabetes Sister    • Heart disease Sister    • Diabetes Brother    • Hypertension Brother    • Heart disease Brother    • Other Other         Abstraction from [facility] Cholesterol   • Diabetes Brother    • Hypertension Brother    • No Known Problems Brother    • Heart disease Brother    • Cancer Brother         colon   • Other Brother        Physical Exam:  /78 (BP Location: Right arm, Patient Position: Lying)   Pulse 105   Temp 98.1 °F (36.7 °C) (Oral)   Resp 18   Ht 160 cm (63\")   Wt 57.9 kg (127 lb 9.6 oz)   LMP  (LMP Unknown)   SpO2 92%   BMI 22.60 kg/m²  Body mass index is 22.6 kg/m². 92% 57.9 kg (127 lb 9.6 oz)  Physical Exam  Cardiovascular:      Heart sounds: Murmur heard.     Pulmonary:      Breath sounds: Rhonchi and rales present.         LABS:  Lab Results   Component Value Date    CALCIUM 9.2 07/03/2021     Results from last 7 days   Lab Units 07/03/21  0459 07/02/21  1138 07/02/21  1137   MAGNESIUM mg/dL 2.0  --   --    SODIUM mmol/L 142 142  --    POTASSIUM mmol/L 3.7 4.1  --    CHLORIDE mmol/L 105 106  --    CO2 mmol/L 24.0 24.0  --    BUN mg/dL 21 20  --    CREATININE mg/dL 0.60 0.52*  --    GLUCOSE mg/dL 67 183*  --    CALCIUM mg/dL 9.2 9.4  --    WBC 10*3/mm3 10.40  --  8.20   HEMOGLOBIN g/dL 16.3*  --  16.5*   PLATELETS 10*3/mm3 197  --  182   ALT (SGPT) U/L  --  16  --    AST (SGOT) U/L  --  13  --    PROBNP pg/mL  --  127.0  --    PROCALCITONIN ng/mL 0.06  --   --      Lab Results   Component Value Date    TROPONINT <0.010 " 07/02/2021     Results from last 7 days   Lab Units 07/02/21  1138   TROPONIN T ng/mL <0.010     Results from last 7 days   Lab Units 07/02/21  1138 07/02/21  1137   BLOODCX  No growth at 24 hours No growth at 24 hours     Results from last 7 days   Lab Units 07/03/21  0459 07/02/21  1151   PROCALCITONIN ng/mL 0.06  --    LACTATE mmol/L  --  1.0     Results from last 7 days   Lab Units 07/03/21  1528   PH, ARTERIAL pH units 7.422   PCO2, ARTERIAL mm Hg 41.0   PO2 ART mm Hg 53.4*   O2 SATURATION ART % 87.9*   MODALITY  Cannula     Results from last 7 days   Lab Units 07/02/21  1607   ADENOVIRUS DETECTION BY PCR  Not Detected   CORONAVIRUS 229E  Not Detected   CORONAVIRUS HKU1  Not Detected   CORONAVIRUS NL63  Not Detected   CORONAVIRUS OC43  Not Detected   HUMAN METAPNEUMOVIRUS  Not Detected   HUMAN RHINOVIRUS/ENTEROVIRUS  Not Detected   INFLUENZA B PCR  Not Detected   PARAINFLUENZA 1  Not Detected   PARAINFLUENZA VIRUS 2  Not Detected   PARAINFLUENZA VIRUS 3  Not Detected   PARAINFLUENZA VIRUS 4  Not Detected   BORDETELLA PERTUSSIS PCR  Not Detected   CHLAMYDOPHILA PNEUMONIAE PCR  Not Detected   MYCOPLAMA PNEUMO PCR  Not Detected   INFLUENZA A PCR  Not Detected   RSV, PCR  Not Detected     Results from last 7 days   Lab Units 07/02/21  1137   INR  1.02     Results from last 7 days   Lab Units 07/02/21  1138 07/02/21  1137   BLOODCX  No growth at 24 hours No growth at 24 hours     Lab Results   Component Value Date    TSH 1.950 03/04/2021     Estimated Creatinine Clearance: 64.1 mL/min (by C-G formula based on SCr of 0.6 mg/dL).         Imaging:  Imaging Results (Last 24 Hours)     Procedure Component Value Units Date/Time    XR Chest 1 View [[id number]] Collected: 07/03/21 1529     Updated: 07/03/21 1532    Narrative:      DATE OF EXAM:  7/3/2021 3:20 PM     PROCEDURE:  XR CHEST 1 VW-     INDICATIONS:  Persistent severe hypoxemia and shortness of breath; J44.1-Chronic  obstructive pulmonary disease with (acute)  exacerbation;  J18.9-Pneumonia, unspecified organism; J98.11-Atelectasis;  R09.02-Hypoxemia cough today     COMPARISON:  2 plain film images chest performed on June 23, 2021     TECHNIQUE:   Single radiographic AP view of the chest was obtained.     FINDINGS:  Single frontal view chest reveals that the heart and mediastinum are  normal. There are bilateral diffuse increased lung markings consistent  with chronic lung disease unchanged since previous study. No evidence of  pneumonia or pleural effusion or pneumothorax or mass right or left  lungs.        Impression:         1. Bilateral diffuse increased lung markings consistent with chronic  lung disease unchanged since previous study.  2. No focal arising in the right or left lungs.     Electronically Signed By-Jovanny Aranda MD On:7/3/2021 3:30 PM  This report was finalized on [id number] by  Jovanny Aranda MD.            Current Meds:   SCHEDULE  atorvastatin, 40 mg, Oral, Nightly  azithromycin, 250 mg, Oral, Q24H  budesonide, 0.5 mg, Nebulization, BID - RT  cefTRIAXone, 1 g, Intravenous, Q24H  cetirizine, 10 mg, Oral, Daily  empagliflozin, 10 mg, Oral, Daily  enoxaparin, 40 mg, Subcutaneous, Daily  famotidine, 20 mg, Oral, Daily  furosemide, 20 mg, Intravenous, Once  guaiFENesin, 1,200 mg, Oral, Q12H  insulin lispro, 0-9 Units, Subcutaneous, TID AC  ipratropium-albuterol, 3 mL, Nebulization, Q4H - RT  losartan, 100 mg, Oral, Daily  magnesium oxide, 400 mg, Oral, Daily  methylPREDNISolone sodium succinate, 60 mg, Intravenous, Q12H  potassium chloride, 10 mEq, Oral, Daily  pramipexole, 0.5 mg, Oral, Nightly  sodium chloride, 10 mL, Intravenous, Q12H      Infusions     PRNs  •  acetaminophen **OR** acetaminophen **OR** acetaminophen  •  benzonatate  •  cyclobenzaprine  •  dextrose  •  dextrose  •  doxylamine  •  glucagon (human recombinant)  •  hydrOXYzine  •  insulin lispro **AND** insulin lispro  •  ipratropium-albuterol  •  melatonin  •   nitroglycerin  •  ondansetron **OR** ondansetron  •  sodium chloride        Ronit Lepe MD  7/3/2021  15:47 EDT      Much of this encounter note is an electronic transcription/translation of spoken language to printed text using Dragon Software.

## 2021-07-03 NOTE — PROGRESS NOTES
[facility]    Patient Name: Eugenia Madden  : 1955  MRN: [id number]  Primary Care Physician: Melisa Taveras MD  Date of admission: 2021     Patient Care Team:  Melisa Taveras MD as PCP - General          Subjective   History Present Illness     Chief Complaint:   Chief Complaint   Patient presents with   • Shortness of Breath     pt c/o SOA for past 2 weeks that has progressed. pt reports recent cough, congestion.          Obtained from admitting provider note on 2021:  65-year-old female with the onset of increasing respiratory problems over the last 10 days.  Patient states she has steadily become more short of breath.  She states she has had a nonproductive cough throughout this.  But has had the onset of yellow sputum in the last 24 hours.  The patient saw her primary care doctor but has not taken steroids or antibiotics with this episode.  The patient reports that she suspects that she has some environmental allergens.  She denies PND or orthopnea.  She denies night sweats or hemoptysis.  She reports that she has had some intermittent pleuritic type pain in the last 10 days     2021 (post observation admission): Patient confirms HPI noted above including 1 week history of worsening dyspnea with a cough productive of small amount of thick sputum.  She notes that she has become increasingly short of air made worse with exertion and has occasional palpitations when her symptoms are at their most profound.  She denies any fever, chills, nausea or vomiting, peripheral edema.  Patient does not smoke or drink alcohol and does not use oxygen at home.  She reports she has recently been started on a scheduled inhaler by her primary care provider to help manage COPD.    7/3/2021: Patient reports only mild improvement throughout the night with continued coughing and dyspnea including profound dyspnea with exertion such as walking short distance on level ground from  her hospital room to the restroom.  She notes she feels more fatigued but continues to deny any fever, chills, nausea or vomiting, peripheral edema orthopnea.         History of Present Illness    ROS   Review of Systems   Constitutional: Positive for malaise/fatigue. Negative for chills and fever.   HENT: Negative.    Eyes: Negative.    Cardiovascular: Positive for palpitations. Negative for chest pain, irregular heartbeat, leg swelling, near-syncope and syncope.   Respiratory: Positive for cough, shortness of breath and sputum production.    Endocrine: Negative.    Skin: Negative.    Musculoskeletal: Negative.    Gastrointestinal: Negative.    Genitourinary: Negative.    Neurological: Negative.    Psychiatric/Behavioral: Negative.         Personal History     Past Medical History:   Past Medical History:   Diagnosis Date   • Arthritis    • Diabetes mellitus (CMS/HCC)    • Hyperlipidemia    • Hypertension    • Kidney stones    • Restless leg        Surgical History:      Past Surgical History:   Procedure Laterality Date   • CERVIX SURGERY  1983    Dysplasia removed from Cervix   • KIDNEY STONE SURGERY  2016    Lithotripsy   • OTHER SURGICAL HISTORY  2007    Pinched nerve arm    • TUBAL ABDOMINAL LIGATION Bilateral            Family History: family history includes Breast cancer (age of onset: 55) in her mother; Cancer in her brother and mother; Diabetes in her brother, brother, father, and sister; Heart disease in her brother, brother, father, mother, and sister; Hypertension in her brother, brother, father, and sister; No Known Problems in her brother; Other in her brother and another family member; Stroke in her father. Otherwise pertinent FHx was reviewed and unremarkable.     Social History:  reports that she quit smoking about 8 months ago. Her smoking use included cigarettes. She has a 23.00 pack-year smoking history. She has never used smokeless tobacco. She reports that she does not drink alcohol and does  not use drugs.      Medications:  Prior to Admission medications    Medication Sig Start Date End Date Taking? Authorizing Provider   atorvastatin (LIPITOR) 40 MG tablet TAKE 1 TABLET BY MOUTH EVERY DAY 8/27/20  Yes Melisa Taveras MD   cetirizine (zyrTEC) 10 MG tablet Take 10 mg by mouth Daily.   Yes Anthony Tapia MD   Cholecalciferol (VITAMIN D3) 125 MCG (5000 UT) tablet tablet Take 5,000 Units by mouth Daily. 8/10/17  Yes Anthony Tapia MD   cyclobenzaprine (FLEXERIL) 10 MG tablet Take 1 tablet by mouth 3 (Three) Times a Day As Needed for Muscle Spasms.  Patient taking differently: Take 10 mg by mouth 3 (Three) Times a Day As Needed for Muscle Spasms. As needed 5/13/21  Yes Bisi Ordaz APRN ELDERBERRY PO Take  by mouth.   Yes Anthony Tapia MD   famotidine (PEPCID) 20 MG tablet Take 20 mg by mouth Daily. 9/23/19  Yes Anthony Tapia MD   ferrous sulfate 325 (65 FE) MG tablet Take 325 mg by mouth Daily With Breakfast.   Yes Anthony Tapia MD   glimepiride (Amaryl) 4 MG tablet Take 1 tablet by mouth Every Morning Before Breakfast. 6/10/21  Yes Melisa Taveras MD   hydrOXYzine (ATARAX) 25 MG tablet TAKE 1 TABLET BY MOUTH AT NIGHT AS NEEDED FOR ITCHING (ONE OR TWO IF ITCHING). 9/3/19  Yes Melisa Taveras MD   Ibuprofen-diphenhydrAMINE HCl (ADVIL PM) 200-25 MG capsule Take 1 capsule by mouth Every Night. 6/26/17  Yes Anthony Tapia MD   Jardiance 10 MG tablet tablet TAKE 1 TABLET BY MOUTH DAILY 6/28/21  Yes Lei Kim MD   losartan (COZAAR) 100 MG tablet Take 1 tablet by mouth Daily. 9/24/20  Yes Melisa Taveras MD   Magnesium 250 MG tablet Take 250 mg by mouth Daily.   Yes Anthony Tapia MD   potassium chloride 10 MEQ CR tablet Take 1 tablet by mouth Daily. 3/5/21  Yes Melisa Taveras MD   pramipexole (MIRAPEX) 0.125 MG tablet Take 1 tablet by mouth Every Night. Take 4 tablets daily at bedtime. 6/27/21  Yes Melisa Taveras  MD Milena   Semaglutide,0.25 or 0.5MG/DOS, (Ozempic, 0.25 or 0.5 MG/DOSE,) 2 MG/1.5ML solution pen-injector Inject .05 mg weekly  Patient taking differently: Inject .05 mg weekly on sunday 4/1/21  Yes Lei Kim MD   tiotropium bromide-olodaterol (STIOLTO RESPIMAT) 2.5-2.5 MCG/ACT aerosol solution inhaler Inhale 2 puffs Daily. 6/21/21  Yes Melisa Taveras MD   albuterol sulfate  (90 Base) MCG/ACT inhaler Inhale 2 puffs Every 4 (Four) Hours As Needed for Wheezing. 6/18/21   Melisa Taveras MD   amoxicillin-clavulanate (AUGMENTIN) 875-125 MG per tablet Take 1 tablet by mouth 2 (Two) Times a Day. 6/18/21   Melisa Taveras MD   Blood Glucose Monitoring Suppl (Accu-Chek Umu Plus) w/Device kit 1 strip 2 (two) times a day.    Bisi Ordaz APRN   glucose blood (Accu-Chek Umu Plus) test strip Use to test blood sugars twice daily. 6/18/21   Melisa Taveras MD   glucose blood test strip 1 each by Other route 2 (two) times a day. 6/11/21   Melisa Taveras MD   metFORMIN ER (GLUCOPHAGE-XR) 500 MG 24 hr tablet TAKE 2 TABLETS BY MOUTH EVERY DAY 2/23/21   Lei Kim MD       Allergies:  No Known Allergies    Objective   Objective     Vital Signs  Temp:  [97.6 °F (36.4 °C)-98.5 °F (36.9 °C)] 97.7 °F (36.5 °C)  Heart Rate:  [] 107  Resp:  [17-22] 20  BP: (102-168)/(55-88) 168/87  SpO2:  [89 %-97 %] 90 %  on  Flow (L/min):  [4-5] 4;   Device (Oxygen Therapy): nasal cannula  Body mass index is 22.6 kg/m².    Physical Exam  Vitals reviewed.   Constitutional:       General: She is not in acute distress.     Appearance: Normal appearance. She is normal weight. She is not ill-appearing, toxic-appearing or diaphoretic.   HENT:      Head: Normocephalic and atraumatic.      Right Ear: External ear normal.      Left Ear: External ear normal.      Nose: Nose normal.      Mouth/Throat:      Mouth: Mucous membranes are moist.   Eyes:      Extraocular Movements: Extraocular movements  intact.   Neck:      Comments: No JVD present  Cardiovascular:      Rate and Rhythm: Normal rate and regular rhythm.      Pulses: Normal pulses.      Heart sounds: Normal heart sounds.   Pulmonary:      Effort: Pulmonary effort is normal.      Breath sounds: Wheezing present.      Comments: Decreased breath sounds bilaterally  Abdominal:      General: Bowel sounds are normal. There is no distension.      Tenderness: There is no abdominal tenderness.   Musculoskeletal:         General: Normal range of motion.      Cervical back: Normal range of motion.   Skin:     General: Skin is warm and dry.      Capillary Refill: Capillary refill takes less than 2 seconds.   Neurological:      General: No focal deficit present.      Mental Status: She is alert and oriented to person, place, and time.   Psychiatric:         Mood and Affect: Mood normal.         Behavior: Behavior normal.         Thought Content: Thought content normal.         Judgment: Judgment normal.           Results Review:  I have personally reviewed most recent cardiac tracings, lab results and radiology images and interpretations and agree with findings, most notably: Troponin, proBNP, CBC, CMP, INR/PT, PTT, lactate, chest x-ray, CT of chest and EKG.    Results from last 7 days   Lab Units 07/03/21  0459 07/02/21  1137   WBC 10*3/mm3 10.40 8.20   HEMOGLOBIN g/dL 16.3* 16.5*   HEMATOCRIT % 47.7* 48.9*   PLATELETS 10*3/mm3 197 182   INR   --  1.02     Results from last 7 days   Lab Units 07/03/21  0459 07/02/21  1151 07/02/21  1138   SODIUM mmol/L 142  --  142   POTASSIUM mmol/L 3.7  --  4.1   CHLORIDE mmol/L 105  --  106   CO2 mmol/L 24.0  --  24.0   BUN mg/dL 21  --  20   CREATININE mg/dL 0.60  --  0.52*   GLUCOSE mg/dL 67  --  183*   CALCIUM mg/dL 9.2  --  9.4   ALT (SGPT) U/L  --   --  16   AST (SGOT) U/L  --   --  13   TROPONIN T ng/mL  --   --  <0.010   PROBNP pg/mL  --   --  127.0   LACTATE mmol/L  --  1.0  --    PROCALCITONIN ng/mL 0.06  --   --       Estimated Creatinine Clearance: 64.1 mL/min (by C-G formula based on SCr of 0.6 mg/dL).  Brief Urine Lab Results  (Last result in the past 365 days)      Color   Clarity   Blood   Leuk Est   Nitrite   Protein   CREAT   Urine HCG        06/10/21 0929             131.3             Microbiology Results (last 10 days)     Procedure Component Value - Date/Time    S. Pneumo Ag Urine or CSF - Urine, Urine, Clean Catch [[id number]]  (Normal) Collected: 07/02/21 1609    Lab Status: Final result Specimen: Urine, Clean Catch Updated: 07/02/21 1635     Strep Pneumo Ag Negative    Legionella Antigen, Urine - Urine, Urine, Clean Catch [[id number]]  (Normal) Collected: 07/02/21 1609    Lab Status: Final result Specimen: Urine, Clean Catch Updated: 07/02/21 1633     LEGIONELLA ANTIGEN, URINE Negative    Respiratory Panel PCR w/COVID-19(SARS-CoV-2) LAURI/SERENE/PRAVEEN/PAD/COR/MAD/DOROTHY In-House, NP Swab in UTM/VTM, 3-4 HR TAT - Swab, Nasopharynx [[id number]]  (Normal) Collected: 07/02/21 1607    Lab Status: Final result Specimen: Swab from Nasopharynx Updated: 07/02/21 1706     ADENOVIRUS, PCR Not Detected     Coronavirus 229E Not Detected     Coronavirus HKU1 Not Detected     Coronavirus NL63 Not Detected     Coronavirus OC43 Not Detected     COVID19 Not Detected     Human Metapneumovirus Not Detected     Human Rhinovirus/Enterovirus Not Detected     Influenza A PCR Not Detected     Influenza B PCR Not Detected     Parainfluenza Virus 1 Not Detected     Parainfluenza Virus 2 Not Detected     Parainfluenza Virus 3 Not Detected     Parainfluenza Virus 4 Not Detected     RSV, PCR Not Detected     Bordetella pertussis pcr Not Detected     Bordetella parapertussis PCR Not Detected     Chlamydophila pneumoniae PCR Not Detected     Mycoplasma pneumo by PCR Not Detected    Narrative:      In the setting of a positive respiratory panel with a viral infection PLUS a negative procalcitonin without other underlying concern for bacterial infection,  consider observing off antibiotics or discontinuation of antibiotics and continue supportive care. If the respiratory panel is positive for atypical bacterial infection (Bordetella pertussis, Chlamydophila pneumoniae, or Mycoplasma pneumoniae), consider antibiotic de-escalation to target atypical bacterial infection.          ECG/EMG Results (most recent)     Procedure Component Value Units Date/Time    ECG 12 Lead [[id number]] Collected: 07/02/21 1115     Updated: 07/02/21 1116     QT Interval 359 ms     Narrative:      HEART RATE= 106  bpm  RR Interval= 568  ms  DC Interval= 170  ms  P Horizontal Axis= 17  deg  P Front Axis= 75  deg  QRSD Interval= 82  ms  QT Interval= 359  ms  QRS Axis= 22  deg  T Wave Axis= 57  deg  - OTHERWISE NORMAL ECG -  Sinus tachycardia  Electronically Signed By:   Date and Time of Study: 2021-07-02 11:15:09              Results for orders placed during the hospital encounter of 03/12/21    Adult Transthoracic Echo Complete w/ Color, Spectral and Contrast if Necessary Per Protocol    Interpretation Summary  · Left ventricular systolic function is normal.  · Left ventricular ejection fraction is 60 to 65%  · Left ventricular wall thickness is consistent with mild concentric hypertrophy.  · Left ventricular diastolic function is consistent with (grade I) impaired relaxation.      CT Chest Pulmonary Embolism    Result Date: 7/2/2021   1. No evidence of pulmonary embolism 2. Left basilar atelectasis is noted with right middle lobe atelectasis. No obvious pneumonia or gross congestive changes noted. No adenopathy is noted.    Electronically Signed By-Aden Gaffney MD On:7/2/2021 1:14 PM This report was finalized on [id number] by  Aden Gaffney MD.        Estimated Creatinine Clearance: 64.1 mL/min (by C-G formula based on SCr of 0.6 mg/dL).    Assessment/Plan   Assessment/Plan       Active Hospital Problems    Diagnosis  POA   • Hypoxemia [R09.02]  Yes     Priority: High   • Plate-like  atelectasis [J98.11]  Yes     Priority: High   • Pneumonia of right middle lobe due to infectious organism [J18.9]  Yes     Priority: High   • Acute exacerbation of chronic obstructive pulmonary disease (COPD) (CMS/[facility], located within [facility]) [J44.1]  Yes     Priority: High   • Type 2 diabetes mellitus (CMS/[facility], located within [facility]) [E11.9]  Yes     Priority: Medium   • Essential hypertension [I10]  Yes     Priority: Medium   • Pulmonary emphysema (CMS/[facility], located within [facility]) [J43.9]  Yes     Priority: Medium   • GERD without esophagitis [K21.9]  Yes     Priority: Low   • Mixed hyperlipidemia [E78.2]  Yes     Priority: Low   • COPD exacerbation (CMS/[facility], located [facility]) [J44.1]  Yes      Resolved Hospital Problems   No resolved problems to display.     Dyspnea  -Likely multifactorial related to acute exacerbation of COPD and possible underlying pneumonia  -CT PE protocol shows no evidence of pulmonary embolism with left basilar atelectasis noted with right middle lobe atelectasis with radiologist noting no obvious pneumonia or gross congestive changes or adenopathy.  ED provider notes area of right middle lobe suspicious for pneumonia  -WBCs: 8.20 with a lactate of 1.0  -Procalcitonin: 0.06  -Respiratory viral panel and urine antigens for strep pneumo and Legionella negative  -Rocephin and azithromycin started in the ED, continue  -Prednisone, DuoNeb and Pulmicort ordered  -Incentive spirometry  -Mucinex and Tessalon as needed  -Continue telemetry, O2 and pulse oximetry  -Hospitalist consulted for further management     Diabetes mellitus  -Poorly controlled with most recent blood glucose of 183  -Hold metformin and Ozempic  -SSI  -Diabetic diet  -Monitor before meals and at bedtime     Hypertension  -Well controlled with most recent blood pressure of 123/55  -Continue losartan     GERD  -Famotidine     Hyperlipidemia  -Continue statin            VTE Prophylaxis -   Mechanical Order History:     None      Pharmalogical Order History:      Ordered     Dose Route Frequency Stop    07/02/21 0068   enoxaparin (LOVENOX) syringe 40 mg      40 mg SC Daily --                CODE STATUS:    Code Status and Medical Interventions:   Ordered at: 07/02/21 1552     Code Status:    CPR     Medical Interventions (Level of Support Prior to Arrest):    Full         I discussed the patient's findings and my recommendations with patient and nursing staff.      Signature:Electronically signed by Aden Bowie PA-C, 07/03/21, 1:59 PM EDT.

## 2021-07-04 LAB
ANION GAP SERPL CALCULATED.3IONS-SCNC: 13 MMOL/L (ref 5–15)
BASOPHILS # BLD AUTO: 0.1 10*3/MM3 (ref 0–0.2)
BASOPHILS NFR BLD AUTO: 1.1 % (ref 0–1.5)
BUN SERPL-MCNC: 32 MG/DL (ref 8–23)
BUN/CREAT SERPL: 50.8 (ref 7–25)
CALCIUM SPEC-SCNC: 9.9 MG/DL (ref 8.6–10.5)
CHLORIDE SERPL-SCNC: 100 MMOL/L (ref 98–107)
CHROMATIN AB SERPL-ACNC: 55 IU/ML (ref 0–14)
CO2 SERPL-SCNC: 27 MMOL/L (ref 22–29)
CREAT SERPL-MCNC: 0.63 MG/DL (ref 0.57–1)
DEPRECATED RDW RBC AUTO: 43.3 FL (ref 37–54)
EOSINOPHIL # BLD AUTO: 0 10*3/MM3 (ref 0–0.4)
EOSINOPHIL NFR BLD AUTO: 0 % (ref 0.3–6.2)
ERYTHROCYTE [DISTWIDTH] IN BLOOD BY AUTOMATED COUNT: 13.8 % (ref 12.3–15.4)
ERYTHROCYTE [SEDIMENTATION RATE] IN BLOOD: 28 MM/HR (ref 0–30)
GFR SERPL CREATININE-BSD FRML MDRD: 95 ML/MIN/1.73
GLUCOSE BLDC GLUCOMTR-MCNC: 133 MG/DL (ref 70–105)
GLUCOSE BLDC GLUCOMTR-MCNC: 189 MG/DL (ref 70–105)
GLUCOSE BLDC GLUCOMTR-MCNC: 220 MG/DL (ref 70–105)
GLUCOSE BLDC GLUCOMTR-MCNC: 267 MG/DL (ref 70–105)
GLUCOSE SERPL-MCNC: 126 MG/DL (ref 65–99)
HCT VFR BLD AUTO: 51.3 % (ref 34–46.6)
HGB BLD-MCNC: 17.3 G/DL (ref 12–15.9)
LYMPHOCYTES # BLD AUTO: 2.4 10*3/MM3 (ref 0.7–3.1)
LYMPHOCYTES NFR BLD AUTO: 21 % (ref 19.6–45.3)
MAGNESIUM SERPL-MCNC: 2.2 MG/DL (ref 1.6–2.4)
MCH RBC QN AUTO: 30.4 PG (ref 26.6–33)
MCHC RBC AUTO-ENTMCNC: 33.8 G/DL (ref 31.5–35.7)
MCV RBC AUTO: 90 FL (ref 79–97)
MONOCYTES # BLD AUTO: 0.5 10*3/MM3 (ref 0.1–0.9)
MONOCYTES NFR BLD AUTO: 4.2 % (ref 5–12)
NEUTROPHILS NFR BLD AUTO: 73.7 % (ref 42.7–76)
NEUTROPHILS NFR BLD AUTO: 8.3 10*3/MM3 (ref 1.7–7)
NRBC BLD AUTO-RTO: 0.1 /100 WBC (ref 0–0.2)
PLATELET # BLD AUTO: 226 10*3/MM3 (ref 140–450)
PMV BLD AUTO: 10.5 FL (ref 6–12)
POTASSIUM SERPL-SCNC: 4.4 MMOL/L (ref 3.5–5.2)
PROCALCITONIN SERPL-MCNC: 0.06 NG/ML (ref 0–0.25)
QT INTERVAL: 359 MS
RBC # BLD AUTO: 5.7 10*6/MM3 (ref 3.77–5.28)
SODIUM SERPL-SCNC: 140 MMOL/L (ref 136–145)
TSH SERPL DL<=0.05 MIU/L-ACNC: 0.29 UIU/ML (ref 0.27–4.2)
WBC # BLD AUTO: 11.3 10*3/MM3 (ref 3.4–10.8)

## 2021-07-04 PROCEDURE — 83735 ASSAY OF MAGNESIUM: CPT | Performed by: PHYSICIAN ASSISTANT

## 2021-07-04 PROCEDURE — 25010000002 METHYLPREDNISOLONE PER 125 MG: Performed by: INTERNAL MEDICINE

## 2021-07-04 PROCEDURE — 94799 UNLISTED PULMONARY SVC/PX: CPT

## 2021-07-04 PROCEDURE — 99233 SBSQ HOSP IP/OBS HIGH 50: CPT | Performed by: INTERNAL MEDICINE

## 2021-07-04 PROCEDURE — 84443 ASSAY THYROID STIM HORMONE: CPT | Performed by: INTERNAL MEDICINE

## 2021-07-04 PROCEDURE — 82962 GLUCOSE BLOOD TEST: CPT

## 2021-07-04 PROCEDURE — 80048 BASIC METABOLIC PNL TOTAL CA: CPT | Performed by: PHYSICIAN ASSISTANT

## 2021-07-04 PROCEDURE — G0378 HOSPITAL OBSERVATION PER HR: HCPCS

## 2021-07-04 PROCEDURE — 85652 RBC SED RATE AUTOMATED: CPT | Performed by: INTERNAL MEDICINE

## 2021-07-04 PROCEDURE — 85025 COMPLETE CBC W/AUTO DIFF WBC: CPT | Performed by: PHYSICIAN ASSISTANT

## 2021-07-04 PROCEDURE — 25010000002 ENOXAPARIN PER 10 MG: Performed by: PHYSICIAN ASSISTANT

## 2021-07-04 PROCEDURE — 63710000001 INSULIN LISPRO (HUMAN) PER 5 UNITS: Performed by: INTERNAL MEDICINE

## 2021-07-04 PROCEDURE — 25010000002 CEFTRIAXONE PER 250 MG: Performed by: PHYSICIAN ASSISTANT

## 2021-07-04 PROCEDURE — 63710000001 INSULIN LISPRO (HUMAN) PER 5 UNITS: Performed by: PHYSICIAN ASSISTANT

## 2021-07-04 PROCEDURE — 84145 PROCALCITONIN (PCT): CPT | Performed by: PHYSICIAN ASSISTANT

## 2021-07-04 RX ORDER — INSULIN LISPRO 100 [IU]/ML
0-24 INJECTION, SOLUTION INTRAVENOUS; SUBCUTANEOUS AS NEEDED
Status: DISCONTINUED | OUTPATIENT
Start: 2021-07-04 | End: 2021-07-07 | Stop reason: HOSPADM

## 2021-07-04 RX ORDER — INSULIN LISPRO 100 [IU]/ML
0-24 INJECTION, SOLUTION INTRAVENOUS; SUBCUTANEOUS
Status: DISCONTINUED | OUTPATIENT
Start: 2021-07-04 | End: 2021-07-07 | Stop reason: HOSPADM

## 2021-07-04 RX ORDER — HYDROXYZINE HYDROCHLORIDE 25 MG/1
25 TABLET, FILM COATED ORAL 3 TIMES DAILY PRN
Status: DISCONTINUED | OUTPATIENT
Start: 2021-07-04 | End: 2021-07-07 | Stop reason: HOSPADM

## 2021-07-04 RX ADMIN — BENZONATATE 200 MG: 100 CAPSULE ORAL at 22:22

## 2021-07-04 RX ADMIN — INSULIN LISPRO 4 UNITS: 100 INJECTION, SOLUTION INTRAVENOUS; SUBCUTANEOUS at 12:21

## 2021-07-04 RX ADMIN — METHYLPREDNISOLONE SODIUM SUCCINATE 60 MG: 125 INJECTION, POWDER, FOR SOLUTION INTRAMUSCULAR; INTRAVENOUS at 15:14

## 2021-07-04 RX ADMIN — Medication 10 ML: at 20:16

## 2021-07-04 RX ADMIN — EMPAGLIFLOZIN 10 MG: 10 TABLET, FILM COATED ORAL at 08:29

## 2021-07-04 RX ADMIN — POTASSIUM CHLORIDE 10 MEQ: 750 TABLET, EXTENDED RELEASE ORAL at 08:29

## 2021-07-04 RX ADMIN — IPRATROPIUM BROMIDE AND ALBUTEROL SULFATE 3 ML: 2.5; .5 SOLUTION RESPIRATORY (INHALATION) at 22:35

## 2021-07-04 RX ADMIN — INSULIN LISPRO 12 UNITS: 100 INJECTION, SOLUTION INTRAVENOUS; SUBCUTANEOUS at 18:22

## 2021-07-04 RX ADMIN — IPRATROPIUM BROMIDE AND ALBUTEROL SULFATE 3 ML: 2.5; .5 SOLUTION RESPIRATORY (INHALATION) at 08:45

## 2021-07-04 RX ADMIN — BUDESONIDE 0.5 MG: 0.5 INHALANT RESPIRATORY (INHALATION) at 08:45

## 2021-07-04 RX ADMIN — Medication 10 ML: at 08:28

## 2021-07-04 RX ADMIN — MAGNESIUM OXIDE TAB 400 MG (241.3 MG ELEMENTAL MG) 400 MG: 400 (241.3 MG) TAB at 08:28

## 2021-07-04 RX ADMIN — ATORVASTATIN CALCIUM 40 MG: 40 TABLET, FILM COATED ORAL at 20:16

## 2021-07-04 RX ADMIN — IPRATROPIUM BROMIDE AND ALBUTEROL SULFATE 3 ML: 2.5; .5 SOLUTION RESPIRATORY (INHALATION) at 11:38

## 2021-07-04 RX ADMIN — AZITHROMYCIN MONOHYDRATE 250 MG: 250 TABLET ORAL at 08:28

## 2021-07-04 RX ADMIN — FAMOTIDINE 20 MG: 20 TABLET, FILM COATED ORAL at 08:29

## 2021-07-04 RX ADMIN — BUDESONIDE 0.5 MG: 0.5 INHALANT RESPIRATORY (INHALATION) at 20:29

## 2021-07-04 RX ADMIN — IPRATROPIUM BROMIDE AND ALBUTEROL SULFATE 3 ML: 2.5; .5 SOLUTION RESPIRATORY (INHALATION) at 15:56

## 2021-07-04 RX ADMIN — LOSARTAN POTASSIUM 100 MG: 50 TABLET, FILM COATED ORAL at 08:28

## 2021-07-04 RX ADMIN — METHYLPREDNISOLONE SODIUM SUCCINATE 60 MG: 125 INJECTION, POWDER, FOR SOLUTION INTRAMUSCULAR; INTRAVENOUS at 07:13

## 2021-07-04 RX ADMIN — IPRATROPIUM BROMIDE AND ALBUTEROL SULFATE 3 ML: 2.5; .5 SOLUTION RESPIRATORY (INHALATION) at 20:29

## 2021-07-04 RX ADMIN — GUAIFENESIN 1200 MG: 600 TABLET, EXTENDED RELEASE ORAL at 20:16

## 2021-07-04 RX ADMIN — CEFTRIAXONE SODIUM 1 G: 1 INJECTION, POWDER, FOR SOLUTION INTRAMUSCULAR; INTRAVENOUS at 15:15

## 2021-07-04 RX ADMIN — PRAMIPEXOLE DIHYDROCHLORIDE 0.5 MG: 0.25 TABLET ORAL at 20:16

## 2021-07-04 RX ADMIN — MONTELUKAST 10 MG: 10 TABLET, FILM COATED ORAL at 20:16

## 2021-07-04 RX ADMIN — THEOPHYLLINE ANHYDROUS 300 MG: 300 CAPSULE, EXTENDED RELEASE ORAL at 08:28

## 2021-07-04 RX ADMIN — ENOXAPARIN SODIUM 40 MG: 40 INJECTION SUBCUTANEOUS at 15:15

## 2021-07-04 RX ADMIN — DOXYLAMINE SUCCINATE 12.5 MG: 25 TABLET ORAL at 00:34

## 2021-07-04 RX ADMIN — CETIRIZINE HYDROCHLORIDE 10 MG: 10 TABLET, FILM COATED ORAL at 08:29

## 2021-07-04 RX ADMIN — GUAIFENESIN 1200 MG: 600 TABLET, EXTENDED RELEASE ORAL at 08:28

## 2021-07-04 RX ADMIN — DOXYLAMINE SUCCINATE 12.5 MG: 25 TABLET ORAL at 22:26

## 2021-07-04 RX ADMIN — BENZONATATE 200 MG: 100 CAPSULE ORAL at 07:13

## 2021-07-04 NOTE — PROGRESS NOTES
[facility] Medicine Services Daily Progress Note      Hospitalist Team  LOS 0 days      Patient Care Team:  Melisa Taveras MD as PCP - General    Patient Location: 109/1      Subjective   Subjective     Chief Complaint / Subjective  Chief Complaint   Patient presents with   • Shortness of Breath     pt c/o SOA for past 2 weeks that has progressed. pt reports recent cough, congestion.          Brief Synopsis of Hospital Course/HPI    65-year-old female with history of COPD, DM 2, hypertension, kidney stones, restless leg syndrome, who had worsening respiratory distress 14 days with nonproductive cough but with small amount of yellow sputum in the last 24 hours.  She had seen her primary care physician recently.  She had suspicion of allergies to environmental antigens.  She denies shortness of breath on lying down or by night  She denies any night sweats or hemoptysis.  She reports intermittent chest pleuritic pain.  Work-up in the ER with CT angiogram was negative for PE but with left basilar atelectatic changes right middle lobe without obvious pneumonia or congestive heart failure arthropathy.  She had normal proBNP and negative troponin x1  No leukocytosis but she had erythrocytosis and normal platelet count.  EKG shows sinus tachycardia  She had an echo in March showing normal EF 60% with LVH and diastolic dysfunction grade 1  Respiratory panel was negative on admission including Covid         Date::    7/4  Patient reports short period of sleep yesterday/overnight  She denies any worsening probably had some improvement.  Notes reviewed from Dr. Ute TIMMONS  All other systems reviewed and negative    Objective   Objective      Vital Signs  Temp:  [97.7 °F (36.5 °C)-98.3 °F (36.8 °C)] 97.7 °F (36.5 °C)  Heart Rate:  [] 79  Resp:  [16-20] 16  BP: (109-149)/(74-79) 144/74  Oxygen Therapy  SpO2: 94 %  Pulse Oximetry Type: Intermittent  Device (Oxygen Therapy): nasal  "cannula  Flow (L/min): 4  Oxygen Concentration (%): 36  Flowsheet Rows      First Filed Value   Admission Height  160 cm (63\") Documented at 07/02/2021 1057   Admission Weight  57.4 kg (126 lb 8.7 oz) Documented at 07/02/2021 1057        Intake & Output (last 3 days)       07/01 0701 - 07/02 0700 07/02 0701 - 07/03 0700 07/03 0701 - 07/04 0700 07/04 0701 - 07/05 0700    P.O.  240 1256 360    Total Intake(mL/kg)  240 (4.1) 1256 (21.7) 360 (6.2)    Net  +240 +1256 +360            Urine Unmeasured Occurrence   3 x         Lines, Drains & Airways    Active LDAs     Name:   Placement date:   Placement time:   Site:   Days:    Peripheral IV 07/02/21 1129 Right Antecubital   07/02/21    1129    Antecubital   1                  Physical Exam:    Physical Exam  Vitals and nursing note reviewed.   Constitutional:       General: She is not in acute distress.     Appearance: Normal appearance. She is well-developed. She is not ill-appearing, toxic-appearing or diaphoretic.   HENT:      Head: Normocephalic and atraumatic.      Right Ear: Ear canal and external ear normal.      Left Ear: Ear canal and external ear normal.      Nose: Nose normal. No congestion or rhinorrhea.      Mouth/Throat:      Mouth: Mucous membranes are moist.      Pharynx: No oropharyngeal exudate.   Eyes:      General: No scleral icterus.        Right eye: No discharge.         Left eye: No discharge.      Extraocular Movements: Extraocular movements intact.      Conjunctiva/sclera: Conjunctivae normal.      Pupils: Pupils are equal, round, and reactive to light.   Neck:      Thyroid: No thyromegaly.      Vascular: No carotid bruit or JVD.      Trachea: No tracheal deviation.   Cardiovascular:      Rate and Rhythm: Normal rate and regular rhythm.      Pulses: Normal pulses.      Heart sounds: Normal heart sounds. No murmur heard.   No friction rub. No gallop.    Pulmonary:      Effort: Pulmonary effort is normal. No respiratory distress.      Breath " sounds: Normal breath sounds. No stridor. No wheezing, rhonchi or rales.      Comments: Mild right basilar crackles upper zone  Chest:      Chest wall: No tenderness.   Abdominal:      General: Bowel sounds are normal. There is no distension.      Palpations: Abdomen is soft. There is no mass.      Tenderness: There is no abdominal tenderness. There is no guarding or rebound.      Hernia: No hernia is present.   Musculoskeletal:         General: No swelling, tenderness, deformity or signs of injury. Normal range of motion.      Cervical back: Normal range of motion and neck supple. No rigidity. No muscular tenderness.      Right lower leg: No edema.      Left lower leg: No edema.   Lymphadenopathy:      Cervical: No cervical adenopathy.   Skin:     General: Skin is warm and dry.      Coloration: Skin is not jaundiced or pale.      Findings: No bruising, erythema or rash.   Neurological:      General: No focal deficit present.      Mental Status: She is alert and oriented to person, place, and time. Mental status is at baseline.      Cranial Nerves: No cranial nerve deficit.      Sensory: No sensory deficit.      Motor: No weakness or abnormal muscle tone.      Coordination: Coordination normal.   Psychiatric:         Mood and Affect: Mood normal.         Behavior: Behavior normal.         Thought Content: Thought content normal.         Judgment: Judgment normal.       Intrusive drive cough noted        Procedures:              Results Review:     I reviewed the patient's new clinical results.      Lab Results (last 24 hours)     Procedure Component Value Units Date/Time    POC Glucose Once [[id number]]  (Abnormal) Collected: 07/04/21 0658    Specimen: Blood Updated: 07/04/21 0702     Glucose 133 mg/dL      Comment: Serial Number: [id number]:  [id number]       Basic Metabolic Panel [[id number]]  (Abnormal) Collected: 07/04/21 0520    Specimen: Blood Updated: 07/04/21 0619     Glucose 126 mg/dL      BUN 32 mg/dL   "    Comment: Result checked         Creatinine 0.63 mg/dL      Sodium 140 mmol/L      Potassium 4.4 mmol/L      Chloride 100 mmol/L      CO2 27.0 mmol/L      Calcium 9.9 mg/dL      eGFR Non African Amer 95 mL/min/1.73      BUN/Creatinine Ratio 50.8     Anion Gap 13.0 mmol/L     Narrative:      GFR Normal >60  Chronic Kidney Disease <60  Kidney Failure <15      Procalcitonin [[id number]]  (Normal) Collected: 07/04/21 0520    Specimen: Blood Updated: 07/04/21 0617     Procalcitonin 0.06 ng/mL     Narrative:      As a Marker for Sepsis (Non-Neonates):     1. <0.5 ng/mL represents a low risk of severe sepsis and/or septic shock.  2. >2 ng/mL represents a high risk of severe sepsis and/or septic shock.    As a Marker for Lower Respiratory Tract Infections that require antibiotic therapy:  PCT on Admission     Antibiotic Therapy             6-12 Hrs later  >0.5                          Strongly Recommended            >0.25 - <0.5             Recommended  0.1 - 0.25                  Discouraged                       Remeasure/reassess PCT  <0.1                         Strongly Discouraged         Remeasure/reassess PCT      As 28 day mortality risk marker: \"Change in Procalcitonin Result\" (>80% or <=80%) if Day 0 (or Day 1) and Day 4 values are available. Refer to http://www.[employer]-pct-calculator.com/    Change in PCT <=80 %   A decrease of PCT levels below or equal to 80% defines a positive change in PCT test result representing a higher risk for 28-day all-cause mortality of patients diagnosed with severe sepsis or septic shock.    Change in PCT >80 %   A decrease of PCT levels of more than 80% defines a negative change in PCT result representing a lower risk for 28-day all-cause mortality of patients diagnosed with severe sepsis or septic shock.              Results may be falsely decreased if patient taking Biotin.     Magnesium [[id number]]  (Normal) Collected: 07/04/21 0520    Specimen: Blood Updated: 07/04/21 0612     " Magnesium 2.2 mg/dL     Sedimentation Rate [[id number]]  (Normal) Collected: 07/04/21 0520    Specimen: Blood Updated: 07/04/21 0556     Sed Rate 28 mm/hr     CBC & Differential [[id number]]  (Abnormal) Collected: 07/04/21 0520    Specimen: Blood Updated: 07/04/21 0543    Narrative:      The following orders were created for panel order CBC & Differential.  Procedure                               Abnormality         Status                     ---------                               -----------         ------                     CBC Auto Differential[[id number]]        Abnormal            Final result                 Please view results for these tests on the individual orders.    CBC Auto Differential [[id number]]  (Abnormal) Collected: 07/04/21 0520    Specimen: Blood Updated: 07/04/21 0543     WBC 11.30 10*3/mm3      RBC 5.70 10*6/mm3      Hemoglobin 17.3 g/dL      Hematocrit 51.3 %      MCV 90.0 fL      MCH 30.4 pg      MCHC 33.8 g/dL      RDW 13.8 %      RDW-SD 43.3 fl      MPV 10.5 fL      Platelets 226 10*3/mm3      Neutrophil % 73.7 %      Lymphocyte % 21.0 %      Monocyte % 4.2 %      Eosinophil % 0.0 %      Basophil % 1.1 %      Neutrophils, Absolute 8.30 10*3/mm3      Lymphocytes, Absolute 2.40 10*3/mm3      Monocytes, Absolute 0.50 10*3/mm3      Eosinophils, Absolute 0.00 10*3/mm3      Basophils, Absolute 0.10 10*3/mm3      nRBC 0.1 /100 WBC     POC Glucose Once [[id number]]  (Abnormal) Collected: 07/03/21 2007    Specimen: Blood Updated: 07/03/21 2008     Glucose 270 mg/dL      Comment: Serial Number: [id number]:  [id number]       Troponin [[id number]]  (Normal) Collected: 07/03/21 1632    Specimen: Blood Updated: 07/03/21 1807     Troponin T <0.010 ng/mL     Narrative:      Troponin T Reference Range:  <= 0.03 ng/mL-   Negative for AMI  >0.03 ng/mL-     Abnormal for myocardial necrosis.  Clinicians would have to utilize clinical acumen, EKG, Troponin and serial changes to determine if it is an Acute  Myocardial Infarction or myocardial injury due to an underlying chronic condition.       Results may be falsely decreased if patient taking Biotin.      Rheumatoid Factor [[id number]] Collected: 07/03/21 1632    Specimen: Blood Updated: 07/03/21 1742    HERACLIO [[id number]] Collected: 07/03/21 1632    Specimen: Blood Updated: 07/03/21 1742    POC Glucose Once [[id number]]  (Abnormal) Collected: 07/03/21 1636    Specimen: Blood Updated: 07/03/21 1637     Glucose 324 mg/dL      Comment: Serial Number: [id number]:  [id number]       D-dimer, Quantitative [[id number]]  (Normal) Collected: 07/03/21 1527    Specimen: Blood Updated: 07/03/21 1544     D-Dimer, Quantitative 0.40 mg/L (FEU)     Narrative:      Reference Range  --------------------------------------------------------------------     < 0.50   Negative Predictive Value  0.50-0.59   Indeterminate    >= 0.60   Probable VTE             A very low percentage of patients with DVT may yield D-Dimer results   below the cut-off of 0.50 mg/L FEU.  This is known to be more   prevalent in patients with distal DVT.             Results of this test should always be interpreted in conjunction with   the patient's medical history, clinical presentation and other   findings.  Clinical diagnosis should not be based on the result of   INNOVANCE D-Dimer alone.    Blood Gas, Arterial - [[id number]]  (Abnormal) Collected: 07/03/21 1528    Specimen: Arterial Blood Updated: 07/03/21 1530     Site Right Radial     Ahmet's Test Positive     pH, Arterial 7.422 pH units      pCO2, Arterial 41.0 mm Hg      pO2, Arterial 53.4 mm Hg      HCO3, Arterial 26.7 mmol/L      Base Excess, Arterial 2.0 mmol/L      Comment: Serial Number: [id number]:  [id number]        O2 Saturation, Arterial 87.9 %      CO2 Content 28.0 mmol/L      Barometric Pressure for Blood Gas --     Comment: N/A        Modality Cannula     FIO2 34 %      Hemodilution No    POC Glucose Once [[id number]]  (Abnormal) Collected:  07/03/21 1241    Specimen: Blood Updated: 07/03/21 1242     Glucose 172 mg/dL      Comment: Serial Number: [id number]:  [id number]           No results found for: HGBA1C  Results from last 7 days   Lab Units 07/02/21  1137   INR  1.02       Results from last 7 days   Lab Units 07/03/21  1528   PH, ARTERIAL pH units 7.422   PO2 ART mm Hg 53.4*   PCO2, ARTERIAL mm Hg 41.0   HCO3 ART mmol/L 26.7     No results found for: LIPASE  Lab Results   Component Value Date    CHOL 106 06/10/2021    TRIG 71 06/10/2021    HDL 41 06/10/2021    LDL 50 06/10/2021       No results found for: INTRAOP, PREDX, FINALDX, COMDX    Microbiology Results (last 10 days)     Procedure Component Value - Date/Time    S. Pneumo Ag Urine or CSF - Urine, Urine, Clean Catch [[id number]]  (Normal) Collected: 07/02/21 1609    Lab Status: Final result Specimen: Urine, Clean Catch Updated: 07/02/21 1635     Strep Pneumo Ag Negative    Legionella Antigen, Urine - Urine, Urine, Clean Catch [[id number]]  (Normal) Collected: 07/02/21 1609    Lab Status: Final result Specimen: Urine, Clean Catch Updated: 07/02/21 1633     LEGIONELLA ANTIGEN, URINE Negative    Respiratory Panel PCR w/COVID-19(SARS-CoV-2) LAURI/SERENE/PRAVEEN/PAD/COR/MAD/DOROTHY In-House, NP Swab in UTM/VTM, 3-4 HR TAT - Swab, Nasopharynx [[id number]]  (Normal) Collected: 07/02/21 1607    Lab Status: Final result Specimen: Swab from Nasopharynx Updated: 07/02/21 1706     ADENOVIRUS, PCR Not Detected     Coronavirus 229E Not Detected     Coronavirus HKU1 Not Detected     Coronavirus NL63 Not Detected     Coronavirus OC43 Not Detected     COVID19 Not Detected     Human Metapneumovirus Not Detected     Human Rhinovirus/Enterovirus Not Detected     Influenza A PCR Not Detected     Influenza B PCR Not Detected     Parainfluenza Virus 1 Not Detected     Parainfluenza Virus 2 Not Detected     Parainfluenza Virus 3 Not Detected     Parainfluenza Virus 4 Not Detected     RSV, PCR Not Detected     Bordetella  pertussis pcr Not Detected     Bordetella parapertussis PCR Not Detected     Chlamydophila pneumoniae PCR Not Detected     Mycoplasma pneumo by PCR Not Detected    Narrative:      In the setting of a positive respiratory panel with a viral infection PLUS a negative procalcitonin without other underlying concern for bacterial infection, consider observing off antibiotics or discontinuation of antibiotics and continue supportive care. If the respiratory panel is positive for atypical bacterial infection (Bordetella pertussis, Chlamydophila pneumoniae, or Mycoplasma pneumoniae), consider antibiotic de-escalation to target atypical bacterial infection.    Blood Culture - Blood, Arm, Right [[id number]] Collected: 07/02/21 1138    Lab Status: Preliminary result Specimen: Blood from Arm, Right Updated: 07/03/21 1200     Blood Culture No growth at 24 hours    Blood Culture - Blood, Arm, Left [[id number]] Collected: 07/02/21 1137    Lab Status: Preliminary result Specimen: Blood from Arm, Left Updated: 07/03/21 1200     Blood Culture No growth at 24 hours          ECG/EMG Results (most recent)     Procedure Component Value Units Date/Time    ECG 12 Lead [[id number]] Collected: 07/02/21 1115     Updated: 07/02/21 1116     QT Interval 359 ms     Narrative:      HEART RATE= 106  bpm  RR Interval= 568  ms  IA Interval= 170  ms  P Horizontal Axis= 17  deg  P Front Axis= 75  deg  QRSD Interval= 82  ms  QT Interval= 359  ms  QRS Axis= 22  deg  T Wave Axis= 57  deg  - OTHERWISE NORMAL ECG -  Sinus tachycardia  Electronically Signed By:   Date and Time of Study: 2021-07-02 11:15:09              Results for orders placed during the hospital encounter of 03/12/21    Adult Transthoracic Echo Complete w/ Color, Spectral and Contrast if Necessary Per Protocol    Interpretation Summary  · Left ventricular systolic function is normal.  · Left ventricular ejection fraction is 60 to 65%  · Left ventricular wall thickness is consistent with mild  concentric hypertrophy.  · Left ventricular diastolic function is consistent with (grade I) impaired relaxation.      XR Chest 1 View    Result Date: 7/3/2021   1. Bilateral diffuse increased lung markings consistent with chronic lung disease unchanged since previous study. 2. No focal arising in the right or left lungs.  Electronically Signed By-Jovanny Aranda MD On:7/3/2021 3:30 PM This report was finalized on [id number] by  Jovanny Aranda MD.    CT Chest Pulmonary Embolism    Result Date: 7/2/2021   1. No evidence of pulmonary embolism 2. Left basilar atelectasis is noted with right middle lobe atelectasis. No obvious pneumonia or gross congestive changes noted. No adenopathy is noted.    Electronically Signed By-Aden Gaffney MD On:7/2/2021 1:14 PM This report was finalized on [id number] by  Aden Gaffney MD.          Xrays, labs reviewed personally by physician.    Medication Review:   I have reviewed the patient's current medication list      Scheduled Meds  atorvastatin, 40 mg, Oral, Nightly  azithromycin, 250 mg, Oral, Q24H  budesonide, 0.5 mg, Nebulization, BID - RT  cefTRIAXone, 1 g, Intravenous, Q24H  cetirizine, 10 mg, Oral, Daily  empagliflozin, 10 mg, Oral, Daily  enoxaparin, 40 mg, Subcutaneous, Daily  famotidine, 20 mg, Oral, Daily  guaiFENesin, 1,200 mg, Oral, Q12H  insulin lispro, 0-9 Units, Subcutaneous, TID AC  ipratropium-albuterol, 3 mL, Nebulization, Q4H - RT  losartan, 100 mg, Oral, Daily  magnesium oxide, 400 mg, Oral, Daily  methylPREDNISolone sodium succinate, 60 mg, Intravenous, Q12H  montelukast, 10 mg, Oral, Nightly  potassium chloride, 10 mEq, Oral, Daily  pramipexole, 0.5 mg, Oral, Nightly  sodium chloride, 10 mL, Intravenous, Q12H  theophylline, 300 mg, Oral, Q24H        Meds Infusions       Meds PRN  •  acetaminophen **OR** acetaminophen **OR** acetaminophen  •  benzonatate  •  cyclobenzaprine  •  dextrose  •  dextrose  •  doxylamine  •  glucagon (human  recombinant)  •  guaiFENesin-dextromethorphan  •  hydrOXYzine  •  insulin lispro **AND** insulin lispro  •  ipratropium-albuterol  •  melatonin  •  nitroglycerin  •  ondansetron **OR** ondansetron  •  sodium chloride        Assessment/Plan   Assessment/Plan     Active Hospital Problems    Diagnosis  POA   • Hypoxemia [R09.02]  Yes   • Plate-like atelectasis [J98.11]  Yes   • Pneumonia of right middle lobe due to infectious organism [J18.9]  Yes   • Acute exacerbation of chronic obstructive pulmonary disease (COPD) (CMS/[facility]) [J44.1]  Yes   • GERD without esophagitis [K21.9]  Yes   • Type 2 diabetes mellitus (CMS/[facility]) [E11.9]  Yes   • COPD exacerbation (CMS/[facility]) [J44.1]  Yes   • Essential hypertension [I10]  Yes   • Pulmonary emphysema (CMS/[facility]) [J43.9]  Yes   • Mixed hyperlipidemia [E78.2]  Yes      Resolved Hospital Problems   No resolved problems to display.       MEDICAL DECISION MAKING COMPLEXITY BY PROBLEM:     COPD exacerbation  Acute respiratory failure with hypoxemia  No PE on CT angiogram admission.    Atelectasis noted on CT right middle lobe  Continue steroids and antibiotics and bronchodilators  Repeat imaging with chest x-ray showing some congestion.  Given 1 dose of Lasix.  Blood cultures negative  Urine antigens negative  Respiratory panel negative including COVID-19  Reviewed  ABG  Stiolto on discharge  Antibiotics with Rocephin and Zithromax  Theophylline added by Dr. Lepe  Outpatient PFT.      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  We will monitor  Troponin negative x2  Recent [id number] March    History of kidney stones  Restless legs syndrome pramipexole     Chronic muscle spasms on Flexeril     Dyslipidemia on Lipitor    History of skin allergies  requiring multiple medications last year.    VTE Prophylaxis -   Mechanical Order History:     None      Pharmalogical Order History:      Ordered     Dose Route Frequency Stop    07/02/21 7002  enoxaparin (LOVENOX) syringe 40 mg      40 mg SC Daily --                  Code Status -   Code Status and Medical Interventions:   Ordered at: 07/02/21 155     Code Status:    CPR     Medical Interventions (Level of Support Prior to Arrest):    Full       This patient has been examined wearing appropriate Personal Protective Equipment and discussed with hospital infection control department. 07/04/21        Discharge Planning  Home        Electronically signed by Nam Momin MD, 07/04/21, 08:08 EDT.  [facility] Hospitalist Team

## 2021-07-04 NOTE — PLAN OF CARE
Problem: Adult Inpatient Plan of Care  Goal: Plan of Care Review  Recent Flowsheet Documentation  Taken 7/4/2021 1040 by Choco Hart, RN  Progress: improving  Plan of Care Reviewed With: patient  Outcome Summary: States feels better I now have her on 3L NC cont to have a cough but says she does not feel as tight as yesterday  Goal: Absence of Hospital-Acquired Illness or Injury  Intervention: Identify and Manage Fall Risk  Recent Flowsheet Documentation  Taken 7/4/2021 0730 by Choco Hart RN  Safety Promotion/Fall Prevention: nonskid shoes/slippers when out of bed   Goal Outcome Evaluation:  Plan of Care Reviewed With: patient        Progress: improving  Outcome Summary: States feels better I now have her on 3L NC cont to have a cough but says she does not feel as tight as yesterday

## 2021-07-04 NOTE — PROGRESS NOTES
Daily Progress Note    Mixed hyperlipidemia    Essential hypertension    Pulmonary emphysema (CMS/HCC)    Hypoxemia    Plate-like atelectasis    Pneumonia of right middle lobe due to infectious organism    Acute exacerbation of chronic obstructive pulmonary disease (COPD) (CMS/HCC)    GERD without esophagitis    Type 2 diabetes mellitus (CMS/HCC)    COPD exacerbation (CMS/HCC)    Assessment    Acute hypoxic respiratory insufficiency  ABG on 7/3/2021, pH 7.42/PCO2 41/PO2 53/HCO3 26   acute COPD exacerbation  Acute bronchitis  No PE on CT 7/2/2021  Fibronodular disease possible related to history of smoking      Echo 3/12/2021  Mild pulmonary hypertension RVSP 36, EF 60% with diastolic dysfunction  History of hyperlipidemia, hypertension, diabetes mellitus, arthritis      Recommendations:     Hypoxia is multifactorial   Antibiotics continue azithromycin and Rocephin  Steroids IV Solu-Medrol 60 mg every 12  Mucinex  Theophylline  Bronchodilators nebulized Pulmicort and DuoNeb    Patient will benefit from PFT as an outpatient as well as sleep study                LOS: 0 days     Subjective     Objective     Vital signs for last 24 hours:  Vitals:    07/04/21 0849 07/04/21 1002 07/04/21 1138 07/04/21 1143   BP:  136/75     BP Location:  Right arm     Patient Position:  Lying     Pulse: 107 115 119 119   Resp: 16 20 18 18   Temp:  98 °F (36.7 °C)     TempSrc:  Oral     SpO2:  91% 94%    Weight:       Height:           Intake/Output last 3 shifts:  I/O last 3 completed shifts:  In: 1496 [P.O.:1496]  Out: -   Intake/Output this shift:  I/O this shift:  In: 600 [P.O.:600]  Out: -       Radiology  Imaging Results (Last 24 Hours)     ** No results found for the last 24 hours. **          Labs:  Results from last 7 days   Lab Units 07/04/21  0520   WBC 10*3/mm3 11.30*   HEMOGLOBIN g/dL 17.3*   HEMATOCRIT % 51.3*   PLATELETS 10*3/mm3 226     Results from last 7 days   Lab Units 07/04/21  0520 07/02/21  1138   SODIUM mmol/L 140  142   POTASSIUM mmol/L 4.4 4.1   CHLORIDE mmol/L 100 106   CO2 mmol/L 27.0 24.0   BUN mg/dL 32* 20   CREATININE mg/dL 0.63 0.52*   CALCIUM mg/dL 9.9 9.4   BILIRUBIN mg/dL  --  0.7   ALK PHOS U/L  --  93   ALT (SGPT) U/L  --  16   AST (SGOT) U/L  --  13   GLUCOSE mg/dL 126* 183*     Results from last 7 days   Lab Units 07/03/21  1528   PH, ARTERIAL pH units 7.422   PO2 ART mm Hg 53.4*   PCO2, ARTERIAL mm Hg 41.0   HCO3 ART mmol/L 26.7     Results from last 7 days   Lab Units 07/02/21  1138   ALBUMIN g/dL 4.10     Results from last 7 days   Lab Units 07/03/21  1632 07/02/21  1138   TROPONIN T ng/mL <0.010 <0.010         Results from last 7 days   Lab Units 07/04/21  0520   MAGNESIUM mg/dL 2.2     Results from last 7 days   Lab Units 07/02/21  1137   INR  1.02   APTT seconds 25.9     Results from last 7 days   Lab Units 07/04/21  0520   TSH uIU/mL 0.291           Meds:   SCHEDULE  atorvastatin, 40 mg, Oral, Nightly  azithromycin, 250 mg, Oral, Q24H  budesonide, 0.5 mg, Nebulization, BID - RT  cefTRIAXone, 1 g, Intravenous, Q24H  cetirizine, 10 mg, Oral, Daily  empagliflozin, 10 mg, Oral, Daily  enoxaparin, 40 mg, Subcutaneous, Daily  famotidine, 20 mg, Oral, Daily  guaiFENesin, 1,200 mg, Oral, Q12H  insulin lispro, 0-24 Units, Subcutaneous, TID AC  ipratropium-albuterol, 3 mL, Nebulization, Q4H - RT  losartan, 100 mg, Oral, Daily  magnesium oxide, 400 mg, Oral, Daily  methylPREDNISolone sodium succinate, 60 mg, Intravenous, Q12H  montelukast, 10 mg, Oral, Nightly  potassium chloride, 10 mEq, Oral, Daily  pramipexole, 0.5 mg, Oral, Nightly  sodium chloride, 10 mL, Intravenous, Q12H  theophylline, 300 mg, Oral, Q24H      Infusions  Pharmacy Consult - Steroid Insulin Protocol,       PRNs  •  acetaminophen **OR** acetaminophen **OR** acetaminophen  •  benzonatate  •  cyclobenzaprine  •  dextrose  •  dextrose  •  doxylamine  •  glucagon (human recombinant)  •  guaiFENesin-dextromethorphan  •  hydrOXYzine  •  insulin lispro  **AND** insulin lispro  •  ipratropium-albuterol  •  melatonin  •  nitroglycerin  •  ondansetron **OR** ondansetron  •  Pharmacy Consult - Steroid Insulin Protocol  •  sodium chloride    Physical Exam:  Physical Exam  Vitals reviewed.   Pulmonary:      Breath sounds: Wheezing present.   Skin:     General: Skin is warm and dry.   Neurological:      Mental Status: She is alert and oriented to person, place, and time.         ROS  Review of Systems   Constitutional: Positive for activity change.   Respiratory: Positive for cough and shortness of breath.        I reviewed the patient's new clinical results.      Electronically signed by JONATHAN Hendrix, 07/04/21 at 14:51 EDT.

## 2021-07-04 NOTE — PLAN OF CARE
Problem: Hypertension Comorbidity  Goal: Blood Pressure in Desired Range  Outcome: Ongoing, Progressing     Problem: Adult Inpatient Plan of Care  Goal: Absence of Hospital-Acquired Illness or Injury  Intervention: Identify and Manage Fall Risk  Recent Flowsheet Documentation  Taken 7/3/2021 2017 by Avery Saini RN  Safety Promotion/Fall Prevention: safety round/check completed  Goal: Optimal Comfort and Wellbeing  Intervention: Provide Person-Centered Care  Recent Flowsheet Documentation  Taken 7/3/2021 2017 by Avery Saini RN  Trust Relationship/Rapport:   care explained   choices provided   emotional support provided   empathic listening provided   questions answered   questions encouraged   reassurance provided     Problem: Adjustment to Illness COPD (Chronic Obstructive Pulmonary Disease)  Goal: Optimal Chronic Illness Coping  Intervention: Support and Optimize Psychosocial Response  Recent Flowsheet Documentation  Taken 7/3/2021 2017 by Avery Saini RN  Supportive Measures: active listening utilized     Problem: Functional Ability Impaired COPD (Chronic Obstructive Pulmonary Disease)  Goal: Optimal Level of Functional [place]  Intervention: Optimize Functional Ability  Recent Flowsheet Documentation  Taken 7/3/2021 2017 by Avery Saini RN  Environmental Support: calm environment promoted   Goal Outcome Evaluation:

## 2021-07-05 LAB
ANA SER QL: NEGATIVE
ANION GAP SERPL CALCULATED.3IONS-SCNC: 12 MMOL/L (ref 5–15)
BASOPHILS # BLD AUTO: 0 10*3/MM3 (ref 0–0.2)
BASOPHILS NFR BLD AUTO: 0.3 % (ref 0–1.5)
BUN SERPL-MCNC: 36 MG/DL (ref 8–23)
BUN/CREAT SERPL: 55.4 (ref 7–25)
CALCIUM SPEC-SCNC: 9 MG/DL (ref 8.6–10.5)
CHLORIDE SERPL-SCNC: 103 MMOL/L (ref 98–107)
CO2 SERPL-SCNC: 25 MMOL/L (ref 22–29)
CREAT SERPL-MCNC: 0.65 MG/DL (ref 0.57–1)
DEPRECATED RDW RBC AUTO: 42.9 FL (ref 37–54)
EOSINOPHIL # BLD AUTO: 0 10*3/MM3 (ref 0–0.4)
EOSINOPHIL NFR BLD AUTO: 0.2 % (ref 0.3–6.2)
ERYTHROCYTE [DISTWIDTH] IN BLOOD BY AUTOMATED COUNT: 13.6 % (ref 12.3–15.4)
GFR SERPL CREATININE-BSD FRML MDRD: 91 ML/MIN/1.73
GLUCOSE BLDC GLUCOMTR-MCNC: 164 MG/DL (ref 70–105)
GLUCOSE BLDC GLUCOMTR-MCNC: 179 MG/DL (ref 70–105)
GLUCOSE BLDC GLUCOMTR-MCNC: 216 MG/DL (ref 70–105)
GLUCOSE BLDC GLUCOMTR-MCNC: 273 MG/DL (ref 70–105)
GLUCOSE SERPL-MCNC: 183 MG/DL (ref 65–99)
HCT VFR BLD AUTO: 48.2 % (ref 34–46.6)
HGB BLD-MCNC: 16.2 G/DL (ref 12–15.9)
LYMPHOCYTES # BLD AUTO: 1.3 10*3/MM3 (ref 0.7–3.1)
LYMPHOCYTES NFR BLD AUTO: 12.1 % (ref 19.6–45.3)
MAGNESIUM SERPL-MCNC: 2.2 MG/DL (ref 1.6–2.4)
MCH RBC QN AUTO: 30.4 PG (ref 26.6–33)
MCHC RBC AUTO-ENTMCNC: 33.5 G/DL (ref 31.5–35.7)
MCV RBC AUTO: 90.6 FL (ref 79–97)
MONOCYTES # BLD AUTO: 0.3 10*3/MM3 (ref 0.1–0.9)
MONOCYTES NFR BLD AUTO: 2.6 % (ref 5–12)
NEUTROPHILS NFR BLD AUTO: 8.9 10*3/MM3 (ref 1.7–7)
NEUTROPHILS NFR BLD AUTO: 84.8 % (ref 42.7–76)
NRBC BLD AUTO-RTO: 0.1 /100 WBC (ref 0–0.2)
PLATELET # BLD AUTO: 206 10*3/MM3 (ref 140–450)
PMV BLD AUTO: 10 FL (ref 6–12)
POTASSIUM SERPL-SCNC: 4.4 MMOL/L (ref 3.5–5.2)
PROCALCITONIN SERPL-MCNC: 0.05 NG/ML (ref 0–0.25)
RBC # BLD AUTO: 5.32 10*6/MM3 (ref 3.77–5.28)
SODIUM SERPL-SCNC: 140 MMOL/L (ref 136–145)
WBC # BLD AUTO: 10.5 10*3/MM3 (ref 3.4–10.8)

## 2021-07-05 PROCEDURE — G0378 HOSPITAL OBSERVATION PER HR: HCPCS

## 2021-07-05 PROCEDURE — 25010000002 ENOXAPARIN PER 10 MG: Performed by: PHYSICIAN ASSISTANT

## 2021-07-05 PROCEDURE — 94799 UNLISTED PULMONARY SVC/PX: CPT

## 2021-07-05 PROCEDURE — 82962 GLUCOSE BLOOD TEST: CPT

## 2021-07-05 PROCEDURE — 83735 ASSAY OF MAGNESIUM: CPT | Performed by: PHYSICIAN ASSISTANT

## 2021-07-05 PROCEDURE — 84145 PROCALCITONIN (PCT): CPT | Performed by: PHYSICIAN ASSISTANT

## 2021-07-05 PROCEDURE — 25010000002 METHYLPREDNISOLONE PER 125 MG: Performed by: INTERNAL MEDICINE

## 2021-07-05 PROCEDURE — 85025 COMPLETE CBC W/AUTO DIFF WBC: CPT | Performed by: PHYSICIAN ASSISTANT

## 2021-07-05 PROCEDURE — 63710000001 INSULIN LISPRO (HUMAN) PER 5 UNITS: Performed by: INTERNAL MEDICINE

## 2021-07-05 PROCEDURE — 80048 BASIC METABOLIC PNL TOTAL CA: CPT | Performed by: PHYSICIAN ASSISTANT

## 2021-07-05 PROCEDURE — 25010000002 CEFTRIAXONE PER 250 MG: Performed by: PHYSICIAN ASSISTANT

## 2021-07-05 PROCEDURE — 99232 SBSQ HOSP IP/OBS MODERATE 35: CPT | Performed by: HOSPITALIST

## 2021-07-05 PROCEDURE — 63710000001 INSULIN REGULAR HUMAN PER 5 UNITS: Performed by: HOSPITALIST

## 2021-07-05 RX ORDER — CALCIUM CARBONATE 200(500)MG
2 TABLET,CHEWABLE ORAL 3 TIMES DAILY PRN
Status: DISCONTINUED | OUTPATIENT
Start: 2021-07-05 | End: 2021-07-07 | Stop reason: HOSPADM

## 2021-07-05 RX ORDER — CALCIUM CARBONATE 200(500)MG
2 TABLET,CHEWABLE ORAL ONCE
Status: DISCONTINUED | OUTPATIENT
Start: 2021-07-05 | End: 2021-07-07 | Stop reason: HOSPADM

## 2021-07-05 RX ADMIN — ATORVASTATIN CALCIUM 40 MG: 40 TABLET, FILM COATED ORAL at 20:40

## 2021-07-05 RX ADMIN — PRAMIPEXOLE DIHYDROCHLORIDE 0.5 MG: 0.25 TABLET ORAL at 20:40

## 2021-07-05 RX ADMIN — IPRATROPIUM BROMIDE AND ALBUTEROL SULFATE 3 ML: 2.5; .5 SOLUTION RESPIRATORY (INHALATION) at 19:00

## 2021-07-05 RX ADMIN — LOSARTAN POTASSIUM 100 MG: 50 TABLET, FILM COATED ORAL at 08:13

## 2021-07-05 RX ADMIN — INSULIN LISPRO 4 UNITS: 100 INJECTION, SOLUTION INTRAVENOUS; SUBCUTANEOUS at 12:08

## 2021-07-05 RX ADMIN — AZITHROMYCIN MONOHYDRATE 250 MG: 250 TABLET ORAL at 08:13

## 2021-07-05 RX ADMIN — BUDESONIDE 0.5 MG: 0.5 INHALANT RESPIRATORY (INHALATION) at 08:44

## 2021-07-05 RX ADMIN — METHYLPREDNISOLONE SODIUM SUCCINATE 60 MG: 125 INJECTION, POWDER, FOR SOLUTION INTRAMUSCULAR; INTRAVENOUS at 03:16

## 2021-07-05 RX ADMIN — BUDESONIDE 0.5 MG: 0.5 INHALANT RESPIRATORY (INHALATION) at 19:08

## 2021-07-05 RX ADMIN — BENZONATATE 200 MG: 100 CAPSULE ORAL at 20:40

## 2021-07-05 RX ADMIN — Medication 10 ML: at 20:53

## 2021-07-05 RX ADMIN — GUAIFENESIN 1200 MG: 600 TABLET, EXTENDED RELEASE ORAL at 08:13

## 2021-07-05 RX ADMIN — INSULIN LISPRO 8 UNITS: 100 INJECTION, SOLUTION INTRAVENOUS; SUBCUTANEOUS at 08:13

## 2021-07-05 RX ADMIN — ENOXAPARIN SODIUM 40 MG: 40 INJECTION SUBCUTANEOUS at 17:28

## 2021-07-05 RX ADMIN — GUAIFENESIN 1200 MG: 600 TABLET, EXTENDED RELEASE ORAL at 20:40

## 2021-07-05 RX ADMIN — INSULIN HUMAN 3 UNITS: 100 INJECTION, SOLUTION PARENTERAL at 17:27

## 2021-07-05 RX ADMIN — Medication 10 ML: at 08:13

## 2021-07-05 RX ADMIN — IPRATROPIUM BROMIDE AND ALBUTEROL SULFATE 3 ML: 2.5; .5 SOLUTION RESPIRATORY (INHALATION) at 16:24

## 2021-07-05 RX ADMIN — METHYLPREDNISOLONE SODIUM SUCCINATE 60 MG: 125 INJECTION, POWDER, FOR SOLUTION INTRAMUSCULAR; INTRAVENOUS at 17:27

## 2021-07-05 RX ADMIN — CEFTRIAXONE SODIUM 1 G: 1 INJECTION, POWDER, FOR SOLUTION INTRAMUSCULAR; INTRAVENOUS at 15:16

## 2021-07-05 RX ADMIN — IPRATROPIUM BROMIDE AND ALBUTEROL SULFATE 3 ML: 2.5; .5 SOLUTION RESPIRATORY (INHALATION) at 12:33

## 2021-07-05 RX ADMIN — EMPAGLIFLOZIN 10 MG: 10 TABLET, FILM COATED ORAL at 08:13

## 2021-07-05 RX ADMIN — GUAIFENESIN AND DEXTROMETHORPHAN 10 ML: 100; 10 SYRUP ORAL at 23:01

## 2021-07-05 RX ADMIN — FAMOTIDINE 20 MG: 20 TABLET, FILM COATED ORAL at 08:13

## 2021-07-05 RX ADMIN — CETIRIZINE HYDROCHLORIDE 10 MG: 10 TABLET, FILM COATED ORAL at 08:13

## 2021-07-05 RX ADMIN — MAGNESIUM OXIDE TAB 400 MG (241.3 MG ELEMENTAL MG) 400 MG: 400 (241.3 MG) TAB at 08:13

## 2021-07-05 RX ADMIN — INSULIN LISPRO 4 UNITS: 100 INJECTION, SOLUTION INTRAVENOUS; SUBCUTANEOUS at 17:27

## 2021-07-05 RX ADMIN — IPRATROPIUM BROMIDE AND ALBUTEROL SULFATE 3 ML: 2.5; .5 SOLUTION RESPIRATORY (INHALATION) at 08:44

## 2021-07-05 RX ADMIN — THEOPHYLLINE ANHYDROUS 300 MG: 300 CAPSULE, EXTENDED RELEASE ORAL at 08:13

## 2021-07-05 RX ADMIN — MONTELUKAST 10 MG: 10 TABLET, FILM COATED ORAL at 20:40

## 2021-07-05 RX ADMIN — POTASSIUM CHLORIDE 10 MEQ: 750 TABLET, EXTENDED RELEASE ORAL at 08:13

## 2021-07-05 NOTE — PROGRESS NOTES
"PULMONARY CRITICAL CARE Progress  NOTE      PATIENT IDENTIFICATION:  Name: Eugenia Madden  MRN: [id number]  :  1955     Age: 65 y.o.  Sex: female    DATE OF Note:  2021   Referring Physician: No admitting provider for patient encounter.                  Subjective:   Feeling better, no new issue, no SOB no chest pain, no nausea or vomiting, no change in bowel habit, no dysuria,  no new  skin rash or itching.      Objective:  tMax 24 hrs: Temp (24hrs), Av °F (36.7 °C), Min:97.4 °F (36.3 °C), Max:98.4 °F (36.9 °C)      Vitals Ranges:   Temp:  [97.4 °F (36.3 °C)-98.4 °F (36.9 °C)] 97.4 °F (36.3 °C)  Heart Rate:  [] 102  Resp:  [18-20] 18  BP: (104-131)/(64-80) 104/65    Intake and Output Last 3 Shifts:   I/O last 3 completed shifts:  In: 1120 [P.O.:1080; I.V.:40]  Out: -     Exam:  /65 (BP Location: Right arm, Patient Position: Lying)   Pulse 102   Temp 97.4 °F (36.3 °C) (Oral)   Resp 18   Ht 160 cm (63\")   Wt 57.9 kg (127 lb 10.3 oz)   LMP  (LMP Unknown)   SpO2 92%   BMI 22.61 kg/m²     General Appearance:     HEENT:  Normocephalic, without obvious abnormality, Conjunctiva/corneas clear,.  Normal external ear canals, Nares normal, no drainage     Neck:  Supple, symmetrical, trachea midline. No JVD.  Lungs /Chest wall:   Bilateral basal rhonchi, respirations unlabored symmetrical wall movement.     Heart:  Regular rate and rhythm, systolic murmur PMI left sternal border  Abdomen: Soft, non-tender, no masses, no organomegaly.    Extremities: Trace edema no clubbing or Cyanosis        Medications:    Current Facility-Administered Medications:   •  acetaminophen (TYLENOL) tablet 650 mg, 650 mg, Oral, Q4H PRN, 650 mg at 21 **OR** acetaminophen (TYLENOL) 160 MG/5ML solution 650 mg, 650 mg, Oral, Q4H PRN **OR** acetaminophen (TYLENOL) suppository 650 mg, 650 mg, Rectal, Q4H PRN, Aden Bowie PA-C  •  atorvastatin (LIPITOR) tablet 40 mg, 40 mg, Oral, Nightly, Nam Momin " Clair Morse MD, 40 mg at 07/04/21 2016  •  azithromycin (ZITHROMAX) tablet 250 mg, 250 mg, Oral, Q24H, Aden Bowie PA-C, 250 mg at 07/05/21 0813  •  benzonatate (TESSALON) capsule 200 mg, 200 mg, Oral, TID PRN, Aden Bowie PA-C, 200 mg at 07/04/21 2222  •  budesonide (PULMICORT) nebulizer solution 0.5 mg, 0.5 mg, Nebulization, BID - RT, Aden Bowie PA-C, 0.5 mg at 07/05/21 0844  •  calcium carbonate (TUMS) chewable tablet 500 mg (200 mg elemental), 2 tablet, Oral, Once, Lora Farooq MD  •  calcium carbonate (TUMS) chewable tablet 500 mg (200 mg elemental), 2 tablet, Oral, TID PRN, Lora Farooq MD  •  cefTRIAXone (ROCEPHIN) 1 g in sodium chloride 0.9 % 100 mL IVPB, 1 g, Intravenous, Q24H, Aden Bowie PA-C, Stopped at 07/04/21 1600  •  cetirizine (zyrTEC) tablet 10 mg, 10 mg, Oral, Daily, Merrill, Nam Morse MD, 10 mg at 07/05/21 0813  •  cyclobenzaprine (FLEXERIL) tablet 10 mg, 10 mg, Oral, TID PRN, Nam Momin MD  •  dextrose (D50W) 25 g/ 50mL Intravenous Solution 25 g, 25 g, Intravenous, Q15 Min PRN, Aden Bowie PA-C  •  dextrose (GLUTOSE) oral gel 15 g, 15 g, Oral, Q15 Min PRN, Aden Bowie PA-C  •  doxylamine (UNISOM) tablet 12.5 mg, 12.5 mg, Oral, Nightly PRN, Nam Momin MD, 12.5 mg at 07/04/21 2226  •  empagliflozin (JARDIANCE) tablet 10 mg, 10 mg, Oral, Daily, Merrill, Nma Morse MD, 10 mg at 07/05/21 0813  •  enoxaparin (LOVENOX) syringe 40 mg, 40 mg, Subcutaneous, Daily, Aden Bowie PA-C, 40 mg at 07/04/21 1515  •  famotidine (PEPCID) tablet 20 mg, 20 mg, Oral, Daily, Nam Momin MD, 20 mg at 07/05/21 0813  •  glucagon (human recombinant) (GLUCAGEN DIAGNOSTIC) injection 1 mg, 1 mg, Subcutaneous, Q15 Min PRN, Aden Bowie PA-C  •  guaiFENesin (MUCINEX) 12 hr tablet 1,200 mg, 1,200 mg, Oral, Q12H, Aden Bowie PA-C, 1,200 mg  at 07/05/21 0813  •  guaiFENesin-dextromethorphan (ROBITUSSIN DM) 100-10 MG/5ML syrup 10 mL, 10 mL, Oral, Q4H PRN, Carlos Claire MD, Stopped at 07/04/21 0234  •  hydrOXYzine (ATARAX) tablet 25 mg, 25 mg, Oral, TID PRN, Nam Momin MD  •  insulin lispro (ADMELOG) injection 0-24 Units, 0-24 Units, Subcutaneous, TID AC, 4 Units at 07/05/21 1208 **AND** insulin lispro (ADMELOG) injection 0-24 Units, 0-24 Units, Subcutaneous, PRN, Nam Momin MD  •  ipratropium-albuterol (DUO-NEB) nebulizer solution 3 mL, 3 mL, Nebulization, Q4H - RT, Aden Bowie PA-C, 3 mL at 07/05/21 1233  •  ipratropium-albuterol (DUO-NEB) nebulizer solution 3 mL, 3 mL, Nebulization, Q2H PRN, Aden Bowie PA-C  •  losartan (COZAAR) tablet 100 mg, 100 mg, Oral, Daily, Nam Momin MD, 100 mg at 07/05/21 0813  •  magnesium oxide (MAG-OX) tablet 400 mg, 400 mg, Oral, Daily, Nam Momin MD, 400 mg at 07/05/21 0813  •  melatonin tablet 5 mg, 5 mg, Oral, Nightly PRN, Aden Bowie PA-C  •  methylPREDNISolone sodium succinate (SOLU-Medrol) injection 60 mg, 60 mg, Intravenous, Q12H, Nam Momin MD, 60 mg at 07/05/21 0316  •  montelukast (SINGULAIR) tablet 10 mg, 10 mg, Oral, Nightly, DrawRonit MD, 10 mg at 07/04/21 2016  •  nitroglycerin (NITROSTAT) SL tablet 0.4 mg, 0.4 mg, Sublingual, Q5 Min PRN, Aden Bowie PA-C  •  ondansetron (ZOFRAN) tablet 4 mg, 4 mg, Oral, Q6H PRN **OR** ondansetron (ZOFRAN) injection 4 mg, 4 mg, Intravenous, Q6H PRN, Aden Bowie PA-C  •  Pharmacy Consult - Steroid Insulin Protocol, , Does not apply, Continuous PRN, Nam Momin MD  •  potassium chloride (K-DUR,KLOR-CON) ER tablet 10 mEq, 10 mEq, Oral, Daily, Nam Momin MD, 10 mEq at 07/05/21 0813  •  pramipexole (MIRAPEX) tablet 0.5 mg, 0.5 mg, Oral, Nightly, Nam Momin  MD Lito, 0.5 mg at 07/04/21 2016  •  sodium chloride 0.9 % flush 10 mL, 10 mL, Intravenous, Q12H, Aden Bowie PA-C, 10 mL at 07/05/21 0813  •  sodium chloride 0.9 % flush 10 mL, 10 mL, Intravenous, PRN, Aden Bowie PA-C  •  theophylline (SUKI-24) 24 hr capsule 300 mg, 300 mg, Oral, Q24H, DrawRonit MD, 300 mg at 07/05/21 0813    Data Review:  All labs (24hrs):   Recent Results (from the past 24 hour(s))   POC Glucose Once    Collection Time: 07/04/21  6:08 PM    Specimen: Blood   Result Value Ref Range    Glucose 267 (H) 70 - 105 mg/dL   POC Glucose Once    Collection Time: 07/04/21  8:48 PM    Specimen: Blood   Result Value Ref Range    Glucose 189 (H) 70 - 105 mg/dL   Magnesium    Collection Time: 07/05/21  3:50 AM    Specimen: Blood   Result Value Ref Range    Magnesium 2.2 1.6 - 2.4 mg/dL   Basic Metabolic Panel    Collection Time: 07/05/21  5:33 AM    Specimen: Blood   Result Value Ref Range    Glucose 183 (H) 65 - 99 mg/dL    BUN 36 (H) 8 - 23 mg/dL    Creatinine 0.65 0.57 - 1.00 mg/dL    Sodium 140 136 - 145 mmol/L    Potassium 4.4 3.5 - 5.2 mmol/L    Chloride 103 98 - 107 mmol/L    CO2 25.0 22.0 - 29.0 mmol/L    Calcium 9.0 8.6 - 10.5 mg/dL    eGFR Non African Amer 91 >60 mL/min/1.73    BUN/Creatinine Ratio 55.4 (H) 7.0 - 25.0    Anion Gap 12.0 5.0 - 15.0 mmol/L   Procalcitonin    Collection Time: 07/05/21  5:33 AM    Specimen: Blood   Result Value Ref Range    Procalcitonin 0.05 0.00 - 0.25 ng/mL   CBC Auto Differential    Collection Time: 07/05/21  5:33 AM    Specimen: Blood   Result Value Ref Range    WBC 10.50 3.40 - 10.80 10*3/mm3    RBC 5.32 (H) 3.77 - 5.28 10*6/mm3    Hemoglobin 16.2 (H) 12.0 - 15.9 g/dL    Hematocrit 48.2 (H) 34.0 - 46.6 %    MCV 90.6 79.0 - 97.0 fL    MCH 30.4 26.6 - 33.0 pg    MCHC 33.5 31.5 - 35.7 g/dL    RDW 13.6 12.3 - 15.4 %    RDW-SD 42.9 37.0 - 54.0 fl    MPV 10.0 6.0 - 12.0 fL    Platelets 206 140 - 450 10*3/mm3    Neutrophil % 84.8 (H) 42.7 - 76.0 %     Lymphocyte % 12.1 (L) 19.6 - 45.3 %    Monocyte % 2.6 (L) 5.0 - 12.0 %    Eosinophil % 0.2 (L) 0.3 - 6.2 %    Basophil % 0.3 0.0 - 1.5 %    Neutrophils, Absolute 8.90 (H) 1.70 - 7.00 10*3/mm3    Lymphocytes, Absolute 1.30 0.70 - 3.10 10*3/mm3    Monocytes, Absolute 0.30 0.10 - 0.90 10*3/mm3    Eosinophils, Absolute 0.00 0.00 - 0.40 10*3/mm3    Basophils, Absolute 0.00 0.00 - 0.20 10*3/mm3    nRBC 0.1 0.0 - 0.2 /100 WBC   POC Glucose Once    Collection Time: 07/05/21  7:10 AM    Specimen: Blood   Result Value Ref Range    Glucose 216 (H) 70 - 105 mg/dL   POC Glucose Once    Collection Time: 07/05/21 11:34 AM    Specimen: Blood   Result Value Ref Range    Glucose 179 (H) 70 - 105 mg/dL        Imaging:  XR Chest 1 View  Narrative: DATE OF EXAM:  7/3/2021 3:20 PM     PROCEDURE:  XR CHEST 1 VW-     INDICATIONS:  Persistent severe hypoxemia and shortness of breath; J44.1-Chronic  obstructive pulmonary disease with (acute) exacerbation;  J18.9-Pneumonia, unspecified organism; J98.11-Atelectasis;  R09.02-Hypoxemia cough today     COMPARISON:  2 plain film images chest performed on June 23, 2021     TECHNIQUE:   Single radiographic AP view of the chest was obtained.     FINDINGS:  Single frontal view chest reveals that the heart and mediastinum are  normal. There are bilateral diffuse increased lung markings consistent  with chronic lung disease unchanged since previous study. No evidence of  pneumonia or pleural effusion or pneumothorax or mass right or left  lungs.      Impression:    1. Bilateral diffuse increased lung markings consistent with chronic  lung disease unchanged since previous study.  2. No focal arising in the right or left lungs.     Electronically Signed By-Jovanny Aranda MD On:7/3/2021 3:30 PM  This report was finalized on [id number] by  Jovanny Aranda MD.       ASSESSMENT:     COPD exacerbation (CMS/HCC)   Mixed hyperlipidemia    Essential hypertension       Hypoxemia    Plate-like  atelectasis    Pneumonia of right middle lobe due to infectious organism    Acute exacerbation of chronic obstructive pulmonary disease (COPD) (CMS/[facility])    GERD without esophagitis    Type 2 diabetes mellitus (CMS/[facility])         PLAN:  Encouraged use I-S more frequently flutter valve  O2 support as needed  Continue with  Bronchodilator  Inhaled corticosteroids  Electrolytes/ glycemic control  DVT and GI prophylaxis.    Total Critical care time in direct medical management (   ) minutes  Chip David MD. D, ABSM.     7/5/2021  13:54 EDT

## 2021-07-05 NOTE — PLAN OF CARE
Goal Outcome Evaluation:                   Patient is here for COPD exacerbation, patient is on IV antibiotics and IV steroids. Patient is getting breathing treatments and is currently on 4L oxygen but is not on any at home. No complaints this shift, will continue to monitor.

## 2021-07-05 NOTE — CASE MANAGEMENT/SOCIAL WORK
Discharge Planning Assessment  [facility]     Patient Name: Eugenia Madden  MRN: [id number]  Today's Date: 7/5/2021    Admit Date: 7/2/2021    Discharge Needs Assessment     Row Name 07/05/21 1713       Living Environment    Lives With  spouse    Name(s) of Who Lives With Patient  [place]    Current Living Arrangements  home/apartment/condo    Primary Care Provided by  self    Provides Primary Care For  no one    Quality of Family Relationships  unable to assess    Able to Return to Prior Arrangements  yes       Resource/Environmental Concerns    Resource/Environmental Concerns  none       Transition Planning    Patient/Family Anticipates Transition to  home with family    Patient/Family Anticipated Services at Transition  none    Transportation Anticipated  family or friend will provide       Discharge Needs Assessment    Readmission Within the Last 30 Days  no previous admission in last 30 days    Equipment Currently Used at Home  glucometer    Concerns to be Addressed  denies needs/concerns at this time    Concerns Comments  No home O2 or nebulizer.    Anticipated Changes Related to Illness  none    Discharge Coordination/Progress  DC Plan: Return home with spouse. No home O2 or nebulizer.        Discharge Plan     Row Name 07/05/21 1716       Plan    Plan  DC Plan: Return home with spouse. No home O2 or nebulizer.    Plan Comments  IV antibiotics and steroids. O2 4L pnc. IADLs. Transportation home per spouse. Pharmacy [facility].        Continued Care and Services - Admitted Since 7/2/2021    Coordination has not been started for this encounter.         Demographic Summary     Row Name 07/05/21 1717       General Information    Admission Type  observation    Arrived From  emergency department    Required Notices Provided  Observation Status Notice    Referral Source  admission list    Reason for Consult  discharge planning    Preferred Language  English     Used During This Interaction  no         Functional Status     Row Name 07/05/21 1713       Functional Status    Usual Activity Tolerance  good    Current Activity Tolerance  moderate       Functional Status, IADL    Medications  independent    Meal Preparation  independent    Housekeeping  independent    Laundry  independent    Shopping  independent       Mental Status    General Appearance WDL  WDL       Mental Status Summary    Recent Changes in Mental Status/Cognitive Functioning  no changes        Met with patient in room wearing PPE: mask, goggles.      Maintained distance greater than six feet and spent less than 15 minutes in the room.               Liliya Mena RN

## 2021-07-05 NOTE — PROGRESS NOTES
[facility] Medicine [facility] Daily Progress Note      Hospitalist Team  LOS 0 days      Patient Care Team:  Melisa Taveras MD as PCP - General    Patient Location: 247/1      Subjective   Subjective     Chief Complaint / Subjective  Chief Complaint   Patient presents with   • Shortness of Breath     pt c/o SOA for past 2 weeks that has progressed. pt reports recent cough, congestion.          Brief Synopsis of Hospital Course/HPI    65-year-old female with history of COPD, DM 2, hypertension, kidney stones, restless leg syndrome, who had worsening respiratory distress 14 days with nonproductive cough but with small amount of yellow sputum in the last 24 hours.  She had seen her primary care physician recently.  She had suspicion of allergies to environmental antigens.  She denies shortness of breath on lying down or by night  She denies any night sweats or hemoptysis.  She reports intermittent chest pleuritic pain.  Work-up in the ER with CT angiogram was negative for PE but with left basilar atelectatic changes right middle lobe without obvious pneumonia or congestive heart failure arthropathy.  She had normal proBNP and negative troponin x1  No leukocytosis but she had erythrocytosis and normal platelet count.  EKG shows sinus tachycardia.  She had an echo in March showing normal EF 60% with LVH and diastolic dysfunction grade 1.  Respiratory panel was negative on admission including Covid.         Date:  7/4  Patient reports short period of sleep yesterday/overnight  She denies any worsening probably had some improvement.  Notes reviewed from Dr. Lepe.  7/5  Patient reported that she slept much better last night with decreased shortness of air.  She complained of heartburn to the nursing staff and was given Tums.    ROS  12 point review of systems was reviewed and was negative except as above.    Objective   Objective      Vital Signs  Temp:  [98 °F (36.7 °C)-98.4 °F (36.9 °C)] 98  "°F (36.7 °C)  Heart Rate:  [] 88  Resp:  [18-20] 18  BP: (108-131)/(64-80) 131/80  Oxygen Therapy  SpO2: 95 %  Pulse Oximetry Type: Intermittent  Device (Oxygen Therapy): nasal cannula  Flow (L/min): 4  Oxygen Concentration (%): 36  Flowsheet Rows      First Filed Value   Admission Height  160 cm (63\") Documented at 07/02/2021 1057   Admission Weight  57.4 kg (126 lb 8.7 oz) Documented at 07/02/2021 1057        Intake & Output (last 3 days)       07/02 0701 - 07/03 0700 07/03 0701 - 07/04 0700 07/04 0701 - 07/05 0700 07/05 0701 - 07/06 0700    P.O. 240 1256 1080 240    I.V. (mL/kg)   40 (0.7)     Total Intake(mL/kg) 240 (4.1) 1256 (21.7) 1120 (19.3) 240 (4.1)    Net +240 +1256 +1120 +240            Urine Unmeasured Occurrence  3 x      Stool Unmeasured Occurrence   1 x         Lines, Drains & Airways    Active LDAs     Name:   Placement date:   Placement time:   Site:   Days:    Peripheral IV 07/02/21 1129 Right Antecubital   07/02/21    1129    Antecubital   1                  Physical Exam:    Physical Exam  Vital signs and nurses notes reviewed.  Well-developed well-nourished in no acute distress sitting up in bed awake and alert; mucous membranes moist; sclerae anicteric; lungs with bibasilar rhonchi and otherwise clear to auscultation with decreased air entry bilaterally; CV regular rate and rhythm; abdomen soft nontender nondistended with active bowel sounds; extremities with no edema, cyanosis or calf tenderness; palpable pedal pulses bilaterally; no Banks catheter.        Procedures:              Results Review:     I reviewed the patient's new clinical results.      Lab Results (last 24 hours)     Procedure Component Value Units Date/Time    Blood Culture - Blood, Arm, Right [[id number]] Collected: 07/02/21 1138    Specimen: Blood from Arm, Right Updated: 07/05/21 1200     Blood Culture No growth at 3 days    Blood Culture - Blood, Arm, Left [[id number]] Collected: 07/02/21 1137    Specimen: Blood from " "Arm, Left Updated: 07/05/21 1200     Blood Culture No growth at 3 days    POC Glucose Once [[id number]]  (Abnormal) Collected: 07/05/21 1134    Specimen: Blood Updated: 07/05/21 1136     Glucose 179 mg/dL      Comment: Serial Number: [id number]:  [id number]       HERACLIO [[id number]]  (Normal) Collected: 07/03/21 1632    Specimen: Blood Updated: 07/05/21 1028     Antinuclear Antibodies (HERACLIO) Negative    POC Glucose Once [[id number]]  (Abnormal) Collected: 07/05/21 0710    Specimen: Blood Updated: 07/05/21 0711     Glucose 216 mg/dL      Comment: Serial Number: [id number]:  [id number]       Procalcitonin [[id number]]  (Normal) Collected: 07/05/21 0533    Specimen: Blood Updated: 07/05/21 0605     Procalcitonin 0.05 ng/mL     Narrative:      As a Marker for Sepsis (Non-Neonates):     1. <0.5 ng/mL represents a low risk of severe sepsis and/or septic shock.  2. >2 ng/mL represents a high risk of severe sepsis and/or septic shock.    As a Marker for Lower Respiratory Tract Infections that require antibiotic therapy:  PCT on Admission     Antibiotic Therapy             6-12 Hrs later  >0.5                          Strongly Recommended            >0.25 - <0.5             Recommended  0.1 - 0.25                  Discouraged                       Remeasure/reassess PCT  <0.1                         Strongly Discouraged         Remeasure/reassess PCT      As 28 day mortality risk marker: \"Change in Procalcitonin Result\" (>80% or <=80%) if Day 0 (or Day 1) and Day 4 values are available. Refer to http://www.[employer]-pct-calculator.com/    Change in PCT <=80 %   A decrease of PCT levels below or equal to 80% defines a positive change in PCT test result representing a higher risk for 28-day all-cause mortality of patients diagnosed with severe sepsis or septic shock.    Change in PCT >80 %   A decrease of PCT levels of more than 80% defines a negative change in PCT result representing a lower risk for 28-day all-cause " mortality of patients diagnosed with severe sepsis or septic shock.              Results may be falsely decreased if patient taking Biotin.     Basic Metabolic Panel [[id number]]  (Abnormal) Collected: 07/05/21 0533    Specimen: Blood Updated: 07/05/21 0558     Glucose 183 mg/dL      BUN 36 mg/dL      Creatinine 0.65 mg/dL      Sodium 140 mmol/L      Potassium 4.4 mmol/L      Comment: Slight hemolysis detected by analyzer. Results may be affected.        Chloride 103 mmol/L      CO2 25.0 mmol/L      Calcium 9.0 mg/dL      eGFR Non African Amer 91 mL/min/1.73      BUN/Creatinine Ratio 55.4     Anion Gap 12.0 mmol/L     Narrative:      GFR Normal >60  Chronic Kidney Disease <60  Kidney Failure <15      CBC & Differential [[id number]]  (Abnormal) Collected: 07/05/21 0533    Specimen: Blood Updated: 07/05/21 0541    Narrative:      The following orders were created for panel order CBC & Differential.  Procedure                               Abnormality         Status                     ---------                               -----------         ------                     CBC Auto Differential[[id number]]        Abnormal            Final result                 Please view results for these tests on the individual orders.    CBC Auto Differential [[id number]]  (Abnormal) Collected: 07/05/21 0533    Specimen: Blood Updated: 07/05/21 0541     WBC 10.50 10*3/mm3      RBC 5.32 10*6/mm3      Hemoglobin 16.2 g/dL      Hematocrit 48.2 %      MCV 90.6 fL      MCH 30.4 pg      MCHC 33.5 g/dL      RDW 13.6 %      RDW-SD 42.9 fl      MPV 10.0 fL      Platelets 206 10*3/mm3      Neutrophil % 84.8 %      Lymphocyte % 12.1 %      Monocyte % 2.6 %      Eosinophil % 0.2 %      Basophil % 0.3 %      Neutrophils, Absolute 8.90 10*3/mm3      Lymphocytes, Absolute 1.30 10*3/mm3      Monocytes, Absolute 0.30 10*3/mm3      Eosinophils, Absolute 0.00 10*3/mm3      Basophils, Absolute 0.00 10*3/mm3      nRBC 0.1 /100 WBC     Magnesium [[id number]]   (Normal) Collected: 07/05/21 0350    Specimen: Blood Updated: 07/05/21 0457     Magnesium 2.2 mg/dL     POC Glucose Once [[id number]]  (Abnormal) Collected: 07/04/21 2048    Specimen: Blood Updated: 07/04/21 2049     Glucose 189 mg/dL      Comment: Serial Number: [id number]:  [id number]       POC Glucose Once [[id number]]  (Abnormal) Collected: 07/04/21 1808    Specimen: Blood Updated: 07/04/21 1809     Glucose 267 mg/dL      Comment: Serial Number: [id number]:  [id number]           No results found for: HGBA1C  Results from last 7 days   Lab Units 07/02/21  1137   INR  1.02       Results from last 7 days   Lab Units 07/03/21  1528   PH, ARTERIAL pH units 7.422   PO2 ART mm Hg 53.4*   PCO2, ARTERIAL mm Hg 41.0   HCO3 ART mmol/L 26.7     No results found for: LIPASE  Lab Results   Component Value Date    CHOL 106 06/10/2021    TRIG 71 06/10/2021    HDL 41 06/10/2021    LDL 50 06/10/2021       No results found for: INTRAOP, PREDX, FINALDX, COMDX    Microbiology Results (last 10 days)     Procedure Component Value - Date/Time    S. Pneumo Ag Urine or CSF - Urine, Urine, Clean Catch [[id number]]  (Normal) Collected: 07/02/21 1609    Lab Status: Final result Specimen: Urine, Clean Catch Updated: 07/02/21 1635     Strep Pneumo Ag Negative    Legionella Antigen, Urine - Urine, Urine, Clean Catch [[id number]]  (Normal) Collected: 07/02/21 1609    Lab Status: Final result Specimen: Urine, Clean Catch Updated: 07/02/21 1633     LEGIONELLA ANTIGEN, URINE Negative    Respiratory Panel PCR w/COVID-19(SARS-CoV-2) LAURI/SERENE/PRAVEEN/PAD/COR/MAD/DOROTHY In-House, NP Swab in UTM/VTM, 3-4 HR TAT - Swab, Nasopharynx [[id number]]  (Normal) Collected: 07/02/21 1607    Lab Status: Final result Specimen: Swab from Nasopharynx Updated: 07/02/21 1706     ADENOVIRUS, PCR Not Detected     Coronavirus 229E Not Detected     Coronavirus HKU1 Not Detected     Coronavirus NL63 Not Detected     Coronavirus OC43 Not Detected     COVID19 Not Detected      Human Metapneumovirus Not Detected     Human Rhinovirus/Enterovirus Not Detected     Influenza A PCR Not Detected     Influenza B PCR Not Detected     Parainfluenza Virus 1 Not Detected     Parainfluenza Virus 2 Not Detected     Parainfluenza Virus 3 Not Detected     Parainfluenza Virus 4 Not Detected     RSV, PCR Not Detected     Bordetella pertussis pcr Not Detected     Bordetella parapertussis PCR Not Detected     Chlamydophila pneumoniae PCR Not Detected     Mycoplasma pneumo by PCR Not Detected    Narrative:      In the setting of a positive respiratory panel with a viral infection PLUS a negative procalcitonin without other underlying concern for bacterial infection, consider observing off antibiotics or discontinuation of antibiotics and continue supportive care. If the respiratory panel is positive for atypical bacterial infection (Bordetella pertussis, Chlamydophila pneumoniae, or Mycoplasma pneumoniae), consider antibiotic de-escalation to target atypical bacterial infection.    Blood Culture - Blood, Arm, Right [[id number]] Collected: 07/02/21 1138    Lab Status: Preliminary result Specimen: Blood from Arm, Right Updated: 07/05/21 1200     Blood Culture No growth at 3 days    Blood Culture - Blood, Arm, Left [[id number]] Collected: 07/02/21 1137    Lab Status: Preliminary result Specimen: Blood from Arm, Left Updated: 07/05/21 1200     Blood Culture No growth at 3 days          ECG/EMG Results (most recent)     Procedure Component Value Units Date/Time    ECG 12 Lead [[id number]] Collected: 07/02/21 1115     Updated: 07/04/21 0952     QT Interval 359 ms     Narrative:      HEART RATE= 106  bpm  RR Interval= 568  ms  IA Interval= 170  ms  P Horizontal Axis= 17  deg  P Front Axis= 75  deg  QRSD Interval= 82  ms  QT Interval= 359  ms  QRS Axis= 22  deg  T Wave Axis= 57  deg  - OTHERWISE NORMAL ECG -  Sinus tachycardia  No previous ECG available for comparison  Electronically Signed By: Dillan Hernandez (Paramjit)  04-Jul-2021 09:37:53  Date and Time of Study: 2021-07-02 11:15:09              Results for orders placed during the hospital encounter of 03/12/21    Adult Transthoracic Echo Complete w/ Color, Spectral and Contrast if Necessary Per Protocol    Interpretation Summary  · Left ventricular systolic function is normal.  · Left ventricular ejection fraction is 60 to 65%  · Left ventricular wall thickness is consistent with mild concentric hypertrophy.  · Left ventricular diastolic function is consistent with (grade I) impaired relaxation.      XR Chest 1 View    Result Date: 7/3/2021   1. Bilateral diffuse increased lung markings consistent with chronic lung disease unchanged since previous study. 2. No focal arising in the right or left lungs.  Electronically Signed By-Jovanny Aranda MD On:7/3/2021 3:30 PM This report was finalized on [id number] by  Jovanny Aranda MD.    CT Chest Pulmonary Embolism    Result Date: 7/2/2021   1. No evidence of pulmonary embolism 2. Left basilar atelectasis is noted with right middle lobe atelectasis. No obvious pneumonia or gross congestive changes noted. No adenopathy is noted.    Electronically Signed By-Aden Gaffney MD On:7/2/2021 1:14 PM This report was finalized on [id number] by  Aden Gaffney MD.          Xrays, labs reviewed personally by physician.    Medication Review:   I have reviewed the patient's current medication list      Scheduled Meds  atorvastatin, 40 mg, Oral, Nightly  azithromycin, 250 mg, Oral, Q24H  budesonide, 0.5 mg, Nebulization, BID - RT  calcium carbonate, 2 tablet, Oral, Once  cefTRIAXone, 1 g, Intravenous, Q24H  cetirizine, 10 mg, Oral, Daily  empagliflozin, 10 mg, Oral, Daily  enoxaparin, 40 mg, Subcutaneous, Daily  famotidine, 20 mg, Oral, Daily  guaiFENesin, 1,200 mg, Oral, Q12H  insulin lispro, 0-24 Units, Subcutaneous, TID AC  ipratropium-albuterol, 3 mL, Nebulization, Q4H - RT  losartan, 100 mg, Oral, Daily  magnesium oxide,  400 mg, Oral, Daily  methylPREDNISolone sodium succinate, 60 mg, Intravenous, Q12H  montelukast, 10 mg, Oral, Nightly  potassium chloride, 10 mEq, Oral, Daily  pramipexole, 0.5 mg, Oral, Nightly  sodium chloride, 10 mL, Intravenous, Q12H  theophylline, 300 mg, Oral, Q24H        Meds Infusions  Pharmacy Consult - Steroid Insulin Protocol,         Meds PRN  •  acetaminophen **OR** acetaminophen **OR** acetaminophen  •  benzonatate  •  calcium carbonate  •  cyclobenzaprine  •  dextrose  •  dextrose  •  doxylamine  •  glucagon (human recombinant)  •  guaiFENesin-dextromethorphan  •  hydrOXYzine  •  insulin lispro **AND** insulin lispro  •  ipratropium-albuterol  •  melatonin  •  nitroglycerin  •  ondansetron **OR** ondansetron  •  Pharmacy Consult - Steroid Insulin Protocol  •  sodium chloride        Assessment/Plan   Assessment/Plan     Active Hospital Problems    Diagnosis  POA   • Hypoxemia [R09.02]  Yes   • Plate-like atelectasis [J98.11]  Yes   • Pneumonia of right middle lobe due to infectious organism [J18.9]  Yes   • Acute exacerbation of chronic obstructive pulmonary disease (COPD) (CMS/[facility]) [J44.1]  Yes   • GERD without esophagitis [K21.9]  Yes   • Type 2 diabetes mellitus (CMS/[facility]) [E11.9]  Yes   • COPD exacerbation (CMS/[facility]) [J44.1]  Yes   • Essential hypertension [I10]  Yes   • Pulmonary emphysema (CMS/[facility]) [J43.9]  Yes   • Mixed hyperlipidemia [E78.2]  Yes      Resolved Hospital Problems   No resolved problems to display.       MEDICAL DECISION MAKING COMPLEXITY BY PROBLEM:     Acute COPD exacerbation  Acute respiratory failure with hypoxemia  Acute bronchitis  -No PE on CT angiogram   -Atelectasis noted on CT right middle lobe  -Continue steroids and antibiotics and bronchodilators  -Repeat imaging with chest x-ray showing some congestion.  Given 1 dose of Lasix.  -Blood cultures negative  -Urine antigens negative  -Respiratory panel negative including COVID-19  -Reviewed  ABG  -Stiolto on  discharge  -Antibiotics with Rocephin and Zithromax  -Theophylline added by Dr. 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  -Brother passed away w CF        DM type 2  -Continue correction insulin  -Hold glimepiride and Metformin  -Continue Jardiance  -Ozempic at home  -steroid correction    Essential hypertension, chronic and controlled   -Continue losartan    Mild sinus tachycardia probably related to her respiratory disease  Chronic diastolic congestive heart failure secondary to hypertensive heart disease  -Troponin negative x2  -Recent echo 3/2021 showed grade 1 diastolic dysfunction with impaired relaxation, LVEF 60 to 65%, mild concentric hypertrophy    History of kidney stones    Restless legs syndrome  -Continue pramipexole     Chronic muscle spasms   -Continue Flexeril     Dyslipidemia   -Continue Lipitor    History of skin allergies requiring multiple medications last year.    VTE Prophylaxis -   Mechanical Order History:     None      Pharmalogical Order History:      Ordered     Dose Route Frequency Stop    07/02/21 8429  enoxaparin (LOVENOX) syringe 40 mg      40 mg SC Daily --                  Code Status -   Code Status and Medical Interventions:   Ordered at: 07/02/21 9042     Code Status:    CPR     Medical Interventions (Level of Support Prior to Arrest):    Full       This patient has been examined wearing appropriate Personal Protective Equipment and discussed with hospital infection control department. 07/05/21        Discharge Planning  Home        Electronically signed by Lora Farooq MD, 07/05/21, 12:19 EDT.  [facility]ist Team

## 2021-07-05 NOTE — CONSULTS
"Nutrition Services    Patient Name: Eugenia Madden  YOB: 1955  MRN: [id number]  Admission date: 7/2/2021    Remove Healthy Heart diet restrictions  Adding Boost pudding BID    PPE Documentation        PPE Worn By Provider Mask, eyewear, gloves   PPE Worn By Patient  None     CLINICAL NUTRITION ASSESSMENT       Reason for Assessment 7/5: MST 2     H&P:      Past Medical History:   Diagnosis Date   • Arthritis    • Diabetes mellitus (CMS/HCC)    • Hyperlipidemia    • Hypertension    • Kidney stones    • Restless leg        Past Surgical History:   Procedure Laterality Date   • CERVIX SURGERY  1983    Dysplasia removed from Cervix   • KIDNEY STONE SURGERY  2016    Lithotripsy   • OTHER SURGICAL HISTORY  2007    Pinched nerve arm    • TUBAL ABDOMINAL LIGATION Bilateral         Current Problems:   COPD exacerbation  Acute respiratory failure with hypoxemia  Pneumonia of right middle lobe due to infectious organism   Repeat imaging with chest x-ray showing some congestion.    Respiratory panel negative including COVID-19    Type 2 diabetes mellitus  Essential hypertension         Nutrition/Diet History         Narrative     7/5: RD visited patient at bedside. Pt reports eating at least 2 meals per day, with protein at each meal. States she has always been slender but has lost some weight recently. Pt states her appetite has increased since admission, EMR confirmed pt eating well. Will continue to monitor. NFPE conducted revealing no signs of malnutrition at this time. Note* pt reported very slender at baseline, though adequate muscle remains upon NFPE. RD offered boost pudding/magic cup to help deter any further weight loss and patient agreed, BID.       Functional Status Independent     Food Allergies NKFA     Factors Affecting   Nutritional Intake Poor appetite reported, SOB/COPD     Anthropometrics        Current Height, Weight Height: 160 cm (63\")  Weight: 57.9 kg (127 lb 10.3 oz) (07/05/21 0600)        " "Admit Height, Weight -    Flowsheet Rows      First Filed Value   Admission Height  160 cm (63\") Documented at 07/02/2021 1057   Admission Weight  57.4 kg (126 lb 8.7 oz) Documented at 07/02/2021 1057             Ideal Body Weight (IBW) 115lbs, 52.2kg   % Ideal Body Weight 110%       Usual Body Weight UTD   % Usual Body Weight UTD   Wt Change Observation 7/5: Weight stable this admission.     PTA:  Weight loss of 8.6% in 4 months, 3.2% weight loss in one month.   Weight Hx    Wt Readings from Last 30 Encounters:   07/05/21 0600 57.9 kg (127 lb 10.3 oz)   07/03/21 0614 57.9 kg (127 lb 9.6 oz)   07/02/21 1725 57.6 kg (126 lb 15.8 oz)   07/02/21 1057 57.4 kg (126 lb 8.7 oz)   06/18/21 1530 60.1 kg (132 lb 6.4 oz)   06/10/21 0848 60.9 kg (134 lb 3.2 oz)   05/13/21 1150 62.1 kg (137 lb)   03/12/21 1242 63.2 kg (139 lb 5.3 oz)   03/04/21 0854 63.2 kg (139 lb 6.4 oz)   12/08/20 1409 61.7 kg (136 lb)   09/02/20 0923 61.2 kg (135 lb)   08/06/20 1025 63 kg (139 lb)   03/05/20 0822 64.8 kg (142 lb 12.8 oz)   02/25/20 1150 65.1 kg (143 lb 9.6 oz)   12/02/19 0807 64 kg (141 lb 3.2 oz)   09/24/19 0910 62.8 kg (138 lb 6.4 oz)   09/03/19 0837 63.8 kg (140 lb 9.6 oz)   08/29/19 1038 64.1 kg (141 lb 6.4 oz)   08/05/19 1521 63.5 kg (140 lb)   06/24/19 0850 63.2 kg (139 lb 6.4 oz)   03/12/19 1051 63.3 kg (139 lb 8 oz)   02/14/19 1350 63.5 kg (140 lb)   12/11/18 1023 61.2 kg (135 lb)   08/16/18 1103 60.9 kg (134 lb 4 oz)   01/03/18 1437 60.3 kg (133 lb)   10/13/17 0922 60.5 kg (133 lb 6.1 oz)   10/04/17 1121 59.9 kg (132 lb)   08/10/17 1003 60.3 kg (133 lb)   06/26/17 0845 61 kg (134 lb 6.1 oz)   03/02/17 1007 61.4 kg (135 lb 6.1 oz)        BMI kg/m2 Body mass index is 22.61 kg/m².       Labs/Medications         Pertinent Labs Reviewed   Results from last 7 days   Lab Units 07/05/21  0533 07/04/21  0520 07/03/21  0459 07/02/21  1138   SODIUM mmol/L 140 140 142 142   POTASSIUM mmol/L 4.4 4.4 3.7 4.1   CHLORIDE mmol/L 103 100 105 106 " "  CO2 mmol/L 25.0 27.0 24.0 24.0   BUN mg/dL 36* 32* 21 20   CREATININE mg/dL 0.65 0.63 0.60 0.52*   CALCIUM mg/dL 9.0 9.9 9.2 9.4   BILIRUBIN mg/dL  --   --   --  0.7   ALK PHOS U/L  --   --   --  93   ALT (SGPT) U/L  --   --   --  16   AST (SGOT) U/L  --   --   --  13   GLUCOSE mg/dL 183* 126* 67 183*     Results from last 7 days   Lab Units 07/05/21  0533 07/05/21  0350 07/04/21  0520 07/03/21  0459   MAGNESIUM mg/dL  --  2.2 2.2 2.0   HEMOGLOBIN g/dL 16.2*  --  17.3* 16.3*   HEMATOCRIT % 48.2*  --  51.3* 47.7*     COVID19   Date Value Ref Range Status   07/02/2021 Not Detected Not Detected - Ref. Range Final     Lab Results   Component Value Date    HGBA1C 6.5 (H) 06/10/2021         Pertinent Medications Zithromax, Jardiance, Pepcid, Admelog, Cozaar, Mag-Ox, Solumedrol, K-DUR, Jonas-24     Physical Findings        Overall Physical   Appearance, MSA 7/5: Pt is slender at baseline. NFPE reveals no signs of malnutrition at this time, adequate muscle stores palpable.    -  Edema  None recorded     Gastrointestinal BM 7/4     Tubes N/A     Oral/Mouth Cavity WNL     Skin In tact       Estimated/Assessed Needs       Energy Requirements    Height for Calculation  Height: 160 cm (63\")   Weight for Calculation -   Method for Estimation  -   EST Needs (kcal/day) -       Protein Requirements    Weight for Calculation -   EST Protein Needs (g/kg) -   EST Daily Needs (g/day) -       Fluid Requirements     Estimated Needs (mL/day) -       Fluid Deficit    Current Na Level (mEq/L) -   Desired Na Level (mEq/L) -   Estimated Fluid Deficit (L)  -     Current Nutrition Orders & Evaluation of Received Nutrient/Fluid Intake       Oral Nutrition     Current PO Diet Diet Cardiac, Diabetic/Consistent Carbs; Healthy Heart; Diabetic - Consistent Carb   Supplement None   PO Evaluation     % PO Intake %    -  Enteral Nutrition    Enteral Route -   TF Modular -   TF Delivery Method -   Current Ordered TF  -   Current Ordered H2O flush  " -    TF Observation  -   EN Evaluation     TF Changes -    TF Residual -    TF Tolerance -    Average EN Delivered -       Parenteral Nutrition     TPN Route -   Current Ordered TPN VOL  -   Dextrose (g/kcals)  -   Amino Acid (g/kcals) -   Lipids (mL/Concentration/FREQ)  -   MVI Frequency  -   Trace Element Frequency  -   TPN Observation  -    TPN Evaluation    Total # Days on TPN -   -  Clinical Course    Nutrition Course Details CC, HH since admission     Nutritional Risk Screening        NRS-2002 Score   -       Nutrition Diagnosis         Nutrition Dx Problem 1 Unintentional weight loss r/t decreased intake in the setting of chronic illness and appetite loss AEB weight loss of 8.6% in 4 month.      Nutrition Dx Problem 2 -       Intervention Goal         Intervention Goal(s) Continue PO %     Nutrition Intervention        RD/Tech Action Remove HH diet order    Adding ONS regimen     Nutrition Prescription          Diet Prescription Consistent carb   Supplement Prescription Boost Pudding BID   -  Enteral Prescription -       TPN Prescription -     Monitor/Evaluation        Monitor PO intake, BM, labs, weight     Electronically signed by:  Judie Wilburn RD  07/05/21 11:25 EDT

## 2021-07-05 NOTE — PLAN OF CARE
Problem: Hypertension Comorbidity  Goal: Blood Pressure in Desired Range  Outcome: Ongoing, Progressing  Intervention: Maintain Hypertension-Management Strategies  Recent Flowsheet Documentation  Taken 7/4/2021 1916 by Sheng Sims RN  Syncope Management: head lowered  Medication Review/Management: medications reviewed     Problem: Adult Inpatient Plan of Care  Goal: Plan of Care Review  Outcome: Ongoing, Progressing  Goal: Patient-Specific Goal (Individualized)  Outcome: Ongoing, Progressing  Goal: Absence of Hospital-Acquired Illness or Injury  Outcome: Ongoing, Progressing  Intervention: Identify and Manage Fall Risk  Recent Flowsheet Documentation  Taken 7/4/2021 1916 by Sheng Sims RN  Safety Promotion/Fall Prevention:   activity supervised   assistive device/personal items within reach   clutter free environment maintained   fall prevention program maintained   safety round/check completed   room organization consistent  Intervention: Prevent Skin Injury  Recent Flowsheet Documentation  Taken 7/4/2021 1916 by Sheng Sims RN  Body Position: position changed independently  Skin Protection:   adhesive use limited   skin-to-device areas padded   skin-to-skin areas padded  Intervention: Prevent Infection  Recent Flowsheet Documentation  Taken 7/4/2021 1916 by Sheng Sims RN  Infection Prevention:   environmental surveillance performed   equipment surfaces disinfected   hand hygiene promoted   rest/sleep promoted   single patient room provided  Goal: Optimal Comfort and Wellbeing  Outcome: Ongoing, Progressing  Intervention: Provide Person-Centered Care  Recent Flowsheet Documentation  Taken 7/4/2021 1916 by Sheng Sims RN  Trust Relationship/Rapport: care explained  Goal: Readiness for Transition of Care  Outcome: Ongoing, Progressing   Goal Outcome Evaluation:

## 2021-07-06 LAB
GLUCOSE BLDC GLUCOMTR-MCNC: 163 MG/DL (ref 70–105)
GLUCOSE BLDC GLUCOMTR-MCNC: 228 MG/DL (ref 70–105)
GLUCOSE BLDC GLUCOMTR-MCNC: 266 MG/DL (ref 70–105)
PROCALCITONIN SERPL-MCNC: 0.06 NG/ML (ref 0–0.25)

## 2021-07-06 PROCEDURE — 25010000002 CEFTRIAXONE PER 250 MG: Performed by: PHYSICIAN ASSISTANT

## 2021-07-06 PROCEDURE — 84145 PROCALCITONIN (PCT): CPT | Performed by: PHYSICIAN ASSISTANT

## 2021-07-06 PROCEDURE — 25010000002 METHYLPREDNISOLONE PER 125 MG: Performed by: INTERNAL MEDICINE

## 2021-07-06 PROCEDURE — 94799 UNLISTED PULMONARY SVC/PX: CPT

## 2021-07-06 PROCEDURE — 99232 SBSQ HOSP IP/OBS MODERATE 35: CPT | Performed by: HOSPITALIST

## 2021-07-06 PROCEDURE — 82962 GLUCOSE BLOOD TEST: CPT

## 2021-07-06 PROCEDURE — 63710000001 INSULIN REGULAR HUMAN PER 5 UNITS: Performed by: HOSPITALIST

## 2021-07-06 PROCEDURE — 25010000002 ENOXAPARIN PER 10 MG: Performed by: PHYSICIAN ASSISTANT

## 2021-07-06 PROCEDURE — 63710000001 INSULIN LISPRO (HUMAN) PER 5 UNITS: Performed by: INTERNAL MEDICINE

## 2021-07-06 RX ORDER — METHYLPREDNISOLONE SODIUM SUCCINATE 40 MG/ML
40 INJECTION, POWDER, LYOPHILIZED, FOR SOLUTION INTRAMUSCULAR; INTRAVENOUS EVERY 12 HOURS
Status: DISCONTINUED | OUTPATIENT
Start: 2021-07-07 | End: 2021-07-07 | Stop reason: HOSPADM

## 2021-07-06 RX ADMIN — IPRATROPIUM BROMIDE AND ALBUTEROL SULFATE 3 ML: 2.5; .5 SOLUTION RESPIRATORY (INHALATION) at 19:57

## 2021-07-06 RX ADMIN — THEOPHYLLINE ANHYDROUS 300 MG: 300 CAPSULE, EXTENDED RELEASE ORAL at 08:31

## 2021-07-06 RX ADMIN — ENOXAPARIN SODIUM 40 MG: 40 INJECTION SUBCUTANEOUS at 16:46

## 2021-07-06 RX ADMIN — FAMOTIDINE 20 MG: 20 TABLET, FILM COATED ORAL at 08:31

## 2021-07-06 RX ADMIN — EMPAGLIFLOZIN 10 MG: 10 TABLET, FILM COATED ORAL at 08:31

## 2021-07-06 RX ADMIN — INSULIN LISPRO 4 UNITS: 100 INJECTION, SOLUTION INTRAVENOUS; SUBCUTANEOUS at 08:30

## 2021-07-06 RX ADMIN — Medication 10 ML: at 21:06

## 2021-07-06 RX ADMIN — MAGNESIUM OXIDE TAB 400 MG (241.3 MG ELEMENTAL MG) 400 MG: 400 (241.3 MG) TAB at 08:31

## 2021-07-06 RX ADMIN — GUAIFENESIN 1200 MG: 600 TABLET, EXTENDED RELEASE ORAL at 21:06

## 2021-07-06 RX ADMIN — IPRATROPIUM BROMIDE AND ALBUTEROL SULFATE 3 ML: 2.5; .5 SOLUTION RESPIRATORY (INHALATION) at 11:52

## 2021-07-06 RX ADMIN — CEFTRIAXONE SODIUM 1 G: 1 INJECTION, POWDER, FOR SOLUTION INTRAMUSCULAR; INTRAVENOUS at 16:46

## 2021-07-06 RX ADMIN — BUDESONIDE 0.5 MG: 0.5 INHALANT RESPIRATORY (INHALATION) at 19:56

## 2021-07-06 RX ADMIN — INSULIN LISPRO 12 UNITS: 100 INJECTION, SOLUTION INTRAVENOUS; SUBCUTANEOUS at 12:06

## 2021-07-06 RX ADMIN — MONTELUKAST 10 MG: 10 TABLET, FILM COATED ORAL at 21:06

## 2021-07-06 RX ADMIN — ATORVASTATIN CALCIUM 40 MG: 40 TABLET, FILM COATED ORAL at 21:06

## 2021-07-06 RX ADMIN — GUAIFENESIN AND DEXTROMETHORPHAN 10 ML: 100; 10 SYRUP ORAL at 04:19

## 2021-07-06 RX ADMIN — CETIRIZINE HYDROCHLORIDE 10 MG: 10 TABLET, FILM COATED ORAL at 08:31

## 2021-07-06 RX ADMIN — INSULIN LISPRO 8 UNITS: 100 INJECTION, SOLUTION INTRAVENOUS; SUBCUTANEOUS at 17:07

## 2021-07-06 RX ADMIN — Medication 10 ML: at 08:31

## 2021-07-06 RX ADMIN — BUDESONIDE 0.5 MG: 0.5 INHALANT RESPIRATORY (INHALATION) at 08:15

## 2021-07-06 RX ADMIN — IPRATROPIUM BROMIDE AND ALBUTEROL SULFATE 3 ML: 2.5; .5 SOLUTION RESPIRATORY (INHALATION) at 08:15

## 2021-07-06 RX ADMIN — IPRATROPIUM BROMIDE AND ALBUTEROL SULFATE 3 ML: 2.5; .5 SOLUTION RESPIRATORY (INHALATION) at 14:27

## 2021-07-06 RX ADMIN — IPRATROPIUM BROMIDE AND ALBUTEROL SULFATE 3 ML: 2.5; .5 SOLUTION RESPIRATORY (INHALATION) at 23:36

## 2021-07-06 RX ADMIN — INSULIN HUMAN 3 UNITS: 100 INJECTION, SOLUTION PARENTERAL at 04:17

## 2021-07-06 RX ADMIN — DOXYLAMINE SUCCINATE 12.5 MG: 25 TABLET ORAL at 00:04

## 2021-07-06 RX ADMIN — GUAIFENESIN 1200 MG: 600 TABLET, EXTENDED RELEASE ORAL at 08:31

## 2021-07-06 RX ADMIN — BENZONATATE 200 MG: 100 CAPSULE ORAL at 21:11

## 2021-07-06 RX ADMIN — AZITHROMYCIN MONOHYDRATE 250 MG: 250 TABLET ORAL at 08:31

## 2021-07-06 RX ADMIN — PRAMIPEXOLE DIHYDROCHLORIDE 0.5 MG: 0.25 TABLET ORAL at 21:06

## 2021-07-06 RX ADMIN — POTASSIUM CHLORIDE 10 MEQ: 750 TABLET, EXTENDED RELEASE ORAL at 08:31

## 2021-07-06 RX ADMIN — LOSARTAN POTASSIUM 100 MG: 50 TABLET, FILM COATED ORAL at 08:31

## 2021-07-06 RX ADMIN — METHYLPREDNISOLONE SODIUM SUCCINATE 60 MG: 125 INJECTION, POWDER, FOR SOLUTION INTRAMUSCULAR; INTRAVENOUS at 04:17

## 2021-07-06 NOTE — PLAN OF CARE
Goal Outcome Evaluation:  Plan of Care Reviewed With: patient        Progress: improving  Outcome Summary: pt still on 4l n/c, pt complaint of cough, gave cough medicine with codeine per mar along with tessalon perles, pt aa&ox4, vss, will continue to monitor

## 2021-07-06 NOTE — PROGRESS NOTES
[facility] Daily Progress Note      Hospitalist Team  LOS 0 days      Patient Care Team:  Melisa Taveras MD as PCP - General    Patient Location: 247/1      Subjective   Subjective     Chief Complaint / Subjective  Chief Complaint   Patient presents with   • Shortness of Breath     pt c/o SOA for past 2 weeks that has progressed. pt reports recent cough, congestion.          Brief Synopsis of Hospital Course/HPI    65-year-old female with history of COPD, DM 2, hypertension, kidney stones, restless leg syndrome, who had worsening respiratory distress 14 days with nonproductive cough but with small amount of yellow sputum in the last 24 hours.  She had seen her primary care physician recently.  She had suspicion of allergies to environmental antigens.  She denies shortness of breath on lying down or by night  She denies any night sweats or hemoptysis.  She reports intermittent chest pleuritic pain.  Work-up in the ER with CT angiogram was negative for PE but with left basilar atelectatic changes right middle lobe without obvious pneumonia or congestive heart failure arthropathy.  She had normal proBNP and negative troponin x1  No leukocytosis but she had erythrocytosis and normal platelet count.  EKG shows sinus tachycardia.  She had an echo in March showing normal EF 60% with LVH and diastolic dysfunction grade 1.  Respiratory panel was negative on admission including Covid.         Date:  7/4  Patient reports short period of sleep yesterday/overnight  She denies any worsening probably had some improvement.  Notes reviewed from Dr. Lepe.  7/5  Patient reported that she slept much better last night with decreased shortness of air.  She complained of heartburn to the nursing staff and was given Tums.  7/6  Patient reports feeling much better today.  She reports that she was able to get a large clump of thick phlegm out from her lungs which has significantly improved  "her ability to breathe deeply.  She is hoping to get to go home soon.      ROS  12 point review of systems was reviewed and was negative except as above.    Objective   Objective      Vital Signs  Temp:  [97.4 °F (36.3 °C)-98.2 °F (36.8 °C)] 97.8 °F (36.6 °C)  Heart Rate:  [] 99  Resp:  [15-19] 19  BP: (104-138)/(64-80) 138/73  Oxygen Therapy  SpO2: 94 %  Pulse Oximetry Type: Intermittent  Device (Oxygen Therapy): nasal cannula  Flow (L/min): 2  Oxygen Concentration (%): 36  Flowsheet Rows      First Filed Value   Admission Height  160 cm (63\") Documented at 07/02/2021 1057   Admission Weight  57.4 kg (126 lb 8.7 oz) Documented at 07/02/2021 1057        Intake & Output (last 3 days)       07/03 0701 - 07/04 0700 07/04 0701 - 07/05 0700 07/05 0701 - 07/06 0700 07/06 0701 - 07/07 0700    P.O. 1256 1080 720 240    I.V. (mL/kg)  40 (0.7)      Total Intake(mL/kg) 1256 (21.7) 1120 (19.3) 720 (12.5) 240 (4.2)    Net +1256 +1120 +720 +240            Urine Unmeasured Occurrence 3 x       Stool Unmeasured Occurrence  1 x          Lines, Drains & Airways    Active LDAs     Name:   Placement date:   Placement time:   Site:   Days:    Peripheral IV 07/02/21 1129 Right Antecubital   07/02/21    1129    Antecubital   1                  Physical Exam:    Physical Exam  Vital signs and nurses notes reviewed.  Well-developed well-nourished in no acute distress sitting up in bed awake and alert; mucous membranes moist; sclerae anicteric; lungs with decreased air entry and clear to auscultation bilaterally; CV regular rate and rhythm; abdomen soft nontender nondistended with active bowel sounds; extremities with no edema, cyanosis or calf tenderness; palpable pedal pulses bilaterally; no Banks catheter.        Procedures:              Results Review:     I reviewed the patient's new clinical results.      Lab Results (last 24 hours)     Procedure Component Value Units Date/Time    POC Glucose Once [[id number]]  (Abnormal) " "Collected: 07/06/21 0728    Specimen: Blood Updated: 07/06/21 0729     Glucose 163 mg/dL      Comment: Serial Number: [id number]:  [id number]       Procalcitonin [[id number]]  (Normal) Collected: 07/06/21 0228    Specimen: Blood Updated: 07/06/21 0321     Procalcitonin 0.06 ng/mL     Narrative:      As a Marker for Sepsis (Non-Neonates):     1. <0.5 ng/mL represents a low risk of severe sepsis and/or septic shock.  2. >2 ng/mL represents a high risk of severe sepsis and/or septic shock.    As a Marker for Lower Respiratory Tract Infections that require antibiotic therapy:  PCT on Admission     Antibiotic Therapy             6-12 Hrs later  >0.5                          Strongly Recommended            >0.25 - <0.5             Recommended  0.1 - 0.25                  Discouraged                       Remeasure/reassess PCT  <0.1                         Strongly Discouraged         Remeasure/reassess PCT      As 28 day mortality risk marker: \"Change in Procalcitonin Result\" (>80% or <=80%) if Day 0 (or Day 1) and Day 4 values are available. Refer to http://[email or web address]-pct-[email or web address]/    Change in PCT <=80 %   A decrease of PCT levels below or equal to 80% defines a positive change in PCT test result representing a higher risk for 28-day all-cause mortality of patients diagnosed with severe sepsis or septic shock.    Change in PCT >80 %   A decrease of PCT levels of more than 80% defines a negative change in PCT result representing a lower risk for 28-day all-cause mortality of patients diagnosed with severe sepsis or septic shock.              Results may be falsely decreased if patient taking Biotin.     POC Glucose Once [[id number]]  (Abnormal) Collected: 07/05/21 2050    Specimen: Blood Updated: 07/05/21 2051     Glucose 273 mg/dL      Comment: Serial Number: [id number]:  [id number]       POC Glucose Once [[id number]]  (Abnormal) Collected: 07/05/21 1649    Specimen: Blood Updated: 07/05/21 1650     " Glucose 164 mg/dL      Comment: Serial Number: [id number]:  [id number]           No results found for: HGBA1C  Results from last 7 days   Lab Units 07/02/21  1137   INR  1.02       Results from last 7 days   Lab Units 07/03/21  1528   PH, ARTERIAL pH units 7.422   PO2 ART mm Hg 53.4*   PCO2, ARTERIAL mm Hg 41.0   HCO3 ART mmol/L 26.7     No results found for: LIPASE  Lab Results   Component Value Date    CHOL 106 06/10/2021    TRIG 71 06/10/2021    HDL 41 06/10/2021    LDL 50 06/10/2021       No results found for: INTRAOP, PREDX, FINALDX, COMDX    Microbiology Results (last 10 days)     Procedure Component Value - Date/Time    S. Pneumo Ag Urine or CSF - Urine, Urine, Clean Catch [[id number]]  (Normal) Collected: 07/02/21 1609    Lab Status: Final result Specimen: Urine, Clean Catch Updated: 07/02/21 1635     Strep Pneumo Ag Negative    Legionella Antigen, Urine - Urine, Urine, Clean Catch [[id number]]  (Normal) Collected: 07/02/21 1609    Lab Status: Final result Specimen: Urine, Clean Catch Updated: 07/02/21 1633     LEGIONELLA ANTIGEN, URINE Negative    Respiratory Panel PCR w/COVID-19(SARS-CoV-2) LAURI/SERENE/PRAVEEN/PAD/COR/MAD/DOROTHY In-House, NP Swab in UTM/VTM, 3-4 HR TAT - Swab, Nasopharynx [[id number]]  (Normal) Collected: 07/02/21 1607    Lab Status: Final result Specimen: Swab from Nasopharynx Updated: 07/02/21 1706     ADENOVIRUS, PCR Not Detected     Coronavirus 229E Not Detected     Coronavirus HKU1 Not Detected     Coronavirus NL63 Not Detected     Coronavirus OC43 Not Detected     COVID19 Not Detected     Human Metapneumovirus Not Detected     Human Rhinovirus/Enterovirus Not Detected     Influenza A PCR Not Detected     Influenza B PCR Not Detected     Parainfluenza Virus 1 Not Detected     Parainfluenza Virus 2 Not Detected     Parainfluenza Virus 3 Not Detected     Parainfluenza Virus 4 Not Detected     RSV, PCR Not Detected     Bordetella pertussis pcr Not Detected     Bordetella parapertussis PCR Not  Detected     Chlamydophila pneumoniae PCR Not Detected     Mycoplasma pneumo by PCR Not Detected    Narrative:      In the setting of a positive respiratory panel with a viral infection PLUS a negative procalcitonin without other underlying concern for bacterial infection, consider observing off antibiotics or discontinuation of antibiotics and continue supportive care. If the respiratory panel is positive for atypical bacterial infection (Bordetella pertussis, Chlamydophila pneumoniae, or Mycoplasma pneumoniae), consider antibiotic de-escalation to target atypical bacterial infection.    Blood Culture - Blood, Arm, Right [[id number]] Collected: 07/02/21 1138    Lab Status: Preliminary result Specimen: Blood from Arm, Right Updated: 07/05/21 1200     Blood Culture No growth at 3 days    Blood Culture - Blood, Arm, Left [[id number]] Collected: 07/02/21 1137    Lab Status: Preliminary result Specimen: Blood from Arm, Left Updated: 07/05/21 1200     Blood Culture No growth at 3 days          ECG/EMG Results (most recent)     Procedure Component Value Units Date/Time    ECG 12 Lead [[id number]] Collected: 07/02/21 1115     Updated: 07/04/21 0952     QT Interval 359 ms     Narrative:      HEART RATE= 106  bpm  RR Interval= 568  ms  PA Interval= 170  ms  P Horizontal Axis= 17  deg  P Front Axis= 75  deg  QRSD Interval= 82  ms  QT Interval= 359  ms  QRS Axis= 22  deg  T Wave Axis= 57  deg  - OTHERWISE NORMAL ECG -  Sinus tachycardia  No previous ECG available for comparison  Electronically Signed By: Dillan Hernandez (Paramjit) 04-Jul-2021 09:37:53  Date and Time of Study: 2021-07-02 11:15:09              Results for orders placed during the hospital encounter of 03/12/21    Adult Transthoracic Echo Complete w/ Color, Spectral and Contrast if Necessary Per Protocol    Interpretation Summary  · Left ventricular systolic function is normal.  · Left ventricular ejection fraction is 60 to 65%  · Left ventricular wall thickness is  consistent with mild concentric hypertrophy.  · Left ventricular diastolic function is consistent with (grade I) impaired relaxation.      XR Chest 1 View    Result Date: 7/3/2021   1. Bilateral diffuse increased lung markings consistent with chronic lung disease unchanged since previous study. 2. No focal arising in the right or left lungs.  Electronically Signed By-Jovanny Aranda MD On:7/3/2021 3:30 PM This report was finalized on [id number] by  Jovanny Aranda MD.    CT Chest Pulmonary Embolism    Result Date: 7/2/2021   1. No evidence of pulmonary embolism 2. Left basilar atelectasis is noted with right middle lobe atelectasis. No obvious pneumonia or gross congestive changes noted. No adenopathy is noted.    Electronically Signed By-Aden Gaffney MD On:7/2/2021 1:14 PM This report was finalized on [id number] by  Aden Gaffney MD.          Xrays, labs reviewed personally by physician.    Medication Review:   I have reviewed the patient's current medication list      Scheduled Meds  atorvastatin, 40 mg, Oral, Nightly  budesonide, 0.5 mg, Nebulization, BID - RT  calcium carbonate, 2 tablet, Oral, Once  cefTRIAXone, 1 g, Intravenous, Q24H  cetirizine, 10 mg, Oral, Daily  empagliflozin, 10 mg, Oral, Daily  enoxaparin, 40 mg, Subcutaneous, Daily  famotidine, 20 mg, Oral, Daily  guaiFENesin, 1,200 mg, Oral, Q12H  insulin lispro, 0-24 Units, Subcutaneous, TID AC  insulin regular, 6 Units, Subcutaneous, Q12H  ipratropium-albuterol, 3 mL, Nebulization, Q4H - RT  losartan, 100 mg, Oral, Daily  magnesium oxide, 400 mg, Oral, Daily  methylPREDNISolone sodium succinate, 60 mg, Intravenous, Q12H  montelukast, 10 mg, Oral, Nightly  potassium chloride, 10 mEq, Oral, Daily  pramipexole, 0.5 mg, Oral, Nightly  sodium chloride, 10 mL, Intravenous, Q12H  theophylline, 300 mg, Oral, Q24H        Meds Infusions  Pharmacy Consult - Steroid Insulin Protocol,         Meds PRN  •  acetaminophen **OR**  acetaminophen **OR** acetaminophen  •  benzonatate  •  calcium carbonate  •  cyclobenzaprine  •  dextrose  •  dextrose  •  doxylamine  •  glucagon (human recombinant)  •  guaiFENesin-dextromethorphan  •  hydrOXYzine  •  insulin lispro **AND** insulin lispro  •  ipratropium-albuterol  •  melatonin  •  nitroglycerin  •  ondansetron **OR** ondansetron  •  Pharmacy Consult - Steroid Insulin Protocol  •  sodium chloride        Assessment/Plan   Assessment/Plan     Active Hospital Problems    Diagnosis  POA   • Hypoxemia [R09.02]  Yes   • Plate-like atelectasis [J98.11]  Yes   • Pneumonia of right middle lobe due to infectious organism [J18.9]  Yes   • Acute exacerbation of chronic obstructive pulmonary disease (COPD) (CMS/[facility]) [J44.1]  Yes   • GERD without esophagitis [K21.9]  Yes   • Type 2 diabetes mellitus (CMS/[facility]) [E11.9]  Yes   • COPD exacerbation (CMS/[facility]) [J44.1]  Yes   • Essential hypertension [I10]  Yes   • Pulmonary emphysema (CMS/[facility]) [J43.9]  Yes   • Mixed hyperlipidemia [E78.2]  Yes      Resolved Hospital Problems   No resolved problems to display.       MEDICAL DECISION MAKING COMPLEXITY BY PROBLEM:     Acute COPD exacerbation  Acute respiratory failure with hypoxemia  Acute bronchitis  Right middle lobe pneumonia (per pulmonary progress note)  -No PE on CT angiogram   -Atelectasis noted on CT right middle lobe  -Continue steroids and antibiotics and bronchodilators  -Repeat imaging with chest x-ray showing some congestion.  Given 1 dose of Lasix.  -Blood cultures negative  -Urine antigens negative  -Respiratory panel negative including COVID-19  -Reviewed  ABG  -Stiolto on discharge  -Antibiotics with Rocephin and Zithromax  -Theophylline added by Dr. 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  -Brother passed away w CF        DM type 2  -Accu-Cheks before meals and at bedtime with SSI  -Hold glimepiride and Metformin  -Continue Jardiance  -Ozempic at home  -Steroid insulin protocol  ordered    Essential hypertension, chronic and controlled   -Continue losartan    Mild sinus tachycardia probably related to her respiratory disease  Chronic diastolic congestive heart failure secondary to hypertensive heart disease  -Troponin negative x2  -Recent echo 3/2021 showed grade 1 diastolic dysfunction with impaired relaxation, LVEF 60 to 65%, mild concentric hypertrophy    History of kidney stones    Restless legs syndrome  -Continue pramipexole     Chronic muscle spasms   -Continue Flexeril     Dyslipidemia   -Continue Lipitor    History of skin allergies requiring multiple medications last year.    VTE Prophylaxis -   Mechanical Order History:     None      Pharmalogical Order History:      Ordered     Dose Route Frequency Stop    07/02/21 1740  enoxaparin (LOVENOX) syringe 40 mg      40 mg SC Daily --                  Code Status -   Code Status and Medical Interventions:   Ordered at: 07/02/21 1552     Code Status:    CPR     Medical Interventions (Level of Support Prior to Arrest):    Full       This patient has been examined wearing appropriate Personal Protective Equipment and discussed with hospital infection control department. 07/06/21        Discharge Planning  Home        Electronically signed by Lora Farooq MD, 07/06/21, 09:49 EDT.  [facility]ist [facility]

## 2021-07-06 NOTE — CASE MANAGEMENT/SOCIAL WORK
Continued Stay Note  [facility]     Patient Name: Eugenia Madden  MRN: [id number]  Today's Date: 7/6/2021    Admit Date: 7/2/2021    Discharge Plan     Row Name 07/06/21 1232       Plan    Plan  Anticipate routine home. Will require walking oximetry prior to d/c.    Plan Comments  DC barriers: IV steroids, IV abx, 4 L NC        Phone communication or documentation only - no physical contact with patient or family.      Juany Baig RN

## 2021-07-06 NOTE — PROGRESS NOTES
"PULMONARY CRITICAL CARE Progress  NOTE      PATIENT IDENTIFICATION:  Name: Eugenia Madden  MRN: [id number]  :  1955     Age: 65 y.o.  Sex: female    DATE OF Note:  2021   Referring Physician: No admitting provider for patient encounter.                  Subjective:   Coughing more but able to bring sputum up  No SOB no chest pain, no nausea or vomiting, no change in bowel habit, no dysuria,  no new  skin rash or itching.      Objective:  tMax 24 hrs: Temp (24hrs), Av.9 °F (36.6 °C), Min:97.4 °F (36.3 °C), Max:98.2 °F (36.8 °C)      Vitals Ranges:   Temp:  [97.4 °F (36.3 °C)-98.2 °F (36.8 °C)] 97.8 °F (36.6 °C)  Heart Rate:  [] 84  Resp:  [15-18] 15  BP: (104-138)/(64-80) 138/73    Intake and Output Last 3 Shifts:   I/O last 3 completed shifts:  In: 1240 [P.O.:1200; I.V.:40]  Out: -     Exam:  /73 (BP Location: Left arm, Patient Position: Lying)   Pulse 84   Temp 97.8 °F (36.6 °C) (Oral)   Resp 15   Ht 160 cm (63\")   Wt 57.8 kg (127 lb 6.8 oz)   LMP  (LMP Unknown)   SpO2 96%   BMI 22.57 kg/m²     General Appearance: Alert awake oriented  HEENT:  Normocephalic, without obvious abnormality, Conjunctiva/corneas clear,.  Normal external ear canals, Nares normal, no drainage     Neck:  Supple, symmetrical, trachea midline. No JVD.  Lungs /Chest wall:   Bilateral basal rhonchi, respirations unlabored symmetrical wall movement.     Heart:  Regular rate and rhythm, systolic murmur PMI left sternal border  Abdomen: Soft, non-tender, no masses, no organomegaly.    Extremities: Trace edema no clubbing or Cyanosis        Medications:    Current Facility-Administered Medications:   •  acetaminophen (TYLENOL) tablet 650 mg, 650 mg, Oral, Q4H PRN, 650 mg at 21 **OR** acetaminophen (TYLENOL) 160 MG/5ML solution 650 mg, 650 mg, Oral, Q4H PRN **OR** acetaminophen (TYLENOL) suppository 650 mg, 650 mg, Rectal, Q4H PRN, Aden Bowie PA-C  •  atorvastatin (LIPITOR) tablet 40 mg, 40 " mg, Oral, Nightly, Nam Momin MD, 40 mg at 07/05/21 2040  •  azithromycin (ZITHROMAX) tablet 250 mg, 250 mg, Oral, Q24H, Aden Bowie PA-C, 250 mg at 07/05/21 0813  •  benzonatate (TESSALON) capsule 200 mg, 200 mg, Oral, TID PRN, Aden Bowie PA-C, 200 mg at 07/05/21 2040  •  budesonide (PULMICORT) nebulizer solution 0.5 mg, 0.5 mg, Nebulization, BID - RT, Aden Bowie PA-C, 0.5 mg at 07/05/21 1908  •  calcium carbonate (TUMS) chewable tablet 500 mg (200 mg elemental), 2 tablet, Oral, Once, Lora Farooq MD  •  calcium carbonate (TUMS) chewable tablet 500 mg (200 mg elemental), 2 tablet, Oral, TID PRN, Lora Farooq MD  •  cefTRIAXone (ROCEPHIN) 1 g in sodium chloride 0.9 % 100 mL IVPB, 1 g, Intravenous, Q24H, Aden Bowie PA-C, Stopped at 07/05/21 1900  •  cetirizine (zyrTEC) tablet 10 mg, 10 mg, Oral, Daily, Nam Momin MD, 10 mg at 07/05/21 0813  •  cyclobenzaprine (FLEXERIL) tablet 10 mg, 10 mg, Oral, TID PRN, Nam Momin MD  •  dextrose (D50W) 25 g/ 50mL Intravenous Solution 25 g, 25 g, Intravenous, Q15 Min PRN, Aden Bowie PA-C  •  dextrose (GLUTOSE) oral gel 15 g, 15 g, Oral, Q15 Min PRN, Aden Bowie PA-C  •  doxylamine (UNISOM) tablet 12.5 mg, 12.5 mg, Oral, Nightly PRN, Nam Moimn MD, 12.5 mg at 07/06/21 0004  •  empagliflozin (JARDIANCE) tablet 10 mg, 10 mg, Oral, Daily, Nam Momin MD, 10 mg at 07/05/21 0813  •  enoxaparin (LOVENOX) syringe 40 mg, 40 mg, Subcutaneous, Daily, Aden Bowie PA-C, 40 mg at 07/05/21 1728  •  famotidine (PEPCID) tablet 20 mg, 20 mg, Oral, Daily, Nam Momin MD, 20 mg at 07/05/21 0813  •  glucagon (human recombinant) (GLUCAGEN DIAGNOSTIC) injection 1 mg, 1 mg, Subcutaneous, Q15 Min PRN, Aden Bowie PA-C  •  guaiFENesin (MUCINEX) 12 hr tablet 1,200 mg, 1,200 mg, Oral, Q12H,  Aden Bowie PA-C, 1,200 mg at 07/05/21 2040  •  guaiFENesin-dextromethorphan (ROBITUSSIN DM) 100-10 MG/5ML syrup 10 mL, 10 mL, Oral, Q4H PRN, Carlos Claire MD, 10 mL at 07/06/21 0419  •  hydrOXYzine (ATARAX) tablet 25 mg, 25 mg, Oral, TID PRN, Nam Momin MD  •  insulin lispro (ADMELOG) injection 0-24 Units, 0-24 Units, Subcutaneous, TID AC, 4 Units at 07/05/21 1727 **AND** insulin lispro (ADMELOG) injection 0-24 Units, 0-24 Units, Subcutaneous, PRN, Merrill, Nam Morse MD  •  insulin regular (humuLIN R,novoLIN R) injection 3 Units, 3 Units, Subcutaneous, Q12H, Lora Farooq MD, 3 Units at 07/06/21 0417  •  ipratropium-albuterol (DUO-NEB) nebulizer solution 3 mL, 3 mL, Nebulization, Q4H - RT, Aden Bowie PA-C, 3 mL at 07/05/21 1900  •  ipratropium-albuterol (DUO-NEB) nebulizer solution 3 mL, 3 mL, Nebulization, Q2H PRN, Aden Bowie PA-C  •  losartan (COZAAR) tablet 100 mg, 100 mg, Oral, Daily, Nam Momin MD, 100 mg at 07/05/21 0813  •  magnesium oxide (MAG-OX) tablet 400 mg, 400 mg, Oral, Daily, Nam Momin MD, 400 mg at 07/05/21 0813  •  melatonin tablet 5 mg, 5 mg, Oral, Nightly PRN, Aden Bowie PA-C  •  methylPREDNISolone sodium succinate (SOLU-Medrol) injection 60 mg, 60 mg, Intravenous, Q12H, Nam Momin MD, 60 mg at 07/06/21 0417  •  montelukast (SINGULAIR) tablet 10 mg, 10 mg, Oral, Nightly, Draw, MD Ronit, 10 mg at 07/05/21 2040  •  nitroglycerin (NITROSTAT) SL tablet 0.4 mg, 0.4 mg, Sublingual, Q5 Min PRN, Aden Bowie PA-C  •  ondansetron (ZOFRAN) tablet 4 mg, 4 mg, Oral, Q6H PRN **OR** ondansetron (ZOFRAN) injection 4 mg, 4 mg, Intravenous, Q6H PRN, Aden Bowie PA-C  •  Pharmacy Consult - Steroid Insulin Protocol, , Does not apply, Continuous PRN, Nam Momin MD  •  potassium chloride (K-DUR,KLOR-CON) ER tablet 10 mEq, 10 mEq,  Oral, Daily, Nam Momin MD, 10 mEq at 07/05/21 0813  •  pramipexole (MIRAPEX) tablet 0.5 mg, 0.5 mg, Oral, Nightly, Nam Momin MD, 0.5 mg at 07/05/21 2040  •  sodium chloride 0.9 % flush 10 mL, 10 mL, Intravenous, Q12H, Aden Bowie PA-C, 10 mL at 07/05/21 2053  •  sodium chloride 0.9 % flush 10 mL, 10 mL, Intravenous, PRN, Aden Bowie PA-C  •  theophylline (SUKI-24) 24 hr capsule 300 mg, 300 mg, Oral, Q24H, Ronit Lepe MD, 300 mg at 07/05/21 0813    Data Review:  All labs (24hrs):   Recent Results (from the past 24 hour(s))   POC Glucose Once    Collection Time: 07/05/21 11:34 AM    Specimen: Blood   Result Value Ref Range    Glucose 179 (H) 70 - 105 mg/dL   POC Glucose Once    Collection Time: 07/05/21  4:49 PM    Specimen: Blood   Result Value Ref Range    Glucose 164 (H) 70 - 105 mg/dL   POC Glucose Once    Collection Time: 07/05/21  8:50 PM    Specimen: Blood   Result Value Ref Range    Glucose 273 (H) 70 - 105 mg/dL   Procalcitonin    Collection Time: 07/06/21  2:28 AM    Specimen: Blood   Result Value Ref Range    Procalcitonin 0.06 0.00 - 0.25 ng/mL   POC Glucose Once    Collection Time: 07/06/21  7:28 AM    Specimen: Blood   Result Value Ref Range    Glucose 163 (H) 70 - 105 mg/dL        Imaging:  XR Chest 1 View  Narrative: DATE OF EXAM:  7/3/2021 3:20 PM     PROCEDURE:  XR CHEST 1 VW-     INDICATIONS:  Persistent severe hypoxemia and shortness of breath; J44.1-Chronic  obstructive pulmonary disease with (acute) exacerbation;  J18.9-Pneumonia, unspecified organism; J98.11-Atelectasis;  R09.02-Hypoxemia cough today     COMPARISON:  2 plain film images chest performed on June 23, 2021     TECHNIQUE:   Single radiographic AP view of the chest was obtained.     FINDINGS:  Single frontal view chest reveals that the heart and mediastinum are  normal. There are bilateral diffuse increased lung markings consistent  with chronic lung disease unchanged  since previous study. No evidence of  pneumonia or pleural effusion or pneumothorax or mass right or left  lungs.      Impression:    1. Bilateral diffuse increased lung markings consistent with chronic  lung disease unchanged since previous study.  2. No focal arising in the right or left lungs.     Electronically Signed By-Jovanny Aranda MD On:7/3/2021 3:30 PM  This report was finalized on [id number] by  Jovanny Aranda MD.       ASSESSMENT:     COPD exacerbation (CMS/[facility])   Mixed hyperlipidemia    Essential hypertension       Hypoxemia    Plate-like atelectasis    Pneumonia of right middle lobe due to infectious organism    Acute exacerbation of chronic obstructive pulmonary disease (COPD) (CMS/[facility])    GERD without esophagitis    Type 2 diabetes mellitus (CMS/[facility])         PLAN:  Continue  Encouraged use I-S more frequently flutter valve  O2 support as needed  Continue with  Bronchodilator  Inhaled corticosteroids  Electrolytes/ glycemic control  DVT and GI prophylaxis.    Total Critical care time in direct medical management (   ) minutes  Chip David MD. D, ABSM.     7/6/2021  08:00 EDT

## 2021-07-07 ENCOUNTER — READMISSION MANAGEMENT (OUTPATIENT)
Dept: CALL CENTER | Facility: HOSPITAL | Age: 66
End: 2021-07-07

## 2021-07-07 VITALS
TEMPERATURE: 98.5 F | OXYGEN SATURATION: 91 % | WEIGHT: 127.43 LBS | BODY MASS INDEX: 22.58 KG/M2 | HEIGHT: 63 IN | HEART RATE: 112 BPM | DIASTOLIC BLOOD PRESSURE: 74 MMHG | SYSTOLIC BLOOD PRESSURE: 121 MMHG | RESPIRATION RATE: 22 BRPM

## 2021-07-07 PROBLEM — E83.42 HYPOMAGNESEMIA: Status: RESOLVED | Noted: 2018-12-11 | Resolved: 2021-07-07

## 2021-07-07 PROBLEM — M79.605 LEG PAIN, BILATERAL: Status: RESOLVED | Noted: 2018-08-16 | Resolved: 2021-07-07

## 2021-07-07 PROBLEM — M79.604 LEG PAIN, BILATERAL: Status: RESOLVED | Noted: 2018-08-16 | Resolved: 2021-07-07

## 2021-07-07 PROBLEM — I50.32 CHRONIC DIASTOLIC CHF (CONGESTIVE HEART FAILURE): Status: ACTIVE | Noted: 2021-07-07

## 2021-07-07 PROBLEM — Z87.891 FORMER SMOKER: Status: ACTIVE | Noted: 2021-07-07

## 2021-07-07 PROBLEM — J96.01 ACUTE RESPIRATORY FAILURE WITH HYPOXIA: Status: ACTIVE | Noted: 2021-07-07

## 2021-07-07 LAB
ANION GAP SERPL CALCULATED.3IONS-SCNC: 11 MMOL/L (ref 5–15)
BACTERIA SPEC AEROBE CULT: NORMAL
BACTERIA SPEC AEROBE CULT: NORMAL
BASOPHILS # BLD AUTO: 0.1 10*3/MM3 (ref 0–0.2)
BASOPHILS NFR BLD AUTO: 0.9 % (ref 0–1.5)
BUN SERPL-MCNC: 32 MG/DL (ref 8–23)
BUN/CREAT SERPL: 37.6 (ref 7–25)
CALCIUM SPEC-SCNC: 9.2 MG/DL (ref 8.6–10.5)
CHLORIDE SERPL-SCNC: 100 MMOL/L (ref 98–107)
CO2 SERPL-SCNC: 28 MMOL/L (ref 22–29)
CREAT SERPL-MCNC: 0.85 MG/DL (ref 0.57–1)
DEPRECATED RDW RBC AUTO: 44.2 FL (ref 37–54)
EOSINOPHIL # BLD AUTO: 0.1 10*3/MM3 (ref 0–0.4)
EOSINOPHIL NFR BLD AUTO: 0.8 % (ref 0.3–6.2)
ERYTHROCYTE [DISTWIDTH] IN BLOOD BY AUTOMATED COUNT: 14 % (ref 12.3–15.4)
GFR SERPL CREATININE-BSD FRML MDRD: 67 ML/MIN/1.73
GLUCOSE BLDC GLUCOMTR-MCNC: 191 MG/DL (ref 70–105)
GLUCOSE BLDC GLUCOMTR-MCNC: 298 MG/DL (ref 70–105)
GLUCOSE SERPL-MCNC: 208 MG/DL (ref 65–99)
HCT VFR BLD AUTO: 50 % (ref 34–46.6)
HGB BLD-MCNC: 16.7 G/DL (ref 12–15.9)
LYMPHOCYTES # BLD AUTO: 4.3 10*3/MM3 (ref 0.7–3.1)
LYMPHOCYTES NFR BLD AUTO: 37.3 % (ref 19.6–45.3)
MCH RBC QN AUTO: 29.8 PG (ref 26.6–33)
MCHC RBC AUTO-ENTMCNC: 33.3 G/DL (ref 31.5–35.7)
MCV RBC AUTO: 89.6 FL (ref 79–97)
MONOCYTES # BLD AUTO: 0.8 10*3/MM3 (ref 0.1–0.9)
MONOCYTES NFR BLD AUTO: 6.9 % (ref 5–12)
NEUTROPHILS NFR BLD AUTO: 54.1 % (ref 42.7–76)
NEUTROPHILS NFR BLD AUTO: 6.3 10*3/MM3 (ref 1.7–7)
NRBC BLD AUTO-RTO: 0.1 /100 WBC (ref 0–0.2)
PLATELET # BLD AUTO: 198 10*3/MM3 (ref 140–450)
PMV BLD AUTO: 10.1 FL (ref 6–12)
POTASSIUM SERPL-SCNC: 4.2 MMOL/L (ref 3.5–5.2)
PROCALCITONIN SERPL-MCNC: 0.06 NG/ML (ref 0–0.25)
RBC # BLD AUTO: 5.58 10*6/MM3 (ref 3.77–5.28)
SODIUM SERPL-SCNC: 139 MMOL/L (ref 136–145)
WBC # BLD AUTO: 11.6 10*3/MM3 (ref 3.4–10.8)

## 2021-07-07 PROCEDURE — 94618 PULMONARY STRESS TESTING: CPT

## 2021-07-07 PROCEDURE — 94799 UNLISTED PULMONARY SVC/PX: CPT

## 2021-07-07 PROCEDURE — 63710000001 INSULIN REGULAR HUMAN PER 5 UNITS: Performed by: HOSPITALIST

## 2021-07-07 PROCEDURE — 82962 GLUCOSE BLOOD TEST: CPT

## 2021-07-07 PROCEDURE — 63710000001 INSULIN LISPRO (HUMAN) PER 5 UNITS: Performed by: INTERNAL MEDICINE

## 2021-07-07 PROCEDURE — 80048 BASIC METABOLIC PNL TOTAL CA: CPT | Performed by: HOSPITALIST

## 2021-07-07 PROCEDURE — 99238 HOSP IP/OBS DSCHRG MGMT 30/<: CPT | Performed by: HOSPITALIST

## 2021-07-07 PROCEDURE — 25010000002 METHYLPREDNISOLONE PER 40 MG: Performed by: HOSPITALIST

## 2021-07-07 PROCEDURE — 84145 PROCALCITONIN (PCT): CPT | Performed by: PHYSICIAN ASSISTANT

## 2021-07-07 PROCEDURE — 85025 COMPLETE CBC W/AUTO DIFF WBC: CPT | Performed by: HOSPITALIST

## 2021-07-07 RX ORDER — MONTELUKAST SODIUM 10 MG/1
10 TABLET ORAL NIGHTLY
Qty: 30 TABLET | Refills: 0 | Status: SHIPPED | OUTPATIENT
Start: 2021-07-07 | End: 2021-10-25

## 2021-07-07 RX ORDER — LANCETS
1 EACH MISCELLANEOUS DAILY
Qty: 100 EACH | Refills: 0 | Status: SHIPPED | OUTPATIENT
Start: 2021-07-07

## 2021-07-07 RX ORDER — GUAIFENESIN 600 MG/1
1200 TABLET, EXTENDED RELEASE ORAL EVERY 12 HOURS
Qty: 40 TABLET | Refills: 0 | Status: SHIPPED | OUTPATIENT
Start: 2021-07-07 | End: 2021-07-17

## 2021-07-07 RX ORDER — IPRATROPIUM BROMIDE AND ALBUTEROL SULFATE 2.5; .5 MG/3ML; MG/3ML
3 SOLUTION RESPIRATORY (INHALATION)
Qty: 360 ML | Refills: 0 | Status: SHIPPED | OUTPATIENT
Start: 2021-07-07 | End: 2021-09-30

## 2021-07-07 RX ORDER — PREDNISONE 10 MG/1
TABLET ORAL
Qty: 30 TABLET | Refills: 0 | Status: SHIPPED | OUTPATIENT
Start: 2021-07-07 | End: 2021-09-30

## 2021-07-07 RX ORDER — BLOOD SUGAR DIAGNOSTIC
1 STRIP MISCELLANEOUS DAILY
Qty: 100 EACH | Refills: 0 | Status: SHIPPED | OUTPATIENT
Start: 2021-07-07 | End: 2022-05-03 | Stop reason: SDUPTHER

## 2021-07-07 RX ADMIN — CETIRIZINE HYDROCHLORIDE 10 MG: 10 TABLET, FILM COATED ORAL at 08:41

## 2021-07-07 RX ADMIN — IPRATROPIUM BROMIDE AND ALBUTEROL SULFATE 3 ML: 2.5; .5 SOLUTION RESPIRATORY (INHALATION) at 03:23

## 2021-07-07 RX ADMIN — MAGNESIUM OXIDE TAB 400 MG (241.3 MG ELEMENTAL MG) 400 MG: 400 (241.3 MG) TAB at 08:46

## 2021-07-07 RX ADMIN — EMPAGLIFLOZIN 10 MG: 10 TABLET, FILM COATED ORAL at 08:41

## 2021-07-07 RX ADMIN — POTASSIUM CHLORIDE 10 MEQ: 750 TABLET, EXTENDED RELEASE ORAL at 08:42

## 2021-07-07 RX ADMIN — GUAIFENESIN AND DEXTROMETHORPHAN 10 ML: 100; 10 SYRUP ORAL at 05:17

## 2021-07-07 RX ADMIN — Medication 10 ML: at 08:40

## 2021-07-07 RX ADMIN — IPRATROPIUM BROMIDE AND ALBUTEROL SULFATE 3 ML: 2.5; .5 SOLUTION RESPIRATORY (INHALATION) at 11:26

## 2021-07-07 RX ADMIN — INSULIN LISPRO 4 UNITS: 100 INJECTION, SOLUTION INTRAVENOUS; SUBCUTANEOUS at 08:41

## 2021-07-07 RX ADMIN — INSULIN LISPRO 12 UNITS: 100 INJECTION, SOLUTION INTRAVENOUS; SUBCUTANEOUS at 11:54

## 2021-07-07 RX ADMIN — INSULIN HUMAN 4 UNITS: 100 INJECTION, SOLUTION PARENTERAL at 05:11

## 2021-07-07 RX ADMIN — LOSARTAN POTASSIUM 100 MG: 50 TABLET, FILM COATED ORAL at 08:42

## 2021-07-07 RX ADMIN — METHYLPREDNISOLONE SODIUM SUCCINATE 40 MG: 40 INJECTION, POWDER, FOR SOLUTION INTRAMUSCULAR; INTRAVENOUS at 05:17

## 2021-07-07 RX ADMIN — FAMOTIDINE 20 MG: 20 TABLET, FILM COATED ORAL at 08:42

## 2021-07-07 RX ADMIN — THEOPHYLLINE ANHYDROUS 300 MG: 300 CAPSULE, EXTENDED RELEASE ORAL at 08:46

## 2021-07-07 RX ADMIN — GUAIFENESIN 1200 MG: 600 TABLET, EXTENDED RELEASE ORAL at 08:42

## 2021-07-07 NOTE — CASE MANAGEMENT/SOCIAL WORK
Continued Stay Note  TASIA Vickers     Patient Name: Eugenia Madden  MRN: [id number]  Today's Date: 7/7/2021    Admit Date: 7/2/2021    Discharge Plan     Row Name 07/07/21 1137       Plan    Plan  Anticipate routine home    Plan Comments  Did not qualify for home oxygen on 7/7. Nebulizer ordered, referral to [facility].     Final Discharge Disposition Code  01 - home or self-care    Final Note  Home         Juany Baig RN

## 2021-07-07 NOTE — DISCHARGE PLACEMENT REQUEST
"Eugenia Chappell (65 y.o. Female)     Date of Birth Social Security Number Address Home Phone MRN    1955  [facility] [place] [address] [phone] [id number]    [place] Marital Status          Jew        Admission Date Admission Type Admitting Provider Attending Provider Department, Room/Bed    7/2/21 Emergency Lora Farooq MD Hall, Kelli G, MD [facility] 2C MEDICAL INPATIENT, 247/1    Discharge Date Discharge Disposition Discharge Destination         Home or Self Care              Attending Provider: Lora Farooq MD    Allergies: No Known Allergies    Isolation: None   Infection: None   Code Status: CPR    Ht: 160 cm (63\")   Wt: 57.8 kg (127 lb 6.8 oz)    Admission Cmt: None   Principal Problem: Acute respiratory failure with hypoxia (CMS/HCC) [J96.01]                 Active Insurance as of 7/2/2021     Primary Coverage     Payor Plan Insurance Group Employer/Plan Group    HUMANA MEDICARE REPLACEMENT HUMANA MEDICARE REPLACEMENT [id number]     Payor Plan Address Payor Plan Phone Number Payor Plan Fax Number Effective Dates    [address] [address] [phone]  1/1/2021 - None Entered    [facility] [phone]       Subscriber Name Subscriber Birth Date Member ID       EUGENIA CHAPPELL 1955 [id number]                 Emergency Contacts      (Rel.) Home Phone Work Phone Mobile Phone    LA NENA CHAPPELL (Spouse) [phone] -- --              "

## 2021-07-07 NOTE — PROGRESS NOTES
Exercise Oximetry    Patient Name:Eugenia Madden   MRN: [id number]   Date: 07/07/21             ROOM AIR BASELINE   SpO2% 89   Heart Rate 101   Blood Pressure      EXERCISE ON ROOM AIR SpO2% EXERCISE ON O2 @  LPM SpO2%   1 MINUTE 89 1 MINUTE    2 MINUTES 90 2 MINUTES    3 MINUTES 93 3 MINUTES    4 MINUTES 93 4 MINUTES    5 MINUTES 90 5 MINUTES    6 MINUTES 90 6 MINUTES               Distance Walked   Distance Walked   Dyspnea (Diana Scale)  0 Dyspnea (Diana Scale)   Fatigue (Diana Scale)  0 Fatigue (Diana Scale)   SpO2% Post Exercise  93 SpO2% Post Exercise   HR Post Exercise  120 HR Post Exercise   Time to Recovery   Time to Recovery     Comments: Patient does not need home oxygen    Josette Roth, CRT

## 2021-07-07 NOTE — OUTREACH NOTE
Prep Survey      Responses   [facility] patient discharged from?  [facility]   Is LACE score < 7 ?  No   Emergency Room discharge w/ pulse ox?  No   Eligibility  [facility]   Date of Admission  07/02/21   Date of Discharge  07/07/21   Discharge Disposition  Home or Self Care   Discharge diagnosis  Acute respiratory failure with hypoxia    Does the patient have one of the following disease processes/diagnoses(primary or secondary)?  Other   Does the patient have Home health ordered?  No   Is there a DME ordered?  Yes   What DME was ordered?  [facility]   O2?   Prep survey completed?  Yes          Parvin Castro RN

## 2021-07-07 NOTE — PROGRESS NOTES
"PULMONARY CRITICAL CARE Progress  NOTE      PATIENT IDENTIFICATION:  Name: Eugenia Madden  MRN: [id number]  :  1955     Age: 65 y.o.  Sex: female    DATE OF Note:  2021   Referring Physician: Lora Farooq MD                  Subjective:   Off O2, coughing more dry,   No SOB no chest pain, no nausea or vomiting, no change in bowel habit, no dysuria,  no new  skin rash or itching.      Objective:  tMax 24 hrs: Temp (24hrs), Av.2 °F (36.8 °C), Min:97.5 °F (36.4 °C), Max:98.5 °F (36.9 °C)      Vitals Ranges:   Temp:  [97.5 °F (36.4 °C)-98.5 °F (36.9 °C)] 98.5 °F (36.9 °C)  Heart Rate:  [] 112  Resp:  [16-22] 22  BP: ()/(63-74) 121/74    Intake and Output Last 3 Shifts:   I/O last 3 completed shifts:  In: 240 [P.O.:240]  Out: -     Exam:  /74 (BP Location: Right arm, Patient Position: Lying)   Pulse 112   Temp 98.5 °F (36.9 °C) (Oral)   Resp 22   Ht 160 cm (63\")   Wt 57.8 kg (127 lb 6.8 oz)   LMP  (LMP Unknown)   SpO2 91%   BMI 22.57 kg/m²     General Appearance: Alert awake oriented  HEENT:  Normocephalic, without obvious abnormality, Conjunctiva/corneas clear,.  Normal external ear canals, Nares normal, no drainage     Neck:  Supple, symmetrical, trachea midline. No JVD.  Lungs /Chest wall:   Bilateral basal rhonchi, respirations unlabored symmetrical wall movement.     Heart:  Regular rate and rhythm, systolic murmur PMI left sternal border  Abdomen: Soft, non-tender, no masses, no organomegaly.    Extremities: Trace edema no clubbing or Cyanosis        Medications:    Current Facility-Administered Medications:   •  acetaminophen (TYLENOL) tablet 650 mg, 650 mg, Oral, Q4H PRN, 650 mg at 21 **OR** acetaminophen (TYLENOL) 160 MG/5ML solution 650 mg, 650 mg, Oral, Q4H PRN **OR** acetaminophen (TYLENOL) suppository 650 mg, 650 mg, Rectal, Q4H PRN, Aden Bowie PA-C  •  atorvastatin (LIPITOR) tablet 40 mg, 40 mg, Oral, Nightly, Nam Momin, " MD, 40 mg at 07/06/21 2106  •  benzonatate (TESSALON) capsule 200 mg, 200 mg, Oral, TID PRN, Aden Bowie PA-C, 200 mg at 07/06/21 2111  •  budesonide (PULMICORT) nebulizer solution 0.5 mg, 0.5 mg, Nebulization, BID - RT, Aden Bowie PA-C, 0.5 mg at 07/06/21 1956  •  calcium carbonate (TUMS) chewable tablet 500 mg (200 mg elemental), 2 tablet, Oral, Once, Lora Farooq MD  •  calcium carbonate (TUMS) chewable tablet 500 mg (200 mg elemental), 2 tablet, Oral, TID PRN, Lora Farooq MD  •  cefTRIAXone (ROCEPHIN) 1 g in sodium chloride 0.9 % 100 mL IVPB, 1 g, Intravenous, Q24H, Aden Bowie PA-C, Last Rate: 200 mL/hr at 07/06/21 1646, 1 g at 07/06/21 1646  •  cetirizine (zyrTEC) tablet 10 mg, 10 mg, Oral, Daily, Nam Momin MD, 10 mg at 07/07/21 0841  •  cyclobenzaprine (FLEXERIL) tablet 10 mg, 10 mg, Oral, TID PRN, Nam Momin MD  •  dextrose (D50W) 25 g/ 50mL Intravenous Solution 25 g, 25 g, Intravenous, Q15 Min PRN, Aden Bowie PA-C  •  dextrose (GLUTOSE) oral gel 15 g, 15 g, Oral, Q15 Min PRN, Aden Bowie PA-C  •  doxylamine (UNISOM) tablet 12.5 mg, 12.5 mg, Oral, Nightly PRN, Nam Momin MD, 12.5 mg at 07/06/21 0004  •  empagliflozin (JARDIANCE) tablet 10 mg, 10 mg, Oral, Daily, Nam Momin MD, 10 mg at 07/07/21 0841  •  enoxaparin (LOVENOX) syringe 40 mg, 40 mg, Subcutaneous, Daily, Aden Bowie PA-C, 40 mg at 07/06/21 1646  •  famotidine (PEPCID) tablet 20 mg, 20 mg, Oral, Daily, Merrill, Nam Morse MD, 20 mg at 07/07/21 0842  •  glucagon (human recombinant) (GLUCAGEN DIAGNOSTIC) injection 1 mg, 1 mg, Subcutaneous, Q15 Min PRN, Aden Bowie PA-C  •  guaiFENesin (MUCINEX) 12 hr tablet 1,200 mg, 1,200 mg, Oral, Q12H, Aden Bowie PA-C, 1,200 mg at 07/07/21 0842  •  guaiFENesin-dextromethorphan (ROBITUSSIN DM) 100-10 MG/5ML syrup 10 mL, 10 mL,  Oral, Q4H PRN, Carlos Claire MD, 10 mL at 07/07/21 0517  •  hydrOXYzine (ATARAX) tablet 25 mg, 25 mg, Oral, TID PRN, Merrill, Nam Morse MD  •  insulin lispro (ADMELOG) injection 0-24 Units, 0-24 Units, Subcutaneous, TID AC, 4 Units at 07/07/21 0841 **AND** insulin lispro (ADMELOG) injection 0-24 Units, 0-24 Units, Subcutaneous, PRN, Merrill, Nam Morse MD  •  insulin regular (humuLIN R,novoLIN R) injection 4 Units, 4 Units, Subcutaneous, Q12H, Lora Farooq MD, 4 Units at 07/07/21 0511  •  ipratropium-albuterol (DUO-NEB) nebulizer solution 3 mL, 3 mL, Nebulization, Q4H - RT, Aden Bowie PA-C, 3 mL at 07/07/21 1126  •  ipratropium-albuterol (DUO-NEB) nebulizer solution 3 mL, 3 mL, Nebulization, Q2H PRN, Aden Bowie PA-C  •  losartan (COZAAR) tablet 100 mg, 100 mg, Oral, Daily, Nam Momin MD, 100 mg at 07/07/21 0842  •  magnesium oxide (MAG-OX) tablet 400 mg, 400 mg, Oral, Daily, Nam Momin MD, 400 mg at 07/07/21 0846  •  melatonin tablet 5 mg, 5 mg, Oral, Nightly PRN, Aden Bowie PA-C  •  methylPREDNISolone sodium succinate (SOLU-Medrol) injection 40 mg, 40 mg, Intravenous, Q12H, Lora Farooq MD, 40 mg at 07/07/21 0517  •  montelukast (SINGULAIR) tablet 10 mg, 10 mg, Oral, Nightly, Draw, MD Ronit, 10 mg at 07/06/21 2106  •  nitroglycerin (NITROSTAT) SL tablet 0.4 mg, 0.4 mg, Sublingual, Q5 Min PRN, Aden Bowie PA-C  •  ondansetron (ZOFRAN) tablet 4 mg, 4 mg, Oral, Q6H PRN **OR** ondansetron (ZOFRAN) injection 4 mg, 4 mg, Intravenous, Q6H PRN, Aden Bowie PA-C  •  Pharmacy Consult - Steroid Insulin Protocol, , Does not apply, Continuous PRN, Nam Momin MD  •  potassium chloride (K-DUR,KLOR-CON) ER tablet 10 mEq, 10 mEq, Oral, Daily, Nam Momin MD, 10 mEq at 07/07/21 0842  •  pramipexole (MIRAPEX) tablet 0.5 mg, 0.5 mg, Oral, Nightly, Nam Momin  Nani Morse MD, 0.5 mg at 07/06/21 2106  •  sodium chloride 0.9 % flush 10 mL, 10 mL, Intravenous, Q12H, Aden Bowie PA-C, 10 mL at 07/07/21 0840  •  sodium chloride 0.9 % flush 10 mL, 10 mL, Intravenous, PRN, Aden Bowie PA-C  •  theophylline (SUKI-24) 24 hr capsule 300 mg, 300 mg, Oral, Q24H, Ronit Lepe MD, 300 mg at 07/07/21 0846    Data Review:  All labs (24hrs):   Recent Results (from the past 24 hour(s))   POC Glucose Once    Collection Time: 07/06/21  4:51 PM    Specimen: Blood   Result Value Ref Range    Glucose 228 (H) 70 - 105 mg/dL   Procalcitonin    Collection Time: 07/07/21  4:06 AM    Specimen: Blood   Result Value Ref Range    Procalcitonin 0.06 0.00 - 0.25 ng/mL   Basic Metabolic Panel    Collection Time: 07/07/21  4:06 AM    Specimen: Blood   Result Value Ref Range    Glucose 208 (H) 65 - 99 mg/dL    BUN 32 (H) 8 - 23 mg/dL    Creatinine 0.85 0.57 - 1.00 mg/dL    Sodium 139 136 - 145 mmol/L    Potassium 4.2 3.5 - 5.2 mmol/L    Chloride 100 98 - 107 mmol/L    CO2 28.0 22.0 - 29.0 mmol/L    Calcium 9.2 8.6 - 10.5 mg/dL    eGFR Non African Amer 67 >60 mL/min/1.73    BUN/Creatinine Ratio 37.6 (H) 7.0 - 25.0    Anion Gap 11.0 5.0 - 15.0 mmol/L   CBC Auto Differential    Collection Time: 07/07/21  4:06 AM    Specimen: Blood   Result Value Ref Range    WBC 11.60 (H) 3.40 - 10.80 10*3/mm3    RBC 5.58 (H) 3.77 - 5.28 10*6/mm3    Hemoglobin 16.7 (H) 12.0 - 15.9 g/dL    Hematocrit 50.0 (H) 34.0 - 46.6 %    MCV 89.6 79.0 - 97.0 fL    MCH 29.8 26.6 - 33.0 pg    MCHC 33.3 31.5 - 35.7 g/dL    RDW 14.0 12.3 - 15.4 %    RDW-SD 44.2 37.0 - 54.0 fl    MPV 10.1 6.0 - 12.0 fL    Platelets 198 140 - 450 10*3/mm3    Neutrophil % 54.1 42.7 - 76.0 %    Lymphocyte % 37.3 19.6 - 45.3 %    Monocyte % 6.9 5.0 - 12.0 %    Eosinophil % 0.8 0.3 - 6.2 %    Basophil % 0.9 0.0 - 1.5 %    Neutrophils, Absolute 6.30 1.70 - 7.00 10*3/mm3    Lymphocytes, Absolute 4.30 (H) 0.70 - 3.10 10*3/mm3    Monocytes, Absolute  0.80 0.10 - 0.90 10*3/mm3    Eosinophils, Absolute 0.10 0.00 - 0.40 10*3/mm3    Basophils, Absolute 0.10 0.00 - 0.20 10*3/mm3    nRBC 0.1 0.0 - 0.2 /100 WBC   POC Glucose Once    Collection Time: 07/07/21  8:12 AM    Specimen: Blood   Result Value Ref Range    Glucose 191 (H) 70 - 105 mg/dL   POC Glucose Once    Collection Time: 07/07/21 11:45 AM    Specimen: Blood   Result Value Ref Range    Glucose 298 (H) 70 - 105 mg/dL        Imaging:  XR Chest 1 View  Narrative: DATE OF EXAM:  7/3/2021 3:20 PM     PROCEDURE:  XR CHEST 1 VW-     INDICATIONS:  Persistent severe hypoxemia and shortness of breath; J44.1-Chronic  obstructive pulmonary disease with (acute) exacerbation;  J18.9-Pneumonia, unspecified organism; J98.11-Atelectasis;  R09.02-Hypoxemia cough today     COMPARISON:  2 plain film images chest performed on June 23, 2021     TECHNIQUE:   Single radiographic AP view of the chest was obtained.     FINDINGS:  Single frontal view chest reveals that the heart and mediastinum are  normal. There are bilateral diffuse increased lung markings consistent  with chronic lung disease unchanged since previous study. No evidence of  pneumonia or pleural effusion or pneumothorax or mass right or left  lungs.      Impression:    1. Bilateral diffuse increased lung markings consistent with chronic  lung disease unchanged since previous study.  2. No focal arising in the right or left lungs.     Electronically Signed By-Jovanny Aranda MD On:7/3/2021 3:30 PM  This report was finalized on [id number] by  Jovanny Aranda MD.       ASSESSMENT:     COPD exacerbation (CMS/HCC)   Mixed hyperlipidemia    Essential hypertension       Hypoxemia    Plate-like atelectasis    Pneumonia of right middle lobe due to infectious organism    Acute exacerbation of chronic obstructive pulmonary disease (COPD) (CMS/HCC)    GERD without esophagitis    Type 2 diabetes mellitus (CMS/HCC)         PLAN:  Continue  Encouraged use I-S more  frequently flutter valve  F/u as out pt   Continue with  Bronchodilator  Inhaled corticosteroids  Electrolytes/ glycemic control  DVT and GI prophylaxis.    Total Critical care time in direct medical management (   ) minutes  Chip David MD. D, ABSM.     7/7/2021  11:51 EDT

## 2021-07-07 NOTE — CASE MANAGEMENT/SOCIAL WORK
Continued Stay Note   [facility]     Patient Name: Eugenia Madden  MRN: [id number]  Today's Date: 7/7/2021    Admit Date: 7/2/2021    Discharge Plan     Row Name 07/07/21 1055       Plan    Plan  Anticiapte routine home. Walking oximetry ordered 7/7, pending.    Plan Comments  Discharge orders noted. Walking oximetry ordered, pending.        Phone communication or documentation only - no physical contact with patient or family.      Juany Baig RN

## 2021-07-07 NOTE — DISCHARGE SUMMARY
[facility] Medicine Services  DISCHARGE SUMMARY        Prepared For PCP:  Melisa Taveras MD    Patient Name: Eugenia Madden  : 1955  MRN: [id number]      Date of Admission:   2021    Date of Discharge:  2021    Length of stay:  LOS: 1 day     Hospital Course     Presenting Problem:   Hypoxemia [R09.02]  Plate-like atelectasis [J98.11]  Acute exacerbation of chronic obstructive pulmonary disease (COPD) (CMS/[facility]) [J44.1]  Pneumonia of right middle lobe due to infectious organism [J18.9]      Active Hospital Problems    Diagnosis  POA   • **Acute respiratory failure with hypoxia (CMS/[facility]) [J96.01]  Yes     Priority: High   • Pneumonia of right middle lobe due to infectious organism [J18.9]  Yes     Priority: High   • Acute exacerbation of chronic obstructive pulmonary disease (COPD) (CMS/[facility]) [J44.1]  Yes     Priority: High   • Former smoker [Z87.891]  Not Applicable   • Chronic diastolic CHF (congestive heart failure) (CMS/[facility]) [I50.32]  Yes   • GERD without esophagitis [K21.9]  Yes   • Type 2 diabetes mellitus (CMS/[facility]) [E11.9]  Yes   • Essential hypertension [I10]  Yes   • Pulmonary emphysema (CMS/[facility]) [J43.9]  Yes   • Chronic coronary artery disease [I25.10]  Yes   • Mixed hyperlipidemia [E78.2]  Yes      Resolved Hospital Problems   No resolved problems to display.     Assessment and plan:    Acute COPD exacerbation  Acute respiratory failure with hypoxemia  Acute bronchitis  Right middle lobe pneumonia (per pulmonary progress note)  -No PE on CT angiogram   -Atelectasis noted on CT right middle lobe  -Continue steroids and antibiotics and bronchodilators  -Repeat imaging with chest x-ray showing some congestion.  Given 1 dose of Lasix.  -Blood cultures negative  -Urine antigens negative  -Respiratory panel negative including COVID-19  -Reviewed  ABG  -Stiolto on discharge  -Patient received Rocephin and Zithromax  -Theophylline added by Dr. Lepe  -Outpatient  PFTs     Carrier of cystic fibrosis  -Brother passed away with CF    DM type 2  -Accu-Cheks before meals and at bedtime with SSI  -Hold glimepiride and Metformin  -Continue Jardiance  -Ozempic at home  -Steroid insulin protocol was given during the hospital stay     Essential hypertension, chronic and controlled   -Continue losartan     Mild sinus tachycardia probably related to her respiratory disease  Chronic diastolic congestive heart failure secondary to hypertensive heart disease  -Troponin negative x2  -Recent echo 3/2021 showed grade 1 diastolic dysfunction with impaired relaxation, LVEF 60 to 65%, mild concentric hypertrophy     History of kidney stones     Restless legs syndrome  -Continue pramipexole     Chronic muscle spasms   -Continue Flexeril     Dyslipidemia   -Continue Lipitor     History of skin allergies requiring multiple medications last year.         Hospital Course:  Eugenia Madden is a 65 y.o. female with history of COPD, DM 2, hypertension, kidney stones, restless leg syndrome, who had worsening respiratory distress 14 days with nonproductive cough but with small amount of yellow sputum in the last 24 hours.  She had seen her primary care physician recently.  She had suspicion of allergies to environmental antigens.  She denies shortness of breath on lying down or by night  She denies any night sweats or hemoptysis.  She reports intermittent chest pleuritic pain.  Work-up in the ER with CT angiogram was negative for PE but with left basilar atelectatic changes right middle lobe without obvious pneumonia or congestive heart failure arthropathy.  She had normal proBNP and negative troponin x1  No leukocytosis but she had erythrocytosis and normal platelet count.  EKG shows sinus tachycardia.  She had an echo in March showing normal EF 60% with LVH and diastolic dysfunction grade 1.  Respiratory panel was negative on admission including Covid.           Date:  7/4  Patient reports short period of  sleep yesterday/overnight  She denies any worsening probably had some improvement.  Notes reviewed from Dr. Lepe.  7/5  Patient reported that she slept much better last night with decreased shortness of air.  She complained of heartburn to the nursing staff and was given Tums.  7/6  Patient reports feeling much better today.  She reports that she was able to get a large clump of thick phlegm out from her lungs which has significantly improved her ability to breathe deeply.  She is hoping to get to go home soon.  7/7  Patient was able to be weaned off of oxygen.  She denies shortness of breath or chest pain, and her cough is more dry.  She is now felt to be stable for discharge.      Day of Discharge       Vital Signs:   Temp:  [97.3 °F (36.3 °C)-98.5 °F (36.9 °C)] 98.5 °F (36.9 °C)  Heart Rate:  [] 98  Resp:  [16-18] 16  BP: ()/(63-72) 111/72     Physical Exam:  Physical Exam   Vital signs and nurses notes reviewed.  Well-developed well-nourished in no acute distress sitting up in bed awake and alert; mucous membranes moist; sclerae anicteric; lungs with rhonchi in the bases posteriorly bilaterally; respirations unlabored;; CV regular rate and rhythm; abdomen soft nontender nondistended with active bowel sounds; extremities with no edema, cyanosis or calf tenderness; palpable pedal pulses bilaterally; no Banks catheter.    Pertinent  and/or Most Recent Results     Results from last 7 days   Lab Units 07/07/21  0406 07/05/21  0533 07/04/21  0520 07/03/21  0459 07/02/21  1138 07/02/21  1137   WBC 10*3/mm3 11.60* 10.50 11.30* 10.40  --  8.20   HEMOGLOBIN g/dL 16.7* 16.2* 17.3* 16.3*  --  16.5*   HEMATOCRIT % 50.0* 48.2* 51.3* 47.7*  --  48.9*   PLATELETS 10*3/mm3 198 206 226 197  --  182   SODIUM mmol/L 139 140 140 142 142  --    POTASSIUM mmol/L 4.2 4.4 4.4 3.7 4.1  --    CHLORIDE mmol/L 100 103 100 105 106  --    CO2 mmol/L 28.0 25.0 27.0 24.0 24.0  --    BUN mg/dL 32* 36* 32* 21 20  --    CREATININE  mg/dL 0.85 0.65 0.63 0.60 0.52*  --    GLUCOSE mg/dL 208* 183* 126* 67 183*  --    CALCIUM mg/dL 9.2 9.0 9.9 9.2 9.4  --      Results from last 7 days   Lab Units 07/02/21  1138 07/02/21  1137   BILIRUBIN mg/dL 0.7  --    ALK PHOS U/L 93  --    ALT (SGPT) U/L 16  --    AST (SGOT) U/L 13  --    PROTIME Seconds  --  11.3   INR   --  1.02   APTT seconds  --  25.9           Invalid input(s): TG, LDLCALC, LDLREALC  Results from last 7 days   Lab Units 07/07/21  0406 07/06/21  0228 07/05/21  0533 07/04/21  0520 07/03/21  1632 07/02/21  1151 07/02/21  1138   TSH uIU/mL  --   --   --  0.291  --   --   --    PROBNP pg/mL  --   --   --   --   --   --  127.0   TROPONIN T ng/mL  --   --   --   --  <0.010  --  <0.010   PROCALCITONIN ng/mL 0.06 0.06 0.05 0.06  --   --   --    LACTATE mmol/L  --   --   --   --   --  1.0  --        Brief Urine Lab Results  (Last result in the past 365 days)      Color   Clarity   Blood   Leuk Est   Nitrite   Protein   CREAT   Urine HCG        06/10/21 0929             131.3             Microbiology Results Abnormal     Procedure Component Value - Date/Time    Blood Culture - Blood, Arm, Right [[id number]] Collected: 07/02/21 1138    Lab Status: Preliminary result Specimen: Blood from Arm, Right Updated: 07/06/21 1200     Blood Culture No growth at 4 days    Blood Culture - Blood, Arm, Left [[id number]] Collected: 07/02/21 1137    Lab Status: Preliminary result Specimen: Blood from Arm, Left Updated: 07/06/21 1200     Blood Culture No growth at 4 days    Respiratory Panel PCR w/COVID-19(SARS-CoV-2) [facility] In-House, NP Swab in UTM/VTM, 3-4 HR TAT - Swab, Nasopharynx [[id number]]  (Normal) Collected: 07/02/21 1607    Lab Status: Final result Specimen: Swab from Nasopharynx Updated: 07/02/21 1706     ADENOVIRUS, PCR Not Detected     Coronavirus 229E Not Detected     Coronavirus HKU1 Not Detected     Coronavirus NL63 Not Detected     Coronavirus OC43 Not Detected     COVID19 Not  Detected     Human Metapneumovirus Not Detected     Human Rhinovirus/Enterovirus Not Detected     Influenza A PCR Not Detected     Influenza B PCR Not Detected     Parainfluenza Virus 1 Not Detected     Parainfluenza Virus 2 Not Detected     Parainfluenza Virus 3 Not Detected     Parainfluenza Virus 4 Not Detected     RSV, PCR Not Detected     Bordetella pertussis pcr Not Detected     Bordetella parapertussis PCR Not Detected     Chlamydophila pneumoniae PCR Not Detected     Mycoplasma pneumo by PCR Not Detected    Narrative:      In the setting of a positive respiratory panel with a viral infection PLUS a negative procalcitonin without other underlying concern for bacterial infection, consider observing off antibiotics or discontinuation of antibiotics and continue supportive care. If the respiratory panel is positive for atypical bacterial infection (Bordetella pertussis, Chlamydophila pneumoniae, or Mycoplasma pneumoniae), consider antibiotic de-escalation to target atypical bacterial infection.    S. Pneumo Ag Urine or CSF - Urine, Urine, Clean Catch [[id number]]  (Normal) Collected: 07/02/21 1609    Lab Status: Final result Specimen: Urine, Clean Catch Updated: 07/02/21 1635     Strep Pneumo Ag Negative    Legionella Antigen, Urine - Urine, Urine, Clean Catch [[id number]]  (Normal) Collected: 07/02/21 1609    Lab Status: Final result Specimen: Urine, Clean Catch Updated: 07/02/21 1633     LEGIONELLA ANTIGEN, URINE Negative          XR Chest 1 View    Result Date: 7/3/2021  Impression:  1. Bilateral diffuse increased lung markings consistent with chronic lung disease unchanged since previous study. 2. No focal arising in the right or left lungs.  Electronically Signed By-Jovanny Aranda MD On:7/3/2021 3:30 PM This report was finalized on [id number] by  Jovanny Aranda MD.    CT Chest Pulmonary Embolism    Result Date: 7/2/2021  Impression:  1. No evidence of pulmonary embolism 2. Left basilar  atelectasis is noted with right middle lobe atelectasis. No obvious pneumonia or gross congestive changes noted. No adenopathy is noted.    Electronically Signed By-Aden Gaffney MD On:7/2/2021 1:14 PM This report was finalized on [id number] by  Aden Gaffney MD.    XR Chest PA & Lateral    Result Date: 6/24/2021  Impression:   1.  Minimal linear atelectasis or scarring within the lateral aspect of the left midlung.  No other acute cardiopulmonary disease identified.   Electronically Signed By-Kurtis Donahue MD On:6/24/2021 9:20 AM This report was finalized on [id number] by  Kurtis Donahue MD.              Results for orders placed during the hospital encounter of 03/12/21    Adult Transthoracic Echo Complete w/ Color, Spectral and Contrast if Necessary Per Protocol    Interpretation Summary  · Left ventricular systolic function is normal.  · Left ventricular ejection fraction is 60 to 65%  · Left ventricular wall thickness is consistent with mild concentric hypertrophy.  · Left ventricular diastolic function is consistent with (grade I) impaired relaxation.              Test Results Pending at Discharge  Pending Labs     Order Current Status    Blood Culture - Blood, Arm, Left Preliminary result    Blood Culture - Blood, Arm, Right Preliminary result            Procedures Performed           Consults:   Consults     Date and Time Order Name Status Description    7/3/2021  3:33 PM Inpatient Pulmonology Consult Completed     7/3/2021  2:00 PM Inpatient Hospitalist Consult Completed             Discharge Details        Discharge Medications      New Medications      Instructions Start Date   Accu-Chek Softclix Lancets lancets   1 each, Other, Daily, Use as instructed      guaiFENesin 600 MG 12 hr tablet  Commonly known as: MUCINEX   1,200 mg, Oral, Every 12 Hours      ipratropium-albuterol 0.5-2.5 mg/3 ml nebulizer  Commonly known as: DUO-NEB   3 mL, Nebulization, Every 4 Hours - RT      Isopropyl  Alcohol 70 % misc  Commonly known as: [employer] Isopropyl Alcohol Wipes   1 each, Apply externally, Daily      montelukast 10 MG tablet  Commonly known as: SINGULAIR   10 mg, Oral, Nightly      predniSONE 10 MG tablet  Commonly known as: DELTASONE   40 mg po qd x 3 d, 30 mg qd x 3 d, 20mg  qd x 3 d, 10mg  qd x 3 d, then d/c      theophylline 300 MG 24 hr capsule  Commonly known as: SUKI-24   300 mg, Oral, Every 24 Hours Scheduled   Start Date: July 8, 2021        Changes to Medications      Instructions Start Date   cyclobenzaprine 10 MG tablet  Commonly known as: FLEXERIL  What changed: additional instructions   10 mg, Oral, 3 Times Daily PRN      glucose blood test strip  What changed: Another medication with the same name was added. Make sure you understand how and when to take each.   1 each, Other, 2 times daily      Accu-Chek Umu Plus test strip  Generic drug: glucose blood  What changed: Another medication with the same name was added. Make sure you understand how and when to take each.   Use to test blood sugars twice daily.      Accu-Chek Guide test strip  Generic drug: glucose blood  What changed: You were already taking a medication with the same name, and this prescription was added. Make sure you understand how and when to take each.   1 each, Other, Daily, Use as instructed      Ozempic (0.25 or 0.5 MG/DOSE) 2 MG/1.5ML solution pen-injector  Generic drug: Semaglutide(0.25 or 0.5MG/DOS)  What changed: additional instructions   Inject .05 mg weekly         Continue These Medications      Instructions Start Date   Accu-Chek Umu Plus w/Device kit   1 strip, Does not apply, 2 times daily      Advil -25 MG capsule  Generic drug: Ibuprofen-diphenhydrAMINE HCl   1 capsule, Oral, Nightly      albuterol sulfate  (90 Base) MCG/ACT inhaler  Commonly known as: PROVENTIL HFA;VENTOLIN HFA;PROAIR HFA   2 puffs, Inhalation, Every 4 Hours PRN      atorvastatin 40 MG tablet  Commonly known as: LIPITOR   TAKE 1  TABLET BY MOUTH EVERY DAY      cetirizine 10 MG tablet  Commonly known as: zyrTEC   10 mg, Oral, Daily      famotidine 20 MG tablet  Commonly known as: PEPCID   20 mg, Oral, Daily      ferrous sulfate 325 (65 FE) MG tablet   325 mg, Oral, Daily With Breakfast      glimepiride 4 MG tablet  Commonly known as: Amaryl   4 mg, Oral, Every Morning Before Breakfast      hydrOXYzine 25 MG tablet  Commonly known as: ATARAX   25 mg, Oral, Nightly PRN      Jardiance 10 MG tablet tablet  Generic drug: empagliflozin   TAKE 1 TABLET BY MOUTH DAILY      losartan 100 MG tablet  Commonly known as: COZAAR   100 mg, Oral, Daily      Magnesium 250 MG tablet   250 mg, Oral, Daily      metFORMIN  MG 24 hr tablet  Commonly known as: GLUCOPHAGE-XR   TAKE 2 TABLETS BY MOUTH EVERY DAY      potassium chloride 10 MEQ CR tablet   10 mEq, Oral, Daily      pramipexole 0.125 MG tablet  Commonly known as: MIRAPEX   0.125 mg, Oral, Nightly, Take 4 tablets daily at bedtime.      tiotropium bromide-olodaterol 2.5-2.5 MCG/ACT aerosol solution inhaler  Commonly known as: STIOLTO RESPIMAT   2 puffs, Inhalation, Daily - RT      vitamin D3 125 MCG (5000 UT) tablet tablet   5,000 Units, Oral, Daily         Stop These Medications    amoxicillin-clavulanate 875-125 MG per tablet  Commonly known as: AUGMENTIN     ELDERBERRY PO            No Known Allergies      Discharge Disposition:  Home or Self Care    Diet:  Hospital:  Diet Order   Procedures   • Diet Diabetic/Consistent Carbs; Diabetic - Consistent Carb         Discharge Activity:   Activity Instructions     Activity as Tolerated              CODE STATUS:    Code Status and Medical Interventions:   Ordered at: 07/02/21 1552     Code Status:    CPR     Medical Interventions (Level of Support Prior to Arrest):    Full         Follow-up Appointments  Future Appointments   Date Time Provider Department Center   7/12/2021  3:00 PM [facility]   9/13/2021  9:15 AM Melisa Taveras MD [facility]  [facility]   9/30/2021  3:15 PM Lei Kim MD [facility]       Additional Instructions for the Follow-ups that You Need to Schedule     Call MD With Problems / Concerns   As directed      Instructions: Call [phone] or email [email or web address] for problems or concerns.    Order Comments: Instructions: Call [phone] or email [email or web address] for problems or concerns.          Discharge Follow-up with PCP   As directed       Currently Documented PCP:    Melisa Taveras MD    PCP Phone Number:    [phone]     Follow Up Details: 2-3 business days via telehealth if possible                 Condition on Discharge:      Stable      This patient has been examined wearing appropriate Personal Protective Equipment. 07/07/21      Electronically signed by Lora Farooq MD, 07/07/21, 10:28 AM EDT.      Time: I spent 25  minutes on this discharge activity which included face-to-face encounter with the patient/reviewing the data in the system/coordination of the care with the nursing staff as well as consultants/documentation/entering orders.

## 2021-07-08 ENCOUNTER — TRANSITIONAL CARE MANAGEMENT TELEPHONE ENCOUNTER (OUTPATIENT)
Dept: CALL CENTER | Facility: HOSPITAL | Age: 66
End: 2021-07-08

## 2021-07-08 NOTE — OUTREACH NOTE
Call Center TCM Note      Responses   [facility] [place] [facility] from?  [facility]   Does the patient have one of the following disease processes/diagnoses(primary or secondary)?  Other   TCM attempt successful?  No   Unsuccessful attempts  Attempt 2          Parvin Baeza MA    7/8/2021, 16:15 EDT

## 2021-07-08 NOTE — OUTREACH NOTE
Call Center TCM Note      Responses   [facility] patient discharged from?  [facility]   Does the patient have one of the following disease processes/diagnoses(primary or secondary)?  Other   TCM attempt successful?  No   Unsuccessful attempts  Attempt 1          Parvin Baeza MA    7/8/2021, 15:08 EDT

## 2021-07-09 ENCOUNTER — TRANSITIONAL CARE MANAGEMENT TELEPHONE ENCOUNTER (OUTPATIENT)
Dept: CALL CENTER | Facility: HOSPITAL | Age: 66
End: 2021-07-09

## 2021-07-09 NOTE — OUTREACH NOTE
Call Center TCM Note      Responses   [facility] patient discharged from?  [facility]   Does the patient have one of the following disease processes/diagnoses(primary or secondary)?  Other   TCM attempt successful?  No   Unsuccessful attempts  Attempt 3 [Outdated PCP verbal release. ]          Parvin Ledesma RN    7/9/2021, 08:29 EDT

## 2021-07-12 ENCOUNTER — HOSPITAL ENCOUNTER (OUTPATIENT)
Dept: MRI IMAGING | Facility: HOSPITAL | Age: 66
Discharge: HOME | End: 2021-07-12
Admitting: PREVENTIVE MEDICINE

## 2021-07-12 DIAGNOSIS — M54.50 ACUTE BILATERAL LOW BACK PAIN WITHOUT SCIATICA: ICD-10-CM

## 2021-07-12 PROCEDURE — 72148 MRI LUMBAR SPINE W/O DYE: CPT

## 2021-07-12 NOTE — PROGRESS NOTES
MRI shows severe nerve root pressure Right L4/L5-advise referral to neurosurgeon if she is agreeable-let me know.

## 2021-07-13 ENCOUNTER — TELEPHONE (OUTPATIENT)
Dept: FAMILY MEDICINE CLINIC | Facility: CLINIC | Age: 66
End: 2021-07-13

## 2021-07-13 ENCOUNTER — OFFICE VISIT (OUTPATIENT)
Dept: FAMILY MEDICINE CLINIC | Facility: CLINIC | Age: 66
End: 2021-07-13

## 2021-07-13 VITALS
HEIGHT: 64 IN | HEART RATE: 96 BPM | OXYGEN SATURATION: 97 % | WEIGHT: 126 LBS | BODY MASS INDEX: 21.51 KG/M2 | DIASTOLIC BLOOD PRESSURE: 83 MMHG | SYSTOLIC BLOOD PRESSURE: 127 MMHG | TEMPERATURE: 98 F

## 2021-07-13 DIAGNOSIS — J44.1 ACUTE EXACERBATION OF CHRONIC OBSTRUCTIVE PULMONARY DISEASE (COPD) (HCC): Primary | ICD-10-CM

## 2021-07-13 DIAGNOSIS — M54.50 ACUTE BILATERAL LOW BACK PAIN WITHOUT SCIATICA: ICD-10-CM

## 2021-07-13 DIAGNOSIS — R71.8 ELEVATED HEMATOCRIT: ICD-10-CM

## 2021-07-13 DIAGNOSIS — E11.65 UNCONTROLLED TYPE 2 DIABETES MELLITUS WITH HYPERGLYCEMIA (HCC): ICD-10-CM

## 2021-07-13 PROCEDURE — 99495 TRANSJ CARE MGMT MOD F2F 14D: CPT | Performed by: NURSE PRACTITIONER

## 2021-07-13 PROCEDURE — 1111F DSCHRG MED/CURRENT MED MERGE: CPT | Performed by: NURSE PRACTITIONER

## 2021-07-13 RX ORDER — ASPIRIN 81 MG/1
81 TABLET, CHEWABLE ORAL DAILY
COMMUNITY

## 2021-07-13 RX ORDER — LOSARTAN POTASSIUM 50 MG/1
50 TABLET ORAL DAILY
COMMUNITY
End: 2021-09-27

## 2021-07-13 RX ORDER — PRAMIPEXOLE DIHYDROCHLORIDE 0.12 MG/1
0.12 TABLET ORAL
COMMUNITY
End: 2021-07-26

## 2021-07-13 NOTE — TELEPHONE ENCOUNTER
HUB TO READ    ----- Message from Melisa Taveras MD sent at 7/12/2021  5:26 PM EDT -----    MRI shows severe nerve root pressure Right L4/L5-advise referral to neurosurgeon if she is agreeable-let me know.

## 2021-07-13 NOTE — PROGRESS NOTES
"Subjective   Eugenia Madden is a 65 y.o. female presents for   Chief Complaint   Patient presents with   • Hospital Follow Up Visit     Pneumonia & COPD admitted 07/02/2021-07/07/2021.       Health Maintenance Due   Topic Date Due   • DIABETIC EYE EXAM  06/06/2019       History of Present Illness   Pt present to follow up hospital stay for pneumonia and copd exacerbation from 7/2/21 to 7/7/21.  She states she is feeling much better, but is still tired and weak.  She also received MRI results today and would like to follow up with neurosurgeon.  She states she is currently taking medications as directed and denies problems or side effects at this time.  She states she stopped all BP medications due to low BP and feeling symptomatic with it.  bloodsugars are also well controlled on ozempic and pt denies side effects.  Pt is currently taking prednisone and tolerating it well.      Vitals:    07/13/21 1439 07/13/21 1442   BP: 120/79 127/83   BP Location: Right arm Left arm   Patient Position: Sitting Sitting   Cuff Size: Adult Adult   Pulse: 96    Temp: 98 °F (36.7 °C)    TempSrc: Oral    SpO2: 97%    Weight: 57.2 kg (126 lb)    Height: 162.6 cm (64.02\")      Body mass index is 21.62 kg/m².    Current Outpatient Medications on File Prior to Visit   Medication Sig Dispense Refill   • Accu-Chek Softclix Lancets lancets 1 each by Other route Daily. Use as instructed 100 each 0   • albuterol sulfate  (90 Base) MCG/ACT inhaler Inhale 2 puffs Every 4 (Four) Hours As Needed for Wheezing. 1 g 3   • aspirin 81 MG chewable tablet Chew 81 mg Daily.     • atorvastatin (LIPITOR) 40 MG tablet TAKE 1 TABLET BY MOUTH EVERY DAY 90 tablet 3   • cetirizine (zyrTEC) 10 MG tablet Take 10 mg by mouth Daily.     • Cholecalciferol (VITAMIN D3) 125 MCG (5000 UT) tablet tablet Take 5,000 Units by mouth Daily.     • famotidine (PEPCID) 20 MG tablet Take 20 mg by mouth Daily.     • ferrous sulfate 325 (65 FE) MG tablet Take 325 mg by mouth " Daily With Breakfast.     • glimepiride (Amaryl) 4 MG tablet Take 1 tablet by mouth Every Morning Before Breakfast. 180 tablet 3   • glucose blood (Accu-Chek Guide) test strip 1 each by Other route Daily. Use as instructed (Patient taking differently: 1 each by Other route Daily. Use to test blood sugars twice daily) 100 each 0   • guaiFENesin (MUCINEX) 600 MG 12 hr tablet Take 2 tablets by mouth Every 12 (Twelve) Hours for 10 days. 40 tablet 0   • hydrOXYzine (ATARAX) 25 MG tablet TAKE 1 TABLET BY MOUTH AT NIGHT AS NEEDED FOR ITCHING (ONE OR TWO IF ITCHING). (Patient taking differently: Take 50 mg by mouth 1 (One) Time. Take 2 tablets daily at bedtime) 30 tablet 1   • Ibuprofen-diphenhydrAMINE HCl (ADVIL PM) 200-25 MG capsule Take 1 capsule by mouth Every Night.     • ipratropium-albuterol (DUO-NEB) 0.5-2.5 mg/3 ml nebulizer Take 3 mL by nebulization Every 4 (Four) Hours. 360 mL 0   • Isopropyl Alcohol ([employer] Isopropyl Alcohol Wipes) 70 % misc Apply 1 each topically Daily. 100 each 0   • Jardiance 10 MG tablet tablet TAKE 1 TABLET BY MOUTH DAILY 30 tablet 11   • losartan (COZAAR) 50 MG tablet Take 50 mg by mouth Daily. Take 1 tablet daily     • Magnesium 250 MG tablet Take 250 mg by mouth Daily.     • metFORMIN ER (GLUCOPHAGE-XR) 500 MG 24 hr tablet TAKE 2 TABLETS BY MOUTH EVERY  tablet 2   • montelukast (SINGULAIR) 10 MG tablet Take 1 tablet by mouth Every Night. 30 tablet 0   • potassium chloride 10 MEQ CR tablet Take 1 tablet by mouth Daily. 90 tablet 1   • pramipexole (MIRAPEX) 0.125 MG tablet Take 0.125 mg by mouth. Take 4 tablets 2 hours prior to bed daily     • predniSONE (DELTASONE) 10 MG tablet 40 mg po qd x 3 d, 30 mg qd x 3 d, 20mg  qd x 3 d, 10mg  qd x 3 d, then d/c 30 tablet 0   • Semaglutide,0.25 or 0.5MG/DOS, (Ozempic, 0.25 or 0.5 MG/DOSE,) 2 MG/1.5ML solution pen-injector Inject .05 mg weekly (Patient taking differently: Inject .05 mg weekly on sunday) 4.5 mL 2   • tiotropium bromide-olodaterol  (STIOLTO RESPIMAT) 2.5-2.5 MCG/ACT aerosol solution inhaler Inhale 2 puffs Daily. 1 each 1   • Unable to find Take 1 each by mouth 1 (One) Time. Med Name: [facility] Advanced Blood Sugar Support Formula     • [DISCONTINUED] pramipexole (MIRAPEX) 0.125 MG tablet Take 1 tablet by mouth Every Night. Take 4 tablets daily at bedtime. 120 tablet 1   • [DISCONTINUED] Blood Glucose Monitoring Suppl (Accu-Chek Umu Plus) w/Device kit 1 strip 2 (two) times a day.     • [DISCONTINUED] cyclobenzaprine (FLEXERIL) 10 MG tablet Take 1 tablet by mouth 3 (Three) Times a Day As Needed for Muscle Spasms. (Patient taking differently: Take 10 mg by mouth 3 (Three) Times a Day As Needed for Muscle Spasms. As needed) 15 tablet 0   • [DISCONTINUED] glucose blood (Accu-Chek Umu Plus) test strip Use to test blood sugars twice daily. 100 each 12   • [DISCONTINUED] glucose blood test strip 1 each by Other route 2 (two) times a day. 100 each 3   • [DISCONTINUED] losartan (COZAAR) 100 MG tablet Take 1 tablet by mouth Daily. (Patient taking differently: Take 50 mg by mouth Daily. Take 1 tablet daily) 90 tablet 3   • [DISCONTINUED] theophylline (SUKI-24) 300 MG 24 hr capsule Take 1 capsule by mouth Daily. 30 capsule 0     No current facility-administered medications on file prior to visit.       The following portions of the patient's history were reviewed and updated as appropriate: allergies, current medications, past family history, past medical history, past social history, past surgical history, and problem list.    Review of Systems   Constitutional: Positive for fatigue. Negative for chills and fever.   HENT: Negative for sinus pressure and sore throat.    Eyes: Negative for blurred vision.   Respiratory: Positive for shortness of breath. Negative for cough.    Cardiovascular: Negative for chest pain.   Gastrointestinal: Negative for abdominal pain.   Endocrine: Negative.    Genitourinary: Negative.    Musculoskeletal: Positive for back  pain (low back pain). Negative for arthralgias and joint swelling.   Skin: Negative for color change.   Allergic/Immunologic: Negative.    Neurological: Negative for dizziness.   Hematological: Negative.    Psychiatric/Behavioral: Negative for behavioral problems.       Objective   Physical Exam  Vitals and nursing note reviewed.   Constitutional:       Appearance: Normal appearance. She is well-developed.   HENT:      Head: Normocephalic and atraumatic.      Right Ear: Tympanic membrane, ear canal and external ear normal.      Left Ear: Tympanic membrane, ear canal and external ear normal.      Nose: Nose normal.   Eyes:      Extraocular Movements: Extraocular movements intact.      Conjunctiva/sclera: Conjunctivae normal.      Pupils: Pupils are equal, round, and reactive to light.   Cardiovascular:      Rate and Rhythm: Normal rate and regular rhythm.      Pulses: Normal pulses.      Heart sounds: Normal heart sounds.   Pulmonary:      Effort: Pulmonary effort is normal.      Breath sounds: Normal breath sounds.   Abdominal:      General: Bowel sounds are normal.      Palpations: Abdomen is soft.   Genitourinary:     Vagina: Normal.   Musculoskeletal:         General: Normal range of motion.      Cervical back: Normal range of motion and neck supple.   Skin:     General: Skin is warm and dry.   Neurological:      General: No focal deficit present.      Mental Status: She is alert and oriented to person, place, and time.   Psychiatric:         Mood and Affect: Mood normal.         Behavior: Behavior normal.         Thought Content: Thought content normal.         Judgment: Judgment normal.       PHQ-9 Total Score:      Assessment/Plan   Diagnoses and all orders for this visit:    1. Acute exacerbation of chronic obstructive pulmonary disease (COPD) (CMS/[facility]) (Primary)  Comments:  stable, complete prednisone taper as directed.  call if sx worsen when complete. continue inhalers/nebulizers as directed.     2. Acute  bilateral low back pain without sciatica  -     Ambulatory Referral to Neurosurgery    3. Elevated hematocrit  -     CBC Auto Differential; Future    4. Uncontrolled type 2 diabetes mellitus with hyperglycemia (CMS/[facility])  Comments:  continue to monitor bloodsugars, call if elevated while on prednisone  Orders:  -     Comprehensive Metabolic Panel; Future        There are no Patient Instructions on file for this visit.

## 2021-07-13 NOTE — TELEPHONE ENCOUNTER
Caller: Eugenia Madden    Relationship to patient: Self    Best call back number: [phone]    Patient is needing: PATIENT RETURNED CALL. MESSAGE RELAYED PATIENT EXPRESSED UNDERSTANDING.     PATIENT IS OPEN TO NEUROSURGEON REFERRAL, BUT WOULD LIKE A REFERRAL TO A PROVIDER IN [place] AND NOT [place].     PATIENT HAS APPT WITH ENDER WHITE TODAY

## 2021-07-14 ENCOUNTER — READMISSION MANAGEMENT (OUTPATIENT)
Dept: CALL CENTER | Facility: HOSPITAL | Age: 66
End: 2021-07-14

## 2021-07-14 NOTE — OUTREACH NOTE
Medical Week 2 Survey      Responses   [facility] patient discharged from?  [facility]   Does the patient have one of the following disease processes/diagnoses(primary or secondary)?  Other   Week 2 attempt successful?  Yes   Call start time  1734   Call end time  1737   Is patient permission given to speak with other caregiver?  No   Meds reviewed with patient/caregiver?  Yes   Is the patient having any side effects they believe may be caused by any medication additions or changes?  No   Does the patient have all medications ordered at discharge?  Yes   Is the patient taking all medications as directed (includes completed medication regime)?  Yes   Does the patient have a primary care provider?   Yes   Does the patient have an appointment with their PCP within 7 days of discharge?  Yes   Comments regarding PCP  Has seen  Melisa Adler    Has the patient kept scheduled appointments due by today?  Yes   Psychosocial issues?  No   Did the patient receive a copy of their discharge instructions?  Yes   Nursing interventions  Reviewed instructions with patient, Educated on MyChart   What is the patient's perception of their health status since discharge?  Improving   Is the patient/caregiver able to teach back signs and symptoms related to disease process for when to call PCP?  Yes   Is the patient/caregiver able to teach back signs and symptoms related to disease process for when to call 911?  Yes   Is the patient/caregiver able to teach back the hierarchy of who to call/visit for symptoms/problems? PCP, Specialist, Home health nurse, Urgent Care, ED, 911  Yes   If the patient is a current smoker, are they able to teach back resources for cessation?  Not a smoker   Week 2 Call Completed?  Yes   Wrap up additional comments  Had MRI yesterday, of back, no new issues           Natalie Sahu RN

## 2021-07-20 ENCOUNTER — READMISSION MANAGEMENT (OUTPATIENT)
Dept: CALL CENTER | Facility: HOSPITAL | Age: 66
End: 2021-07-20

## 2021-07-20 NOTE — OUTREACH NOTE
Medical Week 3 Survey      Responses   [facility] patient discharged from?  [facility]   Does the patient have one of the following disease processes/diagnoses(primary or secondary)?  Other   Week 3 attempt successful?  No   Unsuccessful attempts  Attempt 1          Adriana Mcqueen LPN

## 2021-07-26 RX ORDER — PRAMIPEXOLE DIHYDROCHLORIDE 0.12 MG/1
TABLET ORAL
Qty: 120 TABLET | Refills: 1 | Status: SHIPPED | OUTPATIENT
Start: 2021-07-26 | End: 2021-08-25

## 2021-08-03 ENCOUNTER — CLINICAL SUPPORT (OUTPATIENT)
Dept: FAMILY MEDICINE CLINIC | Facility: CLINIC | Age: 66
End: 2021-08-03

## 2021-08-03 DIAGNOSIS — R71.8 ELEVATED HEMATOCRIT: ICD-10-CM

## 2021-08-03 DIAGNOSIS — E11.65 UNCONTROLLED TYPE 2 DIABETES MELLITUS WITH HYPERGLYCEMIA (HCC): ICD-10-CM

## 2021-08-03 LAB
ALBUMIN SERPL-MCNC: 3.9 G/DL (ref 3.5–5.2)
ALBUMIN/GLOB SERPL: 1.6 G/DL
ALP SERPL-CCNC: 65 U/L (ref 39–117)
ALT SERPL W P-5'-P-CCNC: 17 U/L (ref 1–33)
ANION GAP SERPL CALCULATED.3IONS-SCNC: 7 MMOL/L (ref 5–15)
AST SERPL-CCNC: 10 U/L (ref 1–32)
BASOPHILS # BLD AUTO: 0.03 10*3/MM3 (ref 0–0.2)
BASOPHILS NFR BLD AUTO: 0.5 % (ref 0–1.5)
BILIRUB SERPL-MCNC: 0.3 MG/DL (ref 0–1.2)
BUN SERPL-MCNC: 18 MG/DL (ref 8–23)
BUN/CREAT SERPL: 36.7 (ref 7–25)
CALCIUM SPEC-SCNC: 9.3 MG/DL (ref 8.6–10.5)
CHLORIDE SERPL-SCNC: 101 MMOL/L (ref 98–107)
CO2 SERPL-SCNC: 28 MMOL/L (ref 22–29)
CREAT SERPL-MCNC: 0.49 MG/DL (ref 0.57–1)
DEPRECATED RDW RBC AUTO: 40.3 FL (ref 37–54)
EOSINOPHIL # BLD AUTO: 0.11 10*3/MM3 (ref 0–0.4)
EOSINOPHIL NFR BLD AUTO: 1.9 % (ref 0.3–6.2)
ERYTHROCYTE [DISTWIDTH] IN BLOOD BY AUTOMATED COUNT: 12.4 % (ref 12.3–15.4)
GFR SERPL CREATININE-BSD FRML MDRD: 127 ML/MIN/1.73
GLOBULIN UR ELPH-MCNC: 2.5 GM/DL
GLUCOSE SERPL-MCNC: 112 MG/DL (ref 65–99)
HCT VFR BLD AUTO: 44.9 % (ref 34–46.6)
HGB BLD-MCNC: 14.9 G/DL (ref 12–15.9)
IMM GRANULOCYTES # BLD AUTO: 0.05 10*3/MM3 (ref 0–0.05)
IMM GRANULOCYTES NFR BLD AUTO: 0.8 % (ref 0–0.5)
LYMPHOCYTES # BLD AUTO: 2.17 10*3/MM3 (ref 0.7–3.1)
LYMPHOCYTES NFR BLD AUTO: 36.8 % (ref 19.6–45.3)
MCH RBC QN AUTO: 30.1 PG (ref 26.6–33)
MCHC RBC AUTO-ENTMCNC: 33.2 G/DL (ref 31.5–35.7)
MCV RBC AUTO: 90.7 FL (ref 79–97)
MONOCYTES # BLD AUTO: 0.4 10*3/MM3 (ref 0.1–0.9)
MONOCYTES NFR BLD AUTO: 6.8 % (ref 5–12)
NEUTROPHILS NFR BLD AUTO: 3.13 10*3/MM3 (ref 1.7–7)
NEUTROPHILS NFR BLD AUTO: 53.2 % (ref 42.7–76)
NRBC BLD AUTO-RTO: 0 /100 WBC (ref 0–0.2)
PLAT MORPH BLD: NORMAL
PLATELET # BLD AUTO: 206 10*3/MM3 (ref 140–450)
PMV BLD AUTO: 12.2 FL (ref 6–12)
POTASSIUM SERPL-SCNC: 3.8 MMOL/L (ref 3.5–5.2)
PROT SERPL-MCNC: 6.4 G/DL (ref 6–8.5)
RBC # BLD AUTO: 4.95 10*6/MM3 (ref 3.77–5.28)
RBC MORPH BLD: NORMAL
SODIUM SERPL-SCNC: 136 MMOL/L (ref 136–145)
WBC # BLD AUTO: 5.89 10*3/MM3 (ref 3.4–10.8)
WBC MORPH BLD: NORMAL

## 2021-08-03 PROCEDURE — 85007 BL SMEAR W/DIFF WBC COUNT: CPT

## 2021-08-03 PROCEDURE — 80053 COMPREHEN METABOLIC PANEL: CPT | Performed by: NURSE PRACTITIONER

## 2021-08-03 PROCEDURE — 36415 COLL VENOUS BLD VENIPUNCTURE: CPT | Performed by: NURSE PRACTITIONER

## 2021-08-03 PROCEDURE — 85025 COMPLETE CBC W/AUTO DIFF WBC: CPT | Performed by: NURSE PRACTITIONER

## 2021-08-03 NOTE — PROGRESS NOTES
Venipuncture Blood Specimen Collection  Venipuncture performed in left arm by Virginia Rojas MA with good hemostasis. Patient tolerated the procedure well without complications.   08/03/21   TRENT St MD

## 2021-08-04 ENCOUNTER — TELEPHONE (OUTPATIENT)
Dept: FAMILY MEDICINE CLINIC | Facility: CLINIC | Age: 66
End: 2021-08-04

## 2021-08-04 NOTE — TELEPHONE ENCOUNTER
HUB TO READ    ----- Message from JONATHAN Brown sent at 8/4/2021  8:14 AM EDT -----  Labs are normal this time, and blood sugars have improved.  Will continue to monitor.

## 2021-08-18 RX ORDER — TIOTROPIUM BROMIDE AND OLODATEROL 3.124; 2.736 UG/1; UG/1
SPRAY, METERED RESPIRATORY (INHALATION)
Qty: 3 EACH | Refills: 3 | Status: SHIPPED | OUTPATIENT
Start: 2021-08-18 | End: 2022-08-17 | Stop reason: SDUPTHER

## 2021-08-20 RX ORDER — ATORVASTATIN CALCIUM 40 MG/1
TABLET, FILM COATED ORAL
Qty: 90 TABLET | Refills: 3 | Status: SHIPPED | OUTPATIENT
Start: 2021-08-20 | End: 2022-05-23

## 2021-08-25 RX ORDER — PRAMIPEXOLE DIHYDROCHLORIDE 0.12 MG/1
TABLET ORAL
Qty: 120 TABLET | Refills: 1 | Status: SHIPPED | OUTPATIENT
Start: 2021-08-25 | End: 2021-09-18

## 2021-08-30 RX ORDER — POTASSIUM CHLORIDE 750 MG/1
TABLET, FILM COATED, EXTENDED RELEASE ORAL
Qty: 90 TABLET | Refills: 1 | Status: SHIPPED | OUTPATIENT
Start: 2021-08-30 | End: 2021-12-07

## 2021-09-12 PROBLEM — J96.01 ACUTE RESPIRATORY FAILURE WITH HYPOXIA: Status: RESOLVED | Noted: 2021-07-07 | Resolved: 2021-09-12

## 2021-09-12 NOTE — PROGRESS NOTES
The ABCs of the Annual Wellness Visit  Subsequent Medicare Wellness Visit  Patient presents today for an age-specific physical and has been advised to wear sunscreen and seatbelt.  She is also here to follow-up on multiple chronic health conditions most of which are stableShe is presently undergoing PT for acute low back pain and does seem to be helping to the point where she may consider coming out of skilled nursing and going back to work.  She continues to be without smoking breathing and hypertension seem to be under good control.  She did have calcification on CT in the coronary arteries and she had a negative echo and stress test with Dr. No.  Chief Complaint   Patient presents with   • Medicare Wellness-subsequent   • Diabetes   • Hypertension   • Hyperlipidemia      Subjective    History of Present Illness:  Eugenia Madden is a 66 y.o. female who presents for a Subsequent Medicare Wellness Visit.    The following portions of the patient's history were reviewed and   updated as appropriate: allergies, current medications, past family history, past medical history, past social history, past surgical history and problem list.    Compared to one year ago, the patient feels her physical   health is better.    Compared to one year ago, the patient feels her mental   health is better.    Recent Hospitalizations:  This patient has had a [facility] admission record on file within the last 365 days.    Current Medical Providers:  Patient Care Team:  Melisa Taveras MD as PCP - General    Outpatient Medications Prior to Visit   Medication Sig Dispense Refill   • Accu-Chek Softclix Lancets lancets 1 each by Other route Daily. Use as instructed 100 each 0   • albuterol sulfate  (90 Base) MCG/ACT inhaler Inhale 2 puffs Every 4 (Four) Hours As Needed for Wheezing. 1 g 3   • aspirin 81 MG chewable tablet Chew 81 mg Daily.     • atorvastatin (LIPITOR) 40 MG tablet TAKE 1 TABLET BY MOUTH EVERY DAY 90 tablet 3    • cetirizine (zyrTEC) 10 MG tablet Take 10 mg by mouth Daily.     • Cholecalciferol (VITAMIN D3) 125 MCG (5000 UT) tablet tablet Take 5,000 Units by mouth Daily.     • famotidine (PEPCID) 20 MG tablet Take 20 mg by mouth Daily.     • ferrous sulfate 325 (65 FE) MG tablet Take 325 mg by mouth Daily With Breakfast.     • glimepiride (Amaryl) 4 MG tablet Take 1 tablet by mouth Every Morning Before Breakfast. 180 tablet 3   • glucose blood (Accu-Chek Guide) test strip 1 each by Other route Daily. Use as instructed (Patient taking differently: 1 each by Other route Daily. Use to test blood sugars twice daily) 100 each 0   • hydrOXYzine (ATARAX) 25 MG tablet TAKE 1 TABLET BY MOUTH AT NIGHT AS NEEDED FOR ITCHING (ONE OR TWO IF ITCHING). (Patient taking differently: Take 50 mg by mouth 1 (One) Time. Take 2 tablets daily at bedtime) 30 tablet 1   • Ibuprofen-diphenhydrAMINE HCl (ADVIL PM) 200-25 MG capsule Take 1 capsule by mouth Every Night.     • ipratropium-albuterol (DUO-NEB) 0.5-2.5 mg/3 ml nebulizer Take 3 mL by nebulization Every 4 (Four) Hours. 360 mL 0   • Isopropyl Alcohol ([employer] Isopropyl Alcohol Wipes) 70 % misc Apply 1 each topically Daily. 100 each 0   • Jardiance 10 MG tablet tablet TAKE 1 TABLET BY MOUTH DAILY 30 tablet 11   • losartan (COZAAR) 50 MG tablet Take 50 mg by mouth Daily. Take 1 tablet daily     • Magnesium 250 MG tablet Take 250 mg by mouth Daily.     • meloxicam (MOBIC) 7.5 MG tablet      • metFORMIN ER (GLUCOPHAGE-XR) 500 MG 24 hr tablet TAKE 2 TABLETS BY MOUTH EVERY  tablet 2   • potassium chloride 10 MEQ CR tablet TAKE 1 TABLET BY MOUTH EVERY DAY 90 tablet 1   • pramipexole (MIRAPEX) 0.125 MG tablet TAKE 4 TABLETS BY MOUTH DAILY AT BEDTIME 120 tablet 1   • Semaglutide,0.25 or 0.5MG/DOS, (Ozempic, 0.25 or 0.5 MG/DOSE,) 2 MG/1.5ML solution pen-injector Inject .05 mg weekly (Patient taking differently: Inject .05 mg weekly on sunday) 4.5 mL 2   • Stiolto Respimat 2.5-2.5 MCG/ACT aerosol  solution inhaler INHALE 2 PUFFS BY MOUTH DAILY 3 each 3   • Unable to find Take 1 each by mouth 1 (One) Time. Med Name: [facility] Advanced Blood Sugar Support Formula     • montelukast (SINGULAIR) 10 MG tablet Take 1 tablet by mouth Every Night. 30 tablet 0   • predniSONE (DELTASONE) 10 MG tablet 40 mg po qd x 3 d, 30 mg qd x 3 d, 20mg  qd x 3 d, 10mg  qd x 3 d, then d/c 30 tablet 0   • atorvastatin (LIPITOR) 40 MG tablet        No facility-administered medications prior to visit.       No opioid medication identified on active medication list. I have reviewed chart for other potential  high risk medication/s and harmful drug interactions in the elderly.          Aspirin is on active medication list. Aspirin use is indicated based on review of current medical condition/s. Pros and cons of this therapy have been discussed today. Benefits of this medication outweigh potential harm.  Patient has been encouraged to continue taking this medication.  .      Patient Active Problem List   Diagnosis   • B12 deficiency   • Cardiac murmur   • Chronic coronary artery disease   • Uncontrolled type 2 diabetes mellitus with hyperglycemia (CMS/HCC)   • Family history of cerebrovascular accident (CVA)   • Family history of malignant neoplasm of breast   • Fatty liver   • Hearing deficit   • Mixed hyperlipidemia   • Essential hypertension   • Osteoporosis   • Polycythemia   • Polyp of colon   • Pulmonary emphysema (CMS/HCC)   • Reduced libido   • Restless leg   • Snoring   • Vitamin D deficiency   • Arthritis   • Thoracic aortic aneurysm without rupture (CMS/HCC)   • Acute bilateral low back pain without sciatica   • Pneumonia of right middle lobe due to infectious organism   • GERD without esophagitis   • Former smoker   • Chronic diastolic CHF (congestive heart failure) (CMS/HCC)     Advance Care Planning  Advance Directive is not on file.  ACP discussion was held with the patient during this visit. Patient does not have an advance  "directive, information provided.    Review of Systems   Genitourinary: Positive for urgency.   Musculoskeletal: Positive for back pain.        Objective    Vitals:    09/13/21 0906 09/13/21 0907   BP: 110/70 125/75   BP Location: Right arm Left arm   Patient Position: Sitting Sitting   Cuff Size: Adult Adult   Pulse: 90    Temp: 96.9 °F (36.1 °C)    SpO2: 93%    Weight: 60.8 kg (134 lb)    Height: 162.6 cm (64.02\")    PainSc: 0-No pain      BMI Readings from Last 1 Encounters:   09/13/21 22.99 kg/m²   BMI is within normal parameters. No follow-up required.    Does the patient have evidence of cognitive impairment? No    Physical Exam  Vitals reviewed.   Constitutional:       General: She is not in acute distress.     Appearance: Normal appearance. She is well-developed. She is not ill-appearing or toxic-appearing.   HENT:      Head: Normocephalic and atraumatic.      Right Ear: Tympanic membrane, ear canal and external ear normal.      Left Ear: Tympanic membrane, ear canal and external ear normal.      Nose: Nose normal.   Eyes:      Extraocular Movements: Extraocular movements intact.      Conjunctiva/sclera: Conjunctivae normal.      Pupils: Pupils are equal, round, and reactive to light.   Cardiovascular:      Rate and Rhythm: Normal rate and regular rhythm.      Pulses:           Dorsalis pedis pulses are 1+ on the right side and 1+ on the left side.        Posterior tibial pulses are 1+ on the right side and 1+ on the left side.      Heart sounds: Normal heart sounds.   Pulmonary:      Effort: Pulmonary effort is normal.      Comments: Decreased breath sounds bilaterally  Abdominal:      General: Bowel sounds are normal. There is no distension.      Palpations: Abdomen is soft. There is no mass.      Tenderness: There is no abdominal tenderness.   Musculoskeletal:         General: No tenderness. Normal range of motion.      Cervical back: Neck supple.   Feet:      Right foot:      Skin integrity: Skin " integrity normal.      Toenail Condition: Right toenails are normal.      Left foot:      Protective Sensation: 10 sites tested.      Skin integrity: Skin integrity normal.      Toenail Condition: Left toenails are normal.      Comments:     Skin:     General: Skin is warm.   Neurological:      General: No focal deficit present.      Mental Status: She is alert and oriented to person, place, and time.   Psychiatric:         Mood and Affect: Mood normal.         Behavior: Behavior normal.       Lab Results   Component Value Date    HGBA1C 7.2 (H) 2021            HEALTH RISK ASSESSMENT    Smoking Status:  Social History     Tobacco Use   Smoking Status Former Smoker   • Packs/day: 0.50   • Years: 46.00   • Pack years: 23.00   • Types: Cigarettes   • Quit date: 2020   • Years since quittin.8   Smokeless Tobacco Never Used   Tobacco Comment    Passive Smoke: Y     Alcohol Consumption:  Social History     Substance and Sexual Activity   Alcohol Use No     Fall Risk Screen:    KLEVER Fall Risk Assessment was completed, and patient is at LOW risk for falls.Assessment completed on:2021    Depression Screening:  PHQ-2/PHQ-9 Depression Screening 2021   Little interest or pleasure in doing things 0   Feeling down, depressed, or hopeless 0   Total Score 0       Health Habits and Functional and Cognitive Screening:  Functional & Cognitive Status 2021   Do you have difficulty preparing food and eating? No   Do you have difficulty bathing yourself, getting dressed or grooming yourself? No   Do you have difficulty using the toilet? No   Do you have difficulty moving around from place to place? No   Do you have trouble with steps or getting out of a bed or a chair? No   Current Diet Well Balanced Diet   Dental Exam Up to date   Eye Exam Up to date   Exercise (times per week) 4 times per week   Current Exercises Include Walking   Current Exercise Activities Include -   Do you need help using the phone?  No    Are you deaf or do you have serious difficulty hearing?  Yes   Do you need help with transportation? No   Do you need help shopping? No   Do you need help preparing meals?  No   Do you need help with housework?  No   Do you need help with laundry? No   Do you need help taking your medications? No   Do you need help managing money? No   Do you ever drive or ride in a car without wearing a seat belt? No   Have you felt unusual stress, anger or loneliness in the last month? -   Who do you live with? -   If you need help, do you have trouble finding someone available to you? -   Have you been bothered in the last four weeks by sexual problems? -   Do you have difficulty concentrating, remembering or making decisions? -       Age-appropriate Screening Schedule:  Refer to the list below for future screening recommendations based on patient's age, sex and/or medical conditions. Orders for these recommended tests are listed in the plan section. The patient has been provided with a written plan.    Health Maintenance   Topic Date Due   • DIABETIC EYE EXAM  06/06/2019   • MAMMOGRAM  09/10/2021   • DXA SCAN  09/18/2021   • INFLUENZA VACCINE  10/01/2021   • PAP SMEAR  03/13/2022   • HEMOGLOBIN A1C  03/13/2022   • DIABETIC FOOT EXAM  06/10/2022   • LIPID PANEL  06/10/2022   • URINE MICROALBUMIN  06/10/2022   • TDAP/TD VACCINES (2 - Td or Tdap) 12/02/2029   • ZOSTER VACCINE  Completed              Assessment/Plan   CMS Preventative Services Quick Reference  Risk Factors Identified During Encounter  Cardiovascular Disease  The above risks/problems have been discussed with the patient.  Follow up actions/plans if indicated are seen below in the Assessment/Plan Section.  Pertinent information has been shared with the patient in the After Visit Summary.    Diagnoses and all orders for this visit:    1. Encounter for annual general medical examination with abnormal findings in adult (Primary)  Comments:  Knows to wear sun screen and  seat belt  Orders:  -     ECG 12 Lead    2. Encounter for screening mammogram for malignant neoplasm of breast  Comments:  Again encouraged mammogram  Orders:  -     Mammo Screening Digital Tomosynthesis Bilateral With CAD; Future    3. Acute bilateral low back pain without sciatica  Comments:  In PT 4 weeks.  Has retired but may go BTW    4. Kidney stones  Comments:  Gone    5. Vitamin D deficiency  Comments:  Takes daily  Orders:  -     Vitamin D 25 Hydroxy    6. Tobacco dependence syndrome  Comments:  Stopped 10 months ago    7. Snoring  Comments:  Minimal snoring    8. Pulmonary emphysema, unspecified emphysema type (CMS/HCC)  Comments:  Seems to be breathing much better after she stopped smoking    9. Essential hypertension  Comments:  = Controlled    10. Mixed hyperlipidemia  Comments:  Trying to eat less saturated fats  Orders:  -     Lipid Panel    11. Uncontrolled type 2 diabetes mellitus with hyperglycemia (CMS/HCC)  Comments:  Eye exam up-to-date no low or high blood sugars  Orders:  -     Hemoglobin A1c  -     Microalbumin / Creatinine Urine Ratio - Urine, Clean Catch  -     TSH    12. Chronic coronary artery disease  Comments:  Calcifications on CT-workup Dr. Elkins negative Echo and stress test    13. Osteoporosis without current pathological fracture, unspecified osteoporosis type  Comments:  Last DEXA scan showed osteopenia encouraged 20 minutes of daily weightbearing exercise    14. Former smoker  Comments:  Not smoking for 10 months    15. B12 deficiency  Comments:  No longer taking vitamin B12  Orders:  -     Vitamin B12    16. Pneumonia of right middle lobe due to infectious organism  Comments:  No cough or chest pain last chest x-ray before she left the hospital showed no pneumonia    17. GERD without esophagitis  Comments:  Controlled with intermittent famotidine  Orders:  -     Magnesium    18. Post-menopausal  Comments:  DEXA scan scheduled.  Orders:  -     DEXA Bone Density Axial;  Future    Other orders  -     Cancel: CBC Auto Differential  -     Cancel: Comprehensive Metabolic Panel        Follow Up:   No follow-ups on file.     An After Visit Summary and PPPS were made available to the patient.

## 2021-09-12 NOTE — PATIENT INSTRUCTIONS
Health Maintenance Due   Topic Date Due   • DIABETIC EYE EXAM  06/06/2019   • ANNUAL WELLNESS VISIT  09/02/2021   • MAMMOGRAM  09/10/2021     Call if would consider sleep study    Get mamogram and DXA done.    Patient to visit [employer] website (https://www.ibanet.org/) for information reguarding advanced directive and living will.  Advance Directive       Advance directives are legal documents that let you make choices ahead of time about your health care and medical treatment in case you become unable to communicate for yourself. Advance directives are a way for you to communicate your wishes to family, friends, and health care providers. This can help convey your decisions about end-of-life care if you become unable to communicate.  Discussing and writing advance directives should happen over time rather than all at once. Advance directives can be changed depending on your situation and what you want, even after you have signed the advance directives.  If you do not have an advance directive, some states assign family decision makers to act on your behalf based on how closely you are related to them. Each state has its own laws regarding advance directives. You may want to check with your health care provider, , or state representative about the laws in your state. There are different types of advance directives, such as:  · Medical power of .  · Living will.  · Do not resuscitate (DNR) or do not attempt resuscitation (DNAR) order.  Health care proxy and medical power of   A health care proxy, also called a health care agent, is a person who is appointed to make medical decisions for you in cases in which you are unable to make the decisions yourself. Generally, people choose someone they know well and trust to represent their preferences. Make sure to ask this person for an agreement to act as your proxy. A proxy may have to exercise judgment in the event of a medical  decision for which your wishes are not known.  A medical power of  is a legal document that names your health care proxy. Depending on the laws in your state, after the document is written, it may also need to be:  · Signed.  · Notarized.  · Dated.  · Copied.  · Witnessed.  · Incorporated into your medical record.  You may also want to appoint someone to manage your financial affairs in a situation in which you are unable to do so. This is called a durable power of  for finances. It is a separate legal document from the durable power of  for health care. You may choose the same person or someone different from your health care proxy to act as your agent in financial matters.  If you do not appoint a proxy, or if there is a concern that the proxy is not acting in your best interests, a court-appointed guardian may be designated to act on your behalf.  Living will  A living will is a set of instructions documenting your wishes about medical care when you cannot express them yourself. Health care providers should keep a copy of your living will in your medical record. You may want to give a copy to family members or friends. To alert caregivers in case of an emergency, you can place a card in your wallet to let them know that you have a living will and where they can find it. A living will is used if you become:  · Terminally ill.  · Incapacitated.  · Unable to communicate or make decisions.  Items to consider in your living will include:  · The use or non-use of life-sustaining equipment, such as dialysis machines and breathing machines (ventilators).  · A DNR or DNAR order, which is the instruction not to use cardiopulmonary resuscitation (CPR) if breathing or heartbeat stops.  · The use or non-use of tube feeding.  · Withholding of food and fluids.  · Comfort (palliative) care when the goal becomes comfort rather than a cure.  · Organ and tissue donation.  A living will does not give  instructions for distributing your money and property if you should pass away. It is recommended that you seek the advice of a  when writing a will. Decisions about taxes, beneficiaries, and asset distribution will be legally binding. This process can relieve your family and friends of any concerns surrounding disputes or questions that may come up about the distribution of your assets.  DNR or DNAR  A DNR or DNAR order is a request not to have CPR in the event that your heart stops beating or you stop breathing. If a DNR or DNAR order has not been made and shared, a health care provider will try to help any patient whose heart has stopped or who has stopped breathing. If you plan to have surgery, talk with your health care provider about how your DNR or DNAR order will be followed if problems occur.  Summary  · Advance directives are the legal documents that allow you to make choices ahead of time about your health care and medical treatment in case you become unable to communicate for yourself.  · The process of discussing and writing advance directives should happen over time. You can change the advance directives, even after you have signed them.  · Advance directives include DNR or DNAR orders, living multani, and designating an agent as your medical power of .  This information is not intended to replace advice given to you by your health care provider. Make sure you discuss any questions you have with your health care provider.  Document Released: 03/26/2009 Document Revised: 11/06/2017 Document Reviewed: 11/06/2017  [employer] Patient Education © 2019 [employer]

## 2021-09-13 ENCOUNTER — OFFICE VISIT (OUTPATIENT)
Dept: FAMILY MEDICINE CLINIC | Facility: CLINIC | Age: 66
End: 2021-09-13

## 2021-09-13 VITALS
TEMPERATURE: 96.9 F | BODY MASS INDEX: 22.88 KG/M2 | HEART RATE: 90 BPM | OXYGEN SATURATION: 93 % | HEIGHT: 64 IN | WEIGHT: 134 LBS | DIASTOLIC BLOOD PRESSURE: 75 MMHG | SYSTOLIC BLOOD PRESSURE: 125 MMHG

## 2021-09-13 DIAGNOSIS — K21.9 GERD WITHOUT ESOPHAGITIS: Chronic | ICD-10-CM

## 2021-09-13 DIAGNOSIS — E11.65 UNCONTROLLED TYPE 2 DIABETES MELLITUS WITH HYPERGLYCEMIA (HCC): ICD-10-CM

## 2021-09-13 DIAGNOSIS — J43.9 PULMONARY EMPHYSEMA, UNSPECIFIED EMPHYSEMA TYPE (HCC): ICD-10-CM

## 2021-09-13 DIAGNOSIS — M81.0 OSTEOPOROSIS WITHOUT CURRENT PATHOLOGICAL FRACTURE, UNSPECIFIED OSTEOPOROSIS TYPE: ICD-10-CM

## 2021-09-13 DIAGNOSIS — E55.9 VITAMIN D DEFICIENCY: ICD-10-CM

## 2021-09-13 DIAGNOSIS — Z12.31 ENCOUNTER FOR SCREENING MAMMOGRAM FOR MALIGNANT NEOPLASM OF BREAST: ICD-10-CM

## 2021-09-13 DIAGNOSIS — M54.50 ACUTE BILATERAL LOW BACK PAIN WITHOUT SCIATICA: ICD-10-CM

## 2021-09-13 DIAGNOSIS — I25.10 CHRONIC CORONARY ARTERY DISEASE: ICD-10-CM

## 2021-09-13 DIAGNOSIS — J18.9 PNEUMONIA OF RIGHT MIDDLE LOBE DUE TO INFECTIOUS ORGANISM: ICD-10-CM

## 2021-09-13 DIAGNOSIS — Z78.0 POST-MENOPAUSAL: ICD-10-CM

## 2021-09-13 DIAGNOSIS — E78.2 MIXED HYPERLIPIDEMIA: ICD-10-CM

## 2021-09-13 DIAGNOSIS — N20.0 KIDNEY STONES: ICD-10-CM

## 2021-09-13 DIAGNOSIS — R06.83 SNORING: ICD-10-CM

## 2021-09-13 DIAGNOSIS — F17.200 TOBACCO DEPENDENCE SYNDROME: ICD-10-CM

## 2021-09-13 DIAGNOSIS — Z00.01 ENCOUNTER FOR ANNUAL GENERAL MEDICAL EXAMINATION WITH ABNORMAL FINDINGS IN ADULT: Primary | ICD-10-CM

## 2021-09-13 DIAGNOSIS — I10 ESSENTIAL HYPERTENSION: ICD-10-CM

## 2021-09-13 DIAGNOSIS — E53.8 B12 DEFICIENCY: ICD-10-CM

## 2021-09-13 DIAGNOSIS — Z87.891 FORMER SMOKER: ICD-10-CM

## 2021-09-13 LAB
25(OH)D3 SERPL-MCNC: 61.7 NG/ML
CHOLEST SERPL-MCNC: 140 MG/DL (ref 0–200)
HBA1C MFR BLD: 7.2 % (ref 3.5–5.6)
HDLC SERPL-MCNC: 47 MG/DL (ref 40–60)
LDLC SERPL CALC-MCNC: 71 MG/DL (ref 0–100)
LDLC/HDLC SERPL: 1.46 {RATIO}
MAGNESIUM SERPL-MCNC: 2 MG/DL (ref 1.6–2.4)
TRIGL SERPL-MCNC: 121 MG/DL (ref 0–150)
TSH SERPL DL<=0.05 MIU/L-ACNC: 1.68 UIU/ML (ref 0.27–4.2)
VIT B12 BLD-MCNC: 307 PG/ML (ref 211–946)
VLDLC SERPL-MCNC: 22 MG/DL (ref 5–40)

## 2021-09-13 PROCEDURE — 93000 ELECTROCARDIOGRAM COMPLETE: CPT | Performed by: PREVENTIVE MEDICINE

## 2021-09-13 PROCEDURE — 84443 ASSAY THYROID STIM HORMONE: CPT | Performed by: PREVENTIVE MEDICINE

## 2021-09-13 PROCEDURE — 1126F AMNT PAIN NOTED NONE PRSNT: CPT | Performed by: PREVENTIVE MEDICINE

## 2021-09-13 PROCEDURE — 82607 VITAMIN B-12: CPT | Performed by: PREVENTIVE MEDICINE

## 2021-09-13 PROCEDURE — 83036 HEMOGLOBIN GLYCOSYLATED A1C: CPT | Performed by: PREVENTIVE MEDICINE

## 2021-09-13 PROCEDURE — 96160 PT-FOCUSED HLTH RISK ASSMT: CPT | Performed by: PREVENTIVE MEDICINE

## 2021-09-13 PROCEDURE — 82043 UR ALBUMIN QUANTITATIVE: CPT | Performed by: PREVENTIVE MEDICINE

## 2021-09-13 PROCEDURE — 99397 PER PM REEVAL EST PAT 65+ YR: CPT | Performed by: PREVENTIVE MEDICINE

## 2021-09-13 PROCEDURE — 36415 COLL VENOUS BLD VENIPUNCTURE: CPT | Performed by: PREVENTIVE MEDICINE

## 2021-09-13 PROCEDURE — 82306 VITAMIN D 25 HYDROXY: CPT | Performed by: PREVENTIVE MEDICINE

## 2021-09-13 PROCEDURE — 83735 ASSAY OF MAGNESIUM: CPT | Performed by: PREVENTIVE MEDICINE

## 2021-09-13 PROCEDURE — 1159F MED LIST DOCD IN RCRD: CPT | Performed by: PREVENTIVE MEDICINE

## 2021-09-13 PROCEDURE — 80061 LIPID PANEL: CPT | Performed by: PREVENTIVE MEDICINE

## 2021-09-13 PROCEDURE — 1170F FXNL STATUS ASSESSED: CPT | Performed by: PREVENTIVE MEDICINE

## 2021-09-13 PROCEDURE — 82570 ASSAY OF URINE CREATININE: CPT | Performed by: PREVENTIVE MEDICINE

## 2021-09-13 PROCEDURE — G0438 PPPS, INITIAL VISIT: HCPCS | Performed by: PREVENTIVE MEDICINE

## 2021-09-13 RX ORDER — MELOXICAM 7.5 MG/1
TABLET ORAL
COMMUNITY
Start: 2021-09-12

## 2021-09-13 RX ORDER — ATORVASTATIN CALCIUM 40 MG/1
TABLET, FILM COATED ORAL
COMMUNITY
Start: 2021-08-24 | End: 2021-09-13 | Stop reason: SDUPTHER

## 2021-09-13 NOTE — PROGRESS NOTES
Procedure     ECG 12 Lead    Date/Time: 9/13/2021 5:52 PM  Performed by: Melisa Taveras MD  Authorized by: Melisa Taveras MD   Comparison: compared with previous ECG from 9/2/2020  Similar to previous ECG  Rhythm: sinus rhythm  Rate: normal  Conduction: conduction normal  ST Segments: ST segments normal  T inversion: III  QRS axis: normal  Other: no other findings  Other findings: non-specific ST-T wave changes    Clinical impression: abnormal EKG

## 2021-09-14 ENCOUNTER — TELEPHONE (OUTPATIENT)
Dept: FAMILY MEDICINE CLINIC | Facility: CLINIC | Age: 66
End: 2021-09-14

## 2021-09-14 LAB
ALBUMIN UR-MCNC: 3.5 MG/DL
CREAT UR-MCNC: 66.4 MG/DL
MICROALBUMIN/CREAT UR: 52.7 MG/G

## 2021-09-14 NOTE — TELEPHONE ENCOUNTER
HUB TO READ  ----- Message from Melisa Taveras MD sent at 9/14/2021  7:18 AM EDT -----  Spilling some protein in urine so lets take care to control Blood sugar and blood pressure so we don't have to worry about kidney function.  Vitamin B12 is low so increase otc use. A1C is 7.2 shows DM not well controlled-keep F/U with Dr. Kim

## 2021-09-14 NOTE — PROGRESS NOTES
Spilling some protein in urine so lets take care to control Blood sugar and blood pressure so we don't have to worry about kidney function.  Vitamin B12 is low so increase otc use. A1C is 7.2 shows DM not well controlled-keep F/U with Dr. Kim

## 2021-09-18 RX ORDER — PRAMIPEXOLE DIHYDROCHLORIDE 0.12 MG/1
TABLET ORAL
Qty: 120 TABLET | Refills: 1 | Status: SHIPPED | OUTPATIENT
Start: 2021-09-18 | End: 2021-10-12

## 2021-09-20 ENCOUNTER — HOSPITAL ENCOUNTER (OUTPATIENT)
Dept: BONE DENSITY | Facility: HOSPITAL | Age: 66
Discharge: HOME | End: 2021-09-20

## 2021-09-20 ENCOUNTER — TELEPHONE (OUTPATIENT)
Dept: FAMILY MEDICINE CLINIC | Facility: CLINIC | Age: 66
End: 2021-09-20

## 2021-09-20 ENCOUNTER — HOSPITAL ENCOUNTER (OUTPATIENT)
Dept: MAMMOGRAPHY | Facility: HOSPITAL | Age: 66
Discharge: HOME | End: 2021-09-20

## 2021-09-20 DIAGNOSIS — Z12.31 ENCOUNTER FOR SCREENING MAMMOGRAM FOR MALIGNANT NEOPLASM OF BREAST: ICD-10-CM

## 2021-09-20 DIAGNOSIS — Z78.0 POST-MENOPAUSAL: ICD-10-CM

## 2021-09-20 PROCEDURE — 77080 DXA BONE DENSITY AXIAL: CPT

## 2021-09-20 PROCEDURE — 77063 BREAST TOMOSYNTHESIS BI: CPT

## 2021-09-20 PROCEDURE — 77067 SCR MAMMO BI INCL CAD: CPT

## 2021-09-20 NOTE — PROGRESS NOTES
Patient should get appointment with Dentist to get lower teeth checked for health and set up appointment to come in and se us for discussion of which treatment she wants for osteoporosis.  Vitamin D and Calcium are normal and should continue to get 20 minutes weightbearing exercise daily

## 2021-09-20 NOTE — TELEPHONE ENCOUNTER
HUB TO READ  ----- Message from Melisa Taveras MD sent at 9/20/2021  4:59 PM EDT -----  Mammogram looked good.  Continue BSE and repeat one year    Patient should get appointment with Dentist to get lower teeth checked for health and set up appointment to come in and se us for discussion of which treatment she wants for osteoporosis.  Vitamin D and Calcium are normal and should continue to get 20 minutes weightbearing exercise daily

## 2021-09-20 NOTE — TELEPHONE ENCOUNTER
----- Message from Melisa Taveras MD sent at 9/20/2021  5:02 PM EDT -----  Patient should get appointment with Dentist to get lower teeth checked for health and set up appointment to come in and se us for discussion of which treatment she wants for osteoporosis.  Vitamin D and Calcium are normal and should continue to get 20 minutes weightbearing exercise daily

## 2021-09-21 NOTE — TELEPHONE ENCOUNTER
HUB RELAYED MESSAGE LABELED HUB TO READ,  PATIENT VOICED UNDERSTANDING     Caller: Eugenia Madden    Relationship to patient: Self    Best call back number: [phone]    Patient is needing: PATIENT HAD NO FURTHER QUESTIONS, WILL CALL IN October TO SCHEDULE APPOINTMENT TO DISCUSS OSTEOPOROSIS

## 2021-09-27 RX ORDER — LOSARTAN POTASSIUM 50 MG/1
TABLET ORAL
Qty: 180 TABLET | Refills: 3 | Status: SHIPPED | OUTPATIENT
Start: 2021-09-27 | End: 2022-08-17 | Stop reason: SDUPTHER

## 2021-09-29 RX ORDER — PRAMIPEXOLE DIHYDROCHLORIDE 0.12 MG/1
TABLET ORAL
COMMUNITY
Start: 2021-09-18 | End: 2021-10-12 | Stop reason: SDUPTHER

## 2021-09-30 ENCOUNTER — OFFICE VISIT (OUTPATIENT)
Dept: ENDOCRINOLOGY | Facility: CLINIC | Age: 66
End: 2021-09-30

## 2021-09-30 VITALS
DIASTOLIC BLOOD PRESSURE: 70 MMHG | HEART RATE: 103 BPM | BODY MASS INDEX: 23.22 KG/M2 | OXYGEN SATURATION: 96 % | WEIGHT: 136 LBS | HEIGHT: 64 IN | SYSTOLIC BLOOD PRESSURE: 125 MMHG | TEMPERATURE: 97.2 F

## 2021-09-30 DIAGNOSIS — E11.65 TYPE 2 DIABETES MELLITUS WITH HYPERGLYCEMIA, WITHOUT LONG-TERM CURRENT USE OF INSULIN (HCC): Primary | ICD-10-CM

## 2021-09-30 DIAGNOSIS — I10 ESSENTIAL HYPERTENSION: ICD-10-CM

## 2021-09-30 DIAGNOSIS — E78.2 MIXED HYPERLIPIDEMIA: ICD-10-CM

## 2021-09-30 LAB — GLUCOSE BLDC GLUCOMTR-MCNC: 132 MG/DL (ref 70–105)

## 2021-09-30 PROCEDURE — 99214 OFFICE O/P EST MOD 30 MIN: CPT | Performed by: INTERNAL MEDICINE

## 2021-09-30 PROCEDURE — 82962 GLUCOSE BLOOD TEST: CPT | Performed by: INTERNAL MEDICINE

## 2021-09-30 RX ORDER — GUAIFENESIN 600 MG/1
1200 TABLET, EXTENDED RELEASE ORAL 2 TIMES DAILY
COMMUNITY
End: 2023-03-20

## 2021-09-30 NOTE — PATIENT INSTRUCTIONS
Continue current medications  If you start having low blood sugars then decrease glimepiride to just 1 tablet p.o. daily with breakfast  Always keep glucose source in case of low blood sugar  Annual eye exam and flu vaccine  Labs before follow-up.

## 2021-09-30 NOTE — PROGRESS NOTES
Endocrine Progress Note Outpatient     Patient Care Team:  Melisa Taveras MD as PCP - General    Chief Complaint: Follow-up type 2 diabetes          HPI: This is 66-year-old female with history of type 2 diabetes, hypertension and hyperlipidemia is here for follow-up.  For type 2 diabetes: Currently on Ozempic and 5 mg once a week along with Metformin and Jardiance and glimepiride 4 mg twice a day.  She tells me the blood sugars usually runs well at home.  Hypertension: Well-controlled  Hyperlipidemia: Currently on atorvastatin.    Past Medical History:   Diagnosis Date   • Arthritis    • Kidney stones    • Restless leg        Social History     Socioeconomic History   • Marital status:      Spouse name: Not on file   • Number of children: Not on file   • Years of education: Not on file   • Highest education level: Not on file   Tobacco Use   • Smoking status: Former Smoker     Packs/day: 0.50     Years: 46.00     Pack years: 23.00     Types: Cigarettes     Quit date: 2020     Years since quittin.9   • Smokeless tobacco: Never Used   • Tobacco comment: Passive Smoke: Y   Vaping Use   • Vaping Use: Never used   Substance and Sexual Activity   • Alcohol use: No   • Drug use: No   • Sexual activity: Yes     Partners: Male     Birth control/protection: None       Family History   Problem Relation Age of Onset   • Heart disease Mother    • Cancer Mother         Breast Cancer   • Breast cancer Mother 55   • Diabetes Father    • Stroke Father    • Heart disease Father    • Hypertension Father    • Hypertension Sister    • Diabetes Sister    • Heart disease Sister    • Diabetes Brother    • Hypertension Brother    • Heart disease Brother    • Other Other         Abstraction from [facility] Cholesterol   • Diabetes Brother    • Hypertension Brother    • No Known Problems Brother    • Heart disease Brother    • Cancer Brother         colon   • Other Brother        No Known Allergies    ROS:    Constitutional:  Denies fatigue, tiredness.    Eyes:  Denies change in visual acuity   HENT:  Denies nasal congestion or sore throat   Respiratory: denies cough, shortness of breath.   Cardiovascular:  denies chest pain, edema   GI:  Denies abdominal pain, nausea, vomiting.   Musculoskeletal:  Denies back pain or joint pain   Integument:  Denies dry skin and rash   Neurologic:  Denies headache, focal weakness or sensory changes   Endocrine:  Denies polyuria or polydipsia   Psychiatric:  Denies depression or anxiety      Vitals:    09/30/21 1450   BP: 125/70   Pulse: 103   Temp: 97.2 °F (36.2 °C)   SpO2: 96%     Body mass index is 23.33 kg/m².     Physical Exam:  GEN: NAD, conversant  EYES: EOMI, PERRL, no conjunctival erythema  NECK: no thyromegaly, full ROM   CV: RRR, no murmurs/rubs/gallops, no peripheral edema  LUNG: CTAB, no wheezes/rales/ronchi  SKIN: no rashes, no acanthosis  MSK: no deformities, full ROM of all extremities  NEURO: no tremors, DTR normal  PSYCH: AOX3, appropriate mood, affect normal      Results Review:     I reviewed the patient's new clinical results.    Lab Results   Component Value Date    HGBA1C 7.2 (H) 09/13/2021    HGBA1C 6.5 (H) 06/10/2021    HGBA1C 9.5 (H) 03/04/2021      Lab Results   Component Value Date    GLUCOSE 112 (H) 08/03/2021    BUN 18 08/03/2021    CREATININE 0.49 (L) 08/03/2021    EGFRIFNONA 127 08/03/2021    BCR 36.7 (H) 08/03/2021    K 3.8 08/03/2021    CO2 28.0 08/03/2021    CALCIUM 9.3 08/03/2021    ALBUMIN 3.90 08/03/2021    LABIL2 1.3 03/12/2019    AST 10 08/03/2021    ALT 17 08/03/2021    CHOL 140 09/13/2021    TRIG 121 09/13/2021    LDL 71 09/13/2021    HDL 47 09/13/2021     Lab Results   Component Value Date    TSH 1.680 09/13/2021         Medication Review: Reviewed.       Current Outpatient Medications:   •  Accu-Chek Softclix Lancets lancets, 1 each by Other route Daily. Use as instructed, Disp: 100 each, Rfl: 0  •  albuterol sulfate  (90 Base) MCG/ACT  inhaler, Inhale 2 puffs Every 4 (Four) Hours As Needed for Wheezing., Disp: 1 g, Rfl: 3  •  aspirin 81 MG chewable tablet, Chew 81 mg Daily., Disp: , Rfl:   •  atorvastatin (LIPITOR) 40 MG tablet, TAKE 1 TABLET BY MOUTH EVERY DAY, Disp: 90 tablet, Rfl: 3  •  cetirizine (zyrTEC) 10 MG tablet, Take 10 mg by mouth Daily., Disp: , Rfl:   •  Cholecalciferol (VITAMIN D3) 125 MCG (5000 UT) tablet tablet, Take 5,000 Units by mouth Daily., Disp: , Rfl:   •  famotidine (PEPCID) 20 MG tablet, Take 20 mg by mouth Daily., Disp: , Rfl:   •  ferrous sulfate 325 (65 FE) MG tablet, Take 325 mg by mouth Daily With Breakfast., Disp: , Rfl:   •  glimepiride (Amaryl) 4 MG tablet, Take 1 tablet by mouth Every Morning Before Breakfast., Disp: 180 tablet, Rfl: 3  •  glucose blood (Accu-Chek Guide) test strip, 1 each by Other route Daily. Use as instructed (Patient taking differently: 1 each by Other route Daily. Use to test blood sugars twice daily), Disp: 100 each, Rfl: 0  •  guaiFENesin (MUCINEX) 600 MG 12 hr tablet, Take 1,200 mg by mouth 2 (Two) Times a Day. As needed, Disp: , Rfl:   •  hydrOXYzine (ATARAX) 25 MG tablet, TAKE 1 TABLET BY MOUTH AT NIGHT AS NEEDED FOR ITCHING (ONE OR TWO IF ITCHING). (Patient taking differently: Take 50 mg by mouth 1 (One) Time. Take 2 tablets daily at bedtime), Disp: 30 tablet, Rfl: 1  •  Ibuprofen-diphenhydrAMINE HCl (ADVIL PM) 200-25 MG capsule, Take 1 capsule by mouth Every Night., Disp: , Rfl:   •  Isopropyl Alcohol ([facility] Isopropyl Alcohol Wipes) 70 % misc, Apply 1 each topically Daily., Disp: 100 each, Rfl: 0  •  Jardiance 10 MG tablet tablet, TAKE 1 TABLET BY MOUTH DAILY, Disp: 30 tablet, Rfl: 11  •  losartan (COZAAR) 50 MG tablet, TAKE 1 TABLET BY MOUTH TWICE A DAY, Disp: 180 tablet, Rfl: 3  •  Magnesium 250 MG tablet, Take 250 mg by mouth Daily., Disp: , Rfl:   •  meloxicam (MOBIC) 7.5 MG tablet, , Disp: , Rfl:   •  metFORMIN ER (GLUCOPHAGE-XR) 500 MG 24 hr tablet, TAKE 2 TABLETS BY MOUTH EVERY  DAY, Disp: 180 tablet, Rfl: 2  •  montelukast (SINGULAIR) 10 MG tablet, Take 1 tablet by mouth Every Night., Disp: 30 tablet, Rfl: 0  •  potassium chloride 10 MEQ CR tablet, TAKE 1 TABLET BY MOUTH EVERY DAY, Disp: 90 tablet, Rfl: 1  •  pramipexole (MIRAPEX) 0.125 MG tablet, TAKE 4 TABLETS BY MOUTH DAILY AT BEDTIME, Disp: 120 tablet, Rfl: 1  •  pramipexole (MIRAPEX) 0.125 MG tablet, , Disp: , Rfl:   •  Semaglutide,0.25 or 0.5MG/DOS, (Ozempic, 0.25 or 0.5 MG/DOSE,) 2 MG/1.5ML solution pen-injector, Inject .05 mg weekly (Patient taking differently: Inject .05 mg weekly on sunday), Disp: 4.5 mL, Rfl: 2  •  Stiolto Respimat 2.5-2.5 MCG/ACT aerosol solution inhaler, INHALE 2 PUFFS BY MOUTH DAILY, Disp: 3 each, Rfl: 3  •  Unable to find, Take 1 each by mouth 1 (One) Time. Med Name: [facility] Advanced Blood Sugar Support Formula, Disp: , Rfl:       Assessment/Plan   1.  Diabetes mellitus type II: Fair control, A1c at 7.2%.  We will continue current management with Ozempic along with Metformin, Jardiance and glimepiride.  Advised to watch for diet and low blood sugars.  If she continues to have low blood sugar then she will decrease the glimepiride to just 1 tablet once a day with breakfast.  We will follow blood sugars and A1c.  Recommend annual eye exam and flu vaccine.  2.  Hypertension: Well-controlled, continue current medication  3.  Hyperlipidemia: Currently on atorvastatin will continue that and follow lipid panel.            Lei Kim MD FACE.

## 2021-10-12 RX ORDER — PRAMIPEXOLE DIHYDROCHLORIDE 0.12 MG/1
TABLET ORAL
Qty: 120 TABLET | Refills: 1 | Status: SHIPPED | OUTPATIENT
Start: 2021-10-12 | End: 2021-11-14

## 2021-10-25 ENCOUNTER — OFFICE VISIT (OUTPATIENT)
Dept: FAMILY MEDICINE CLINIC | Facility: CLINIC | Age: 66
End: 2021-10-25

## 2021-10-25 VITALS
WEIGHT: 136 LBS | HEIGHT: 64 IN | HEART RATE: 88 BPM | TEMPERATURE: 97.3 F | SYSTOLIC BLOOD PRESSURE: 114 MMHG | BODY MASS INDEX: 23.22 KG/M2 | DIASTOLIC BLOOD PRESSURE: 78 MMHG | OXYGEN SATURATION: 91 % | RESPIRATION RATE: 17 BRPM

## 2021-10-25 DIAGNOSIS — M54.50 ACUTE BILATERAL LOW BACK PAIN WITHOUT SCIATICA: ICD-10-CM

## 2021-10-25 DIAGNOSIS — R05.9 COUGH: ICD-10-CM

## 2021-10-25 DIAGNOSIS — K21.9 GERD WITHOUT ESOPHAGITIS: Chronic | ICD-10-CM

## 2021-10-25 DIAGNOSIS — E78.2 MIXED HYPERLIPIDEMIA: ICD-10-CM

## 2021-10-25 DIAGNOSIS — I10 ESSENTIAL HYPERTENSION: ICD-10-CM

## 2021-10-25 DIAGNOSIS — E11.65 TYPE 2 DIABETES MELLITUS WITH HYPERGLYCEMIA, WITHOUT LONG-TERM CURRENT USE OF INSULIN (HCC): Primary | ICD-10-CM

## 2021-10-25 DIAGNOSIS — E53.8 B12 DEFICIENCY: ICD-10-CM

## 2021-10-25 DIAGNOSIS — I25.10 CHRONIC CORONARY ARTERY DISEASE: ICD-10-CM

## 2021-10-25 DIAGNOSIS — M81.0 OSTEOPOROSIS WITHOUT CURRENT PATHOLOGICAL FRACTURE, UNSPECIFIED OSTEOPOROSIS TYPE: ICD-10-CM

## 2021-10-25 PROCEDURE — 99214 OFFICE O/P EST MOD 30 MIN: CPT | Performed by: PREVENTIVE MEDICINE

## 2021-10-25 RX ORDER — CEFDINIR 300 MG/1
300 CAPSULE ORAL 2 TIMES DAILY
Qty: 20 CAPSULE | Refills: 0 | Status: SHIPPED | OUTPATIENT
Start: 2021-10-25 | End: 2021-12-16

## 2021-10-25 RX ORDER — CYCLOBENZAPRINE HCL 5 MG
TABLET ORAL
Qty: 20 TABLET | Refills: 0 | Status: SHIPPED | OUTPATIENT
Start: 2021-10-25 | End: 2021-11-23

## 2021-10-25 NOTE — PROGRESS NOTES
"Subjective   Eugenia Madden is a 66 y.o. female presents for No chief complaint on file.      Health Maintenance Due   Topic Date Due   • INFLUENZA VACCINE  08/01/2021   Patient is here to discuss treatment for osteoporosis.  Forms of treatment asked and she chose Prolia we will see if her insurance will approve of that.  Also she is at been advised to get 20 minutes of weightbearing exercise daily and we will continue to monitor calcium and vitamin D.  Secondly she has a new cough producing yellow sputum for the last 2 weeks and does not seem to be improving so we will try some cefdinir 300 mg twice daily and she will call back toward the end of the week if cough persists we will get a chest x-ray.  Also follow-up of multiple chronic health conditions most of which are stable the exception is her acute low back pain and we will give her 20 more Flexeril if she requires any more than that over the next 6 months she will be referred to pain management.    History of Present Illness     Vitals:    10/25/21 1145 10/25/21 1146   BP: 106/67 114/78   BP Location: Right arm Left arm   Pulse: 88    Resp: 17    Temp: 97.3 °F (36.3 °C)    SpO2: 91%    Weight: 61.7 kg (136 lb)    Height: 162.6 cm (64\")      Body mass index is 23.34 kg/m².    Current Outpatient Medications on File Prior to Visit   Medication Sig Dispense Refill   • Accu-Chek Softclix Lancets lancets 1 each by Other route Daily. Use as instructed 100 each 0   • albuterol sulfate  (90 Base) MCG/ACT inhaler Inhale 2 puffs Every 4 (Four) Hours As Needed for Wheezing. 1 g 3   • aspirin 81 MG chewable tablet Chew 81 mg Daily.     • atorvastatin (LIPITOR) 40 MG tablet TAKE 1 TABLET BY MOUTH EVERY DAY 90 tablet 3   • cetirizine (zyrTEC) 10 MG tablet Take 10 mg by mouth Daily.     • Cholecalciferol (VITAMIN D3) 125 MCG (5000 UT) tablet tablet Take 5,000 Units by mouth Daily.     • famotidine (PEPCID) 20 MG tablet Take 20 mg by mouth Daily.     • ferrous sulfate 325 " (65 FE) MG tablet Take 325 mg by mouth Daily With Breakfast.     • glimepiride (Amaryl) 4 MG tablet Take 1 tablet by mouth Every Morning Before Breakfast. (Patient taking differently: Take 8 mg by mouth Every Morning Before Breakfast.) 180 tablet 3   • glucose blood (Accu-Chek Guide) test strip 1 each by Other route Daily. Use as instructed (Patient taking differently: 1 each by Other route Daily. Use to test blood sugars twice daily) 100 each 0   • guaiFENesin (MUCINEX) 600 MG 12 hr tablet Take 1,200 mg by mouth 2 (Two) Times a Day. As needed     • hydrOXYzine (ATARAX) 25 MG tablet TAKE 1 TABLET BY MOUTH AT NIGHT AS NEEDED FOR ITCHING (ONE OR TWO IF ITCHING). (Patient taking differently: Take 50 mg by mouth 1 (One) Time. Take 2 tablets daily at bedtime) 30 tablet 1   • Ibuprofen-diphenhydrAMINE HCl (ADVIL PM) 200-25 MG capsule Take 1 capsule by mouth Every Night.     • Isopropyl Alcohol ([facility] Isopropyl Alcohol Wipes) 70 % misc Apply 1 each topically Daily. 100 each 0   • Jardiance 10 MG tablet tablet TAKE 1 TABLET BY MOUTH DAILY 30 tablet 11   • losartan (COZAAR) 50 MG tablet TAKE 1 TABLET BY MOUTH TWICE A  tablet 3   • Magnesium 250 MG tablet Take 250 mg by mouth Daily.     • meloxicam (MOBIC) 7.5 MG tablet      • metFORMIN ER (GLUCOPHAGE-XR) 500 MG 24 hr tablet TAKE 2 TABLETS BY MOUTH EVERY  tablet 2   • potassium chloride 10 MEQ CR tablet TAKE 1 TABLET BY MOUTH EVERY DAY 90 tablet 1   • Semaglutide,0.25 or 0.5MG/DOS, (Ozempic, 0.25 or 0.5 MG/DOSE,) 2 MG/1.5ML solution pen-injector Inject .05 mg weekly (Patient taking differently: Inject .05 mg weekly on sunday) 4.5 mL 2   • Stiolto Respimat 2.5-2.5 MCG/ACT aerosol solution inhaler INHALE 2 PUFFS BY MOUTH DAILY 3 each 3   • Unable to find Take 1 each by mouth 1 (One) Time. Med Name: [facility] Advanced Blood Sugar Support Formula     • montelukast (SINGULAIR) 10 MG tablet Take 1 tablet by mouth Every Night. 30 tablet 0   • pramipexole (MIRAPEX) 0.125  MG tablet TAKE 4 TABLETS BY MOUTH AT BEDTIME 120 tablet 1     No current facility-administered medications on file prior to visit.       The following portions of the patient's history were reviewed and updated as appropriate: allergies, current medications, past family history, past medical history, past social history, past surgical history and problem list.    Review of Systems   Respiratory: Positive for cough.    Musculoskeletal: Positive for back pain.       Objective   Physical Exam  Vitals reviewed.   Constitutional:       General: She is not in acute distress.     Appearance: Normal appearance. She is well-developed. She is not ill-appearing or toxic-appearing.   HENT:      Head: Normocephalic and atraumatic.      Right Ear: Tympanic membrane, ear canal and external ear normal.      Left Ear: Tympanic membrane, ear canal and external ear normal.      Nose: Nose normal.   Eyes:      Extraocular Movements: Extraocular movements intact.      Conjunctiva/sclera: Conjunctivae normal.      Pupils: Pupils are equal, round, and reactive to light.   Cardiovascular:      Rate and Rhythm: Normal rate and regular rhythm.      Pulses:           Dorsalis pedis pulses are 1+ on the right side and 1+ on the left side.        Posterior tibial pulses are 1+ on the right side and 1+ on the left side.      Heart sounds: Normal heart sounds.   Pulmonary:      Effort: Pulmonary effort is normal.      Breath sounds: Rhonchi present.      Comments: Decreased breath sounds bilaterally  Abdominal:      General: Bowel sounds are normal. There is no distension.      Palpations: Abdomen is soft. There is no mass.      Tenderness: There is no abdominal tenderness.   Musculoskeletal:         General: Normal range of motion.      Cervical back: Neck supple.   Feet:      Right foot:      Skin integrity: Skin integrity normal.      Toenail Condition: Right toenails are normal.      Left foot:      Skin integrity: Skin integrity normal.       Toenail Condition: Left toenails are normal.      Comments: Diabetic Foot Exam Performed    Skin:     General: Skin is warm.   Neurological:      General: No focal deficit present.      Mental Status: She is alert and oriented to person, place, and time.   Psychiatric:         Mood and Affect: Mood normal.         Behavior: Behavior normal.       PHQ-9 Total Score:      Assessment/Plan   Diagnoses and all orders for this visit:    1. Type 2 diabetes mellitus with hyperglycemia, without long-term current use of insulin (HCC) (Primary)  Comments:  Patient states she has had no high or low sugars.    2. Essential hypertension  Comments:  Blood pressure under good control    3. Mixed hyperlipidemia  Comments:  To eat less saturated fat    4. Chronic coronary artery disease  Comments:  Chest pressure or heart failure    5. B12 deficiency  Comments:  Takes vitamin D regularly    6. GERD without esophagitis  Comments:  Controlled with medicine    7. Acute bilateral low back pain without sciatica  Comments:  Back pain is improving.  We gave her 20 more Flexeril and if she requires anything more than this over the next 6 months and she will need to be followed by [facility]    8. Cough  Comments:  Cough last 2 weeks now turning up yellow creamy sputum no fever.  Cefdinir 300 mg twice daily for 10 days she will call back the end of this week if cough persi    9. Osteoporosis without current pathological fracture, unspecified osteoporosis type  Comments:  [facility] different forms of treatment for osteoporosis she did choose the Prolia shot.  We will try to get permission from her insurance carrier to take the injecti    Other orders  -     cefdinir (OMNICEF) 300 MG capsule; Take 1 capsule by mouth 2 (Two) Times a Day.  Dispense: 20 capsule; Refill: 0  -     cyclobenzaprine (FLEXERIL) 5 MG tablet; Infrequent use of one nightly for severe back pain  Dispense: 20 tablet; Refill: 0  -     denosumab (PROLIA) 60 MG/ML solution prefilled  syringe syringe; Inject 1 mL under the skin into the appropriate area as directed 1 (One) Time for 1 dose.  Dispense: 1 mL; Refill: 1        Patient Instructions     Health Maintenance Due   Topic Date Due   • INFLUENZA VACCINE  08/01/2021     Call end of week about cough and will xray if persists.    Take antibiotics till gone.    Set up appointment with Dr. Lepe-staff to give info

## 2021-10-25 NOTE — PATIENT INSTRUCTIONS
Health Maintenance Due   Topic Date Due   • INFLUENZA VACCINE  08/01/2021     Call end of week about cough and will xray if persists.    Take antibiotics till gone.    Set up appointment with Dr. Lepe-staff to give info

## 2021-10-28 ENCOUNTER — TELEPHONE (OUTPATIENT)
Dept: FAMILY MEDICINE CLINIC | Facility: CLINIC | Age: 66
End: 2021-10-28

## 2021-10-28 NOTE — TELEPHONE ENCOUNTER
Caller: [id number] - [place], [place] - [address] [facility] - [phone]  - [phone] FX    Relationship: Pharmacy    Best call back number:  [phone]    What is the best time to reach you: BEFORE 8PM    Who are you requesting to speak with (clinical staff, provider,  specific staff member): CLINICAL     What was the call regarding: PHARMACY NEEDS PRIOR AUTHORIZATION TO FILL PATIENTS denosumab (PROLIA) 60 MG/ML solution prefilled syringe syringe () AND CLARIFY THE FREQUENCY     Do you require a callback: YES

## 2021-11-08 ENCOUNTER — TELEPHONE (OUTPATIENT)
Dept: FAMILY MEDICINE CLINIC | Facility: CLINIC | Age: 66
End: 2021-11-08

## 2021-11-08 NOTE — TELEPHONE ENCOUNTER
Caller: Eugenia Madden    Relationship: Self    Best call back number: [phone]     What is the best time to reach you: ANY    Who are you requesting to speak with (clinical staff, provider,  specific staff member): CLINICAL    What was the call regarding: PATIENT RAN OUT OF THE ANTIBIOTICS REQUEST CHEST XRAY.  PLEASE ADVISE    Do you require a callback: YES

## 2021-11-09 DIAGNOSIS — R05.9 COUGH: Primary | ICD-10-CM

## 2021-11-09 NOTE — TELEPHONE ENCOUNTER
Last night and today she is doing better. No fever/chills, no SOA, nothing comes up, but when it does- clear.  Better today- still taking mucinex. Not tested for Covid or Flu. She does not feel at this point like she needs the xray or antibiotics. She will call if she starts feeling sick again.

## 2021-11-09 NOTE — TELEPHONE ENCOUNTER
Chest xray ordered.  Any fever, chills, increased shortness of air?  What does sputum look like?  Did she get any better on antibiotics?  Anyone else sick?  Has she had covid or been covid and flu tested?

## 2021-11-14 RX ORDER — PRAMIPEXOLE DIHYDROCHLORIDE 0.12 MG/1
TABLET ORAL
Qty: 120 TABLET | Refills: 1 | Status: SHIPPED | OUTPATIENT
Start: 2021-11-14 | End: 2021-12-21 | Stop reason: SDUPTHER

## 2021-11-15 RX ORDER — METFORMIN HYDROCHLORIDE 500 MG/1
TABLET, EXTENDED RELEASE ORAL
Qty: 180 TABLET | Refills: 2 | Status: SHIPPED | OUTPATIENT
Start: 2021-11-15 | End: 2022-08-17

## 2021-11-23 DIAGNOSIS — M54.50 ACUTE BILATERAL LOW BACK PAIN WITHOUT SCIATICA: Primary | ICD-10-CM

## 2021-11-23 RX ORDER — CYCLOBENZAPRINE HCL 5 MG
TABLET ORAL
Qty: 20 TABLET | Refills: 0 | Status: SHIPPED | OUTPATIENT
Start: 2021-11-23 | End: 2021-12-07

## 2021-11-24 NOTE — TELEPHONE ENCOUNTER
Just MARLI. I spoke with pt today concerning pain management and asked if she had a preference on where she goes. Pt stated she is not going to do pain management at this time. I just wanted both of you to be aware of this. Thanks

## 2021-11-24 NOTE — TELEPHONE ENCOUNTER
I will refill this time, however, review of prior note with Dr. Taveras states she will refer to pain management if ongoing need for flexeril needed.  Referral also entered.  Please ask pt if she has a preference for pain management.

## 2021-12-03 NOTE — TELEPHONE ENCOUNTER
Caller: Eugenia Madden    Relationship to patient: Self    Best call back number: [phone]    Patient is needing: PATIENT CALLED IN AND WANTS TO GET THE CHEST X-RAY AS SOON AS POSSIBLE. SHE SAID THAT SHE IS FEELING WORSE AND THE COUGH IS BACK. PLEASE CALL PATIENT AND ADVISE.

## 2021-12-06 ENCOUNTER — TELEPHONE (OUTPATIENT)
Dept: FAMILY MEDICINE CLINIC | Facility: CLINIC | Age: 66
End: 2021-12-06

## 2021-12-06 ENCOUNTER — HOSPITAL ENCOUNTER (OUTPATIENT)
Dept: GENERAL RADIOLOGY | Facility: HOSPITAL | Age: 66
Discharge: HOME | End: 2021-12-06
Admitting: PREVENTIVE MEDICINE

## 2021-12-06 DIAGNOSIS — J18.9 PNEUMONIA OF LEFT LUNG DUE TO INFECTIOUS ORGANISM, UNSPECIFIED PART OF LUNG: Primary | ICD-10-CM

## 2021-12-06 PROCEDURE — 71046 X-RAY EXAM CHEST 2 VIEWS: CPT

## 2021-12-06 NOTE — TELEPHONE ENCOUNTER
Caller: Eugenia Madden    Relationship: Self    Best call back number: [phone]    What was the call regarding: PATIENT WOULD LIKE TO KNOW IF HER XRAY ORDERS HAVE BEEN SENT. SHE GETS THEM DONE AT [facility], [place], operated by [facility] IN [place]. PLEASE CALL TO ADVISE SO SHE CAN GO GET IT DONE. PATIENT SPOKE TO SOMEONE ON Friday BUT NEVER HEARD BACK.     Do you require a callback: YES

## 2021-12-07 RX ORDER — POTASSIUM CHLORIDE 750 MG/1
TABLET, FILM COATED, EXTENDED RELEASE ORAL
Qty: 90 TABLET | Refills: 1 | Status: SHIPPED | OUTPATIENT
Start: 2021-12-07 | End: 2022-08-17 | Stop reason: SDUPTHER

## 2021-12-07 RX ORDER — CYCLOBENZAPRINE HCL 5 MG
TABLET ORAL
Qty: 20 TABLET | Refills: 0 | Status: SHIPPED | OUTPATIENT
Start: 2021-12-07 | End: 2022-05-01

## 2021-12-07 RX ORDER — LEVOFLOXACIN 500 MG/1
500 TABLET, FILM COATED ORAL DAILY
Qty: 5 TABLET | Refills: 0 | Status: SHIPPED | OUTPATIENT
Start: 2021-12-07 | End: 2021-12-16

## 2021-12-07 NOTE — TELEPHONE ENCOUNTER
HUB TO READ  ----- Message from Melisa Taveras MD sent at 12/6/2021  4:44 PM EST -----  LEFT LINGULAR LUNG HAS INFECTION STILL.  Did she take all of the Cefdinir that we gave her last?  Did she get better end of October and early November and is now worse or did she never get better?

## 2021-12-07 NOTE — TELEPHONE ENCOUNTER
Spoke to patient. She did take all the medication you prescribed her. She said she got a little better Oct/Nov but she never did quit coughing. Now she is still coughing and she feels sick again.

## 2021-12-07 NOTE — TELEPHONE ENCOUNTER
Would advise 5 days of new antibiotic-take one daily for 5 days and then get chest CT to see if has cleared in 2 weeks.  Stop iron and magnesium while taking new antibiotic and moniter sugars closely as the antibiotics may cause sugar to drop.

## 2021-12-13 ENCOUNTER — TELEPHONE (OUTPATIENT)
Dept: FAMILY MEDICINE CLINIC | Facility: CLINIC | Age: 66
End: 2021-12-13

## 2021-12-13 ENCOUNTER — HOSPITAL ENCOUNTER (OUTPATIENT)
Dept: CT IMAGING | Facility: HOSPITAL | Age: 66
Discharge: HOME | End: 2021-12-13
Admitting: PREVENTIVE MEDICINE

## 2021-12-13 DIAGNOSIS — J18.9 PNEUMONIA OF LEFT LUNG DUE TO INFECTIOUS ORGANISM, UNSPECIFIED PART OF LUNG: ICD-10-CM

## 2021-12-13 PROCEDURE — 71250 CT THORAX DX C-: CPT

## 2021-12-13 NOTE — TELEPHONE ENCOUNTER
HUB TO READ  ----- Message from Melisa Taveras MD sent at 12/13/2021 12:59 PM EST -----  Chest CT shows no more pneumonia but does show changes of CopD from smoking and calcified buildup in coronary arteries.  Should recheck if not improved.

## 2021-12-13 NOTE — PROGRESS NOTES
Chest CT shows no more pneumonia but does show changes of CopD from smoking and calcified buildup in coronary arteries.  Should recheck if not improved.

## 2021-12-16 ENCOUNTER — OFFICE VISIT (OUTPATIENT)
Dept: FAMILY MEDICINE CLINIC | Facility: CLINIC | Age: 66
End: 2021-12-16

## 2021-12-16 ENCOUNTER — TELEPHONE (OUTPATIENT)
Dept: FAMILY MEDICINE CLINIC | Facility: CLINIC | Age: 66
End: 2021-12-16

## 2021-12-16 VITALS
TEMPERATURE: 97.8 F | BODY MASS INDEX: 23.29 KG/M2 | DIASTOLIC BLOOD PRESSURE: 74 MMHG | HEIGHT: 64 IN | WEIGHT: 136.4 LBS | OXYGEN SATURATION: 93 % | SYSTOLIC BLOOD PRESSURE: 112 MMHG | HEART RATE: 88 BPM

## 2021-12-16 DIAGNOSIS — M54.50 ACUTE BILATERAL LOW BACK PAIN WITHOUT SCIATICA: ICD-10-CM

## 2021-12-16 DIAGNOSIS — E78.2 MIXED HYPERLIPIDEMIA: ICD-10-CM

## 2021-12-16 DIAGNOSIS — I10 ESSENTIAL HYPERTENSION: ICD-10-CM

## 2021-12-16 DIAGNOSIS — I71.20 THORACIC AORTIC ANEURYSM WITHOUT RUPTURE (HCC): ICD-10-CM

## 2021-12-16 DIAGNOSIS — K21.9 GERD WITHOUT ESOPHAGITIS: Primary | Chronic | ICD-10-CM

## 2021-12-16 DIAGNOSIS — E11.65 TYPE 2 DIABETES MELLITUS WITH HYPERGLYCEMIA, WITHOUT LONG-TERM CURRENT USE OF INSULIN (HCC): ICD-10-CM

## 2021-12-16 PROBLEM — J18.9 LINGULAR PNEUMONIA: Status: ACTIVE | Noted: 2021-12-16

## 2021-12-16 PROBLEM — I50.32 CHRONIC DIASTOLIC CHF (CONGESTIVE HEART FAILURE) (HCC): Status: RESOLVED | Noted: 2021-07-07 | Resolved: 2021-12-16

## 2021-12-16 LAB
ALBUMIN SERPL-MCNC: 3.9 G/DL (ref 3.5–5.2)
ALBUMIN UR-MCNC: 2.5 MG/DL
ALBUMIN/GLOB SERPL: 1.4 G/DL
ALP SERPL-CCNC: 83 U/L (ref 39–117)
ALT SERPL W P-5'-P-CCNC: 13 U/L (ref 1–33)
ANION GAP SERPL CALCULATED.3IONS-SCNC: 9.2 MMOL/L (ref 5–15)
AST SERPL-CCNC: 10 U/L (ref 1–32)
BILIRUB SERPL-MCNC: 0.2 MG/DL (ref 0–1.2)
BUN SERPL-MCNC: 12 MG/DL (ref 8–23)
BUN/CREAT SERPL: 23.5 (ref 7–25)
CALCIUM SPEC-SCNC: 9 MG/DL (ref 8.6–10.5)
CHLORIDE SERPL-SCNC: 107 MMOL/L (ref 98–107)
CHOLEST SERPL-MCNC: 113 MG/DL (ref 0–200)
CO2 SERPL-SCNC: 26.8 MMOL/L (ref 22–29)
CREAT SERPL-MCNC: 0.51 MG/DL (ref 0.57–1)
CREAT UR-MCNC: 102.8 MG/DL
GFR SERPL CREATININE-BSD FRML MDRD: 121 ML/MIN/1.73
GLOBULIN UR ELPH-MCNC: 2.7 GM/DL
GLUCOSE SERPL-MCNC: 98 MG/DL (ref 65–99)
HBA1C MFR BLD: 6.8 % (ref 3.5–5.6)
HDLC SERPL-MCNC: 37 MG/DL (ref 40–60)
LDLC SERPL CALC-MCNC: 51 MG/DL (ref 0–100)
LDLC/HDLC SERPL: 1.29 {RATIO}
MICROALBUMIN/CREAT UR: 24.3 MG/G
POTASSIUM SERPL-SCNC: 3.9 MMOL/L (ref 3.5–5.2)
PROT SERPL-MCNC: 6.6 G/DL (ref 6–8.5)
SODIUM SERPL-SCNC: 143 MMOL/L (ref 136–145)
TRIGL SERPL-MCNC: 142 MG/DL (ref 0–150)
VLDLC SERPL-MCNC: 25 MG/DL (ref 5–40)

## 2021-12-16 PROCEDURE — 99214 OFFICE O/P EST MOD 30 MIN: CPT | Performed by: PREVENTIVE MEDICINE

## 2021-12-16 PROCEDURE — 80061 LIPID PANEL: CPT | Performed by: INTERNAL MEDICINE

## 2021-12-16 PROCEDURE — 83036 HEMOGLOBIN GLYCOSYLATED A1C: CPT | Performed by: INTERNAL MEDICINE

## 2021-12-16 PROCEDURE — 82570 ASSAY OF URINE CREATININE: CPT | Performed by: PREVENTIVE MEDICINE

## 2021-12-16 PROCEDURE — 36415 COLL VENOUS BLD VENIPUNCTURE: CPT | Performed by: PREVENTIVE MEDICINE

## 2021-12-16 PROCEDURE — 82043 UR ALBUMIN QUANTITATIVE: CPT | Performed by: PREVENTIVE MEDICINE

## 2021-12-16 PROCEDURE — 80053 COMPREHEN METABOLIC PANEL: CPT | Performed by: INTERNAL MEDICINE

## 2021-12-16 RX ORDER — DOXYCYCLINE 100 MG/1
100 CAPSULE ORAL 2 TIMES DAILY
Qty: 20 CAPSULE | Refills: 0 | Status: SHIPPED | OUTPATIENT
Start: 2021-12-16 | End: 2022-05-09

## 2021-12-16 NOTE — PROGRESS NOTES
Venipuncture Blood Specimen Collection  Venipuncture performed in right arm by Renetta Tran MA with good hemostasis. Patient tolerated the procedure well without complications.   12/16/21   Renetta Tran MA\

## 2021-12-16 NOTE — PROGRESS NOTES
"Subjective   Eugenia Madden is a 66 y.o. female presents for   Chief Complaint   Patient presents with   • Diabetes     NO additional concerns     Patient presents today for follow-up of multiple chronic health conditions her diabetes is stable.  She states that she is still bringing up some thick phlegmFrom her lingular pneumonia.  After chest x-ray showed consolidation remain she was treated with 5 days of Levaquin and CT of the chest showed clearing but again today I am hearing voices in the airways of the left mid lung field.  We will continue 10 days worth of doxycycline and then she should return for auscultation.  Patient is feeling much improved.  Patient was advised that she is overdue for Covid vaccine and she is getting that today along with her flu vaccine.  Health Maintenance Due   Topic Date Due   • INFLUENZA VACCINE  08/01/2021   • COVID-19 Vaccine (3 - Booster for Moderna series) 09/17/2021       History of Present Illness     Vitals:    12/16/21 0925 12/16/21 0927   BP: 109/70 112/74   BP Location: Right arm Left arm   Patient Position: Sitting Sitting   Cuff Size: Adult Adult   Pulse: 88    Temp: 97.8 °F (36.6 °C)    TempSrc: Temporal    SpO2: 93%    Weight: 61.9 kg (136 lb 6.4 oz)    Height: 162.6 cm (64\")      Body mass index is 23.41 kg/m².    Current Outpatient Medications on File Prior to Visit   Medication Sig Dispense Refill   • Accu-Chek Softclix Lancets lancets 1 each by Other route Daily. Use as instructed 100 each 0   • albuterol sulfate  (90 Base) MCG/ACT inhaler Inhale 2 puffs Every 4 (Four) Hours As Needed for Wheezing. 1 g 3   • aspirin 81 MG chewable tablet Chew 81 mg Daily.     • atorvastatin (LIPITOR) 40 MG tablet TAKE 1 TABLET BY MOUTH EVERY DAY 90 tablet 3   • cetirizine (zyrTEC) 10 MG tablet Take 10 mg by mouth Daily.     • Cholecalciferol (VITAMIN D3) 125 MCG (5000 UT) tablet tablet Take 5,000 Units by mouth Daily.     • cyclobenzaprine (FLEXERIL) 5 MG tablet TAKE 1 " TABLET BY MOUTH NIGHTLY FOR SEVERE BACK PAIN 20 tablet 0   • famotidine (PEPCID) 20 MG tablet Take 20 mg by mouth Daily.     • glimepiride (Amaryl) 4 MG tablet Take 1 tablet by mouth Every Morning Before Breakfast. (Patient taking differently: Take 8 mg by mouth Every Morning Before Breakfast.) 180 tablet 3   • glucose blood (Accu-Chek Guide) test strip 1 each by Other route Daily. Use as instructed (Patient taking differently: 1 each by Other route Daily. Use to test blood sugars twice daily) 100 each 0   • guaiFENesin (MUCINEX) 600 MG 12 hr tablet Take 1,200 mg by mouth 2 (Two) Times a Day. As needed     • hydrOXYzine (ATARAX) 25 MG tablet TAKE 1 TABLET BY MOUTH AT NIGHT AS NEEDED FOR ITCHING (ONE OR TWO IF ITCHING). (Patient taking differently: Take 50 mg by mouth 1 (One) Time. Take 2 tablets daily at bedtime) 30 tablet 1   • Ibuprofen-diphenhydrAMINE HCl (ADVIL PM) 200-25 MG capsule Take 1 capsule by mouth Every Night.     • Isopropyl Alcohol ([employer] Isopropyl Alcohol Wipes) 70 % misc Apply 1 each topically Daily. 100 each 0   • Jardiance 10 MG tablet tablet TAKE 1 TABLET BY MOUTH DAILY 30 tablet 11   • losartan (COZAAR) 50 MG tablet TAKE 1 TABLET BY MOUTH TWICE A  tablet 3   • meloxicam (MOBIC) 7.5 MG tablet      • metFORMIN ER (GLUCOPHAGE-XR) 500 MG 24 hr tablet TAKE 2 TABLETS BY MOUTH EVERY  tablet 2   • potassium chloride 10 MEQ CR tablet TAKE 1 TABLET BY MOUTH EVERY DAY 90 tablet 1   • pramipexole (MIRAPEX) 0.125 MG tablet TAKE 4 TABLETS BY MOUTH AT BEDTIME 120 tablet 1   • Semaglutide,0.25 or 0.5MG/DOS, (Ozempic, 0.25 or 0.5 MG/DOSE,) 2 MG/1.5ML solution pen-injector Inject .05 mg weekly (Patient taking differently: Inject .05 mg weekly on sunday) 4.5 mL 2   • Stiolto Respimat 2.5-2.5 MCG/ACT aerosol solution inhaler INHALE 2 PUFFS BY MOUTH DAILY 3 each 3   • Unable to find Take 1 each by mouth 1 (One) Time. Med Name: [facility] Advanced Blood Sugar Support Formula     • [DISCONTINUED] ferrous  sulfate 325 (65 FE) MG tablet Take 325 mg by mouth Daily With Breakfast.     • [DISCONTINUED] Magnesium 250 MG tablet Take 250 mg by mouth Daily.     • denosumab (PROLIA) 60 MG/ML solution prefilled syringe syringe Inject 1 mL under the skin into the appropriate area as directed 1 (One) Time for 1 dose. 1 mL 1   • [DISCONTINUED] cefdinir (OMNICEF) 300 MG capsule Take 1 capsule by mouth 2 (Two) Times a Day. 20 capsule 0   • [DISCONTINUED] levoFLOXacin (Levaquin) 500 MG tablet Take 1 tablet by mouth Daily. 5 tablet 0     No current facility-administered medications on file prior to visit.       The following portions of the patient's history were reviewed and updated as appropriate: allergies, current medications, past family history, past medical history, past social history, past surgical history and problem list.    Review of Systems   Respiratory: Positive for cough.        Objective   Physical Exam  Vitals reviewed.   Constitutional:       General: She is not in acute distress.     Appearance: Normal appearance. She is well-developed. She is not ill-appearing or toxic-appearing.   HENT:      Head: Normocephalic and atraumatic.      Right Ear: Tympanic membrane, ear canal and external ear normal.      Left Ear: Tympanic membrane, ear canal and external ear normal.      Nose: Nose normal.   Eyes:      Extraocular Movements: Extraocular movements intact.      Conjunctiva/sclera: Conjunctivae normal.      Pupils: Pupils are equal, round, and reactive to light.   Cardiovascular:      Rate and Rhythm: Normal rate and regular rhythm.      Pulses:           Dorsalis pedis pulses are 1+ on the right side and 1+ on the left side.        Posterior tibial pulses are 1+ on the right side and 1+ on the left side.      Heart sounds: Normal heart sounds.   Pulmonary:      Effort: Pulmonary effort is normal.      Breath sounds: Wheezing and rhonchi present.      Comments: Left midlung field.  Decreased breath sounds  bilaterally.  Abdominal:      General: Bowel sounds are normal. There is no distension.      Palpations: Abdomen is soft. There is no mass.      Tenderness: There is no abdominal tenderness.   Musculoskeletal:         General: Normal range of motion.      Cervical back: Neck supple.   Feet:      Right foot:      Skin integrity: Skin integrity normal.      Left foot:      Skin integrity: Skin breakdown present.      Comments: Diabetic Foot Exam Performed  Advised Lamisil LGT  Skin:     General: Skin is warm.   Neurological:      General: No focal deficit present.      Mental Status: She is alert and oriented to person, place, and time.   Psychiatric:         Mood and Affect: Mood normal.         Behavior: Behavior normal.       PHQ-9 Total Score:      Assessment/Plan   Diagnoses and all orders for this visit:    1. GERD without esophagitis (Primary)  Comments:  Famotide takes when symptoms monthly    2. Type 2 diabetes mellitus with hyperglycemia, without long-term current use of insulin (HCC)  Comments:  No sugars below 100 or over 200 eye exam up-to-date  Orders:  -     Hemoglobin A1c  -     Lipid Panel  -     Comprehensive Metabolic Panel  -     Microalbumin / Creatinine Urine Ratio - Urine, Clean Catch    3. Thoracic aortic aneurysm without rupture (HCC)  Comments:  Minimal change of the last couple of years we will remonitor again in 2022    4. Essential hypertension  Comments:  Controlled    5. Mixed hyperlipidemia  Comments:  Trying to eat less saturated fat    6. Acute bilateral low back pain without sciatica  Comments:  Back pain has pretty much resolved.    Other orders  -     doxycycline (MONODOX) 100 MG capsule; Take 1 capsule by mouth 2 (Two) Times a Day.  Dispense: 20 capsule; Refill: 0        Patient Instructions     Stop  magnesiom and iron while taking Doxycycline  Health Maintenance Due   Topic Date Due   • INFLUENZA VACCINE  08/01/2021   • COVID-19 Vaccine (3 - Booster for Moderna series)  09/17/2021

## 2021-12-16 NOTE — PATIENT INSTRUCTIONS
Stop  magnesiom and iron while taking Doxycycline  Health Maintenance Due   Topic Date Due   • INFLUENZA VACCINE  08/01/2021   • COVID-19 Vaccine (3 - Booster for Moderna series) 09/17/2021

## 2021-12-21 RX ORDER — PRAMIPEXOLE DIHYDROCHLORIDE 0.12 MG/1
0.5 TABLET ORAL
Qty: 120 TABLET | Refills: 1 | Status: SHIPPED | OUTPATIENT
Start: 2021-12-21 | End: 2022-03-04

## 2021-12-21 RX ORDER — PRAMIPEXOLE DIHYDROCHLORIDE 0.12 MG/1
TABLET ORAL
Qty: 120 TABLET | Refills: 1 | OUTPATIENT
Start: 2021-12-21

## 2021-12-21 RX ORDER — CYCLOBENZAPRINE HCL 5 MG
TABLET ORAL
Qty: 20 TABLET | Refills: 0 | OUTPATIENT
Start: 2021-12-21

## 2021-12-22 NOTE — TELEPHONE ENCOUNTER
"Pt does not need this medications and does not pain management because she does not want the \"heavy drugs\" they are going to place her on.  "

## 2021-12-22 NOTE — TELEPHONE ENCOUNTER
I should have said I'll refer to Pain Management as we don't prescribe Flexeril on chronic basis.  Can put in referral and will give her a supply till she can get in to see them.  We are not set up to do random drug screening so can't prescribe on regular basis

## 2022-02-28 RX ORDER — SEMAGLUTIDE 1.34 MG/ML
INJECTION, SOLUTION SUBCUTANEOUS
Qty: 4.5 ML | Refills: 2 | Status: SHIPPED | OUTPATIENT
Start: 2022-02-28 | End: 2022-11-28

## 2022-03-04 RX ORDER — PRAMIPEXOLE DIHYDROCHLORIDE 0.12 MG/1
0.5 TABLET ORAL
Qty: 120 TABLET | Refills: 1 | Status: SHIPPED | OUTPATIENT
Start: 2022-03-04 | End: 2022-03-30 | Stop reason: SDUPTHER

## 2022-03-14 NOTE — PATIENT INSTRUCTIONS
Health Maintenance Due   Topic Date Due    PAP SMEAR  03/13/2022    Check blood pressure cuff for accuracy and send 10 blood pressures over 2 weeks.  Watch sodium, alcohol and weight   Increase fluids first thing in morning    Patient to call insurance to see where Prolia comes from and have it sent to us

## 2022-03-15 ENCOUNTER — OFFICE VISIT (OUTPATIENT)
Dept: FAMILY MEDICINE CLINIC | Facility: CLINIC | Age: 67
End: 2022-03-15

## 2022-03-15 VITALS
BODY MASS INDEX: 22.91 KG/M2 | HEIGHT: 64 IN | WEIGHT: 134.2 LBS | DIASTOLIC BLOOD PRESSURE: 74 MMHG | OXYGEN SATURATION: 94 % | HEART RATE: 105 BPM | TEMPERATURE: 97.1 F | SYSTOLIC BLOOD PRESSURE: 119 MMHG

## 2022-03-15 DIAGNOSIS — K21.9 GERD WITHOUT ESOPHAGITIS: Chronic | ICD-10-CM

## 2022-03-15 DIAGNOSIS — I71.20 THORACIC AORTIC ANEURYSM WITHOUT RUPTURE: ICD-10-CM

## 2022-03-15 DIAGNOSIS — J43.9 PULMONARY EMPHYSEMA, UNSPECIFIED EMPHYSEMA TYPE: ICD-10-CM

## 2022-03-15 DIAGNOSIS — M79.671 PAIN IN BOTH FEET: ICD-10-CM

## 2022-03-15 DIAGNOSIS — Z12.4 SCREENING FOR CERVICAL CANCER: ICD-10-CM

## 2022-03-15 DIAGNOSIS — E78.2 MIXED HYPERLIPIDEMIA: ICD-10-CM

## 2022-03-15 DIAGNOSIS — S00.01XA ABRASION OF SCALP, INITIAL ENCOUNTER: ICD-10-CM

## 2022-03-15 DIAGNOSIS — E53.8 B12 DEFICIENCY: ICD-10-CM

## 2022-03-15 DIAGNOSIS — E11.65 TYPE 2 DIABETES MELLITUS WITH HYPERGLYCEMIA, WITHOUT LONG-TERM CURRENT USE OF INSULIN: Primary | ICD-10-CM

## 2022-03-15 DIAGNOSIS — R06.83 SNORING: ICD-10-CM

## 2022-03-15 DIAGNOSIS — I25.10 CHRONIC CORONARY ARTERY DISEASE: ICD-10-CM

## 2022-03-15 DIAGNOSIS — J18.9 LINGULAR PNEUMONIA: ICD-10-CM

## 2022-03-15 DIAGNOSIS — I10 ESSENTIAL HYPERTENSION: ICD-10-CM

## 2022-03-15 DIAGNOSIS — M79.672 PAIN IN BOTH FEET: ICD-10-CM

## 2022-03-15 LAB
ALBUMIN SERPL-MCNC: 3.9 G/DL (ref 3.5–5.2)
ALBUMIN UR-MCNC: 1.5 MG/DL
ALBUMIN/GLOB SERPL: 1.3 G/DL
ALP SERPL-CCNC: 81 U/L (ref 39–117)
ALT SERPL W P-5'-P-CCNC: 16 U/L (ref 1–33)
ANION GAP SERPL CALCULATED.3IONS-SCNC: 14.7 MMOL/L (ref 5–15)
AST SERPL-CCNC: 14 U/L (ref 1–32)
BASOPHILS # BLD AUTO: 0.07 10*3/MM3 (ref 0–0.2)
BASOPHILS NFR BLD AUTO: 0.9 % (ref 0–1.5)
BILIRUB SERPL-MCNC: 0.5 MG/DL (ref 0–1.2)
BUN SERPL-MCNC: 19 MG/DL (ref 8–23)
BUN/CREAT SERPL: 34.5 (ref 7–25)
CALCIUM SPEC-SCNC: 9.3 MG/DL (ref 8.6–10.5)
CHLORIDE SERPL-SCNC: 105 MMOL/L (ref 98–107)
CHOLEST SERPL-MCNC: 130 MG/DL (ref 0–200)
CO2 SERPL-SCNC: 22.3 MMOL/L (ref 22–29)
CREAT SERPL-MCNC: 0.55 MG/DL (ref 0.57–1)
CREAT UR-MCNC: 96.4 MG/DL
DEPRECATED RDW RBC AUTO: 38.6 FL (ref 37–54)
EGFRCR SERPLBLD CKD-EPI 2021: 101.2 ML/MIN/1.73
EOSINOPHIL # BLD AUTO: 0.49 10*3/MM3 (ref 0–0.4)
EOSINOPHIL NFR BLD AUTO: 6.2 % (ref 0.3–6.2)
ERYTHROCYTE [DISTWIDTH] IN BLOOD BY AUTOMATED COUNT: 12 % (ref 12.3–15.4)
GLOBULIN UR ELPH-MCNC: 3 GM/DL
GLUCOSE SERPL-MCNC: 150 MG/DL (ref 65–99)
HCT VFR BLD AUTO: 47.1 % (ref 34–46.6)
HDLC SERPL-MCNC: 40 MG/DL (ref 40–60)
HGB BLD-MCNC: 15.9 G/DL (ref 12–15.9)
IMM GRANULOCYTES # BLD AUTO: 0.02 10*3/MM3 (ref 0–0.05)
IMM GRANULOCYTES NFR BLD AUTO: 0.3 % (ref 0–0.5)
LDLC SERPL CALC-MCNC: 67 MG/DL (ref 0–100)
LDLC/HDLC SERPL: 1.6 {RATIO}
LYMPHOCYTES # BLD AUTO: 1.6 10*3/MM3 (ref 0.7–3.1)
LYMPHOCYTES NFR BLD AUTO: 20.1 % (ref 19.6–45.3)
MAGNESIUM SERPL-MCNC: 2 MG/DL (ref 1.6–2.4)
MCH RBC QN AUTO: 29.8 PG (ref 26.6–33)
MCHC RBC AUTO-ENTMCNC: 33.8 G/DL (ref 31.5–35.7)
MCV RBC AUTO: 88.4 FL (ref 79–97)
MICROALBUMIN/CREAT UR: 15.6 MG/G
MONOCYTES # BLD AUTO: 0.53 10*3/MM3 (ref 0.1–0.9)
MONOCYTES NFR BLD AUTO: 6.7 % (ref 5–12)
NEUTROPHILS NFR BLD AUTO: 5.24 10*3/MM3 (ref 1.7–7)
NEUTROPHILS NFR BLD AUTO: 65.8 % (ref 42.7–76)
NRBC BLD AUTO-RTO: 0 /100 WBC (ref 0–0.2)
PLATELET # BLD AUTO: 203 10*3/MM3 (ref 140–450)
PMV BLD AUTO: 12.2 FL (ref 6–12)
POTASSIUM SERPL-SCNC: 4 MMOL/L (ref 3.5–5.2)
PROT SERPL-MCNC: 6.9 G/DL (ref 6–8.5)
RBC # BLD AUTO: 5.33 10*6/MM3 (ref 3.77–5.28)
SODIUM SERPL-SCNC: 142 MMOL/L (ref 136–145)
TRIGL SERPL-MCNC: 130 MG/DL (ref 0–150)
TSH SERPL DL<=0.05 MIU/L-ACNC: 1.63 UIU/ML (ref 0.27–4.2)
URATE SERPL-MCNC: 3.1 MG/DL (ref 2.4–5.7)
VIT B12 BLD-MCNC: 269 PG/ML (ref 211–946)
VLDLC SERPL-MCNC: 23 MG/DL (ref 5–40)
WBC NRBC COR # BLD: 7.95 10*3/MM3 (ref 3.4–10.8)

## 2022-03-15 PROCEDURE — 83036 HEMOGLOBIN GLYCOSYLATED A1C: CPT | Performed by: PREVENTIVE MEDICINE

## 2022-03-15 PROCEDURE — 84443 ASSAY THYROID STIM HORMONE: CPT | Performed by: PREVENTIVE MEDICINE

## 2022-03-15 PROCEDURE — 82570 ASSAY OF URINE CREATININE: CPT | Performed by: PREVENTIVE MEDICINE

## 2022-03-15 PROCEDURE — 80053 COMPREHEN METABOLIC PANEL: CPT | Performed by: PREVENTIVE MEDICINE

## 2022-03-15 PROCEDURE — 80061 LIPID PANEL: CPT | Performed by: PREVENTIVE MEDICINE

## 2022-03-15 PROCEDURE — 83735 ASSAY OF MAGNESIUM: CPT | Performed by: PREVENTIVE MEDICINE

## 2022-03-15 PROCEDURE — 82043 UR ALBUMIN QUANTITATIVE: CPT | Performed by: PREVENTIVE MEDICINE

## 2022-03-15 PROCEDURE — 82607 VITAMIN B-12: CPT | Performed by: PREVENTIVE MEDICINE

## 2022-03-15 PROCEDURE — 85025 COMPLETE CBC W/AUTO DIFF WBC: CPT | Performed by: PREVENTIVE MEDICINE

## 2022-03-15 PROCEDURE — 99214 OFFICE O/P EST MOD 30 MIN: CPT | Performed by: PREVENTIVE MEDICINE

## 2022-03-15 PROCEDURE — 36415 COLL VENOUS BLD VENIPUNCTURE: CPT | Performed by: PREVENTIVE MEDICINE

## 2022-03-15 PROCEDURE — 84550 ASSAY OF BLOOD/URIC ACID: CPT | Performed by: PREVENTIVE MEDICINE

## 2022-03-15 RX ORDER — KETOCONAZOLE 20 MG/ML
SHAMPOO TOPICAL 2 TIMES WEEKLY
Qty: 8 EACH | Refills: 1 | Status: SHIPPED | OUTPATIENT
Start: 2022-03-17 | End: 2022-05-01

## 2022-03-15 RX ORDER — INFLUENZA A VIRUS A/MICHIGAN/45/2015 X-275 (H1N1) ANTIGEN (FORMALDEHYDE INACTIVATED), INFLUENZA A VIRUS A/SINGAPORE/INFIMH-16-0019/2016 IVR-186 (H3N2) ANTIGEN (FORMALDEHYDE INACTIVATED), INFLUENZA B VIRUS B/PHUKET/3073/2013 ANTIGEN (FORMALDEHYDE INACTIVATED), AND INFLUENZA B VIRUS B/MARYLAND/15/2016 BX-69A ANTIGEN (FORMALDEHYDE INACTIVATED) 60; 60; 60; 60 UG/.7ML; UG/.7ML; UG/.7ML; UG/.7ML
INJECTION, SUSPENSION INTRAMUSCULAR
COMMUNITY
Start: 2021-12-16 | End: 2022-09-20

## 2022-03-15 NOTE — PROGRESS NOTES
"Subjective   Eugenia Madden is a 66 y.o. female presents for   Chief Complaint   Patient presents with   • Diabetes     3 month f/u with fasting labs   • Hypertension   • Hyperlipidemia   Patient presents today for follow-up on multiple chronic health conditions most of which are stable.  Blood pressure was a little low this morning and she felt dizzy that is the first morning that she has been low but will check blood pressure over the next 4 to 5 days in the morning and call us with results may need to cut the losartan back.  Patient is also having new foot pain with swelling in the first MTP joint of the left foot and bilateral plantar surface pain.  Podiatry referral and uric acid obtained arch supports are not helping so she will need evaluation finally thoracic aortic aneurysm needs to be surveilled the 20-year since the last service we will order chest CT with contrast.  Patient also has a new problem with scalp irritation she has been letting her new puppies scratch her scalp and she has developed some abrasions which are pruritic and then some hives on the back of her neck she will stop allowing puppies to do this use of Nizoral shampoo and some hydrocortisone on the hives in the back of her neck.    Health Maintenance Due   Topic Date Due   • PAP SMEAR  03/13/2022       History of Present Illness     Vitals:    03/15/22 0906 03/15/22 0908   BP: 120/76 119/74   BP Location: Left arm Right arm   Patient Position: Sitting Sitting   Cuff Size: Large Adult Large Adult   Pulse: 105    Temp: 97.1 °F (36.2 °C)    TempSrc: Temporal    SpO2: 94%    Weight: 60.9 kg (134 lb 3.2 oz)    Height: 162.6 cm (64\")      Body mass index is 23.04 kg/m².    Current Outpatient Medications on File Prior to Visit   Medication Sig Dispense Refill   • Accu-Chek Softclix Lancets lancets 1 each by Other route Daily. Use as instructed 100 each 0   • albuterol sulfate  (90 Base) MCG/ACT inhaler Inhale 2 puffs Every 4 (Four) Hours As " Needed for Wheezing. 1 g 3   • aspirin 81 MG chewable tablet Chew 81 mg Daily.     • atorvastatin (LIPITOR) 40 MG tablet TAKE 1 TABLET BY MOUTH EVERY DAY 90 tablet 3   • cetirizine (zyrTEC) 10 MG tablet Take 10 mg by mouth Daily.     • Cholecalciferol (VITAMIN D3) 125 MCG (5000 UT) tablet tablet Take 5,000 Units by mouth Daily.     • cyclobenzaprine (FLEXERIL) 5 MG tablet TAKE 1 TABLET BY MOUTH NIGHTLY FOR SEVERE BACK PAIN 20 tablet 0   • doxycycline (MONODOX) 100 MG capsule Take 1 capsule by mouth 2 (Two) Times a Day. 20 capsule 0   • famotidine (PEPCID) 20 MG tablet Take 20 mg by mouth Daily.     • Fluzone High-Dose Quadrivalent 0.7 ML suspension prefilled syringe injection      • glimepiride (Amaryl) 4 MG tablet Take 1 tablet by mouth Every Morning Before Breakfast. (Patient taking differently: Take 8 mg by mouth Every Morning Before Breakfast.) 180 tablet 3   • glucose blood (Accu-Chek Guide) test strip 1 each by Other route Daily. Use as instructed (Patient taking differently: 1 each by Other route Daily. Use to test blood sugars twice daily) 100 each 0   • guaiFENesin (MUCINEX) 600 MG 12 hr tablet Take 1,200 mg by mouth 2 (Two) Times a Day. As needed     • hydrOXYzine (ATARAX) 25 MG tablet TAKE 1 TABLET BY MOUTH AT NIGHT AS NEEDED FOR ITCHING (ONE OR TWO IF ITCHING). (Patient taking differently: Take 50 mg by mouth 1 (One) Time. Take 2 tablets daily at bedtime) 30 tablet 1   • Ibuprofen-diphenhydrAMINE HCl 200-25 MG capsule Take 1 capsule by mouth Every Night.     • Isopropyl Alcohol ([employer] Isopropyl Alcohol Wipes) 70 % misc Apply 1 each topically Daily. 100 each 0   • Jardiance 10 MG tablet tablet TAKE 1 TABLET BY MOUTH DAILY 30 tablet 11   • losartan (COZAAR) 50 MG tablet TAKE 1 TABLET BY MOUTH TWICE A  tablet 3   • meloxicam (MOBIC) 7.5 MG tablet      • metFORMIN ER (GLUCOPHAGE-XR) 500 MG 24 hr tablet TAKE 2 TABLETS BY MOUTH EVERY  tablet 2   • potassium chloride 10 MEQ CR tablet TAKE 1 TABLET  BY MOUTH EVERY DAY 90 tablet 1   • pramipexole (MIRAPEX) 0.125 MG tablet TAKE 4 TABLETS BY MOUTH EVERY NIGHT AT BEDTIME. 120 tablet 1   • Semaglutide,0.25 or 0.5MG/DOS, (Ozempic, 0.25 or 0.5 MG/DOSE,) 2 MG/1.5ML solution pen-injector Inject .05 mg weekly on sunday 4.5 mL 2   • Stiolto Respimat 2.5-2.5 MCG/ACT aerosol solution inhaler INHALE 2 PUFFS BY MOUTH DAILY 3 each 3   • [DISCONTINUED] Unable to find Take 1 each by mouth 1 (One) Time. Med Name: [facility] Advanced Blood Sugar Support Formula     • [DISCONTINUED] denosumab (PROLIA) 60 MG/ML solution prefilled syringe syringe Inject 1 mL under the skin into the appropriate area as directed 1 (One) Time for 1 dose. 1 mL 1     No current facility-administered medications on file prior to visit.       The following portions of the patient's history were reviewed and updated as appropriate: allergies, current medications, past family history, past medical history, past social history, past surgical history and problem list.    Review of Systems   Musculoskeletal: Positive for arthralgias, gait problem and joint swelling.   Skin: Positive for rash.       Objective   Physical Exam  Vitals reviewed.   Constitutional:       General: She is not in acute distress.     Appearance: Normal appearance. She is well-developed. She is not ill-appearing or toxic-appearing.   HENT:      Head: Normocephalic and atraumatic.      Right Ear: Tympanic membrane, ear canal and external ear normal.      Left Ear: Tympanic membrane, ear canal and external ear normal.      Nose: Nose normal.   Eyes:      Extraocular Movements: Extraocular movements intact.      Conjunctiva/sclera: Conjunctivae normal.      Pupils: Pupils are equal, round, and reactive to light.   Cardiovascular:      Rate and Rhythm: Normal rate and regular rhythm.      Pulses:           Dorsalis pedis pulses are 1+ on the right side and 1+ on the left side.        Posterior tibial pulses are 1+ on the right side and 1+ on the  left side.      Heart sounds: Normal heart sounds.   Pulmonary:      Effort: Pulmonary effort is normal.      Comments: Decreased breath sounds bilaterally  Abdominal:      General: Bowel sounds are normal. There is no distension.      Palpations: Abdomen is soft. There is no mass.      Tenderness: There is no abdominal tenderness.   Musculoskeletal:         General: Swelling and tenderness present. No signs of injury. Normal range of motion.      Cervical back: Neck supple.      Left foot: Bunion present.   Feet:      Right foot:      Skin integrity: Callus present.      Toenail Condition: Right toenails are normal.      Left foot:      Skin integrity: Callus present.      Toenail Condition: Left toenails are normal.      Comments: Diabetic Foot Exam Performed    Skin:     General: Skin is warm.      Findings: Erythema, lesion and rash present.   Neurological:      General: No focal deficit present.      Mental Status: She is alert and oriented to person, place, and time.   Psychiatric:         Mood and Affect: Mood normal.         Behavior: Behavior normal.       PHQ-9 Total Score: 0    Assessment/Plan   Diagnoses and all orders for this visit:    1. Type 2 diabetes mellitus with hyperglycemia, without long-term current use of insulin (HCC) (Primary)  Comments:  100-200.  Eye exam UTD  Orders:  -     Comprehensive Metabolic Panel  -     Hemoglobin A1c  -     Microalbumin / Creatinine Urine Ratio - Urine, Clean Catch  -     TSH    2. Chronic coronary artery disease  Comments:  No chest pain or shortness of breath follows regularly with a cardiologist    3. Essential hypertension  Comments:  Controlled.  Low this morning 90/60 Will home monitor and may need tocut Losartan  Orders:  -     CBC Auto Differential    4. B12 deficiency  Comments:  Takes B12 regularly  Orders:  -     Vitamin B12    5. GERD without esophagitis  Comments:  Controlled  Orders:  -     Magnesium    6. Lingular pneumonia  Comments:  Cough  resolved.  Patient is no longer coughing and breath sounds are normal in the lingular area  Orders:  -     CT Chest With Contrast; Future    7. Mixed hyperlipidemia  Comments:  Trying to eat less saturated fats  Orders:  -     Lipid Panel    8. Pulmonary emphysema, unspecified emphysema type (HCC)  Comments:  : Patient is no longer smoking    9. Snoring  Comments:  Resolved    10. Thoracic aortic aneurysm without rupture (HCC)  Comments:  CT with contrast pending  Orders:  -     CT Chest With Contrast; Future    11. Screening for cervical cancer  Comments:  Sees / [facility]-will see them soon  Orders:  -     Ambulatory Referral to Gynecology    12. Pain in both feet  Comments:  New swelling of the left first MTP joint does get red and hot at times uric acid ordered.  Bilateral plantar foot pain.  Referred to podiatry  Orders:  -     Uric Acid  -     Ambulatory Referral to Podiatry    13. Abrasion of scalp, initial encounter  Comments:  Has been allowing new puppies to scratch and bite her scalp now is having some itching and has some abrasions will walk scrutinize oral and treat some of the ir    Other orders  -     ketoconazole (Nizoral) 2 % shampoo; Apply  topically to the appropriate area as directed 2 (Two) Times a Week.  Dispense: 8 each; Refill: 1  -     denosumab (PROLIA) 60 MG/ML solution prefilled syringe syringe; Inject 1 mL under the skin into the appropriate area as directed 1 (One) Time for 1 dose.  Dispense: 1 mL; Refill: 1        Patient Instructions     Health Maintenance Due   Topic Date Due   • PAP SMEAR  03/13/2022    Check blood pressure cuff for accuracy and send 10 blood pressures over 2 weeks.  Watch sodium, alcohol and weight   Increase fluids first thing in morning    Patient to call insurance to see where Prolia comes from and have it sent to us

## 2022-03-15 NOTE — PROGRESS NOTES
Venipuncture Blood Specimen Collection  Venipuncture performed in left arm by Clement Diggs MA with good hemostasis. Patient tolerated the procedure well without complications.   03/15/2022  Clement Diggs MA

## 2022-03-15 NOTE — PROGRESS NOTES
Venipuncture Blood Specimen Collection  Venipuncture performed in left arm by Clement Diggs MA with good hemostasis. Patient tolerated the procedure well without complications.   03/15/2022  Clement Diggs MA    Left urine for Microal

## 2022-03-16 LAB — HBA1C MFR BLD: 6.6 % (ref 3.5–5.6)

## 2022-03-17 ENCOUNTER — TELEPHONE (OUTPATIENT)
Dept: FAMILY MEDICINE CLINIC | Facility: CLINIC | Age: 67
End: 2022-03-17

## 2022-03-17 NOTE — TELEPHONE ENCOUNTER
Caller: Eugenia Madden    Relationship to patient: Self    Best call back number:[phone]    Patient is needing:     HUB TO READ:  ----- Message from Melisa Taveras MD sent at 3/17/2022  7:52 AM EDT -----  Glucose is 150 with A1c showing good control at 6.6 vitamin B12 is still low so I would increase her dose over-the-counter can consider shots if she is unable to get a vitamin B12.     HUB READ FOLLOWING MESSAGE TO PATIENT. PATIENT THAT NOT CURRENTLY TAKING VITAMIN B-12. PATIENT STATED SHE WOULD LIKE TO KNOW HOW MANY MILLIGRAMS TO TAKE. PLEASE CALL PATIENT AND ADVISE

## 2022-03-17 NOTE — TELEPHONE ENCOUNTER
HUB TO READ:  ----- Message from Melisa Taveras MD sent at 3/17/2022  7:52 AM EDT -----  Glucose is 150 with A1c showing good control at 6.6 vitamin B12 is still low so I would increase her dose over-the-counter can consider shots if she is unable to get a vitamin B12.

## 2022-03-17 NOTE — PROGRESS NOTES
Glucose is 150 with A1c showing good control at 6.6 vitamin B12 is still low so I would increase her dose over-the-counter can consider shots if she is unable to get a vitamin B12.

## 2022-03-21 ENCOUNTER — HOSPITAL ENCOUNTER (OUTPATIENT)
Dept: CT IMAGING | Facility: HOSPITAL | Age: 67
Discharge: HOME | End: 2022-03-21
Admitting: PREVENTIVE MEDICINE

## 2022-03-21 ENCOUNTER — TELEPHONE (OUTPATIENT)
Dept: FAMILY MEDICINE CLINIC | Facility: CLINIC | Age: 67
End: 2022-03-21

## 2022-03-21 DIAGNOSIS — E04.2 MULTIPLE THYROID NODULES: Primary | ICD-10-CM

## 2022-03-21 DIAGNOSIS — J18.9 LINGULAR PNEUMONIA: ICD-10-CM

## 2022-03-21 DIAGNOSIS — I71.20 THORACIC AORTIC ANEURYSM WITHOUT RUPTURE: ICD-10-CM

## 2022-03-21 PROCEDURE — 71260 CT THORAX DX C+: CPT

## 2022-03-21 PROCEDURE — 0 IOPAMIDOL PER 1 ML: Performed by: PREVENTIVE MEDICINE

## 2022-03-21 RX ADMIN — IOPAMIDOL 100 ML: 755 INJECTION, SOLUTION INTRAVENOUS at 12:51

## 2022-03-21 NOTE — PROGRESS NOTES
Looks like pneumonia is gone.  Aneurysms have not changed in size there is some calcification in the coronary arteries so if she develops any chest pain make sure that she does not ignore those.= There are also multiple thyroid nodules or cysts we should evaluate the thyroid with ultrasound orders placed

## 2022-03-21 NOTE — TELEPHONE ENCOUNTER
----- Message from Melisa Taveras MD sent at 3/21/2022  3:59 PM EDT -----    HUB TO READ    Looks like pneumonia is gone.  Aneurysms have not changed in size there is some calcification in the coronary arteries so if she develops any chest pain make sure that she does not ignore those.= There are also multiple thyroid nodules or cysts we should evaluate the thyroid with ultrasound orders placed

## 2022-03-30 ENCOUNTER — HOSPITAL ENCOUNTER (OUTPATIENT)
Dept: ULTRASOUND IMAGING | Facility: HOSPITAL | Age: 67
Discharge: HOME | End: 2022-03-30
Admitting: PREVENTIVE MEDICINE

## 2022-03-30 DIAGNOSIS — E04.2 MULTIPLE THYROID NODULES: ICD-10-CM

## 2022-03-30 PROCEDURE — 76536 US EXAM OF HEAD AND NECK: CPT

## 2022-03-30 RX ORDER — PRAMIPEXOLE DIHYDROCHLORIDE 0.12 MG/1
0.5 TABLET ORAL
Qty: 120 TABLET | Refills: 1 | Status: SHIPPED | OUTPATIENT
Start: 2022-03-30 | End: 2022-04-24 | Stop reason: SDUPTHER

## 2022-03-31 ENCOUNTER — TELEPHONE (OUTPATIENT)
Dept: FAMILY MEDICINE CLINIC | Facility: CLINIC | Age: 67
End: 2022-03-31

## 2022-03-31 NOTE — PROGRESS NOTES
Thyroid ultrasound shows a normal size gland with multiple small nodules.  None raise alarm and would make us advise fine-needle biopsy.  The ultrasound should however be repeated again in a year.

## 2022-04-07 ENCOUNTER — TELEPHONE (OUTPATIENT)
Dept: FAMILY MEDICINE CLINIC | Facility: CLINIC | Age: 67
End: 2022-04-07

## 2022-04-07 NOTE — TELEPHONE ENCOUNTER
PATIENTS PROLIA AVRIVED TODAY HERE AT THE OFFICE.   PLEASE REVIEW LABS. IS SHE OK TO SCHEDULE FOR PROLIA INJ?

## 2022-04-08 ENCOUNTER — CLINICAL SUPPORT (OUTPATIENT)
Dept: FAMILY MEDICINE CLINIC | Facility: CLINIC | Age: 67
End: 2022-04-08

## 2022-04-08 DIAGNOSIS — M81.0 OSTEOPOROSIS WITHOUT CURRENT PATHOLOGICAL FRACTURE, UNSPECIFIED OSTEOPOROSIS TYPE: Primary | ICD-10-CM

## 2022-04-08 PROCEDURE — 96372 THER/PROPH/DIAG INJ SC/IM: CPT | Performed by: PREVENTIVE MEDICINE

## 2022-04-11 ENCOUNTER — PATIENT ROUNDING (BHMG ONLY) (OUTPATIENT)
Dept: PODIATRY | Facility: CLINIC | Age: 67
End: 2022-04-11

## 2022-04-11 ENCOUNTER — OFFICE VISIT (OUTPATIENT)
Dept: PODIATRY | Facility: CLINIC | Age: 67
End: 2022-04-11

## 2022-04-11 VITALS
SYSTOLIC BLOOD PRESSURE: 137 MMHG | DIASTOLIC BLOOD PRESSURE: 80 MMHG | BODY MASS INDEX: 22.88 KG/M2 | HEIGHT: 64 IN | WEIGHT: 134 LBS | HEART RATE: 99 BPM

## 2022-04-11 DIAGNOSIS — M77.41 METATARSALGIA OF BOTH FEET: ICD-10-CM

## 2022-04-11 DIAGNOSIS — M20.21 HALLUX RIGIDUS OF BOTH FEET: ICD-10-CM

## 2022-04-11 DIAGNOSIS — M77.42 METATARSALGIA OF BOTH FEET: ICD-10-CM

## 2022-04-11 DIAGNOSIS — M79.672 BILATERAL FOOT PAIN: Primary | ICD-10-CM

## 2022-04-11 DIAGNOSIS — M79.671 BILATERAL FOOT PAIN: Primary | ICD-10-CM

## 2022-04-11 DIAGNOSIS — M20.22 HALLUX RIGIDUS OF BOTH FEET: ICD-10-CM

## 2022-04-11 PROCEDURE — 99203 OFFICE O/P NEW LOW 30 MIN: CPT | Performed by: PODIATRIST

## 2022-04-11 NOTE — PROGRESS NOTES
A my chart message has been sent to the patient for PATIENT ROUNDING with [facility]

## 2022-04-12 NOTE — PROGRESS NOTES
04/11/2022  Foot and Ankle Surgery - New Patient   Provider: Dr. Yo Leon DPM  Location: [facility]    Subjective:  Eugenia Madden is a 66 y.o. female.     Chief Complaint   Patient presents with   • Right Foot - Pain   • Left Foot - Pain   • Initial Evaluation     Last pcp appt with maricruz Taveras md 3/15/2022       HPI: Patient is a pleasant 66-year-old female that presents with issues involving both feet, left greater than right.  She complains of burning and discomfort involving her feet with some numbness at times.  She feels odd sensations and states that the issues are worse with increased weightbearing activities and better with rest.  Intermittently she has had discomfort involving her left first metatarsophalangeal joint region.  She has had redness and limitation in the past.  Patient has not been formally evaluated for these issues.  She has discussed with her primary care physician who has referred her to me for further management.  Patient is unaware of any recent injuries to her feet.    No Known Allergies    Past Medical History:   Diagnosis Date   • Arthritis    • Kidney stones    • Restless leg        Past Surgical History:   Procedure Laterality Date   • CERVIX SURGERY  1983    Dysplasia removed from Cervix   • KIDNEY STONE SURGERY  2016    Lithotripsy   • OTHER SURGICAL HISTORY  2007    Pinched nerve arm    • TUBAL ABDOMINAL LIGATION Bilateral        Family History   Problem Relation Age of Onset   • Heart disease Mother    • Cancer Mother         Breast Cancer   • Breast cancer Mother 55   • Diabetes Father    • Stroke Father    • Heart disease Father    • Hypertension Father    • Hypertension Sister    • Diabetes Sister    • Heart disease Sister    • Diabetes Brother    • Hypertension Brother    • Heart disease Brother    • Other Other         Abstraction from [employer] Cholesterol   • Diabetes Brother    • Hypertension Brother    • No Known Problems Brother    • Heart disease  Brother    • Cancer Brother         colon   • Other Brother        Social History     Socioeconomic History   • Marital status:    Tobacco Use   • Smoking status: Former Smoker     Packs/day: 0.50     Years: 46.00     Pack years: 23.00     Types: Cigarettes     Quit date: 2020     Years since quittin.4   • Smokeless tobacco: Never Used   • Tobacco comment: Passive Smoke: Y   Vaping Use   • Vaping Use: Never used   Substance and Sexual Activity   • Alcohol use: No   • Drug use: No   • Sexual activity: Yes     Partners: Male     Birth control/protection: None        Current Outpatient Medications on File Prior to Visit   Medication Sig Dispense Refill   • Accu-Chek Softclix Lancets lancets 1 each by Other route Daily. Use as instructed 100 each 0   • albuterol sulfate  (90 Base) MCG/ACT inhaler Inhale 2 puffs Every 4 (Four) Hours As Needed for Wheezing. 1 g 3   • aspirin 81 MG chewable tablet Chew 81 mg Daily.     • atorvastatin (LIPITOR) 40 MG tablet TAKE 1 TABLET BY MOUTH EVERY DAY 90 tablet 3   • cetirizine (zyrTEC) 10 MG tablet Take 10 mg by mouth Daily.     • Cholecalciferol (VITAMIN D3) 125 MCG (5000 UT) tablet tablet Take 5,000 Units by mouth Daily.     • cyclobenzaprine (FLEXERIL) 5 MG tablet TAKE 1 TABLET BY MOUTH NIGHTLY FOR SEVERE BACK PAIN 20 tablet 0   • doxycycline (MONODOX) 100 MG capsule Take 1 capsule by mouth 2 (Two) Times a Day. 20 capsule 0   • famotidine (PEPCID) 20 MG tablet Take 20 mg by mouth Daily.     • Fluzone High-Dose Quadrivalent 0.7 ML suspension prefilled syringe injection      • glimepiride (Amaryl) 4 MG tablet Take 1 tablet by mouth Every Morning Before Breakfast. (Patient taking differently: Take 8 mg by mouth Every Morning Before Breakfast.) 180 tablet 3   • glucose blood (Accu-Chek Guide) test strip 1 each by Other route Daily. Use as instructed (Patient taking differently: 1 each by Other route Daily. Use to test blood sugars twice daily) 100 each 0   •  guaiFENesin (MUCINEX) 600 MG 12 hr tablet Take 1,200 mg by mouth 2 (Two) Times a Day. As needed     • hydrOXYzine (ATARAX) 25 MG tablet TAKE 1 TABLET BY MOUTH AT NIGHT AS NEEDED FOR ITCHING (ONE OR TWO IF ITCHING). (Patient taking differently: Take 50 mg by mouth 1 (One) Time. Take 2 tablets daily at bedtime) 30 tablet 1   • Ibuprofen-diphenhydrAMINE HCl 200-25 MG capsule Take 1 capsule by mouth Every Night.     • Isopropyl Alcohol ([employer] Isopropyl Alcohol Wipes) 70 % misc Apply 1 each topically Daily. 100 each 0   • Jardiance 10 MG tablet tablet TAKE 1 TABLET BY MOUTH DAILY 30 tablet 11   • ketoconazole (Nizoral) 2 % shampoo Apply  topically to the appropriate area as directed 2 (Two) Times a Week. 8 each 1   • losartan (COZAAR) 50 MG tablet TAKE 1 TABLET BY MOUTH TWICE A  tablet 3   • meloxicam (MOBIC) 7.5 MG tablet      • metFORMIN ER (GLUCOPHAGE-XR) 500 MG 24 hr tablet TAKE 2 TABLETS BY MOUTH EVERY  tablet 2   • potassium chloride 10 MEQ CR tablet TAKE 1 TABLET BY MOUTH EVERY DAY 90 tablet 1   • pramipexole (MIRAPEX) 0.125 MG tablet TAKE 4 TABLETS BY MOUTH EVERY NIGHT AT BEDTIME. 120 tablet 1   • Semaglutide,0.25 or 0.5MG/DOS, (Ozempic, 0.25 or 0.5 MG/DOSE,) 2 MG/1.5ML solution pen-injector Inject .05 mg weekly on sunday 4.5 mL 2   • Stiolto Respimat 2.5-2.5 MCG/ACT aerosol solution inhaler INHALE 2 PUFFS BY MOUTH DAILY 3 each 3   • denosumab (PROLIA) 60 MG/ML solution prefilled syringe syringe Inject 1 mL under the skin into the appropriate area as directed 1 (One) Time for 1 dose. 1 mL 1     No current facility-administered medications on file prior to visit.       Review of Systems:  General: Denies fever, chills, fatigue, and weakness.  Eyes: Denies vision loss, blurry vision, and excessive redness.  ENT: Denies hearing issues and difficulty swallowing.  Cardiovascular: Denies palpitations, chest pain, or syncopal episodes.  Respiratory: Denies shortness of breath, wheezing, and coughing.  GI:  "Denies abdominal pain, nausea, and vomiting.   : Denies frequency, hematuria, and urgency.  Musculoskeletal: + Bilateral foot pain and limitation  Derm: Denies rash, open wounds, or suspicious lesions.  Neuro: Denies headaches, numbness, loss of coordination, and tremors.  Psych: Denies anxiety and depression.  Endocrine: Denies temperature intolerance and changes in appetite.  Heme: Denies bleeding disorders or abnormal bruising.     Objective   /80   Pulse 99   Ht 162.6 cm (64\")   Wt 60.8 kg (134 lb)   LMP  (LMP Unknown)   BMI 23.00 kg/m²     Foot/Ankle Exam:       General:   Appearance: appears stated age and healthy    Orientation: AAOx3    Affect: appropriate    Gait: antalgic    Gait comment:  Mildly apropulsive gait    VASCULAR      Right Foot Vascularity   Normal vascular exam    Dorsalis pedis:  2+  Posterior tibial:  2+  Skin Temperature: warm    Edema Grading:  None  CFT:  < 3 seconds  Pedal Hair Growth:  Present  Varicosities: none       Left Foot Vascularity   Normal vascular exam    Dorsalis pedis:  2+  Posterior tibial:  2+  Skin Temperature: warm    Edema Grading:  None  CFT:  < 3 seconds  Pedal Hair Growth:  Present  Varicosities: none        NEUROLOGIC     Right Foot Neurologic   Light touch sensation:  Normal  Hot/Cold sensation: normal    Achilles reflex:  2+     Left Foot Neurologic   Light touch sensation:  Normal  Hot/cold sensation: normal    Achilles reflex:  2+     MUSCULOSKELETAL      Right Foot Musculoskeletal   Ecchymosis:  None  Tenderness: lesser metatarsophalangeal joints    Arch:  Normal  Hallux limitus: Yes       Left Foot Musculoskeletal   Ecchymosis:  None  Tenderness: great toe metatarsophalangeal joint and lesser metatarsophalangeal joints    Arch:  Normal  Hallux limitus: Yes (Moderate limitation.  Discomfort with palpation)       MUSCLE STRENGTH     Right Foot Muscle Strength   Normal strength    Foot dorsiflexion:  5  Foot plantar flexion:  5  Foot inversion:  " 5  Foot eversion:  5     Left Foot Muscle Strength   Normal strength    Foot dorsiflexion:  5  Foot plantar flexion:  5  Foot inversion:  5  Foot eversion:  5     DERMATOLOGIC     Right Foot Dermatologic   Skin: skin intact       Left Foot Dermatologic   Skin: skin intact       TESTS     Right Foot Tests   Anterior drawer: negative    Varus tilt: negative       Left Foot Tests   Anterior drawer: negative    Varus tilt: negative        Right Foot Additional Comments Increased soft tissue rigidity with equinus contracture to both lower extremities.      Left Foot Additional Comments: Limitation with first MTPJ joint range of motion.  No significant signs of inflammation.      Assessment/Plan   Diagnoses and all orders for this visit:    1. Bilateral foot pain (Primary)  -     XR Foot 3+ View Bilateral    2. Hallux rigidus of both feet    3. Metatarsalgia of both feet      Patient presents with bilateral foot issues, left greater than right.  She complains of discomfort involving the forefoot region.  Symptoms are worse with increased activity and typically improved with rest.  Imaging was obtained and independently reviewed showing moderate degenerative changes involving the left first MTPJ region.  Changes are also noticed to the right first MTPJ.  I explained that symptoms are consistent with hallux rigidus.  This could be causing increased pressure to the lesser metatarsophalangeal joints leading to her symptoms.  We did discuss conservative care options and I have recommended that she obtain a pair of power step inserts with metatarsal pad for forefoot offloading.  I would also like her to start stretching manual therapy exercises.  I have asked that she monitor closely and call with any progressive issues or concerns.  I would like to see her in 4 weeks for reevaluation.  May consider further discussion about cheilectomy and Cartiva implant if necessary.  Greater than 30 minutes was spent before, during, and  after evaluation for patient care.    Orders Placed This Encounter   Procedures   • XR Foot 3+ View Bilateral     Order Specific Question:   Reason for Exam:     Answer:   riley foot pain at ball of feet, swelling  room 13 wb     Order Specific Question:   Does this patient have a diabetic monitoring/medication delivering device on?     Answer:   No     Order Specific Question:   Release to patient     Answer:   Immediate        Note is dictated utilizing voice recognition software. Unfortunately this leads to occasional typographical errors. I apologize in advance if the situation occurs. If questions occur please do not hesitate to call our office.

## 2022-04-24 RX ORDER — PRAMIPEXOLE DIHYDROCHLORIDE 0.12 MG/1
0.5 TABLET ORAL
Qty: 120 TABLET | Refills: 1 | Status: SHIPPED | OUTPATIENT
Start: 2022-04-24 | End: 2022-05-23

## 2022-05-01 RX ORDER — KETOCONAZOLE 20 MG/ML
SHAMPOO TOPICAL
Qty: 120 ML | Refills: 1 | Status: SHIPPED | OUTPATIENT
Start: 2022-05-01 | End: 2022-05-31

## 2022-05-01 RX ORDER — CYCLOBENZAPRINE HCL 5 MG
TABLET ORAL
Qty: 20 TABLET | Refills: 0 | Status: SHIPPED | OUTPATIENT
Start: 2022-05-01

## 2022-05-03 ENCOUNTER — TELEPHONE (OUTPATIENT)
Dept: FAMILY MEDICINE CLINIC | Facility: CLINIC | Age: 67
End: 2022-05-03

## 2022-05-03 RX ORDER — BLOOD SUGAR DIAGNOSTIC
1 STRIP MISCELLANEOUS DAILY
Qty: 100 EACH | Refills: 3 | Status: SHIPPED | OUTPATIENT
Start: 2022-05-03

## 2022-05-03 NOTE — TELEPHONE ENCOUNTER
Caller: Eugenia Madden    Relationship: Self    Best call back number: [phone]       Requested Prescriptions:   Requested Prescriptions     Pending Prescriptions Disp Refills   • glucose blood (Accu-Chek Guide) test strip 100 each 0     Si each by Other route Daily. Use as instructed        Pharmacy where request should be sent: [id number] - [place], [place] - [address] - [phone]  - [phone] FX     Additional details provided by patient: PATIENT IS OUT OF TEST STIRPS     Does the patient have less than a 3 day supply:  [x] Yes  [] No    Tod Sanders Rep   22 09:10 EDT

## 2022-05-03 NOTE — TELEPHONE ENCOUNTER
Since you are not on insulin they will only allow you 1 strip per day from a [employer] you can call and see if she can get this increased to 2 let me know.

## 2022-05-09 ENCOUNTER — OFFICE VISIT (OUTPATIENT)
Dept: PODIATRY | Facility: CLINIC | Age: 67
End: 2022-05-09

## 2022-05-09 VITALS
DIASTOLIC BLOOD PRESSURE: 85 MMHG | BODY MASS INDEX: 22.88 KG/M2 | HEART RATE: 88 BPM | WEIGHT: 134 LBS | HEIGHT: 64 IN | SYSTOLIC BLOOD PRESSURE: 132 MMHG

## 2022-05-09 DIAGNOSIS — M79.672 BILATERAL FOOT PAIN: Primary | ICD-10-CM

## 2022-05-09 DIAGNOSIS — M79.671 BILATERAL FOOT PAIN: Primary | ICD-10-CM

## 2022-05-09 DIAGNOSIS — M20.22 HALLUX RIGIDUS OF BOTH FEET: ICD-10-CM

## 2022-05-09 DIAGNOSIS — M77.41 METATARSALGIA OF BOTH FEET: ICD-10-CM

## 2022-05-09 DIAGNOSIS — M20.21 HALLUX RIGIDUS OF BOTH FEET: ICD-10-CM

## 2022-05-09 DIAGNOSIS — M77.42 METATARSALGIA OF BOTH FEET: ICD-10-CM

## 2022-05-09 PROCEDURE — 99212 OFFICE O/P EST SF 10 MIN: CPT | Performed by: PODIATRIST

## 2022-05-09 NOTE — PROGRESS NOTES
05/09/2022  Foot and Ankle Surgery - Established Patient/Follow-up  Provider: Dr. Yo Leon DPM  Location: [facility]    Subjective:  Eugenia Madden is a 66 y.o. female.     Chief Complaint   Patient presents with   • Right Foot - Pain   • Left Foot - Pain   • Follow-up     LAST PCP APPT WITH WALLY HARRISON MD        HPI: The patient presents to the office today for a follow up of bilateral foot pain.     The patient presented to the office last month for bilateral foot pain. At that time, we discussed orthotic inserts and stretching exercises as well as some other options. She states her bilateral foot pain has improved since her last visit. She notes progressive improvement in her pain after wearing the inserts for a couple of days. The patient states she is 75 to 80 percent improved. She is wearing sandals today; however, she states she only wears sandals occasionally. She notes that she has been having to transfer the inserts to different shoes. She is going to obtain a new pair of inserts today.     No Known Allergies    Current Outpatient Medications on File Prior to Visit   Medication Sig Dispense Refill   • Accu-Chek Softclix Lancets lancets 1 each by Other route Daily. Use as instructed 100 each 0   • albuterol sulfate  (90 Base) MCG/ACT inhaler Inhale 2 puffs Every 4 (Four) Hours As Needed for Wheezing. 1 g 3   • aspirin 81 MG chewable tablet Chew 81 mg Daily.     • atorvastatin (LIPITOR) 40 MG tablet TAKE 1 TABLET BY MOUTH EVERY DAY 90 tablet 3   • cetirizine (zyrTEC) 10 MG tablet Take 10 mg by mouth Daily.     • Cholecalciferol (VITAMIN D3) 125 MCG (5000 UT) tablet tablet Take 5,000 Units by mouth Daily.     • cyclobenzaprine (FLEXERIL) 5 MG tablet TAKE 1 TABLET BY MOUTH NIGHTLY FOR SEVERE BACK PAIN 20 tablet 0   • famotidine (PEPCID) 20 MG tablet Take 20 mg by mouth Daily.     • Fluzone High-Dose Quadrivalent 0.7 ML suspension prefilled syringe injection      • glimepiride (Amaryl) 4  "MG tablet Take 1 tablet by mouth Every Morning Before Breakfast. (Patient taking differently: Take 8 mg by mouth Every Morning Before Breakfast.) 180 tablet 3   • glucose blood (Accu-Chek Guide) test strip 1 each by Other route Daily. Use as instructed 100 each 3   • guaiFENesin (MUCINEX) 600 MG 12 hr tablet Take 1,200 mg by mouth 2 (Two) Times a Day. As needed     • hydrOXYzine (ATARAX) 25 MG tablet TAKE 1 TABLET BY MOUTH AT NIGHT AS NEEDED FOR ITCHING (ONE OR TWO IF ITCHING). (Patient taking differently: Take 50 mg by mouth 1 (One) Time. Take 2 tablets daily at bedtime) 30 tablet 1   • Ibuprofen-diphenhydrAMINE HCl 200-25 MG capsule Take 1 capsule by mouth Every Night.     • Isopropyl Alcohol ([employer] Isopropyl Alcohol Wipes) 70 % misc Apply 1 each topically Daily. 100 each 0   • Jardiance 10 MG tablet tablet TAKE 1 TABLET BY MOUTH DAILY 30 tablet 11   • ketoconazole (NIZORAL) 2 % shampoo APPLY TOPICALLY TO APPROPRIATE AREA AS DIRECTED TWICE WEEKLY 120 mL 1   • losartan (COZAAR) 50 MG tablet TAKE 1 TABLET BY MOUTH TWICE A  tablet 3   • meloxicam (MOBIC) 7.5 MG tablet      • metFORMIN ER (GLUCOPHAGE-XR) 500 MG 24 hr tablet TAKE 2 TABLETS BY MOUTH EVERY  tablet 2   • potassium chloride 10 MEQ CR tablet TAKE 1 TABLET BY MOUTH EVERY DAY 90 tablet 1   • pramipexole (MIRAPEX) 0.125 MG tablet TAKE 4 TABLETS BY MOUTH EVERY NIGHT AT BEDTIME. 120 tablet 1   • Semaglutide,0.25 or 0.5MG/DOS, (Ozempic, 0.25 or 0.5 MG/DOSE,) 2 MG/1.5ML solution pen-injector Inject .05 mg weekly on sunday 4.5 mL 2   • Stiolto Respimat 2.5-2.5 MCG/ACT aerosol solution inhaler INHALE 2 PUFFS BY MOUTH DAILY 3 each 3   • denosumab (PROLIA) 60 MG/ML solution prefilled syringe syringe Inject 1 mL under the skin into the appropriate area as directed 1 (One) Time for 1 dose. 1 mL 1     No current facility-administered medications on file prior to visit.       Objective   /85   Pulse 88   Ht 162.6 cm (64\")   Wt 60.8 kg (134 lb)   LMP  " (LMP Unknown)   BMI 23.00 kg/m²     Foot/Ankle Exam:       General:   Appearance: appears stated age and healthy    Orientation: AAOx3    Affect: appropriate    Gait: antalgic    Gait comment:  Mildly apropulsive gait    VASCULAR      Right Foot Vascularity   Normal vascular exam    Dorsalis pedis:  2+  Posterior tibial:  2+  Skin Temperature: warm    Edema Grading:  None  CFT:  < 3 seconds  Pedal Hair Growth:  Present  Varicosities: none       Left Foot Vascularity   Normal vascular exam    Dorsalis pedis:  2+  Posterior tibial:  2+  Skin Temperature: warm    Edema Grading:  None  CFT:  < 3 seconds  Pedal Hair Growth:  Present  Varicosities: none        NEUROLOGIC     Right Foot Neurologic   Light touch sensation:  Normal  Hot/Cold sensation: normal    Achilles reflex:  2+     Left Foot Neurologic   Light touch sensation:  Normal  Hot/cold sensation: normal    Achilles reflex:  2+     MUSCULOSKELETAL      Right Foot Musculoskeletal   Ecchymosis:  None  Tenderness: lesser metatarsophalangeal joints    Arch:  Normal  Hallux limitus: Yes       Left Foot Musculoskeletal   Ecchymosis:  None  Tenderness: great toe metatarsophalangeal joint and lesser metatarsophalangeal joints    Arch:  Normal  Hallux limitus: Yes (Moderate limitation.  Discomfort with palpation)       MUSCLE STRENGTH     Right Foot Muscle Strength   Normal strength    Foot dorsiflexion:  5  Foot plantar flexion:  5  Foot inversion:  5  Foot eversion:  5     Left Foot Muscle Strength   Normal strength    Foot dorsiflexion:  5  Foot plantar flexion:  5  Foot inversion:  5  Foot eversion:  5     DERMATOLOGIC     Right Foot Dermatologic   Skin: skin intact       Left Foot Dermatologic   Skin: skin intact       TESTS     Right Foot Tests   Anterior drawer: negative    Varus tilt: negative       Left Foot Tests   Anterior drawer: negative    Varus tilt: negative        Right Foot Additional Comments Increased soft tissue rigidity with equinus contracture to  both lower extremities.      Left Foot Additional Comments: Limitation with first MTPJ joint range of motion.  No significant signs of inflammation.        Diagnoses and all orders for this visit:    1. Bilateral foot pain (Primary)    2. Metatarsalgia of both feet    3. Hallux rigidus of both feet        We discussed the etiology of her bilateral foot pain. I advised continued stretching exercises and use of supportive shoes. We discussed the avoidance of flip flops, sandals and flats and I advised her to only wear these types of shoes in moderation. I recommend low-impact exercises and stretching exercises with a tennis ball. The patient will monitor her symptoms and return if needed.     No orders of the defined types were placed in this encounter.         Note is dictated utilizing voice recognition software. Unfortunately this leads to occasional typographical errors. I apologize in advance if the situation occurs. If questions occur please do not hesitate to call our office.    Transcribed from ambient dictation for DIPTI Leon DPM by Christen Portillo.  05/09/22   13:33 EDT    Patient verbalized consent to the visit recording.

## 2022-05-23 RX ORDER — PRAMIPEXOLE DIHYDROCHLORIDE 0.12 MG/1
0.5 TABLET ORAL
Qty: 120 TABLET | Refills: 1 | Status: SHIPPED | OUTPATIENT
Start: 2022-05-23 | End: 2022-06-14

## 2022-05-23 RX ORDER — ATORVASTATIN CALCIUM 40 MG/1
TABLET, FILM COATED ORAL
Qty: 90 TABLET | Refills: 3 | Status: SHIPPED | OUTPATIENT
Start: 2022-05-23

## 2022-05-31 ENCOUNTER — OFFICE VISIT (OUTPATIENT)
Dept: ENDOCRINOLOGY | Facility: CLINIC | Age: 67
End: 2022-05-31

## 2022-05-31 VITALS
BODY MASS INDEX: 22.88 KG/M2 | HEART RATE: 91 BPM | OXYGEN SATURATION: 96 % | SYSTOLIC BLOOD PRESSURE: 120 MMHG | DIASTOLIC BLOOD PRESSURE: 80 MMHG | WEIGHT: 134 LBS | HEIGHT: 64 IN

## 2022-05-31 DIAGNOSIS — I10 ESSENTIAL HYPERTENSION: ICD-10-CM

## 2022-05-31 DIAGNOSIS — E78.2 MIXED HYPERLIPIDEMIA: ICD-10-CM

## 2022-05-31 DIAGNOSIS — E11.65 TYPE 2 DIABETES MELLITUS WITH HYPERGLYCEMIA, WITHOUT LONG-TERM CURRENT USE OF INSULIN: Primary | ICD-10-CM

## 2022-05-31 LAB — GLUCOSE BLDC GLUCOMTR-MCNC: 184 MG/DL (ref 70–105)

## 2022-05-31 PROCEDURE — 82962 GLUCOSE BLOOD TEST: CPT | Performed by: INTERNAL MEDICINE

## 2022-05-31 PROCEDURE — 99214 OFFICE O/P EST MOD 30 MIN: CPT | Performed by: INTERNAL MEDICINE

## 2022-05-31 NOTE — PROGRESS NOTES
Endocrine Progress Note Outpatient     Patient Care Team:  Melisa Taveras MD as PCP - General    Chief Complaint: Follow-up type 2 diabetes      HPI: This is 66-year-old female with history of type 2 diabetes, hypertension and hyperlipidemia is here for follow-up.    For type 2 diabetes: Currently on Ozempic and 0.55 mg once a week along with Metformin and Jardiance and glimepiride 4 mg twice a day.  She tells me the blood sugars usually runs well at home.  Hypertension: Well-controlled  Hyperlipidemia: Currently on atorvastatin.    Past Medical History:   Diagnosis Date   • Arthritis    • Kidney stones    • Restless leg        Social History     Socioeconomic History   • Marital status:      Spouse name: Braeden   • Number of children: 4   • Years of education: 12   Tobacco Use   • Smoking status: Former Smoker     Packs/day: 0.50     Years: 46.00     Pack years: 23.00     Types: Cigarettes     Quit date: 2020     Years since quittin.5   • Smokeless tobacco: Never Used   • Tobacco comment: Passive Smoke: Y   Vaping Use   • Vaping Use: Never used   Substance and Sexual Activity   • Alcohol use: No   • Drug use: No   • Sexual activity: Yes     Partners: Male     Birth control/protection: None       Family History   Problem Relation Age of Onset   • Heart disease Mother    • Cancer Mother         Breast Cancer   • Breast cancer Mother 55   • Diabetes Father    • Stroke Father    • Heart disease Father    • Hypertension Father    • Hypertension Sister    • Diabetes Sister    • Heart disease Sister    • Diabetes Brother    • Hypertension Brother    • Heart disease Brother    • Other Other         [facility]   • Diabetes Brother    • Hypertension Brother    • No Known Problems Brother    • Heart disease Brother    • Cancer Brother         colon   • Other Brother        No Known Allergies    ROS:   Constitutional:  Denies fatigue, tiredness.    Eyes:  Denies change in visual  acuity   HENT:  Denies nasal congestion or sore throat   Respiratory: denies cough, shortness of breath.   Cardiovascular:  denies chest pain, edema   GI:  Denies abdominal pain, nausea, vomiting.   Musculoskeletal:  Denies back pain or joint pain   Integument:  Denies dry skin and rash   Neurologic:  Denies headache, focal weakness or sensory changes   Endocrine:  Denies polyuria or polydipsia   Psychiatric:  Denies depression or anxiety      Vitals:    05/31/22 1250   BP: 120/80   Pulse: 91   SpO2: 96%     Body mass index is 23 kg/m².     Physical Exam:  GEN: NAD, conversant  EYES: EOMI, PERRL, no conjunctival erythema  NECK: no thyromegaly, full ROM   CV: RRR, no murmurs/rubs/gallops, no peripheral edema  LUNG: CTAB, no wheezes/rales/ronchi  SKIN: no rashes, no acanthosis  MSK: no deformities, full ROM of all extremities  NEURO: no tremors, DTR normal  PSYCH: AOX3, appropriate mood, affect normal      Results Review:     I reviewed the patient's new clinical results.    Lab Results   Component Value Date    HGBA1C 6.6 (H) 03/15/2022    HGBA1C 6.8 (H) 12/16/2021    HGBA1C 7.2 (H) 09/13/2021      Lab Results   Component Value Date    GLUCOSE 150 (H) 03/15/2022    BUN 19 03/15/2022    CREATININE 0.55 (L) 03/15/2022    EGFRIFNONA 121 12/16/2021    BCR 34.5 (H) 03/15/2022    K 4.0 03/15/2022    CO2 22.3 03/15/2022    CALCIUM 9.3 03/15/2022    ALBUMIN 3.90 03/15/2022    LABIL2 1.3 03/12/2019    AST 14 03/15/2022    ALT 16 03/15/2022    CHOL 130 03/15/2022    TRIG 130 03/15/2022    LDL 67 03/15/2022    HDL 40 03/15/2022     Lab Results   Component Value Date    TSH 1.630 03/15/2022         Medication Review: Reviewed.       Current Outpatient Medications:   •  Accu-Chek Softclix Lancets lancets, 1 each by Other route Daily. Use as instructed, Disp: 100 each, Rfl: 0  •  albuterol sulfate  (90 Base) MCG/ACT inhaler, Inhale 2 puffs Every 4 (Four) Hours As Needed for Wheezing., Disp: 1 g, Rfl: 3  •  aspirin 81 MG  chewable tablet, Chew 81 mg Daily., Disp: , Rfl:   •  atorvastatin (LIPITOR) 40 MG tablet, TAKE 1 TABLET BY MOUTH EVERY DAY, Disp: 90 tablet, Rfl: 3  •  cetirizine (zyrTEC) 10 MG tablet, Take 10 mg by mouth Daily., Disp: , Rfl:   •  Cholecalciferol (VITAMIN D3) 125 MCG (5000 UT) tablet tablet, Take 5,000 Units by mouth Daily., Disp: , Rfl:   •  cyclobenzaprine (FLEXERIL) 5 MG tablet, TAKE 1 TABLET BY MOUTH NIGHTLY FOR SEVERE BACK PAIN, Disp: 20 tablet, Rfl: 0  •  famotidine (PEPCID) 20 MG tablet, Take 20 mg by mouth Daily., Disp: , Rfl:   •  Fluzone High-Dose Quadrivalent 0.7 ML suspension prefilled syringe injection, , Disp: , Rfl:   •  glimepiride (Amaryl) 4 MG tablet, Take 1 tablet by mouth Every Morning Before Breakfast. (Patient taking differently: Take 8 mg by mouth Every Morning Before Breakfast.), Disp: 180 tablet, Rfl: 3  •  glucose blood (Accu-Chek Guide) test strip, 1 each by Other route Daily. Use as instructed, Disp: 100 each, Rfl: 3  •  guaiFENesin (MUCINEX) 600 MG 12 hr tablet, Take 1,200 mg by mouth 2 (Two) Times a Day. As needed, Disp: , Rfl:   •  hydrOXYzine (ATARAX) 25 MG tablet, TAKE 1 TABLET BY MOUTH AT NIGHT AS NEEDED FOR ITCHING (ONE OR TWO IF ITCHING). (Patient taking differently: Take 50 mg by mouth 1 (One) Time. Take 2 tablets daily at bedtime), Disp: 30 tablet, Rfl: 1  •  Ibuprofen-diphenhydrAMINE HCl 200-25 MG capsule, Take 1 capsule by mouth Every Night., Disp: , Rfl:   •  Isopropyl Alcohol ([employer] Isopropyl Alcohol Wipes) 70 % misc, Apply 1 each topically Daily., Disp: 100 each, Rfl: 0  •  Jardiance 10 MG tablet tablet, TAKE 1 TABLET BY MOUTH DAILY, Disp: 30 tablet, Rfl: 11  •  losartan (COZAAR) 50 MG tablet, TAKE 1 TABLET BY MOUTH TWICE A DAY, Disp: 180 tablet, Rfl: 3  •  meloxicam (MOBIC) 7.5 MG tablet, , Disp: , Rfl:   •  metFORMIN ER (GLUCOPHAGE-XR) 500 MG 24 hr tablet, TAKE 2 TABLETS BY MOUTH EVERY DAY, Disp: 180 tablet, Rfl: 2  •  potassium chloride 10 MEQ CR tablet, TAKE 1 TABLET BY  MOUTH EVERY DAY, Disp: 90 tablet, Rfl: 1  •  pramipexole (MIRAPEX) 0.125 MG tablet, TAKE 4 TABLETS BY MOUTH EVERY NIGHT AT BEDTIME., Disp: 120 tablet, Rfl: 1  •  Semaglutide,0.25 or 0.5MG/DOS, (Ozempic, 0.25 or 0.5 MG/DOSE,) 2 MG/1.5ML solution pen-injector, Inject .05 mg weekly on sunday, Disp: 4.5 mL, Rfl: 2  •  Stiolto Respimat 2.5-2.5 MCG/ACT aerosol solution inhaler, INHALE 2 PUFFS BY MOUTH DAILY, Disp: 3 each, Rfl: 3  •  denosumab (PROLIA) 60 MG/ML solution prefilled syringe syringe, Inject 1 mL under the skin into the appropriate area as directed 1 (One) Time for 1 dose., Disp: 1 mL, Rfl: 1      Assessment & Plan   1.  Diabetes mellitus type II: Well-controlled with A1c at 6.6%.  No low blood sugars.  We will continue current management with Ozempic along with Metformin, Jardiance and glimepiride.  Advised to watch for diet and low blood sugars.  If she continues to have low blood sugar then she will decrease the glimepiride to just 1 tablet once a day with breakfast.  We will follow blood sugars and A1c.  Recommend annual eye exam and flu vaccine.    2.  Hypertension: Well-controlled, continue current medication    3.  Hyperlipidemia: Currently on atorvastatin will continue that and follow lipid panel.    Assessment and plan from September 30, 2021 reviewed and updated.            Lei Kim MD FACE.

## 2022-06-14 RX ORDER — PRAMIPEXOLE DIHYDROCHLORIDE 0.12 MG/1
0.5 TABLET ORAL
Qty: 120 TABLET | Refills: 1 | Status: SHIPPED | OUTPATIENT
Start: 2022-06-14 | End: 2022-07-10

## 2022-06-20 PROBLEM — J18.9 LINGULAR PNEUMONIA: Status: RESOLVED | Noted: 2021-12-16 | Resolved: 2022-06-20

## 2022-06-20 NOTE — PATIENT INSTRUCTIONS
Health Maintenance Due   Topic Date Due    PAP SMEAR  03/13/2022    COVID-19 Vaccine (4 - Booster for Moderna series) 04/16/2022

## 2022-06-21 ENCOUNTER — OFFICE VISIT (OUTPATIENT)
Dept: FAMILY MEDICINE CLINIC | Facility: CLINIC | Age: 67
End: 2022-06-21

## 2022-06-21 VITALS
SYSTOLIC BLOOD PRESSURE: 107 MMHG | HEART RATE: 86 BPM | HEIGHT: 64 IN | OXYGEN SATURATION: 96 % | WEIGHT: 135.4 LBS | BODY MASS INDEX: 23.12 KG/M2 | DIASTOLIC BLOOD PRESSURE: 70 MMHG | TEMPERATURE: 96.8 F

## 2022-06-21 DIAGNOSIS — I25.10 CHRONIC CORONARY ARTERY DISEASE: Chronic | ICD-10-CM

## 2022-06-21 DIAGNOSIS — E11.65 TYPE 2 DIABETES MELLITUS WITH HYPERGLYCEMIA, WITHOUT LONG-TERM CURRENT USE OF INSULIN: ICD-10-CM

## 2022-06-21 DIAGNOSIS — J43.9 PULMONARY EMPHYSEMA, UNSPECIFIED EMPHYSEMA TYPE: ICD-10-CM

## 2022-06-21 DIAGNOSIS — R01.1 CARDIAC MURMUR: ICD-10-CM

## 2022-06-21 DIAGNOSIS — Z12.4 SCREENING FOR CERVICAL CANCER: Primary | ICD-10-CM

## 2022-06-21 DIAGNOSIS — M81.0 OSTEOPOROSIS WITHOUT CURRENT PATHOLOGICAL FRACTURE, UNSPECIFIED OSTEOPOROSIS TYPE: ICD-10-CM

## 2022-06-21 DIAGNOSIS — E53.8 B12 DEFICIENCY: ICD-10-CM

## 2022-06-21 DIAGNOSIS — I71.20 THORACIC AORTIC ANEURYSM WITHOUT RUPTURE: ICD-10-CM

## 2022-06-21 DIAGNOSIS — I10 ESSENTIAL HYPERTENSION: ICD-10-CM

## 2022-06-21 DIAGNOSIS — R06.83 SNORING: ICD-10-CM

## 2022-06-21 DIAGNOSIS — S00.01XA ABRASION OF SCALP, INITIAL ENCOUNTER: ICD-10-CM

## 2022-06-21 DIAGNOSIS — E78.2 MIXED HYPERLIPIDEMIA: ICD-10-CM

## 2022-06-21 DIAGNOSIS — K21.9 GERD WITHOUT ESOPHAGITIS: Chronic | ICD-10-CM

## 2022-06-21 LAB — HBA1C MFR BLD: 7 % (ref 3.5–5.6)

## 2022-06-21 PROCEDURE — 99214 OFFICE O/P EST MOD 30 MIN: CPT | Performed by: PREVENTIVE MEDICINE

## 2022-06-21 PROCEDURE — 83036 HEMOGLOBIN GLYCOSYLATED A1C: CPT | Performed by: PREVENTIVE MEDICINE

## 2022-06-21 PROCEDURE — 83735 ASSAY OF MAGNESIUM: CPT | Performed by: PREVENTIVE MEDICINE

## 2022-06-21 PROCEDURE — 85025 COMPLETE CBC W/AUTO DIFF WBC: CPT | Performed by: PREVENTIVE MEDICINE

## 2022-06-21 PROCEDURE — 36415 COLL VENOUS BLD VENIPUNCTURE: CPT | Performed by: PREVENTIVE MEDICINE

## 2022-06-21 PROCEDURE — 84443 ASSAY THYROID STIM HORMONE: CPT | Performed by: PREVENTIVE MEDICINE

## 2022-06-21 PROCEDURE — 82043 UR ALBUMIN QUANTITATIVE: CPT | Performed by: PREVENTIVE MEDICINE

## 2022-06-21 PROCEDURE — 80061 LIPID PANEL: CPT | Performed by: PREVENTIVE MEDICINE

## 2022-06-21 PROCEDURE — 82570 ASSAY OF URINE CREATININE: CPT | Performed by: PREVENTIVE MEDICINE

## 2022-06-21 PROCEDURE — 82607 VITAMIN B-12: CPT | Performed by: PREVENTIVE MEDICINE

## 2022-06-21 PROCEDURE — 80053 COMPREHEN METABOLIC PANEL: CPT | Performed by: PREVENTIVE MEDICINE

## 2022-06-21 NOTE — PROGRESS NOTES
"Subjective   Eugenia Madden is a 66 y.o. female presents for   Chief Complaint   Patient presents with   • Hyperlipidemia     3 MONTH    • Hypertension     3 MONTH    • Diabetes     3 MONTH      Patient presents today for follow-up of multiple chronic health conditions most of which are stable blood pressure was controlled she has recently had a check of her thoracic aortic aneurysm and the abrasions of her scalp have gone.  She is getting 30 minutes of weightbearing exercise daily.  Health Maintenance Due   Topic Date Due   • PAP SMEAR  03/13/2022   • COVID-19 Vaccine (4 - Booster for Moderna series) 04/16/2022       History of Present Illness     Vitals:    06/21/22 0931 06/21/22 0933   BP: 104/63 107/70   BP Location: Right arm Left arm   Patient Position: Sitting Sitting   Cuff Size: Adult Adult   Pulse: 86    Temp: 96.8 °F (36 °C)    TempSrc: Temporal    SpO2: 96%    Weight: 61.4 kg (135 lb 6.4 oz)    Height: 162.6 cm (64\")      Body mass index is 23.24 kg/m².    Current Outpatient Medications on File Prior to Visit   Medication Sig Dispense Refill   • Accu-Chek Softclix Lancets lancets 1 each by Other route Daily. Use as instructed 100 each 0   • albuterol sulfate  (90 Base) MCG/ACT inhaler Inhale 2 puffs Every 4 (Four) Hours As Needed for Wheezing. 1 g 3   • aspirin 81 MG chewable tablet Chew 81 mg Daily.     • atorvastatin (LIPITOR) 40 MG tablet TAKE 1 TABLET BY MOUTH EVERY DAY 90 tablet 3   • cetirizine (zyrTEC) 10 MG tablet Take 10 mg by mouth Daily.     • Cholecalciferol (VITAMIN D3) 125 MCG (5000 UT) tablet tablet Take 5,000 Units by mouth Daily.     • cyclobenzaprine (FLEXERIL) 5 MG tablet TAKE 1 TABLET BY MOUTH NIGHTLY FOR SEVERE BACK PAIN 20 tablet 0   • empagliflozin (Jardiance) 10 MG tablet tablet Take 1 tablet by mouth Daily. 90 tablet 4   • famotidine (PEPCID) 20 MG tablet Take 20 mg by mouth Daily.     • glimepiride (Amaryl) 4 MG tablet Take 1 tablet by mouth Every Morning Before Breakfast. " (Patient taking differently: Take 8 mg by mouth Every Morning Before Breakfast.) 180 tablet 3   • glucose blood (Accu-Chek Guide) test strip 1 each by Other route Daily. Use as instructed 100 each 3   • guaiFENesin (MUCINEX) 600 MG 12 hr tablet Take 1,200 mg by mouth 2 (Two) Times a Day. As needed     • hydrOXYzine (ATARAX) 25 MG tablet TAKE 1 TABLET BY MOUTH AT NIGHT AS NEEDED FOR ITCHING (ONE OR TWO IF ITCHING). (Patient taking differently: Take 50 mg by mouth 1 (One) Time. Take 2 tablets daily at bedtime) 30 tablet 1   • Ibuprofen-diphenhydrAMINE HCl 200-25 MG capsule Take 1 capsule by mouth Every Night.     • Isopropyl Alcohol ([employer] Isopropyl Alcohol Wipes) 70 % misc Apply 1 each topically Daily. 100 each 0   • losartan (COZAAR) 50 MG tablet TAKE 1 TABLET BY MOUTH TWICE A  tablet 3   • meloxicam (MOBIC) 7.5 MG tablet      • metFORMIN ER (GLUCOPHAGE-XR) 500 MG 24 hr tablet TAKE 2 TABLETS BY MOUTH EVERY  tablet 2   • potassium chloride 10 MEQ CR tablet TAKE 1 TABLET BY MOUTH EVERY DAY 90 tablet 1   • pramipexole (MIRAPEX) 0.125 MG tablet TAKE 4 TABLETS BY MOUTH EVERY NIGHT AT BEDTIME 120 tablet 1   • Semaglutide,0.25 or 0.5MG/DOS, (Ozempic, 0.25 or 0.5 MG/DOSE,) 2 MG/1.5ML solution pen-injector Inject .05 mg weekly on sunday 4.5 mL 2   • Stiolto Respimat 2.5-2.5 MCG/ACT aerosol solution inhaler INHALE 2 PUFFS BY MOUTH DAILY 3 each 3   • denosumab (PROLIA) 60 MG/ML solution prefilled syringe syringe Inject 1 mL under the skin into the appropriate area as directed 1 (One) Time for 1 dose. 1 mL 1   • Fluzone High-Dose Quadrivalent 0.7 ML suspension prefilled syringe injection        No current facility-administered medications on file prior to visit.       The following portions of the patient's history were reviewed and updated as appropriate: allergies, current medications, past family history, past medical history, past social history, past surgical history and problem list.    Review of Systems    Respiratory: Negative for shortness of breath.    Cardiovascular: Negative for chest pain.   Gastrointestinal: Negative for GERD.       Objective   Physical Exam  Vitals reviewed.   Constitutional:       General: She is not in acute distress.     Appearance: Normal appearance. She is well-developed. She is not ill-appearing or toxic-appearing.   HENT:      Head: Normocephalic and atraumatic.      Right Ear: Tympanic membrane, ear canal and external ear normal.      Left Ear: Tympanic membrane, ear canal and external ear normal.      Nose: Nose normal.   Eyes:      Extraocular Movements: Extraocular movements intact.      Conjunctiva/sclera: Conjunctivae normal.      Pupils: Pupils are equal, round, and reactive to light.   Cardiovascular:      Rate and Rhythm: Normal rate and regular rhythm.      Pulses:           Dorsalis pedis pulses are 1+ on the right side and 1+ on the left side.        Posterior tibial pulses are 1+ on the right side and 1+ on the left side.      Heart sounds: Normal heart sounds.   Pulmonary:      Effort: Pulmonary effort is normal.      Comments: Decreased breath sounds bilaterally  Abdominal:      General: Bowel sounds are normal. There is no distension.      Palpations: Abdomen is soft. There is no mass.      Tenderness: There is no abdominal tenderness.   Musculoskeletal:         General: Normal range of motion.      Cervical back: Neck supple.   Feet:      Right foot:      Protective Sensation: 10 sites tested. 10 sites sensed.      Skin integrity: Skin integrity normal.      Toenail Condition: Right toenails are normal.      Left foot:      Protective Sensation: 10 sites tested. 10 sites sensed.      Skin integrity: Skin integrity normal.      Toenail Condition: Left toenails are normal.      Comments: Diabetic Foot Exam Performed and Monofilament Test Performed    Skin:     General: Skin is warm.   Neurological:      General: No focal deficit present.      Mental Status: She is alert  and oriented to person, place, and time.   Psychiatric:         Mood and Affect: Mood normal.         Behavior: Behavior normal.       PHQ-9 Total Score:      Assessment & Plan   Diagnoses and all orders for this visit:    1. Screening for cervical cancer (Primary)  Comments:  gETTING NEXT 2 MONTHS naa Leonora    2. Type 2 diabetes mellitus with hyperglycemia, without long-term current use of insulin (HCC)  Comments:   and eye exam UTD  Orders:  -     Comprehensive Metabolic Panel  -     Hemoglobin A1c  -     TSH  -     Microalbumin / Creatinine Urine Ratio - Urine, Clean Catch    3. Thoracic aortic aneurysm without rupture (HCC)  Comments:  Stable last check  3/21/21    4. Snoring  Comments:  lightly    5. Pulmonary emphysema, unspecified emphysema type (HCC)  Comments:  Breathing ok    6. Mixed hyperlipidemia  Comments:  Trying to eat less sat fats  Orders:  -     Lipid Panel    7. GERD without esophagitis  Comments:  Controlled with episodic Famotine  Orders:  -     CBC Auto Differential  -     Magnesium    8. Essential hypertension  Comments:  Controlled    9. Chronic coronary artery disease  Comments:  No chest pain    10. B12 deficiency  -     Vitamin B12    11. Abrasion of scalp, initial encounter  Comments:  gone      12. Osteoporosis without current pathological fracture, unspecified osteoporosis type  Comments:  Patient is getting 30 minutes of weightbearing exercise daily.  Patient gets Prolia shot every 6 months.    13. Cardiac murmur  Comments:  No murmur ascultated.        Patient Instructions     Health Maintenance Due   Topic Date Due   • PAP SMEAR  03/13/2022   • COVID-19 Vaccine (4 - Booster for Moderna series) 04/16/2022

## 2022-06-21 NOTE — PROGRESS NOTES
Venipuncture Blood Specimen Collection  Venipuncture performed in left arm by Renetta Tran MA with good hemostasis. Patient tolerated the procedure well without complications.   06/21/22   Renetta Tran MA

## 2022-06-22 ENCOUNTER — TELEPHONE (OUTPATIENT)
Dept: FAMILY MEDICINE CLINIC | Facility: CLINIC | Age: 67
End: 2022-06-22

## 2022-06-22 LAB
ALBUMIN SERPL-MCNC: 4.3 G/DL (ref 3.5–5.2)
ALBUMIN UR-MCNC: 1.8 MG/DL
ALBUMIN/GLOB SERPL: 1.7 G/DL
ALP SERPL-CCNC: 62 U/L (ref 39–117)
ALT SERPL W P-5'-P-CCNC: 20 U/L (ref 1–33)
ANION GAP SERPL CALCULATED.3IONS-SCNC: 12 MMOL/L (ref 5–15)
AST SERPL-CCNC: 14 U/L (ref 1–32)
BASOPHILS # BLD AUTO: 0.06 10*3/MM3 (ref 0–0.2)
BASOPHILS NFR BLD AUTO: 0.8 % (ref 0–1.5)
BILIRUB SERPL-MCNC: 0.4 MG/DL (ref 0–1.2)
BUN SERPL-MCNC: 19 MG/DL (ref 8–23)
BUN/CREAT SERPL: 38.8 (ref 7–25)
CALCIUM SPEC-SCNC: 9.3 MG/DL (ref 8.6–10.5)
CHLORIDE SERPL-SCNC: 104 MMOL/L (ref 98–107)
CHOLEST SERPL-MCNC: 138 MG/DL (ref 0–200)
CO2 SERPL-SCNC: 23 MMOL/L (ref 22–29)
CREAT SERPL-MCNC: 0.49 MG/DL (ref 0.57–1)
CREAT UR-MCNC: 56.5 MG/DL
DEPRECATED RDW RBC AUTO: 38.7 FL (ref 37–54)
EGFRCR SERPLBLD CKD-EPI 2021: 104.1 ML/MIN/1.73
EOSINOPHIL # BLD AUTO: 0.43 10*3/MM3 (ref 0–0.4)
EOSINOPHIL NFR BLD AUTO: 5.9 % (ref 0.3–6.2)
ERYTHROCYTE [DISTWIDTH] IN BLOOD BY AUTOMATED COUNT: 12.4 % (ref 12.3–15.4)
GLOBULIN UR ELPH-MCNC: 2.6 GM/DL
GLUCOSE SERPL-MCNC: 129 MG/DL (ref 65–99)
HCT VFR BLD AUTO: 47.1 % (ref 34–46.6)
HDLC SERPL-MCNC: 44 MG/DL (ref 40–60)
HGB BLD-MCNC: 15.9 G/DL (ref 12–15.9)
IMM GRANULOCYTES # BLD AUTO: 0.01 10*3/MM3 (ref 0–0.05)
IMM GRANULOCYTES NFR BLD AUTO: 0.1 % (ref 0–0.5)
LDLC SERPL CALC-MCNC: 70 MG/DL (ref 0–100)
LDLC/HDLC SERPL: 1.53 {RATIO}
LYMPHOCYTES # BLD AUTO: 1.93 10*3/MM3 (ref 0.7–3.1)
LYMPHOCYTES NFR BLD AUTO: 26.5 % (ref 19.6–45.3)
MAGNESIUM SERPL-MCNC: 2.6 MG/DL (ref 1.6–2.4)
MCH RBC QN AUTO: 29.6 PG (ref 26.6–33)
MCHC RBC AUTO-ENTMCNC: 33.8 G/DL (ref 31.5–35.7)
MCV RBC AUTO: 87.5 FL (ref 79–97)
MICROALBUMIN/CREAT UR: 31.9 MG/G
MONOCYTES # BLD AUTO: 0.46 10*3/MM3 (ref 0.1–0.9)
MONOCYTES NFR BLD AUTO: 6.3 % (ref 5–12)
NEUTROPHILS NFR BLD AUTO: 4.4 10*3/MM3 (ref 1.7–7)
NEUTROPHILS NFR BLD AUTO: 60.4 % (ref 42.7–76)
NRBC BLD AUTO-RTO: 0 /100 WBC (ref 0–0.2)
PLATELET # BLD AUTO: 197 10*3/MM3 (ref 140–450)
PMV BLD AUTO: 12.5 FL (ref 6–12)
POTASSIUM SERPL-SCNC: 4.1 MMOL/L (ref 3.5–5.2)
PROT SERPL-MCNC: 6.9 G/DL (ref 6–8.5)
RBC # BLD AUTO: 5.38 10*6/MM3 (ref 3.77–5.28)
SODIUM SERPL-SCNC: 139 MMOL/L (ref 136–145)
TRIGL SERPL-MCNC: 134 MG/DL (ref 0–150)
TSH SERPL DL<=0.05 MIU/L-ACNC: 1.23 UIU/ML (ref 0.27–4.2)
VIT B12 BLD-MCNC: 369 PG/ML (ref 211–946)
VLDLC SERPL-MCNC: 24 MG/DL (ref 5–40)
WBC NRBC COR # BLD: 7.29 10*3/MM3 (ref 3.4–10.8)

## 2022-06-22 NOTE — PROGRESS NOTES
Glucose was 129 with A1c showing fair control at 7.0.  Vitamin B12 is low I would increase 1000 units 3 days a week.  Finally magnesium is elevated she may need to cut the dose a couple of days per week.  Call if any other questions or concerns.

## 2022-06-22 NOTE — TELEPHONE ENCOUNTER
HUB TO READ:  ----- Message from Melisa Taveras MD sent at 6/22/2022  9:03 AM EDT -----  Glucose was 129 with A1c showing fair control at 7.0.  Vitamin B12 is low I would increase 1000 units 3 days a week.  Finally magnesium is elevated she may need to cut the dose a couple of days per week.  Call if any other questions or concerns.

## 2022-06-22 NOTE — TELEPHONE ENCOUNTER
If presently not taking B12 would start with 1000 daily   What Type Of Note Output Would You Prefer (Optional)?: Bullet Format How Severe Are Your Spot(S)?: mild Have Your Spot(S) Been Treated In The Past?: has not been treated Hpi Title: Evaluation of a Skin Lesion

## 2022-07-10 RX ORDER — PRAMIPEXOLE DIHYDROCHLORIDE 0.12 MG/1
0.5 TABLET ORAL
Qty: 120 TABLET | Refills: 1 | Status: SHIPPED | OUTPATIENT
Start: 2022-07-10 | End: 2022-08-04

## 2022-08-04 RX ORDER — PRAMIPEXOLE DIHYDROCHLORIDE 0.12 MG/1
0.5 TABLET ORAL
Qty: 120 TABLET | Refills: 1 | Status: SHIPPED | OUTPATIENT
Start: 2022-08-04 | End: 2022-08-29

## 2022-08-11 RX ORDER — GLIMEPIRIDE 4 MG/1
8 TABLET ORAL
Qty: 180 TABLET | Refills: 1 | Status: SHIPPED | OUTPATIENT
Start: 2022-08-11 | End: 2022-12-30 | Stop reason: SDUPTHER

## 2022-08-17 RX ORDER — TIOTROPIUM BROMIDE AND OLODATEROL 3.124; 2.736 UG/1; UG/1
SPRAY, METERED RESPIRATORY (INHALATION)
Qty: 90 EACH | Refills: 3 | Status: SHIPPED | OUTPATIENT
Start: 2022-08-17 | End: 2023-03-03

## 2022-08-17 RX ORDER — POTASSIUM CHLORIDE 750 MG/1
TABLET, FILM COATED, EXTENDED RELEASE ORAL
Qty: 90 TABLET | Refills: 1 | Status: SHIPPED | OUTPATIENT
Start: 2022-08-17 | End: 2023-02-13 | Stop reason: SDUPTHER

## 2022-08-17 RX ORDER — METFORMIN HYDROCHLORIDE 500 MG/1
TABLET, EXTENDED RELEASE ORAL
Qty: 180 TABLET | Refills: 2 | Status: SHIPPED | OUTPATIENT
Start: 2022-08-17

## 2022-08-17 RX ORDER — LOSARTAN POTASSIUM 50 MG/1
TABLET ORAL
Qty: 180 TABLET | Refills: 3 | Status: SHIPPED | OUTPATIENT
Start: 2022-08-17

## 2022-08-29 RX ORDER — PRAMIPEXOLE DIHYDROCHLORIDE 0.12 MG/1
0.5 TABLET ORAL
Qty: 120 TABLET | Refills: 1 | Status: SHIPPED | OUTPATIENT
Start: 2022-08-29 | End: 2022-09-25

## 2022-09-19 NOTE — PROGRESS NOTES
The ABCs of the Annual Wellness Visit  Subsequent Medicare Wellness Visit  Patient also presents for an age-specific physical and has been advised to wear sunscreen and seatbelt.  She is here to follow-up on multiple chronic health conditions most of which are stable her blood sugars have been running between 80 and 200 she will schedule an eye exam.  She has had problems with swelling and redness into fingers wrists knees and ankles.  X-rays of the feet showed no bony erosions but we will order some more arthritis tests she will take 2 Aleve in the morning and at night she was cautioned if she gets any GI upset or dark or red stools.  The medication.  Blood pressure was well controlledShe is using Pepcid for her GERD and has agreed to breast and Pap smear.  Has been advised to get her COVID booster.  Chief Complaint   Patient presents with   • Medicare Wellness-subsequent     Patient is fasting       Subjective    History of Present Illness:  Eugenia Madden is a 67 y.o. female who presents for a Subsequent Medicare Wellness Visit.    The following portions of the patient's history were reviewed and   updated as appropriate: allergies, current medications, past family history, past medical history, past social history, past surgical history and problem list.    Compared to one year ago, the patient feels her physical   health is worse.    Compared to one year ago, the patient feels her mental   health is better.    Recent Hospitalizations:  She was not admitted to the hospital during the last year.       Current Medical Providers:  Patient Care Team:  Melisa Taveras MD as PCP - General    Outpatient Medications Prior to Visit   Medication Sig Dispense Refill   • Accu-Chek Softclix Lancets lancets 1 each by Other route Daily. Use as instructed 100 each 0   • albuterol sulfate  (90 Base) MCG/ACT inhaler Inhale 2 puffs Every 4 (Four) Hours As Needed for Wheezing. 1 g 3   • aspirin 81 MG chewable tablet Chew  81 mg Daily.     • atorvastatin (LIPITOR) 40 MG tablet TAKE 1 TABLET BY MOUTH EVERY DAY 90 tablet 3   • cetirizine (zyrTEC) 10 MG tablet Take 10 mg by mouth Daily.     • Cholecalciferol (VITAMIN D3) 125 MCG (5000 UT) tablet tablet Take 5,000 Units by mouth Daily.     • cyclobenzaprine (FLEXERIL) 5 MG tablet TAKE 1 TABLET BY MOUTH NIGHTLY FOR SEVERE BACK PAIN 20 tablet 0   • empagliflozin (Jardiance) 10 MG tablet tablet Take 1 tablet by mouth Daily. 90 tablet 4   • famotidine (PEPCID) 20 MG tablet Take 20 mg by mouth Daily.     • glimepiride (AMARYL) 4 MG tablet Take 2 tablets by mouth Every Morning Before Breakfast. 180 tablet 1   • glucose blood (Accu-Chek Guide) test strip 1 each by Other route Daily. Use as instructed 100 each 3   • guaiFENesin (MUCINEX) 600 MG 12 hr tablet Take 1,200 mg by mouth 2 (Two) Times a Day. As needed     • hydrOXYzine (ATARAX) 25 MG tablet TAKE 1 TABLET BY MOUTH AT NIGHT AS NEEDED FOR ITCHING (ONE OR TWO IF ITCHING). (Patient taking differently: Take 50 mg by mouth 1 (One) Time. Take 2 tablets daily at bedtime) 30 tablet 1   • Isopropyl Alcohol ([employer] Isopropyl Alcohol Wipes) 70 % misc Apply 1 each topically Daily. 100 each 0   • losartan (COZAAR) 50 MG tablet TAKE 1 TABLET BY MOUTH TWICE A  tablet 3   • meloxicam (MOBIC) 7.5 MG tablet      • metFORMIN ER (GLUCOPHAGE-XR) 500 MG 24 hr tablet TAKE 2 TABLETS BY MOUTH EVERY  tablet 2   • potassium chloride 10 MEQ CR tablet TAKE 1 TABLET BY MOUTH EVERY DAY 90 tablet 1   • pramipexole (MIRAPEX) 0.125 MG tablet TAKE 4 TABLETS BY MOUTH EVERY NIGHT AT BEDTIME 120 tablet 1   • Semaglutide,0.25 or 0.5MG/DOS, (Ozempic, 0.25 or 0.5 MG/DOSE,) 2 MG/1.5ML solution pen-injector Inject .05 mg weekly on sunday 4.5 mL 2   • Stiolto Respimat 2.5-2.5 MCG/ACT aerosol solution inhaler INHALE 2 PUFFS BY MOUTH EVERY DAY 90 each 3   • denosumab (PROLIA) 60 MG/ML solution prefilled syringe syringe Inject 1 mL under the skin into the appropriate area as  directed 1 (One) Time for 1 dose. 1 mL 1   • Ibuprofen-diphenhydrAMINE HCl 200-25 MG capsule Take 1 capsule by mouth Every Night.     • Fluzone High-Dose Quadrivalent 0.7 ML suspension prefilled syringe injection        No facility-administered medications prior to visit.       No opioid medication identified on active medication list. I have reviewed chart for other potential  high risk medication/s and harmful drug interactions in the elderly.          Aspirin is on active medication list. Aspirin use is indicated based on review of current medical condition/s. Pros and cons of this therapy have been discussed today. Benefits of this medication outweigh potential harm.  Patient has been encouraged to continue taking this medication.  .      Patient Active Problem List   Diagnosis   • B12 deficiency   • Cardiac murmur   • Chronic coronary artery disease   • Type 2 diabetes mellitus with hyperglycemia, without long-term current use of insulin (HCC)   • Family history of cerebrovascular accident (CVA)   • Family history of malignant neoplasm of breast   • Fatty liver   • Hearing deficit   • Mixed hyperlipidemia   • Essential hypertension   • Osteoporosis   • Polycythemia   • Polyp of colon   • Pulmonary emphysema (HCC)   • Reduced libido   • Restless leg   • Snoring   • Vitamin D deficiency   • Arthritis   • Thoracic aortic aneurysm without rupture (HCC)   • Acute bilateral low back pain without sciatica   • Pneumonia of right middle lobe due to infectious organism   • GERD without esophagitis   • Former smoker     Advance Care Planning  Advance Directive is not on file.  ACP discussion was held with the patient during this visit. Patient has an advance directive (not in EMR), copy requested.    Review of Systems   Respiratory: Positive for cough and shortness of breath.    Musculoskeletal: Positive for arthralgias, joint swelling and myalgias.   Psychiatric/Behavioral: Positive for sleep disturbance.       "  Objective    Vitals:    09/20/22 1010 09/20/22 1015   BP: 111/74 121/76   BP Location: Right arm Left arm   Patient Position: Sitting Sitting   Cuff Size: Adult Adult   Pulse: 98    Temp: 98 °F (36.7 °C)    TempSrc: Temporal    SpO2: 94%    Weight: 63.1 kg (139 lb 3.2 oz)    Height: 162.6 cm (64.02\")    PainSc:   2      Estimated body mass index is 23.88 kg/m² as calculated from the following:    Height as of this encounter: 162.6 cm (64.02\").    Weight as of this encounter: 63.1 kg (139 lb 3.2 oz).    BMI is within normal parameters. No other follow-up for BMI required.      Does the patient have evidence of cognitive impairment? No    Physical Exam  Vitals reviewed.   Constitutional:       General: She is not in acute distress.     Appearance: Normal appearance. She is well-developed. She is not ill-appearing or toxic-appearing.   HENT:      Head: Normocephalic and atraumatic.      Right Ear: Tympanic membrane, ear canal and external ear normal.      Left Ear: Tympanic membrane, ear canal and external ear normal.      Nose: Nose normal.      Mouth/Throat:      Mouth: Mucous membranes are moist.      Pharynx: No posterior oropharyngeal erythema.   Eyes:      Extraocular Movements: Extraocular movements intact.      Conjunctiva/sclera: Conjunctivae normal.      Pupils: Pupils are equal, round, and reactive to light.   Cardiovascular:      Rate and Rhythm: Normal rate and regular rhythm.      Pulses:           Dorsalis pedis pulses are 1+ on the right side and 1+ on the left side.        Posterior tibial pulses are 1+ on the right side and 1+ on the left side.      Heart sounds: Normal heart sounds.   Pulmonary:      Effort: Pulmonary effort is normal.      Comments: Decreased breath sounds bilaterally  Abdominal:      General: Bowel sounds are normal. There is no distension.      Palpations: Abdomen is soft. There is no mass.      Tenderness: There is no abdominal tenderness.   Musculoskeletal:         General: " Swelling, tenderness and deformity present.      Cervical back: Neck supple.   Feet:      Right foot:      Protective Sensation: 10 sites tested. 9 sites sensed.      Skin integrity: Callus present.      Toenail Condition: Right toenails are normal.      Left foot:      Protective Sensation: 10 sites tested. 9 sites sensed.      Skin integrity: Callus present.      Toenail Condition: Left toenails are normal.      Comments: Diabetic Foot Exam Performed and Monofilament Test Performed    Skin:     General: Skin is warm.   Neurological:      General: No focal deficit present.      Mental Status: She is alert and oriented to person, place, and time.   Psychiatric:         Mood and Affect: Mood normal.         Behavior: Behavior normal.                 HEALTH RISK ASSESSMENT    Smoking Status:  Social History     Tobacco Use   Smoking Status Former Smoker   • Packs/day: 0.50   • Years: 46.00   • Pack years: 23.00   • Types: Cigarettes   • Quit date: 2020   • Years since quittin.8   Smokeless Tobacco Never Used   Tobacco Comment    Passive Smoke: Y     Alcohol Consumption:  Social History     Substance and Sexual Activity   Alcohol Use No     Fall Risk Screen:    STEADI Fall Risk Assessment was completed, and patient is at LOW risk for falls.Assessment completed on:2022    Depression Screening:  PHQ-2/PHQ-9 Depression Screening 2022   Retired PHQ-9 Total Score -   Retired Total Score -   Little Interest or Pleasure in Doing Things 0-->not at all   Feeling Down, Depressed or Hopeless 0-->not at all   PHQ-9: Brief Depression Severity Measure Score 0       Health Habits and Functional and Cognitive Screening:  Functional & Cognitive Status 2022   Do you have difficulty preparing food and eating? No   Do you have difficulty bathing yourself, getting dressed or grooming yourself? No   Do you have difficulty using the toilet? No   Do you have difficulty moving around from place to place? No   Do you have  trouble with steps or getting out of a bed or a chair? No   Current Diet Well Balanced Diet   Dental Exam Up to date   Eye Exam Not up to date   Exercise (times per week) 7 times per week   Current Exercises Include House Cleaning;Walking;Yard Work   Current Exercise Activities Include -   Do you need help using the phone?  No   Are you deaf or do you have serious difficulty hearing?  No   Do you need help with transportation? No   Do you need help shopping? No   Do you need help preparing meals?  No   Do you need help with housework?  No   Do you need help with laundry? No   Do you need help taking your medications? No   Do you need help managing money? No   Do you ever drive or ride in a car without wearing a seat belt? No   Have you felt unusual stress, anger or loneliness in the last month? No   Who do you live with? Spouse   If you need help, do you have trouble finding someone available to you? No   Have you been bothered in the last four weeks by sexual problems? No   Do you have difficulty concentrating, remembering or making decisions? No       Age-appropriate Screening Schedule:  Refer to the list below for future screening recommendations based on patient's age, sex and/or medical conditions. Orders for these recommended tests are listed in the plan section. The patient has been provided with a written plan.    Health Maintenance   Topic Date Due   • PAP SMEAR  03/13/2022   • DIABETIC EYE EXAM  07/01/2022   • MAMMOGRAM  09/20/2022   • INFLUENZA VACCINE  10/01/2022   • HEMOGLOBIN A1C  12/21/2022   • LIPID PANEL  06/21/2023   • URINE MICROALBUMIN  06/21/2023   • DIABETIC FOOT EXAM  09/20/2023   • DXA SCAN  09/20/2023   • TDAP/TD VACCINES (2 - Td or Tdap) 12/02/2029   • ZOSTER VACCINE  Completed              Assessment & Plan   CMS Preventative Services Quick Reference  Risk Factors Identified During Encounter  None Identified  The above risks/problems have been discussed with the patient.  Follow up  actions/plans if indicated are seen below in the Assessment/Plan Section.  Pertinent information has been shared with the patient in the After Visit Summary.    Diagnoses and all orders for this visit:    1. Encounter for annual general medical examination with abnormal findings in adult (Primary)  Comments:  Patient has been advised to wear sunscreen and a seatbelt    2. Screening for cervical cancer  Comments:  Referral for Pap smear made  Orders:  -     Ambulatory Referral to Gynecology    3. B12 deficiency  Comments:  Takes vitamin B12 regularly  Orders:  -     CBC Auto Differential; Future  -     Vitamin B12; Future  -     CBC Auto Differential  -     Vitamin B12    4. Chronic coronary artery disease  Comments:  No increase in shortness of breath and no chest pain    5. Essential hypertension  Comments:  Controlled  Orders:  -     Comprehensive Metabolic Panel; Future  -     Comprehensive Metabolic Panel    6. Former smoker  Comments:  No smoking X2 years    7. GERD without esophagitis  Comments:  Pepcid helps-GI warning about Aleve  Orders:  -     Magnesium; Future  -     CBC Auto Differential; Future  -     Magnesium  -     Cancel: CBC Auto Differential    8. Mixed hyperlipidemia  Comments:  Try to eat less saturated fat  Orders:  -     Lipid Panel; Future  -     Lipid Panel; Future  -     Lipid Panel  -     Cancel: Lipid Panel    9. Type 2 diabetes mellitus with hyperglycemia, without long-term current use of insulin (HCC)  Comments:  .  Eye exam needs to be scheuduled  Orders:  -     Hemoglobin A1c; Future  -     Microalbumin / Creatinine Urine Ratio - Urine, Clean Catch; Future  -     TSH; Future  -     Comprehensive Metabolic Panel; Future  -     Hemoglobin A1c; Future  -     Microalbumin / Creatinine Urine Ratio - Urine, Clean Catch; Future  -     Hemoglobin A1c  -     Microalbumin / Creatinine Urine Ratio - Urine, Clean Catch  -     TSH  -     Cancel: Comprehensive Metabolic Panel  -     Cancel:  Hemoglobin A1c  -     Cancel: Microalbumin / Creatinine Urine Ratio - Urine, Clean Catch    10. Vitamin D deficiency  Comments:  Takes daily  Orders:  -     Vitamin D 25 Hydroxy; Future  -     Vitamin D 25 Hydroxy    11. Arthritis  Comments:  Patient had noticed swelling and erythema of fingers and also of wrist and at times knees and ankles.  No erosions in the small joints of the feet labs pending  Orders:  -     Sedimentation Rate; Future  -     Creatinine, Serum; Future  -     Sedimentation Rate  -     Cancel: Creatinine, Serum    12. Encounter for screening mammogram for malignant neoplasm of breast  Comments:  Patient agrees to mammogram.  Orders:  -     Mammo Screening Digital Tomosynthesis Bilateral With CAD; Future    Other orders  -     Fluzone High-Dose 65+yrs ([id number])        Follow Up:   Return in about 6 months (around 3/20/2023).     An After Visit Summary and PPPS were made available to the patient.

## 2022-09-19 NOTE — PATIENT INSTRUCTIONS
Health Maintenance Due   Topic Date Due    PAP SMEAR  03/13/2022    COVID-19 Vaccine (4 - Booster for Moderna series) 04/16/2022    DIABETIC EYE EXAM  07/01/2022    ANNUAL WELLNESS VISIT  09/13/2022    Patient to call Dr. Manzo and set up PAP    Patient to visit [employer] website (https://www.ibanet.[email or web address]/) for information reguarding advanced directive and living will.  Advance Directive       Advance directives are legal documents that let you make choices ahead of time about your health care and medical treatment in case you become unable to communicate for yourself. Advance directives are a way for you to communicate your wishes to family, friends, and health care providers. This can help convey your decisions about end-of-life care if you become unable to communicate.  Discussing and writing advance directives should happen over time rather than all at once. Advance directives can be changed depending on your situation and what you want, even after you have signed the advance directives.  If you do not have an advance directive, some states assign family decision makers to act on your behalf based on how closely you are related to them. Each state has its own laws regarding advance directives. You may want to check with your health care provider, , or state representative about the laws in your state. There are different types of advance directives, such as:  Medical power of .  Living will.  Do not resuscitate (DNR) or do not attempt resuscitation (DNAR) order.  Health care proxy and medical power of   A health care proxy, also called a health care agent, is a person who is appointed to make medical decisions for you in cases in which you are unable to make the decisions yourself. Generally, people choose someone they know well and trust to represent their preferences. Make sure to ask this person for an agreement to act as your proxy. A proxy may have to exercise judgment in  the event of a medical decision for which your wishes are not known.  A medical power of  is a legal document that names your health care proxy. Depending on the laws in your state, after the document is written, it may also need to be:  Signed.  Notarized.  Dated.  Copied.  Witnessed.  Incorporated into your medical record.  You may also want to appoint someone to manage your financial affairs in a situation in which you are unable to do so. This is called a durable power of  for finances. It is a separate legal document from the durable power of  for health care. You may choose the same person or someone different from your health care proxy to act as your agent in financial matters.  If you do not appoint a proxy, or if there is a concern that the proxy is not acting in your best interests, a court-appointed guardian may be designated to act on your behalf.  Living will  A living will is a set of instructions documenting your wishes about medical care when you cannot express them yourself. Health care providers should keep a copy of your living will in your medical record. You may want to give a copy to family members or friends. To alert caregivers in case of an emergency, you can place a card in your wallet to let them know that you have a living will and where they can find it. A living will is used if you become:  Terminally ill.  Incapacitated.  Unable to communicate or make decisions.  Items to consider in your living will include:  The use or non-use of life-sustaining equipment, such as dialysis machines and breathing machines (ventilators).  A DNR or DNAR order, which is the instruction not to use cardiopulmonary resuscitation (CPR) if breathing or heartbeat stops.  The use or non-use of tube feeding.  Withholding of food and fluids.  Comfort (palliative) care when the goal becomes comfort rather than a cure.  Organ and tissue donation.  A living will does not give instructions  for distributing your money and property if you should pass away. It is recommended that you seek the advice of a  when writing a will. Decisions about taxes, beneficiaries, and asset distribution will be legally binding. This process can relieve your family and friends of any concerns surrounding disputes or questions that may come up about the distribution of your assets.  DNR or DNAR  A DNR or DNAR order is a request not to have CPR in the event that your heart stops beating or you stop breathing. If a DNR or DNAR order has not been made and shared, a health care provider will try to help any patient whose heart has stopped or who has stopped breathing. If you plan to have surgery, talk with your health care provider about how your DNR or DNAR order will be followed if problems occur.  Summary  Advance directives are the legal documents that allow you to make choices ahead of time about your health care and medical treatment in case you become unable to communicate for yourself.  The process of discussing and writing advance directives should happen over time. You can change the advance directives, even after you have signed them.  Advance directives include DNR or DNAR orders, living multani, and designating an agent as your medical power of .  This information is not intended to replace advice given to you by your health care provider. Make sure you discuss any questions you have with your health care provider.  Document Released: 03/26/2009 Document Revised: 11/06/2017 Document Reviewed: 11/06/2017  [employer] Patient Education © 2019 [employer]    Patient to take 2 Aleve every 12 hours on full stomach and stop if GI upset or dark or red stools or stomach pain    Ey exam Dr. Farooq at Dr. Park

## 2022-09-20 ENCOUNTER — OFFICE VISIT (OUTPATIENT)
Dept: FAMILY MEDICINE CLINIC | Facility: CLINIC | Age: 67
End: 2022-09-20

## 2022-09-20 ENCOUNTER — TELEPHONE (OUTPATIENT)
Dept: FAMILY MEDICINE CLINIC | Facility: CLINIC | Age: 67
End: 2022-09-20

## 2022-09-20 VITALS
DIASTOLIC BLOOD PRESSURE: 76 MMHG | OXYGEN SATURATION: 94 % | SYSTOLIC BLOOD PRESSURE: 121 MMHG | HEART RATE: 98 BPM | BODY MASS INDEX: 23.76 KG/M2 | TEMPERATURE: 98 F | WEIGHT: 139.2 LBS | HEIGHT: 64 IN

## 2022-09-20 DIAGNOSIS — Z12.31 ENCOUNTER FOR SCREENING MAMMOGRAM FOR MALIGNANT NEOPLASM OF BREAST: ICD-10-CM

## 2022-09-20 DIAGNOSIS — E55.9 VITAMIN D DEFICIENCY: ICD-10-CM

## 2022-09-20 DIAGNOSIS — E11.65 TYPE 2 DIABETES MELLITUS WITH HYPERGLYCEMIA, WITHOUT LONG-TERM CURRENT USE OF INSULIN: ICD-10-CM

## 2022-09-20 DIAGNOSIS — K21.9 GERD WITHOUT ESOPHAGITIS: Chronic | ICD-10-CM

## 2022-09-20 DIAGNOSIS — I10 ESSENTIAL HYPERTENSION: ICD-10-CM

## 2022-09-20 DIAGNOSIS — M19.90 ARTHRITIS: ICD-10-CM

## 2022-09-20 DIAGNOSIS — E53.8 B12 DEFICIENCY: ICD-10-CM

## 2022-09-20 DIAGNOSIS — Z00.01 ENCOUNTER FOR ANNUAL GENERAL MEDICAL EXAMINATION WITH ABNORMAL FINDINGS IN ADULT: Primary | ICD-10-CM

## 2022-09-20 DIAGNOSIS — Z87.891 FORMER SMOKER: ICD-10-CM

## 2022-09-20 DIAGNOSIS — Z12.4 SCREENING FOR CERVICAL CANCER: ICD-10-CM

## 2022-09-20 DIAGNOSIS — E78.2 MIXED HYPERLIPIDEMIA: ICD-10-CM

## 2022-09-20 DIAGNOSIS — I25.10 CHRONIC CORONARY ARTERY DISEASE: ICD-10-CM

## 2022-09-20 LAB
ALBUMIN SERPL-MCNC: 3.9 G/DL (ref 3.5–5.2)
ALBUMIN UR-MCNC: <1.2 MG/DL
ALBUMIN/GLOB SERPL: 1.3 G/DL
ALP SERPL-CCNC: 73 U/L (ref 39–117)
ALT SERPL W P-5'-P-CCNC: 17 U/L (ref 1–33)
ANION GAP SERPL CALCULATED.3IONS-SCNC: 14 MMOL/L (ref 5–15)
AST SERPL-CCNC: 17 U/L (ref 1–32)
BASOPHILS # BLD AUTO: 0.06 10*3/MM3 (ref 0–0.2)
BASOPHILS NFR BLD AUTO: 0.7 % (ref 0–1.5)
BILIRUB SERPL-MCNC: 0.4 MG/DL (ref 0–1.2)
BUN SERPL-MCNC: 17 MG/DL (ref 8–23)
BUN/CREAT SERPL: 27.9 (ref 7–25)
CALCIUM SPEC-SCNC: 9.5 MG/DL (ref 8.6–10.5)
CHLORIDE SERPL-SCNC: 103 MMOL/L (ref 98–107)
CHOLEST SERPL-MCNC: 147 MG/DL (ref 0–200)
CO2 SERPL-SCNC: 22 MMOL/L (ref 22–29)
CREAT SERPL-MCNC: 0.61 MG/DL (ref 0.57–1)
CREAT UR-MCNC: 56.7 MG/DL
DEPRECATED RDW RBC AUTO: 40.1 FL (ref 37–54)
EGFRCR SERPLBLD CKD-EPI 2021: 98.1 ML/MIN/1.73
EOSINOPHIL # BLD AUTO: 0.49 10*3/MM3 (ref 0–0.4)
EOSINOPHIL NFR BLD AUTO: 5.6 % (ref 0.3–6.2)
ERYTHROCYTE [DISTWIDTH] IN BLOOD BY AUTOMATED COUNT: 12.3 % (ref 12.3–15.4)
ERYTHROCYTE [SEDIMENTATION RATE] IN BLOOD: 34 MM/HR (ref 0–30)
GLOBULIN UR ELPH-MCNC: 2.9 GM/DL
GLUCOSE SERPL-MCNC: 173 MG/DL (ref 65–99)
HBA1C MFR BLD: 7 % (ref 3.5–5.6)
HCT VFR BLD AUTO: 47.4 % (ref 34–46.6)
HDLC SERPL-MCNC: 50 MG/DL (ref 40–60)
HGB BLD-MCNC: 15.9 G/DL (ref 12–15.9)
IMM GRANULOCYTES # BLD AUTO: 0.03 10*3/MM3 (ref 0–0.05)
IMM GRANULOCYTES NFR BLD AUTO: 0.3 % (ref 0–0.5)
LDLC SERPL CALC-MCNC: 76 MG/DL (ref 0–100)
LDLC/HDLC SERPL: 1.46 {RATIO}
LYMPHOCYTES # BLD AUTO: 2.17 10*3/MM3 (ref 0.7–3.1)
LYMPHOCYTES NFR BLD AUTO: 24.7 % (ref 19.6–45.3)
MAGNESIUM SERPL-MCNC: 2.1 MG/DL (ref 1.6–2.4)
MCH RBC QN AUTO: 30.3 PG (ref 26.6–33)
MCHC RBC AUTO-ENTMCNC: 33.5 G/DL (ref 31.5–35.7)
MCV RBC AUTO: 90.5 FL (ref 79–97)
MICROALBUMIN/CREAT UR: NORMAL MG/G{CREAT}
MONOCYTES # BLD AUTO: 0.51 10*3/MM3 (ref 0.1–0.9)
MONOCYTES NFR BLD AUTO: 5.8 % (ref 5–12)
NEUTROPHILS NFR BLD AUTO: 5.51 10*3/MM3 (ref 1.7–7)
NEUTROPHILS NFR BLD AUTO: 62.9 % (ref 42.7–76)
NRBC BLD AUTO-RTO: 0 /100 WBC (ref 0–0.2)
PLATELET # BLD AUTO: 203 10*3/MM3 (ref 140–450)
PMV BLD AUTO: 12.2 FL (ref 6–12)
POTASSIUM SERPL-SCNC: 4.4 MMOL/L (ref 3.5–5.2)
PROT SERPL-MCNC: 6.8 G/DL (ref 6–8.5)
RBC # BLD AUTO: 5.24 10*6/MM3 (ref 3.77–5.28)
SODIUM SERPL-SCNC: 139 MMOL/L (ref 136–145)
TRIGL SERPL-MCNC: 119 MG/DL (ref 0–150)
TSH SERPL DL<=0.05 MIU/L-ACNC: 1.84 UIU/ML (ref 0.27–4.2)
VLDLC SERPL-MCNC: 21 MG/DL (ref 5–40)
WBC NRBC COR # BLD: 8.77 10*3/MM3 (ref 3.4–10.8)

## 2022-09-20 PROCEDURE — 82570 ASSAY OF URINE CREATININE: CPT | Performed by: PREVENTIVE MEDICINE

## 2022-09-20 PROCEDURE — 82043 UR ALBUMIN QUANTITATIVE: CPT | Performed by: PREVENTIVE MEDICINE

## 2022-09-20 PROCEDURE — 85025 COMPLETE CBC W/AUTO DIFF WBC: CPT | Performed by: PREVENTIVE MEDICINE

## 2022-09-20 PROCEDURE — 85652 RBC SED RATE AUTOMATED: CPT | Performed by: PREVENTIVE MEDICINE

## 2022-09-20 PROCEDURE — 1170F FXNL STATUS ASSESSED: CPT | Performed by: PREVENTIVE MEDICINE

## 2022-09-20 PROCEDURE — 83036 HEMOGLOBIN GLYCOSYLATED A1C: CPT | Performed by: PREVENTIVE MEDICINE

## 2022-09-20 PROCEDURE — 1125F AMNT PAIN NOTED PAIN PRSNT: CPT | Performed by: PREVENTIVE MEDICINE

## 2022-09-20 PROCEDURE — 99213 OFFICE O/P EST LOW 20 MIN: CPT | Performed by: PREVENTIVE MEDICINE

## 2022-09-20 PROCEDURE — 36415 COLL VENOUS BLD VENIPUNCTURE: CPT | Performed by: PREVENTIVE MEDICINE

## 2022-09-20 PROCEDURE — 90662 IIV NO PRSV INCREASED AG IM: CPT | Performed by: PREVENTIVE MEDICINE

## 2022-09-20 PROCEDURE — 99397 PER PM REEVAL EST PAT 65+ YR: CPT | Performed by: PREVENTIVE MEDICINE

## 2022-09-20 PROCEDURE — 80061 LIPID PANEL: CPT | Performed by: PREVENTIVE MEDICINE

## 2022-09-20 PROCEDURE — 96160 PT-FOCUSED HLTH RISK ASSMT: CPT | Performed by: PREVENTIVE MEDICINE

## 2022-09-20 PROCEDURE — G0008 ADMIN INFLUENZA VIRUS VAC: HCPCS | Performed by: PREVENTIVE MEDICINE

## 2022-09-20 PROCEDURE — 84443 ASSAY THYROID STIM HORMONE: CPT | Performed by: PREVENTIVE MEDICINE

## 2022-09-20 PROCEDURE — 82607 VITAMIN B-12: CPT | Performed by: PREVENTIVE MEDICINE

## 2022-09-20 PROCEDURE — 80053 COMPREHEN METABOLIC PANEL: CPT | Performed by: PREVENTIVE MEDICINE

## 2022-09-20 PROCEDURE — 1159F MED LIST DOCD IN RCRD: CPT | Performed by: PREVENTIVE MEDICINE

## 2022-09-20 PROCEDURE — 83735 ASSAY OF MAGNESIUM: CPT | Performed by: PREVENTIVE MEDICINE

## 2022-09-20 PROCEDURE — 82306 VITAMIN D 25 HYDROXY: CPT | Performed by: PREVENTIVE MEDICINE

## 2022-09-20 PROCEDURE — G0439 PPPS, SUBSEQ VISIT: HCPCS | Performed by: PREVENTIVE MEDICINE

## 2022-09-20 NOTE — TELEPHONE ENCOUNTER
Caller: Chano Eugenia    Relationship: Self    Best call back number: [phone]    What medication are you requesting: DICLOFENAC SODIUM TOPICAL GEL 1%    If a prescription is needed, what is your preferred pharmacy and phone number:      [facility]/pharmacy #[id number] - [place], [place] - [address] - [phone]  - [phone] FX    Additional notes: PATIENT STATED SHE WAS JUST SEEN THIS MORNING AND IS CALLING BACK WITH THE NAME OF THE FOOT CREAM THAT SHE WAS DISCUSSING WITH DR SPEARS. PLEASE ADVISE.

## 2022-09-20 NOTE — PROGRESS NOTES
Venipuncture Blood Specimen Collection  Venipuncture performed in left arm by Renetta rTan MA with good hemostasis. Patient tolerated the procedure well without complications.   09/20/22   Renetta Tran MA

## 2022-09-21 LAB
25(OH)D3 SERPL-MCNC: 59.7 NG/ML (ref 30–100)
VIT B12 BLD-MCNC: 329 PG/ML (ref 211–946)

## 2022-09-21 NOTE — PROGRESS NOTES
Glucose was 173 and A1c shows modest control at 7.  Continue to watch carbs and walk vitamin B12 is low I would increase 1000 units on the weekend days.  Bad cholesterol is 76 and goal is below 70 so watch saturated fats and increase walking sed rate which is test that measures inflammation is slightly elevated at 34 can refer her to rheumatologist if she would consider going let us know call if any other questions or concerns

## 2022-09-22 ENCOUNTER — TELEPHONE (OUTPATIENT)
Dept: FAMILY MEDICINE CLINIC | Facility: CLINIC | Age: 67
End: 2022-09-22

## 2022-09-22 DIAGNOSIS — M19.90 ARTHRITIS: Primary | ICD-10-CM

## 2022-09-22 NOTE — TELEPHONE ENCOUNTER
HUB TO READ:    ----- Message from Melisa Taveras MD sent at 9/21/2022  6:34 PM EDT -----    Glucose was 173 and A1c shows modest control at 7.  Continue to watch carbs and walk vitamin B12 is low I would increase 1000 units on the weekend days.  Bad cholesterol is 76 and goal is below 70 so watch saturated fats and increase walking sed rate which is test that measures inflammation is slightly elevated at 34 can refer her to rheumatologist if she would consider going let us know call if any other questions or concerns

## 2022-09-25 RX ORDER — PRAMIPEXOLE DIHYDROCHLORIDE 0.12 MG/1
0.5 TABLET ORAL
Qty: 120 TABLET | Refills: 1 | Status: SHIPPED | OUTPATIENT
Start: 2022-09-25 | End: 2022-10-18

## 2022-10-05 ENCOUNTER — HOSPITAL ENCOUNTER (OUTPATIENT)
Dept: MAMMOGRAPHY | Facility: HOSPITAL | Age: 67
Discharge: HOME | End: 2022-10-05
Admitting: PREVENTIVE MEDICINE

## 2022-10-05 DIAGNOSIS — Z12.31 ENCOUNTER FOR SCREENING MAMMOGRAM FOR MALIGNANT NEOPLASM OF BREAST: ICD-10-CM

## 2022-10-05 PROCEDURE — 77063 BREAST TOMOSYNTHESIS BI: CPT

## 2022-10-05 PROCEDURE — 77067 SCR MAMMO BI INCL CAD: CPT

## 2022-10-12 ENCOUNTER — TELEPHONE (OUTPATIENT)
Dept: FAMILY MEDICINE CLINIC | Facility: CLINIC | Age: 67
End: 2022-10-12

## 2022-10-13 ENCOUNTER — CLINICAL SUPPORT (OUTPATIENT)
Dept: FAMILY MEDICINE CLINIC | Facility: CLINIC | Age: 67
End: 2022-10-13

## 2022-10-13 DIAGNOSIS — M81.0 OSTEOPOROSIS WITHOUT CURRENT PATHOLOGICAL FRACTURE, UNSPECIFIED OSTEOPOROSIS TYPE: Primary | ICD-10-CM

## 2022-10-13 PROCEDURE — 96372 THER/PROPH/DIAG INJ SC/IM: CPT | Performed by: PREVENTIVE MEDICINE

## 2022-10-13 NOTE — PROGRESS NOTES
Injection  Injection performed in left arm by Renetta Tran MA. Patient tolerated the procedure well without complications.  10/13/22   TRENT Dillard MD

## 2022-10-18 RX ORDER — PRAMIPEXOLE DIHYDROCHLORIDE 0.12 MG/1
0.5 TABLET ORAL
Qty: 120 TABLET | Refills: 1 | Status: SHIPPED | OUTPATIENT
Start: 2022-10-18 | End: 2022-10-21 | Stop reason: SDUPTHER

## 2022-10-21 RX ORDER — PRAMIPEXOLE DIHYDROCHLORIDE 0.12 MG/1
0.5 TABLET ORAL
Qty: 120 TABLET | Refills: 1 | Status: SHIPPED | OUTPATIENT
Start: 2022-10-21 | End: 2022-11-22

## 2022-11-22 RX ORDER — PRAMIPEXOLE DIHYDROCHLORIDE 0.12 MG/1
0.5 TABLET ORAL
Qty: 120 TABLET | Refills: 0 | Status: SHIPPED | OUTPATIENT
Start: 2022-11-22 | End: 2023-01-04

## 2022-11-28 RX ORDER — SEMAGLUTIDE 1.34 MG/ML
INJECTION, SOLUTION SUBCUTANEOUS
Qty: 4.5 ML | Refills: 2 | Status: SHIPPED | OUTPATIENT
Start: 2022-11-28 | End: 2023-04-03 | Stop reason: SDUPTHER

## 2022-12-30 RX ORDER — GLIMEPIRIDE 4 MG/1
8 TABLET ORAL
Qty: 180 TABLET | Refills: 0 | Status: SHIPPED | OUTPATIENT
Start: 2022-12-30

## 2023-01-04 RX ORDER — NAPROXEN 500 MG/1
500 TABLET ORAL 2 TIMES DAILY PRN
COMMUNITY
Start: 2022-12-10 | End: 2023-01-04 | Stop reason: SDUPTHER

## 2023-01-04 RX ORDER — LEFLUNOMIDE 20 MG/1
20 TABLET ORAL DAILY
COMMUNITY
Start: 2022-11-29

## 2023-01-04 RX ORDER — PRAMIPEXOLE DIHYDROCHLORIDE 0.12 MG/1
0.5 TABLET ORAL
Qty: 120 TABLET | Refills: 0 | Status: SHIPPED | OUTPATIENT
Start: 2023-01-04

## 2023-01-04 RX ORDER — BNT162B2 ORIGINAL AND OMICRON BA.4/BA.5 .1125; .1125 MG/2.25ML; MG/2.25ML
0.3 INJECTION, SUSPENSION INTRAMUSCULAR ONCE
COMMUNITY
Start: 2022-10-31 | End: 2023-03-20

## 2023-01-17 RX ORDER — NAPROXEN 500 MG/1
TABLET ORAL
COMMUNITY
Start: 2023-01-06

## 2023-01-18 ENCOUNTER — DOCUMENTATION (OUTPATIENT)
Dept: ENDOCRINOLOGY | Facility: CLINIC | Age: 68
End: 2023-01-18
Payer: MEDICARE

## 2023-01-18 NOTE — PROGRESS NOTES
Benefits Investigation Summary    Prescription: Renewal     Dispensing pharmacy: [facility]    Copay amount: Jardiance-$120/30 day  (Refill too soon 1/31)  Ozempic-$60/30 day  (Refill too soon 1/30)    PLAN: [employer] Part D  BIN: [id number]  PCN: [id number]  RX GROUP:     Prior Auth and Med Assistance notes: none    Parvin Mitchell CP

## 2023-01-19 ENCOUNTER — CLINICAL SUPPORT (OUTPATIENT)
Dept: FAMILY MEDICINE CLINIC | Facility: CLINIC | Age: 68
End: 2023-01-19
Payer: MEDICARE

## 2023-01-19 DIAGNOSIS — E11.65 TYPE 2 DIABETES MELLITUS WITH HYPERGLYCEMIA, WITHOUT LONG-TERM CURRENT USE OF INSULIN: ICD-10-CM

## 2023-01-19 LAB
ALBUMIN SERPL-MCNC: 4.2 G/DL (ref 3.5–5.2)
ALBUMIN/GLOB SERPL: 1.7 G/DL
ALP SERPL-CCNC: 64 U/L (ref 39–117)
ALT SERPL W P-5'-P-CCNC: 37 U/L (ref 1–33)
ANION GAP SERPL CALCULATED.3IONS-SCNC: 12 MMOL/L (ref 5–15)
AST SERPL-CCNC: 30 U/L (ref 1–32)
BILIRUB SERPL-MCNC: 0.4 MG/DL (ref 0–1.2)
BUN SERPL-MCNC: 17 MG/DL (ref 8–23)
BUN/CREAT SERPL: 28.3 (ref 7–25)
CALCIUM SPEC-SCNC: 9.2 MG/DL (ref 8.6–10.5)
CHLORIDE SERPL-SCNC: 102 MMOL/L (ref 98–107)
CHOLEST SERPL-MCNC: 132 MG/DL (ref 0–200)
CO2 SERPL-SCNC: 25 MMOL/L (ref 22–29)
CREAT SERPL-MCNC: 0.6 MG/DL (ref 0.57–1)
EGFRCR SERPLBLD CKD-EPI 2021: 98.5 ML/MIN/1.73
GLOBULIN UR ELPH-MCNC: 2.5 GM/DL
GLUCOSE SERPL-MCNC: 125 MG/DL (ref 65–99)
HBA1C MFR BLD: 6.4 % (ref 3.5–5.6)
HDLC SERPL-MCNC: 37 MG/DL (ref 40–60)
LDLC SERPL CALC-MCNC: 69 MG/DL (ref 0–100)
LDLC/HDLC SERPL: 1.77 {RATIO}
POTASSIUM SERPL-SCNC: 3.9 MMOL/L (ref 3.5–5.2)
PROT SERPL-MCNC: 6.7 G/DL (ref 6–8.5)
SODIUM SERPL-SCNC: 139 MMOL/L (ref 136–145)
TRIGL SERPL-MCNC: 147 MG/DL (ref 0–150)
VLDLC SERPL-MCNC: 26 MG/DL (ref 5–40)

## 2023-01-19 PROCEDURE — 82570 ASSAY OF URINE CREATININE: CPT | Performed by: INTERNAL MEDICINE

## 2023-01-19 PROCEDURE — 82043 UR ALBUMIN QUANTITATIVE: CPT | Performed by: INTERNAL MEDICINE

## 2023-01-19 PROCEDURE — 83036 HEMOGLOBIN GLYCOSYLATED A1C: CPT | Performed by: INTERNAL MEDICINE

## 2023-01-19 PROCEDURE — 80061 LIPID PANEL: CPT | Performed by: INTERNAL MEDICINE

## 2023-01-19 PROCEDURE — 80053 COMPREHEN METABOLIC PANEL: CPT | Performed by: INTERNAL MEDICINE

## 2023-01-19 PROCEDURE — 36415 COLL VENOUS BLD VENIPUNCTURE: CPT | Performed by: PREVENTIVE MEDICINE

## 2023-01-19 NOTE — PROGRESS NOTES
Venipuncture performed on Left Arm by Renetta Tran MA  with good hemostasis. Patient tolerated well. 01/19/23   Melisa Taveras MD

## 2023-01-20 LAB
ALBUMIN UR-MCNC: 6.4 MG/DL
CREAT UR-MCNC: 63.6 MG/DL
MICROALBUMIN/CREAT UR: 100.6 MG/G

## 2023-01-24 ENCOUNTER — OFFICE VISIT (OUTPATIENT)
Dept: ENDOCRINOLOGY | Facility: CLINIC | Age: 68
End: 2023-01-24
Payer: MEDICARE

## 2023-01-24 VITALS
WEIGHT: 140 LBS | DIASTOLIC BLOOD PRESSURE: 70 MMHG | HEIGHT: 64 IN | BODY MASS INDEX: 23.9 KG/M2 | HEART RATE: 94 BPM | OXYGEN SATURATION: 99 % | SYSTOLIC BLOOD PRESSURE: 135 MMHG

## 2023-01-24 DIAGNOSIS — E11.65 TYPE 2 DIABETES MELLITUS WITH HYPERGLYCEMIA, WITHOUT LONG-TERM CURRENT USE OF INSULIN: Primary | ICD-10-CM

## 2023-01-24 DIAGNOSIS — E78.2 MIXED HYPERLIPIDEMIA: ICD-10-CM

## 2023-01-24 DIAGNOSIS — I10 ESSENTIAL HYPERTENSION: ICD-10-CM

## 2023-01-24 LAB — GLUCOSE BLDC GLUCOMTR-MCNC: 88 MG/DL (ref 70–105)

## 2023-01-24 PROCEDURE — 99214 OFFICE O/P EST MOD 30 MIN: CPT | Performed by: INTERNAL MEDICINE

## 2023-01-24 PROCEDURE — 3044F HG A1C LEVEL LT 7.0%: CPT | Performed by: INTERNAL MEDICINE

## 2023-01-24 PROCEDURE — 82962 GLUCOSE BLOOD TEST: CPT | Performed by: INTERNAL MEDICINE

## 2023-01-24 RX ORDER — KETOCONAZOLE 20 MG/ML
SHAMPOO TOPICAL
COMMUNITY
Start: 2023-01-06 | End: 2023-01-30

## 2023-01-24 NOTE — PATIENT INSTRUCTIONS
Continue current medications  Always keep glucose source in case of low blood sugar  Annual eye exam  Labs before follow-up

## 2023-01-24 NOTE — PROGRESS NOTES
Endocrine Progress Note Outpatient     Patient Care Team:  Melisa Taveras MD as PCP - General  Sheryl Khan MD as Consulting Physician (Rheumatology)    Chief Complaint: Follow-up type 2 diabetes    HPI: This is 67-year-old female with history of type 2 diabetes, hypertension and hyperlipidemia is here for follow-up.    For type 2 diabetes: Currently on Ozempic and 0.5 mg once a week along with Metformin and Jardiance and glimepiride 4 mg twice a day.  She tells me the blood sugars usually runs well at home.    Hypertension: Well-controlled    Hyperlipidemia: Currently on atorvastatin.    Past Medical History:   Diagnosis Date   • Arthritis    • Kidney stones    • Restless leg        Social History     Socioeconomic History   • Marital status:      Spouse name: Braeden   • Number of children: 4   • Years of education: 12   Tobacco Use   • Smoking status: Former     Packs/day: 0.50     Years: 46.00     Pack years: 23.00     Types: Cigarettes     Quit date: 2020     Years since quittin.2   • Smokeless tobacco: Never   • Tobacco comments:     Passive Smoke: Y   Vaping Use   • Vaping Use: Never used   Substance and Sexual Activity   • Alcohol use: No   • Drug use: No   • Sexual activity: Yes     Partners: Male     Birth control/protection: None       Family History   Problem Relation Age of Onset   • Heart disease Mother    • Cancer Mother         Breast Cancer   • Breast cancer Mother 55   • Diabetes Father    • Stroke Father    • Heart disease Father    • Hypertension Father    • Hypertension Sister    • Diabetes Sister    • Heart disease Sister    • Diabetes Brother    • Hypertension Brother    • Heart disease Brother    • Other Other         [facility] Ch[facility]   • Diabetes Brother    • Hypertension Brother    • No Known Problems Brother    • Heart disease Brother    • Cancer Brother         colon   • Other Brother        No Known Allergies    ROS:    Constitutional:  Denies fatigue, tiredness.    Eyes:  Denies change in visual acuity   HENT:  Denies nasal congestion or sore throat   Respiratory: denies cough, shortness of breath.   Cardiovascular:  denies chest pain, edema   GI:  Denies abdominal pain, nausea, vomiting.   Musculoskeletal:  Denies back pain or joint pain   Integument:  Denies dry skin and rash   Neurologic:  Denies headache, focal weakness or sensory changes   Endocrine:  Denies polyuria or polydipsia   Psychiatric:  Denies depression or anxiety      Vitals:    01/24/23 1301   BP: 135/70   Pulse: 94   SpO2: 99%     Body mass index is 24.02 kg/m².     Physical Exam:  GEN: NAD, conversant  EYES: EOMI, PERRL, no conjunctival erythema  NECK: no thyromegaly, full ROM   CV: RRR, no murmurs/rubs/gallops, no peripheral edema  LUNG: CTAB, no wheezes/rales/ronchi  SKIN: no rashes, no acanthosis  MSK: no deformities, full ROM of all extremities  NEURO: no tremors, DTR normal  PSYCH: AOX3, appropriate mood, affect normal      Results Review:     I reviewed the patient's new clinical results.    Lab Results   Component Value Date    HGBA1C 6.4 (H) 01/19/2023    HGBA1C 7.0 (H) 09/20/2022    HGBA1C 7.0 (H) 06/21/2022      Lab Results   Component Value Date    GLUCOSE 125 (H) 01/19/2023    BUN 17 01/19/2023    CREATININE 0.60 01/19/2023    EGFRIFNONA 121 12/16/2021    BCR 28.3 (H) 01/19/2023    K 3.9 01/19/2023    CO2 25.0 01/19/2023    CALCIUM 9.2 01/19/2023    ALBUMIN 4.2 01/19/2023    LABIL2 1.3 03/12/2019    AST 30 01/19/2023    ALT 37 (H) 01/19/2023    CHOL 132 01/19/2023    TRIG 147 01/19/2023    LDL 69 01/19/2023    HDL 37 (L) 01/19/2023     Lab Results   Component Value Date    TSH 1.840 09/20/2022         Medication Review: Reviewed.       Current Outpatient Medications:   •  Accu-Chek Softclix Lancets lancets, 1 each by Other route Daily. Use as instructed, Disp: 100 each, Rfl: 0  •  albuterol sulfate  (90 Base) MCG/ACT inhaler, Inhale 2 puffs  Every 4 (Four) Hours As Needed for Wheezing., Disp: 1 g, Rfl: 3  •  aspirin 81 MG chewable tablet, Chew 81 mg Daily., Disp: , Rfl:   •  atorvastatin (LIPITOR) 40 MG tablet, TAKE 1 TABLET BY MOUTH EVERY DAY, Disp: 90 tablet, Rfl: 3  •  cetirizine (zyrTEC) 10 MG tablet, Take 10 mg by mouth Daily., Disp: , Rfl:   •  Cholecalciferol (VITAMIN D3) 125 MCG (5000 UT) tablet tablet, Take 5,000 Units by mouth Daily., Disp: , Rfl:   •  cyclobenzaprine (FLEXERIL) 5 MG tablet, TAKE 1 TABLET BY MOUTH NIGHTLY FOR SEVERE BACK PAIN, Disp: 20 tablet, Rfl: 0  •  empagliflozin (Jardiance) 10 MG tablet tablet, Take 1 tablet by mouth Daily., Disp: 90 tablet, Rfl: 4  •  famotidine (PEPCID) 20 MG tablet, Take 20 mg by mouth Daily., Disp: , Rfl:   •  glimepiride (AMARYL) 4 MG tablet, Take 2 tablets by mouth Every Morning Before Breakfast., Disp: 180 tablet, Rfl: 0  •  glucose blood (Accu-Chek Guide) test strip, 1 each by Other route Daily. Use as instructed, Disp: 100 each, Rfl: 3  •  guaiFENesin (MUCINEX) 600 MG 12 hr tablet, Take 1,200 mg by mouth 2 (Two) Times a Day. As needed, Disp: , Rfl:   •  hydrOXYzine (ATARAX) 25 MG tablet, TAKE 1 TABLET BY MOUTH AT NIGHT AS NEEDED FOR ITCHING (ONE OR TWO IF ITCHING). (Patient taking differently: Take 50 mg by mouth 1 (One) Time. Take 2 tablets daily at bedtime), Disp: 30 tablet, Rfl: 1  •  Ibuprofen-diphenhydrAMINE HCl 200-25 MG capsule, Take 1 capsule by mouth Every Night., Disp: , Rfl:   •  Isopropyl Alcohol ([employer] Isopropyl Alcohol Wipes) 70 % misc, Apply 1 each topically Daily., Disp: 100 each, Rfl: 0  •  ketoconazole (NIZORAL) 2 % shampoo, Apply topically TO appropriate AREA as directed twice WEEKLY, Disp: , Rfl:   •  leflunomide (ARAVA) 20 MG tablet, Take 20 mg by mouth Daily., Disp: , Rfl:   •  losartan (COZAAR) 50 MG tablet, TAKE 1 TABLET BY MOUTH TWICE A DAY, Disp: 180 tablet, Rfl: 3  •  meloxicam (MOBIC) 7.5 MG tablet, , Disp: , Rfl:   •  metFORMIN ER (GLUCOPHAGE-XR) 500 MG 24 hr tablet,  TAKE 2 TABLETS BY MOUTH EVERY DAY, Disp: 180 tablet, Rfl: 2  •  naproxen (NAPROSYN) 500 MG tablet, , Disp: , Rfl:   •  Pfizer COVID-19 Vac Bivalent 30 MCG/0.3ML suspension, Inject 0.3 mL into the appropriate muscle as directed by prescriber 1 (One) Time., Disp: , Rfl:   •  potassium chloride 10 MEQ CR tablet, TAKE 1 TABLET BY MOUTH EVERY DAY, Disp: 90 tablet, Rfl: 1  •  pramipexole (MIRAPEX) 0.125 MG tablet, TAKE 4 TABLETS BY MOUTH EVERY NIGHT AT BEDTIME., Disp: 120 tablet, Rfl: 0  •  Semaglutide,0.25 or 0.5MG/DOS, (Ozempic, 0.25 or 0.5 MG/DOSE,) 2 MG/1.5ML solution pen-injector, INJECT 0.5 MG SUBCUTANEOUSLY EVERY WEEK ON SUNDAY, Disp: 4.5 mL, Rfl: 2  •  Stiolto Respimat 2.5-2.5 MCG/ACT aerosol solution inhaler, INHALE 2 PUFFS BY MOUTH EVERY DAY, Disp: 90 each, Rfl: 3  •  denosumab (PROLIA) 60 MG/ML solution prefilled syringe syringe, Inject 1 mL under the skin into the appropriate area as directed 1 (One) Time for 1 dose., Disp: 1 mL, Rfl: 1    Current Facility-Administered Medications:   •  denosumab (PROLIA) syringe 60 mg, 60 mg, Subcutaneous, Q6 Months, Melisa Taveras MD, 60 mg at 10/13/22 1159      Assessment & Plan   1.  Diabetes mellitus type II with hyperglycemia: Well-controlled with A1c at 6.4%.  We will continue current management.  She is not having any significant issues with low blood sugars.  Advised to continue to work on diet and activity and always keep glucose source in case of low blood sugars.  We will follow labs.  Recommend annual eye exam..    2.  Hypertension: Well-controlled, continue current medication    3.  Hyperlipidemia: Currently on atorvastatin will continue that and follow lipid panel.    Assessment and plan from May 31, 2022 reviewed and updated.            Lei Kim MD FACE.

## 2023-01-30 RX ORDER — KETOCONAZOLE 20 MG/ML
SHAMPOO TOPICAL
Qty: 120 ML | Refills: 1 | Status: SHIPPED | OUTPATIENT
Start: 2023-01-30 | End: 2023-03-20

## 2023-02-13 RX ORDER — POTASSIUM CHLORIDE 750 MG/1
10 TABLET, FILM COATED, EXTENDED RELEASE ORAL DAILY
Qty: 90 TABLET | Refills: 1 | Status: SHIPPED | OUTPATIENT
Start: 2023-02-13

## 2023-02-13 NOTE — TELEPHONE ENCOUNTER
Rx Refill Note  Requested Prescriptions     Pending Prescriptions Disp Refills   • potassium chloride 10 MEQ CR tablet 90 tablet 1     Sig: Take 1 tablet by mouth Daily.      Last office visit with prescribing clinician: 9/20/2022   Last telemedicine visit with prescribing clinician: 3/20/2023   Next office visit with prescribing clinician: 3/20/2023                         Would you like a call back once the refill request has been completed: [] Yes [] No    If the office needs to give you a call back, can they leave a voicemail: [] Yes [] No    Merna Castillo MA  02/13/23, 12:09 EST

## 2023-03-03 RX ORDER — TIOTROPIUM BROMIDE AND OLODATEROL 3.124; 2.736 UG/1; UG/1
SPRAY, METERED RESPIRATORY (INHALATION)
Qty: 4 G | Refills: 2 | Status: SHIPPED | OUTPATIENT
Start: 2023-03-03

## 2023-03-03 NOTE — TELEPHONE ENCOUNTER
Rx Refill Note  Requested Prescriptions     Pending Prescriptions Disp Refills    Stiolto Respimat 2.5-2.5 MCG/ACT aerosol solution inhaler [Pharmacy Med Name: Stiolto Respimat 2.5 mcg-2.5 mcg/actuation solution for inhalation] 4 g 2     Sig: INHALE TWO PUFFS BY MOUTH EVERY DAY      Last office visit with prescribing clinician: 9/20/2022   Last telemedicine visit with prescribing clinician: 3/20/2023   Next office visit with prescribing clinician: 3/20/2023                         Would you like a call back once the refill request has been completed: [] Yes [] No    If the office needs to give you a call back, can they leave a voicemail: [] Yes [] No    Renetta Tran MA  03/03/23, 09:26 EST

## 2023-03-20 ENCOUNTER — OFFICE VISIT (OUTPATIENT)
Dept: FAMILY MEDICINE CLINIC | Facility: CLINIC | Age: 68
End: 2023-03-20
Payer: MEDICARE

## 2023-03-20 VITALS
TEMPERATURE: 98 F | BODY MASS INDEX: 23.97 KG/M2 | HEIGHT: 64 IN | SYSTOLIC BLOOD PRESSURE: 104 MMHG | HEART RATE: 72 BPM | DIASTOLIC BLOOD PRESSURE: 71 MMHG | WEIGHT: 140.4 LBS | OXYGEN SATURATION: 94 %

## 2023-03-20 DIAGNOSIS — E11.65 TYPE 2 DIABETES MELLITUS WITH HYPERGLYCEMIA, WITHOUT LONG-TERM CURRENT USE OF INSULIN: Primary | ICD-10-CM

## 2023-03-20 DIAGNOSIS — L50.9 URTICARIA, UNSPECIFIED: ICD-10-CM

## 2023-03-20 DIAGNOSIS — I10 ESSENTIAL HYPERTENSION: ICD-10-CM

## 2023-03-20 DIAGNOSIS — I25.10 CHRONIC CORONARY ARTERY DISEASE: ICD-10-CM

## 2023-03-20 DIAGNOSIS — Z12.4 SCREENING FOR CERVICAL CANCER: ICD-10-CM

## 2023-03-20 DIAGNOSIS — K21.9 GERD WITHOUT ESOPHAGITIS: ICD-10-CM

## 2023-03-20 DIAGNOSIS — J43.9 PULMONARY EMPHYSEMA, UNSPECIFIED EMPHYSEMA TYPE: ICD-10-CM

## 2023-03-20 DIAGNOSIS — E53.8 B12 DEFICIENCY: ICD-10-CM

## 2023-03-20 DIAGNOSIS — R06.83 SNORING: ICD-10-CM

## 2023-03-20 DIAGNOSIS — E78.2 MIXED HYPERLIPIDEMIA: ICD-10-CM

## 2023-03-20 DIAGNOSIS — E55.9 VITAMIN D DEFICIENCY DISEASE: ICD-10-CM

## 2023-03-20 DIAGNOSIS — I71.20 THORACIC AORTIC ANEURYSM WITHOUT RUPTURE, UNSPECIFIED PART: ICD-10-CM

## 2023-03-20 DIAGNOSIS — M05.79 RHEUMATOID ARTHRITIS OF MULTIPLE SITES WITHOUT ORGAN OR SYSTEM INVOLVEMENT WITH POSITIVE RHEUMATOID FACTOR: ICD-10-CM

## 2023-03-20 DIAGNOSIS — K76.0 FATTY LIVER: ICD-10-CM

## 2023-03-20 LAB
25(OH)D3 SERPL-MCNC: 47 NG/ML (ref 30–100)
ALBUMIN SERPL-MCNC: 4.5 G/DL (ref 3.5–5.2)
ALBUMIN UR-MCNC: 1.8 MG/DL
ALBUMIN/GLOB SERPL: 1.6 G/DL
ALP SERPL-CCNC: 73 U/L (ref 39–117)
ALT SERPL W P-5'-P-CCNC: 35 U/L (ref 1–33)
ANION GAP SERPL CALCULATED.3IONS-SCNC: 9.7 MMOL/L (ref 5–15)
AST SERPL-CCNC: 26 U/L (ref 1–32)
BASOPHILS # BLD AUTO: 0.08 10*3/MM3 (ref 0–0.2)
BASOPHILS NFR BLD AUTO: 1.2 % (ref 0–1.5)
BILIRUB SERPL-MCNC: 0.4 MG/DL (ref 0–1.2)
BUN SERPL-MCNC: 21 MG/DL (ref 8–23)
BUN/CREAT SERPL: 30 (ref 7–25)
CALCIUM SPEC-SCNC: 10 MG/DL (ref 8.6–10.5)
CHLORIDE SERPL-SCNC: 101 MMOL/L (ref 98–107)
CHOLEST SERPL-MCNC: 143 MG/DL (ref 0–200)
CO2 SERPL-SCNC: 25.3 MMOL/L (ref 22–29)
CREAT SERPL-MCNC: 0.7 MG/DL (ref 0.57–1)
CREAT UR-MCNC: 33.6 MG/DL
CRP SERPL-MCNC: <0.3 MG/DL (ref 0–0.5)
DEPRECATED RDW RBC AUTO: 39.4 FL (ref 37–54)
EGFRCR SERPLBLD CKD-EPI 2021: 94.9 ML/MIN/1.73
EOSINOPHIL # BLD AUTO: 0.3 10*3/MM3 (ref 0–0.4)
EOSINOPHIL NFR BLD AUTO: 4.4 % (ref 0.3–6.2)
ERYTHROCYTE [DISTWIDTH] IN BLOOD BY AUTOMATED COUNT: 12.2 % (ref 12.3–15.4)
ERYTHROCYTE [SEDIMENTATION RATE] IN BLOOD: 46 MM/HR (ref 0–30)
GLOBULIN UR ELPH-MCNC: 2.9 GM/DL
GLUCOSE SERPL-MCNC: 235 MG/DL (ref 65–99)
HBA1C MFR BLD: 7.7 % (ref 4.8–5.6)
HCT VFR BLD AUTO: 47.7 % (ref 34–46.6)
HDLC SERPL-MCNC: 36 MG/DL (ref 40–60)
HGB BLD-MCNC: 15.7 G/DL (ref 12–15.9)
IMM GRANULOCYTES # BLD AUTO: 0.02 10*3/MM3 (ref 0–0.05)
IMM GRANULOCYTES NFR BLD AUTO: 0.3 % (ref 0–0.5)
LDLC SERPL CALC-MCNC: 62 MG/DL (ref 0–100)
LDLC/HDLC SERPL: 1.42 {RATIO}
LYMPHOCYTES # BLD AUTO: 2.02 10*3/MM3 (ref 0.7–3.1)
LYMPHOCYTES NFR BLD AUTO: 29.3 % (ref 19.6–45.3)
MAGNESIUM SERPL-MCNC: 2 MG/DL (ref 1.6–2.4)
MCH RBC QN AUTO: 29.2 PG (ref 26.6–33)
MCHC RBC AUTO-ENTMCNC: 32.9 G/DL (ref 31.5–35.7)
MCV RBC AUTO: 88.8 FL (ref 79–97)
MICROALBUMIN/CREAT UR: 53.6 MG/G
MONOCYTES # BLD AUTO: 0.52 10*3/MM3 (ref 0.1–0.9)
MONOCYTES NFR BLD AUTO: 7.5 % (ref 5–12)
NEUTROPHILS NFR BLD AUTO: 3.95 10*3/MM3 (ref 1.7–7)
NEUTROPHILS NFR BLD AUTO: 57.3 % (ref 42.7–76)
NRBC BLD AUTO-RTO: 0 /100 WBC (ref 0–0.2)
PLATELET # BLD AUTO: 160 10*3/MM3 (ref 140–450)
PMV BLD AUTO: 13.6 FL (ref 6–12)
POTASSIUM SERPL-SCNC: 4.5 MMOL/L (ref 3.5–5.2)
PROT SERPL-MCNC: 7.4 G/DL (ref 6–8.5)
RBC # BLD AUTO: 5.37 10*6/MM3 (ref 3.77–5.28)
SODIUM SERPL-SCNC: 136 MMOL/L (ref 136–145)
TRIGL SERPL-MCNC: 280 MG/DL (ref 0–150)
TSH SERPL DL<=0.05 MIU/L-ACNC: 1.5 UIU/ML (ref 0.27–4.2)
VIT B12 BLD-MCNC: 335 PG/ML (ref 211–946)
VLDLC SERPL-MCNC: 45 MG/DL (ref 5–40)
WBC NRBC COR # BLD: 6.89 10*3/MM3 (ref 3.4–10.8)

## 2023-03-20 PROCEDURE — 80053 COMPREHEN METABOLIC PANEL: CPT | Performed by: PREVENTIVE MEDICINE

## 2023-03-20 PROCEDURE — 83735 ASSAY OF MAGNESIUM: CPT | Performed by: PREVENTIVE MEDICINE

## 2023-03-20 PROCEDURE — 82043 UR ALBUMIN QUANTITATIVE: CPT | Performed by: PREVENTIVE MEDICINE

## 2023-03-20 PROCEDURE — 82607 VITAMIN B-12: CPT | Performed by: PREVENTIVE MEDICINE

## 2023-03-20 PROCEDURE — 85652 RBC SED RATE AUTOMATED: CPT | Performed by: PREVENTIVE MEDICINE

## 2023-03-20 PROCEDURE — 84443 ASSAY THYROID STIM HORMONE: CPT | Performed by: PREVENTIVE MEDICINE

## 2023-03-20 PROCEDURE — 83036 HEMOGLOBIN GLYCOSYLATED A1C: CPT | Performed by: PREVENTIVE MEDICINE

## 2023-03-20 PROCEDURE — 86140 C-REACTIVE PROTEIN: CPT | Performed by: PREVENTIVE MEDICINE

## 2023-03-20 PROCEDURE — 82306 VITAMIN D 25 HYDROXY: CPT | Performed by: PREVENTIVE MEDICINE

## 2023-03-20 PROCEDURE — 85025 COMPLETE CBC W/AUTO DIFF WBC: CPT | Performed by: PREVENTIVE MEDICINE

## 2023-03-20 PROCEDURE — 80061 LIPID PANEL: CPT | Performed by: PREVENTIVE MEDICINE

## 2023-03-20 PROCEDURE — 82570 ASSAY OF URINE CREATININE: CPT | Performed by: PREVENTIVE MEDICINE

## 2023-03-20 RX ORDER — EMPAGLIFLOZIN 10 MG/1
TABLET, FILM COATED ORAL
Qty: 90 TABLET | Refills: 4 | Status: SHIPPED | OUTPATIENT
Start: 2023-03-20

## 2023-03-20 RX ORDER — HYDROXYZINE HYDROCHLORIDE 25 MG/1
50 TABLET, FILM COATED ORAL ONCE
Qty: 180 TABLET | Refills: 1 | Status: SHIPPED | OUTPATIENT
Start: 2023-03-20 | End: 2023-03-20

## 2023-03-20 NOTE — PATIENT INSTRUCTIONS
Health Maintenance Due   Topic Date Due    PAP SMEAR  03/13/2022    DIABETIC EYE EXAM  07/01/2022    Patient advised to get Eye exam    Get records from Dr. Manzo

## 2023-03-20 NOTE — PROGRESS NOTES
"Subjective   Eugenia Madden is a 67 y.o. female presents for   Chief Complaint   Patient presents with   • Diabetes       Health Maintenance Due   Topic Date Due   • PAP SMEAR  03/13/2022   • DIABETIC EYE EXAM  07/01/2022       History of Present Illness  Eugenia Madden is present for a 6-month evaluation for type 2 diabetes without long term use of insulin, screening for cervical cancer, B12 deficiency, coronary artery disease, fatty liver, hyperlipidemia, hypertension, pulmonary emphysema, snoring, thoracic aortic aneurysm, and GERD. She has agreed to use CESAR for today's visit.     She reports a blood glucose reading of 207 this morning, 03/20/2023. She correlates this to \"drinking more chocolate milk than she should\".     She is not compliant with taking vitamin B12.     She has been watching her saturated fat intake.     She denies having to use her rescue inhaler.     She has not noticed any snoring.     She reports symptoms of GERD. She states she takes Pepcid for this condition, which helps.     She denies chest discomfort and weakness in the legs.     The patient states she \"went to her arthritis doctor and she said her vitamin D was too high\".     She is compliant with performing monthly self-breast examinations.     The patient has no known allergies.        Vitals:    03/20/23 1018 03/20/23 1019   BP: 108/71 104/71   BP Location: Right arm Left arm   Patient Position: Sitting Lying   Cuff Size: Adult Adult   Pulse: 72    Temp: 98 °F (36.7 °C)    TempSrc: Temporal    SpO2: 94%    Weight: 63.7 kg (140 lb 6.4 oz)    Height: 162.6 cm (64.02\")      Body mass index is 24.08 kg/m².    Current Outpatient Medications on File Prior to Visit   Medication Sig Dispense Refill   • Accu-Chek Softclix Lancets lancets 1 each by Other route Daily. Use as instructed 100 each 0   • albuterol sulfate  (90 Base) MCG/ACT inhaler Inhale 2 puffs Every 4 (Four) Hours As Needed for Wheezing. 1 g 3   • aspirin 81 MG chewable " tablet Chew 1 tablet Daily.     • atorvastatin (LIPITOR) 40 MG tablet TAKE 1 TABLET BY MOUTH EVERY DAY 90 tablet 3   • cetirizine (zyrTEC) 10 MG tablet Take 1 tablet by mouth Daily.     • Cholecalciferol (VITAMIN D3) 125 MCG (5000 UT) tablet tablet Take 1 tablet by mouth Daily.     • cyclobenzaprine (FLEXERIL) 5 MG tablet TAKE 1 TABLET BY MOUTH NIGHTLY FOR SEVERE BACK PAIN 20 tablet 0   • denosumab (PROLIA) 60 MG/ML solution prefilled syringe syringe Inject 1 mL under the skin into the appropriate area as directed 1 (One) Time for 1 dose. 1 mL 1   • famotidine (PEPCID) 20 MG tablet Take 1 tablet by mouth Daily.     • glimepiride (AMARYL) 4 MG tablet Take 2 tablets by mouth Every Morning Before Breakfast. 180 tablet 0   • glucose blood (Accu-Chek Guide) test strip 1 each by Other route Daily. Use as instructed 100 each 3   • Isopropyl Alcohol ([employer] Isopropyl Alcohol Wipes) 70 % misc Apply 1 each topically Daily. 100 each 0   • leflunomide (ARAVA) 20 MG tablet Take 1 tablet by mouth Daily.     • losartan (COZAAR) 50 MG tablet TAKE 1 TABLET BY MOUTH TWICE A  tablet 3   • meloxicam (MOBIC) 7.5 MG tablet      • metFORMIN ER (GLUCOPHAGE-XR) 500 MG 24 hr tablet TAKE 2 TABLETS BY MOUTH EVERY  tablet 2   • naproxen (NAPROSYN) 500 MG tablet      • potassium chloride 10 MEQ CR tablet Take 1 tablet by mouth Daily. 90 tablet 1   • pramipexole (MIRAPEX) 0.125 MG tablet TAKE 4 TABLETS BY MOUTH EVERY NIGHT AT BEDTIME. 120 tablet 0   • Semaglutide,0.25 or 0.5MG/DOS, (Ozempic, 0.25 or 0.5 MG/DOSE,) 2 MG/1.5ML solution pen-injector INJECT 0.5 MG SUBCUTANEOUSLY EVERY WEEK ON SUNDAY 4.5 mL 2   • Stiolto Respimat 2.5-2.5 MCG/ACT aerosol solution inhaler INHALE TWO PUFFS BY MOUTH EVERY DAY 4 g 2   • [DISCONTINUED] hydrOXYzine (ATARAX) 25 MG tablet TAKE 1 TABLET BY MOUTH AT NIGHT AS NEEDED FOR ITCHING (ONE OR TWO IF ITCHING). (Patient taking differently: Take 2 tablets by mouth 1 (One) Time. Take 2 tablets daily at bedtime) 30  tablet 1   • [DISCONTINUED] empagliflozin (Jardiance) 10 MG tablet tablet Take 1 tablet by mouth Daily. 90 tablet 4   • [DISCONTINUED] guaiFENesin (MUCINEX) 600 MG 12 hr tablet Take 1,200 mg by mouth 2 (Two) Times a Day. As needed (Patient not taking: Reported on 3/20/2023)     • [DISCONTINUED] Ibuprofen-diphenhydrAMINE HCl 200-25 MG capsule Take 1 capsule by mouth Every Night.     • [DISCONTINUED] ketoconazole (NIZORAL) 2 % shampoo Apply topically TO appropriate AREA as directed twice WEEKLY 120 mL 1   • [DISCONTINUED] Pfizer COVID-19 Vac Bivalent 30 MCG/0.3ML suspension Inject 0.3 mL into the appropriate muscle as directed by prescriber 1 (One) Time.       Current Facility-Administered Medications on File Prior to Visit   Medication Dose Route Frequency Provider Last Rate Last Admin   • denosumab (PROLIA) syringe 60 mg  60 mg Subcutaneous Q6 Months Melisa Taveras MD   60 mg at 10/13/22 1159       The following portions of the patient's history were reviewed and updated as appropriate: allergies, current medications, past family history, past medical history, past social history, past surgical history and problem list.    Review of Systems   Constitutional: Negative for diaphoresis and unexpected weight loss.   HENT: Negative for hearing loss and tinnitus.    Respiratory: Negative for cough and wheezing.    Cardiovascular: Negative for chest pain and palpitations.   Gastrointestinal: Positive for GERD. Negative for blood in stool.   Genitourinary: Negative for dysuria and vaginal discharge.   Neurological: Negative for seizures, weakness and headache.       Objective   Physical Exam  HENT:      Right Ear: Tympanic membrane normal.      Left Ear: Tympanic membrane normal.   Eyes:      Pupils: Pupils are equal, round, and reactive to light.   Neck:      Vascular: No carotid bruit.      Comments: Thyroid is not palpable.   Cardiovascular:      Rate and Rhythm: Regular rhythm. Tachycardia present.      Pulses:            Dorsalis pedis pulses are 1+ on the right side and 1+ on the left side.        Posterior tibial pulses are 1+ on the right side and 1+ on the left side.      Heart sounds: Murmur heard.    Systolic murmur is present with a grade of 2/6.  Pulmonary:      Breath sounds: Examination of the right-upper field reveals decreased breath sounds. Examination of the left-upper field reveals decreased breath sounds. Examination of the right-middle field reveals decreased breath sounds. Examination of the left-middle field reveals decreased breath sounds. Examination of the right-lower field reveals decreased breath sounds. Examination of the left-lower field reveals decreased breath sounds. Decreased breath sounds present.   Abdominal:      General: Bowel sounds are normal.      Palpations: There is no hepatomegaly, splenomegaly or mass.   Musculoskeletal:      Right lower leg: No edema.      Left lower leg: No edema.   Feet:      Right foot:      Protective Sensation: 10 sites tested. 10 sites sensed.      Skin integrity: Skin integrity normal.      Toenail Condition: Right toenails are normal.      Left foot:      Protective Sensation: 10 sites tested. 10 sites sensed.      Skin integrity: Skin integrity normal.      Toenail Condition: Left toenails are normal.      Comments: Diabetic Foot Exam Performed and Monofilament Test Performed    Lymphadenopathy:      Cervical: No cervical adenopathy.       PHQ-9 Total Score: 0    Assessment & Plan   Diagnoses and all orders for this visit:    1. Type 2 diabetes mellitus with hyperglycemia, without long-term current use of insulin (HCC) (Primary)  -     Comprehensive Metabolic Panel; Future  -     Hemoglobin A1c; Future  -     Microalbumin / Creatinine Urine Ratio - Urine, Clean Catch; Future  -     TSH; Future  -     Comprehensive Metabolic Panel  -     Hemoglobin A1c  -     Microalbumin / Creatinine Urine Ratio - Urine, Clean Catch  -     TSH    2. Screening for cervical  cancer    3. B12 deficiency  -     Vitamin B12; Future  -     Vitamin B12    4. Chronic coronary artery disease    5. Fatty liver    6. Mixed hyperlipidemia  -     Lipid Panel; Future  -     Lipid Panel    7. Essential hypertension    8. Pulmonary emphysema, unspecified emphysema type (HCC)    9. Snoring    10. Thoracic aortic aneurysm without rupture, unspecified part  -     CT Chest With Contrast; Future    11. GERD without esophagitis  -     CBC Auto Differential; Future  -     Magnesium; Future  -     CBC Auto Differential  -     Magnesium    12. Vitamin D deficiency disease  -     Vitamin D 25 hydroxy; Future  -     Vitamin D 25 hydroxy    13. 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 Urticaria, unspecified  -     Sedimentation rate, automated; Future  -     Sedimentation rate, automated    Other orders  -     hydrOXYzine (ATARAX) 25 MG tablet; Take 2 tablets by mouth 1 (One) Time for 1 dose. Take 2 tablets daily at bedtime  Dispense: 180 tablet; Refill: 1        Patient Instructions     Health Maintenance Due   Topic Date Due   • PAP SMEAR  03/13/2022   • DIABETIC EYE EXAM  07/01/2022    Patient advised to get Eye exam    Get records from Dr. Manzo    Transcribed from ambient dictation for Melisa Taveras MD by Pily Blum.  03/20/23   12:15 EDT    Patient or patient representative verbalized consent to the visit recording.  I have personally performed the services described in this document as transcribed by the above individual, and it is both accurate and complete.

## 2023-03-20 NOTE — PROGRESS NOTES
Venipuncture performed on Right Arm by Renetta Tran MA  with good hemostasis. Patient tolerated well. 03/20/23

## 2023-03-21 ENCOUNTER — TELEPHONE (OUTPATIENT)
Dept: FAMILY MEDICINE CLINIC | Facility: CLINIC | Age: 68
End: 2023-03-21
Payer: MEDICARE

## 2023-03-21 NOTE — PROGRESS NOTES
Glucose was 235 with A1c showing that her blood sugar is out of control at 7.7.  Please decrease her use of chocolate milk and if this fails to improve we may need to have additional medication for diabetes.  Sed rate was elevated so make sure she follows up with her rheumatologist C-reactive protein however was normal vitamin B12 is low so I would increase to 1000 units daily over-the-counter.  Call if any other questions or concerns

## 2023-03-21 NOTE — TELEPHONE ENCOUNTER
HUB TO READ:  ----- Message from Melisa Taveras MD sent at 3/21/2023  6:55 AM EDT -----  Glucose was 235 with A1c showing that her blood sugar is out of control at 7.7.  Please decrease her use of chocolate milk and if this fails to improve we may need to have additional medication for diabetes.  Sed rate was elevated so make sure she follows up with her rheumatologist C-reactive protein however was normal vitamin B12 is low so I would increase to 1000 units daily over-the-counter.  Call if any other questions or concerns

## 2023-03-23 ENCOUNTER — TELEPHONE (OUTPATIENT)
Dept: FAMILY MEDICINE CLINIC | Facility: CLINIC | Age: 68
End: 2023-03-23
Payer: MEDICARE

## 2023-03-23 NOTE — TELEPHONE ENCOUNTER
Caller: Eugenia Madden    Relationship: Self    Best call back number: [phone]    What form or medical record are you requesting: MOST RECENT LABS FROM 3/20/23    Who is requesting this form or medical record from you: RHEUMATOLOGIST    How would you like to receive the form or medical records (pick-up, mail, fax): FAX  If fax, what is the fax number: [phone]    Timeframe paperwork needed: ASAP    Additional notes: NEEDING RESULTS SENT SO SHE DOESN'T HAVE TO GET LABS DRAWN TWICE

## 2023-03-27 RX ORDER — SEMAGLUTIDE 1.34 MG/ML
INJECTION, SOLUTION SUBCUTANEOUS
Qty: 4.5 ML | Refills: 2 | Status: CANCELLED | OUTPATIENT
Start: 2023-03-27

## 2023-03-27 NOTE — TELEPHONE ENCOUNTER
Caller: Eugenia Madden    Relationship: Self    Best call back number: [phone]     Requested Prescriptions:   Requested Prescriptions     Pending Prescriptions Disp Refills   • Semaglutide,0.25 or 0.5MG/DOS, (Ozempic, 0.25 or 0.5 MG/DOSE,) 2 MG/1.5ML solution pen-injector 4.5 mL 2     Sig: INJECT 0.5 MG SUBCUTANEOUSLY EVERY WEEK ON SUNDAY        Pharmacy where request should be sent: [facility]/PHARMACY #[id number] - [place], [place]  [address] [phone] [facility] [phone] FX     Last office visit with prescribing clinician: 3/20/2023   Last telemedicine visit with prescribing clinician: 6/21/2023   Next office visit with prescribing clinician: 9/21/2023     Additional details provided by patient: PATIENT IS OUT, HAS BEEN SINCE YESTERDAY 03/26/23     Does the patient have less than a 3 day supply:  [x] Yes  [] No    Would you like a call back once the refill request has been completed: [x] Yes [] No    If the office needs to give you a call back, can they leave a voicemail: [x] Yes [] No    Kizzy Walker, PCT   03/27/23 13:56 EDT

## 2023-03-28 ENCOUNTER — HOSPITAL ENCOUNTER (OUTPATIENT)
Dept: CT IMAGING | Facility: HOSPITAL | Age: 68
Discharge: HOME | End: 2023-03-28
Admitting: PREVENTIVE MEDICINE
Payer: MEDICARE

## 2023-03-28 DIAGNOSIS — I71.20 THORACIC AORTIC ANEURYSM WITHOUT RUPTURE, UNSPECIFIED PART: ICD-10-CM

## 2023-03-28 PROCEDURE — 25510000001 IOPAMIDOL PER 1 ML: Performed by: PREVENTIVE MEDICINE

## 2023-03-28 PROCEDURE — 71260 CT THORAX DX C+: CPT

## 2023-03-28 RX ADMIN — IOPAMIDOL 100 ML: 755 INJECTION, SOLUTION INTRAVENOUS at 13:10

## 2023-03-29 ENCOUNTER — TELEPHONE (OUTPATIENT)
Dept: FAMILY MEDICINE CLINIC | Facility: CLINIC | Age: 68
End: 2023-03-29
Payer: MEDICARE

## 2023-03-29 NOTE — TELEPHONE ENCOUNTER
HUB TO READ:  ----- Message from Melisa Taveras MD sent at 3/29/2023 10:09 AM EDT -----  Stable aneurysm size and still lung disease and fatty liver -all unchanged

## 2023-03-31 NOTE — TELEPHONE ENCOUNTER
Caller: [facility] - [place] - [phone]  - [phone] FX    Relationship: Pharmacy    Best call back number: [facility]   PHONE: [phone]  FAX NUMBER: [phone]     Requested Prescriptions:   denosumab (PROLIA) syringe 60 mg     Pharmacy where request should be sent:      Last office visit with prescribing clinician: 3/20/2023   Last telemedicine visit with prescribing clinician: 6/21/2023   Next office visit with prescribing clinician: 9/21/2023     Additional details provided by patient:     Does the patient have less than a 3 day supply:  [] Yes  [] No    Would you like a call back once the refill request has been completed: [] Yes [] No    If the office needs to give you a call back, can they leave a voicemail: [] Yes [] No    Tod Dunaway Rep   03/31/23 15:47 EDT

## 2023-04-03 DIAGNOSIS — E11.65 TYPE 2 DIABETES MELLITUS WITH HYPERGLYCEMIA, WITHOUT LONG-TERM CURRENT USE OF INSULIN: Primary | ICD-10-CM

## 2023-04-03 RX ORDER — SEMAGLUTIDE 1.34 MG/ML
INJECTION, SOLUTION SUBCUTANEOUS
Qty: 4.5 ML | Refills: 2 | Status: SHIPPED | OUTPATIENT
Start: 2023-04-03 | End: 2023-04-04

## 2023-04-04 DIAGNOSIS — E11.65 TYPE 2 DIABETES MELLITUS WITH HYPERGLYCEMIA, WITHOUT LONG-TERM CURRENT USE OF INSULIN: Primary | ICD-10-CM

## 2023-04-04 RX ORDER — SEMAGLUTIDE 0.68 MG/ML
0.5 INJECTION, SOLUTION SUBCUTANEOUS WEEKLY
Qty: 3 ML | Refills: 2 | Status: SHIPPED | OUTPATIENT
Start: 2023-04-04

## 2023-04-07 ENCOUNTER — TELEPHONE (OUTPATIENT)
Dept: FAMILY MEDICINE CLINIC | Facility: CLINIC | Age: 68
End: 2023-04-07
Payer: MEDICARE

## 2023-04-07 RX ORDER — BENZONATATE 100 MG/1
100 CAPSULE ORAL 3 TIMES DAILY PRN
Qty: 30 CAPSULE | Refills: 0 | Status: SHIPPED | OUTPATIENT
Start: 2023-04-07 | End: 2023-04-18 | Stop reason: SDUPTHER

## 2023-04-07 NOTE — TELEPHONE ENCOUNTER
Patient ill for a week but has not had any fever she had a home COVID test yesterday that was negative but we have advised that she get checked tomorrow at urgent care and she will start on the Tessalon tonight but will be seen in urgent care tomorrow prescription sent to [facility] in [facility]

## 2023-04-07 NOTE — TELEPHONE ENCOUNTER
Caller: ChanoEugenia    Relationship: Self    Best call back number: [phone]    What was the call regarding: PATIENT REQUESTING CHEST XRAY DUE TO COPD,INFLUZEMA.  PATIENT CURRENTLY HAS BACK/CHEST PAIN DUE TO EXCESSIVE COUGHING.  PATIENT TALKING EXTRA STRENGTH MUSINEX.      CAN YOU PRESCRIBE A COUGH MEDICATION?      ALSO NEEDS A albuterol sulfate  (90 Base) MCG/ACT inhaler    PHARMACY: [facility]/pharmacy #[id number] - [place], [place] [address] - [phone] [facility] [phone] FX        Do you require a callback: YES

## 2023-04-10 ENCOUNTER — TELEPHONE (OUTPATIENT)
Dept: FAMILY MEDICINE CLINIC | Facility: CLINIC | Age: 68
End: 2023-04-10
Payer: MEDICARE

## 2023-04-11 ENCOUNTER — HOSPITAL ENCOUNTER (EMERGENCY)
Facility: HOSPITAL | Age: 68
Discharge: HOME | End: 2023-04-11
Attending: EMERGENCY MEDICINE | Admitting: EMERGENCY MEDICINE
Payer: MEDICARE

## 2023-04-11 ENCOUNTER — APPOINTMENT (OUTPATIENT)
Dept: GENERAL RADIOLOGY | Facility: HOSPITAL | Age: 68
End: 2023-04-11
Payer: MEDICARE

## 2023-04-11 ENCOUNTER — TELEPHONE (OUTPATIENT)
Dept: FAMILY MEDICINE CLINIC | Facility: CLINIC | Age: 68
End: 2023-04-11
Payer: MEDICARE

## 2023-04-11 VITALS
BODY MASS INDEX: 23.71 KG/M2 | OXYGEN SATURATION: 91 % | HEIGHT: 64 IN | DIASTOLIC BLOOD PRESSURE: 77 MMHG | SYSTOLIC BLOOD PRESSURE: 126 MMHG | WEIGHT: 138.89 LBS | TEMPERATURE: 97.9 F | HEART RATE: 91 BPM | RESPIRATION RATE: 19 BRPM

## 2023-04-11 DIAGNOSIS — J20.9 ACUTE BRONCHITIS, UNSPECIFIED ORGANISM: ICD-10-CM

## 2023-04-11 DIAGNOSIS — J44.1 ACUTE EXACERBATION OF CHRONIC OBSTRUCTIVE PULMONARY DISEASE (COPD): Primary | ICD-10-CM

## 2023-04-11 DIAGNOSIS — R91.1 RIGHT LOWER LOBE PULMONARY NODULE: ICD-10-CM

## 2023-04-11 LAB
ALBUMIN SERPL-MCNC: 4.1 G/DL (ref 3.5–5.2)
ALBUMIN/GLOB SERPL: 1.3 G/DL
ALP SERPL-CCNC: 97 U/L (ref 39–117)
ALT SERPL W P-5'-P-CCNC: 27 U/L (ref 1–33)
ANION GAP SERPL CALCULATED.3IONS-SCNC: 11 MMOL/L (ref 5–15)
AST SERPL-CCNC: 23 U/L (ref 1–32)
BASOPHILS # BLD AUTO: 0.1 10*3/MM3 (ref 0–0.2)
BASOPHILS NFR BLD AUTO: 1.4 % (ref 0–1.5)
BILIRUB SERPL-MCNC: 0.3 MG/DL (ref 0–1.2)
BUN SERPL-MCNC: 12 MG/DL (ref 8–23)
BUN/CREAT SERPL: 22.6 (ref 7–25)
CALCIUM SPEC-SCNC: 9.4 MG/DL (ref 8.6–10.5)
CHLORIDE SERPL-SCNC: 108 MMOL/L (ref 98–107)
CO2 SERPL-SCNC: 25 MMOL/L (ref 22–29)
CREAT SERPL-MCNC: 0.53 MG/DL (ref 0.57–1)
D-LACTATE SERPL-SCNC: 1.1 MMOL/L (ref 0.3–2)
DEPRECATED RDW RBC AUTO: 45.5 FL (ref 37–54)
EGFRCR SERPLBLD CKD-EPI 2021: 101.5 ML/MIN/1.73
EOSINOPHIL # BLD AUTO: 0.9 10*3/MM3 (ref 0–0.4)
EOSINOPHIL NFR BLD AUTO: 9.2 % (ref 0.3–6.2)
ERYTHROCYTE [DISTWIDTH] IN BLOOD BY AUTOMATED COUNT: 14 % (ref 12.3–15.4)
FLUAV SUBTYP SPEC NAA+PROBE: NOT DETECTED
FLUBV RNA ISLT QL NAA+PROBE: NOT DETECTED
GLOBULIN UR ELPH-MCNC: 3.1 GM/DL
GLUCOSE SERPL-MCNC: 240 MG/DL (ref 65–99)
HCT VFR BLD AUTO: 43.6 % (ref 34–46.6)
HGB BLD-MCNC: 14.4 G/DL (ref 12–15.9)
LYMPHOCYTES # BLD AUTO: 1.8 10*3/MM3 (ref 0.7–3.1)
LYMPHOCYTES NFR BLD AUTO: 19.4 % (ref 19.6–45.3)
MCH RBC QN AUTO: 29.2 PG (ref 26.6–33)
MCHC RBC AUTO-ENTMCNC: 33.1 G/DL (ref 31.5–35.7)
MCV RBC AUTO: 88.3 FL (ref 79–97)
MONOCYTES # BLD AUTO: 0.7 10*3/MM3 (ref 0.1–0.9)
MONOCYTES NFR BLD AUTO: 7 % (ref 5–12)
NEUTROPHILS NFR BLD AUTO: 5.9 10*3/MM3 (ref 1.7–7)
NEUTROPHILS NFR BLD AUTO: 63 % (ref 42.7–76)
NRBC BLD AUTO-RTO: 0 /100 WBC (ref 0–0.2)
PLATELET # BLD AUTO: 193 10*3/MM3 (ref 140–450)
PMV BLD AUTO: 10.5 FL (ref 6–12)
POTASSIUM SERPL-SCNC: 4.4 MMOL/L (ref 3.5–5.2)
PROT SERPL-MCNC: 7.2 G/DL (ref 6–8.5)
RBC # BLD AUTO: 4.94 10*6/MM3 (ref 3.77–5.28)
SARS-COV-2 RNA RESP QL NAA+PROBE: NOT DETECTED
SODIUM SERPL-SCNC: 144 MMOL/L (ref 136–145)
WBC NRBC COR # BLD: 9.4 10*3/MM3 (ref 3.4–10.8)

## 2023-04-11 PROCEDURE — 85025 COMPLETE CBC W/AUTO DIFF WBC: CPT | Performed by: EMERGENCY MEDICINE

## 2023-04-11 PROCEDURE — 87040 BLOOD CULTURE FOR BACTERIA: CPT | Performed by: EMERGENCY MEDICINE

## 2023-04-11 PROCEDURE — 25010000002 CEFTRIAXONE PER 250 MG: Performed by: EMERGENCY MEDICINE

## 2023-04-11 PROCEDURE — 94640 AIRWAY INHALATION TREATMENT: CPT

## 2023-04-11 PROCEDURE — 25010000002 METHYLPREDNISOLONE PER 125 MG: Performed by: EMERGENCY MEDICINE

## 2023-04-11 PROCEDURE — 96365 THER/PROPH/DIAG IV INF INIT: CPT

## 2023-04-11 PROCEDURE — 87636 SARSCOV2 & INF A&B AMP PRB: CPT | Performed by: EMERGENCY MEDICINE

## 2023-04-11 PROCEDURE — 99283 EMERGENCY DEPT VISIT LOW MDM: CPT

## 2023-04-11 PROCEDURE — 71045 X-RAY EXAM CHEST 1 VIEW: CPT

## 2023-04-11 PROCEDURE — 94799 UNLISTED PULMONARY SVC/PX: CPT

## 2023-04-11 PROCEDURE — 96375 TX/PRO/DX INJ NEW DRUG ADDON: CPT

## 2023-04-11 PROCEDURE — 83605 ASSAY OF LACTIC ACID: CPT

## 2023-04-11 PROCEDURE — 80053 COMPREHEN METABOLIC PANEL: CPT | Performed by: EMERGENCY MEDICINE

## 2023-04-11 PROCEDURE — 36415 COLL VENOUS BLD VENIPUNCTURE: CPT

## 2023-04-11 RX ORDER — IPRATROPIUM BROMIDE AND ALBUTEROL SULFATE 2.5; .5 MG/3ML; MG/3ML
3 SOLUTION RESPIRATORY (INHALATION) EVERY 4 HOURS PRN
Qty: 360 ML | Refills: 0 | Status: SHIPPED | OUTPATIENT
Start: 2023-04-11 | End: 2023-04-20 | Stop reason: SDUPTHER

## 2023-04-11 RX ORDER — DOXYCYCLINE 100 MG/1
100 CAPSULE ORAL 2 TIMES DAILY
Qty: 14 CAPSULE | Refills: 0 | Status: SHIPPED | OUTPATIENT
Start: 2023-04-11 | End: 2023-04-18 | Stop reason: SDUPTHER

## 2023-04-11 RX ORDER — METHYLPREDNISOLONE SODIUM SUCCINATE 125 MG/2ML
80 INJECTION, POWDER, LYOPHILIZED, FOR SOLUTION INTRAMUSCULAR; INTRAVENOUS ONCE
Status: COMPLETED | OUTPATIENT
Start: 2023-04-11 | End: 2023-04-11

## 2023-04-11 RX ORDER — PREDNISONE 20 MG/1
40 TABLET ORAL DAILY
Qty: 10 TABLET | Refills: 0 | Status: SHIPPED | OUTPATIENT
Start: 2023-04-11

## 2023-04-11 RX ORDER — ALBUTEROL SULFATE 90 UG/1
2 AEROSOL, METERED RESPIRATORY (INHALATION) ONCE
Status: COMPLETED | OUTPATIENT
Start: 2023-04-11 | End: 2023-04-11

## 2023-04-11 RX ADMIN — ALBUTEROL SULFATE 2 PUFF: 108 INHALANT RESPIRATORY (INHALATION) at 02:22

## 2023-04-11 RX ADMIN — METHYLPREDNISOLONE SODIUM SUCCINATE 80 MG: 125 INJECTION, POWDER, FOR SOLUTION INTRAMUSCULAR; INTRAVENOUS at 02:36

## 2023-04-11 RX ADMIN — CEFTRIAXONE 1 G: 1 INJECTION, POWDER, FOR SOLUTION INTRAMUSCULAR; INTRAVENOUS at 05:38

## 2023-04-11 NOTE — ED PROVIDER NOTES
Subjective   History of Present Illness  67-year-old female complaining of cough productive of yellow sputum as well as persistent wheezing.  She states she is finished a course of Zithromax and was taking prednisone 20 mg a day without significant improvement.  The patient reports that she has had some fatigue and has been using rescue inhaler several times a day she states she has a nebulizer but does not have medication for it.  She reports no night sweats or hemoptysis.  She denies orthopnea or leg swelling        Review of Systems   Constitutional: Positive for chills, fatigue and fever.   Respiratory: Positive for cough and shortness of breath.    Cardiovascular: Negative for chest pain, palpitations and leg swelling.   Gastrointestinal: Negative for nausea.   Hematological: Does not bruise/bleed easily.   All other systems reviewed and are negative.      Past Medical History:   Diagnosis Date   • Arthritis    • Kidney stones    • Restless leg        No Known Allergies    Past Surgical History:   Procedure Laterality Date   • CERVIX SURGERY  1983    Dysplasia removed from Cervix   • KIDNEY STONE SURGERY  2016    Lithotripsy   • OTHER SURGICAL HISTORY  2007    Pinched nerve arm    • TUBAL ABDOMINAL LIGATION Bilateral        Family History   Problem Relation Age of Onset   • Heart disease Mother    • Cancer Mother         Breast Cancer   • Breast cancer Mother 55   • Diabetes Father    • Stroke Father    • Heart disease Father    • Hypertension Father    • Hypertension Sister    • Diabetes Sister    • Heart disease Sister    • Diabetes Brother    • Hypertension Brother    • Heart disease Brother    • Other Other         Abstraction from [facility] Cholesterol   • Diabetes Brother    • Hypertension Brother    • No Known Problems Brother    • Heart disease Brother    • Cancer Brother         colon   • Other Brother        Social History     Socioeconomic History   • Marital status:      Spouse name: Braeden    • Number of children: 4   • Years of education: 12   Tobacco Use   • Smoking status: Former     Packs/day: 0.50     Years: 46.00     Pack years: 23.00     Types: Cigarettes     Quit date: 2020     Years since quittin.4   • Smokeless tobacco: Never   • Tobacco comments:     Passive Smoke: Y   Vaping Use   • Vaping Use: Never used   Substance and Sexual Activity   • Alcohol use: No   • Drug use: No   • Sexual activity: Yes     Partners: Male     Birth control/protection: None           Objective   Physical Exam Alert Manuel Coma Scale 15 O2 saturations in the 92% range   HEENT: Pupils equal and reactive to light. Conjunctivae are not injected. Normal tympanic membranes. Oropharynx and nares are normal.   Neck: Supple. Midline trachea. No JVD. No goiter.   Chest: Rhonchi and expiratory wheezes bilaterally and equal breath sounds bilaterally, regular rate and rhythm without murmur or rub.   Abdomen: Positive bowel sounds, nontender, nondistended. No rebound or peritoneal signs. No CVA tenderness.   Extremities no clubbing. cyanosis or edema. Motor sensory exam is normal. The full range of motion is intact   Skin: Warm and dry, no rashes or petechia.   Lymphatic: No regional lymphadenopathy. No calf pain, swelling or Homans sign    Labs Reviewed   COMPREHENSIVE METABOLIC PANEL - Abnormal; Notable for the following components:       Result Value    Glucose 240 (*)     Creatinine 0.53 (*)     Chloride 108 (*)     All other components within normal limits    Narrative:     GFR Normal >60  Chronic Kidney Disease <60  Kidney Failure <15     CBC WITH AUTO DIFFERENTIAL - Abnormal; Notable for the following components:    Lymphocyte % 19.4 (*)     Eosinophil % 9.2 (*)     Eosinophils, Absolute 0.90 (*)     All other components within normal limits   COVID-19 AND FLU A/B, NP SWAB IN TRANSPORT MEDIA 8-12 HR TAT - Normal    Narrative:     Fact sheet for providers: https://www.fda.gov/media/189156/download    Fact sheet  for patients: https://www.fda.gov/media/927425/download    Test performed by PCR.   POC LACTATE - Normal   BLOOD CULTURE   BLOOD CULTURE   POC LACTATE   CBC AND DIFFERENTIAL    Narrative:     The following orders were created for panel order CBC & Differential.  Procedure                               Abnormality         Status                     ---------                               -----------         ------                     CBC Auto Differential[[id number]]        Abnormal            Final result                 Please view results for these tests on the individual orders.     Medications   cefTRIAXone (ROCEPHIN) 1 g in sodium chloride 0.9 % 100 mL IVPB (has no administration in time range)   albuterol sulfate HFA (PROVENTIL HFA;VENTOLIN HFA;PROAIR HFA) inhaler 2 puff (2 puffs Inhalation Given 4/11/23 0222)   methylPREDNISolone sodium succinate (SOLU-Medrol) injection 80 mg (80 mg Intravenous Given 4/11/23 0236)     No radiology results for the last day    Procedures           ED Course           Labs Reviewed   COMPREHENSIVE METABOLIC PANEL - Abnormal; Notable for the following components:       Result Value    Glucose 240 (*)     Creatinine 0.53 (*)     Chloride 108 (*)     All other components within normal limits    Narrative:     GFR Normal >60  Chronic Kidney Disease <60  Kidney Failure <15     CBC WITH AUTO DIFFERENTIAL - Abnormal; Notable for the following components:    Lymphocyte % 19.4 (*)     Eosinophil % 9.2 (*)     Eosinophils, Absolute 0.90 (*)     All other components within normal limits   COVID-19 AND FLU A/B, NP SWAB IN TRANSPORT MEDIA 8-12 HR TAT - Normal    Narrative:     Fact sheet for providers: https://www.fda.gov/media/199606/download    Fact sheet for patients: https://www.fda.gov/media/084169/download    Test performed by PCR.   POC LACTATE - Normal   BLOOD CULTURE   BLOOD CULTURE   POC LACTATE   CBC AND DIFFERENTIAL    Narrative:     The following orders were created for panel  order CBC & Differential.  Procedure                               Abnormality         Status                     ---------                               -----------         ------                     CBC Auto Differential[[id number]]        Abnormal            Final result                 Please view results for these tests on the individual orders.     Medications   cefTRIAXone (ROCEPHIN) 1 g in sodium chloride 0.9 % 100 mL IVPB (has no administration in time range)   albuterol sulfate HFA (PROVENTIL HFA;VENTOLIN HFA;PROAIR HFA) inhaler 2 puff (2 puffs Inhalation Given 4/11/23 0222)   methylPREDNISolone sodium succinate (SOLU-Medrol) injection 80 mg (80 mg Intravenous Given 4/11/23 0236)     No radiology results for the last day                                  Medical Decision Making  Patient will be placed on doxycycline, 40 mg prednisone daily, and DuoNeb.  She is encouraged to rest and avoid exertional activity for the next 3 days and use Tylenol as needed for fever control is encouraged to follow-up closely with her primary care provider or return for worsening symptoms.  The patient is stable at discharge and vocalized understanding of discharge instructions worn    Amount and/or Complexity of Data Reviewed  Labs: ordered.  Radiology: ordered and independent interpretation performed.      Risk  Prescription drug management.          Final diagnoses:   Acute exacerbation of chronic obstructive pulmonary disease (COPD)   Acute bronchitis, unspecified organism   Right lower lobe pulmonary nodule       ED Disposition  ED Disposition     ED Disposition   Discharge    Condition   Stable    Comment   --             Melisa Taveras MD  [address] [place] [address]  [phone]               Medication List      New Prescriptions    doxycycline 100 MG capsule  Commonly known as: MONODOX  Take 1 capsule by mouth 2 (Two) Times a Day.     ipratropium-albuterol 0.5-2.5 mg/3 ml nebulizer  Commonly known as:  DUO-NEB  Take 3 mL by nebulization Every 4 (Four) Hours As Needed for Wheezing.     predniSONE 20 MG tablet  Commonly known as: DELTASONE  Take 2 tablets by mouth Daily.           Where to Get Your Medications      These medications were sent to [facility] #[id number] - [place], [place] - [address] [facility] [address] - [phone]  - [phone]  [address] [facility] [place] [address]    Phone: [phone]   · doxycycline 100 MG capsule  · ipratropium-albuterol 0.5-2.5 mg/3 ml nebulizer  · predniSONE 20 MG tablet          Dillan Hernandez MD  04/11/23 0526

## 2023-04-11 NOTE — TELEPHONE ENCOUNTER
Eugenia Madden notified and voiced comprehension and understanding. Patient amintaed    Adriana Pedraza

## 2023-04-11 NOTE — DISCHARGE INSTRUCTIONS
Rest, no work or exertion next 3 days  Tylenol as needed for any fever  Medication as directed  Follow-up with primary care provider return for worsening symptoms

## 2023-04-11 NOTE — TELEPHONE ENCOUNTER
Eugenia Madden notified and voiced comprehension and understanding. Patient scheduled    Adriana Pedraza

## 2023-04-11 NOTE — TELEPHONE ENCOUNTER
Hope she is doing better from cough causing her to go to [facility].  Finish meds and we should recheck first of next week-back to ER if worsens-Monitor Oxygen saturation with oximeter she has

## 2023-04-12 ENCOUNTER — TELEPHONE (OUTPATIENT)
Dept: FAMILY MEDICINE CLINIC | Facility: CLINIC | Age: 68
End: 2023-04-12
Payer: MEDICARE

## 2023-04-12 DIAGNOSIS — J18.9 PNEUMONIA OF RIGHT MIDDLE LOBE DUE TO INFECTIOUS ORGANISM: Primary | ICD-10-CM

## 2023-04-12 DIAGNOSIS — J43.9 PULMONARY EMPHYSEMA, UNSPECIFIED EMPHYSEMA TYPE: ICD-10-CM

## 2023-04-12 RX ORDER — IPRATROPIUM BROMIDE AND ALBUTEROL SULFATE 2.5; .5 MG/3ML; MG/3ML
3 SOLUTION RESPIRATORY (INHALATION)
Status: SHIPPED | OUTPATIENT
Start: 2023-04-12

## 2023-04-12 NOTE — TELEPHONE ENCOUNTER
Caller: Chano Eugenia    Relationship: Self    Best call back number: [phone]    What medication are you requesting: IPRATROPIUM (NEB SOLUTION)    What are your current symptoms: PNEUMONIA    How long have you been experiencing symptoms:     Have you had these symptoms before:    [] Yes  [] No    Have you been treated for these symptoms before:   [] Yes  [] No    If a prescription is needed, what is your preferred pharmacy and phone number: [id number] - [place], [place] - [address] [address] [address] - [phone] [facility] [phone]      Additional notes:

## 2023-04-13 RX ORDER — BLOOD SUGAR DIAGNOSTIC
STRIP MISCELLANEOUS
Qty: 100 EACH | Refills: 0 | Status: SHIPPED | OUTPATIENT
Start: 2023-04-13

## 2023-04-16 LAB
BACTERIA SPEC AEROBE CULT: NORMAL
BACTERIA SPEC AEROBE CULT: NORMAL

## 2023-04-16 NOTE — PATIENT INSTRUCTIONS
Health Maintenance Due   Topic Date Due    PAP SMEAR  03/13/2022    DIABETIC EYE EXAM  07/01/2022    Patient to rest and drink fluids and take antibiotics till gone.  Call 4/24/2023 to report progress.  Will get Chest CT and pulmonary evaluation if persists.

## 2023-04-18 ENCOUNTER — OFFICE VISIT (OUTPATIENT)
Dept: FAMILY MEDICINE CLINIC | Facility: CLINIC | Age: 68
End: 2023-04-18
Payer: MEDICARE

## 2023-04-18 VITALS
HEIGHT: 64 IN | TEMPERATURE: 98 F | BODY MASS INDEX: 23.08 KG/M2 | DIASTOLIC BLOOD PRESSURE: 77 MMHG | WEIGHT: 135.2 LBS | SYSTOLIC BLOOD PRESSURE: 110 MMHG | HEART RATE: 105 BPM | OXYGEN SATURATION: 95 %

## 2023-04-18 DIAGNOSIS — J40 BRONCHITIS: Primary | ICD-10-CM

## 2023-04-18 DIAGNOSIS — E11.65 TYPE 2 DIABETES MELLITUS WITH HYPERGLYCEMIA, WITHOUT LONG-TERM CURRENT USE OF INSULIN: ICD-10-CM

## 2023-04-18 DIAGNOSIS — Z12.4 SCREENING FOR CERVICAL CANCER: ICD-10-CM

## 2023-04-18 DIAGNOSIS — I10 ESSENTIAL HYPERTENSION: ICD-10-CM

## 2023-04-18 RX ORDER — ALBUTEROL SULFATE 90 UG/1
2 AEROSOL, METERED RESPIRATORY (INHALATION) EVERY 4 HOURS PRN
Qty: 1 G | Refills: 8 | Status: SHIPPED | OUTPATIENT
Start: 2023-04-18

## 2023-04-18 RX ORDER — DOXYCYCLINE 100 MG/1
100 CAPSULE ORAL 2 TIMES DAILY
Qty: 8 CAPSULE | Refills: 0 | Status: SHIPPED | OUTPATIENT
Start: 2023-04-18

## 2023-04-18 RX ORDER — BENZONATATE 100 MG/1
100 CAPSULE ORAL 3 TIMES DAILY PRN
Qty: 30 CAPSULE | Refills: 0 | Status: SHIPPED | OUTPATIENT
Start: 2023-04-18

## 2023-04-18 NOTE — ASSESSMENT & PLAN NOTE
- prescribed Doxycycline 100 mg   - prescribed Tessalon Perles 100 mg  - prescribed albuterol sulfate inhaler  - prescribed ipratropium-albuterol (DUO-NEB) 0.5-2.5 mg/3 ml nebulizer  - advised patient to rest and drink plenty of fluids  - advised patient to notify clinic via Mychart on one week progress, if symptoms have worsened or failed to improved CT Chest will be ordered

## 2023-04-18 NOTE — PROGRESS NOTES
"Subjective   Eugenia Madden is a 67 y.o. female presents for   Chief Complaint   Patient presents with   • Pneumonia     [facility] ER 1 week ago Friday  Needs refill of rescue inhaler       Health Maintenance Due   Topic Date Due   • PAP SMEAR  03/13/2022   • DIABETIC EYE EXAM  07/01/2022       DAXHPI    The patient presents today for follow up on bronchitis, screening for cervical cancer, essential hypertension, and type 2 diabetes without long term use of insulin.     Patient was seen at [facility] ER on 04/11/2023 for cough and shortness of breath. Patient had a chest x-ray done at the ER which showed the pneumonia clearing. Patient notes she still does not feel good. She was tested at the hospital for COVID-19, and it was negative, she has also done an at home COVID-19 test which was also negative. Patient states she is not using the albuteral sulfate inhaler. She does use the Stiolto Respimat inhaler twice daily. She notes she has a nebulizer machine at home but does not have the treatments to go with it. The patient reports she completed the course of prednisone and has one more dose of doxycycline.     Patient notes her blood glucose has been elevated but she denies any low blood glucose levels.     Patient states her blood pressure is well controlled.       Vitals:    04/18/23 1305 04/18/23 1315   BP: 114/77 110/77   BP Location: Left arm Right arm   Patient Position: Sitting Sitting   Cuff Size: Adult Adult   Pulse: 105    Temp: 98 °F (36.7 °C)    TempSrc: Tympanic    SpO2: 95%    Weight: 61.3 kg (135 lb 3.2 oz)    Height: 162.6 cm (64.02\")      Body mass index is 23.2 kg/m².    Current Outpatient Medications on File Prior to Visit   Medication Sig Dispense Refill   • Accu-Chek Guide test strip USE TO test blood sugar daily as instructed 100 each 0   • Accu-Chek Softclix Lancets lancets 1 each by Other route Daily. Use as instructed 100 each 0   • aspirin 81 MG chewable tablet Chew 1 tablet Daily.     • " atorvastatin (LIPITOR) 40 MG tablet TAKE 1 TABLET BY MOUTH EVERY DAY 90 tablet 3   • cetirizine (zyrTEC) 10 MG tablet Take 1 tablet by mouth Daily.     • famotidine (PEPCID) 20 MG tablet Take 1 tablet by mouth Daily.     • glimepiride (AMARYL) 4 MG tablet Take 2 tablets by mouth Every Morning Before Breakfast. 180 tablet 0   • ipratropium-albuterol (DUO-NEB) 0.5-2.5 mg/3 ml nebulizer Take 3 mL by nebulization Every 4 (Four) Hours As Needed for Wheezing. 360 mL 0   • Isopropyl Alcohol ([employer] Isopropyl Alcohol Wipes) 70 % misc Apply 1 each topically Daily. 100 each 0   • Jardiance 10 MG tablet tablet TAKE 1 TABLET BY MOUTH EVERY DAY 90 tablet 4   • leflunomide (ARAVA) 20 MG tablet Take 1 tablet by mouth Daily.     • losartan (COZAAR) 50 MG tablet TAKE 1 TABLET BY MOUTH TWICE A  tablet 3   • metFORMIN ER (GLUCOPHAGE-XR) 500 MG 24 hr tablet TAKE 2 TABLETS BY MOUTH EVERY  tablet 2   • naproxen (NAPROSYN) 500 MG tablet      • potassium chloride 10 MEQ CR tablet Take 1 tablet by mouth Daily. 90 tablet 1   • pramipexole (MIRAPEX) 0.125 MG tablet TAKE 4 TABLETS BY MOUTH EVERY NIGHT AT BEDTIME. 120 tablet 0   • predniSONE (DELTASONE) 20 MG tablet Take 2 tablets by mouth Daily. 10 tablet 0   • Semaglutide,0.25 or 0.5MG/DOS, (Ozempic, 0.25 or 0.5 MG/DOSE,) 2 MG/3ML solution pen-injector Inject 0.75 mL under the skin into the appropriate area as directed 1 (One) Time Per Week. 3 mL 2   • Stiolto Respimat 2.5-2.5 MCG/ACT aerosol solution inhaler INHALE TWO PUFFS BY MOUTH EVERY DAY 4 g 2   • [DISCONTINUED] benzonatate (Tessalon Perles) 100 MG capsule Take 1 capsule by mouth 3 (Three) Times a Day As Needed for Cough. 30 capsule 0   • [DISCONTINUED] doxycycline (MONODOX) 100 MG capsule Take 1 capsule by mouth 2 (Two) Times a Day. 14 capsule 0   • Cholecalciferol (VITAMIN D3) 125 MCG (5000 UT) tablet tablet Take 1 tablet by mouth Daily. (Patient not taking: Reported on 4/18/2023)     • cyclobenzaprine (FLEXERIL) 5 MG  tablet TAKE 1 TABLET BY MOUTH NIGHTLY FOR SEVERE BACK PAIN (Patient not taking: Reported on 4/18/2023) 20 tablet 0   • denosumab (PROLIA) 60 MG/ML solution prefilled syringe syringe Inject 1 mL under the skin into the appropriate area as directed 1 (One) Time for 1 dose. 1 mL 1   • [DISCONTINUED] albuterol sulfate  (90 Base) MCG/ACT inhaler Inhale 2 puffs Every 4 (Four) Hours As Needed for Wheezing. (Patient not taking: Reported on 4/18/2023) 1 g 3   • [DISCONTINUED] meloxicam (MOBIC) 7.5 MG tablet  (Patient not taking: Reported on 4/18/2023)       Current Facility-Administered Medications on File Prior to Visit   Medication Dose Route Frequency Provider Last Rate Last Admin   • denosumab (PROLIA) syringe 60 mg  60 mg Subcutaneous Q6 Months Melisa Taveras MD   60 mg at 10/13/22 1159   • ipratropium-albuterol (DUO-NEB) nebulizer solution 3 mL  3 mL Nebulization 4x Daily - RT Melisa Taveras MD           The following portions of the patient's history were reviewed and updated as appropriate: allergies, current medications, past family history, past medical history, past social history, past surgical history and problem list.    Review of Systems   Constitutional: Negative for chills, diaphoresis, fever and unexpected weight loss.   HENT: Negative for ear pain, hearing loss, postnasal drip, sore throat, tinnitus and trouble swallowing.    Respiratory: Positive for cough. Negative for chest tightness, shortness of breath and wheezing.    Cardiovascular: Positive for chest pain.   Gastrointestinal: Negative for constipation, diarrhea, vomiting and indigestion.   Musculoskeletal: Negative for myalgias.   Skin: Negative for rash.     DAXROS    Objective   Physical Exam  Pulmonary:      Breath sounds: Normal breath sounds. No wheezing, rhonchi or rales.       DAXEXAM  PHQ-9 Total Score:      Assessment & Plan   Diagnoses and all orders for this visit:    1. Bronchitis (Primary)  Assessment & Plan:  -  prescribed Doxycycline 100 mg   - prescribed Tessalon Perles 100 mg  - prescribed albuterol sulfate inhaler  - prescribed ipratropium-albuterol (DUO-NEB) 0.5-2.5 mg/3 ml nebulizer  - advised patient to rest and drink plenty of fluids  - advised patient to notify clinic via Mychart on one week progress, if symptoms have worsened or failed to improved CT Chest will be ordered      2. Screening for cervical cancer    3. Essential hypertension  Assessment & Plan:  - well controlled      4. Type 2 diabetes mellitus with hyperglycemia, without long-term current use of insulin (HCC)  Assessment & Plan:  - well controlled      Other orders  -     albuterol sulfate  (90 Base) MCG/ACT inhaler; Inhale 2 puffs Every 4 (Four) Hours As Needed for Wheezing.  Dispense: 1 g; Refill: 8  -     doxycycline (MONODOX) 100 MG capsule; Take 1 capsule by mouth 2 (Two) Times a Day.  Dispense: 8 capsule; Refill: 0  -     benzonatate (Tessalon Perles) 100 MG capsule; Take 1 capsule by mouth 3 (Three) Times a Day As Needed for Cough.  Dispense: 30 capsule; Refill: 0      DAXPLAN  DAXRESULTS    Patient Instructions     Health Maintenance Due   Topic Date Due   • PAP SMEAR  03/13/2022   • DIABETIC EYE EXAM  07/01/2022    Patient to rest and drink fluids and take antibiotics till gone.  Call 4/24/2023 to report progress.  Will get Chest CT and pulmonary evaluation if persists.          Transcribed from ambient dictation for Melisa Taveras MD by Nathalie Fields.  04/18/23   15:39 EDT    Patient or patient representative verbalized consent to the visit recording.  I have personally performed the services described in this document as transcribed by the above individual, and it is both accurate and complete.    Answers for HPI/ROS submitted by the patient on 4/16/2023  What is the primary reason for your visit?: Cough  Onset: 1 to 4 weeks ago  Progression since onset: rapidly improving  Frequency: constantly  Cough characteristics:  non-productive  ear congestion: No  headaches: No  heartburn: No  hemoptysis: No  nasal congestion: Yes  sweats: No  Aggravated by: lying down  Risk factors for lung disease: animal exposure

## 2023-04-19 ENCOUNTER — TELEPHONE (OUTPATIENT)
Dept: FAMILY MEDICINE CLINIC | Facility: CLINIC | Age: 68
End: 2023-04-19
Payer: MEDICARE

## 2023-04-19 NOTE — TELEPHONE ENCOUNTER
Caller: Eugenia Madden    Relationship: Self    Best call back number: [phone]    What medication are you requesting: ALBUTEROL NEB SOLUTION    What are your current symptoms:     How long have you been experiencing symptoms:     Have you had these symptoms before:    [x] Yes  [] No    Have you been treated for these symptoms before:   [x] Yes  [] No    If a prescription is needed, what is your preferred pharmacy and phone number: [facility] - [place], [place] - [address] [facility] [phone] [facility] [phone] FX     Additional notes:

## 2023-04-20 RX ORDER — IPRATROPIUM BROMIDE AND ALBUTEROL SULFATE 2.5; .5 MG/3ML; MG/3ML
3 SOLUTION RESPIRATORY (INHALATION) EVERY 4 HOURS PRN
Qty: 360 ML | Refills: 1 | Status: SHIPPED | OUTPATIENT
Start: 2023-04-20

## 2023-04-20 NOTE — TELEPHONE ENCOUNTER
Sent DuoNeb instead and it looks like it was received at [facility] ([place], [place]).  This particular medicine for your nebulizer is easier on your heart.  Please call if that is a problem

## 2023-04-21 ENCOUNTER — TELEPHONE (OUTPATIENT)
Dept: FAMILY MEDICINE CLINIC | Facility: CLINIC | Age: 68
End: 2023-04-21
Payer: MEDICARE

## 2023-05-02 ENCOUNTER — CLINICAL SUPPORT (OUTPATIENT)
Dept: FAMILY MEDICINE CLINIC | Facility: CLINIC | Age: 68
End: 2023-05-02

## 2023-05-02 NOTE — PROGRESS NOTES
Injection  Injection performed in left  Deltoid by Merna Castillo MA. Patient tolerated the procedure well without complications.  05/02/23   TRENT Ramos MD

## 2023-05-31 RX ORDER — METFORMIN HYDROCHLORIDE 500 MG/1
TABLET, EXTENDED RELEASE ORAL
Qty: 180 TABLET | Refills: 1 | Status: SHIPPED | OUTPATIENT
Start: 2023-05-31

## 2023-05-31 RX ORDER — TIOTROPIUM BROMIDE AND OLODATEROL 3.124; 2.736 UG/1; UG/1
SPRAY, METERED RESPIRATORY (INHALATION)
Qty: 4 G | Refills: 0 | Status: SHIPPED | OUTPATIENT
Start: 2023-05-31

## 2023-05-31 RX ORDER — ATORVASTATIN CALCIUM 40 MG/1
TABLET, FILM COATED ORAL
Qty: 90 TABLET | Refills: 0 | Status: SHIPPED | OUTPATIENT
Start: 2023-05-31

## 2023-07-27 ENCOUNTER — TRANSCRIBE ORDERS (OUTPATIENT)
Dept: FAMILY MEDICINE CLINIC | Facility: CLINIC | Age: 68
End: 2023-07-27
Payer: MEDICARE

## 2023-07-27 DIAGNOSIS — M15.9 OSTEOARTHRITIS OF MULTIPLE JOINTS, UNSPECIFIED OSTEOARTHRITIS TYPE: ICD-10-CM

## 2023-07-27 DIAGNOSIS — L50.9 URTICARIA, UNSPECIFIED: ICD-10-CM

## 2023-07-27 DIAGNOSIS — E55.9 VITAMIN D DEFICIENCY, UNSPECIFIED: Primary | ICD-10-CM

## 2023-07-27 DIAGNOSIS — M05.79 RHEUMATOID ARTHRITIS WITH RHEUMATOID FACTOR OF MULTIPLE SITES WITHOUT ORGAN OR SYSTEMS INVOLVEMENT: ICD-10-CM

## 2023-07-27 DIAGNOSIS — M06.4 INFLAMMATORY POLYARTHROPATHY: ICD-10-CM

## 2023-07-27 RX ORDER — TIOTROPIUM BROMIDE AND OLODATEROL 3.124; 2.736 UG/1; UG/1
SPRAY, METERED RESPIRATORY (INHALATION)
Qty: 4 G | Refills: 0 | Status: SHIPPED | OUTPATIENT
Start: 2023-07-27

## 2023-07-27 RX ORDER — GLIMEPIRIDE 4 MG/1
8 TABLET ORAL
Qty: 180 TABLET | Refills: 0 | Status: SHIPPED | OUTPATIENT
Start: 2023-07-27

## 2023-07-27 NOTE — TELEPHONE ENCOUNTER
Rx Refill Note  Requested Prescriptions     Pending Prescriptions Disp Refills    hydrOXYzine (ATARAX) 25 MG tablet [Pharmacy Med Name: hydroxyzine HCl 25 mg tablet] 180 tablet 1     Sig: TAKE 2 TABLETS BY MOUTH daily AT bedtime      Last office visit with prescribing clinician: 4/18/2023   Last telemedicine visit with prescribing clinician: Visit date not found   Next office visit with prescribing clinician: 9/21/2023                         Would you like a call back once the refill request has been completed: [] Yes [] No    If the office needs to give you a call back, can they leave a voicemail: [] Yes [] No    Renetta Tran MA  07/27/23, 16:49 EDT

## 2023-07-28 ENCOUNTER — CLINICAL SUPPORT (OUTPATIENT)
Dept: FAMILY MEDICINE CLINIC | Facility: CLINIC | Age: 68
End: 2023-07-28
Payer: MEDICARE

## 2023-07-28 DIAGNOSIS — M15.9 OSTEOARTHRITIS OF MULTIPLE JOINTS, UNSPECIFIED OSTEOARTHRITIS TYPE: ICD-10-CM

## 2023-07-28 DIAGNOSIS — M05.79 RHEUMATOID ARTHRITIS WITH RHEUMATOID FACTOR OF MULTIPLE SITES WITHOUT ORGAN OR SYSTEMS INVOLVEMENT: ICD-10-CM

## 2023-07-28 DIAGNOSIS — E55.9 VITAMIN D DEFICIENCY, UNSPECIFIED: ICD-10-CM

## 2023-07-28 DIAGNOSIS — L50.9 URTICARIA, UNSPECIFIED: ICD-10-CM

## 2023-07-28 DIAGNOSIS — M06.4 INFLAMMATORY POLYARTHROPATHY: ICD-10-CM

## 2023-07-28 LAB
BASOPHILS # BLD AUTO: 0.07 10*3/MM3 (ref 0–0.2)
BASOPHILS NFR BLD AUTO: 1 % (ref 0–1.5)
CRP SERPL-MCNC: <0.3 MG/DL (ref 0–0.5)
DEPRECATED RDW RBC AUTO: 39.8 FL (ref 37–54)
EOSINOPHIL # BLD AUTO: 0.46 10*3/MM3 (ref 0–0.4)
EOSINOPHIL NFR BLD AUTO: 6.4 % (ref 0.3–6.2)
ERYTHROCYTE [DISTWIDTH] IN BLOOD BY AUTOMATED COUNT: 12.7 % (ref 12.3–15.4)
ERYTHROCYTE [SEDIMENTATION RATE] IN BLOOD: 18 MM/HR (ref 0–30)
HCT VFR BLD AUTO: 43.2 % (ref 34–46.6)
HGB BLD-MCNC: 14.6 G/DL (ref 12–15.9)
IMM GRANULOCYTES # BLD AUTO: 0.02 10*3/MM3 (ref 0–0.05)
IMM GRANULOCYTES NFR BLD AUTO: 0.3 % (ref 0–0.5)
LYMPHOCYTES # BLD AUTO: 1.97 10*3/MM3 (ref 0.7–3.1)
LYMPHOCYTES NFR BLD AUTO: 27.4 % (ref 19.6–45.3)
MCH RBC QN AUTO: 29.6 PG (ref 26.6–33)
MCHC RBC AUTO-ENTMCNC: 33.8 G/DL (ref 31.5–35.7)
MCV RBC AUTO: 87.4 FL (ref 79–97)
MONOCYTES # BLD AUTO: 0.46 10*3/MM3 (ref 0.1–0.9)
MONOCYTES NFR BLD AUTO: 6.4 % (ref 5–12)
NEUTROPHILS NFR BLD AUTO: 4.22 10*3/MM3 (ref 1.7–7)
NEUTROPHILS NFR BLD AUTO: 58.5 % (ref 42.7–76)
NRBC BLD AUTO-RTO: 0 /100 WBC (ref 0–0.2)
PLATELET # BLD AUTO: 163 10*3/MM3 (ref 140–450)
PMV BLD AUTO: 13 FL (ref 6–12)
RBC # BLD AUTO: 4.94 10*6/MM3 (ref 3.77–5.28)
WBC NRBC COR # BLD: 7.2 10*3/MM3 (ref 3.4–10.8)

## 2023-07-28 PROCEDURE — 85025 COMPLETE CBC W/AUTO DIFF WBC: CPT | Performed by: PREVENTIVE MEDICINE

## 2023-07-28 PROCEDURE — 86140 C-REACTIVE PROTEIN: CPT | Performed by: PREVENTIVE MEDICINE

## 2023-07-28 PROCEDURE — 85652 RBC SED RATE AUTOMATED: CPT | Performed by: PREVENTIVE MEDICINE

## 2023-07-28 RX ORDER — HYDROXYZINE HYDROCHLORIDE 25 MG/1
TABLET, FILM COATED ORAL
Qty: 180 TABLET | Refills: 1 | Status: SHIPPED | OUTPATIENT
Start: 2023-07-28

## 2023-07-28 NOTE — PROGRESS NOTES
Venipuncture performed on Right Arm by Renetta Tran MA  with good hemostasis. Patient tolerated well. 07/28/23  Melisa Taveras MD

## 2023-07-30 ENCOUNTER — TELEPHONE (OUTPATIENT)
Dept: FAMILY MEDICINE CLINIC | Facility: CLINIC | Age: 68
End: 2023-07-30
Payer: MEDICARE

## 2023-07-31 ENCOUNTER — HOSPITAL ENCOUNTER (EMERGENCY)
Facility: HOSPITAL | Age: 68
Discharge: HOME | End: 2023-07-31
Attending: EMERGENCY MEDICINE | Admitting: EMERGENCY MEDICINE
Payer: MEDICARE

## 2023-07-31 ENCOUNTER — TELEPHONE (OUTPATIENT)
Dept: FAMILY MEDICINE CLINIC | Facility: CLINIC | Age: 68
End: 2023-07-31
Payer: MEDICARE

## 2023-07-31 ENCOUNTER — APPOINTMENT (OUTPATIENT)
Dept: GENERAL RADIOLOGY | Facility: HOSPITAL | Age: 68
End: 2023-07-31
Payer: MEDICARE

## 2023-07-31 VITALS
HEIGHT: 63 IN | RESPIRATION RATE: 20 BRPM | TEMPERATURE: 98.1 F | OXYGEN SATURATION: 93 % | WEIGHT: 127 LBS | DIASTOLIC BLOOD PRESSURE: 74 MMHG | SYSTOLIC BLOOD PRESSURE: 127 MMHG | HEART RATE: 107 BPM | BODY MASS INDEX: 22.5 KG/M2

## 2023-07-31 DIAGNOSIS — M54.6 ACUTE RIGHT-SIDED THORACIC BACK PAIN: Primary | ICD-10-CM

## 2023-07-31 LAB
ALBUMIN SERPL-MCNC: 4.2 G/DL (ref 3.5–5.2)
ALBUMIN/GLOB SERPL: 1.5 G/DL
ALP SERPL-CCNC: 62 U/L (ref 39–117)
ALT SERPL W P-5'-P-CCNC: 22 U/L (ref 1–33)
ANION GAP SERPL CALCULATED.3IONS-SCNC: 15 MMOL/L (ref 5–15)
AST SERPL-CCNC: 17 U/L (ref 1–32)
BASOPHILS # BLD MANUAL: 0.09 10*3/MM3 (ref 0–0.2)
BASOPHILS NFR BLD MANUAL: 1 % (ref 0–1.5)
BILIRUB SERPL-MCNC: 0.4 MG/DL (ref 0–1.2)
BUN SERPL-MCNC: 23 MG/DL (ref 8–23)
BUN/CREAT SERPL: 36.5 (ref 7–25)
CALCIUM SPEC-SCNC: 9.5 MG/DL (ref 8.6–10.5)
CHLORIDE SERPL-SCNC: 102 MMOL/L (ref 98–107)
CO2 SERPL-SCNC: 21 MMOL/L (ref 22–29)
CREAT SERPL-MCNC: 0.63 MG/DL (ref 0.57–1)
DEPRECATED RDW RBC AUTO: 46.4 FL (ref 37–54)
EGFRCR SERPLBLD CKD-EPI 2021: 97.4 ML/MIN/1.73
EOSINOPHIL # BLD MANUAL: 0.85 10*3/MM3 (ref 0–0.4)
EOSINOPHIL NFR BLD MANUAL: 9 % (ref 0.3–6.2)
ERYTHROCYTE [DISTWIDTH] IN BLOOD BY AUTOMATED COUNT: 14 % (ref 12.3–15.4)
GLOBULIN UR ELPH-MCNC: 2.8 GM/DL
GLUCOSE SERPL-MCNC: 225 MG/DL (ref 65–99)
HCT VFR BLD AUTO: 46.9 % (ref 34–46.6)
HGB BLD-MCNC: 15.3 G/DL (ref 12–15.9)
LARGE PLATELETS: ABNORMAL
LIPASE SERPL-CCNC: 17 U/L (ref 13–60)
LYMPHOCYTES # BLD MANUAL: 1.22 10*3/MM3 (ref 0.7–3.1)
LYMPHOCYTES NFR BLD MANUAL: 7 % (ref 5–12)
MCH RBC QN AUTO: 29.2 PG (ref 26.6–33)
MCHC RBC AUTO-ENTMCNC: 32.7 G/DL (ref 31.5–35.7)
MCV RBC AUTO: 89.1 FL (ref 79–97)
MONOCYTES # BLD: 0.66 10*3/MM3 (ref 0.1–0.9)
NEUTROPHILS # BLD AUTO: 6.58 10*3/MM3 (ref 1.7–7)
NEUTROPHILS NFR BLD MANUAL: 70 % (ref 42.7–76)
PLATELET # BLD AUTO: 174 10*3/MM3 (ref 140–450)
PMV BLD AUTO: 10.5 FL (ref 6–12)
POTASSIUM SERPL-SCNC: 4.6 MMOL/L (ref 3.5–5.2)
PROT SERPL-MCNC: 7 G/DL (ref 6–8.5)
RBC # BLD AUTO: 5.26 10*6/MM3 (ref 3.77–5.28)
RBC MORPH BLD: NORMAL
SCAN SLIDE: NORMAL
SODIUM SERPL-SCNC: 138 MMOL/L (ref 136–145)
TROPONIN T SERPL HS-MCNC: 17 NG/L
VARIANT LYMPHS NFR BLD MANUAL: 13 % (ref 19.6–45.3)
WBC MORPH BLD: NORMAL
WBC NRBC COR # BLD: 9.4 10*3/MM3 (ref 3.4–10.8)

## 2023-07-31 PROCEDURE — 99284 EMERGENCY DEPT VISIT MOD MDM: CPT

## 2023-07-31 PROCEDURE — 84484 ASSAY OF TROPONIN QUANT: CPT | Performed by: EMERGENCY MEDICINE

## 2023-07-31 PROCEDURE — 93005 ELECTROCARDIOGRAM TRACING: CPT

## 2023-07-31 PROCEDURE — 83690 ASSAY OF LIPASE: CPT | Performed by: EMERGENCY MEDICINE

## 2023-07-31 PROCEDURE — 71045 X-RAY EXAM CHEST 1 VIEW: CPT

## 2023-07-31 PROCEDURE — 85025 COMPLETE CBC W/AUTO DIFF WBC: CPT | Performed by: EMERGENCY MEDICINE

## 2023-07-31 PROCEDURE — 80053 COMPREHEN METABOLIC PANEL: CPT | Performed by: EMERGENCY MEDICINE

## 2023-07-31 PROCEDURE — 85007 BL SMEAR W/DIFF WBC COUNT: CPT | Performed by: EMERGENCY MEDICINE

## 2023-07-31 RX ORDER — CYCLOBENZAPRINE HCL 5 MG
5 TABLET ORAL 3 TIMES DAILY PRN
Qty: 15 TABLET | Refills: 0 | Status: SHIPPED | OUTPATIENT
Start: 2023-07-31

## 2023-07-31 RX ORDER — SODIUM CHLORIDE 0.9 % (FLUSH) 0.9 %
10 SYRINGE (ML) INJECTION AS NEEDED
Status: DISCONTINUED | OUTPATIENT
Start: 2023-07-31 | End: 2023-07-31 | Stop reason: HOSPADM

## 2023-08-01 LAB — QT INTERVAL: 344 MS

## 2023-08-14 ENCOUNTER — OFFICE VISIT (OUTPATIENT)
Dept: FAMILY MEDICINE CLINIC | Facility: CLINIC | Age: 68
End: 2023-08-14
Payer: MEDICARE

## 2023-08-14 VITALS
OXYGEN SATURATION: 92 % | BODY MASS INDEX: 22.54 KG/M2 | SYSTOLIC BLOOD PRESSURE: 132 MMHG | DIASTOLIC BLOOD PRESSURE: 71 MMHG | HEIGHT: 63 IN | HEART RATE: 115 BPM | WEIGHT: 127.2 LBS | TEMPERATURE: 97.8 F

## 2023-08-14 DIAGNOSIS — R35.0 FREQUENCY OF URINATION: ICD-10-CM

## 2023-08-14 DIAGNOSIS — B02.8 HERPES ZOSTER WITH OTHER COMPLICATION: Primary | ICD-10-CM

## 2023-08-14 DIAGNOSIS — R30.0 DYSURIA: ICD-10-CM

## 2023-08-14 DIAGNOSIS — B02.29 POST HERPETIC NEURALGIA: ICD-10-CM

## 2023-08-14 LAB
BILIRUB BLD-MCNC: NEGATIVE MG/DL
CLARITY, POC: CLEAR
COLOR UR: YELLOW
EXPIRATION DATE: ABNORMAL
GLUCOSE UR STRIP-MCNC: ABNORMAL MG/DL
KETONES UR QL: NEGATIVE
LEUKOCYTE EST, POC: ABNORMAL
Lab: ABNORMAL
NITRITE UR-MCNC: POSITIVE MG/ML
PH UR: 5.5 [PH] (ref 5–8)
PROT UR STRIP-MCNC: ABNORMAL MG/DL
RBC # UR STRIP: ABNORMAL /UL
SP GR UR: 1.01 (ref 1–1.03)
UROBILINOGEN UR QL: ABNORMAL

## 2023-08-14 PROCEDURE — 3052F HG A1C>EQUAL 8.0%<EQUAL 9.0%: CPT | Performed by: NURSE PRACTITIONER

## 2023-08-14 PROCEDURE — 81003 URINALYSIS AUTO W/O SCOPE: CPT | Performed by: NURSE PRACTITIONER

## 2023-08-14 PROCEDURE — 3075F SYST BP GE 130 - 139MM HG: CPT | Performed by: NURSE PRACTITIONER

## 2023-08-14 PROCEDURE — 3078F DIAST BP <80 MM HG: CPT | Performed by: NURSE PRACTITIONER

## 2023-08-14 PROCEDURE — 87186 SC STD MICRODIL/AGAR DIL: CPT | Performed by: NURSE PRACTITIONER

## 2023-08-14 PROCEDURE — 99214 OFFICE O/P EST MOD 30 MIN: CPT | Performed by: NURSE PRACTITIONER

## 2023-08-14 PROCEDURE — 87086 URINE CULTURE/COLONY COUNT: CPT | Performed by: NURSE PRACTITIONER

## 2023-08-14 PROCEDURE — 87077 CULTURE AEROBIC IDENTIFY: CPT | Performed by: NURSE PRACTITIONER

## 2023-08-14 RX ORDER — GABAPENTIN 300 MG/1
300 CAPSULE ORAL 3 TIMES DAILY
Qty: 90 CAPSULE | Refills: 1 | Status: SHIPPED | OUTPATIENT
Start: 2023-08-14

## 2023-08-14 RX ORDER — NITROFURANTOIN 25; 75 MG/1; MG/1
100 CAPSULE ORAL 2 TIMES DAILY
Qty: 10 CAPSULE | Refills: 0 | Status: SHIPPED | OUTPATIENT
Start: 2023-08-14

## 2023-08-14 NOTE — PROGRESS NOTES
"Subjective   Eugenia Madden is a 67 y.o. female presents for painful rash on right breast.  Chief Complaint   Patient presents with    Rash     on breast and back X2 weeks. Painful - patient suspects shingles.     Blood in Urine     Started Friday- happened all weekend.     Med Refill     pepcid       Health Maintenance Due   Topic Date Due    PAP SMEAR  03/13/2022    DIABETIC EYE EXAM  07/01/2022    COVID-19 Vaccine (5 - Moderna series) 02/28/2023     The patient presents for painful rash on right breast and back. She complains of back pain that feels like nerve pain. 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 Taveras to go to the emergency room 2 weeks ago when symptoms started. An x-ray was performed, and she was told that she was experiencing a pulled muscle. She has been in constant pain for the past 2 weeks. Her rash began on her lower extremities and scattered to other parts of her body. She believes she has shingles. She has used Ivarest that has given her temporary relief. She has used gabapentin in the past but did not help her restless leg symptoms. She admits that she had to put down her family dog and has been stressed recently.  Patient also reports urinary frequency as well as blood in her urine for the past 3 days.    Vitals:    08/14/23 1124   BP: 132/71   BP Location: Right arm   Patient Position: Sitting   Cuff Size: Adult   Pulse: 115   Temp: 97.8 øF (36.6 øC)   TempSrc: Temporal   SpO2: 92%   Weight: 57.7 kg (127 lb 3.2 oz)   Height: 160 cm (63\")     Body mass index is 22.53 kg/mý.    Current Outpatient Medications on File Prior to Visit   Medication Sig Dispense Refill    Accu-Chek Guide test strip USE TO test blood sugar daily as instructed 100 each 0    Accu-Chek Softclix Lancets lancets 1 each by Other route Daily. Use as instructed 100 each 0    albuterol sulfate  (90 Base) MCG/ACT inhaler Inhale 2 puffs Every 4 (Four) Hours As Needed for Wheezing. 1 g 8    aspirin 81 " MG chewable tablet Chew 1 tablet Daily.      atorvastatin (LIPITOR) 40 MG tablet TAKE 1 TABLET BY MOUTH EVERY DAY 90 tablet 0    cetirizine (zyrTEC) 10 MG tablet Take 1 tablet by mouth Daily.      cyclobenzaprine (FLEXERIL) 5 MG tablet Take 1 tablet by mouth 3 (Three) Times a Day As Needed for Muscle Spasms. 15 tablet 0    famotidine (PEPCID) 20 MG tablet Take 1 tablet by mouth Daily.      glimepiride (AMARYL) 4 MG tablet TAKE 2 TABLETS BY MOUTH EVERY MORNING BEFORE BREAKFAST 180 tablet 0    hydrOXYzine (ATARAX) 25 MG tablet TAKE 2 TABLETS BY MOUTH daily AT bedtime 180 tablet 1    ipratropium-albuterol (DUO-NEB) 0.5-2.5 mg/3 ml nebulizer Take 3 mL by nebulization Every 4 (Four) Hours As Needed for Wheezing. 360 mL 1    Isopropyl Alcohol ([employer] Isopropyl Alcohol Wipes) 70 % misc Apply 1 each topically Daily. 100 each 0    Jardiance 10 MG tablet tablet TAKE 1 TABLET BY MOUTH EVERY DAY 90 tablet 2    leflunomide (ARAVA) 20 MG tablet Take 1 tablet by mouth Daily.      losartan (COZAAR) 50 MG tablet TAKE 1 TABLET BY MOUTH TWICE DAILY 180 tablet 2    metFORMIN ER (GLUCOPHAGE-XR) 500 MG 24 hr tablet TAKE 2 TABLETS BY MOUTH EVERY  tablet 1    naproxen (NAPROSYN) 500 MG tablet       potassium chloride 10 MEQ CR tablet TAKE 1 TABLET BY MOUTH EVERY DAY 90 tablet 1    pramipexole (MIRAPEX) 0.125 MG tablet TAKE 4 TABLETS BY MOUTH EVERY NIGHT AT BEDTIME. 120 tablet 0    Semaglutide, 1 MG/DOSE, (Ozempic, 1 MG/DOSE,) 4 MG/3ML solution pen-injector Inject 1 mg under the skin into the appropriate area as directed 1 (One) Time Per Week. DX/E11.65 3 mL 2    Stiolto Respimat 2.5-2.5 MCG/ACT aerosol solution inhaler INHALE TWO PUFFS BY MOUTH DAILY 4 g 0    Cholecalciferol (VITAMIN D3) 125 MCG (5000 UT) tablet tablet Take 1 tablet by mouth Daily. (Patient not taking: Reported on 8/14/2023)      denosumab (PROLIA) 60 MG/ML solution prefilled syringe syringe Inject 1 mL under the skin into the appropriate area as directed 1 (One) Time  for 1 dose. 1 mL 1    predniSONE (DELTASONE) 20 MG tablet Take 2 tablets by mouth Daily. 10 tablet 0     Current Facility-Administered Medications on File Prior to Visit   Medication Dose Route Frequency Provider Last Rate Last Admin    denosumab (PROLIA) syringe 60 mg  60 mg Subcutaneous Q6 Months Melisa Taveras MD   60 mg at 05/02/23 1251    ipratropium-albuterol (DUO-NEB) nebulizer solution 3 mL  3 mL Nebulization 4x Daily - RT Melisa Taveras MD           The following portions of the patient's history were reviewed and updated as appropriate: allergies, current medications, past family history, past medical history, past social history, past surgical history, and problem list.    Review of Systems   Skin:  Positive for rash.     Objective   Physical Exam  Vitals and nursing note reviewed.   Constitutional:       General: She is in acute distress.      Appearance: Normal appearance. She is well-developed.   HENT:      Head: Normocephalic and atraumatic.      Right Ear: External ear normal.      Left Ear: External ear normal.      Nose: Nose normal.   Eyes:      Extraocular Movements: Extraocular movements intact.      Pupils: Pupils are equal, round, and reactive to light.   Cardiovascular:      Rate and Rhythm: Normal rate and regular rhythm.      Heart sounds: Normal heart sounds.   Pulmonary:      Effort: Pulmonary effort is normal.      Breath sounds: Normal breath sounds.   Abdominal:      General: Bowel sounds are normal.      Palpations: Abdomen is soft.   Genitourinary:     Vagina: Normal.   Musculoskeletal:         General: Normal range of motion.      Cervical back: Normal range of motion and neck supple.   Skin:     General: Skin is warm and dry.      Findings: Rash (Patch on right breast and right scapula that appears to be a vesicular rash in the stages of healing.  Excoriation marks present as well) present.   Neurological:      General: No focal deficit present.      Mental Status:  She is alert and oriented to person, place, and time.   Psychiatric:         Mood and Affect: Mood normal.         Behavior: Behavior normal.         Judgment: Judgment normal.     PHQ-9 Total Score:      Assessment & Plan   Diagnoses and all orders for this visit:    1. Herpes zoster with other complication (Primary)  Assessment & Plan:  Explained to patient that she was past the optimal treatment time for acyclovir and did not feel that she would get much benefit from it, however I will treat ongoing pain with gabapentin at this time.  Patient instructed to call if no improvement or for worsening of symptoms.      2. Frequency of urination  -     POCT urinalysis dipstick, automated  -     Urine Culture - Urine, Urine, Clean Catch; Future  -     Urine Culture - Urine, Urine, Clean Catch    3. Post herpetic neuralgia  -     gabapentin (NEURONTIN) 300 MG capsule; Take 1 capsule by mouth 3 (Three) Times a Day.  Dispense: 90 capsule; Refill: 1    4. Dysuria  -     nitrofurantoin, macrocrystal-monohydrate, (Macrobid) 100 MG capsule; Take 1 capsule by mouth 2 (Two) Times a Day.  Dispense: 10 capsule; Refill: 0      1. Shingles  The patient will begin gabapentin once at bedtime then 3 times daily.     2. Urinary tract infection  The patient will begin Macrobid twice daily.  Urinalysis will be sent out for culture.     Call or return to office is symptoms worsen.     There are no Patient Instructions on file for this visit.      Transcribed from ambient dictation for JONATHAN Brown by Kiara Valle.  08/14/23   13:56 EDT    Patient or patient representative verbalized consent to the visit recording.  I have personally performed the services described in this document as transcribed by the above individual, and it is both accurate and complete.

## 2023-08-15 PROBLEM — B02.9 SHINGLES: Status: ACTIVE | Noted: 2023-08-15

## 2023-08-15 NOTE — ASSESSMENT & PLAN NOTE
Explained to patient that she was past the optimal treatment time for acyclovir and did not feel that she would get much benefit from it, however I will treat ongoing pain with gabapentin at this time.  Patient instructed to call if no improvement or for worsening of symptoms.

## 2023-08-17 LAB — BACTERIA SPEC AEROBE CULT: ABNORMAL

## 2023-08-22 ENCOUNTER — CLINICAL SUPPORT (OUTPATIENT)
Dept: FAMILY MEDICINE CLINIC | Facility: CLINIC | Age: 68
End: 2023-08-22
Payer: MEDICARE

## 2023-08-22 DIAGNOSIS — M54.50 ACUTE BILATERAL LOW BACK PAIN WITHOUT SCIATICA: Primary | ICD-10-CM

## 2023-08-22 LAB
BACTERIA UR QL AUTO: ABNORMAL /HPF
BILIRUB UR QL STRIP: NEGATIVE
CLARITY UR: ABNORMAL
COLOR UR: YELLOW
GLUCOSE UR STRIP-MCNC: ABNORMAL MG/DL
HGB UR QL STRIP.AUTO: ABNORMAL
HYALINE CASTS UR QL AUTO: ABNORMAL /LPF
KETONES UR QL STRIP: NEGATIVE
LEUKOCYTE ESTERASE UR QL STRIP.AUTO: ABNORMAL
NITRITE UR QL STRIP: NEGATIVE
PH UR STRIP.AUTO: 6 [PH] (ref 5–8)
PROT UR QL STRIP: ABNORMAL
RBC # UR STRIP: ABNORMAL /HPF
REF LAB TEST METHOD: ABNORMAL
SP GR UR STRIP: 1.03 (ref 1–1.03)
SQUAMOUS #/AREA URNS HPF: ABNORMAL /HPF
UROBILINOGEN UR QL STRIP: ABNORMAL
WBC # UR STRIP: ABNORMAL /HPF
YEAST URNS QL MICRO: ABNORMAL /HPF

## 2023-08-22 PROCEDURE — 81001 URINALYSIS AUTO W/SCOPE: CPT | Performed by: PREVENTIVE MEDICINE

## 2023-08-22 PROCEDURE — 87086 URINE CULTURE/COLONY COUNT: CPT | Performed by: PREVENTIVE MEDICINE

## 2023-08-22 PROCEDURE — 87077 CULTURE AEROBIC IDENTIFY: CPT | Performed by: PREVENTIVE MEDICINE

## 2023-08-22 PROCEDURE — 87186 SC STD MICRODIL/AGAR DIL: CPT

## 2023-08-24 ENCOUNTER — TELEPHONE (OUTPATIENT)
Dept: FAMILY MEDICINE CLINIC | Facility: CLINIC | Age: 68
End: 2023-08-24
Payer: MEDICARE

## 2023-08-24 DIAGNOSIS — B02.29 POST HERPETIC NEURALGIA: ICD-10-CM

## 2023-08-24 DIAGNOSIS — N39.0 URINARY TRACT INFECTION WITHOUT HEMATURIA, SITE UNSPECIFIED: Primary | ICD-10-CM

## 2023-08-24 LAB — BACTERIA SPEC AEROBE CULT: ABNORMAL

## 2023-08-24 RX ORDER — SULFAMETHOXAZOLE AND TRIMETHOPRIM 800; 160 MG/1; MG/1
1 TABLET ORAL 2 TIMES DAILY
Qty: 14 TABLET | Refills: 0 | Status: SHIPPED | OUTPATIENT
Start: 2023-08-24

## 2023-08-24 NOTE — TELEPHONE ENCOUNTER
HUB TO READ:  ----- Message from Melisa Taveras MD sent at 8/24/2023  2:19 PM EDT -----  Urine is infected and I have sent some sulfa medicine to the pharmacy take for 7 days and 3 days after make sure that you reculture the urine with careful clean-catch to make sure infection is gone drink plenty of fluids rest and if develops fever chills flank pain gross blood in the urine may need to go to the emergency room call if any other questions or concerns

## 2023-08-24 NOTE — PROGRESS NOTES
Urine is infected and I have sent some sulfa medicine to the pharmacy take for 7 days and 3 days after make sure that you reculture the urine with careful clean-catch to make sure infection is gone drink plenty of fluids rest and if develops fever chills flank pain gross blood in the urine may need to go to the emergency room call if any other questions or concerns

## 2023-08-25 RX ORDER — GABAPENTIN 300 MG/1
300 CAPSULE ORAL 3 TIMES DAILY
Qty: 90 CAPSULE | Refills: 1 | OUTPATIENT
Start: 2023-08-25

## 2023-09-05 ENCOUNTER — CLINICAL SUPPORT (OUTPATIENT)
Dept: FAMILY MEDICINE CLINIC | Facility: CLINIC | Age: 68
End: 2023-09-05
Payer: MEDICARE

## 2023-09-05 DIAGNOSIS — N39.0 URINARY TRACT INFECTION WITHOUT HEMATURIA, SITE UNSPECIFIED: ICD-10-CM

## 2023-09-05 PROCEDURE — 87086 URINE CULTURE/COLONY COUNT: CPT | Performed by: PREVENTIVE MEDICINE

## 2023-09-05 PROCEDURE — 87077 CULTURE AEROBIC IDENTIFY: CPT | Performed by: PREVENTIVE MEDICINE

## 2023-09-05 PROCEDURE — 87186 SC STD MICRODIL/AGAR DIL: CPT | Performed by: PREVENTIVE MEDICINE

## 2023-09-07 ENCOUNTER — TELEPHONE (OUTPATIENT)
Dept: FAMILY MEDICINE CLINIC | Facility: CLINIC | Age: 68
End: 2023-09-07
Payer: MEDICARE

## 2023-09-07 DIAGNOSIS — R30.0 DYSURIA: ICD-10-CM

## 2023-09-07 DIAGNOSIS — N39.0 RECURRENT UTI: Primary | ICD-10-CM

## 2023-09-07 LAB — BACTERIA SPEC AEROBE CULT: ABNORMAL

## 2023-09-07 RX ORDER — NITROFURANTOIN 25; 75 MG/1; MG/1
100 CAPSULE ORAL 2 TIMES DAILY
Qty: 14 CAPSULE | Refills: 0 | Status: SHIPPED | OUTPATIENT
Start: 2023-09-07

## 2023-09-07 NOTE — PROGRESS NOTES
Urine was infected so we did send antibiotic to the pharmacy for her.  She needs to take it till its all gone in 3 days after repeat urine culture.  We also have referred her to urology and that she has had multiple UTIs and she should be followed up by them in order to help prevent these from reoccurring turning.

## 2023-09-07 NOTE — TELEPHONE ENCOUNTER
HUB TO READ:    ----- Message from Melisa Taveras MD sent at 9/7/2023 12:00 PM EDT -----  Urine was infected so we did send antibiotic to the pharmacy for her.  She needs to take it till its all gone in 3 days after repeat urine culture.  We also have referred her to urology and that she has had multiple UTIs and she should be followed up by them in order to help prevent these from reoccurring turning.

## 2023-09-08 RX ORDER — PRAMIPEXOLE DIHYDROCHLORIDE 0.12 MG/1
0.5 TABLET ORAL
Qty: 120 TABLET | Refills: 0 | Status: SHIPPED | OUTPATIENT
Start: 2023-09-08

## 2023-09-17 DIAGNOSIS — E11.65 TYPE 2 DIABETES MELLITUS WITH HYPERGLYCEMIA, WITHOUT LONG-TERM CURRENT USE OF INSULIN: ICD-10-CM

## 2023-09-18 RX ORDER — SEMAGLUTIDE 1.34 MG/ML
INJECTION, SOLUTION SUBCUTANEOUS
Qty: 3 ML | Refills: 2 | Status: SHIPPED | OUTPATIENT
Start: 2023-09-18

## 2023-09-20 NOTE — PROGRESS NOTES
The ABCs of the Annual Wellness Visit  Subsequent Medicare Wellness Visit    Subjective    History of Present Illness:  Eugenia Madden is a 68 y.o. female who presents for a Subsequent Medicare Wellness Visit.    The following portions of the patient's history were reviewed and   updated as appropriate: allergies, current medications, past family history, past medical history, past social history, past surgical history, and problem list.    Compared to one year ago, the patient feels her physical   health is worse.    Compared to one year ago, the patient feels her mental   health is the same.    Recent Hospitalizations:  She was not admitted to the hospital during the last year.       Current Medical Providers:  Patient Care Team:  Melisa Taveras MD as PCP - General  Sheyrl Khan MD as Consulting Physician (Rheumatology)    Outpatient Medications Prior to Visit   Medication Sig Dispense Refill    Accu-Chek Guide test strip USE TO test blood sugar daily as instructed 100 each 0    Accu-Chek Softclix Lancets lancets 1 each by Other route Daily. Use as instructed 100 each 0    albuterol sulfate  (90 Base) MCG/ACT inhaler Inhale 2 puffs Every 4 (Four) Hours As Needed for Wheezing. 1 g 8    aspirin 81 MG chewable tablet Chew 1 tablet Daily.      atorvastatin (LIPITOR) 40 MG tablet TAKE 1 TABLET BY MOUTH EVERY DAY 90 tablet 0    cetirizine (zyrTEC) 10 MG tablet Take 1 tablet by mouth Daily.      cyclobenzaprine (FLEXERIL) 5 MG tablet Take 1 tablet by mouth 3 (Three) Times a Day As Needed for Muscle Spasms. 15 tablet 0    gabapentin (NEURONTIN) 300 MG capsule Take 1 capsule by mouth 3 (Three) Times a Day. 90 capsule 1    glimepiride (AMARYL) 4 MG tablet TAKE 2 TABLETS BY MOUTH EVERY MORNING BEFORE BREAKFAST 180 tablet 0    hydrOXYzine (ATARAX) 25 MG tablet TAKE 2 TABLETS BY MOUTH daily AT bedtime 180 tablet 1    ipratropium-albuterol (DUO-NEB) 0.5-2.5 mg/3 ml nebulizer Take 3 mL by nebulization  Every 4 (Four) Hours As Needed for Wheezing. 360 mL 1    Isopropyl Alcohol ([employer] Isopropyl Alcohol Wipes) 70 % misc Apply 1 each topically Daily. 100 each 0    Jardiance 10 MG tablet tablet TAKE 1 TABLET BY MOUTH EVERY DAY 90 tablet 2    leflunomide (ARAVA) 20 MG tablet Take 1 tablet by mouth Daily.      losartan (COZAAR) 50 MG tablet TAKE 1 TABLET BY MOUTH TWICE DAILY 180 tablet 2    metFORMIN ER (GLUCOPHAGE-XR) 500 MG 24 hr tablet TAKE 2 TABLETS BY MOUTH EVERY  tablet 1    naproxen (NAPROSYN) 500 MG tablet       potassium chloride 10 MEQ CR tablet TAKE 1 TABLET BY MOUTH EVERY DAY 90 tablet 1    pramipexole (MIRAPEX) 0.125 MG tablet TAKE 4 TABLETS BY MOUTH EVERY NIGHT AT BEDTIME 120 tablet 0    Semaglutide, 1 MG/DOSE, (Ozempic, 1 MG/DOSE,) 4 MG/3ML solution pen-injector INJECT 1 MG UNDER THE SKIN INTO THE APPROPRIATE AREA AS DIRECTED 1 (ONE) TIME PER WEEK. 3 mL 2    Stiolto Respimat 2.5-2.5 MCG/ACT aerosol solution inhaler INHALE TWO PUFFS BY MOUTH DAILY 4 g 0    denosumab (PROLIA) 60 MG/ML solution prefilled syringe syringe Inject 1 mL under the skin into the appropriate area as directed 1 (One) Time for 1 dose. 1 mL 1    famotidine (PEPCID) 20 MG tablet Take 1 tablet by mouth Daily. (Patient not taking: Reported on 9/21/2023)      Cholecalciferol (VITAMIN D3) 125 MCG (5000 UT) tablet tablet Take 1 tablet by mouth Daily. (Patient not taking: Reported on 8/14/2023)      nitrofurantoin, macrocrystal-monohydrate, (Macrobid) 100 MG capsule Take 1 capsule by mouth 2 (Two) Times a Day. 14 capsule 0    predniSONE (DELTASONE) 20 MG tablet Take 2 tablets by mouth Daily. (Patient not taking: Reported on 9/21/2023) 10 tablet 0    sulfamethoxazole-trimethoprim (Bactrim DS) 800-160 MG per tablet Take 1 tablet by mouth 2 (Two) Times a Day. 14 tablet 0     Facility-Administered Medications Prior to Visit   Medication Dose Route Frequency Provider Last Rate Last Admin    denosumab (PROLIA) syringe 60 mg  60 mg Subcutaneous  Q6 Months Melisa Taveras MD   60 mg at 05/02/23 1251    ipratropium-albuterol (DUO-NEB) nebulizer solution 3 mL  3 mL Nebulization 4x Daily - RT Melisa Taveras MD           No opioid medication identified on active medication list. I have reviewed chart for other potential  high risk medication/s and harmful drug interactions in the elderly.        Aspirin is on active medication list. Aspirin use is indicated based on review of current medical condition/s. Pros and cons of this therapy have been discussed today. Benefits of this medication outweigh potential harm.  Patient has been encouraged to continue taking this medication.  .    Patient Active Problem List   Diagnosis    B12 deficiency    Cardiac murmur    Chronic coronary artery disease    Type 2 diabetes mellitus with hyperglycemia, without long-term current use of insulin    Family history of cerebrovascular accident (CVA)    Family history of malignant neoplasm of breast    Fatty liver    Hearing deficit    Mixed hyperlipidemia    Essential hypertension    Osteoporosis    Polycythemia    Polyp of colon    Pulmonary emphysema    Reduced libido    Restless leg    Vitamin D deficiency    Arthritis    Thoracic aortic aneurysm without rupture    Acute bilateral low back pain without sciatica    GERD without esophagitis    Former smoker    Bronchitis    Shingles    Encounter for annual general medical examination with abnormal findings in adult    Screening for cervical cancer    Cough    Recurrent UTI     Advance Care Planning  Advance Directive is not on file.  ACP discussion was held with the patient during this visit. Patient does not have an advance directive, information provided.     Objective    Vitals:    09/21/23 0926 09/21/23 0928   BP: 121/73 116/77   BP Location: Right arm Left arm   Patient Position: Sitting Sitting   Cuff Size: Adult    Pulse: 99    Temp: 98.4 °F (36.9 °C)    TempSrc: Temporal    SpO2: 99%    Weight: 57.9 kg (127 lb  "9.6 oz)    Height: 160 cm (63\")    PainSc: 0-No pain      Estimated body mass index is 22.6 kg/m² as calculated from the following:    Height as of this encounter: 160 cm (63\").    Weight as of this encounter: 57.9 kg (127 lb 9.6 oz).    BMI is within normal parameters. No other follow-up for BMI required.      Does the patient have evidence of cognitive impairment? No          HEALTH RISK ASSESSMENT    Smoking Status:  Social History     Tobacco Use   Smoking Status Former    Packs/day: 0.50    Years: 46.00    Pack years: 23.00    Types: Cigarettes    Start date: 1974    Quit date: 2020    Years since quittin.8   Smokeless Tobacco Never   Tobacco Comments    Passive Smoke: Y     Alcohol Consumption:  Social History     Substance and Sexual Activity   Alcohol Use Never     Fall Risk Screen:    KLEVER Fall Risk Assessment was completed, and patient is at LOW risk for falls.Assessment completed on:2023    Depression Screenin/21/2023     9:24 AM   PHQ-2/PHQ-9 Depression Screening   Little Interest or Pleasure in Doing Things 0-->not at all   Feeling Down, Depressed or Hopeless 0-->not at all   PHQ-9: Brief Depression Severity Measure Score 0       Health Habits and Functional and Cognitive Screenin/21/2023     9:25 AM   Functional & Cognitive Status   Do you have difficulty preparing food and eating? No   Do you have difficulty bathing yourself, getting dressed or grooming yourself? No   Do you have difficulty using the toilet? No   Do you have difficulty moving around from place to place? No   Do you have trouble with steps or getting out of a bed or a chair? No   Current Diet Well Balanced Diet   Dental Exam Up to date   Eye Exam Not up to date   Exercise (times per week) 7 times per week   Current Exercises Include Walking   Do you need help using the phone?  No   Are you deaf or do you have serious difficulty hearing?  Yes   Do you need help to go to places out of walking " distance? No   Do you need help shopping? No   Do you need help preparing meals?  No   Do you need help with housework?  No   Do you need help with laundry? No   Do you need help taking your medications? No   Do you need help managing money? No   Do you ever drive or ride in a car without wearing a seat belt? No   Have you felt unusual stress, anger or loneliness in the last month? No   Who do you live with? Spouse   If you need help, do you have trouble finding someone available to you? No   Have you been bothered in the last four weeks by sexual problems? No   Do you have difficulty concentrating, remembering or making decisions? No       Age-appropriate Screening Schedule:  Refer to the list below for future screening recommendations based on patient's age, sex and/or medical conditions. Orders for these recommended tests are listed in the plan section. The patient has been provided with a written plan.    Health Maintenance   Topic Date Due    PAP SMEAR  03/13/2022    DIABETIC EYE EXAM  07/01/2022    COVID-19 Vaccine (5 - Moderna series) 02/28/2023    INFLUENZA VACCINE  10/01/2023    MAMMOGRAM  10/05/2023    Pneumococcal Vaccine 65+ (3 - PPSV23 or PCV20) 12/11/2023    HEMOGLOBIN A1C  12/21/2023    DIABETIC FOOT EXAM  03/20/2024    LUNG CANCER SCREENING  03/28/2024    LIPID PANEL  06/21/2024    URINE MICROALBUMIN  06/21/2024    ANNUAL WELLNESS VISIT  09/21/2024    DXA SCAN  09/20/2026    TDAP/TD VACCINES (2 - Td or Tdap) 12/02/2029    COLORECTAL CANCER SCREENING  08/20/2030    HEPATITIS C SCREENING  Completed    ZOSTER VACCINE  Completed                CMS Preventative Services Quick Reference  Risk Factors Identified During Encounter  None Identified  The above risks/problems have been discussed with the patient.  Follow up actions/plans if indicated are seen below in the Assessment/Plan Section.  Pertinent information has been shared with the patient in the After Visit Summary.  An After Visit Summary and PPPS  were made available to the patient.    Follow Up:   Next Medicare Wellness visit to be scheduled in 1 year.         Additional E&M Note during same encounter follows:  Patient has multiple medical problems which are significant and separately identifiable that require additional work above and beyond the Medicare Wellness Visit.        Chief Complaint  Medicare Wellness-subsequent, Cough (Concerned with possible pneumonia), and Diabetes    Subjective        HPI  Eugenia Madden also presents to follow up on screening for cervical cancer, type 2 diabetes without long term use of insulin, snoring, emphysema, hyperlipidemia, GERD, hypertension, coronary artery disease, vitamin B12 deficiency, osteoporosis without current fracture in need of Prolia shot, and recent cough with negative Covid testing.    Patient is also here for an age-specific physical and she has been advised to wear sunscreen and a seatbelt    Patient went to [facility] Emergency Department on 07/31/2023 for right sided chest pain after having the pain for about 1 week. She had a chest XR done and was told her spine and lungs were normal and that she more than likely had a pinched nerve or pulled muscle.     Patient states on 08/12/2023, she noticed a rash on her abdominal area that was painful. She was seen by JONATHAN Da Silva on 08/14/2023 and was told she was past treatment time for acyclovir and was prescribed gabapentin for the pain.     Patient states she has just finished her third round of antibiotics for a urinary tract infection.     She notes her cough is productive but there is not much mucus.     Patient is due for a Pap smear in 10/2023 or 11/2023. She notes she is followed by Dr. Manzo.     Patient states her blood glucose is stable, never above 200 mg/dL but occasionally below 100 mg/dL but she does not feel lethargic with the low blood glucose levels.     She is due for an eye exam    She does not use her CPAP machine and notes  "she no longer snores.     Patient notes her emphysema is well controlled with Stiolto Respimat inhaler and albuterol sulfate inhaler as needed    She monitors her saturated fat intake    She notes her GERD is bad at times, she takes Pepcid as needed.    Her blood pressure is well controlled.     Patient is not followed by cardiology for coronary artery disease. Patient states she has had an echocardiogram and treadmill stress test in the past.     She takes a vitamin B12 supplement when she remembers to take it    Patient is due for her Prolia injection  Review of Systems   Constitutional:  Negative for chills, diaphoresis, fatigue and fever.   HENT:  Negative for hearing loss, tinnitus and trouble swallowing.    Eyes:  Negative for photophobia, discharge, itching and visual disturbance.   Respiratory:  Positive for cough and wheezing. Negative for choking and chest tightness.    Cardiovascular:  Negative for chest pain, palpitations and leg swelling.   Gastrointestinal:  Negative for abdominal pain, blood in stool, constipation, diarrhea, nausea and vomiting.   Genitourinary:  Negative for dysuria, hematuria and vaginal discharge.   Neurological:  Negative for dizziness, tremors, seizures, syncope, weakness and numbness.   Psychiatric/Behavioral:  Negative for suicidal ideas.      Objective   Vital Signs:  /77 (BP Location: Left arm, Patient Position: Sitting)   Pulse 99   Temp 98.4 °F (36.9 °C) (Temporal)   Ht 160 cm (63\")   Wt 57.9 kg (127 lb 9.6 oz)   SpO2 99%   BMI 22.60 kg/m²     Physical Exam  Constitutional:       Appearance: She is normal weight.   HENT:      Right Ear: Tympanic membrane normal.      Left Ear: Tympanic membrane normal.      Nose: Nose normal.      Mouth/Throat:      Mouth: Mucous membranes are moist.      Pharynx: Oropharynx is clear.   Eyes:      Pupils: Pupils are equal, round, and reactive to light.   Neck:      Vascular: No carotid bruit.   Cardiovascular:      Rate and " Rhythm: Normal rate and regular rhythm.      Pulses: Normal pulses.           Dorsalis pedis pulses are 1+ on the right side and 1+ on the left side.        Posterior tibial pulses are 1+ on the right side and 1+ on the left side.      Heart sounds: Normal heart sounds. No murmur heard.    No gallop.   Pulmonary:      Breath sounds: Examination of the right-upper field reveals decreased breath sounds. Examination of the left-upper field reveals decreased breath sounds and wheezing. Examination of the right-middle field reveals decreased breath sounds. Examination of the left-middle field reveals decreased breath sounds and wheezing. Examination of the right-lower field reveals decreased breath sounds. Examination of the left-lower field reveals decreased breath sounds and wheezing. Decreased breath sounds and wheezing present. No rhonchi or rales.   Abdominal:      General: Bowel sounds are normal.      Palpations: There is no mass.      Tenderness: There is no abdominal tenderness.      Hernia: No hernia is present.   Musculoskeletal:      Right lower leg: No edema.      Left lower leg: No edema.   Feet:      Right foot:      Protective Sensation: 10 sites tested.  10 sites sensed.      Skin integrity: Skin integrity normal.      Toenail Condition: Right toenails are normal.      Left foot:      Protective Sensation: 10 sites tested.  10 sites sensed.      Skin integrity: Skin integrity normal.      Toenail Condition: Left toenails are normal.      Comments:     Lymphadenopathy:      Cervical: No cervical adenopathy.   Psychiatric:         Mood and Affect: Mood normal.                       Assessment and Plan   Diagnoses and all orders for this visit:    1. Encounter for annual general medical examination with abnormal findings in adult (Primary)  Assessment & Plan:  - advised patient to wear sunscreen and a seatbelt  - order placed for fasting labs        2. Screening for cervical cancer  Assessment & Plan:  -  referral placed to gynecology    Orders:  -     Ambulatory Referral to Gynecology    3. Type 2 diabetes mellitus with hyperglycemia, without long-term current use of insulin  Assessment & Plan:  - order placed for fasting labs  - order placed for urine sample    Orders:  -     Hemoglobin A1c; Future  -     Microalbumin / Creatinine Urine Ratio - Urine, Clean Catch; Future  -     Vitamin B12; Future  -     TSH; Future  -     Comprehensive Metabolic Panel  -     Hemoglobin A1c  -     Cancel: Hemoglobin A1c  -     Microalbumin / Creatinine Urine Ratio - Urine, Clean Catch  -     Vitamin B12  -     TSH    4. Snoring    5. Pulmonary emphysema, unspecified emphysema type  Assessment & Plan:      - well controlled      6. Mixed hyperlipidemia  Assessment & Plan:  - advised patient to monitor saturated fat intake  - order placed for fasting labs    Orders:  -     Comprehensive Metabolic Panel; Future  -     Lipid Panel; Future  -     Cancel: Comprehensive Metabolic Panel  -     Lipid Panel    7. GERD without esophagitis  Assessment & Plan:  - order placed for fasting labs    Orders:  -     CBC Auto Differential; Future  -     Magnesium; Future  -     CBC Auto Differential  -     Magnesium    8. Essential hypertension  Assessment & Plan:  - well controlled      9. Chronic coronary artery disease  Assessment & Plan:  - will request echocardiogram and treadmill stress test results       10. B12 deficiency  Assessment & Plan:  - order placed for fasting labs      11. Osteoporosis without current pathological fracture, unspecified osteoporosis type  Assessment & Plan:  - advised patient to contact insurance regarding new PCP and prolia injection        12. Cough, unspecified type  Assessment & Plan:  - prescribed Tessalon Perles 100 mg  - order placed for XR Chest PA and Lateral    Orders:  -     POCT SARS-CoV-2 Antigen ARLINE + Flu  -     XR Chest PA & Lateral; Future    13. Herpes zoster without complication    14. Recurrent  UTI  Assessment & Plan:  - order placed for urine culture    Orders:  -     Urine Culture - Urine, Urine, Clean Catch    Other orders  -     Discontinue: benzonatate (Tessalon Perles) 100 MG capsule; Take 1 capsule by mouth 3 (Three) Times a Day As Needed for Cough.  Dispense: 30 capsule; Refill: 0             Follow Up   Return in about 3 months (around 12/21/2023).  Patient was given instructions and counseling regarding her condition or for health maintenance advice. Please see specific information pulled into the AVS if appropriate.   Transcribed from ambient dictation for Melisa Taveras MD by Nathalie Fields.  09/21/23   11:21 EDT    Patient or patient representative verbalized consent to the visit recording.  I have personally performed the services described in this document as transcribed by the above individual, and it is both accurate and complete.

## 2023-09-20 NOTE — PATIENT INSTRUCTIONS
Health Maintenance Due   Topic Date Due    PAP SMEAR  03/13/2022    DIABETIC EYE EXAM  07/01/2022    COVID-19 Vaccine (5 - Moderna series) 02/28/2023    ANNUAL WELLNESS VISIT  09/20/2023    DXA SCAN  09/20/2023    Patient to visit [employer] website (https://www.ibanet.org/) for information reguarding advanced directive and living will.  Advance Directive       Advance directives are legal documents that let you make choices ahead of time about your health care and medical treatment in case you become unable to communicate for yourself. Advance directives are a way for you to communicate your wishes to family, friends, and health care providers. This can help convey your decisions about end-of-life care if you become unable to communicate.  Discussing and writing advance directives should happen over time rather than all at once. Advance directives can be changed depending on your situation and what you want, even after you have signed the advance directives.  If you do not have an advance directive, some states assign family decision makers to act on your behalf based on how closely you are related to them. Each state has its own laws regarding advance directives. You may want to check with your health care provider, , or state representative about the laws in your state. There are different types of advance directives, such as:  Medical power of .  Living will.  Do not resuscitate (DNR) or do not attempt resuscitation (DNAR) order.  Health care proxy and medical power of   A health care proxy, also called a health care agent, is a person who is appointed to make medical decisions for you in cases in which you are unable to make the decisions yourself. Generally, people choose someone they know well and trust to represent their preferences. Make sure to ask this person for an agreement to act as your proxy. A proxy may have to exercise judgment in the event of a medical decision  for which your wishes are not known.  A medical power of  is a legal document that names your health care proxy. Depending on the laws in your state, after the document is written, it may also need to be:  Signed.  Notarized.  Dated.  Copied.  Witnessed.  Incorporated into your medical record.  You may also want to appoint someone to manage your financial affairs in a situation in which you are unable to do so. This is called a durable power of  for finances. It is a separate legal document from the durable power of  for health care. You may choose the same person or someone different from your health care proxy to act as your agent in financial matters.  If you do not appoint a proxy, or if there is a concern that the proxy is not acting in your best interests, a court-appointed guardian may be designated to act on your behalf.  Living will  A living will is a set of instructions documenting your wishes about medical care when you cannot express them yourself. Health care providers should keep a copy of your living will in your medical record. You may want to give a copy to family members or friends. To alert caregivers in case of an emergency, you can place a card in your wallet to let them know that you have a living will and where they can find it. A living will is used if you become:  Terminally ill.  Incapacitated.  Unable to communicate or make decisions.  Items to consider in your living will include:  The use or non-use of life-sustaining equipment, such as dialysis machines and breathing machines (ventilators).  A DNR or DNAR order, which is the instruction not to use cardiopulmonary resuscitation (CPR) if breathing or heartbeat stops.  The use or non-use of tube feeding.  Withholding of food and fluids.  Comfort (palliative) care when the goal becomes comfort rather than a cure.  Organ and tissue donation.  A living will does not give instructions for distributing your money and  property if you should pass away. It is recommended that you seek the advice of a  when writing a will. Decisions about taxes, beneficiaries, and asset distribution will be legally binding. This process can relieve your family and friends of any concerns surrounding disputes or questions that may come up about the distribution of your assets.  DNR or DNAR  A DNR or DNAR order is a request not to have CPR in the event that your heart stops beating or you stop breathing. If a DNR or DNAR order has not been made and shared, a health care provider will try to help any patient whose heart has stopped or who has stopped breathing. If you plan to have surgery, talk with your health care provider about how your DNR or DNAR order will be followed if problems occur.  Summary  Advance directives are the legal documents that allow you to make choices ahead of time about your health care and medical treatment in case you become unable to communicate for yourself.  The process of discussing and writing advance directives should happen over time. You can change the advance directives, even after you have signed them.  Advance directives include DNR or DNAR orders, living multani, and designating an agent as your medical power of .  This information is not intended to replace advice given to you by your health care provider. Make sure you discuss any questions you have with your health care provider.  Document Released: 03/26/2009 Document Revised: 11/06/2017 Document Reviewed: 11/06/2017  [email or web address] Interactive Patient Education © 2019 [employer]    Patient to call insurance and see how she can get Prolia shot whichis due 10/3/23    If fever, color to sputum or increased breathing problems let us know    When cough improves can get RSV, HD flu. And Prevnar 20  Consider Covid vaccination but not at same time as RSV.  If don't get flu, prevnar and rsv same day-separate by 2 months.

## 2023-09-21 ENCOUNTER — OFFICE VISIT (OUTPATIENT)
Dept: FAMILY MEDICINE CLINIC | Facility: CLINIC | Age: 68
End: 2023-09-21
Payer: MEDICARE

## 2023-09-21 ENCOUNTER — HOSPITAL ENCOUNTER (OUTPATIENT)
Dept: GENERAL RADIOLOGY | Facility: HOSPITAL | Age: 68
Discharge: HOME | End: 2023-09-21
Admitting: PREVENTIVE MEDICINE
Payer: MEDICARE

## 2023-09-21 VITALS
OXYGEN SATURATION: 99 % | HEIGHT: 63 IN | SYSTOLIC BLOOD PRESSURE: 116 MMHG | DIASTOLIC BLOOD PRESSURE: 77 MMHG | HEART RATE: 99 BPM | BODY MASS INDEX: 22.61 KG/M2 | WEIGHT: 127.6 LBS | TEMPERATURE: 98.4 F

## 2023-09-21 DIAGNOSIS — B02.9 HERPES ZOSTER WITHOUT COMPLICATION: ICD-10-CM

## 2023-09-21 DIAGNOSIS — E53.8 B12 DEFICIENCY: ICD-10-CM

## 2023-09-21 DIAGNOSIS — R06.83 SNORING: ICD-10-CM

## 2023-09-21 DIAGNOSIS — M81.0 OSTEOPOROSIS WITHOUT CURRENT PATHOLOGICAL FRACTURE, UNSPECIFIED OSTEOPOROSIS TYPE: ICD-10-CM

## 2023-09-21 DIAGNOSIS — N39.0 RECURRENT UTI: ICD-10-CM

## 2023-09-21 DIAGNOSIS — I10 ESSENTIAL HYPERTENSION: ICD-10-CM

## 2023-09-21 DIAGNOSIS — R05.9 COUGH, UNSPECIFIED TYPE: ICD-10-CM

## 2023-09-21 DIAGNOSIS — I25.10 CHRONIC CORONARY ARTERY DISEASE: ICD-10-CM

## 2023-09-21 DIAGNOSIS — K21.9 GERD WITHOUT ESOPHAGITIS: ICD-10-CM

## 2023-09-21 DIAGNOSIS — E78.2 MIXED HYPERLIPIDEMIA: ICD-10-CM

## 2023-09-21 DIAGNOSIS — E11.65 TYPE 2 DIABETES MELLITUS WITH HYPERGLYCEMIA, WITHOUT LONG-TERM CURRENT USE OF INSULIN: ICD-10-CM

## 2023-09-21 DIAGNOSIS — Z00.01 ENCOUNTER FOR ANNUAL GENERAL MEDICAL EXAMINATION WITH ABNORMAL FINDINGS IN ADULT: Primary | ICD-10-CM

## 2023-09-21 DIAGNOSIS — Z12.4 SCREENING FOR CERVICAL CANCER: ICD-10-CM

## 2023-09-21 DIAGNOSIS — J43.9 PULMONARY EMPHYSEMA, UNSPECIFIED EMPHYSEMA TYPE: ICD-10-CM

## 2023-09-21 PROBLEM — J18.9 PNEUMONIA OF RIGHT MIDDLE LOBE DUE TO INFECTIOUS ORGANISM: Status: RESOLVED | Noted: 2021-07-02 | Resolved: 2023-09-21

## 2023-09-21 LAB
ALBUMIN SERPL-MCNC: 4.4 G/DL (ref 3.5–5.2)
ALBUMIN/GLOB SERPL: 1.6 G/DL
ALP SERPL-CCNC: 65 U/L (ref 39–117)
ALT SERPL W P-5'-P-CCNC: 21 U/L (ref 1–33)
ANION GAP SERPL CALCULATED.3IONS-SCNC: 12 MMOL/L (ref 5–15)
AST SERPL-CCNC: 19 U/L (ref 1–32)
BASOPHILS # BLD AUTO: 0.09 10*3/MM3 (ref 0–0.2)
BASOPHILS NFR BLD AUTO: 1.1 % (ref 0–1.5)
BILIRUB SERPL-MCNC: 0.5 MG/DL (ref 0–1.2)
BUN SERPL-MCNC: 17 MG/DL (ref 8–23)
BUN/CREAT SERPL: 27 (ref 7–25)
CALCIUM SPEC-SCNC: 9.8 MG/DL (ref 8.6–10.5)
CHLORIDE SERPL-SCNC: 104 MMOL/L (ref 98–107)
CHOLEST SERPL-MCNC: 140 MG/DL (ref 0–200)
CO2 SERPL-SCNC: 24 MMOL/L (ref 22–29)
CREAT SERPL-MCNC: 0.63 MG/DL (ref 0.57–1)
DEPRECATED RDW RBC AUTO: 39.4 FL (ref 37–54)
EGFRCR SERPLBLD CKD-EPI 2021: 96.8 ML/MIN/1.73
EOSINOPHIL # BLD AUTO: 0.42 10*3/MM3 (ref 0–0.4)
EOSINOPHIL NFR BLD AUTO: 5.3 % (ref 0.3–6.2)
ERYTHROCYTE [DISTWIDTH] IN BLOOD BY AUTOMATED COUNT: 12.4 % (ref 12.3–15.4)
EXPIRATION DATE: NORMAL
FLUAV AG UPPER RESP QL IA.RAPID: NOT DETECTED
FLUBV AG UPPER RESP QL IA.RAPID: NOT DETECTED
GLOBULIN UR ELPH-MCNC: 2.7 GM/DL
GLUCOSE SERPL-MCNC: 143 MG/DL (ref 65–99)
HBA1C MFR BLD: 6.7 % (ref 4.8–5.6)
HCT VFR BLD AUTO: 42.5 % (ref 34–46.6)
HDLC SERPL-MCNC: 38 MG/DL (ref 40–60)
HGB BLD-MCNC: 14.3 G/DL (ref 12–15.9)
INTERNAL CONTROL: NORMAL
LDLC SERPL CALC-MCNC: 72 MG/DL (ref 0–100)
LDLC/HDLC SERPL: 1.74 {RATIO}
LYMPHOCYTES # BLD AUTO: 1.47 10*3/MM3 (ref 0.7–3.1)
LYMPHOCYTES NFR BLD AUTO: 18.7 % (ref 19.6–45.3)
Lab: NORMAL
MAGNESIUM SERPL-MCNC: 1.9 MG/DL (ref 1.6–2.4)
MCH RBC QN AUTO: 29.5 PG (ref 26.6–33)
MCHC RBC AUTO-ENTMCNC: 33.6 G/DL (ref 31.5–35.7)
MCV RBC AUTO: 87.6 FL (ref 79–97)
MONOCYTES # BLD AUTO: 0.51 10*3/MM3 (ref 0.1–0.9)
MONOCYTES NFR BLD AUTO: 6.5 % (ref 5–12)
NEUTROPHILS NFR BLD AUTO: 5.36 10*3/MM3 (ref 1.7–7)
NEUTROPHILS NFR BLD AUTO: 68.1 % (ref 42.7–76)
PLATELET # BLD AUTO: 171 10*3/MM3 (ref 140–450)
PMV BLD AUTO: 13.1 FL (ref 6–12)
POTASSIUM SERPL-SCNC: 4.4 MMOL/L (ref 3.5–5.2)
PROT SERPL-MCNC: 7.1 G/DL (ref 6–8.5)
RBC # BLD AUTO: 4.85 10*6/MM3 (ref 3.77–5.28)
SARS-COV-2 AG UPPER RESP QL IA.RAPID: NOT DETECTED
SODIUM SERPL-SCNC: 140 MMOL/L (ref 136–145)
TRIGL SERPL-MCNC: 180 MG/DL (ref 0–150)
TSH SERPL DL<=0.05 MIU/L-ACNC: 1.38 UIU/ML (ref 0.27–4.2)
VIT B12 BLD-MCNC: 314 PG/ML (ref 211–946)
VLDLC SERPL-MCNC: 30 MG/DL (ref 5–40)
WBC NRBC COR # BLD: 7.87 10*3/MM3 (ref 3.4–10.8)

## 2023-09-21 PROCEDURE — 80061 LIPID PANEL: CPT | Performed by: INTERNAL MEDICINE

## 2023-09-21 PROCEDURE — 83036 HEMOGLOBIN GLYCOSYLATED A1C: CPT | Performed by: INTERNAL MEDICINE

## 2023-09-21 PROCEDURE — 71046 X-RAY EXAM CHEST 2 VIEWS: CPT

## 2023-09-21 PROCEDURE — 84443 ASSAY THYROID STIM HORMONE: CPT | Performed by: INTERNAL MEDICINE

## 2023-09-21 PROCEDURE — 83735 ASSAY OF MAGNESIUM: CPT | Performed by: INTERNAL MEDICINE

## 2023-09-21 PROCEDURE — 87186 SC STD MICRODIL/AGAR DIL: CPT | Performed by: PREVENTIVE MEDICINE

## 2023-09-21 PROCEDURE — 87077 CULTURE AEROBIC IDENTIFY: CPT | Performed by: PREVENTIVE MEDICINE

## 2023-09-21 PROCEDURE — 82570 ASSAY OF URINE CREATININE: CPT | Performed by: PREVENTIVE MEDICINE

## 2023-09-21 PROCEDURE — 87086 URINE CULTURE/COLONY COUNT: CPT | Performed by: PREVENTIVE MEDICINE

## 2023-09-21 PROCEDURE — 82043 UR ALBUMIN QUANTITATIVE: CPT | Performed by: PREVENTIVE MEDICINE

## 2023-09-21 PROCEDURE — 80053 COMPREHEN METABOLIC PANEL: CPT | Performed by: INTERNAL MEDICINE

## 2023-09-21 PROCEDURE — 82607 VITAMIN B-12: CPT | Performed by: PREVENTIVE MEDICINE

## 2023-09-21 PROCEDURE — 85025 COMPLETE CBC W/AUTO DIFF WBC: CPT | Performed by: PREVENTIVE MEDICINE

## 2023-09-21 RX ORDER — BENZONATATE 100 MG/1
100 CAPSULE ORAL 3 TIMES DAILY PRN
Qty: 30 CAPSULE | Refills: 0 | Status: SHIPPED | OUTPATIENT
Start: 2023-09-21 | End: 2023-09-21

## 2023-09-21 RX ORDER — BENZONATATE 100 MG/1
100 CAPSULE ORAL 3 TIMES DAILY PRN
Qty: 30 CAPSULE | Refills: 0 | Status: SHIPPED | OUTPATIENT
Start: 2023-09-21

## 2023-09-22 ENCOUNTER — TELEPHONE (OUTPATIENT)
Dept: FAMILY MEDICINE CLINIC | Facility: CLINIC | Age: 68
End: 2023-09-22
Payer: MEDICARE

## 2023-09-22 LAB
ALBUMIN UR-MCNC: 12.3 MG/DL
CREAT UR-MCNC: 110.7 MG/DL
MICROALBUMIN/CREAT UR: 111.1 MG/G

## 2023-09-22 NOTE — TELEPHONE ENCOUNTER
HUB TO READ:  ----- Message from Melisa Taveras MD sent at 9/22/2023  9:18 AM EDT -----  No pneumonia or other cardiopulmonary changes on chest  xray..  Blood sugar under good control with A1C showing 6.7.  You are still spilling some protein in urine so good that you are on Jardiance.  Bad cholesterol 72 and goal is below 70 so watch sat fats and increase walking to get to goal.  Also, Vitamin B12 is slightly low so increase otc dose 3 days/week

## 2023-09-22 NOTE — PROGRESS NOTES
No pneumonia or other cardiopulmonary changes on chest  xray..  Blood sugar under good control with A1C showing 6.7.  You are still spilling some protein in urine so good that you are on Jardiance.  Bad cholesterol 72 and goal is below 70 so watch sat fats and increase walking to get to goal.  Also, Vitamin B12 is slightly low so increase otc dose 3 days/week

## 2023-09-24 LAB — BACTERIA SPEC AEROBE CULT: ABNORMAL

## 2023-09-25 ENCOUNTER — TELEPHONE (OUTPATIENT)
Dept: FAMILY MEDICINE CLINIC | Facility: CLINIC | Age: 68
End: 2023-09-25

## 2023-09-25 DIAGNOSIS — N39.0 URINARY TRACT INFECTION WITHOUT HEMATURIA, SITE UNSPECIFIED: Primary | ICD-10-CM

## 2023-09-25 RX ORDER — SULFAMETHOXAZOLE AND TRIMETHOPRIM 800; 160 MG/1; MG/1
1 TABLET ORAL 2 TIMES DAILY
Qty: 14 TABLET | Refills: 0 | Status: SHIPPED | OUTPATIENT
Start: 2023-09-25

## 2023-09-25 NOTE — PROGRESS NOTES
Urine is again infected.  I have sent Bactrim to the pharmacy.  Since you have had multiple UTIs when do you see the urologist.  Do you need a referral?  Remember to take antibiotics till gone drink plenty of fluids and reculture 3 days after medicine.  Vitamin B12 is also slightly low so take of 1000 units daily over-the-counter.  Continue to watch saturated fats as you are at 72 on the harmful saturated fats and goal is 70.  Call if any other questions or concerns

## 2023-09-25 NOTE — TELEPHONE ENCOUNTER
HUB TO READ:  ----- Message from Melisa Taveras MD sent at 9/25/2023  7:48 AM EDT -----  Urine is again infected.  I have sent Bactrim to the pharmacy.  Since you have had multiple UTIs when do you see the urologist.  Do you need a referral?  Remember to take antibiotics till gone drink plenty of fluids and reculture 3 days after medicine.  Vitamin B12 is also slightly low so take of 1000 units daily over-the-counter.  Continue to watch saturated fats as you are at 72 on the harmful saturated fats and goal is 70.  Call if any other questions or concerns

## 2023-10-02 ENCOUNTER — APPOINTMENT (OUTPATIENT)
Dept: GENERAL RADIOLOGY | Facility: HOSPITAL | Age: 68
End: 2023-10-02
Payer: MEDICARE

## 2023-10-02 ENCOUNTER — HOSPITAL ENCOUNTER (OUTPATIENT)
Facility: HOSPITAL | Age: 68
Setting detail: OBSERVATION
Discharge: HOME | End: 2023-10-05
Attending: EMERGENCY MEDICINE | Admitting: STUDENT IN AN ORGANIZED HEALTH CARE EDUCATION/TRAINING PROGRAM
Payer: MEDICARE

## 2023-10-02 DIAGNOSIS — J44.9 CHRONIC OBSTRUCTIVE PULMONARY DISEASE, UNSPECIFIED COPD TYPE: ICD-10-CM

## 2023-10-02 DIAGNOSIS — R06.00 DYSPNEA, UNSPECIFIED TYPE: Primary | ICD-10-CM

## 2023-10-02 LAB
ALBUMIN SERPL-MCNC: 4.3 G/DL (ref 3.5–5.2)
ALBUMIN/GLOB SERPL: 1.5 G/DL
ALP SERPL-CCNC: 74 U/L (ref 39–117)
ALT SERPL W P-5'-P-CCNC: 20 U/L (ref 1–33)
ANION GAP SERPL CALCULATED.3IONS-SCNC: 12 MMOL/L (ref 5–15)
APTT PPP: 29.7 SECONDS (ref 61–76.5)
AST SERPL-CCNC: 21 U/L (ref 1–32)
BASOPHILS # BLD MANUAL: 0.14 10*3/MM3 (ref 0–0.2)
BASOPHILS NFR BLD MANUAL: 2 % (ref 0–1.5)
BILIRUB SERPL-MCNC: 0.3 MG/DL (ref 0–1.2)
BUN SERPL-MCNC: 19 MG/DL (ref 8–23)
BUN/CREAT SERPL: 29.7 (ref 7–25)
CALCIUM SPEC-SCNC: 9.7 MG/DL (ref 8.6–10.5)
CHLORIDE SERPL-SCNC: 102 MMOL/L (ref 98–107)
CO2 SERPL-SCNC: 24 MMOL/L (ref 22–29)
CREAT SERPL-MCNC: 0.64 MG/DL (ref 0.57–1)
D DIMER PPP FEU-MCNC: 0.65 MG/L (FEU) (ref 0–0.68)
DEPRECATED RDW RBC AUTO: 45.5 FL (ref 37–54)
EGFRCR SERPLBLD CKD-EPI 2021: 96.4 ML/MIN/1.73
EOSINOPHIL # BLD MANUAL: 0.55 10*3/MM3 (ref 0–0.4)
EOSINOPHIL NFR BLD MANUAL: 8 % (ref 0.3–6.2)
ERYTHROCYTE [DISTWIDTH] IN BLOOD BY AUTOMATED COUNT: 13.9 % (ref 12.3–15.4)
GLOBULIN UR ELPH-MCNC: 2.9 GM/DL
GLUCOSE SERPL-MCNC: 157 MG/DL (ref 65–99)
HCT VFR BLD AUTO: 43.8 % (ref 34–46.6)
HGB BLD-MCNC: 14.3 G/DL (ref 12–15.9)
INR PPP: 1.03 (ref 0.93–1.1)
LARGE PLATELETS: ABNORMAL
LYMPHOCYTES # BLD MANUAL: 1.1 10*3/MM3 (ref 0.7–3.1)
LYMPHOCYTES NFR BLD MANUAL: 6 % (ref 5–12)
MAGNESIUM SERPL-MCNC: 1.9 MG/DL (ref 1.6–2.4)
MCH RBC QN AUTO: 29.1 PG (ref 26.6–33)
MCHC RBC AUTO-ENTMCNC: 32.6 G/DL (ref 31.5–35.7)
MCV RBC AUTO: 89.3 FL (ref 79–97)
MONOCYTES # BLD: 0.41 10*3/MM3 (ref 0.1–0.9)
NEUTROPHILS # BLD AUTO: 4.69 10*3/MM3 (ref 1.7–7)
NEUTROPHILS NFR BLD MANUAL: 65 % (ref 42.7–76)
NEUTS BAND NFR BLD MANUAL: 3 % (ref 0–5)
NT-PROBNP SERPL-MCNC: 146.9 PG/ML (ref 0–900)
PLATELET # BLD AUTO: 216 10*3/MM3 (ref 140–450)
PMV BLD AUTO: 10.1 FL (ref 6–12)
POTASSIUM SERPL-SCNC: 4.1 MMOL/L (ref 3.5–5.2)
PROT SERPL-MCNC: 7.2 G/DL (ref 6–8.5)
PROTHROMBIN TIME: 11.2 SECONDS (ref 9.6–11.7)
RBC # BLD AUTO: 4.9 10*6/MM3 (ref 3.77–5.28)
RBC MORPH BLD: NORMAL
SARS-COV-2 RNA RESP QL NAA+PROBE: NOT DETECTED
SCAN SLIDE: NORMAL
SMALL PLATELETS BLD QL SMEAR: ADEQUATE
SODIUM SERPL-SCNC: 138 MMOL/L (ref 136–145)
TROPONIN T SERPL HS-MCNC: 16 NG/L
VARIANT LYMPHS NFR BLD MANUAL: 11 % (ref 19.6–45.3)
VARIANT LYMPHS NFR BLD MANUAL: 5 % (ref 0–5)
WBC MORPH BLD: NORMAL
WBC NRBC COR # BLD: 6.9 10*3/MM3 (ref 3.4–10.8)

## 2023-10-02 PROCEDURE — 85379 FIBRIN DEGRADATION QUANT: CPT | Performed by: EMERGENCY MEDICINE

## 2023-10-02 PROCEDURE — 87635 SARS-COV-2 COVID-19 AMP PRB: CPT | Performed by: EMERGENCY MEDICINE

## 2023-10-02 PROCEDURE — 84484 ASSAY OF TROPONIN QUANT: CPT | Performed by: EMERGENCY MEDICINE

## 2023-10-02 PROCEDURE — 83880 ASSAY OF NATRIURETIC PEPTIDE: CPT | Performed by: EMERGENCY MEDICINE

## 2023-10-02 PROCEDURE — 94799 UNLISTED PULMONARY SVC/PX: CPT

## 2023-10-02 PROCEDURE — 85007 BL SMEAR W/DIFF WBC COUNT: CPT | Performed by: EMERGENCY MEDICINE

## 2023-10-02 PROCEDURE — 94640 AIRWAY INHALATION TREATMENT: CPT

## 2023-10-02 PROCEDURE — 25010000002 METHYLPREDNISOLONE PER 125 MG: Performed by: EMERGENCY MEDICINE

## 2023-10-02 PROCEDURE — 71045 X-RAY EXAM CHEST 1 VIEW: CPT

## 2023-10-02 PROCEDURE — 85025 COMPLETE CBC W/AUTO DIFF WBC: CPT | Performed by: EMERGENCY MEDICINE

## 2023-10-02 PROCEDURE — 80053 COMPREHEN METABOLIC PANEL: CPT | Performed by: EMERGENCY MEDICINE

## 2023-10-02 PROCEDURE — 85610 PROTHROMBIN TIME: CPT | Performed by: EMERGENCY MEDICINE

## 2023-10-02 PROCEDURE — 99285 EMERGENCY DEPT VISIT HI MDM: CPT

## 2023-10-02 PROCEDURE — 85730 THROMBOPLASTIN TIME PARTIAL: CPT | Performed by: EMERGENCY MEDICINE

## 2023-10-02 PROCEDURE — 96374 THER/PROPH/DIAG INJ IV PUSH: CPT

## 2023-10-02 PROCEDURE — 83735 ASSAY OF MAGNESIUM: CPT | Performed by: EMERGENCY MEDICINE

## 2023-10-02 PROCEDURE — 93005 ELECTROCARDIOGRAM TRACING: CPT | Performed by: EMERGENCY MEDICINE

## 2023-10-02 RX ORDER — METHYLPREDNISOLONE SODIUM SUCCINATE 125 MG/2ML
125 INJECTION, POWDER, LYOPHILIZED, FOR SOLUTION INTRAMUSCULAR; INTRAVENOUS ONCE
Status: COMPLETED | OUTPATIENT
Start: 2023-10-02 | End: 2023-10-02

## 2023-10-02 RX ORDER — SODIUM CHLORIDE 0.9 % (FLUSH) 0.9 %
10 SYRINGE (ML) INJECTION AS NEEDED
Status: DISCONTINUED | OUTPATIENT
Start: 2023-10-02 | End: 2023-10-05 | Stop reason: HOSPADM

## 2023-10-02 RX ORDER — IPRATROPIUM BROMIDE AND ALBUTEROL SULFATE 2.5; .5 MG/3ML; MG/3ML
3 SOLUTION RESPIRATORY (INHALATION) ONCE
Status: COMPLETED | OUTPATIENT
Start: 2023-10-02 | End: 2023-10-02

## 2023-10-02 RX ADMIN — IPRATROPIUM BROMIDE AND ALBUTEROL SULFATE 3 ML: .5; 3 SOLUTION RESPIRATORY (INHALATION) at 23:00

## 2023-10-02 RX ADMIN — METHYLPREDNISOLONE SODIUM SUCCINATE 125 MG: 125 INJECTION, POWDER, FOR SOLUTION INTRAMUSCULAR; INTRAVENOUS at 23:20

## 2023-10-02 NOTE — Clinical Note
Level of Care: Med/Surg [1]   Admitting Physician: FELECIA RIVERA [[id number]]   Attending Physician: FELECIA RIVERA [[id number]]   Bed Request Comments: tele

## 2023-10-03 ENCOUNTER — APPOINTMENT (OUTPATIENT)
Dept: CT IMAGING | Facility: HOSPITAL | Age: 68
End: 2023-10-03
Payer: MEDICARE

## 2023-10-03 PROBLEM — R79.89 ELEVATED TROPONIN: Status: ACTIVE | Noted: 2023-10-03

## 2023-10-03 PROBLEM — J44.1 COPD WITH ACUTE EXACERBATION: Status: ACTIVE | Noted: 2023-10-03

## 2023-10-03 PROBLEM — J44.1 COPD EXACERBATION: Status: ACTIVE | Noted: 2023-10-03

## 2023-10-03 PROBLEM — E11.9 TYPE 2 DIABETES MELLITUS: Status: ACTIVE | Noted: 2023-10-03

## 2023-10-03 PROBLEM — M54.50 ACUTE RIGHT-SIDED LOW BACK PAIN: Status: ACTIVE | Noted: 2023-10-03

## 2023-10-03 LAB
GEN 5 2HR TROPONIN T REFLEX: 15 NG/L
GLUCOSE BLDC GLUCOMTR-MCNC: 117 MG/DL (ref 70–105)
GLUCOSE BLDC GLUCOMTR-MCNC: 197 MG/DL (ref 70–105)
GLUCOSE BLDC GLUCOMTR-MCNC: 219 MG/DL (ref 70–105)
GLUCOSE BLDC GLUCOMTR-MCNC: 291 MG/DL (ref 70–105)
HOLD SPECIMEN: NORMAL
QT INTERVAL: 356 MS
QTC INTERVAL: 458 MS
TROPONIN T DELTA: -1 NG/L
TROPONIN T SERPL HS-MCNC: 9 NG/L

## 2023-10-03 PROCEDURE — 25010000002 MORPHINE PER 10 MG: Performed by: EMERGENCY MEDICINE

## 2023-10-03 PROCEDURE — 96376 TX/PRO/DX INJ SAME DRUG ADON: CPT

## 2023-10-03 PROCEDURE — 94761 N-INVAS EAR/PLS OXIMETRY MLT: CPT

## 2023-10-03 PROCEDURE — 96375 TX/PRO/DX INJ NEW DRUG ADDON: CPT

## 2023-10-03 PROCEDURE — G0378 HOSPITAL OBSERVATION PER HR: HCPCS

## 2023-10-03 PROCEDURE — 84484 ASSAY OF TROPONIN QUANT: CPT | Performed by: EMERGENCY MEDICINE

## 2023-10-03 PROCEDURE — 82948 REAGENT STRIP/BLOOD GLUCOSE: CPT

## 2023-10-03 PROCEDURE — 94664 DEMO&/EVAL PT USE INHALER: CPT

## 2023-10-03 PROCEDURE — 25010000002 ENOXAPARIN PER 10 MG: Performed by: HOSPITALIST

## 2023-10-03 PROCEDURE — 36415 COLL VENOUS BLD VENIPUNCTURE: CPT | Performed by: NURSE PRACTITIONER

## 2023-10-03 PROCEDURE — 25010000002 KETOROLAC TROMETHAMINE PER 15 MG: Performed by: NURSE PRACTITIONER

## 2023-10-03 PROCEDURE — 25510000001 IOPAMIDOL PER 1 ML: Performed by: EMERGENCY MEDICINE

## 2023-10-03 PROCEDURE — 25010000002 METHYLPREDNISOLONE PER 40 MG: Performed by: NURSE PRACTITIONER

## 2023-10-03 PROCEDURE — 25010000002 ONDANSETRON PER 1 MG: Performed by: EMERGENCY MEDICINE

## 2023-10-03 PROCEDURE — 96372 THER/PROPH/DIAG INJ SC/IM: CPT

## 2023-10-03 PROCEDURE — 84484 ASSAY OF TROPONIN QUANT: CPT | Performed by: NURSE PRACTITIONER

## 2023-10-03 PROCEDURE — 25010000002 KETOROLAC TROMETHAMINE PER 15 MG: Performed by: HOSPITALIST

## 2023-10-03 PROCEDURE — 63710000001 INSULIN LISPRO (HUMAN) PER 5 UNITS: Performed by: NURSE PRACTITIONER

## 2023-10-03 PROCEDURE — 25010000002 METHYLPREDNISOLONE PER 40 MG: Performed by: HOSPITALIST

## 2023-10-03 PROCEDURE — 94799 UNLISTED PULMONARY SVC/PX: CPT

## 2023-10-03 PROCEDURE — 71275 CT ANGIOGRAPHY CHEST: CPT

## 2023-10-03 RX ORDER — GABAPENTIN 300 MG/1
300 CAPSULE ORAL 3 TIMES DAILY
Status: DISCONTINUED | OUTPATIENT
Start: 2023-10-03 | End: 2023-10-05 | Stop reason: HOSPADM

## 2023-10-03 RX ORDER — ATORVASTATIN CALCIUM 40 MG/1
40 TABLET, FILM COATED ORAL DAILY
Status: DISCONTINUED | OUTPATIENT
Start: 2023-10-03 | End: 2023-10-05 | Stop reason: HOSPADM

## 2023-10-03 RX ORDER — KETOROLAC TROMETHAMINE 30 MG/ML
15 INJECTION, SOLUTION INTRAMUSCULAR; INTRAVENOUS ONCE
Status: COMPLETED | OUTPATIENT
Start: 2023-10-03 | End: 2023-10-03

## 2023-10-03 RX ORDER — IPRATROPIUM BROMIDE AND ALBUTEROL SULFATE 2.5; .5 MG/3ML; MG/3ML
3 SOLUTION RESPIRATORY (INHALATION) EVERY 4 HOURS PRN
Status: DISCONTINUED | OUTPATIENT
Start: 2023-10-03 | End: 2023-10-05 | Stop reason: HOSPADM

## 2023-10-03 RX ORDER — BENZONATATE 100 MG/1
100 CAPSULE ORAL 3 TIMES DAILY PRN
Status: DISCONTINUED | OUTPATIENT
Start: 2023-10-03 | End: 2023-10-03

## 2023-10-03 RX ORDER — BENZONATATE 100 MG/1
100 CAPSULE ORAL 3 TIMES DAILY PRN
Status: DISCONTINUED | OUTPATIENT
Start: 2023-10-03 | End: 2023-10-05 | Stop reason: HOSPADM

## 2023-10-03 RX ORDER — KETOROLAC TROMETHAMINE 30 MG/ML
15 INJECTION, SOLUTION INTRAMUSCULAR; INTRAVENOUS ONCE AS NEEDED
Status: COMPLETED | OUTPATIENT
Start: 2023-10-03 | End: 2023-10-03

## 2023-10-03 RX ORDER — PRAMIPEXOLE DIHYDROCHLORIDE 0.5 MG/1
0.5 TABLET ORAL NIGHTLY
Status: DISCONTINUED | OUTPATIENT
Start: 2023-10-03 | End: 2023-10-05 | Stop reason: HOSPADM

## 2023-10-03 RX ORDER — IBUPROFEN 600 MG/1
1 TABLET ORAL
Status: DISCONTINUED | OUTPATIENT
Start: 2023-10-03 | End: 2023-10-05 | Stop reason: HOSPADM

## 2023-10-03 RX ORDER — METHYLPREDNISOLONE SODIUM SUCCINATE 40 MG/ML
40 INJECTION, POWDER, LYOPHILIZED, FOR SOLUTION INTRAMUSCULAR; INTRAVENOUS EVERY 6 HOURS
Status: DISCONTINUED | OUTPATIENT
Start: 2023-10-03 | End: 2023-10-03

## 2023-10-03 RX ORDER — NICOTINE POLACRILEX 4 MG
15 LOZENGE BUCCAL
Status: DISCONTINUED | OUTPATIENT
Start: 2023-10-03 | End: 2023-10-05 | Stop reason: HOSPADM

## 2023-10-03 RX ORDER — METHYLPREDNISOLONE SODIUM SUCCINATE 40 MG/ML
40 INJECTION, POWDER, LYOPHILIZED, FOR SOLUTION INTRAMUSCULAR; INTRAVENOUS EVERY 12 HOURS
Status: DISCONTINUED | OUTPATIENT
Start: 2023-10-03 | End: 2023-10-05 | Stop reason: HOSPADM

## 2023-10-03 RX ORDER — DEXTROSE MONOHYDRATE 25 G/50ML
25 INJECTION, SOLUTION INTRAVENOUS
Status: DISCONTINUED | OUTPATIENT
Start: 2023-10-03 | End: 2023-10-05 | Stop reason: HOSPADM

## 2023-10-03 RX ORDER — METHYLPREDNISOLONE SODIUM SUCCINATE 40 MG/ML
40 INJECTION, POWDER, LYOPHILIZED, FOR SOLUTION INTRAMUSCULAR; INTRAVENOUS EVERY 8 HOURS
Status: DISCONTINUED | OUTPATIENT
Start: 2023-10-03 | End: 2023-10-03

## 2023-10-03 RX ORDER — CYCLOBENZAPRINE HCL 10 MG
10 TABLET ORAL ONCE
Status: COMPLETED | OUTPATIENT
Start: 2023-10-03 | End: 2023-10-03

## 2023-10-03 RX ORDER — CYCLOBENZAPRINE HCL 10 MG
5 TABLET ORAL 3 TIMES DAILY PRN
Status: DISCONTINUED | OUTPATIENT
Start: 2023-10-03 | End: 2023-10-05 | Stop reason: HOSPADM

## 2023-10-03 RX ORDER — ARFORMOTEROL TARTRATE 15 UG/2ML
15 SOLUTION RESPIRATORY (INHALATION)
Status: DISCONTINUED | OUTPATIENT
Start: 2023-10-03 | End: 2023-10-05 | Stop reason: HOSPADM

## 2023-10-03 RX ORDER — ONDANSETRON 2 MG/ML
4 INJECTION INTRAMUSCULAR; INTRAVENOUS ONCE
Status: COMPLETED | OUTPATIENT
Start: 2023-10-03 | End: 2023-10-03

## 2023-10-03 RX ORDER — INSULIN LISPRO 100 [IU]/ML
2-9 INJECTION, SOLUTION INTRAVENOUS; SUBCUTANEOUS
Status: DISCONTINUED | OUTPATIENT
Start: 2023-10-03 | End: 2023-10-04

## 2023-10-03 RX ORDER — ASPIRIN 81 MG/1
81 TABLET, CHEWABLE ORAL DAILY
Status: DISCONTINUED | OUTPATIENT
Start: 2023-10-03 | End: 2023-10-05 | Stop reason: HOSPADM

## 2023-10-03 RX ORDER — TRAMADOL HYDROCHLORIDE 50 MG/1
50 TABLET ORAL EVERY 6 HOURS PRN
Status: DISCONTINUED | OUTPATIENT
Start: 2023-10-03 | End: 2023-10-05 | Stop reason: HOSPADM

## 2023-10-03 RX ORDER — LEFLUNOMIDE 20 MG/1
20 TABLET ORAL DAILY
Status: DISCONTINUED | OUTPATIENT
Start: 2023-10-03 | End: 2023-10-05 | Stop reason: HOSPADM

## 2023-10-03 RX ORDER — PANTOPRAZOLE SODIUM 40 MG/1
40 TABLET, DELAYED RELEASE ORAL
Status: DISCONTINUED | OUTPATIENT
Start: 2023-10-03 | End: 2023-10-05 | Stop reason: HOSPADM

## 2023-10-03 RX ORDER — CETIRIZINE HYDROCHLORIDE 10 MG/1
10 TABLET ORAL DAILY
Status: DISCONTINUED | OUTPATIENT
Start: 2023-10-04 | End: 2023-10-05 | Stop reason: HOSPADM

## 2023-10-03 RX ORDER — IPRATROPIUM BROMIDE AND ALBUTEROL SULFATE 2.5; .5 MG/3ML; MG/3ML
3 SOLUTION RESPIRATORY (INHALATION) EVERY 4 HOURS PRN
Status: DISCONTINUED | OUTPATIENT
Start: 2023-10-03 | End: 2023-10-03 | Stop reason: SDUPTHER

## 2023-10-03 RX ORDER — LOSARTAN POTASSIUM 50 MG/1
50 TABLET ORAL 2 TIMES DAILY
Status: DISCONTINUED | OUTPATIENT
Start: 2023-10-03 | End: 2023-10-05 | Stop reason: HOSPADM

## 2023-10-03 RX ORDER — ENOXAPARIN SODIUM 100 MG/ML
40 INJECTION SUBCUTANEOUS EVERY 24 HOURS
Status: DISCONTINUED | OUTPATIENT
Start: 2023-10-03 | End: 2023-10-05 | Stop reason: HOSPADM

## 2023-10-03 RX ORDER — HYDROXYZINE HYDROCHLORIDE 25 MG/1
25 TABLET, FILM COATED ORAL NIGHTLY PRN
Status: DISCONTINUED | OUTPATIENT
Start: 2023-10-03 | End: 2023-10-05 | Stop reason: HOSPADM

## 2023-10-03 RX ORDER — GUAIFENESIN 600 MG/1
1200 TABLET, EXTENDED RELEASE ORAL EVERY 12 HOURS SCHEDULED
Status: DISCONTINUED | OUTPATIENT
Start: 2023-10-03 | End: 2023-10-05 | Stop reason: HOSPADM

## 2023-10-03 RX ADMIN — ATORVASTATIN CALCIUM 40 MG: 40 TABLET, FILM COATED ORAL at 20:49

## 2023-10-03 RX ADMIN — TRAMADOL HYDROCHLORIDE 50 MG: 50 TABLET, COATED ORAL at 15:53

## 2023-10-03 RX ADMIN — INSULIN LISPRO 2 UNITS: 100 INJECTION, SOLUTION INTRAVENOUS; SUBCUTANEOUS at 08:20

## 2023-10-03 RX ADMIN — IOPAMIDOL 100 ML: 755 INJECTION, SOLUTION INTRAVENOUS at 00:53

## 2023-10-03 RX ADMIN — IPRATROPIUM BROMIDE 0.5 MG: 0.5 SOLUTION RESPIRATORY (INHALATION) at 15:14

## 2023-10-03 RX ADMIN — PRAMIPEXOLE DIHYDROCHLORIDE 0.5 MG: 0.5 TABLET ORAL at 20:49

## 2023-10-03 RX ADMIN — MORPHINE SULFATE 4 MG: 4 INJECTION INTRAVENOUS at 00:58

## 2023-10-03 RX ADMIN — CYCLOBENZAPRINE 5 MG: 10 TABLET, FILM COATED ORAL at 20:57

## 2023-10-03 RX ADMIN — IPRATROPIUM BROMIDE 0.5 MG: 0.5 SOLUTION RESPIRATORY (INHALATION) at 06:12

## 2023-10-03 RX ADMIN — IPRATROPIUM BROMIDE 0.5 MG: 0.5 SOLUTION RESPIRATORY (INHALATION) at 10:02

## 2023-10-03 RX ADMIN — METHYLPREDNISOLONE SODIUM SUCCINATE 40 MG: 40 INJECTION, POWDER, FOR SOLUTION INTRAMUSCULAR; INTRAVENOUS at 20:51

## 2023-10-03 RX ADMIN — CYCLOBENZAPRINE 10 MG: 10 TABLET, FILM COATED ORAL at 04:49

## 2023-10-03 RX ADMIN — ARFORMOTEROL TARTRATE 15 MCG: 15 SOLUTION RESPIRATORY (INHALATION) at 06:12

## 2023-10-03 RX ADMIN — LOSARTAN POTASSIUM 50 MG: 50 TABLET, FILM COATED ORAL at 20:49

## 2023-10-03 RX ADMIN — INSULIN LISPRO 6 UNITS: 100 INJECTION, SOLUTION INTRAVENOUS; SUBCUTANEOUS at 17:31

## 2023-10-03 RX ADMIN — GUAIFENESIN 1200 MG: 600 TABLET, EXTENDED RELEASE ORAL at 20:49

## 2023-10-03 RX ADMIN — KETOROLAC TROMETHAMINE 15 MG: 30 INJECTION, SOLUTION INTRAMUSCULAR; INTRAVENOUS at 18:56

## 2023-10-03 RX ADMIN — KETOROLAC TROMETHAMINE 15 MG: 30 INJECTION, SOLUTION INTRAMUSCULAR; INTRAVENOUS at 07:13

## 2023-10-03 RX ADMIN — ONDANSETRON 4 MG: 2 INJECTION INTRAMUSCULAR; INTRAVENOUS at 00:58

## 2023-10-03 RX ADMIN — INSULIN LISPRO 4 UNITS: 100 INJECTION, SOLUTION INTRAVENOUS; SUBCUTANEOUS at 12:08

## 2023-10-03 RX ADMIN — ENOXAPARIN SODIUM 40 MG: 40 INJECTION, SOLUTION SUBCUTANEOUS at 15:53

## 2023-10-03 RX ADMIN — GABAPENTIN 300 MG: 300 CAPSULE ORAL at 20:49

## 2023-10-03 RX ADMIN — METHYLPREDNISOLONE SODIUM SUCCINATE 40 MG: 40 INJECTION, POWDER, FOR SOLUTION INTRAMUSCULAR; INTRAVENOUS at 08:21

## 2023-10-03 RX ADMIN — LEFLUNOMIDE 20 MG: 20 TABLET ORAL at 20:49

## 2023-10-03 RX ADMIN — ASPIRIN 81 MG CHEWABLE TABLET 81 MG: 81 TABLET CHEWABLE at 20:49

## 2023-10-03 RX ADMIN — GUAIFENESIN 1200 MG: 600 TABLET, EXTENDED RELEASE ORAL at 04:49

## 2023-10-03 NOTE — H&P
"    [facility] Medicine Services      Patient Name: Eugenia Madden  : 1955  MRN: [id number]  Primary Care Physician:  Melisa Taveras MD  Date of admission: 10/2/2023      Subjective      Chief Complaint: Shortness of air    History of Present Illness: Eugenia Madden is a very pleasant 68 y.o. female former smoker with a past medical history of COPD not on home oxygen, essential hypertension, hyperlipidemia, GERD without esophagitis, osteoporosis, restless leg syndrome, type 2 diabetes mellitus, chronic coronary artery disease, arthritis, who presented to [facility] on 10/2/2023 complaining of increased shortness of breath cough productive of white sputum for the past 3 weeks.  She reports some pleuritic chest pain she feels was from coughing not exacerbated with activity.  She denies any dizziness or nausea.  She denies any fevers chills or diaphoresis.  She is stable on room air.  Diagnostics today show:High-sensitivity troponin 16 then 15 proBNP 146.9 glucose 157, COVID-19 negative, EKG shows sinus rhythm tachycardia heart rate 100 no acute ST elevation or depression read by me    Chest x-ray per radiology:  Impression:     No acute infiltrate.   Hyperinflation which can be seen with COPD.     CTA chest per radiology:    \"Impression:  1.No evidence of pulmonary embolic disease.  2.Prominence of the pulmonary interstitium could be from interstitial edema, mild.  3.Bilateral lower lobe bronchial wall thickening with some fluid in the bronchi may indicate bronchitis. Scattered foci of atelectasis are also seen somewhat nodular in the anterior aspect of the upper lobes bilaterally.  4.Moderate to severe coronary artery calcific atherosclerosis.    She is afebrile, WBC not elevated, no antibiotics indicated at this time.  She was given on 25 mg IV Solu-Medrol and a DuoNeb treatment in the emergency department with improvement.  She reports she started develop right sided low back " pain when she got to the emergency department.  Possibly caused by coughing.  She denies any injuries or falls.  She will be admitted for further treatment and evaluation of COPD exacerbation.  Review of Systems   Constitutional: Negative.   HENT: Negative.     Eyes: Negative.    Cardiovascular:  Positive for dyspnea on exertion.   Respiratory:  Positive for cough, shortness of breath and sputum production.    Endocrine: Negative.    Hematologic/Lymphatic: Negative.    Skin: Negative.    Musculoskeletal:  Positive for arthritis and back pain.   Gastrointestinal: Negative.    Genitourinary: Negative.    Neurological: Negative.    Psychiatric/Behavioral: Negative.     Allergic/Immunologic: Negative.    All other systems reviewed and are negative.   Personal History     Past Medical History:   Diagnosis Date    Arthritis     COPD (chronic obstructive pulmonary disease) 07/2021    Diabetes mellitus 2010    Hypertension     Kidney stones     Restless leg        Past Surgical History:   Procedure Laterality Date    CERVIX SURGERY  1983    Dysplasia removed from Cervix    COLONOSCOPY      KIDNEY STONE SURGERY  2016    Lithotripsy    OTHER SURGICAL HISTORY  2007    Pinched nerve arm     TUBAL ABDOMINAL LIGATION Bilateral        Family History: family history includes Breast cancer (age of onset: 55) in her mother; Cancer in her brother, father, and mother; Diabetes in her brother, brother, father, mother, and sister; Heart disease in her brother, brother, father, mother, and sister; Hypertension in her brother, brother, father, and sister; No Known Problems in her brother; Other in her brother and another family member; Stroke in her father. Otherwise pertinent FHx was reviewed and not pertinent to current issue.    Social History:  reports that she quit smoking about 2 years ago. Her smoking use included cigarettes. She started smoking about 49 years ago. She has a 23.00 pack-year smoking history. She has never used  smokeless tobacco. She reports that she does not drink alcohol and does not use drugs.    Home Medications:  Prior to Admission Medications       Prescriptions Last Dose Informant Patient Reported? Taking?    Accu-Chek Guide test strip   No No    USE TO test blood sugar daily as instructed    Accu-Chek Softclix Lancets lancets   No No    1 each by Other route Daily. Use as instructed    albuterol sulfate  (90 Base) MCG/ACT inhaler   No No    Inhale 2 puffs Every 4 (Four) Hours As Needed for Wheezing.    aspirin 81 MG chewable tablet   Yes No    Chew 1 tablet Daily.    atorvastatin (LIPITOR) 40 MG tablet   No No    TAKE 1 TABLET BY MOUTH EVERY DAY    benzonatate (TESSALON) 100 MG capsule   No No    Take 1 capsule by mouth 3 (Three) Times a Day As Needed for Cough.    cetirizine (zyrTEC) 10 MG tablet   Yes No    Take 1 tablet by mouth Daily.    cyclobenzaprine (FLEXERIL) 5 MG tablet   No No    Take 1 tablet by mouth 3 (Three) Times a Day As Needed for Muscle Spasms.    denosumab (PROLIA) 60 MG/ML solution prefilled syringe syringe   No No    Inject 1 mL under the skin into the appropriate area as directed 1 (One) Time for 1 dose.    denosumab (PROLIA) syringe 60 mg   No No    famotidine (PEPCID) 20 MG tablet   Yes No    Take 1 tablet by mouth Daily.    Patient not taking:  Reported on 9/21/2023    gabapentin (NEURONTIN) 300 MG capsule   No No    Take 1 capsule by mouth 3 (Three) Times a Day.    glimepiride (AMARYL) 4 MG tablet   No No    TAKE 2 TABLETS BY MOUTH EVERY MORNING BEFORE BREAKFAST    hydrOXYzine (ATARAX) 25 MG tablet   No No    TAKE 2 TABLETS BY MOUTH daily AT bedtime    ipratropium-albuterol (DUO-NEB) 0.5-2.5 mg/3 ml nebulizer   No No    Take 3 mL by nebulization Every 4 (Four) Hours As Needed for Wheezing.    ipratropium-albuterol (DUO-NEB) nebulizer solution 3 mL   No No    Isopropyl Alcohol ([employer] Isopropyl Alcohol Wipes) 70 % misc   No No    Apply 1 each topically Daily.    Jardiance 10 MG tablet  tablet   No No    TAKE 1 TABLET BY MOUTH EVERY DAY    leflunomide (ARAVA) 20 MG tablet   Yes No    Take 1 tablet by mouth Daily.    losartan (COZAAR) 50 MG tablet   No No    TAKE 1 TABLET BY MOUTH TWICE DAILY    metFORMIN ER (GLUCOPHAGE-XR) 500 MG 24 hr tablet   No No    TAKE 2 TABLETS BY MOUTH EVERY DAY    naproxen (NAPROSYN) 500 MG tablet   Yes No    potassium chloride 10 MEQ CR tablet   No No    TAKE 1 TABLET BY MOUTH EVERY DAY    pramipexole (MIRAPEX) 0.125 MG tablet   No No    TAKE 4 TABLETS BY MOUTH EVERY NIGHT AT BEDTIME    Semaglutide, 1 MG/DOSE, (Ozempic, 1 MG/DOSE,) 4 MG/3ML solution pen-injector   No No    INJECT 1 MG UNDER THE SKIN INTO THE APPROPRIATE AREA AS DIRECTED 1 (ONE) TIME PER WEEK.    Stiolto Respimat 2.5-2.5 MCG/ACT aerosol solution inhaler   No No    INHALE TWO PUFFS BY MOUTH DAILY    sulfamethoxazole-trimethoprim (Bactrim DS) 800-160 MG per tablet   No No    Take 1 tablet by mouth 2 (Two) Times a Day.              Allergies:  No Known Allergies    Objective      Vitals:   Temp:  [97.7 °F (36.5 °C)] 97.7 °F (36.5 °C)  Heart Rate:  [] 92  Resp:  [20-21] 21  BP: (116-132)/(58-63) 116/58    Physical Exam  Vitals reviewed.   Constitutional:       Appearance: Normal appearance. She is normal weight.   HENT:      Head: Normocephalic and atraumatic.      Right Ear: External ear normal.      Left Ear: External ear normal.      Nose: Nose normal.      Mouth/Throat:      Mouth: Mucous membranes are moist.   Eyes:      Extraocular Movements: Extraocular movements intact.   Cardiovascular:      Rate and Rhythm: Regular rhythm. Tachycardia present.      Pulses: Normal pulses.      Heart sounds: Normal heart sounds.   Pulmonary:      Effort: Pulmonary effort is normal.      Breath sounds: Normal breath sounds.   Abdominal:      Palpations: Abdomen is soft.   Genitourinary:     Comments: deferred  Musculoskeletal:         General: Normal range of motion.      Cervical back: Normal range of motion and  "neck supple.   Skin:     General: Skin is warm and dry.   Neurological:      General: No focal deficit present.      Mental Status: She is alert and oriented to person, place, and time.   Psychiatric:         Mood and Affect: Mood normal.         Behavior: Behavior normal.         Thought Content: Thought content normal.         Judgment: Judgment normal.        Result Review    Result Review:  I have personally reviewed the results from the time of this admission to 10/3/2023 03:56 EDT and agree with these findings:  [x]  Laboratory  [x]  Microbiology  [x]  Radiology  [x]  EKG/Telemetry   [x]  Cardiology/Vascular   []  Pathology  [x]  Old records  []  Other:  Most notable findings include: High-sensitivity troponin 16 then 15 proBNP 146.9 glucose 157, COVID-19 negative, EKG shows sinus rhythm tachycardia heart rate 100 no acute ST elevation or depression read by me    Chest x-ray per radiology:  Impression:     No acute infiltrate.   Hyperinflation which can be seen with COPD.     CTA chest per radiology:    \"Impression:  1.No evidence of pulmonary embolic disease.  2.Prominence of the pulmonary interstitium could be from interstitial edema, mild.  3.Bilateral lower lobe bronchial wall thickening with some fluid in the bronchi may indicate bronchitis. Scattered foci of atelectasis are also seen somewhat nodular in the anterior aspect of the upper lobes bilaterally.  4.Moderate to severe coronary artery calcific atherosclerosis.  Assessment & Plan        Active Hospital Problems:  Active Hospital Problems    Diagnosis     **COPD with acute exacerbation     Type 2 diabetes mellitus     Elevated troponin     Acute right-sided low back pain     COPD exacerbation     GERD without esophagitis     Arthritis     Essential hypertension     Chronic coronary artery disease     Mixed hyperlipidemia     Osteoporosis     Restless leg      Plan:     COPD with acute exacerbation, no infiltrate or PE per radiology, WBC not " elevated, stable on room air, 40 mg IV Solu-Medrol, DuoNebs every 4 hours as needed shortness of air wheezing, Mucinex and Tessalon Perles for cough, consider pulmonary consult if no improvement, was on home Stiolto Respimat, formulary substitute reordered here    Type 2 diabetes mellitus, stable, hold home metformin while inpatient, home meds unverified at this time reorder pending verification pharmacy consistent carb diet, add SSI as needed with Accu-Cheks ACHS    Elevated troponin mild, 16 then 15 denies chest pain other than pleuritic from coughing, no ST elevation or depression, continuous cardiac monitoring, consider cardiology consult if indicated, repeat troponin pending    Acute right-sided low back pain, 4 mg IV morphine in ED, one-time dose 10 mg Flexeril, one-time dose 15 mg ketorolac denies injury or fall    GERD without esophagitis, home meds unverified this centimeter pending verification pharmacy    Arthritis, osteoporosis, home meds unverified at this time reorder pending verification forms    Essential hypertension, home meds unverified at this time reorder pending verification pharmacy will add as needed antihypertensives if needed monitor BP    Chronic coronary artery disease, denies cardiac catheterization in past denies chest pain troponin mildly elevated see above, was on home statin reorder pending verification    Upper lipidemia, was on home statin reorder pending verification    Restless leg syndrome, was on home Mirapex reorder pending verification      DVT prophylaxis:  Mechanical DVT prophylaxis orders are present.    CODE STATUS:    Code Status (Patient has no pulse and is not breathing): CPR (Attempt to Resuscitate)  Medical Interventions (Patient has pulse or is breathing): Full Support    Admission Status:  I believe this patient meets observation  status.    I discussed the patient's findings and my recommendations with patient and family.    This patient has been examined wearing  appropriate Personal Protective Equipment    . 10/03/23      Signature: Electronically signed by JONATHAN Gunderson, 10/03/23, 4:02 AM EDT.

## 2023-10-03 NOTE — ED PROVIDER NOTES
Subjective   History of Present Illness  Chief complaint: Patient is 68-year-old female with history of COPD.  She is not on home oxygen.  She presents short of breath to the emergency department tonight.  She states she has had 3 weeks worth of symptoms.  She does have a cough with mild mucus production.  She said exertionally she cannot catch her breath with any activity.  She states she does not smoke anymore.  She is not having any chest pain.  She has had left foot swelling for couple of days.  She is never had a blood clot in her leg or lungs.  No recent travel or long trip that she has been on.  She has not had fever to this point.  She saw her primary physician a couple of weeks ago, but was not put on steroids or any medications because she was at the end of a long course of shingles.  But with her symptoms getting progressively worse she presented here for emergency evaluation as they are now severe    Context:    Duration: 3 weeks    Timing:    Severity: Severe    Associated Symptoms:        PCP:  LMP:    Review of Systems   Constitutional:  Positive for fatigue. Negative for fever.   HENT:  Positive for congestion.    Respiratory:  Positive for cough and shortness of breath.    Cardiovascular:  Negative for chest pain.        Left foot swelling   Gastrointestinal:  Negative for abdominal pain.   Genitourinary: Negative.    Musculoskeletal: Negative.    Neurological: Negative.      Past Medical History:   Diagnosis Date    Arthritis     COPD (chronic obstructive pulmonary disease) 07/2021    Diabetes mellitus 2010    Hypertension     Kidney stones     Restless leg        No Known Allergies    Past Surgical History:   Procedure Laterality Date    CERVIX SURGERY  1983    Dysplasia removed from Cervix    COLONOSCOPY      KIDNEY STONE SURGERY  2016    Lithotripsy    OTHER SURGICAL HISTORY  2007    Pinched nerve arm     TUBAL ABDOMINAL LIGATION Bilateral        Family History   Problem Relation Age of Onset     Heart disease Mother     Cancer Mother         Breast Cancer    Breast cancer Mother 55    Diabetes Mother     Diabetes Father     Stroke Father     Heart disease Father     Hypertension Father     Cancer Father     Hypertension Sister     Diabetes Sister     Heart disease Sister     Diabetes Brother     Hypertension Brother     Heart disease Brother     Other Other         Abstraction from [employer] Cholesterol    Diabetes Brother     Hypertension Brother     No Known Problems Brother     Heart disease Brother     Cancer Brother         colon    Other Brother        Social History     Socioeconomic History    Marital status:      Spouse name: Braeden    Number of children: 4    Years of education: 12   Tobacco Use    Smoking status: Former     Packs/day: 0.50     Years: 46.00     Pack years: 23.00     Types: Cigarettes     Start date: 1974     Quit date: 2020     Years since quittin.9    Smokeless tobacco: Never    Tobacco comments:     Passive Smoke: Y   Vaping Use    Vaping Use: Never used   Substance and Sexual Activity    Alcohol use: Never    Drug use: No    Sexual activity: Yes     Partners: Male     Birth control/protection: Surgical, None           Objective   Physical Exam  Vitals and nursing note reviewed.   Constitutional:       General: She is not in acute distress.  HENT:      Head: Normocephalic and atraumatic.   Eyes:      Pupils: Pupils are equal, round, and reactive to light.   Cardiovascular:      Rate and Rhythm: Normal rate and regular rhythm.      Heart sounds: Normal heart sounds.   Pulmonary:      Effort: Pulmonary effort is normal.      Breath sounds: Normal breath sounds.      Comments: Mild tachypnea.  Decreased breath sounds in the left lower lobe.  Abdominal:      Tenderness: There is no abdominal tenderness.   Musculoskeletal:      Cervical back: Normal range of motion and neck supple.      Comments: Good pulses bilateral lower extremities.  No pitting edema.  No pain  in the calf.  Feet have good perfusion.   Skin:     General: Skin is warm and dry.   Neurological:      Mental Status: She is alert.   Psychiatric:         Mood and Affect: Mood normal.         Thought Content: Thought content normal.       Procedures           ED Course      Results for orders placed or performed during the hospital encounter of 10/02/23   COVID-19,CEPHEID/ALMAS,COR/PRAVEEN/PAD/SANTO/MAD IN-HOUSE(OR EMERGENT/ADD-ON),NP SWAB IN TRANSPORT MEDIA 3-4 HR TAT, RT-PCR - Swab, Nasopharynx    Specimen: Nasopharynx; Swab   Result Value Ref Range    COVID19 Not Detected Not Detected - Ref. Range   Comprehensive Metabolic Panel    Specimen: Blood   Result Value Ref Range    Glucose 157 (H) 65 - 99 mg/dL    BUN 19 8 - 23 mg/dL    Creatinine 0.64 0.57 - 1.00 mg/dL    Sodium 138 136 - 145 mmol/L    Potassium 4.1 3.5 - 5.2 mmol/L    Chloride 102 98 - 107 mmol/L    CO2 24.0 22.0 - 29.0 mmol/L    Calcium 9.7 8.6 - 10.5 mg/dL    Total Protein 7.2 6.0 - 8.5 g/dL    Albumin 4.3 3.5 - 5.2 g/dL    ALT (SGPT) 20 1 - 33 U/L    AST (SGOT) 21 1 - 32 U/L    Alkaline Phosphatase 74 39 - 117 U/L    Total Bilirubin 0.3 0.0 - 1.2 mg/dL    Globulin 2.9 gm/dL    A/G Ratio 1.5 g/dL    BUN/Creatinine Ratio 29.7 (H) 7.0 - 25.0    Anion Gap 12.0 5.0 - 15.0 mmol/L    eGFR 96.4 >60.0 mL/min/1.73   Protime-INR    Specimen: Blood   Result Value Ref Range    Protime 11.2 9.6 - 11.7 Seconds    INR 1.03 0.93 - 1.10   aPTT    Specimen: Blood   Result Value Ref Range    PTT 29.7 (L) 61.0 - 76.5 seconds   High Sensitivity Troponin T    Specimen: Blood   Result Value Ref Range    HS Troponin T 16 (H) <10 ng/L   BNP    Specimen: Blood   Result Value Ref Range    proBNP 146.9 0.0 - 900.0 pg/mL   D-dimer, Quantitative    Specimen: Blood   Result Value Ref Range    D-Dimer, Quantitative 0.65 0.00 - 0.68 mg/L (FEU)   Magnesium    Specimen: Blood   Result Value Ref Range    Magnesium 1.9 1.6 - 2.4 mg/dL   CBC Auto Differential    Specimen: Blood   Result  Value Ref Range    WBC 6.90 3.40 - 10.80 10*3/mm3    RBC 4.90 3.77 - 5.28 10*6/mm3    Hemoglobin 14.3 12.0 - 15.9 g/dL    Hematocrit 43.8 34.0 - 46.6 %    MCV 89.3 79.0 - 97.0 fL    MCH 29.1 26.6 - 33.0 pg    MCHC 32.6 31.5 - 35.7 g/dL    RDW 13.9 12.3 - 15.4 %    RDW-SD 45.5 37.0 - 54.0 fl    MPV 10.1 6.0 - 12.0 fL    Platelets 216 140 - 450 10*3/mm3   Scan Slide    Specimen: Blood   Result Value Ref Range    Scan Slide     High Sensitivity Troponin T 2Hr    Specimen: Blood   Result Value Ref Range    HS Troponin T 15 (H) <10 ng/L    Troponin T Delta -1 >=-4 - <+4 ng/L   Manual Differential    Specimen: Blood   Result Value Ref Range    Neutrophil % 65.0 42.7 - 76.0 %    Lymphocyte % 11.0 (L) 19.6 - 45.3 %    Monocyte % 6.0 5.0 - 12.0 %    Eosinophil % 8.0 (H) 0.3 - 6.2 %    Basophil % 2.0 (H) 0.0 - 1.5 %    Bands %  3.0 0.0 - 5.0 %    Atypical Lymphocyte % 5.0 0.0 - 5.0 %    Neutrophils Absolute 4.69 1.70 - 7.00 10*3/mm3    Lymphocytes Absolute 1.10 0.70 - 3.10 10*3/mm3    Monocytes Absolute 0.41 0.10 - 0.90 10*3/mm3    Eosinophils Absolute 0.55 (H) 0.00 - 0.40 10*3/mm3    Basophils Absolute 0.14 0.00 - 0.20 10*3/mm3    RBC Morphology Normal Normal    WBC Morphology Normal Normal    Platelet Estimate Adequate Normal    Large Platelets Slight/1+ None Seen   ECG 12 Lead Dyspnea   Result Value Ref Range    QT Interval 356 ms    QTC Interval 458 ms   Gold Top - SST   Result Value Ref Range    Extra Tube Hold for add-ons.                XR Chest 1 View    Result Date: 10/2/2023  Impression: No acute infiltrate. Hyperinflation which can be seen with COPD. Electronically Signed: Srini Gurrola MD  10/2/2023 10:57 PM EDT  Workstation ID: [id number]    CT Angiogram Chest    Result Date: 10/3/2023  Impression: 1.No evidence of pulmonary embolic disease. 2.Prominence of the pulmonary interstitium could be from interstitial edema, mild. 3.Bilateral lower lobe bronchial wall thickening with some fluid in the bronchi may  indicate bronchitis. Scattered foci of atelectasis are also seen somewhat nodular in the anterior aspect of the upper lobes bilaterally. 4.Moderate to severe coronary artery calcific atherosclerosis. Electronically Signed: Francisco Aguilar MD  10/3/2023 1:48 AM EDT  Workstation ID: [id number]                              Medical Decision Making  Patient was seen and evaluated for the above problem    Differential diagnose includes was not limited to dissection, PE, pneumothorax, COPD  Patient's initial EKG sinus tachycardia with lateral T wave ST changes rate of 100.  Similar to prior EKG dated 7/31/2023.  She was diminished with breath sounds.  Her COVID test was negative.  She received steroids.  She began to have acute onset right-sided upper back and chest pain.  Her D-dimer was normal.  However I was concerned about dissection at this point.  I will obtain CT angiogram chest.  No signs of dissection.  No evidence of PE.  As the patient has progressively become worse with her symptoms we will place in the hospital.  Discussed with Mattie on-call for Dr. Ramires who agrees to admit      Problems Addressed:  Chronic obstructive pulmonary disease, unspecified COPD type: complicated acute illness or injury  Dyspnea, unspecified type: complicated acute illness or injury    Amount and/or Complexity of Data Reviewed  Labs: ordered. Decision-making details documented in ED Course.     Details: Labs reviewed by myself  Radiology: ordered and independent interpretation performed.     Details: CT and x-ray reviewed with  ECG/medicine tests: ordered and independent interpretation performed.     Details: EKG interpreted by myself as above    Risk  Prescription drug management.  Decision regarding hospitalization.        Final diagnoses:   None   Dyspnea  COPD exacerbation    ED Disposition  ED Disposition       None            No follow-up provider specified.       Medication List      No changes were made to your prescriptions  during this visit.            Farhan Stewart,   10/03/23 0259

## 2023-10-04 LAB
ANION GAP SERPL CALCULATED.3IONS-SCNC: 10 MMOL/L (ref 5–15)
BASOPHILS # BLD AUTO: 0.1 10*3/MM3 (ref 0–0.2)
BASOPHILS NFR BLD AUTO: 0.9 % (ref 0–1.5)
BUN SERPL-MCNC: 28 MG/DL (ref 8–23)
BUN/CREAT SERPL: 43.1 (ref 7–25)
CALCIUM SPEC-SCNC: 9.7 MG/DL (ref 8.6–10.5)
CHLORIDE SERPL-SCNC: 100 MMOL/L (ref 98–107)
CO2 SERPL-SCNC: 26 MMOL/L (ref 22–29)
CREAT SERPL-MCNC: 0.65 MG/DL (ref 0.57–1)
DEPRECATED RDW RBC AUTO: 45.5 FL (ref 37–54)
EGFRCR SERPLBLD CKD-EPI 2021: 96 ML/MIN/1.73
EOSINOPHIL # BLD AUTO: 0 10*3/MM3 (ref 0–0.4)
EOSINOPHIL NFR BLD AUTO: 0 % (ref 0.3–6.2)
ERYTHROCYTE [DISTWIDTH] IN BLOOD BY AUTOMATED COUNT: 13.9 % (ref 12.3–15.4)
GLUCOSE BLDC GLUCOMTR-MCNC: 175 MG/DL (ref 70–105)
GLUCOSE BLDC GLUCOMTR-MCNC: 186 MG/DL (ref 70–105)
GLUCOSE BLDC GLUCOMTR-MCNC: 214 MG/DL (ref 70–105)
GLUCOSE BLDC GLUCOMTR-MCNC: 270 MG/DL (ref 70–105)
GLUCOSE SERPL-MCNC: 219 MG/DL (ref 65–99)
HCT VFR BLD AUTO: 41.6 % (ref 34–46.6)
HGB BLD-MCNC: 13.8 G/DL (ref 12–15.9)
LYMPHOCYTES # BLD AUTO: 1 10*3/MM3 (ref 0.7–3.1)
LYMPHOCYTES NFR BLD AUTO: 11.5 % (ref 19.6–45.3)
MCH RBC QN AUTO: 29.7 PG (ref 26.6–33)
MCHC RBC AUTO-ENTMCNC: 33.3 G/DL (ref 31.5–35.7)
MCV RBC AUTO: 89.2 FL (ref 79–97)
MONOCYTES # BLD AUTO: 0.2 10*3/MM3 (ref 0.1–0.9)
MONOCYTES NFR BLD AUTO: 2 % (ref 5–12)
NEUTROPHILS NFR BLD AUTO: 7.3 10*3/MM3 (ref 1.7–7)
NEUTROPHILS NFR BLD AUTO: 85.6 % (ref 42.7–76)
NRBC BLD AUTO-RTO: 0.2 /100 WBC (ref 0–0.2)
PLATELET # BLD AUTO: 217 10*3/MM3 (ref 140–450)
PMV BLD AUTO: 10.2 FL (ref 6–12)
POTASSIUM SERPL-SCNC: 4.8 MMOL/L (ref 3.5–5.2)
RBC # BLD AUTO: 4.66 10*6/MM3 (ref 3.77–5.28)
SODIUM SERPL-SCNC: 136 MMOL/L (ref 136–145)
WBC NRBC COR # BLD: 8.5 10*3/MM3 (ref 3.4–10.8)

## 2023-10-04 PROCEDURE — 96376 TX/PRO/DX INJ SAME DRUG ADON: CPT

## 2023-10-04 PROCEDURE — 80048 BASIC METABOLIC PNL TOTAL CA: CPT | Performed by: HOSPITALIST

## 2023-10-04 PROCEDURE — 63710000001 INSULIN LISPRO (HUMAN) PER 5 UNITS: Performed by: NURSE PRACTITIONER

## 2023-10-04 PROCEDURE — 25010000002 METHYLPREDNISOLONE PER 40 MG: Performed by: HOSPITALIST

## 2023-10-04 PROCEDURE — 25010000002 KETOROLAC TROMETHAMINE PER 15 MG: Performed by: HOSPITALIST

## 2023-10-04 PROCEDURE — 63710000001 INSULIN LISPRO (HUMAN) PER 5 UNITS: Performed by: HOSPITALIST

## 2023-10-04 PROCEDURE — 94799 UNLISTED PULMONARY SVC/PX: CPT

## 2023-10-04 PROCEDURE — G0378 HOSPITAL OBSERVATION PER HR: HCPCS

## 2023-10-04 PROCEDURE — 82948 REAGENT STRIP/BLOOD GLUCOSE: CPT

## 2023-10-04 PROCEDURE — 85025 COMPLETE CBC W/AUTO DIFF WBC: CPT | Performed by: HOSPITALIST

## 2023-10-04 PROCEDURE — 25010000002 ENOXAPARIN PER 10 MG: Performed by: HOSPITALIST

## 2023-10-04 PROCEDURE — 94664 DEMO&/EVAL PT USE INHALER: CPT

## 2023-10-04 PROCEDURE — 94761 N-INVAS EAR/PLS OXIMETRY MLT: CPT

## 2023-10-04 PROCEDURE — 96372 THER/PROPH/DIAG INJ SC/IM: CPT

## 2023-10-04 RX ORDER — INSULIN LISPRO 100 [IU]/ML
3-14 INJECTION, SOLUTION INTRAVENOUS; SUBCUTANEOUS
Status: DISCONTINUED | OUTPATIENT
Start: 2023-10-04 | End: 2023-10-05 | Stop reason: HOSPADM

## 2023-10-04 RX ORDER — KETOROLAC TROMETHAMINE 30 MG/ML
15 INJECTION, SOLUTION INTRAMUSCULAR; INTRAVENOUS EVERY 8 HOURS PRN
Status: DISCONTINUED | OUTPATIENT
Start: 2023-10-04 | End: 2023-10-05 | Stop reason: HOSPADM

## 2023-10-04 RX ADMIN — GABAPENTIN 300 MG: 300 CAPSULE ORAL at 20:39

## 2023-10-04 RX ADMIN — GUAIFENESIN 1200 MG: 600 TABLET, EXTENDED RELEASE ORAL at 20:39

## 2023-10-04 RX ADMIN — ATORVASTATIN CALCIUM 40 MG: 40 TABLET, FILM COATED ORAL at 09:12

## 2023-10-04 RX ADMIN — LOSARTAN POTASSIUM 50 MG: 50 TABLET, FILM COATED ORAL at 09:13

## 2023-10-04 RX ADMIN — KETOROLAC TROMETHAMINE 15 MG: 30 INJECTION, SOLUTION INTRAMUSCULAR; INTRAVENOUS at 12:20

## 2023-10-04 RX ADMIN — LEFLUNOMIDE 20 MG: 20 TABLET ORAL at 09:11

## 2023-10-04 RX ADMIN — PANTOPRAZOLE SODIUM 40 MG: 40 TABLET, DELAYED RELEASE ORAL at 03:51

## 2023-10-04 RX ADMIN — TRAMADOL HYDROCHLORIDE 50 MG: 50 TABLET, COATED ORAL at 03:51

## 2023-10-04 RX ADMIN — INSULIN LISPRO 8 UNITS: 100 INJECTION, SOLUTION INTRAVENOUS; SUBCUTANEOUS at 17:28

## 2023-10-04 RX ADMIN — TRAMADOL HYDROCHLORIDE 50 MG: 50 TABLET, COATED ORAL at 22:02

## 2023-10-04 RX ADMIN — IPRATROPIUM BROMIDE 0.5 MG: 0.5 SOLUTION RESPIRATORY (INHALATION) at 07:50

## 2023-10-04 RX ADMIN — ASPIRIN 81 MG CHEWABLE TABLET 81 MG: 81 TABLET CHEWABLE at 09:11

## 2023-10-04 RX ADMIN — INSULIN LISPRO 2 UNITS: 100 INJECTION, SOLUTION INTRAVENOUS; SUBCUTANEOUS at 12:20

## 2023-10-04 RX ADMIN — Medication 10 ML: at 09:13

## 2023-10-04 RX ADMIN — ARFORMOTEROL TARTRATE 15 MCG: 15 SOLUTION RESPIRATORY (INHALATION) at 19:48

## 2023-10-04 RX ADMIN — GABAPENTIN 300 MG: 300 CAPSULE ORAL at 16:08

## 2023-10-04 RX ADMIN — LOSARTAN POTASSIUM 50 MG: 50 TABLET, FILM COATED ORAL at 20:38

## 2023-10-04 RX ADMIN — INSULIN LISPRO 5 UNITS: 100 INJECTION, SOLUTION INTRAVENOUS; SUBCUTANEOUS at 22:03

## 2023-10-04 RX ADMIN — CETIRIZINE HYDROCHLORIDE 10 MG: 10 TABLET, FILM COATED ORAL at 09:13

## 2023-10-04 RX ADMIN — GUAIFENESIN 1200 MG: 600 TABLET, EXTENDED RELEASE ORAL at 09:11

## 2023-10-04 RX ADMIN — IPRATROPIUM BROMIDE 0.5 MG: 0.5 SOLUTION RESPIRATORY (INHALATION) at 15:10

## 2023-10-04 RX ADMIN — INSULIN LISPRO 2 UNITS: 100 INJECTION, SOLUTION INTRAVENOUS; SUBCUTANEOUS at 09:11

## 2023-10-04 RX ADMIN — ARFORMOTEROL TARTRATE 15 MCG: 15 SOLUTION RESPIRATORY (INHALATION) at 07:45

## 2023-10-04 RX ADMIN — METHYLPREDNISOLONE SODIUM SUCCINATE 40 MG: 40 INJECTION, POWDER, FOR SOLUTION INTRAMUSCULAR; INTRAVENOUS at 09:13

## 2023-10-04 RX ADMIN — PRAMIPEXOLE DIHYDROCHLORIDE 0.5 MG: 0.5 TABLET ORAL at 20:39

## 2023-10-04 RX ADMIN — ENOXAPARIN SODIUM 40 MG: 40 INJECTION, SOLUTION SUBCUTANEOUS at 16:08

## 2023-10-04 RX ADMIN — GABAPENTIN 300 MG: 300 CAPSULE ORAL at 09:13

## 2023-10-04 RX ADMIN — METHYLPREDNISOLONE SODIUM SUCCINATE 40 MG: 40 INJECTION, POWDER, FOR SOLUTION INTRAMUSCULAR; INTRAVENOUS at 20:40

## 2023-10-04 RX ADMIN — IPRATROPIUM BROMIDE 0.5 MG: 0.5 SOLUTION RESPIRATORY (INHALATION) at 11:26

## 2023-10-04 RX ADMIN — IPRATROPIUM BROMIDE 0.5 MG: 0.5 SOLUTION RESPIRATORY (INHALATION) at 19:44

## 2023-10-04 NOTE — CASE MANAGEMENT/SOCIAL WORK
Discharge Planning Assessment  [facility] ER     Patient Name: Eugenia Madden  MRN: [id number]  Today's Date: 10/4/2023    Admit Date: 10/2/2023    Plan: Return home with spouse.   Discharge Needs Assessment       Row Name 10/04/23 1317       Living Environment    People in Home spouse    Name(s) of People in Home sampson Deras    Current Living Arrangements home    Potentially Unsafe Housing Conditions none    Primary Care Provided by self    Provides Primary Care For no one    Family Caregiver if Needed spouse    Family Caregiver Names sampson Deras    Quality of Family Relationships helpful;involved;supportive    Able to Return to Prior Arrangements yes       Resource/Environmental Concerns    Resource/Environmental Concerns none    Transportation Concerns none       Transition Planning    Patient/Family Anticipates Transition to home with family    Patient/Family Anticipated Services at Transition none    Transportation Anticipated family or friend will provide       Discharge Needs Assessment    Readmission Within the Last 30 Days no previous admission in last 30 days    Equipment Currently Used at Home glucometer;nebulizer    Anticipated Changes Related to Illness none    Provided Post Acute Provider List? N/A    Provided Post Acute Provider Quality & Resource List? N/A                   Discharge Plan       Row Name 10/04/23 5036       Plan    Plan Return home with spouse.    Plan Comments CM met with patient at the bedside. Confirmed PCP, insurance, and pharmacy. Patient lives at home with spouse and anticipates returning home at discharge. Patient drives.  Braeden will provide transport at discharge. Patient denies any difficulty affording medications. Patient is not current with any HHC/OPPT/OT services. DC Barriers: monitor respirtatory status and monitor new O2 requirements.                   Demographic Summary       Row Name 10/04/23 1313       General Information    Admission Type observation    Arrived  From emergency department    Referral Source admission list    Reason for Consult care coordination/care conference;discharge planning    Preferred Language English       Contact Information    Permission Granted to Share Info With     Contact Information Obtained for                    Functional Status       Row Name 10/04/23 2890       Functional Status    Usual Activity Tolerance moderate    Current Activity Tolerance fair       Physical Activity    On average, how many days per week do you engage in moderate to strenuous exercise (like a brisk walk)? 0 days    On average, how many minutes do you engage in exercise at this level? 0 min    Number of minutes of exercise per week 0       Functional Status, IADL    Medications independent    Meal Preparation independent    Housekeeping independent    Laundry independent    Shopping independent                José Emerson RN      Office Phone: [phone]

## 2023-10-04 NOTE — PROGRESS NOTES
[facility]     Progress Note    Patient Name: Eugenia Madden  : 1955  MRN: [id number]  Primary Care Physician:  Melisa Taveras MD  Date of admission: 10/2/2023  Service date and time: 10/04/23 12:25 EDT  Subjective   Subjective     Chief Complaint: SOB    HPI:  Patient Reports coughing and back pain along with improved SOB      Objective   Objective     Vitals:   Temp:  [97.5 °F (36.4 °C)-98.1 °F (36.7 °C)] 97.8 °F (36.6 °C)  Heart Rate:  [] 82  Resp:  [10-21] 16  BP: (103-120)/(54-64) 117/54  Flow (L/min):  [2] 2  Physical Exam    Constitutional: Awake, alert   Eyes: PERRLA, sclerae anicteric, no conjunctival injection   HENT: NCAT, mucous membranes moist   Neck: Supple, no thyromegaly, no lymphadenopathy, trachea midline   Respiratory: mild wheezing   Cardiovascular: RRR, no murmurs, rubs, or gallops, palpable pedal pulses bilaterally   Gastrointestinal: Positive bowel sounds, soft, nontender, nondistended   Musculoskeletal: No bilateral ankle edema, no clubbing or cyanosis to extremities   Psychiatric: Appropriate affect, cooperative   Neurologic: Oriented x 3, strength symmetric in all extremities, Cranial Nerves grossly intact to confrontation, speech clear   Skin: No rashes     Result Review    Result Review:  I have personally reviewed the results from the time of this admission to 10/4/2023 12:25 EDT and agree with these findings:  [x]  Laboratory list / accordion  [x]  Microbiology  [x]  Radiology  [x]  EKG/Telemetry   [x]  Cardiology/Vascular   []  Pathology  []  Old records  []  Other:      Assessment & Plan   Assessment / Plan       Active Hospital Problems:  Active Hospital Problems    Diagnosis     **COPD with acute exacerbation     Type 2 diabetes mellitus     Elevated troponin     Acute right-sided low back pain     COPD exacerbation     GERD without esophagitis     Arthritis     Essential hypertension     Chronic coronary artery disease     Mixed hyperlipidemia      Osteoporosis     Restless leg      Plan:    - cont IV solumedrol, wean as tolerated, cont with supplemental oxygen  - scheduled and prn duonebs, wean oxygen as tolerated  - pain control for back pain, toradol and tramadol prn  - cont home meds as able  - trend labs daily  - supportive care, IS  - SSI    DVT/GI prophylaxis:  Medical and mechanical DVT prophylaxis orders are present.    CODE STATUS:   Code Status (Patient has no pulse and is not breathing): CPR (Attempt to Resuscitate)  Medical Interventions (Patient has pulse or is breathing): Full Support    Disposition:  I expect patient to be discharged 1-2 days    Chuy Cerna MD

## 2023-10-04 NOTE — DISCHARGE PLACEMENT REQUEST
"Euegnia Madden (68 y.o. Female)       Date of Birth   1955    Social Security Number       [address]   [address] [place] [facility]    Home Phone   [phone]    MRN   [id number]       Baptism   Jew    Marital Status                               Admission Date   10/2/23    Admission Type   Emergency    Admitting Provider   Leah Ramires DO    Attending Provider   Chuy Cerna MD    Department, Room/Bed   [facility], 1114/1       Discharge Date       Discharge Disposition       Discharge Destination                                 Attending Provider: Chuy Cerna MD    Allergies: No Known Allergies    Isolation: None   Infection: None   Code Status: CPR    Ht: 160 cm (63\")   Wt: 56.1 kg (123 lb 10.9 oz)    Admission Cmt: None   Principal Problem: COPD with acute exacerbation [J44.1]                   Active Insurance as of 10/2/2023       Primary Coverage       Payor Plan Insurance Group Employer/Plan Group    HUMANA MEDICARE REPLACEMENT HUMANA MEDICARE REPLACEMENT [id number]       Payor Plan Address Payor Plan Phone Number Payor Plan Fax Number Effective Dates    [address] [address] [phone]  1/1/2021 - None Entered    [facility] [address]         Subscriber Name Subscriber Birth Date Member ID       EUGENIA MADDEN 1955 [id number]                     Emergency Contacts        (Rel.) Home Phone Work Phone Mobile Phone    LA NENA MADDEN (Spouse) [phone] -- --                "

## 2023-10-04 NOTE — PLAN OF CARE
Problem: Adult Inpatient Plan of Care  Goal: Plan of Care Review  Outcome: Ongoing, Progressing  Flowsheets (Taken 10/4/2023 1819)  Outcome Evaluation: Pt blood sugars have been elevated today insulin strength increased. No complaints at this time.  Goal: Patient-Specific Goal (Individualized)  Outcome: Ongoing, Progressing  Goal: Absence of Hospital-Acquired Illness or Injury  Outcome: Ongoing, Progressing  Intervention: Identify and Manage Fall Risk  Description: Perform standard risk assessment on admission using a validated tool or comprehensive approach appropriate to the patient; reassess fall risk frequently, with change in status or transfer to another level of care.  Communicate fall injury risk to interprofessional healthcare team.  Determine need for increased observation, equipment and environmental modification, such as low bed, signage and supportive, nonskid footwear.  Adjust safety measures to individual developmental age, stage and identified risk factors.  Reinforce the importance of safety and physical activity with patient and family.  Perform regular intentional rounding to assess need for position change, pain assessment and personal needs, including assistance with toileting.  Recent Flowsheet Documentation  Taken 10/4/2023 1800 by June Sequeira LPN  Safety Promotion/Fall Prevention:   assistive device/personal items within reach   clutter free environment maintained   nonskid shoes/slippers when out of bed   room organization consistent   safety round/check completed  Taken 10/4/2023 1700 by June Sequeira LPN  Safety Promotion/Fall Prevention:   assistive device/personal items within reach   clutter free environment maintained   nonskid shoes/slippers when out of bed   room organization consistent   safety round/check completed  Taken 10/4/2023 1510 by June Sequeira LPN  Safety Promotion/Fall Prevention:   assistive device/personal items within reach   clutter free environment  maintained   nonskid shoes/slippers when out of bed   room organization consistent   safety round/check completed  Taken 10/4/2023 1440 by June Sequeira LPN  Safety Promotion/Fall Prevention:   assistive device/personal items within reach   clutter free environment maintained   nonskid shoes/slippers when out of bed   room organization consistent   safety round/check completed  Taken 10/4/2023 1300 by June Sequeira LPN  Safety Promotion/Fall Prevention:   assistive device/personal items within reach   clutter free environment maintained   nonskid shoes/slippers when out of bed   room organization consistent   safety round/check completed  Taken 10/4/2023 1126 by June Sequeira LPN  Safety Promotion/Fall Prevention:   assistive device/personal items within reach   clutter free environment maintained   nonskid shoes/slippers when out of bed   room organization consistent   safety round/check completed  Taken 10/4/2023 1010 by June Sequeira LPN  Safety Promotion/Fall Prevention:   assistive device/personal items within reach   clutter free environment maintained   nonskid shoes/slippers when out of bed   room organization consistent   safety round/check completed  Taken 10/4/2023 0905 by June Sequeira LPN  Safety Promotion/Fall Prevention:   assistive device/personal items within reach   clutter free environment maintained   nonskid shoes/slippers when out of bed   room organization consistent   safety round/check completed  Taken 10/4/2023 0752 by June Sequeira LPN  Safety Promotion/Fall Prevention:   assistive device/personal items within reach   clutter free environment maintained   nonskid shoes/slippers when out of bed   room organization consistent   safety round/check completed  Goal: Optimal Comfort and Wellbeing  Outcome: Ongoing, Progressing  Goal: Readiness for Transition of Care  Outcome: Ongoing, Progressing     Problem: Pain Acute  Goal: Acceptable Pain Control and Functional  Ability  Outcome: Ongoing, Progressing     Problem: Adjustment to Illness COPD (Chronic Obstructive Pulmonary Disease)  Goal: Optimal Chronic Illness Coping  Outcome: Ongoing, Progressing     Problem: Functional Ability Impaired COPD (Chronic Obstructive Pulmonary Disease)  Goal: Optimal Level of Functional [place]  Outcome: Ongoing, Progressing     Problem: Infection COPD (Chronic Obstructive Pulmonary Disease)  Goal: Absence of Infection Signs and Symptoms  Outcome: Ongoing, Progressing     Problem: Oral Intake Inadequate COPD (Chronic Obstructive Pulmonary Disease)  Goal: Improved Nutrition Intake  Outcome: Ongoing, Progressing     Problem: Respiratory Compromise COPD (Chronic Obstructive Pulmonary Disease)  Goal: Effective Oxygenation and Ventilation  Outcome: Ongoing, Progressing   Goal Outcome Evaluation:              Outcome Evaluation: Pt blood sugars have been elevated today insulin strength increased. No complaints at this time.

## 2023-10-05 ENCOUNTER — READMISSION MANAGEMENT (OUTPATIENT)
Dept: CALL CENTER | Facility: HOSPITAL | Age: 68
End: 2023-10-05
Payer: MEDICARE

## 2023-10-05 VITALS
WEIGHT: 123.68 LBS | TEMPERATURE: 97.7 F | SYSTOLIC BLOOD PRESSURE: 110 MMHG | DIASTOLIC BLOOD PRESSURE: 69 MMHG | HEIGHT: 63 IN | OXYGEN SATURATION: 94 % | BODY MASS INDEX: 21.91 KG/M2 | HEART RATE: 96 BPM | RESPIRATION RATE: 22 BRPM

## 2023-10-05 PROBLEM — J96.01 ACUTE RESPIRATORY FAILURE WITH HYPOXIA: Status: ACTIVE | Noted: 2023-10-05

## 2023-10-05 LAB
ANION GAP SERPL CALCULATED.3IONS-SCNC: 9 MMOL/L (ref 5–15)
BASOPHILS # BLD AUTO: 0 10*3/MM3 (ref 0–0.2)
BASOPHILS NFR BLD AUTO: 0.6 % (ref 0–1.5)
BUN SERPL-MCNC: 23 MG/DL (ref 8–23)
BUN/CREAT SERPL: 42.6 (ref 7–25)
CALCIUM SPEC-SCNC: 8.9 MG/DL (ref 8.6–10.5)
CHLORIDE SERPL-SCNC: 100 MMOL/L (ref 98–107)
CO2 SERPL-SCNC: 26 MMOL/L (ref 22–29)
CREAT SERPL-MCNC: 0.54 MG/DL (ref 0.57–1)
DEPRECATED RDW RBC AUTO: 44.2 FL (ref 37–54)
EGFRCR SERPLBLD CKD-EPI 2021: 100.4 ML/MIN/1.73
EOSINOPHIL # BLD AUTO: 0 10*3/MM3 (ref 0–0.4)
EOSINOPHIL NFR BLD AUTO: 0 % (ref 0.3–6.2)
ERYTHROCYTE [DISTWIDTH] IN BLOOD BY AUTOMATED COUNT: 13.9 % (ref 12.3–15.4)
GLUCOSE BLDC GLUCOMTR-MCNC: 200 MG/DL (ref 70–105)
GLUCOSE BLDC GLUCOMTR-MCNC: 231 MG/DL (ref 70–105)
GLUCOSE SERPL-MCNC: 267 MG/DL (ref 65–99)
HCT VFR BLD AUTO: 42 % (ref 34–46.6)
HGB BLD-MCNC: 13.7 G/DL (ref 12–15.9)
LYMPHOCYTES # BLD AUTO: 1 10*3/MM3 (ref 0.7–3.1)
LYMPHOCYTES NFR BLD AUTO: 13.4 % (ref 19.6–45.3)
MCH RBC QN AUTO: 29.8 PG (ref 26.6–33)
MCHC RBC AUTO-ENTMCNC: 32.6 G/DL (ref 31.5–35.7)
MCV RBC AUTO: 91.4 FL (ref 79–97)
MONOCYTES # BLD AUTO: 0.2 10*3/MM3 (ref 0.1–0.9)
MONOCYTES NFR BLD AUTO: 2.5 % (ref 5–12)
NEUTROPHILS NFR BLD AUTO: 6.5 10*3/MM3 (ref 1.7–7)
NEUTROPHILS NFR BLD AUTO: 83.5 % (ref 42.7–76)
NRBC BLD AUTO-RTO: 0 /100 WBC (ref 0–0.2)
PLATELET # BLD AUTO: 217 10*3/MM3 (ref 140–450)
PMV BLD AUTO: 10.8 FL (ref 6–12)
POTASSIUM SERPL-SCNC: 4.6 MMOL/L (ref 3.5–5.2)
RBC # BLD AUTO: 4.59 10*6/MM3 (ref 3.77–5.28)
SODIUM SERPL-SCNC: 135 MMOL/L (ref 136–145)
WBC NRBC COR # BLD: 7.8 10*3/MM3 (ref 3.4–10.8)

## 2023-10-05 PROCEDURE — 96376 TX/PRO/DX INJ SAME DRUG ADON: CPT

## 2023-10-05 PROCEDURE — 85025 COMPLETE CBC W/AUTO DIFF WBC: CPT | Performed by: HOSPITALIST

## 2023-10-05 PROCEDURE — 94618 PULMONARY STRESS TESTING: CPT

## 2023-10-05 PROCEDURE — G0378 HOSPITAL OBSERVATION PER HR: HCPCS

## 2023-10-05 PROCEDURE — 94799 UNLISTED PULMONARY SVC/PX: CPT

## 2023-10-05 PROCEDURE — 25010000002 METHYLPREDNISOLONE PER 40 MG: Performed by: HOSPITALIST

## 2023-10-05 PROCEDURE — 80048 BASIC METABOLIC PNL TOTAL CA: CPT | Performed by: HOSPITALIST

## 2023-10-05 PROCEDURE — 82948 REAGENT STRIP/BLOOD GLUCOSE: CPT

## 2023-10-05 PROCEDURE — 63710000001 INSULIN LISPRO (HUMAN) PER 5 UNITS: Performed by: HOSPITALIST

## 2023-10-05 RX ORDER — GUAIFENESIN 600 MG/1
1200 TABLET, EXTENDED RELEASE ORAL EVERY 12 HOURS SCHEDULED
Qty: 10 TABLET | Refills: 0 | Status: SHIPPED | OUTPATIENT
Start: 2023-10-05

## 2023-10-05 RX ORDER — PREDNISONE 20 MG/1
40 TABLET ORAL DAILY
Qty: 14 TABLET | Refills: 0 | Status: SHIPPED | OUTPATIENT
Start: 2023-10-05 | End: 2023-10-12

## 2023-10-05 RX ADMIN — GUAIFENESIN 1200 MG: 600 TABLET, EXTENDED RELEASE ORAL at 08:03

## 2023-10-05 RX ADMIN — IPRATROPIUM BROMIDE 0.5 MG: 0.5 SOLUTION RESPIRATORY (INHALATION) at 12:18

## 2023-10-05 RX ADMIN — INSULIN LISPRO 5 UNITS: 100 INJECTION, SOLUTION INTRAVENOUS; SUBCUTANEOUS at 11:09

## 2023-10-05 RX ADMIN — LOSARTAN POTASSIUM 50 MG: 50 TABLET, FILM COATED ORAL at 08:03

## 2023-10-05 RX ADMIN — GABAPENTIN 300 MG: 300 CAPSULE ORAL at 08:03

## 2023-10-05 RX ADMIN — CETIRIZINE HYDROCHLORIDE 10 MG: 10 TABLET, FILM COATED ORAL at 08:03

## 2023-10-05 RX ADMIN — PANTOPRAZOLE SODIUM 40 MG: 40 TABLET, DELAYED RELEASE ORAL at 05:13

## 2023-10-05 RX ADMIN — INSULIN LISPRO 5 UNITS: 100 INJECTION, SOLUTION INTRAVENOUS; SUBCUTANEOUS at 08:00

## 2023-10-05 RX ADMIN — METHYLPREDNISOLONE SODIUM SUCCINATE 40 MG: 40 INJECTION, POWDER, FOR SOLUTION INTRAMUSCULAR; INTRAVENOUS at 08:03

## 2023-10-05 RX ADMIN — LEFLUNOMIDE 20 MG: 20 TABLET ORAL at 08:03

## 2023-10-05 RX ADMIN — ASPIRIN 81 MG CHEWABLE TABLET 81 MG: 81 TABLET CHEWABLE at 08:03

## 2023-10-05 RX ADMIN — ATORVASTATIN CALCIUM 40 MG: 40 TABLET, FILM COATED ORAL at 08:03

## 2023-10-05 NOTE — PROGRESS NOTES
Exercise Oximetry    Patient Name:Eugenia Madden   MRN: [id number]   Date: 10/05/23             ROOM AIR BASELINE   SpO2% 85   Heart Rate 92   Blood Pressure      EXERCISE ON ROOM AIR SpO2% EXERCISE ON O2 @ 3 LPM SpO2%   1 MINUTE  1 MINUTE           @ 2L 89%   2 MINUTES  2 MINUTES         @ 2L  87%   3 MINUTES  3 MINUTES         @ 3L 89%   4 MINUTES  4 MINUTES          @ 3L 90%   5 MINUTES  5 MINUTES          @ 3L 89%   6 MINUTES  6 MINUTES          @ 3L 89%              Distance Walked  150 ft Distance Walked   Dyspnea (Diana Scale)   Dyspnea (Diana Scale)   Fatigue (Diana Scale)   Fatigue (Diana Scale)   SpO2% Post Exercise  90 SpO2% Post Exercise   HR Post Exercise  102 HR Post Exercise   Time to Recovery  <1 min Time to Recovery     Comments:   On room air pt's SpO2 was 85% and 2L of oxygen was placed on pt to obtain SpO2 of 89%. Upon ambulation pt's SpO2 was 87% and oxygen was increased to 3Ls.   Pt was able to maintain SpO2 of 89-90% on 3Ls of oxygen.

## 2023-10-05 NOTE — OUTREACH NOTE
Prep Survey      Flowsheet Row Responses   [facility] facility patient discharged from? [facility]   Is LACE score < 7 ? No   Eligibility [facility]   Date of Admission 10/02/23   Date of Discharge 10/05/23   Discharge Disposition Home or Self Care   Discharge diagnosis COPD with acute exacerbation   Does the patient have one of the following disease processes/diagnoses(primary or secondary)? COPD   Does the patient have Home health ordered? No   Is there a DME ordered? Yes   What DME was ordered? [facility]   Prep survey completed? Yes            JULITA MARIE - Registered Nurse

## 2023-10-05 NOTE — PLAN OF CARE
Goal Outcome Evaluation:         Patient discharging. [facility] to bring o2 for patient before discharge.

## 2023-10-05 NOTE — CASE MANAGEMENT/SOCIAL WORK
Continued Stay Note   [facility]     Patient Name: Eugenia Madden  MRN: [id number]  Today's Date: 10/5/2023    Admit Date: 10/2/2023    Plan: Return home with spouse.   Discharge Plan       Row Name 10/05/23 0923       Plan    Plan Return home with spouse.    Plan Comments DC barriers: noted that patient is still on O2 2L NC. Patient does not have home O2. If unable to be weaned off O2 will need to do 6 minute walk prior to discharge to assess new O2 demand. CM discussed with nursing.                José Emerson RN      Office Phone: [phone]

## 2023-10-05 NOTE — PLAN OF CARE
Goal Outcome Evaluation:      Patient a/ox4, no c/o pain or soa at rest at this time, afebrile, BS at hs 214, remains in SR.

## 2023-10-05 NOTE — DISCHARGE PLACEMENT REQUEST
"Eugenia Madden (68 y.o. Female)       Date of Birth   1955    Social Security Number       [address]   [address] [facility] [place] [facility]    Home Phone   [phone]    MRN   [id number]       Hoahaoism   Rastafarian    Marital Status                               Admission Date   10/2/23    Admission Type   Emergency    Admitting Provider   Leah Ramires DO    Attending Provider   Chuy Cerna MD    Department, Room/Bed   [facility], 1114/1       Discharge Date       Discharge Disposition       Discharge Destination                                 Attending Provider: Chuy Cerna MD    Allergies: No Known Allergies    Isolation: None   Infection: None   Code Status: CPR    Ht: 160 cm (63\")   Wt: 56.1 kg (123 lb 10.9 oz)    Admission Cmt: None   Principal Problem: COPD with acute exacerbation [J44.1]                   Active Insurance as of 10/2/2023       Primary Coverage       Payor Plan Insurance Group Employer/Plan Group    HUMANA MEDICARE REPLACEMENT HUMANA MEDICARE REPLACEMENT [id number]       Payor Plan Address Payor Plan Phone Number Payor Plan Fax Number Effective Dates    [address] [address] [phone]  1/1/2021 - None Entered    [facility] [id number]         Subscriber Name Subscriber Birth Date Member ID       EUGENIA MADDEN 1955 [id number]                     Emergency Contacts        (Rel.) Home Phone Work Phone Mobile Phone    [place] NENA MADDEN (Spouse) [phone] -- --                "

## 2023-10-05 NOTE — DISCHARGE SUMMARY
[facility]         DISCHARGE SUMMARY    Patient Name: Eugenia Madden  : 1955  MRN: [id number]    Date of Admission: 10/2/2023  Date of Discharge:  10/5/23  Primary Care Physician: Melisa Taveras MD    Consults       No orders found from 9/3/2023 to 10/3/2023.            Presenting Problem:   COPD with acute exacerbation [J44.1]  Chronic obstructive pulmonary disease, unspecified COPD type [J44.9]  Dyspnea, unspecified type [R06.00]    Active and Resolved Hospital Problems:  Active Hospital Problems    Diagnosis POA    **COPD with acute exacerbation [J44.1] Yes    Acute respiratory failure with hypoxia [J96.01] Yes    Type 2 diabetes mellitus [E11.9] Yes    Elevated troponin [R79.89] Yes    Acute right-sided low back pain [M54.50] Yes    COPD exacerbation [J44.1] Yes    GERD without esophagitis [K21.9] Yes    Arthritis [M19.90] Yes    Essential hypertension [I10] Yes    Chronic coronary artery disease [I25.10] Yes    Mixed hyperlipidemia [E78.2] Yes    Osteoporosis [M81.0] Yes    Restless leg [G25.81] Yes      Resolved Hospital Problems   No resolved problems to display.         Hospital Course     Hospital Course:  Eugenia Madden is a 68 y.o. female who is a former smoker with a past medical history of COPD not on home oxygen, essential hypertension, hyperlipidemia, GERD without esophagitis, osteoporosis, restless leg syndrome, type 2 diabetes mellitus, chronic coronary artery disease, arthritis, who presented to [facility] on 10/2/2023 complaining of increased shortness of breath cough productive of white sputum for the past 3 weeks.  She reports some pleuritic chest pain she feels was from coughing not exacerbated with activity.  She denies any dizziness or nausea.  She denies any fevers chills or diaphoresis.  She is stable on room air.  Diagnostics show:High-sensitivity troponin 16 then 15 proBNP 146.9 glucose 157, COVID-19 negative.  She was placed on IV solumedrol,  "supplemental oxygen and scheduled duonebs. Patient improved slowly and was weaned down on oxygen but was unable to be weaned to room air.     \"Patient had a 6-minute walk test done which demonstrates patient to need 3L via nasal cannula at home.\"    will setup home oxygen and she will go home on Oral steroids as well.     DISCHARGE Follow Up Recommendations for labs and diagnostics: PCP      Day of Discharge     Vital Signs:  Temp:  [97.5 °F (36.4 °C)-98.4 °F (36.9 °C)] 97.7 °F (36.5 °C)  Heart Rate:  [85-99] 94  Resp:  [11-25] 22  BP: (108-144)/(57-75) 110/69  Flow (L/min):  [2-3] 2  Physical Exam:  Constitutional: Awake, alert   Eyes: PERRLA, sclerae anicteric, no conjunctival injection   HENT: NCAT, mucous membranes moist   Neck: Supple, no thyromegaly, no lymphadenopathy, trachea midline   Respiratory: Clear to auscultation bilaterally, nonlabored respirations    Cardiovascular: RRR, no murmurs, rubs, or gallops, palpable pedal pulses bilaterally   Gastrointestinal: Positive bowel sounds, soft, nontender, nondistended   Musculoskeletal: No bilateral ankle edema, no clubbing or cyanosis to extremities   Psychiatric: Appropriate affect, cooperative   Neurologic: Oriented x 3, strength symmetric in all extremities, Cranial Nerves grossly intact to confrontation, speech clear   Skin: No rashes     Pertinent  and/or Most Recent Results     LAB RESULTS:      Lab 10/05/23  0409 10/04/23  0337 10/02/23  2310   WBC 7.80 8.50 6.90   HEMOGLOBIN 13.7 13.8 14.3   HEMATOCRIT 42.0 41.6 43.8   PLATELETS 217 217 216   NEUTROS ABS 6.50 7.30* 4.69   LYMPHS ABS 1.00 1.00  --    MONOS ABS 0.20 0.20  --    EOS ABS 0.00 0.00 0.55*   MCV 91.4 89.2 89.3   PROTIME  --   --  11.2   APTT  --   --  29.7*         Lab 10/05/23  0409 10/04/23  0337 10/02/23  2310   SODIUM 135* 136 138   POTASSIUM 4.6 4.8 4.1   CHLORIDE 100 100 102   CO2 26.0 26.0 24.0   ANION GAP 9.0 10.0 12.0   BUN 23 28* 19   CREATININE 0.54* 0.65 0.64   EGFR " 100.4 96.0 96.4   GLUCOSE 267* 219* 157*   CALCIUM 8.9 9.7 9.7   MAGNESIUM  --   --  1.9         Lab 10/02/23  2310   TOTAL PROTEIN 7.2   ALBUMIN 4.3   GLOBULIN 2.9   ALT (SGPT) 20   AST (SGOT) 21   BILIRUBIN 0.3   ALK PHOS 74         Lab 10/03/23  0737 10/03/23  0109 10/02/23  2310   PROBNP  --   --  146.9   HSTROP T 9 15* 16*   PROTIME  --   --  11.2   INR  --   --  1.03                 Brief Urine Lab Results  (Last result in the past 365 days)        Color   Clarity   Blood   Leuk Est   Nitrite   Protein   CREAT   Urine HCG        09/21/23 1048             110.7               Microbiology Results (last 10 days)       Procedure Component Value - Date/Time    COVID-19,CEPHEID/ALMAS,COR/[facility]/PAD/[facility]AD IN-HOUSE(OR EMERGENT/ADD-ON),NP SWAB IN TRANSPORT MEDIA 3-4 HR TAT, RT-PCR - Swab, Nasopharynx [[id number]]  (Normal) Collected: 10/02/23 2317    Lab Status: Final result Specimen: Swab from Nasopharynx Updated: 10/02/23 2345     COVID19 Not Detected    Narrative:      Fact sheet for providers: https://www.fda.gov/media/381973/download     Fact sheet for patients: https://www.fda.gov/media/023381/download  Fact sheet for providers: https://www.fda.gov/media/579839/download    Fact sheet for patients: https://www.fda.gov/media/926519/download    Test performed by PCR.            XR Chest 1 View    Result Date: 10/2/2023  Impression: Impression: No acute infiltrate. Hyperinflation which can be seen with COPD. Electronically Signed: Srini Gurrola MD  10/2/2023 10:57 PM EDT  Workstation ID: [id number]    CT Angiogram Chest    Result Date: 10/3/2023  Impression: Impression: 1.No evidence of pulmonary embolic disease. 2.Prominence of the pulmonary interstitium could be from interstitial edema, mild. 3.Bilateral lower lobe bronchial wall thickening with some fluid in the bronchi may indicate bronchitis. Scattered foci of atelectasis are also seen somewhat nodular in the anterior aspect of the upper lobes bilaterally.  4.Moderate to severe coronary artery calcific atherosclerosis. Electronically Signed: Francisco Aguilar MD  10/3/2023 1:48 AM EDT  Workstation ID: [id number]    XR Chest PA & Lateral    Result Date: 9/22/2023  Impression: Impression: No acute process. Electronically Signed: Austyn Henson MD  9/22/2023 8:17 AM EDT  Workstation ID: [id number]             Results for orders placed during the hospital encounter of 03/12/21    Adult Transthoracic Echo Complete w/ Color, Spectral and Contrast if Necessary Per Protocol    Interpretation Summary  · Left ventricular systolic function is normal.  · Left ventricular ejection fraction is 60 to 65%  · Left ventricular wall thickness is consistent with mild concentric hypertrophy.  · Left ventricular diastolic function is consistent with (grade I) impaired relaxation.      Labs Pending at Discharge: none        Discharge Details        Discharge Medications        New Medications        Instructions Start Date   guaiFENesin 600 MG 12 hr tablet  Commonly known as: MUCINEX   1,200 mg, Oral, Every 12 Hours Scheduled      predniSONE 20 MG tablet  Commonly known as: DELTASONE   40 mg, Oral, Daily             Continue These Medications        Instructions Start Date   Accu-Chek Guide test strip  Generic drug: glucose blood   USE TO test blood sugar daily as instructed      Accu-Chek Softclix Lancets lancets   1 each, Other, Daily, Use as instructed      albuterol sulfate  (90 Base) MCG/ACT inhaler  Commonly known as: PROVENTIL HFA;VENTOLIN HFA;PROAIR HFA   2 puffs, Inhalation, Every 4 Hours PRN      aspirin 81 MG chewable tablet   81 mg, Oral, Daily      atorvastatin 40 MG tablet  Commonly known as: LIPITOR   TAKE 1 TABLET BY MOUTH EVERY DAY      benzonatate 100 MG capsule  Commonly known as: TESSALON   100 mg, Oral, 3 Times Daily PRN      cetirizine 10 MG tablet  Commonly known as: zyrTEC   10 mg, Oral, Daily      cyclobenzaprine 5 MG tablet  Commonly known as: FLEXERIL   5 mg, Oral, 3  Times Daily PRN      famotidine 20 MG tablet  Commonly known as: PEPCID   20 mg, Oral, Daily      gabapentin 300 MG capsule  Commonly known as: NEURONTIN   300 mg, Oral, 3 Times Daily      glimepiride 4 MG tablet  Commonly known as: AMARYL   8 mg, Oral, Every Morning Before Breakfast      hydrOXYzine 25 MG tablet  Commonly known as: ATARAX   TAKE 2 TABLETS BY MOUTH daily AT bedtime      ipratropium-albuterol 0.5-2.5 mg/3 ml nebulizer  Commonly known as: DUO-NEB   3 mL, Nebulization, Every 4 Hours PRN      Isopropyl Alcohol 70 % misc  Commonly known as: [employer] Isopropyl Alcohol Wipes   1 each, Apply externally, Daily      Jardiance 10 MG tablet tablet  Generic drug: empagliflozin   TAKE 1 TABLET BY MOUTH EVERY DAY      leflunomide 20 MG tablet  Commonly known as: ARAVA   20 mg, Oral, Daily      losartan 50 MG tablet  Commonly known as: COZAAR   TAKE 1 TABLET BY MOUTH TWICE DAILY      metFORMIN  MG 24 hr tablet  Commonly known as: GLUCOPHAGE-XR   TAKE 2 TABLETS BY MOUTH EVERY DAY      naproxen 500 MG tablet  Commonly known as: NAPROSYN   No dose, route, or frequency recorded.      Ozempic (1 MG/DOSE) 4 MG/3ML solution pen-injector  Generic drug: Semaglutide (1 MG/DOSE)   INJECT 1 MG UNDER THE SKIN INTO THE APPROPRIATE AREA AS DIRECTED 1 (ONE) TIME PER WEEK.      potassium chloride 10 MEQ CR tablet   TAKE 1 TABLET BY MOUTH EVERY DAY      pramipexole 0.125 MG tablet  Commonly known as: MIRAPEX   0.5 mg, Oral, Every Night at Bedtime      Stiolto Respimat 2.5-2.5 MCG/ACT aerosol solution inhaler  Generic drug: tiotropium bromide-olodaterol   INHALE TWO PUFFS BY MOUTH DAILY      sulfamethoxazole-trimethoprim 800-160 MG per tablet  Commonly known as: Bactrim DS   1 tablet, Oral, 2 Times Daily             Stop These Medications      denosumab 60 MG/ML solution prefilled syringe syringe  Commonly known as: PROLIA              No Known Allergies      Discharge Disposition:   Home or Self Care    Discharge Condition:  stable    Diet:  Hospital:  Diet Order   Procedures    Diet: Cardiac Diets, Diabetic Diets; Healthy Heart (2-3 Na+); Consistent Carbohydrate; Texture: Regular Texture (IDDSI 7); Fluid Consistency: Thin (IDDSI 0)         Discharge Activity: as tolerated        CODE STATUS:  Code Status and Medical Interventions:   Ordered at: 10/03/23 0301     Code Status (Patient has no pulse and is not breathing):    CPR (Attempt to Resuscitate)     Medical Interventions (Patient has pulse or is breathing):    Full Support         Future Appointments   Date Time Provider Department Center   12/28/2023  8:30 AM Melisa Taveras MD [facility]           Time spent on Discharge including face to face service:  35 minutes    Chuy Cerna MD

## 2023-10-06 ENCOUNTER — TRANSITIONAL CARE MANAGEMENT TELEPHONE ENCOUNTER (OUTPATIENT)
Dept: CALL CENTER | Facility: HOSPITAL | Age: 68
End: 2023-10-06
Payer: MEDICARE

## 2023-10-06 NOTE — CASE MANAGEMENT/SOCIAL WORK
Case Management Discharge Note      Final Note: Routine home    Provided Post Acute Provider List?: N/A  Provided Post Acute Provider Quality & Resource List?: N/A    Selected Continued Care - Discharged on 10/5/2023 Admission date: 10/2/2023 - Discharge disposition: Home or Self Care        Durable Medical Equipment Coordination complete.      Service Provider Selected Services Address Phone Fax Patient Preferred    [employer] [facility] [employer] [address] [address] [phone] --                    Transportation Services  Private: Car    Final Discharge Disposition Code: 01 - home or self-care

## 2023-10-06 NOTE — OUTREACH NOTE
Call Center TCM Note      Flowsheet Row Responses   [facility] [place] patient discharged from? [facility]   Does the patient have one of the following disease processes/diagnoses(primary or secondary)? COPD   TCM attempt successful? Yes   Call start time 1608   Call end time 1610   Discharge diagnosis COPD with acute exacerbation   Person spoke with today (if not patient) and relationship patient   Meds reviewed with patient/caregiver? Yes   Is the patient having any side effects they believe may be caused by any medication additions or changes? No   Does the patient have all medications ordered at discharge? Yes   Is the patient taking all medications as directed (includes completed medication regime)? Yes   Comments Patient reports she will schedule any needed followups.   Does the patient have an appointment with their PCP within 7-14 days of discharge? No   Nursing Interventions Patient declined scheduling/rescheduling appointment at this time   Psychosocial issues? No   Did the patient receive a copy of their discharge instructions? Yes   Nursing interventions Reviewed instructions with patient   What is the patient's perception of their health status since discharge? Improving   Nursing Interventions Nurse provided patient education   If the patient is a current smoker, are they able to teach back resources for cessation? Not a smoker   Is the patient/caregiver able to teach back the hierarchy of who to call/visit for symptoms/problems? PCP, Specialist, Home health nurse, Urgent Care, ED, 911 Yes   Is the patient able to teach back COPD zones? Yes   Nursing interventions Education provided on various zones   Patient reports what zone on this call? Green Zone   Green Zone Reports doing well   TCM call completed? Yes   Wrap up additional comments Patient reports she is doing well. No needs at this time.   Call end time 1610   Would this patient benefit from a Referral to Amb Social Work? No   Is the patient interested  in additional calls from an ambulatory ? No            Rj Daigle RN    10/6/2023, 16:10 EDT

## 2023-10-23 RX ORDER — TIOTROPIUM BROMIDE AND OLODATEROL 3.124; 2.736 UG/1; UG/1
SPRAY, METERED RESPIRATORY (INHALATION)
Qty: 4 G | Refills: 0 | OUTPATIENT
Start: 2023-10-23

## 2023-10-23 RX ORDER — ATORVASTATIN CALCIUM 40 MG/1
40 TABLET, FILM COATED ORAL DAILY
Qty: 90 TABLET | Refills: 0 | OUTPATIENT
Start: 2023-10-23

## 2023-10-23 RX ORDER — GLIMEPIRIDE 4 MG/1
TABLET ORAL
Qty: 180 TABLET | Refills: 0 | Status: SHIPPED | OUTPATIENT
Start: 2023-10-23

## 2023-10-30 RX ORDER — BLOOD SUGAR DIAGNOSTIC
STRIP MISCELLANEOUS
Qty: 100 EACH | Refills: 0 | Status: SHIPPED | OUTPATIENT
Start: 2023-10-30

## 2023-11-10 DIAGNOSIS — B02.29 POST HERPETIC NEURALGIA: ICD-10-CM

## 2023-11-10 RX ORDER — GABAPENTIN 300 MG/1
300 CAPSULE ORAL 3 TIMES DAILY
Qty: 90 CAPSULE | Refills: 1 | OUTPATIENT
Start: 2023-11-10

## 2023-11-21 DIAGNOSIS — B02.29 POST HERPETIC NEURALGIA: ICD-10-CM

## 2023-11-22 RX ORDER — TIOTROPIUM BROMIDE AND OLODATEROL 3.124; 2.736 UG/1; UG/1
SPRAY, METERED RESPIRATORY (INHALATION)
Qty: 4 G | Refills: 1 | OUTPATIENT
Start: 2023-11-22

## 2023-11-22 RX ORDER — GABAPENTIN 300 MG/1
300 CAPSULE ORAL 3 TIMES DAILY
Qty: 90 CAPSULE | Refills: 1 | OUTPATIENT
Start: 2023-11-22

## 2023-11-22 RX ORDER — ATORVASTATIN CALCIUM 40 MG/1
40 TABLET, FILM COATED ORAL DAILY
Qty: 90 TABLET | Refills: 1 | OUTPATIENT
Start: 2023-11-22

## 2023-12-18 RX ORDER — BLOOD SUGAR DIAGNOSTIC
STRIP MISCELLANEOUS
Qty: 100 EACH | Refills: 0 | Status: SHIPPED | OUTPATIENT
Start: 2023-12-18

## 2023-12-26 DIAGNOSIS — B02.29 POST HERPETIC NEURALGIA: ICD-10-CM

## 2023-12-26 RX ORDER — POTASSIUM CHLORIDE 750 MG/1
10 TABLET, FILM COATED, EXTENDED RELEASE ORAL DAILY
Qty: 90 TABLET | Refills: 0 | Status: SHIPPED | OUTPATIENT
Start: 2023-12-26

## 2023-12-26 RX ORDER — ATORVASTATIN CALCIUM 40 MG/1
40 TABLET, FILM COATED ORAL DAILY
Qty: 90 TABLET | Refills: 0 | Status: SHIPPED | OUTPATIENT
Start: 2023-12-26

## 2023-12-26 RX ORDER — GABAPENTIN 300 MG/1
300 CAPSULE ORAL 3 TIMES DAILY
Qty: 90 CAPSULE | Refills: 1 | OUTPATIENT
Start: 2023-12-26

## 2023-12-29 ENCOUNTER — TRANSCRIBE ORDERS (OUTPATIENT)
Dept: ADMINISTRATIVE | Facility: HOSPITAL | Age: 68
End: 2023-12-29
Payer: MEDICARE

## 2023-12-29 DIAGNOSIS — Z12.31 SCREENING MAMMOGRAM FOR BREAST CANCER: Primary | ICD-10-CM

## 2024-01-01 RX ORDER — METFORMIN HYDROCHLORIDE 500 MG/1
TABLET, EXTENDED RELEASE ORAL
Qty: 180 TABLET | Refills: 0 | Status: SHIPPED | OUTPATIENT
Start: 2024-01-01

## 2024-01-09 ENCOUNTER — HOSPITAL ENCOUNTER (OUTPATIENT)
Dept: MAMMOGRAPHY | Facility: HOSPITAL | Age: 69
Discharge: HOME | End: 2024-01-09
Admitting: FAMILY MEDICINE
Payer: COMMERCIAL

## 2024-01-09 DIAGNOSIS — Z12.31 SCREENING MAMMOGRAM FOR BREAST CANCER: ICD-10-CM

## 2024-01-09 PROCEDURE — 77063 BREAST TOMOSYNTHESIS BI: CPT

## 2024-01-09 PROCEDURE — 77067 SCR MAMMO BI INCL CAD: CPT

## 2024-01-16 RX ORDER — BLOOD SUGAR DIAGNOSTIC
STRIP MISCELLANEOUS
Qty: 100 EACH | Refills: 0 | Status: SHIPPED | OUTPATIENT
Start: 2024-01-16

## 2024-01-29 RX ORDER — HYDROXYZINE HYDROCHLORIDE 25 MG/1
50 TABLET, FILM COATED ORAL
Qty: 180 TABLET | Refills: 1 | Status: SHIPPED | OUTPATIENT
Start: 2024-01-29

## 2024-01-31 DIAGNOSIS — E11.65 TYPE 2 DIABETES MELLITUS WITH HYPERGLYCEMIA, WITHOUT LONG-TERM CURRENT USE OF INSULIN: ICD-10-CM

## 2024-02-01 RX ORDER — SEMAGLUTIDE 0.68 MG/ML
INJECTION, SOLUTION SUBCUTANEOUS
Qty: 1 ML | Refills: 0 | OUTPATIENT
Start: 2024-02-01

## 2024-02-06 ENCOUNTER — OFFICE VISIT (OUTPATIENT)
Dept: PULMONOLOGY | Facility: HOSPITAL | Age: 69
End: 2024-02-06
Payer: MEDICARE

## 2024-02-06 VITALS
SYSTOLIC BLOOD PRESSURE: 129 MMHG | DIASTOLIC BLOOD PRESSURE: 81 MMHG | OXYGEN SATURATION: 96 % | HEART RATE: 88 BPM | HEIGHT: 63 IN | WEIGHT: 124 LBS | BODY MASS INDEX: 21.97 KG/M2 | RESPIRATION RATE: 16 BRPM

## 2024-02-06 DIAGNOSIS — G47.33 OSA (OBSTRUCTIVE SLEEP APNEA): ICD-10-CM

## 2024-02-06 DIAGNOSIS — G25.81 RESTLESS LEGS SYNDROME (RLS): ICD-10-CM

## 2024-02-06 DIAGNOSIS — J44.9 CHRONIC OBSTRUCTIVE PULMONARY DISEASE, UNSPECIFIED COPD TYPE: Primary | ICD-10-CM

## 2024-02-06 PROCEDURE — G0463 HOSPITAL OUTPT CLINIC VISIT: HCPCS

## 2024-02-06 RX ORDER — GABAPENTIN 600 MG/1
1 TABLET ORAL 3 TIMES DAILY
COMMUNITY
Start: 2024-01-30

## 2024-02-06 NOTE — PROGRESS NOTES
PULMONARY/ CRITICAL CARE/ SLEEP MEDICINE OUTPATIENT CONSULT/ FOLLOW UP NOTE        Patient Name:  Eugenia Madden    :  1955    Medical Record:  [id number]    PRIMARY CARE PHYSICIAN     Nahid Lam MD    REASON FOR CONSULTATION    Eugenia Madden is a 68 y.o. female who is referred for consultation for COPD  REVIEW OF SYSTEMS    Constitutional:  Denies fever or chills   Eyes:  Denies change in visual acuity   HENT:  Denies nasal congestion or sore throat   Respiratory:  Denies cough or shortness of breath   Cardiovascular:  Denies chest pain or edema   GI:  Denies abdominal pain, nausea, vomiting, bloody stools or diarrhea   :  Denies dysuria   Musculoskeletal:  Denies back pain or joint pain   Integument:  Denies rash   Neurologic:  Denies headache, focal weakness or sensory changes   Endocrine:  Denies polyuria or polydipsia   Lymphatic:  Denies swollen glands   Psychiatric:  Denies depression or anxiety     MEDICAL HISTORY    Past Medical History:   Diagnosis Date    Arthritis     COPD (chronic obstructive pulmonary disease) 2021    Diabetes mellitus 2010    Hypertension     Kidney stones     Restless leg         SURGICAL HISTORY    Past Surgical History:   Procedure Laterality Date    CERVIX SURGERY      Dysplasia removed from Cervix    COLONOSCOPY      KIDNEY STONE SURGERY  2016    Lithotripsy    OTHER SURGICAL HISTORY  2007    Pinched nerve arm     TUBAL ABDOMINAL LIGATION Bilateral         FAMILY HISTORY    Family History   Problem Relation Age of Onset    Heart disease Mother     Cancer Mother         Breast Cancer    Breast cancer Mother 55    Diabetes Mother     Diabetes Father     Stroke Father     Heart disease Father     Hypertension Father     Cancer Father     Hypertension Sister     Diabetes Sister     Heart disease Sister     Diabetes Brother     Hypertension Brother     Heart disease Brother     Other Other         Abstraction from [facility]    Diabetes Brother      Hypertension Brother     No Known Problems Brother     Heart disease Brother     Cancer Brother         colon    Other Brother        SOCIAL HISTORY    Social History     Tobacco Use    Smoking status: Former     Packs/day: 0.50     Years: 46.00     Additional pack years: 0.00     Total pack years: 23.00     Types: Cigarettes     Start date: 1/1/1974     Quit date: 11/1/2020     Years since quitting: 3.2     Passive exposure: Past    Smokeless tobacco: Never    Tobacco comments:     Passive Smoke: Y   Substance Use Topics    Alcohol use: Never        ALLERGIES    Allergies   Allergen Reactions    Tuberculin, Ppd Rash     40yrs ago         MEDICATIONS    Current Outpatient Medications on File Prior to Visit   Medication Sig Dispense Refill    Accu-Chek Softclix Lancets lancets 1 each by Other route Daily. Use as instructed 100 each 0    albuterol sulfate  (90 Base) MCG/ACT inhaler Inhale 2 puffs Every 4 (Four) Hours As Needed for Wheezing. 1 g 8    aspirin 81 MG chewable tablet Chew 1 tablet Daily.      atorvastatin (LIPITOR) 40 MG tablet TAKE 1 TABLET BY MOUTH DAILY 90 tablet 0    benzonatate (TESSALON) 100 MG capsule Take 1 capsule by mouth 3 (Three) Times a Day As Needed for Cough. 30 capsule 0    cetirizine (zyrTEC) 10 MG tablet Take 1 tablet by mouth Daily.      cyclobenzaprine (FLEXERIL) 5 MG tablet Take 1 tablet by mouth 3 (Three) Times a Day As Needed for Muscle Spasms. 15 tablet 0    famotidine (PEPCID) 20 MG tablet Take 1 tablet by mouth Daily.      gabapentin (NEURONTIN) 300 MG capsule Take 1 capsule by mouth 3 (Three) Times a Day. 90 capsule 1    gabapentin (NEURONTIN) 600 MG tablet Take 1 tablet by mouth 3 times a day.      glimepiride (AMARYL) 4 MG tablet TAKE 2 TABLETS BY MOUTH IN THE MORNING BEFORE BREAKFAST 180 tablet 0    glucose blood (Accu-Chek Guide) test strip USE TO test blood sugar daily as instructed 100 each 0    hydrOXYzine (ATARAX) 25 MG tablet TAKE 2 TABLETS BY MOUTH EVERY NIGHT AT  "BEDTIME 180 tablet 1    ipratropium-albuterol (DUO-NEB) 0.5-2.5 mg/3 ml nebulizer Take 3 mL by nebulization Every 4 (Four) Hours As Needed for Wheezing. 360 mL 1    Isopropyl Alcohol ([employer] Isopropyl Alcohol Wipes) 70 % misc Apply 1 each topically Daily. 100 each 0    Jardiance 10 MG tablet tablet TAKE 1 TABLET BY MOUTH EVERY DAY 90 tablet 2    leflunomide (ARAVA) 20 MG tablet Take 1 tablet by mouth Daily.      losartan (COZAAR) 50 MG tablet TAKE 1 TABLET BY MOUTH TWICE DAILY 180 tablet 2    metFORMIN ER (GLUCOPHAGE-XR) 500 MG 24 hr tablet TAKE 2 TABLETS BY MOUTH DAILY 180 tablet 0    naproxen (NAPROSYN) 500 MG tablet       potassium chloride 10 MEQ CR tablet TAKE 1 TABLET BY MOUTH DAILY 90 tablet 0    pramipexole (MIRAPEX) 0.125 MG tablet TAKE 4 TABLETS BY MOUTH EVERY NIGHT AT BEDTIME 120 tablet 0    Semaglutide, 1 MG/DOSE, (Ozempic, 1 MG/DOSE,) 4 MG/3ML solution pen-injector INJECT 1 MG UNDER THE SKIN INTO THE APPROPRIATE AREA AS DIRECTED 1 (ONE) TIME PER WEEK. 3 mL 2    Stiolto Respimat 2.5-2.5 MCG/ACT aerosol solution inhaler INHALE TWO PUFFS BY MOUTH DAILY 4 g 0    [DISCONTINUED] guaiFENesin (MUCINEX) 600 MG 12 hr tablet Take 2 tablets by mouth Every 12 (Twelve) Hours. 10 tablet 0    [DISCONTINUED] sulfamethoxazole-trimethoprim (Bactrim DS) 800-160 MG per tablet Take 1 tablet by mouth 2 (Two) Times a Day. 14 tablet 0     Current Facility-Administered Medications on File Prior to Visit   Medication Dose Route Frequency Provider Last Rate Last Admin    denosumab (PROLIA) syringe 60 mg  60 mg Subcutaneous Q6 Months Melisa Taveras MD   60 mg at 05/02/23 1251    ipratropium-albuterol (DUO-NEB) nebulizer solution 3 mL  3 mL Nebulization 4x Daily - RT Melisa Taveras MD           PHYSICAL EXAM    Vitals:    02/06/24 1031   BP: 129/81   BP Location: Left arm   Patient Position: Sitting   Cuff Size: Adult   Pulse: 88   Resp: 16   SpO2: 96%   Weight: 56.2 kg (124 lb)   Height: 160 cm (63\")    "     Constitutional:  Well developed, well nourished, no acute distress, non-toxic appearance   Eyes:  PERRL, conjunctiva normal   HENT:  Atraumatic, external ears normal, nose normal, oropharynx moist, no pharyngeal exudates. mallampatti   Neck- normal range of motion, no tenderness, supple   Respiratory:  No respiratory distress, normal breath sounds, no rales, no wheezing   Cardiovascular:  Normal rate, normal rhythm, no murmurs, no gallops, no rubs   GI:  Soft, nondistended, normal bowel sounds, nontender, no organomegaly, no mass, no rebound, no guarding   :  No costovertebral angle tenderness   Musculoskeletal:  No edema, no tenderness, no deformities. Back- no tenderness  Integument:  Well hydrated, no rash   Lymphatic:  No lymphadenopathy noted   Neurologic:  Alert & oriented x 3, CN 2-12 normal, normal motor function, normal sensory function, no focal deficits noted   Psychiatric:  Speech and behavior appropriate     Mammo Screening Digital Tomosynthesis Bilateral With CAD    Result Date: 1/9/2024  No mammographic signs of malignancy. Recommend routine mammographic screening.  BI-RADS ASSESSMENT: BI-RADS 1. Negative.  The patient's information is entered into a computerized reminder system with a targeted due date for the next mammogram.  Note:  It has been reported that there is approximately a 15% false negative in mammography.  Therefore, management of a palpable abnormality should not be deferred because of a negative mammogram.      Electronically Signed By-Bogdan Meléndez MD On:1/9/2024 3:09 PM      Results for orders placed during the hospital encounter of 03/12/21    Adult Transthoracic Echo Complete w/ Color, Spectral and Contrast if Necessary Per Protocol    Interpretation Summary  · Left ventricular systolic function is normal.  · Left ventricular ejection fraction is 60 to 65%  · Left ventricular wall thickness is consistent with mild concentric hypertrophy.  · Left ventricular diastolic function  is consistent with (grade I) impaired relaxation.      ASSESSMENT & PLAN:      68-year-old female quit smoking November 2020 with 44-pack-year history of smoking  COPD       CT chest 10/2/2023  Impression:  1.No evidence of pulmonary embolic disease.  2.Prominence of the pulmonary interstitium could be from interstitial edema, mild.  3.Bilateral lower lobe bronchial wall thickening with some fluid in the bronchi may indicate bronchitis. Scattered foci of atelectasis are also seen somewhat nodular in the anterior aspect of the upper lobes bilaterally.  4.Moderate to severe coronary artery calcific atherosclerosis.      Complaining of restless sleep, insomnia, inability to maintain sleep with excessive daytime sleepiness and fatigue and difficulty concentrating during the daytime       Echo 3/12/2021  Mild pulmonary hypertension RVSP 36, EF 60% with diastolic dysfunction  History of hyperlipidemia, hypertension, diabetes mellitus, arthritis      Recommendations:     LDCT chest noncontrast next is October 2024  Continue Stiolto and albuterol  Theophylline     PFT                    This document has been electronically signed by  Ronit Lepe MD  10:37 EST

## 2024-03-05 ENCOUNTER — HOSPITAL ENCOUNTER (OUTPATIENT)
Dept: RESPIRATORY THERAPY | Facility: HOSPITAL | Age: 69
Discharge: HOME | End: 2024-03-05
Payer: MEDICARE

## 2024-03-05 DIAGNOSIS — J44.9 CHRONIC OBSTRUCTIVE PULMONARY DISEASE, UNSPECIFIED COPD TYPE: ICD-10-CM

## 2024-03-05 PROCEDURE — 94010 BREATHING CAPACITY TEST: CPT

## 2024-03-05 PROCEDURE — 94726 PLETHYSMOGRAPHY LUNG VOLUMES: CPT

## 2024-03-05 PROCEDURE — 94729 DIFFUSING CAPACITY: CPT

## 2024-03-27 RX ORDER — GLIMEPIRIDE 4 MG/1
TABLET ORAL
Qty: 180 TABLET | Refills: 0 | OUTPATIENT
Start: 2024-03-27

## 2024-04-08 RX ORDER — DENOSUMAB 60 MG/ML
INJECTION SUBCUTANEOUS
Qty: 1 EACH | Refills: 1 | OUTPATIENT
Start: 2024-04-08

## 2024-04-22 RX ORDER — BLOOD SUGAR DIAGNOSTIC
STRIP MISCELLANEOUS
Qty: 100 EACH | Refills: 0 | Status: SHIPPED | OUTPATIENT
Start: 2024-04-22

## 2024-05-21 DIAGNOSIS — E11.65 TYPE 2 DIABETES MELLITUS WITH HYPERGLYCEMIA, WITHOUT LONG-TERM CURRENT USE OF INSULIN: ICD-10-CM

## 2024-05-21 RX ORDER — EMPAGLIFLOZIN 10 MG/1
TABLET, FILM COATED ORAL
Qty: 90 TABLET | Refills: 2 | OUTPATIENT
Start: 2024-05-21

## 2024-05-23 RX ORDER — SEMAGLUTIDE 0.68 MG/ML
INJECTION, SOLUTION SUBCUTANEOUS
Refills: 2 | OUTPATIENT
Start: 2024-05-23

## 2024-06-17 RX ORDER — LOSARTAN POTASSIUM 50 MG/1
TABLET ORAL
Qty: 180 TABLET | Refills: 2 | Status: SHIPPED | OUTPATIENT
Start: 2024-06-17

## 2024-07-09 RX ORDER — BLOOD SUGAR DIAGNOSTIC
STRIP MISCELLANEOUS
Refills: 0 | OUTPATIENT
Start: 2024-07-09

## 2024-07-09 NOTE — TELEPHONE ENCOUNTER
Rx Refill Note  Requested Prescriptions     Pending Prescriptions Disp Refills    Accu-Chek Guide test strip [Pharmacy Med Name: Accu-Chek Guide test strips]  0     Sig: USE TO test blood sugar daily as instructed      Last office visit with prescribing clinician: 9/21/2023   Last telemedicine visit with prescribing clinician: Visit date not found   Next office visit with prescribing clinician: Visit date not found                         Would you like a call back once the refill request has been completed: [] Yes [] No    If the office needs to give you a call back, can they leave a voicemail: [] Yes [] No    Renetta Tran MA  07/09/24, 08:50 EDT

## 2024-07-15 RX ORDER — HYDROXYZINE HYDROCHLORIDE 25 MG/1
50 TABLET, FILM COATED ORAL
Qty: 180 TABLET | Refills: 1 | OUTPATIENT
Start: 2024-07-15

## 2024-07-15 RX ORDER — BLOOD SUGAR DIAGNOSTIC
STRIP MISCELLANEOUS
Refills: 0 | OUTPATIENT
Start: 2024-07-15

## 2024-09-05 ENCOUNTER — TRANSCRIBE ORDERS (OUTPATIENT)
Dept: ADMINISTRATIVE | Facility: HOSPITAL | Age: 69
End: 2024-09-05
Payer: MEDICARE

## 2024-09-05 DIAGNOSIS — Z13.820 ENCOUNTER FOR SCREENING FOR OSTEOPOROSIS: ICD-10-CM

## 2024-09-05 DIAGNOSIS — M85.80 OTHER SPECIFIED DISORDERS OF BONE DENSITY AND STRUCTURE, UNSPECIFIED SITE: ICD-10-CM

## 2024-09-05 DIAGNOSIS — Z12.31 ENCOUNTER FOR SCREENING MAMMOGRAM FOR MALIGNANT NEOPLASM OF BREAST: Primary | ICD-10-CM

## 2024-09-05 DIAGNOSIS — N95.1 MENOPAUSAL AND FEMALE CLIMACTERIC STATES: ICD-10-CM

## 2024-11-07 ENCOUNTER — TRANSCRIBE ORDERS (OUTPATIENT)
Dept: ADMINISTRATIVE | Facility: HOSPITAL | Age: 69
End: 2024-11-07
Payer: MEDICARE

## 2024-11-07 DIAGNOSIS — M81.0 AGE RELATED OSTEOPOROSIS, UNSPECIFIED PATHOLOGICAL FRACTURE PRESENCE: Primary | ICD-10-CM

## 2024-11-13 ENCOUNTER — HOSPITAL ENCOUNTER (OUTPATIENT)
Dept: BONE DENSITY | Facility: HOSPITAL | Age: 69
Discharge: HOME | End: 2024-11-13
Admitting: FAMILY MEDICINE
Payer: MEDICARE

## 2024-11-13 DIAGNOSIS — M81.0 AGE RELATED OSTEOPOROSIS, UNSPECIFIED PATHOLOGICAL FRACTURE PRESENCE: ICD-10-CM

## 2024-11-13 PROCEDURE — 77080 DXA BONE DENSITY AXIAL: CPT

## 2025-03-17 RX ORDER — LOSARTAN POTASSIUM 50 MG/1
50 TABLET ORAL 2 TIMES DAILY
Qty: 180 TABLET | Refills: 2 | OUTPATIENT
Start: 2025-03-17

## 2025-04-03 NOTE — PROGRESS NOTES
Date of Office Visit: 2025  Encounter Provider: Dr. Donell Joyce  Place of Service: [facility] CARDIOLOGY [facility]  Patient Name: Eugenia Madden  :1955  Nahid Lam MD    Chief Complaint   Patient presents with    Coronary Artery Disease    Hypertension    Consult     History of Present Illness:    I am pleased to see Mrs. Madden in my office today as a new consultation.    As you know, patient is a 69-year-old white female whose past medical history significant for hypertension, hyperlipidemia, diabetes mellitus, COPD, who is referred to me for symptom of chest pain and shortness of breath.    In , patient underwent CT scan of the chest and it showed moderate to severe coronary calcification.  Patient underwent echocardiogram which showed normal left-ventricular size and function and no significant valvular heart disease noted.    In 2025, patient reports that she had an episode of chest pain.  She described this as a dull ache.  She had few of those episodes.  No correlation with exertion patient does complain of shortness of breath on exertion.  Patient denies any orthopnea, PND, syncope or presyncope.  No leg edema noted.    EKG showed normal sinus rhythm    At this stage I would recommend to decrease losartan 25 mg daily and add Toprol-XL 25 mg daily continue aspirin proceed with stress test with Cardiolite imaging she cannot walk on a treadmill because of arthritis in the legs.  If patient continues to have chest pain, cardiac catheterization may be needed        Past Medical History:   Diagnosis Date    Arthritis     COPD (chronic obstructive pulmonary disease) 2021    Coronary artery disease     Diabetes mellitus 2010    Heart murmur     Hypertension     Kidney stones     Restless leg          Past Surgical History:   Procedure Laterality Date    CERVIX SURGERY      Dysplasia removed from Cervix    COLONOSCOPY      KIDNEY STONE SURGERY  2016    Lithotripsy    OTHER  SURGICAL HISTORY  2007    Pinched nerve arm     TUBAL ABDOMINAL LIGATION Bilateral            Current Outpatient Medications:     Accu-Chek Guide test strip, USE TO test blood sugar daily as instructed, Disp: 100 each, Rfl: 0    Accu-Chek Softclix Lancets lancets, 1 each by Other route Daily. Use as instructed, Disp: 100 each, Rfl: 0    albuterol sulfate  (90 Base) MCG/ACT inhaler, Inhale 2 puffs Every 4 (Four) Hours As Needed for Wheezing., Disp: 1 g, Rfl: 8    aspirin 81 MG chewable tablet, Chew 1 tablet Daily., Disp: , Rfl:     atorvastatin (LIPITOR) 40 MG tablet, TAKE 1 TABLET BY MOUTH DAILY, Disp: 90 tablet, Rfl: 0    benzonatate (TESSALON) 100 MG capsule, Take 1 capsule by mouth 3 (Three) Times a Day As Needed for Cough., Disp: 30 capsule, Rfl: 0    cetirizine (zyrTEC) 10 MG tablet, Take 1 tablet by mouth Daily., Disp: , Rfl:     famotidine (PEPCID) 20 MG tablet, Take 1 tablet by mouth Daily., Disp: , Rfl:     gabapentin (NEURONTIN) 300 MG capsule, Take 1 capsule by mouth 3 (Three) Times a Day., Disp: 90 capsule, Rfl: 1    hydrOXYzine (ATARAX) 25 MG tablet, TAKE 2 TABLETS BY MOUTH EVERY NIGHT AT BEDTIME, Disp: 180 tablet, Rfl: 1    ipratropium-albuterol (DUO-NEB) 0.5-2.5 mg/3 ml nebulizer, Take 3 mL by nebulization Every 4 (Four) Hours As Needed for Wheezing., Disp: 360 mL, Rfl: 1    Isopropyl Alcohol ([employer] Isopropyl Alcohol Wipes) 70 % misc, Apply 1 each topically Daily., Disp: 100 each, Rfl: 0    Jardiance 10 MG tablet tablet, TAKE 1 TABLET BY MOUTH EVERY DAY (Patient taking differently: Take 2.5 tablets by mouth Daily.), Disp: 90 tablet, Rfl: 2    leflunomide (ARAVA) 20 MG tablet, Take 1 tablet by mouth Daily., Disp: , Rfl:     losartan (COZAAR) 25 MG tablet, Take 1 tablet by mouth Daily., Disp: 90 tablet, Rfl: 1    metFORMIN ER (GLUCOPHAGE-XR) 500 MG 24 hr tablet, TAKE 2 TABLETS BY MOUTH DAILY, Disp: 180 tablet, Rfl: 0    naproxen (NAPROSYN) 500 MG tablet, , Disp: , Rfl:     potassium chloride 10 MEQ  CR tablet, TAKE 1 TABLET BY MOUTH DAILY, Disp: 90 tablet, Rfl: 0    pramipexole (MIRAPEX) 0.125 MG tablet, TAKE 4 TABLETS BY MOUTH EVERY NIGHT AT BEDTIME, Disp: 120 tablet, Rfl: 0    Semaglutide, 1 MG/DOSE, (Ozempic, 1 MG/DOSE,) 4 MG/3ML solution pen-injector, INJECT 1 MG UNDER THE SKIN INTO THE APPROPRIATE AREA AS DIRECTED 1 (ONE) TIME PER WEEK., Disp: 3 mL, Rfl: 2    metoprolol succinate XL (TOPROL-XL) 25 MG 24 hr tablet, Take 1 tablet by mouth Daily., Disp: 90 tablet, Rfl: 3    nitroglycerin (NITROSTAT) 0.3 MG SL tablet, 1 under the tongue as needed for angina, may repeat q5mins for up three doses, Disp: 100 tablet, Rfl: 11  No current facility-administered medications for this visit.      Social History     Socioeconomic History    Marital status:      Spouse name: Braeden    Number of children: 4    Years of education: 12   Tobacco Use    Smoking status: Former     Current packs/day: 0.00     Average packs/day: 0.5 packs/day for 46.8 years (23.4 ttl pk-yrs)     Types: Cigarettes     Start date: 1974     Quit date: 2020     Years since quittin.4     Passive exposure: Past    Smokeless tobacco: Never    Tobacco comments:     Passive Smoke: Y   Vaping Use    Vaping status: Never Used   Substance and Sexual Activity    Alcohol use: Never    Drug use: No    Sexual activity: Yes     Partners: Male     Birth control/protection: Surgical, None         Review of Systems   Constitutional: Negative for chills and fever.   HENT:  Negative for ear discharge and nosebleeds.    Eyes:  Negative for discharge and redness.   Cardiovascular:  Positive for chest pain. Negative for orthopnea, palpitations, paroxysmal nocturnal dyspnea and syncope.   Respiratory:  Positive for shortness of breath. Negative for cough and wheezing.    Endocrine: Negative for heat intolerance.   Skin:  Negative for rash.   Musculoskeletal:  Negative for arthritis and myalgias.   Gastrointestinal:  Negative for abdominal pain, melena,  "nausea and vomiting.   Genitourinary:  Negative for dysuria and hematuria.   Neurological:  Negative for dizziness, light-headedness, numbness and tremors.   Psychiatric/Behavioral:  Negative for depression. The patient is not nervous/anxious.        Procedures      ECG 12 Lead    Date/Time: 4/4/2025 1:26 PM  Performed by: Donell Joyce MD    Authorized by: Donell Joyce MD  Previous ECG: no previous ECG available  Rhythm: sinus rhythm    Clinical impression: normal ECG          ECG 12 Lead    (Results Pending)           Objective:    /76 (BP Location: Right arm, Patient Position: Sitting, Cuff Size: Large Adult)   Pulse 98   Resp 16   Ht 160 cm (62.99\")   Wt 56.7 kg (125 lb)   LMP  (LMP Unknown)   SpO2 95%   BMI 22.15 kg/m²         Constitutional:       Appearance: Well-developed.   Eyes:      General: No scleral icterus.        Right eye: No discharge.   HENT:      Head: Normocephalic and atraumatic.   Neck:      Thyroid: No thyromegaly.      Lymphadenopathy: No cervical adenopathy.   Pulmonary:      Effort: Pulmonary effort is normal. No respiratory distress.      Breath sounds: Normal breath sounds. No wheezing. No rales.   Cardiovascular:      Normal rate. Regular rhythm.      No gallop.    Edema:     Peripheral edema absent.   Abdominal:      Tenderness: There is no abdominal tenderness.   Skin:     Findings: No erythema or rash.   Neurological:      Mental Status: Alert and oriented to person, place, and time.             Assessment:       Diagnosis Plan   1. Cardiac murmur  ECG 12 Lead    Stress Test With Myocardial Perfusion One Day    metoprolol succinate XL (TOPROL-XL) 25 MG 24 hr tablet    nitroglycerin (NITROSTAT) 0.3 MG SL tablet    losartan (COZAAR) 25 MG tablet      2. Mixed hyperlipidemia  ECG 12 Lead    Stress Test With Myocardial Perfusion One Day    metoprolol succinate XL (TOPROL-XL) 25 MG 24 hr tablet    nitroglycerin (NITROSTAT) 0.3 MG SL tablet    losartan (COZAAR) 25 MG tablet "      3. Essential hypertension  ECG 12 Lead    Stress Test With Myocardial Perfusion One Day    metoprolol succinate XL (TOPROL-XL) 25 MG 24 hr tablet    nitroglycerin (NITROSTAT) 0.3 MG SL tablet    losartan (COZAAR) 25 MG tablet      4. Type 2 diabetes mellitus with hyperglycemia, without long-term current use of insulin  Stress Test With Myocardial Perfusion One Day    metoprolol succinate XL (TOPROL-XL) 25 MG 24 hr tablet    nitroglycerin (NITROSTAT) 0.3 MG SL tablet    losartan (COZAAR) 25 MG tablet      5. Shortness of breath on exertion  Stress Test With Myocardial Perfusion One Day    nitroglycerin (NITROSTAT) 0.3 MG SL tablet    losartan (COZAAR) 25 MG tablet               Plan:       MDM:    1.  Coronary calcification:    Proceed with stress test    2.  Chest pain:    I would proceed with stress test start Toprol-XL nitroglycerin would be given    3.  Hypertension:    Blood pressure is controlled    4.  Mixed hyperlipidemia:    Patient is on Lipitor repeat lipid panel testing

## 2025-04-04 ENCOUNTER — OFFICE VISIT (OUTPATIENT)
Dept: CARDIOLOGY | Facility: CLINIC | Age: 70
End: 2025-04-04
Payer: MEDICARE

## 2025-04-04 VITALS
OXYGEN SATURATION: 95 % | RESPIRATION RATE: 16 BRPM | WEIGHT: 125 LBS | DIASTOLIC BLOOD PRESSURE: 76 MMHG | BODY MASS INDEX: 22.15 KG/M2 | HEIGHT: 63 IN | HEART RATE: 98 BPM | SYSTOLIC BLOOD PRESSURE: 116 MMHG

## 2025-04-04 DIAGNOSIS — R06.02 SHORTNESS OF BREATH ON EXERTION: ICD-10-CM

## 2025-04-04 DIAGNOSIS — E78.2 MIXED HYPERLIPIDEMIA: ICD-10-CM

## 2025-04-04 DIAGNOSIS — E11.65 TYPE 2 DIABETES MELLITUS WITH HYPERGLYCEMIA, WITHOUT LONG-TERM CURRENT USE OF INSULIN: ICD-10-CM

## 2025-04-04 DIAGNOSIS — R01.1 CARDIAC MURMUR: Primary | ICD-10-CM

## 2025-04-04 DIAGNOSIS — I10 ESSENTIAL HYPERTENSION: ICD-10-CM

## 2025-04-04 PROCEDURE — 93000 ELECTROCARDIOGRAM COMPLETE: CPT | Performed by: INTERNAL MEDICINE

## 2025-04-04 PROCEDURE — 1159F MED LIST DOCD IN RCRD: CPT | Performed by: INTERNAL MEDICINE

## 2025-04-04 PROCEDURE — 3078F DIAST BP <80 MM HG: CPT | Performed by: INTERNAL MEDICINE

## 2025-04-04 PROCEDURE — 1160F RVW MEDS BY RX/DR IN RCRD: CPT | Performed by: INTERNAL MEDICINE

## 2025-04-04 PROCEDURE — 3074F SYST BP LT 130 MM HG: CPT | Performed by: INTERNAL MEDICINE

## 2025-04-04 PROCEDURE — 99204 OFFICE O/P NEW MOD 45 MIN: CPT | Performed by: INTERNAL MEDICINE

## 2025-04-04 RX ORDER — LOSARTAN POTASSIUM 25 MG/1
25 TABLET ORAL DAILY
Qty: 90 TABLET | Refills: 1 | Status: SHIPPED | OUTPATIENT
Start: 2025-04-04

## 2025-04-04 RX ORDER — METOPROLOL SUCCINATE 25 MG/1
25 TABLET, EXTENDED RELEASE ORAL DAILY
Qty: 90 TABLET | Refills: 3 | Status: SHIPPED | OUTPATIENT
Start: 2025-04-04

## 2025-04-04 RX ORDER — NITROGLYCERIN 0.3 MG/1
TABLET SUBLINGUAL
Qty: 100 TABLET | Refills: 11 | Status: SHIPPED | OUTPATIENT
Start: 2025-04-04

## 2025-04-07 ENCOUNTER — PATIENT ROUNDING (BHMG ONLY) (OUTPATIENT)
Dept: CARDIOLOGY | Facility: CLINIC | Age: 70
End: 2025-04-07
Payer: MEDICARE

## 2025-04-09 RX ORDER — LOSARTAN POTASSIUM 50 MG/1
50 TABLET ORAL EVERY 12 HOURS SCHEDULED
Qty: 180 TABLET | Refills: 2 | OUTPATIENT
Start: 2025-04-09

## 2025-05-06 ENCOUNTER — HOSPITAL ENCOUNTER (OUTPATIENT)
Dept: NUCLEAR MEDICINE | Facility: HOSPITAL | Age: 70
Discharge: HOME | End: 2025-05-06
Payer: MEDICARE

## 2025-05-06 DIAGNOSIS — E11.65 TYPE 2 DIABETES MELLITUS WITH HYPERGLYCEMIA, WITHOUT LONG-TERM CURRENT USE OF INSULIN: ICD-10-CM

## 2025-05-06 DIAGNOSIS — R01.1 CARDIAC MURMUR: ICD-10-CM

## 2025-05-06 DIAGNOSIS — R06.02 SHORTNESS OF BREATH ON EXERTION: ICD-10-CM

## 2025-05-06 DIAGNOSIS — E78.2 MIXED HYPERLIPIDEMIA: ICD-10-CM

## 2025-05-06 DIAGNOSIS — I10 ESSENTIAL HYPERTENSION: ICD-10-CM

## 2025-05-09 ENCOUNTER — HOSPITAL ENCOUNTER (OUTPATIENT)
Dept: NUCLEAR MEDICINE | Facility: HOSPITAL | Age: 70
Discharge: HOME | End: 2025-05-09
Payer: MEDICARE

## 2025-05-09 LAB
BH CV NUCLEAR PRIOR STUDY: 3
BH CV REST NUCLEAR ISOTOPE DOSE: 1.3 MCI
BH CV STRESS BP STAGE 1: NORMAL
BH CV STRESS BP STAGE 2: NORMAL
BH CV STRESS BP STAGE 3: NORMAL
BH CV STRESS COMMENTS STAGE 1: NORMAL
BH CV STRESS COMMENTS STAGE 2: NORMAL
BH CV STRESS DOSE REGADENOSON STAGE 1: 0.4
BH CV STRESS DURATION MIN STAGE 1: 0
BH CV STRESS DURATION MIN STAGE 2: 4
BH CV STRESS DURATION SEC STAGE 1: 10
BH CV STRESS DURATION SEC STAGE 2: 0
BH CV STRESS HR STAGE 1: 104
BH CV STRESS HR STAGE 2: 112
BH CV STRESS HR STAGE 3: 117
BH CV STRESS NUCLEAR ISOTOPE DOSE: 33 MCI
BH CV STRESS PROTOCOL 1: NORMAL
BH CV STRESS RECOVERY BP: NORMAL MMHG
BH CV STRESS RECOVERY HR: 113 BPM
BH CV STRESS STAGE 1: 1
BH CV STRESS STAGE 2: 2
BH CV STRESS STAGE 3: 3
MAXIMAL PREDICTED HEART RATE: 151 BPM
PERCENT MAX PREDICTED HR: 77.48 %
SPECT HRT GATED+EF W RNC IV: 68 %
STRESS BASELINE BP: NORMAL MMHG
STRESS BASELINE HR: 85 BPM
STRESS PERCENT HR: 91 %
STRESS POST PEAK BP: NORMAL MMHG
STRESS POST PEAK HR: 117 BPM
STRESS TARGET HR: 128 BPM

## 2025-05-09 PROCEDURE — 25010000002 REGADENOSON 0.4 MG/5ML SOLUTION: Performed by: INTERNAL MEDICINE

## 2025-05-09 PROCEDURE — 34310000005 TECHNETIUM TETROFOSMIN KIT: Performed by: INTERNAL MEDICINE

## 2025-05-09 PROCEDURE — 78452 HT MUSCLE IMAGE SPECT MULT: CPT

## 2025-05-09 PROCEDURE — 93017 CV STRESS TEST TRACING ONLY: CPT

## 2025-05-09 PROCEDURE — A9502 TC99M TETROFOSMIN: HCPCS | Performed by: INTERNAL MEDICINE

## 2025-05-09 RX ORDER — REGADENOSON 0.08 MG/ML
0.4 INJECTION, SOLUTION INTRAVENOUS
Status: COMPLETED | OUTPATIENT
Start: 2025-05-09 | End: 2025-05-09

## 2025-05-09 RX ADMIN — TETROFOSMIN 1 DOSE: 1.38 INJECTION, POWDER, LYOPHILIZED, FOR SOLUTION INTRAVENOUS at 10:04

## 2025-05-09 RX ADMIN — TETROFOSMIN 1 DOSE: 1.38 INJECTION, POWDER, LYOPHILIZED, FOR SOLUTION INTRAVENOUS at 11:00

## 2025-05-09 RX ADMIN — REGADENOSON 0.4 MG: 0.08 INJECTION, SOLUTION INTRAVENOUS at 11:00

## 2025-05-20 ENCOUNTER — TELEPHONE (OUTPATIENT)
Dept: CARDIOLOGY | Facility: CLINIC | Age: 70
End: 2025-05-20

## 2025-05-20 NOTE — TELEPHONE ENCOUNTER
Caller: Eugenia Madden    Relationship: Self    Best call back number: [phone] (home)     What is the best time to reach you: ANY    Who are you requesting to speak with (clinical staff, provider,  specific staff member): CLINICAL    What was the call regarding: PT IS ASKING IF THERE HAS BEEN ANY REVIEW DONE OF HER STRESS TEST COMPLETED IN [facility].

## 2025-07-14 DIAGNOSIS — R01.1 CARDIAC MURMUR: ICD-10-CM

## 2025-07-14 DIAGNOSIS — E11.65 TYPE 2 DIABETES MELLITUS WITH HYPERGLYCEMIA, WITHOUT LONG-TERM CURRENT USE OF INSULIN: ICD-10-CM

## 2025-07-14 DIAGNOSIS — I10 ESSENTIAL HYPERTENSION: ICD-10-CM

## 2025-07-14 DIAGNOSIS — E78.2 MIXED HYPERLIPIDEMIA: ICD-10-CM

## 2025-07-14 DIAGNOSIS — R06.02 SHORTNESS OF BREATH ON EXERTION: ICD-10-CM

## 2025-07-14 RX ORDER — LOSARTAN POTASSIUM 25 MG/1
25 TABLET ORAL DAILY
Qty: 90 TABLET | Refills: 1 | Status: ON HOLD | OUTPATIENT
Start: 2025-07-14

## 2025-07-15 ENCOUNTER — HOSPITAL ENCOUNTER (INPATIENT)
Facility: HOSPITAL | Age: 70
LOS: 8 days | Discharge: HOME HEALTH | DRG: 871 | End: 2025-07-24
Attending: INTERNAL MEDICINE | Admitting: INTERNAL MEDICINE
Payer: MEDICARE

## 2025-07-15 ENCOUNTER — APPOINTMENT (OUTPATIENT)
Dept: GENERAL RADIOLOGY | Facility: HOSPITAL | Age: 70
DRG: 871 | End: 2025-07-15
Payer: MEDICARE

## 2025-07-15 DIAGNOSIS — E86.0 DEHYDRATION: ICD-10-CM

## 2025-07-15 DIAGNOSIS — R79.89 ELEVATED TROPONIN: ICD-10-CM

## 2025-07-15 DIAGNOSIS — A41.9 SEPSIS, DUE TO UNSPECIFIED ORGANISM, UNSPECIFIED WHETHER ACUTE ORGAN DYSFUNCTION PRESENT: ICD-10-CM

## 2025-07-15 DIAGNOSIS — D69.6 THROMBOCYTOPENIA: ICD-10-CM

## 2025-07-15 DIAGNOSIS — R50.9 FEVER, UNSPECIFIED FEVER CAUSE: ICD-10-CM

## 2025-07-15 DIAGNOSIS — N12 PYELONEPHRITIS: ICD-10-CM

## 2025-07-15 DIAGNOSIS — I25.10 CHRONIC CORONARY ARTERY DISEASE: Primary | ICD-10-CM

## 2025-07-15 LAB
ALBUMIN SERPL-MCNC: 3.6 G/DL (ref 3.5–5.2)
ALBUMIN/GLOB SERPL: 1.4 G/DL
ALP SERPL-CCNC: 104 U/L (ref 39–117)
ALT SERPL W P-5'-P-CCNC: 28 U/L (ref 1–33)
AMPHET+METHAMPHET UR QL: NEGATIVE
AMPHETAMINES UR QL: NEGATIVE
ANION GAP SERPL CALCULATED.3IONS-SCNC: 17.6 MMOL/L (ref 5–15)
APTT PPP: 26.1 SECONDS (ref 22.7–35.4)
AST SERPL-CCNC: 37 U/L (ref 1–32)
BACTERIA UR QL AUTO: ABNORMAL /HPF
BARBITURATES UR QL SCN: NEGATIVE
BENZODIAZ UR QL SCN: NEGATIVE
BILIRUB SERPL-MCNC: 0.8 MG/DL (ref 0–1.2)
BILIRUB UR QL STRIP: NEGATIVE
BUN SERPL-MCNC: 15.5 MG/DL (ref 8–23)
BUN/CREAT SERPL: 16.3 (ref 7–25)
BUPRENORPHINE SERPL-MCNC: NEGATIVE NG/ML
CALCIUM SPEC-SCNC: 8.7 MG/DL (ref 8.6–10.5)
CANNABINOIDS SERPL QL: NEGATIVE
CHLORIDE SERPL-SCNC: 102 MMOL/L (ref 98–107)
CLARITY UR: CLEAR
CO2 SERPL-SCNC: 18.4 MMOL/L (ref 22–29)
COCAINE UR QL: NEGATIVE
COLOR UR: YELLOW
CREAT SERPL-MCNC: 0.95 MG/DL (ref 0.57–1)
D-LACTATE SERPL-SCNC: 1.7 MMOL/L (ref 0.3–2)
DEPRECATED RDW RBC AUTO: 42 FL (ref 37–54)
EGFRCR SERPLBLD CKD-EPI 2021: 65 ML/MIN/1.73
EOSINOPHIL # BLD MANUAL: 0.03 10*3/MM3 (ref 0–0.4)
EOSINOPHIL NFR BLD MANUAL: 1.1 % (ref 0.3–6.2)
ERYTHROCYTE [DISTWIDTH] IN BLOOD BY AUTOMATED COUNT: 13 % (ref 12.3–15.4)
ETHANOL UR QL: <0.01 %
FLUAV RNA RESP QL NAA+PROBE: NOT DETECTED
FLUBV RNA NPH QL NAA+NON-PROBE: NOT DETECTED
GLOBULIN UR ELPH-MCNC: 2.5 GM/DL
GLUCOSE BLDC GLUCOMTR-MCNC: 291 MG/DL (ref 70–105)
GLUCOSE SERPL-MCNC: 294 MG/DL (ref 65–99)
GLUCOSE UR STRIP-MCNC: ABNORMAL MG/DL
HCT VFR BLD AUTO: 45.7 % (ref 34–46.6)
HGB BLD-MCNC: 14.5 G/DL (ref 12–15.9)
HGB UR QL STRIP.AUTO: ABNORMAL
HYALINE CASTS UR QL AUTO: ABNORMAL /LPF
INR PPP: 1.19 (ref 0.9–1.1)
KETONES UR QL STRIP: ABNORMAL
LARGE PLATELETS: ABNORMAL
LEUKOCYTE ESTERASE UR QL STRIP.AUTO: ABNORMAL
LIPASE SERPL-CCNC: 17 U/L (ref 13–60)
LYMPHOCYTES # BLD MANUAL: 0.18 10*3/MM3 (ref 0.7–3.1)
MAGNESIUM SERPL-MCNC: 1.7 MG/DL (ref 1.6–2.4)
MCH RBC QN AUTO: 28.2 PG (ref 26.6–33)
MCHC RBC AUTO-ENTMCNC: 31.7 G/DL (ref 31.5–35.7)
MCV RBC AUTO: 88.7 FL (ref 79–97)
METAMYELOCYTES NFR BLD MANUAL: 4.4 % (ref 0–0)
METHADONE UR QL SCN: NEGATIVE
NEUTROPHILS # BLD AUTO: 2.44 10*3/MM3 (ref 1.7–7)
NEUTROPHILS NFR BLD MANUAL: 53.8 % (ref 42.7–76)
NEUTS BAND NFR BLD MANUAL: 34.1 % (ref 0–5)
NITRITE UR QL STRIP: NEGATIVE
NT-PROBNP SERPL-MCNC: 1087 PG/ML (ref 0–900)
OPIATES UR QL: NEGATIVE
OXYCODONE UR QL SCN: NEGATIVE
PCP UR QL SCN: NEGATIVE
PH UR STRIP.AUTO: 5.5 [PH] (ref 5–8)
PHOSPHATE SERPL-MCNC: 2.7 MG/DL (ref 2.5–4.5)
PLATELET # BLD AUTO: 129 10*3/MM3 (ref 140–450)
PMV BLD AUTO: 11.2 FL (ref 6–12)
POTASSIUM SERPL-SCNC: 3.5 MMOL/L (ref 3.5–5.2)
PROCALCITONIN SERPL-MCNC: 30.9 NG/ML (ref 0–0.25)
PROT SERPL-MCNC: 6.1 G/DL (ref 6–8.5)
PROT UR QL STRIP: ABNORMAL
PROTHROMBIN TIME: 15 SECONDS (ref 11.7–14.2)
RBC # BLD AUTO: 5.15 10*6/MM3 (ref 3.77–5.28)
RBC # UR STRIP: ABNORMAL /HPF
RBC MORPH BLD: NORMAL
REF LAB TEST METHOD: ABNORMAL
RSV RNA RESP QL NAA+PROBE: NOT DETECTED
SARS-COV-2 RNA RESP QL NAA+PROBE: NOT DETECTED
SCAN SLIDE: NORMAL
SMALL PLATELETS BLD QL SMEAR: ABNORMAL
SODIUM SERPL-SCNC: 138 MMOL/L (ref 136–145)
SP GR UR STRIP: 1.02 (ref 1–1.03)
SQUAMOUS #/AREA URNS HPF: ABNORMAL /HPF
TRICYCLICS UR QL SCN: NEGATIVE
TROPONIN T SERPL HS-MCNC: 35 NG/L
TSH SERPL DL<=0.05 MIU/L-ACNC: 2.85 UIU/ML (ref 0.27–4.2)
UROBILINOGEN UR QL STRIP: ABNORMAL
VARIANT LYMPHS NFR BLD MANUAL: 6.6 % (ref 19.6–45.3)
WBC # UR STRIP: ABNORMAL /HPF
WBC MORPH BLD: NORMAL
WBC NRBC COR # BLD AUTO: 2.77 10*3/MM3 (ref 3.4–10.8)

## 2025-07-15 PROCEDURE — 87040 BLOOD CULTURE FOR BACTERIA: CPT | Performed by: OCCUPATIONAL THERAPIST

## 2025-07-15 PROCEDURE — 99291 CRITICAL CARE FIRST HOUR: CPT

## 2025-07-15 PROCEDURE — 84484 ASSAY OF TROPONIN QUANT: CPT | Performed by: OCCUPATIONAL THERAPIST

## 2025-07-15 PROCEDURE — 83735 ASSAY OF MAGNESIUM: CPT | Performed by: OCCUPATIONAL THERAPIST

## 2025-07-15 PROCEDURE — 93005 ELECTROCARDIOGRAM TRACING: CPT | Performed by: OCCUPATIONAL THERAPIST

## 2025-07-15 PROCEDURE — P9612 CATHETERIZE FOR URINE SPEC: HCPCS

## 2025-07-15 PROCEDURE — 81001 URINALYSIS AUTO W/SCOPE: CPT | Performed by: OCCUPATIONAL THERAPIST

## 2025-07-15 PROCEDURE — 85610 PROTHROMBIN TIME: CPT | Performed by: OCCUPATIONAL THERAPIST

## 2025-07-15 PROCEDURE — 85007 BL SMEAR W/DIFF WBC COUNT: CPT | Performed by: OCCUPATIONAL THERAPIST

## 2025-07-15 PROCEDURE — 84145 PROCALCITONIN (PCT): CPT | Performed by: OCCUPATIONAL THERAPIST

## 2025-07-15 PROCEDURE — 85379 FIBRIN DEGRADATION QUANT: CPT | Performed by: OCCUPATIONAL THERAPIST

## 2025-07-15 PROCEDURE — 36415 COLL VENOUS BLD VENIPUNCTURE: CPT

## 2025-07-15 PROCEDURE — 87637 SARSCOV2&INF A&B&RSV AMP PRB: CPT | Performed by: OCCUPATIONAL THERAPIST

## 2025-07-15 PROCEDURE — 80306 DRUG TEST PRSMV INSTRMNT: CPT | Performed by: OCCUPATIONAL THERAPIST

## 2025-07-15 PROCEDURE — 83880 ASSAY OF NATRIURETIC PEPTIDE: CPT | Performed by: OCCUPATIONAL THERAPIST

## 2025-07-15 PROCEDURE — 85730 THROMBOPLASTIN TIME PARTIAL: CPT | Performed by: OCCUPATIONAL THERAPIST

## 2025-07-15 PROCEDURE — 87077 CULTURE AEROBIC IDENTIFY: CPT | Performed by: OCCUPATIONAL THERAPIST

## 2025-07-15 PROCEDURE — 85025 COMPLETE CBC W/AUTO DIFF WBC: CPT | Performed by: OCCUPATIONAL THERAPIST

## 2025-07-15 PROCEDURE — 80053 COMPREHEN METABOLIC PANEL: CPT | Performed by: OCCUPATIONAL THERAPIST

## 2025-07-15 PROCEDURE — 71045 X-RAY EXAM CHEST 1 VIEW: CPT

## 2025-07-15 PROCEDURE — 83690 ASSAY OF LIPASE: CPT | Performed by: OCCUPATIONAL THERAPIST

## 2025-07-15 PROCEDURE — 84100 ASSAY OF PHOSPHORUS: CPT | Performed by: OCCUPATIONAL THERAPIST

## 2025-07-15 PROCEDURE — 87186 SC STD MICRODIL/AGAR DIL: CPT | Performed by: OCCUPATIONAL THERAPIST

## 2025-07-15 PROCEDURE — 82077 ASSAY SPEC XCP UR&BREATH IA: CPT | Performed by: OCCUPATIONAL THERAPIST

## 2025-07-15 PROCEDURE — 84443 ASSAY THYROID STIM HORMONE: CPT | Performed by: OCCUPATIONAL THERAPIST

## 2025-07-15 PROCEDURE — 87154 CUL TYP ID BLD PTHGN 6+ TRGT: CPT | Performed by: OCCUPATIONAL THERAPIST

## 2025-07-15 PROCEDURE — 82948 REAGENT STRIP/BLOOD GLUCOSE: CPT

## 2025-07-15 PROCEDURE — 25810000003 SODIUM CHLORIDE 0.9 % SOLUTION: Performed by: OCCUPATIONAL THERAPIST

## 2025-07-15 PROCEDURE — 83605 ASSAY OF LACTIC ACID: CPT | Performed by: OCCUPATIONAL THERAPIST

## 2025-07-15 RX ORDER — GABAPENTIN 300 MG/1
300 CAPSULE ORAL NIGHTLY
COMMUNITY

## 2025-07-15 RX ORDER — PIOGLITAZONE 15 MG/1
15 TABLET ORAL NIGHTLY
COMMUNITY

## 2025-07-15 RX ORDER — BUDESONIDE, GLYCOPYRROLATE, AND FORMOTEROL FUMARATE 160; 9; 4.8 UG/1; UG/1; UG/1
2 AEROSOL, METERED RESPIRATORY (INHALATION) 2 TIMES DAILY
COMMUNITY
Start: 2025-01-21

## 2025-07-15 RX ORDER — EMPAGLIFLOZIN 25 MG/1
1 TABLET, FILM COATED ORAL NIGHTLY
COMMUNITY
Start: 2025-07-02 | End: 2025-07-24 | Stop reason: HOSPADM

## 2025-07-15 RX ORDER — ACETAMINOPHEN 500 MG
1000 TABLET ORAL ONCE
Status: COMPLETED | OUTPATIENT
Start: 2025-07-15 | End: 2025-07-15

## 2025-07-15 RX ADMIN — ACETAMINOPHEN 1000 MG: 500 TABLET, FILM COATED ORAL at 23:07

## 2025-07-15 RX ADMIN — SODIUM CHLORIDE 1000 ML: 0.9 INJECTION, SOLUTION INTRAVENOUS at 22:51

## 2025-07-15 NOTE — Clinical Note
Level of Care: Critical Care [6]   Admitting Physician: DAYANA GLASGOW [[id number]]   Attending Physician: DAYANA GLASGOW [[id number]]

## 2025-07-16 ENCOUNTER — APPOINTMENT (OUTPATIENT)
Dept: CT IMAGING | Facility: HOSPITAL | Age: 70
DRG: 871 | End: 2025-07-16
Payer: MEDICARE

## 2025-07-16 PROBLEM — A41.9 SEPTIC SHOCK: Status: ACTIVE | Noted: 2025-07-16

## 2025-07-16 PROBLEM — R65.21 SEPTIC SHOCK: Status: ACTIVE | Noted: 2025-07-16

## 2025-07-16 LAB
ALBUMIN SERPL-MCNC: 3.2 G/DL (ref 3.5–5.2)
ALBUMIN/GLOB SERPL: 1.6 G/DL
ALP SERPL-CCNC: 65 U/L (ref 39–117)
ALT SERPL W P-5'-P-CCNC: 25 U/L (ref 1–33)
ANION GAP SERPL CALCULATED.3IONS-SCNC: 14.4 MMOL/L (ref 5–15)
AST SERPL-CCNC: 29 U/L (ref 1–32)
BACTERIA BLD CULT: ABNORMAL
BASOPHILS # BLD AUTO: 0.06 10*3/MM3 (ref 0–0.2)
BASOPHILS NFR BLD AUTO: 0.4 % (ref 0–1.5)
BILIRUB SERPL-MCNC: 0.6 MG/DL (ref 0–1.2)
BOTTLE TYPE: ABNORMAL
BUN SERPL-MCNC: 15.7 MG/DL (ref 8–23)
BUN/CREAT SERPL: 12.9 (ref 7–25)
CALCIUM SPEC-SCNC: 7.7 MG/DL (ref 8.6–10.5)
CHLORIDE SERPL-SCNC: 109 MMOL/L (ref 98–107)
CHOLEST SERPL-MCNC: 112 MG/DL (ref 0–200)
CO2 SERPL-SCNC: 17.6 MMOL/L (ref 22–29)
CREAT SERPL-MCNC: 1.22 MG/DL (ref 0.57–1)
D DIMER PPP FEU-MCNC: 9.88 MCGFEU/ML (ref 0–0.69)
DEPRECATED RDW RBC AUTO: 42.3 FL (ref 37–54)
EGFRCR SERPLBLD CKD-EPI 2021: 48.1 ML/MIN/1.73
EOSINOPHIL # BLD AUTO: 0.01 10*3/MM3 (ref 0–0.4)
EOSINOPHIL NFR BLD AUTO: 0.1 % (ref 0.3–6.2)
ERYTHROCYTE [DISTWIDTH] IN BLOOD BY AUTOMATED COUNT: 13.2 % (ref 12.3–15.4)
GEN 5 1HR TROPONIN T REFLEX: 65 NG/L
GLOBULIN UR ELPH-MCNC: 2 GM/DL
GLUCOSE BLDC GLUCOMTR-MCNC: 182 MG/DL (ref 70–105)
GLUCOSE BLDC GLUCOMTR-MCNC: 196 MG/DL (ref 70–105)
GLUCOSE BLDC GLUCOMTR-MCNC: 255 MG/DL (ref 70–105)
GLUCOSE BLDC GLUCOMTR-MCNC: 270 MG/DL (ref 70–105)
GLUCOSE BLDC GLUCOMTR-MCNC: 280 MG/DL (ref 70–105)
GLUCOSE SERPL-MCNC: 188 MG/DL (ref 65–99)
HBA1C MFR BLD: 9.83 % (ref 4.8–5.6)
HCT VFR BLD AUTO: 39 % (ref 34–46.6)
HDLC SERPL-MCNC: 28 MG/DL (ref 40–60)
HGB BLD-MCNC: 12.6 G/DL (ref 12–15.9)
IMM GRANULOCYTES # BLD AUTO: 0.14 10*3/MM3 (ref 0–0.05)
IMM GRANULOCYTES NFR BLD AUTO: 1 % (ref 0–0.5)
LDLC SERPL CALC-MCNC: 59 MG/DL (ref 0–100)
LDLC/HDLC SERPL: 1.99 {RATIO}
LYMPHOCYTES # BLD AUTO: 0.35 10*3/MM3 (ref 0.7–3.1)
LYMPHOCYTES NFR BLD AUTO: 2.6 % (ref 19.6–45.3)
MAGNESIUM SERPL-MCNC: 1.6 MG/DL (ref 1.6–2.4)
MCH RBC QN AUTO: 28.3 PG (ref 26.6–33)
MCHC RBC AUTO-ENTMCNC: 32.3 G/DL (ref 31.5–35.7)
MCV RBC AUTO: 87.6 FL (ref 79–97)
MONOCYTES # BLD AUTO: 0.58 10*3/MM3 (ref 0.1–0.9)
MONOCYTES NFR BLD AUTO: 4.2 % (ref 5–12)
MRSA DNA SPEC QL NAA+PROBE: NORMAL
NEUTROPHILS NFR BLD AUTO: 12.56 10*3/MM3 (ref 1.7–7)
NEUTROPHILS NFR BLD AUTO: 91.7 % (ref 42.7–76)
NRBC BLD AUTO-RTO: 0 /100 WBC (ref 0–0.2)
PHOSPHATE SERPL-MCNC: 4.4 MG/DL (ref 2.5–4.5)
PLATELET # BLD AUTO: 117 10*3/MM3 (ref 140–450)
PMV BLD AUTO: 11 FL (ref 6–12)
POTASSIUM SERPL-SCNC: 3.3 MMOL/L (ref 3.5–5.2)
POTASSIUM SERPL-SCNC: 4.2 MMOL/L (ref 3.5–5.2)
PROT SERPL-MCNC: 5.2 G/DL (ref 6–8.5)
QT INTERVAL: 319 MS
QTC INTERVAL: 468 MS
RBC # BLD AUTO: 4.45 10*6/MM3 (ref 3.77–5.28)
SODIUM SERPL-SCNC: 141 MMOL/L (ref 136–145)
TRIGL SERPL-MCNC: 142 MG/DL (ref 0–150)
TROPONIN T % DELTA: 86
TROPONIN T NUMERIC DELTA: 30 NG/L
VLDLC SERPL-MCNC: 25 MG/DL (ref 5–40)
WBC NRBC COR # BLD AUTO: 13.7 10*3/MM3 (ref 3.4–10.8)

## 2025-07-16 PROCEDURE — 74176 CT ABD & PELVIS W/O CONTRAST: CPT

## 2025-07-16 PROCEDURE — 25810000003 SODIUM CHLORIDE 0.9 % SOLUTION: Performed by: OCCUPATIONAL THERAPIST

## 2025-07-16 PROCEDURE — 94664 DEMO&/EVAL PT USE INHALER: CPT

## 2025-07-16 PROCEDURE — 25010000002 METRONIDAZOLE 500 MG/100ML SOLUTION: Performed by: INTERNAL MEDICINE

## 2025-07-16 PROCEDURE — 71275 CT ANGIOGRAPHY CHEST: CPT

## 2025-07-16 PROCEDURE — 84100 ASSAY OF PHOSPHORUS: CPT

## 2025-07-16 PROCEDURE — 85025 COMPLETE CBC W/AUTO DIFF WBC: CPT

## 2025-07-16 PROCEDURE — 94761 N-INVAS EAR/PLS OXIMETRY MLT: CPT

## 2025-07-16 PROCEDURE — 87641 MR-STAPH DNA AMP PROBE: CPT

## 2025-07-16 PROCEDURE — 84132 ASSAY OF SERUM POTASSIUM: CPT | Performed by: INTERNAL MEDICINE

## 2025-07-16 PROCEDURE — 82948 REAGENT STRIP/BLOOD GLUCOSE: CPT | Performed by: INTERNAL MEDICINE

## 2025-07-16 PROCEDURE — 82948 REAGENT STRIP/BLOOD GLUCOSE: CPT

## 2025-07-16 PROCEDURE — 80053 COMPREHEN METABOLIC PANEL: CPT

## 2025-07-16 PROCEDURE — 25010000002 PIPERACILLIN SOD-TAZOBACTAM PER 1 G: Performed by: EMERGENCY MEDICINE

## 2025-07-16 PROCEDURE — 25810000003 LACTATED RINGERS PER 1000 ML: Performed by: NURSE PRACTITIONER

## 2025-07-16 PROCEDURE — 83735 ASSAY OF MAGNESIUM: CPT

## 2025-07-16 PROCEDURE — 84484 ASSAY OF TROPONIN QUANT: CPT | Performed by: OCCUPATIONAL THERAPIST

## 2025-07-16 PROCEDURE — 94640 AIRWAY INHALATION TREATMENT: CPT

## 2025-07-16 PROCEDURE — 25510000001 IOPAMIDOL PER 1 ML: Performed by: OCCUPATIONAL THERAPIST

## 2025-07-16 PROCEDURE — 63710000001 INSULIN LISPRO (HUMAN) PER 5 UNITS: Performed by: INTERNAL MEDICINE

## 2025-07-16 PROCEDURE — 25010000002 HYDROMORPHONE 1 MG/ML SOLUTION: Performed by: INTERNAL MEDICINE

## 2025-07-16 PROCEDURE — 83036 HEMOGLOBIN GLYCOSYLATED A1C: CPT | Performed by: NURSE PRACTITIONER

## 2025-07-16 PROCEDURE — 25010000002 CEFTRIAXONE PER 250 MG: Performed by: INTERNAL MEDICINE

## 2025-07-16 PROCEDURE — 94799 UNLISTED PULMONARY SVC/PX: CPT

## 2025-07-16 PROCEDURE — 63710000001 INSULIN REGULAR HUMAN PER 5 UNITS: Performed by: OCCUPATIONAL THERAPIST

## 2025-07-16 PROCEDURE — 80061 LIPID PANEL: CPT

## 2025-07-16 PROCEDURE — 25010000002 MAGNESIUM SULFATE 4 GM/100ML SOLUTION: Performed by: NURSE PRACTITIONER

## 2025-07-16 RX ORDER — IOPAMIDOL 755 MG/ML
100 INJECTION, SOLUTION INTRAVASCULAR
Status: COMPLETED | OUTPATIENT
Start: 2025-07-16 | End: 2025-07-16

## 2025-07-16 RX ORDER — GABAPENTIN 300 MG/1
300 CAPSULE ORAL NIGHTLY
Status: DISCONTINUED | OUTPATIENT
Start: 2025-07-16 | End: 2025-07-21

## 2025-07-16 RX ORDER — INSULIN LISPRO 100 [IU]/ML
2-9 INJECTION, SOLUTION INTRAVENOUS; SUBCUTANEOUS EVERY 6 HOURS SCHEDULED
Status: DISCONTINUED | OUTPATIENT
Start: 2025-07-16 | End: 2025-07-17

## 2025-07-16 RX ORDER — ACETAMINOPHEN 650 MG/1
650 SUPPOSITORY RECTAL EVERY 4 HOURS PRN
Status: DISCONTINUED | OUTPATIENT
Start: 2025-07-16 | End: 2025-07-24 | Stop reason: HOSPADM

## 2025-07-16 RX ORDER — NICOTINE POLACRILEX 4 MG
15 LOZENGE BUCCAL
Status: DISCONTINUED | OUTPATIENT
Start: 2025-07-16 | End: 2025-07-16

## 2025-07-16 RX ORDER — NICOTINE POLACRILEX 4 MG
15 LOZENGE BUCCAL
Status: DISCONTINUED | OUTPATIENT
Start: 2025-07-16 | End: 2025-07-24 | Stop reason: HOSPADM

## 2025-07-16 RX ORDER — IPRATROPIUM BROMIDE AND ALBUTEROL SULFATE 2.5; .5 MG/3ML; MG/3ML
3 SOLUTION RESPIRATORY (INHALATION)
Status: DISCONTINUED | OUTPATIENT
Start: 2025-07-16 | End: 2025-07-24 | Stop reason: HOSPADM

## 2025-07-16 RX ORDER — AMOXICILLIN 250 MG
2 CAPSULE ORAL 2 TIMES DAILY
Status: DISCONTINUED | OUTPATIENT
Start: 2025-07-16 | End: 2025-07-24 | Stop reason: HOSPADM

## 2025-07-16 RX ORDER — SODIUM CHLORIDE 9 MG/ML
40 INJECTION, SOLUTION INTRAVENOUS AS NEEDED
Status: DISCONTINUED | OUTPATIENT
Start: 2025-07-16 | End: 2025-07-24 | Stop reason: HOSPADM

## 2025-07-16 RX ORDER — IBUPROFEN 600 MG/1
1 TABLET ORAL
Status: DISCONTINUED | OUTPATIENT
Start: 2025-07-16 | End: 2025-07-24 | Stop reason: HOSPADM

## 2025-07-16 RX ORDER — MAGNESIUM SULFATE HEPTAHYDRATE 40 MG/ML
4 INJECTION, SOLUTION INTRAVENOUS ONCE
Status: COMPLETED | OUTPATIENT
Start: 2025-07-16 | End: 2025-07-16

## 2025-07-16 RX ORDER — BISACODYL 10 MG
10 SUPPOSITORY, RECTAL RECTAL DAILY PRN
Status: DISCONTINUED | OUTPATIENT
Start: 2025-07-16 | End: 2025-07-24 | Stop reason: HOSPADM

## 2025-07-16 RX ORDER — BUDESONIDE AND FORMOTEROL FUMARATE DIHYDRATE 160; 4.5 UG/1; UG/1
2 AEROSOL RESPIRATORY (INHALATION)
Status: DISCONTINUED | OUTPATIENT
Start: 2025-07-16 | End: 2025-07-24 | Stop reason: HOSPADM

## 2025-07-16 RX ORDER — ONDANSETRON 4 MG/1
4 TABLET, ORALLY DISINTEGRATING ORAL EVERY 6 HOURS PRN
Status: DISCONTINUED | OUTPATIENT
Start: 2025-07-16 | End: 2025-07-24 | Stop reason: HOSPADM

## 2025-07-16 RX ORDER — NOREPINEPHRINE BITARTRATE 0.03 MG/ML
.02-.3 INJECTION, SOLUTION INTRAVENOUS
Status: DISCONTINUED | OUTPATIENT
Start: 2025-07-16 | End: 2025-07-17

## 2025-07-16 RX ORDER — BISACODYL 5 MG/1
5 TABLET, DELAYED RELEASE ORAL DAILY PRN
Status: DISCONTINUED | OUTPATIENT
Start: 2025-07-16 | End: 2025-07-24 | Stop reason: HOSPADM

## 2025-07-16 RX ORDER — POLYETHYLENE GLYCOL 3350 17 G/17G
17 POWDER, FOR SOLUTION ORAL DAILY PRN
Status: DISCONTINUED | OUTPATIENT
Start: 2025-07-16 | End: 2025-07-24 | Stop reason: HOSPADM

## 2025-07-16 RX ORDER — ACETAMINOPHEN 325 MG/1
650 TABLET ORAL EVERY 4 HOURS PRN
Status: DISCONTINUED | OUTPATIENT
Start: 2025-07-16 | End: 2025-07-24 | Stop reason: HOSPADM

## 2025-07-16 RX ORDER — NITROGLYCERIN 0.4 MG/1
0.4 TABLET SUBLINGUAL
Status: DISCONTINUED | OUTPATIENT
Start: 2025-07-16 | End: 2025-07-24 | Stop reason: HOSPADM

## 2025-07-16 RX ORDER — HYDROXYZINE HYDROCHLORIDE 25 MG/1
50 TABLET, FILM COATED ORAL NIGHTLY
Status: DISCONTINUED | OUTPATIENT
Start: 2025-07-16 | End: 2025-07-24 | Stop reason: HOSPADM

## 2025-07-16 RX ORDER — DEXTROSE MONOHYDRATE 25 G/50ML
25 INJECTION, SOLUTION INTRAVENOUS
Status: DISCONTINUED | OUTPATIENT
Start: 2025-07-16 | End: 2025-07-24 | Stop reason: HOSPADM

## 2025-07-16 RX ORDER — SODIUM CHLORIDE 0.9 % (FLUSH) 0.9 %
10 SYRINGE (ML) INJECTION AS NEEDED
Status: DISCONTINUED | OUTPATIENT
Start: 2025-07-16 | End: 2025-07-24 | Stop reason: HOSPADM

## 2025-07-16 RX ORDER — ATORVASTATIN CALCIUM 40 MG/1
40 TABLET, FILM COATED ORAL DAILY
Status: DISCONTINUED | OUTPATIENT
Start: 2025-07-16 | End: 2025-07-24 | Stop reason: HOSPADM

## 2025-07-16 RX ORDER — SODIUM CHLORIDE 0.9 % (FLUSH) 0.9 %
10 SYRINGE (ML) INJECTION EVERY 12 HOURS SCHEDULED
Status: DISCONTINUED | OUTPATIENT
Start: 2025-07-16 | End: 2025-07-24 | Stop reason: HOSPADM

## 2025-07-16 RX ORDER — DEXTROSE MONOHYDRATE 25 G/50ML
25 INJECTION, SOLUTION INTRAVENOUS
Status: DISCONTINUED | OUTPATIENT
Start: 2025-07-16 | End: 2025-07-16

## 2025-07-16 RX ORDER — METOPROLOL SUCCINATE 25 MG/1
25 TABLET, EXTENDED RELEASE ORAL DAILY
Status: DISCONTINUED | OUTPATIENT
Start: 2025-07-16 | End: 2025-07-22

## 2025-07-16 RX ORDER — LOSARTAN POTASSIUM 25 MG/1
25 TABLET ORAL DAILY
Status: DISCONTINUED | OUTPATIENT
Start: 2025-07-16 | End: 2025-07-24 | Stop reason: HOSPADM

## 2025-07-16 RX ORDER — ONDANSETRON 2 MG/ML
4 INJECTION INTRAMUSCULAR; INTRAVENOUS EVERY 6 HOURS PRN
Status: DISCONTINUED | OUTPATIENT
Start: 2025-07-16 | End: 2025-07-24 | Stop reason: HOSPADM

## 2025-07-16 RX ORDER — SODIUM CHLORIDE, SODIUM LACTATE, POTASSIUM CHLORIDE, CALCIUM CHLORIDE 600; 310; 30; 20 MG/100ML; MG/100ML; MG/100ML; MG/100ML
100 INJECTION, SOLUTION INTRAVENOUS CONTINUOUS
Status: DISPENSED | OUTPATIENT
Start: 2025-07-16 | End: 2025-07-17

## 2025-07-16 RX ORDER — ALUMINA, MAGNESIA, AND SIMETHICONE 2400; 2400; 240 MG/30ML; MG/30ML; MG/30ML
15 SUSPENSION ORAL EVERY 6 HOURS PRN
Status: DISCONTINUED | OUTPATIENT
Start: 2025-07-16 | End: 2025-07-24 | Stop reason: HOSPADM

## 2025-07-16 RX ORDER — FAMOTIDINE 20 MG/1
20 TABLET, FILM COATED ORAL NIGHTLY
Status: DISCONTINUED | OUTPATIENT
Start: 2025-07-16 | End: 2025-07-24 | Stop reason: HOSPADM

## 2025-07-16 RX ORDER — POTASSIUM CHLORIDE 1500 MG/1
40 TABLET, EXTENDED RELEASE ORAL EVERY 4 HOURS
Status: COMPLETED | OUTPATIENT
Start: 2025-07-16 | End: 2025-07-16

## 2025-07-16 RX ORDER — INSULIN LISPRO 100 [IU]/ML
2-7 INJECTION, SOLUTION INTRAVENOUS; SUBCUTANEOUS EVERY 6 HOURS SCHEDULED
Status: DISCONTINUED | OUTPATIENT
Start: 2025-07-16 | End: 2025-07-16

## 2025-07-16 RX ORDER — PRAMIPEXOLE DIHYDROCHLORIDE 0.5 MG/1
0.5 TABLET ORAL NIGHTLY
Status: DISCONTINUED | OUTPATIENT
Start: 2025-07-16 | End: 2025-07-24 | Stop reason: HOSPADM

## 2025-07-16 RX ORDER — CETIRIZINE HYDROCHLORIDE 10 MG/1
10 TABLET ORAL NIGHTLY
Status: DISCONTINUED | OUTPATIENT
Start: 2025-07-16 | End: 2025-07-24 | Stop reason: HOSPADM

## 2025-07-16 RX ORDER — METRONIDAZOLE 500 MG/100ML
500 INJECTION, SOLUTION INTRAVENOUS EVERY 8 HOURS
Status: DISCONTINUED | OUTPATIENT
Start: 2025-07-16 | End: 2025-07-18

## 2025-07-16 RX ORDER — IBUPROFEN 600 MG/1
1 TABLET ORAL
Status: DISCONTINUED | OUTPATIENT
Start: 2025-07-16 | End: 2025-07-16

## 2025-07-16 RX ADMIN — INSULIN LISPRO 2 UNITS: 100 INJECTION, SOLUTION INTRAVENOUS; SUBCUTANEOUS at 23:27

## 2025-07-16 RX ADMIN — MAGNESIUM SULFATE IN WATER FOR 4 G: 40 INJECTION INTRAVENOUS at 12:57

## 2025-07-16 RX ADMIN — METRONIDAZOLE 500 MG: 500 INJECTION, SOLUTION INTRAVENOUS at 11:21

## 2025-07-16 RX ADMIN — MUPIROCIN 1 APPLICATION: 20 OINTMENT TOPICAL at 06:02

## 2025-07-16 RX ADMIN — INSULIN LISPRO 6 UNITS: 100 INJECTION, SOLUTION INTRAVENOUS; SUBCUTANEOUS at 17:32

## 2025-07-16 RX ADMIN — Medication 10 ML: at 08:16

## 2025-07-16 RX ADMIN — Medication 10 ML: at 21:40

## 2025-07-16 RX ADMIN — POTASSIUM CHLORIDE 40 MEQ: 1500 TABLET, EXTENDED RELEASE ORAL at 12:57

## 2025-07-16 RX ADMIN — PIPERACILLIN AND TAZOBACTAM 3.38 G: 3; .375 INJECTION, POWDER, FOR SOLUTION INTRAVENOUS at 03:59

## 2025-07-16 RX ADMIN — IOPAMIDOL 100 ML: 755 INJECTION, SOLUTION INTRAVENOUS at 00:59

## 2025-07-16 RX ADMIN — SODIUM CHLORIDE 1000 ML: 0.9 INJECTION, SOLUTION INTRAVENOUS at 02:43

## 2025-07-16 RX ADMIN — ACETAMINOPHEN 650 MG: 325 TABLET, FILM COATED ORAL at 12:57

## 2025-07-16 RX ADMIN — SODIUM CHLORIDE, POTASSIUM CHLORIDE, SODIUM LACTATE AND CALCIUM CHLORIDE 100 ML/HR: 600; 310; 30; 20 INJECTION, SOLUTION INTRAVENOUS at 11:21

## 2025-07-16 RX ADMIN — HYDROMORPHONE HYDROCHLORIDE 1 MG: 1 INJECTION, SOLUTION INTRAMUSCULAR; INTRAVENOUS; SUBCUTANEOUS at 19:37

## 2025-07-16 RX ADMIN — HUMAN INSULIN 5 UNITS: 100 INJECTION, SOLUTION SUBCUTANEOUS at 01:01

## 2025-07-16 RX ADMIN — CEFTRIAXONE 2000 MG: 2 INJECTION, POWDER, FOR SOLUTION INTRAMUSCULAR; INTRAVENOUS at 10:47

## 2025-07-16 RX ADMIN — METRONIDAZOLE 500 MG: 500 INJECTION, SOLUTION INTRAVENOUS at 18:48

## 2025-07-16 RX ADMIN — ACETAMINOPHEN 650 MG: 325 TABLET, FILM COATED ORAL at 08:14

## 2025-07-16 RX ADMIN — NOREPINEPHRINE BITARTRATE 0.02 MCG/KG/MIN: 32 SOLUTION INTRAVENOUS at 03:59

## 2025-07-16 RX ADMIN — POTASSIUM CHLORIDE 40 MEQ: 1500 TABLET, EXTENDED RELEASE ORAL at 08:14

## 2025-07-16 RX ADMIN — SENNOSIDES AND DOCUSATE SODIUM 2 TABLET: 50; 8.6 TABLET ORAL at 08:14

## 2025-07-16 RX ADMIN — BUDESONIDE AND FORMOTEROL FUMARATE DIHYDRATE 2 PUFF: 160; 4.5 AEROSOL RESPIRATORY (INHALATION) at 19:12

## 2025-07-16 RX ADMIN — MUPIROCIN 1 APPLICATION: 20 OINTMENT TOPICAL at 21:40

## 2025-07-16 RX ADMIN — BUDESONIDE AND FORMOTEROL FUMARATE DIHYDRATE 2 PUFF: 160; 4.5 AEROSOL RESPIRATORY (INHALATION) at 11:35

## 2025-07-16 RX ADMIN — HYDROMORPHONE HYDROCHLORIDE 1 MG: 1 INJECTION, SOLUTION INTRAMUSCULAR; INTRAVENOUS; SUBCUTANEOUS at 14:13

## 2025-07-16 NOTE — CONSULTS
Diabetes Education    Patient Name:  Eugenia Madden  YOB: 1955  MRN: [id number]  Admit Date:  7/15/2025        On 7/16/2025 A1c was 9.83% and admission blood sugar was 294. A1c info sheet given with discussion on A1c target and healthy blood sugar range. Patient stated she was diagnosed about 8 years ago and has an Accu-chek glucometer that is about 3 years old. Patient stated she checks her blood sugar about 2X a week with results 150-180. At home patient stated that she takes Jardiance 25 mg daily, Actos 15 mg daily, and Ozempic 2 mg on Mondays. Patient stated she drinks A & W zero, Pepsi zero, unsweetened tea with Splenda, diet Sprite, and coffee. Patient stated she doesn't drink much water. Explained that drinking water and exercise were 2 natural ways to help with blood sugar control.  at bedside asked if patient could do 5-10 minutes of exercise 2-3X a day due to patient's COPD and educator said yes. Discussed that it is recommended to get a minimum of 150 minutes of exercise a week. Patient has no further questions or concerns related to diabetes at this time.      Electronically signed by:  Parvin Penny RN  07/16/25 16:30 EDT

## 2025-07-16 NOTE — ED PROVIDER NOTES
Subjective   History of Present Illness  Patient is a 69-year-old female with a past medical history significant for COPD, CAD, diabetes, heart murmur, hypertension, and kidney stones presenting to the ED for evaluation of hypotension.  Patient was transported via EMS who reports that her systolic blood pressure was 80s on scene.  She received 200 mL of normal saline and improved.  Patient reports she has just not felt well.  She reports that she has had bodyaches but does not know if she has had a fever.  She does not know what is wrong with her, she just feels poorly.  She denies any pain, nausea, vomiting, chest pain, and shortness of breath.          Review of Systems   Constitutional:  Positive for fever.       Past Medical History:   Diagnosis Date    Arthritis     COPD (chronic obstructive pulmonary disease) 07/2021    Coronary artery disease     Diabetes mellitus 2010    Heart murmur     Hypertension     Kidney stones     Restless leg        Allergies   Allergen Reactions    Tuberculin, Ppd Rash     40yrs ago       Past Surgical History:   Procedure Laterality Date    CERVIX SURGERY  1983    Dysplasia removed from Cervix    COLONOSCOPY      KIDNEY STONE SURGERY  2016    Lithotripsy    OTHER SURGICAL HISTORY  2007    Pinched nerve arm     TUBAL ABDOMINAL LIGATION Bilateral        Family History   Problem Relation Age of Onset    Heart disease Mother     Cancer Mother         Breast Cancer    Breast cancer Mother 55    Diabetes Mother     Diabetes Father     Stroke Father     Heart disease Father     Hypertension Father     Cancer Father     Hypertension Sister     Diabetes Sister     Heart disease Sister     Diabetes Brother     Hypertension Brother     Heart disease Brother     Other Other         [facility]    Diabetes Brother     Hypertension Brother     No Known Problems Brother     Heart disease Brother     Cancer Brother         colon    Other Brother        Social History      Socioeconomic History    Marital status:      Spouse name: Braeden    Number of children: 4    Years of education: 12   Tobacco Use    Smoking status: Former     Current packs/day: 0.00     Average packs/day: 0.5 packs/day for 46.8 years (23.4 ttl pk-yrs)     Types: Cigarettes     Start date: 1974     Quit date: 2020     Years since quittin.7     Passive exposure: Past    Smokeless tobacco: Never    Tobacco comments:     Passive Smoke: Y   Vaping Use    Vaping status: Never Used   Substance and Sexual Activity    Alcohol use: Never    Drug use: No    Sexual activity: Yes     Partners: Male     Birth control/protection: Surgical, None           Objective   Physical Exam  Vitals and nursing note reviewed.   Constitutional:       General: She is awake. She is not in acute distress.     Appearance: Normal appearance. She is ill-appearing. She is not toxic-appearing or diaphoretic.   HENT:      Head: Normocephalic and atraumatic.      Right Ear: External ear normal.      Left Ear: External ear normal.      Nose: Nose normal. No congestion or rhinorrhea.      Mouth/Throat:      Mouth: Mucous membranes are moist.   Eyes:      General: No scleral icterus.        Right eye: No discharge.         Left eye: No discharge.      Extraocular Movements: Extraocular movements intact.      Conjunctiva/sclera: Conjunctivae normal.      Pupils: Pupils are equal, round, and reactive to light.   Neck:      Vascular: No carotid bruit.   Cardiovascular:      Rate and Rhythm: Normal rate and regular rhythm.      Pulses: Normal pulses.      Heart sounds: Murmur heard.      No friction rub. No gallop.   Pulmonary:      Effort: Pulmonary effort is normal. No respiratory distress.      Breath sounds: No stridor. Wheezing and rhonchi present. No rales.   Abdominal:      General: Abdomen is flat. Bowel sounds are normal. There is no distension.      Palpations: Abdomen is soft.      Tenderness: There is no abdominal  tenderness. There is no guarding.   Musculoskeletal:         General: No tenderness. Normal range of motion.      Cervical back: Normal range of motion and neck supple. No rigidity or tenderness.      Right lower leg: No edema.      Left lower leg: No edema.   Lymphadenopathy:      Cervical: No cervical adenopathy.   Skin:     General: Skin is warm and dry.      Capillary Refill: Capillary refill takes 2 to 3 seconds.      Coloration: Skin is not jaundiced or pale.      Findings: No bruising, erythema, lesion or rash.   Neurological:      General: No focal deficit present.   Psychiatric:         Mood and Affect: Mood normal.         Behavior: Behavior is cooperative.         Procedures           ED Course  ED Course as of 07/16/25 0422   Wed Jul 16, 2025   0123 Pain on repeat troponin and CT PE. [ME]   0138 Patient is resting comfortably no acute distress.  Patient denies chest pain and shortness of breath at this time.  She reports that she has not had either symptom in the last several days. [ME]      ED Course User Index  [ME] Parvin Feliciano PA-C      Labs Reviewed   COMPREHENSIVE METABOLIC PANEL - Abnormal; Notable for the following components:       Result Value    Glucose 294 (*)     CO2 18.4 (*)     AST (SGOT) 37 (*)     Anion Gap 17.6 (*)     All other components within normal limits    Narrative:     GFR Categories in Chronic Kidney Disease (CKD)              GFR Category          GFR (mL/min/1.73)    Interpretation  G1                    90 or greater        Normal or high (1)  G2                    60-89                Mild decrease (1)  G3a                   45-59                Mild to moderate decrease  G3b                   30-44                Moderate to severe decrease  G4                    15-29                Severe decrease  G5                    14 or less           Kidney failure    (1)In the absence of evidence of kidney disease, neither GFR category G1 or G2 fulfill the criteria for  CKD.    eGFR calculation 2021 CKD-EPI creatinine equation, which does not include race as a factor   PROTIME-INR - Abnormal; Notable for the following components:    Protime 15.0 (*)     INR 1.19 (*)     All other components within normal limits   URINALYSIS W/ CULTURE IF INDICATED - Abnormal; Notable for the following components:    Glucose, UA >=1000 mg/dL (3+) (*)     Ketones, UA Trace (*)     Blood, UA Trace (*)     Protein, UA Trace (*)     Leuk Esterase, UA Small (1+) (*)     All other components within normal limits    Narrative:     In absence of clinical symptoms, the presence of pyuria, bacteria, and/or nitrites on the urinalysis result does not correlate with infection.   BNP (IN-HOUSE) - Abnormal; Notable for the following components:    proBNP 1,087.0 (*)     All other components within normal limits    Narrative:     This assay is used as an aid in the diagnosis of individuals suspected of having heart failure. It can be used as an aid in the diagnosis of acute decompensated heart failure (ADHF) in patients presenting with signs and symptoms of ADHF to the emergency department (ED). In addition, NT-proBNP of <300 pg/mL indicates ADHF is not likely.    Age Range Result Interpretation  NT-proBNP Concentration (pg/mL:      <50             Positive            >450                   Gray                 300-450                    Negative             <300    50-75           Positive            >900                  Gray                300-900                  Negative            <300      >75             Positive            >1800                  Gray                300-1800                  Negative            <300   D-DIMER, QUANTITATIVE - Abnormal; Notable for the following components:    D-Dimer, Quantitative 9.88 (*)     All other components within normal limits    Narrative:     According to the assay 's published package insert, a normal (<0.50 MCGFEU/mL) D-dimer result in conjunction  "with a non-high clinical probability assessment, excludes deep vein thrombosis (DVT) and pulmonary embolism (PE) with high sensitivity.    D-dimer values increase with age and this can make VTE exclusion of an older population difficult. To address this, the [employer], based on best available evidence and recent guidelines, recommends that clinicians use age-adjusted D-dimer thresholds in patients greater than 50 years of age with: a) a low probability of PE who do not meet all Pulmonary Embolism Rule Out Criteria, or b) in those with intermediate probability of PE.   The formula for an age-adjusted D-dimer cut-off is \"age/100\".  For example, a 60 year old patient would have an age-adjusted cut-off of 0.60 MCGFEU/mL and an 80 year old 0.80 MCGFEU/mL.   TROPONIN - Abnormal; Notable for the following components:    HS Troponin T 35 (*)     All other components within normal limits    Narrative:     High Sensitive Troponin T Reference Range:  <14.0 ng/L- Negative Female for AMI  <22.0 ng/L- Negative Male for AMI  >=14 - Abnormal Female indicating possible myocardial injury.  >=22 - Abnormal Male indicating possible myocardial injury.   Clinicians would have to utilize clinical acumen, EKG, Troponin, and serial changes to determine if it is an Acute Myocardial Infarction or myocardial injury due to an underlying chronic condition.        PROCALCITONIN - Abnormal; Notable for the following components:    Procalcitonin 30.90 (*)     All other components within normal limits    Narrative:     As a Marker for Sepsis (Non-Neonates):    1. <0.5 ng/mL represents a low risk of severe sepsis and/or septic shock.  2. >2 ng/mL represents a high risk of severe sepsis and/or septic shock.    As a Marker for Lower Respiratory Tract Infections that require antibiotic therapy:    PCT on Admission    Antibiotic Therapy       6-12 Hrs later    >0.5                Strongly Recommended  >0.25 - <0.5        Recommended " "  0.1 - 0.25          Discouraged              Remeasure/reassess PCT  <0.1                Strongly Discouraged     Remeasure/reassess PCT    As 28 day mortality risk marker: \"Change in Procalcitonin Result\" (>80% or <=80%) if Day 0 (or Day 1) and Day 4 values are available. Refer to http://[email or web address] [address] [email or web address] [email or web address]-calculator.[email or web address]    Change in PCT <=80%  A decrease of PCT levels below or equal to 80% defines a positive change in PCT test result representing a higher risk for 28-day all-cause mortality of patients diagnosed with severe sepsis for septic shock.    Change in PCT >80%  A decrease of PCT levels of more than 80% defines a negative change in PCT result representing a lower risk for 28-day all-cause mortality of patients diagnosed with severe sepsis or septic shock.      CBC WITH AUTO DIFFERENTIAL - Abnormal; Notable for the following components:    WBC 2.77 (*)     Platelets 129 (*)     All other components within normal limits    Narrative:     Instrument flags present, additional testing may be recommended.  The previously reported component NRBC is no longer being reported. Previous result was 0.0 /100 WBC (Reference Range: 0.0-0.2 /100 WBC) on 7/15/2025 at 2303 EDT.   URINALYSIS, MICROSCOPIC ONLY - Abnormal; Notable for the following components:    WBC, UA 6-10 (*)     All other components within normal limits   MANUAL DIFFERENTIAL - Abnormal; Notable for the following components:    Lymphocyte % 6.6 (*)     Bands %  34.1 (*)     Metamyelocyte % 4.4 (*)     Lymphocytes Absolute 0.18 (*)     All other components within normal limits   HIGH SENSITIVITIY TROPONIN T 1HR - Abnormal; Notable for the following components:    HS Troponin T 65 (*)     Troponin T Numeric Delta 30 (*)     Troponin T % Delta 86 (*)     All other components within normal limits    Narrative:     High Sensitive Troponin T Reference Range:  <14.0 ng/L- Negative Female for AMI  <22.0 ng/L- Negative Male for AMI  >=14 - Abnormal Female indicating " possible myocardial injury.  >=22 - Abnormal Male indicating possible myocardial injury.   Clinicians would have to utilize clinical acumen, EKG, Troponin, and serial changes to determine if it is an Acute Myocardial Infarction or myocardial injury due to an underlying chronic condition.        POCT GLUCOSE FINGERSTICK - Abnormal; Notable for the following components:    Glucose 291 (*)     All other components within normal limits   COVID-19/FLUA&B/RSV, NP SWAB IN TRANSPORT MEDIA 1 HR TAT - Normal   LIPASE - Normal   APTT - Normal   URINE DRUG SCREEN - Normal    Narrative:     Cutoff For Drugs Screened:    Amphetamines               500 ng/ml  Barbiturates               200 ng/ml  Benzodiazepines            150 ng/ml  Cocaine                    150 ng/ml  Methadone                  200 ng/ml  Opiates                    100 ng/ml  Phencyclidine               25 ng/ml  THC                         50 ng/ml  Methamphetamine            500 ng/ml  Tricyclic Antidepressants  300 ng/ml  Oxycodone                  100 ng/ml  Buprenorphine               10 ng/ml    The normal value for all drugs tested is negative. This report includes unconfirmed screening results, with the cutoff values listed, to be used for medical treatment purposes only.  Unconfirmed results must not be used for non-medical purposes such as employment or legal testing.  Clinical consideration should be applied to any drug of abuse test, particularly when unconfirmed results are used.    All urine drugs of abuse requests without chain of custody are for medical screening purposes only.  False positives are possible.     MAGNESIUM - Normal   PHOSPHORUS - Normal   TSH RFX ON ABNORMAL TO FREE T4 - Normal   POC LACTATE - Normal   BLOOD CULTURE   BLOOD CULTURE   MRSA SCREEN, PCR   ETHANOL    Narrative:     Not for legal purposes.   SCAN SLIDE   LIPID PANEL   MAGNESIUM   PHOSPHORUS   COMPREHENSIVE METABOLIC PANEL   CBC WITH AUTO DIFFERENTIAL   POCT GLUCOSE  FINGERSTICK   CBC AND DIFFERENTIAL    Narrative:     The following orders were created for panel order CBC & Differential.  Procedure                               Abnormality         Status                     ---------                               -----------         ------                     CBC Auto Differential[[id number]]        Abnormal            Final result               Scan Slide[[id number]]                                       Final result                 Please view results for these tests on the individual orders.   CBC AND DIFFERENTIAL    Narrative:     The following orders were created for panel order CBC & Differential.  Procedure                               Abnormality         Status                     ---------                               -----------         ------                     CBC Auto Differential[[id number]]                                                         Please view results for these tests on the individual orders.     CT Angiogram Chest Pulmonary Embolism  Result Date: 7/16/2025  Impression: 1.No evidence of pulmonary embolism. 2.Mild pulmonary edema. 3.Gas within the upper pole of the right kidney with surrounding fat stranding. This could represent infection of the right kidney such as pyonephrosis. This is incompletely visualized on this exam. Electronically Signed: Francisco Aguilar MD  7/16/2025 2:22 AM EDT  Workstation ID: [id number]    XR Chest 1 View  Result Date: 7/15/2025  Impression: No active disease. Electronically Signed: Francisco Aguilar MD  7/15/2025 11:00 PM EDT  Workstation ID: [id number]    Medications   piperacillin-tazobactam (ZOSYN) 3.375 g IVPB in 100 mL NS MBP (CD) (3.375 g Intravenous New Bag 7/16/25 0358)   norepinephrine (LEVOPHED) 8 mg in 250 mL NS infusion (premix) (0.02 mcg/kg/min × 69.2 kg Intravenous Currently Infusing 7/16/25 0401)   nitroglycerin (NITROSTAT) SL tablet 0.4 mg (has no administration in time range)   sodium chloride 0.9 % flush 10 mL  (has no administration in time range)   sodium chloride 0.9 % flush 10 mL (has no administration in time range)   sodium chloride 0.9 % infusion 40 mL (has no administration in time range)   mupirocin (BACTROBAN) 2 % nasal ointment 1 Application (has no administration in time range)   aluminum-magnesium hydroxide-simethicone (MAALOX MAX) 400-400-40 MG/5ML suspension 15 mL (has no administration in time range)   sennosides-docusate (PERICOLACE) 8.6-50 MG per tablet 2 tablet (has no administration in time range)     And   polyethylene glycol (MIRALAX) packet 17 g (has no administration in time range)     And   bisacodyl (DULCOLAX) EC tablet 5 mg (has no administration in time range)     And   bisacodyl (DULCOLAX) suppository 10 mg (has no administration in time range)   acetaminophen (TYLENOL) tablet 650 mg (has no administration in time range)     Or   acetaminophen (TYLENOL) suppository 650 mg (has no administration in time range)   ondansetron ODT (ZOFRAN-ODT) disintegrating tablet 4 mg (has no administration in time range)     Or   ondansetron (ZOFRAN) injection 4 mg (has no administration in time range)   sodium chloride 0.9 % bolus 1,000 mL (0 mL Intravenous Stopped 7/16/25 0204)   acetaminophen (TYLENOL) tablet 1,000 mg (1,000 mg Oral Given 7/15/25 2307)   insulin regular (humuLIN R,novoLIN R) injection 5 Units (5 Units Subcutaneous Given 7/16/25 0101)   iopamidol (ISOVUE-370) 76 % injection 100 mL (100 mL Intravenous Given 7/16/25 0059)   sodium chloride 0.9 % bolus 1,000 mL (1,000 mL Intravenous New Bag 7/16/25 0243)                                                      Medical Decision Making  Patient is a 69-year-old female who presented with the above complaint.  She had the above evaluation and exam.  Patient was placed on monitor and IV was established.    EKG independently interpreted by Dr. Forbes and reviewed by myself.  Sinus tachycardia, rate 130, QT interval 3/1/2019.  No significant changes  compared to prior on 10/2/2023.    Imaging independently interpreted by radiology and reviewed by myself.  CTA of the chest was negative for PE but did show some gas in the upper pole of the right kidney with surrounding fat stranding.  CT abdomen pelvis was ordered but has not resulted at this time.  Chest x-ray negative.    Patient's glucose was 291 upon arrival, so she was given 5 units of insulin.  POC lactate 1.7.  Blood cultures are pending.  D-dimer 9.88.  PTT 26.1 with PT of 15, INR 1.19.  TSH was normal.  Phosphorus, magnesium, and ethanol were normal.  UDS was negative.  Urinalysis showed 6-10 white blood cells, which is consistent with patient's imaging.  Initial troponin was 35 with a repeat of 65 for a percentage delta of 86.  Patient denies chest pain or shortness of breath.  Procalcitonin 30.9.  BNP 1097.  Lipase normal.  CMP grossly unremarkable with slight elevation of anion gap at 17.6.  Patient was given IV fluids septic bolus.  Manual differential showed 34% bands.  CBC showed 2.77 white count and 129,000 platelets.  COVID swab was negative.  Patient was discussed with Dr. Conn.      Patient was given Tylenol and Zosyn in the ED.  At reassessment, patient remains hypotensive.  Levophed was ordered.  At time of admission, patient remained hypotensive but was stable.  Patient was discussed with intensivist team, who agreed to admit.    Critical Care Time     The high probability of sudden, clinically significant deterioration in the patient's condition required the highest level of my preparedness to intervene urgently.  The services I provided to this patient were to treat and/or prevent clinically significant deterioration that could result in: death . Services included the following: chart data review, reviewing nurses notes an/or old charts, documentation time, consultant collaboration regarding findings and treatment options, vital sign assessments and ordering, interpreting and reviewing  diagnostic studies/lab test.  Aggregate critical care time was 45 mins , which includes only time during which I was engaged in work directly related to the patient's care, as described above, whether at the bedside or elsewhere in the Emergency Department. It did not include time spent performing other reported procedures or the services of residents, students, nurses or physician assistants.    Problems Addressed:  Sepsis, due to unspecified organism, unspecified whether acute organ dysfunction present: complicated acute illness or injury    Amount and/or Complexity of Data Reviewed  Labs: ordered.  Radiology: ordered.  ECG/medicine tests: ordered.    Risk  OTC drugs.  Prescription drug management.  Decision regarding hospitalization.        Final diagnoses:   Fever, unspecified fever cause   Thrombocytopenia   Dehydration   Pyelonephritis   Sepsis, due to unspecified organism, unspecified whether acute organ dysfunction present   Elevated troponin       ED Disposition  ED Disposition       ED Disposition   Decision to Admit    Condition   --    Comment   Level of Care: Critical Care [6]   Diagnosis: Septic shock [[id number]]   Certification: I Certify That Inpatient Hospital Services Are Medically Necessary For Greater Than 2 Midnights                 No follow-up provider specified.       Medication List        ASK your doctor about these medications      gabapentin 300 MG capsule  Commonly known as: NEURONTIN  Ask about: Which instructions should I use?     Jardiance 25 MG tablet tablet  Generic drug: empagliflozin  Ask about: Which instructions should I use?                 Parvin Feliciano PA-C  07/16/25 0422

## 2025-07-16 NOTE — H&P
Critical Care History and Physical     Eugenia Madden : 1955 MRN:[id number] LOS:0 ROOM:      Reason for admission: Septic shock     Assessment / Plan     Septic Shock, secondary to emphysematous pyelonephritis  Likely due to pyelonephritis, complicated UTI  Work-up:   WBC 2.77, 34% bandemia, lactate 1.7, procalcitonin 30.9  Blood cultures pending  UA abnormal but did not reflex to culture  Elevated D Dimer, CT PE Protocol negative for PE in ED.  CT abdomen/pelvis (): emphysematous pyelonephritis of the right kidney   Given Zosyn in ED, converted to ceftriaxone, Flagyl.  S/p 2L sepsis bolus, persistent hypotension in spite of adequate fluid resuscitation.   C/w additional fluids as ordered. Avoid fluid overload.   On levophed, titrate vasopressors for a target MAP of 65  Urology consulted    Acute Kidney Injury  Remains nonoliguric. Baseline creatinine 0.6. Creatinine 1.22  Likely prerenal. Possible intrinsic component due to ATN from infection and hypotension.   C/w IV fluids as ordered.  Monitor Input/Output very closely.   Avoids NSAIDs, nephrotoxic medications, and hypotension.    Coronary artery disease involving native coronary artery of native heart without angina pectoris  Chronic and stable.   HS Troponin 35, with reflex of 65.  EKG independently reviewed with Sinus tachycardia without ST or T wave abnormalities, heart rate 130, QTc 468  c/w aspirin, beta blockers, nitrates and statins.    Essential hypertension: well controlled.   Holding Toprol, Cozaar,   Titrate medications as needed.    Emphysema  COPD, not in exacerbation  CXR no infiltrates or effusions  Continue Symbicort (hospital formulary) and duonebs as needed.  Continue Zyrtec    Diabetes mellitus Type 2, not insulin-dependent : well controlled.   Home regimen includes: Semaglutide, Jardiance, Actos.  Hold oral agents while in ICU.  A1c 6.7 (23), repeat-pending.  Accu checks ACHS or Every 6 Hours, based on diet, with sliding  scale Admelog coverage as needed.     Dyslipidemia: On statin as OP.  GERD: On pepcid.  RLS: On Mirapex.  Insomnia: On hydroxyzine  Arthritis: On leflunomide as OP.  Neuropathy: On gabapentin    Code Status (Patient has no pulse and is not breathing): CPR (Attempt to Resuscitate)  Medical Interventions (Patient has pulse or is breathing): Full Support       Nutrition:   NPO Diet NPO Type: Sips with Meds     VTE Prophylaxis:  Mechanical VTE prophylaxis orders are present.       History of Present illness     Eugenia Madden is a 69 y.o. female with PMH of COPD, emphysema, DM type II, hypertension, hyperlipidemia, CAD, and nephrolithiasis presented to the hospital for overall not feeling well and back pain, and was admitted with a principal diagnosis of Septic shock. She presents after severe back pain x1 day, and inability to walk and talk x1 hour at home, secondary to her level of pain. She denied dysuria. She stated it came on suddenly, and has a history of kidney stones but hasn't had for many years. Upon EMS arrival the patient's blood pressure was 80 systolic which briefly improved with 200 mL IVF.  Upon arrival to the ED she was tachycardic and febrile at 100.9 F.  She developed hypotension and received 2 L IVF with persistent hypotension for which Levophed was started.  Labs remarkable for BNP 1087, CO2 18.4, anion gap 17.6, glucose 294 procalcitonin 30.9, D-dimer 9.88 INR 1.19, WBC 2.77, 34% bandemia.  CTA chest was negative for PE showed mild pulmonary edema, and gas within the upper pole of the right kidney with surrounding fat stranding although it was incompletely visualized.  She was sent for a CT abdomen pelvis with emphysematous pyelonephritis of the right kidney. Urologic consultation is recommended. She was given 5 units regular insulin for the hyperglycemia and started on Zosyn for suspected pyelonephritis.    ACP: No advance care planning documentation on file, in the event patient is unable to make  her own decisions, her spouse to be next of kin and decision maker.    Patient was seen and examined on 25 at 03:49 EDT .      Past Medical/Surgical/Social/Family History & Allergies     Past Medical History:   Diagnosis Date    Arthritis     COPD (chronic obstructive pulmonary disease) 2021    Coronary artery disease     Diabetes mellitus 2010    Heart murmur     Hypertension     Kidney stones     Restless leg       Past Surgical History:   Procedure Laterality Date    CERVIX SURGERY      Dysplasia removed from Cervix    COLONOSCOPY      KIDNEY STONE SURGERY  2016    Lithotripsy    OTHER SURGICAL HISTORY  2007    Pinched nerve arm     TUBAL ABDOMINAL LIGATION Bilateral       Social History     Socioeconomic History    Marital status:      Spouse name: Braeden    Number of children: 4    Years of education: 12   Tobacco Use    Smoking status: Former     Current packs/day: 0.00     Average packs/day: 0.5 packs/day for 46.8 years (23.4 ttl pk-yrs)     Types: Cigarettes     Start date: 1974     Quit date: 2020     Years since quittin.7     Passive exposure: Past    Smokeless tobacco: Never    Tobacco comments:     Passive Smoke: Y   Vaping Use    Vaping status: Never Used   Substance and Sexual Activity    Alcohol use: Never    Drug use: No    Sexual activity: Yes     Partners: Male     Birth control/protection: Surgical, None      Family History   Problem Relation Age of Onset    Heart disease Mother     Cancer Mother         Breast Cancer    Breast cancer Mother 55    Diabetes Mother     Diabetes Father     Stroke Father     Heart disease Father     Hypertension Father     Cancer Father     Hypertension Sister     Diabetes Sister     Heart disease Sister     Diabetes Brother     Hypertension Brother     Heart disease Brother     Other Other         Abstraction from [employer] Cholesterol    Diabetes Brother     Hypertension Brother     No Known Problems Brother     Heart disease Brother      Cancer Brother         colon    Other Brother       Allergies   Allergen Reactions    Tuberculin, Ppd Rash     40yrs ago      Social Drivers of Health     Tobacco Use: Medium Risk (4/4/2025)    Patient History     Smoking Tobacco Use: Former     Smokeless Tobacco Use: Never     Passive Exposure: Past   Alcohol Use: Not At Risk (10/3/2023)    AUDIT-C     Frequency of Alcohol Consumption: Never     Average Number of Drinks: Patient does not drink     Frequency of Binge Drinking: Never   Financial Resource Strain: Not on file   Food Insecurity: Not on file   Transportation Needs: Not on file   Physical Activity: Inactive (10/4/2023)    Exercise Vital Sign     Days of Exercise per Week: 0 days     Minutes of Exercise per Session: 0 min   Stress: Not on file   Social Connections: Not on file   Interpersonal Safety: Not At Risk (7/16/2025)    Abuse Screen     Unsafe at Home or Work/School: no     Feels Threatened by Someone?: no     Does Anyone Keep You from Contacting Others or Doint Things Outside the Home?: no     Physical Sign of Abuse Present: no   Depression: Not at risk (9/21/2023)    PHQ-2     PHQ-2 Score: 0   Housing Stability: Not At Risk (10/4/2023)    Housing Stability     Current Living Arrangements: home     Potentially Unsafe Housing Conditions: none   Utilities: Not on file   Health Literacy: Unknown (10/4/2023)    Education     Help with school or training?: Not on file     Preferred Language: English   Employment: Not on file   Disabilities: Not At Risk (10/3/2023)    Disabilities     Concentrating, Remembering, or Making Decisions Difficulty: no     Doing Errands Independently Difficulty: no        Home Medications     Prior to Admission medications    Medication Sig Start Date End Date Taking? Authorizing Provider   atorvastatin (LIPITOR) 40 MG tablet TAKE 1 TABLET BY MOUTH DAILY 12/26/23  Yes [facility]as [facility] 160-9-4.8 MCG/ACT aerosol inhaler Inhale 2 puffs 2 (Two)  Times a Day. 1/21/25  Yes Anthony Tapia MD   cetirizine (zyrTEC) 10 MG tablet Take 1 tablet by mouth Every Night.   Yes Anthony Tapia MD   famotidine (PEPCID) 20 MG tablet Take 1 tablet by mouth Every Night. 9/23/19  Yes Anthony Tapia MD   gabapentin (NEURONTIN) 300 MG capsule Take 1 capsule by mouth Every Night.   Yes Anthony Tapia MD   hydrOXYzine (ATARAX) 25 MG tablet TAKE 2 TABLETS BY MOUTH EVERY NIGHT AT BEDTIME 1/29/24  Yes Melisa Taveras MD   ipratropium-albuterol (DUO-NEB) 0.5-2.5 mg/3 ml nebulizer Take 3 mL by nebulization Every 4 (Four) Hours As Needed for Wheezing. 4/20/23  Yes Melisa Taveras MD   Jardiance 25 MG tablet tablet Take 1 tablet by mouth Every Night. 7/2/25  Yes Anthony Tapia MD   leflunomide (ARAVA) 20 MG tablet Take 1 tablet by mouth Every Night. 11/29/22  Yes Anthony Tapia MD   losartan (COZAAR) 25 MG tablet TAKE 1 TABLET BY MOUTH EVERY DAY 7/14/25  Yes Donell Joyce MD   metoprolol succinate XL (TOPROL-XL) 25 MG 24 hr tablet Take 1 tablet by mouth Daily. 4/4/25  Yes Donell Joyce MD   pioglitazone (ACTOS) 15 MG tablet Take 1 tablet by mouth Every Night.   Yes Anthony Tapia MD   potassium chloride 10 MEQ CR tablet TAKE 1 TABLET BY MOUTH DAILY 12/26/23  Yes Melisa Taveras MD   pramipexole (MIRAPEX) 0.125 MG tablet TAKE 4 TABLETS BY MOUTH EVERY NIGHT AT BEDTIME 9/8/23  Yes Melisa Taveras MD   Semaglutide, 1 MG/DOSE, (Ozempic, 1 MG/DOSE,) 4 MG/3ML solution pen-injector INJECT 1 MG UNDER THE SKIN INTO THE APPROPRIATE AREA AS DIRECTED 1 (ONE) TIME PER WEEK. 9/18/23  Yes Lei Kim MD   nitroglycerin (NITROSTAT) 0.3 MG SL tablet 1 under the tongue as needed for angina, may repeat q5mins for up three doses 4/4/25   Donell Joyce MD   metFORMIN ER (GLUCOPHAGE-XR) 500 MG 24 hr tablet TAKE 2 TABLETS BY MOUTH DAILY 1/1/24 7/16/25  Lei Kim MD   naproxen (NAPROSYN) 500 MG tablet  1/6/23 7/16/25  Provider,  MD Anthony        Objective / Physical Exam     Vital signs:  Temp: 99.1 °F (37.3 °C)  BP: (!) 88/50  Heart Rate: 104  Resp: 18  SpO2: 94 %  Weight: 69.2 kg (152 lb 9.6 oz)    Admission Weight: Weight: 69.2 kg (152 lb 9.6 oz)    Physical Exam  Vitals and nursing note reviewed.   Constitutional:       General: She is not in acute distress.     Appearance: She is not ill-appearing, toxic-appearing or diaphoretic.   HENT:      Head: Normocephalic and atraumatic.      Nose: Nose normal. No congestion.      Mouth/Throat:      Mouth: Mucous membranes are moist.      Pharynx: Oropharynx is clear.   Eyes:      Comments: Eyelids normal.   Cardiovascular:      Rate and Rhythm: Normal rate and regular rhythm.      Heart sounds: Normal heart sounds. No murmur heard.  Pulmonary:      Effort: Pulmonary effort is normal. No respiratory distress.      Breath sounds: Normal breath sounds.   Abdominal:      General: There is no distension.      Palpations: Abdomen is soft.      Tenderness: There is no abdominal tenderness.      Comments: Right sided CVA tenderness   Musculoskeletal:         General: No swelling.      Right lower leg: No edema.      Left lower leg: No edema.   Skin:     General: Skin is warm and dry.   Neurological:      Mental Status: She is alert and oriented to person, place, and time.   Psychiatric:      Comments: Calm, cooperative.          Labs     Results from last 7 days   Lab Units 07/15/25  2251   WBC 10*3/mm3 2.77*   HEMOGLOBIN g/dL 14.5   HEMATOCRIT % 45.7   PLATELETS 10*3/mm3 129*      Results from last 7 days   Lab Units 07/15/25  2251   SODIUM mmol/L 138   POTASSIUM mmol/L 3.5   CHLORIDE mmol/L 102   CO2 mmol/L 18.4*   ANION GAP mmol/L 17.6*   BUN mg/dL 15.5   CREATININE mg/dL 0.95   GLUCOSE mg/dL 294*   PHOSPHORUS mg/dL 2.7   MAGNESIUM mg/dL 1.7   ALT (SGPT) U/L 28   AST (SGOT) U/L 37*   ALK PHOS U/L 104            Imaging     CT Abdomen Pelvis Without Contrast  Result Date: 7/16/2025  Impression:  Emphysematous pyelonephritis of the right kidney. Urologic consultation is recommended. Electronically Signed: Francisco Aguilar MD  7/16/2025 5:58 AM EDT  Workstation ID: [id number]    CT Angiogram Chest Pulmonary Embolism  Result Date: 7/16/2025  Impression: 1.No evidence of pulmonary embolism. 2.Mild pulmonary edema. 3.Gas within the upper pole of the right kidney with surrounding fat stranding. This could represent infection of the right kidney such as pyonephrosis. This is incompletely visualized on this exam. Electronically Signed: Francisco Aguilar MD  7/16/2025 2:22 AM EDT  Workstation ID: [id number]    XR Chest 1 View  Result Date: 7/15/2025  Impression: No active disease. Electronically Signed: Francisco Aguilar MD  7/15/2025 11:00 PM EDT  Workstation ID: [id number]      EKG: My independent evaluation showed sinus tachycardia without ST or T wave abnormalities, heart rate 130, QTc 468.    Current Medications     Scheduled Meds:  mupirocin, 1 Application, Each Nare, BID  piperacillin-tazobactam, 3.375 g, Intravenous, Once  senna-docusate sodium, 2 tablet, Oral, BID  sodium chloride, 10 mL, Intravenous, Q12H         Continuous Infusions:  norepinephrine, 0.02-0.3 mcg/kg/min       Total critical care time:  32 minutes    Due to a high probability of clinically significant, life threatening deterioration, the patient required my highest level of preparedness to intervene emergently and I personally spent this critical care time directly and personally managing the patient. This critical care time included obtaining a history; examining the patient; pulse oximetry; ordering and review of studies; arranging urgent treatment with development of a management plan; evaluation of patient's response to treatment; frequent reassessment; and, discussions with other providers.    This critical care time was performed to assess and manage the high probability of imminent, life-threatening deterioration that could result in multi-organ  failure. It was exclusive of separately billable procedures and treating other patients and teaching time.    JONATHAN Flynn   Critical Care  07/16/25   03:49 EDT

## 2025-07-16 NOTE — CASE MANAGEMENT/SOCIAL WORK
Discharge Planning Assessment   [facility]     Patient Name: Eugenia Madden  MRN: [id number]  Today's Date: 7/16/2025    Admit Date: 7/15/2025    Plan: JUDIT Plan: Anticipate routine home with Spouse Braeden.   Discharge Needs Assessment       Row Name 07/16/25 1141       Living Environment    People in Home spouse    Name(s) of People in Home Braeden Madden    Current Living Arrangements home    Potentially Unsafe Housing Conditions none    In the past 12 months has the electric, gas, oil, or water company threatened to shut off services in your home? No    Primary Care Provided by self    Provides Primary Care For no one    Family Caregiver if Needed spouse    Family Caregiver Names Braeden Madden    Quality of Family Relationships helpful;involved;supportive    Able to Return to Prior Arrangements yes       Resource/Environmental Concerns    Resource/Environmental Concerns none    Transportation Concerns none       Transportation Needs    In the past 12 months, has lack of transportation kept you from medical appointments or from getting medications? no    In the past 12 months, has lack of transportation kept you from meetings, work, or from getting things needed for daily living? No       Food Insecurity    Within the past 12 months, you worried that your food would run out before you got the money to buy more. Never true    Within the past 12 months, the food you bought just didn't last and you didn't have money to get more. Never true       Transition Planning    Patient/Family Anticipates Transition to home with family    Patient/Family Anticipated Services at Transition none    Transportation Anticipated car, drives self;family or friend will provide       Discharge Needs Assessment    Readmission Within the Last 30 Days no previous admission in last 30 days    Equipment Currently Used at Home none    Concerns to be Addressed discharge planning    Do you want help finding or keeping work or a job? I do not need or want  help    Do you want help with school or training? For example, starting or completing job training or getting a high school diploma, GED or equivalent No    Anticipated Changes Related to Illness none    Equipment Needed After Discharge none    Provided Post Acute Provider List? N/A    Provided Post Acute Provider Quality & Resource List? N/A                   Discharge Plan       Row Name 07/16/25 1142       Plan    Plan DC Plan: Anticipate routine home with Spouse Braeden.    Patient/Family in Agreement with Plan yes    Provided Post Acute Provider List? N/A    Provided Post Acute Provider Quality & Resource List? N/A    Plan Comments CM spoke with patient at bedside to discuss admission assessment and discharge planning. Patient confirms PCP and pharmacy. Patient confirms she is agreeable to meds to bed program at this time. CM updated pharmacy in [facility]. Patient denies any difficulty affording medications or food at this time. Patient denies any additional needs for services or DME at this time. Patient reports independent with ADL's and drives. Patient spouse will provide transportation when ready for DC.CM spoke with intensivist, nursing, and NP to obtain clinical upates in morning rounds. Plan for patient to remain NPO until Dr. Durand sees patient and develops plan. Remains on pressor. IVF's started.CM will continue to follow for any additional needs. DC Barriers: Cardiac monitoring, O2@1L nc, Levophed gtt, IVF's, Pending cultures, NPO, and monitor labs.                 Expected Discharge Date and Time       Expected Discharge Date Expected Discharge Time    Jul 19, 2025            Demographic Summary       Row Name 07/16/25 1138       General Information    Admission Type inpatient    Arrived From emergency department    Required Notices Provided Important Message from Medicare    Referral Source admission list    Reason for Consult discharge planning    Preferred Language English       Contact Information     Permission Granted to Share Info With                    Functional Status       Row Name 07/16/25 1141       Functional Status    Usual Activity Tolerance good    Current Activity Tolerance good       Physical Activity    On average, how many days per week do you engage in moderate to strenuous exercise (like a brisk walk)? 0 days    On average, how many minutes do you engage in exercise at this level? 0 min    Number of minutes of exercise per week 0       Functional Status, IADL    Medications independent    Meal Preparation independent    Housekeeping independent    Laundry independent    Shopping independent    If for any reason you need help with day-to-day activities such as bathing, preparing meals, shopping, managing finances, etc., do you get the help you need? I don't need any help       Mental Status    General Appearance WDL WDL       Mental Status Summary    Recent Changes in Mental Status/Cognitive Functioning no changes       Employment/    Employment Status retired    Current or Previous Occupation not applicable                 Robyn Carrasco, RN     Office Phone: [phone]  Office Cell:     [phone]

## 2025-07-16 NOTE — CONSULTS
[facility] CONSULT      Patient Identification:  NAME:  Eugenia Madden  Age:  69 y.o.   Sex:  female   :  1955   MRN:  [id number]       Chief complaint/Reason for consult: Emphysematous Pyelitis    History of present illness:  69 y.o. female known to Dr. Medina with history of renal calculi, hydronephrosis, gross hematuria and recurrent UTIs presented to the ER on 7/15/2025 evaluation of hypotension.  Patient reported that she had not been feeling well.  Reported body aches unsure if febrile.  Denied pain, nausea, and vomiting.  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      Past medical history:  Past Medical History:   Diagnosis Date    Arthritis     COPD (chronic obstructive pulmonary disease) 2021    Coronary artery disease     Diabetes mellitus     Heart murmur     Hypertension     Kidney stones     Restless leg        Past surgical history:  Past Surgical History:   Procedure Laterality Date    CERVIX SURGERY      Dysplasia removed from Cervix    COLONOSCOPY      KIDNEY STONE SURGERY  2016    Lithotripsy    OTHER SURGICAL HISTORY  2007    Pinched nerve arm     TUBAL ABDOMINAL LIGATION Bilateral        Allergies:  Tuberculin, ppd    Home medications:  Medications Prior to Admission   Medication Sig Dispense Refill Last Dose/Taking    atorvastatin (LIPITOR) 40 MG tablet TAKE 1 TABLET BY MOUTH DAILY 90 tablet 0 2025 Evening    Breztri Aerosphere 160-9-4.8 MCG/ACT aerosol inhaler Inhale 2 puffs 2 (Two) Times a Day.   7/15/2025 Morning    cetirizine (zyrTEC) 10 MG tablet Take 1 tablet by mouth Every Night.   2025 Evening    famotidine (PEPCID) 20 MG tablet Take 1 tablet by mouth Every Night.   2025 Evening    gabapentin (NEURONTIN) 300 MG capsule Take 1 capsule by mouth Every Night.   2025 Evening    hydrOXYzine (ATARAX) 25 MG tablet TAKE 2 TABLETS BY MOUTH EVERY NIGHT AT BEDTIME 180 tablet 1 2025 Bedtime    ipratropium-albuterol (DUO-NEB) 0.5-2.5 mg/3 ml  nebulizer Take 3 mL by nebulization Every 4 (Four) Hours As Needed for Wheezing. 360 mL 1 Taking As Needed    Jardiance 25 MG tablet tablet Take 1 tablet by mouth Every Night.   7/14/2025 Evening    leflunomide (ARAVA) 20 MG tablet Take 1 tablet by mouth Every Night.   7/14/2025 Evening    losartan (COZAAR) 25 MG tablet TAKE 1 TABLET BY MOUTH EVERY DAY 90 tablet 1 7/14/2025 Evening    metoprolol succinate XL (TOPROL-XL) 25 MG 24 hr tablet Take 1 tablet by mouth Daily. 90 tablet 3 7/14/2025 Evening    pioglitazone (ACTOS) 15 MG tablet Take 1 tablet by mouth Every Night.   7/14/2025 Evening    potassium chloride 10 MEQ CR tablet TAKE 1 TABLET BY MOUTH DAILY 90 tablet 0 7/14/2025 Evening    pramipexole (MIRAPEX) 0.125 MG tablet TAKE 4 TABLETS BY MOUTH EVERY NIGHT AT BEDTIME 120 tablet 0 7/14/2025 Bedtime    Semaglutide, 1 MG/DOSE, (Ozempic, 1 MG/DOSE,) 4 MG/3ML solution pen-injector INJECT 1 MG UNDER THE SKIN INTO THE APPROPRIATE AREA AS DIRECTED 1 (ONE) TIME PER WEEK. 3 mL 2 7/14/2025 Morning    nitroglycerin (NITROSTAT) 0.3 MG SL tablet 1 under the tongue as needed for angina, may repeat q5mins for up three doses 100 tablet 11         Hospital medications:  [Held by provider] atorvastatin, 40 mg, Oral, Daily  budesonide-formoterol, 2 puff, Inhalation, BID - RT  cefTRIAXone, 2,000 mg, Intravenous, Q24H  [Held by provider] cetirizine, 10 mg, Oral, Nightly  [Held by provider] famotidine, 20 mg, Oral, Nightly  [Held by provider] gabapentin, 300 mg, Oral, Nightly  [Held by provider] hydrOXYzine, 50 mg, Oral, Nightly  insulin lispro, 2-9 Units, Subcutaneous, Q6H  [Held by provider] losartan, 25 mg, Oral, Daily  [Held by provider] metoprolol succinate XL, 25 mg, Oral, Daily  metroNIDAZOLE, 500 mg, Intravenous, Q8H  mupirocin, 1 Application, Each Nare, BID  [Held by provider] pramipexole, 0.5 mg, Oral, Nightly  senna-docusate sodium, 2 tablet, Oral, BID  sodium chloride, 10 mL, Intravenous, Q12H      lactated ringers, 100  mL/hr, Last Rate: 100 mL/hr (25 1121)  norepinephrine, 0.02-0.3 mcg/kg/min, Last Rate: 0.06 mcg/kg/min (25 1300)        acetaminophen **OR** acetaminophen    aluminum-magnesium hydroxide-simethicone    senna-docusate sodium **AND** polyethylene glycol **AND** bisacodyl **AND** bisacodyl    Calcium Replacement - Follow Nurse / BPA Driven Protocol    dextrose    dextrose    glucagon (human recombinant)    HYDROmorphone    ipratropium-albuterol    Magnesium Standard Dose Replacement - Follow Nurse / BPA Driven Protocol    nitroglycerin    ondansetron ODT **OR** ondansetron    Phosphorus Replacement - Follow Nurse / BPA Driven Protocol    Potassium Replacement - Follow Nurse / BPA Driven Protocol    sodium chloride    sodium chloride    Family history:  Family History   Problem Relation Age of Onset    Heart disease Mother     Cancer Mother         Breast Cancer    Breast cancer Mother 55    Diabetes Mother     Diabetes Father     Stroke Father     Heart disease Father     Hypertension Father     Cancer Father     Hypertension Sister     Diabetes Sister     Heart disease Sister     Diabetes Brother     Hypertension Brother     Heart disease Brother     Other Other         [facility]    Diabetes Brother     Hypertension Brother     No Known Problems Brother     Heart disease Brother     Cancer Brother         colon    Other Brother        Social history:  Social History     Tobacco Use    Smoking status: Former     Current packs/day: 0.00     Average packs/day: 0.5 packs/day for 46.8 years (23.4 ttl pk-yrs)     Types: Cigarettes     Start date: 1974     Quit date: 2020     Years since quittin.7     Passive exposure: Past    Smokeless tobacco: Never    Tobacco comments:     Passive Smoke: Y   Vaping Use    Vaping status: Never Used   Substance Use Topics    Alcohol use: Never    Drug use: No       Objective:  TMax 24 hours:   Temp (24hrs), Av.6 °F (37 °C), Min:97.3  °F (36.3 °C), Max:100.9 °F (38.3 °C)      Vitals Ranges:   Temp:  [97.3 °F (36.3 °C)-100.9 °F (38.3 °C)] 98.3 °F (36.8 °C)  Heart Rate:  [] 90  Resp:  [18-27] 22  BP: ()/(39-72) 116/63    Intake/Output Last 3 shifts:  I/O last 3 completed shifts:  In: 208 [I.V.:208]  Out: 350 [Urine:350]     Physical Exam:    General Appearance:    Alert, cooperative, NAD   Lungs:     Respirations unlabored, no audible wheezing    Heart:    No cyanosis   Abdomen:     Soft, ND    :    No suprapubic distention       Results review:   I reviewed the patient's new clinical results.    Data review:  Lab Results (last 24 hours)       Procedure Component Value Units Date/Time    Blood Culture - Blood, Arm, Right [[id number]]  (Abnormal) Collected: 07/15/25 2251    Specimen: Blood from Arm, Right Updated: 07/16/25 1407     Blood Culture Abnormal Stain     Gram Stain Anaerobic Bottle Gram negative bacilli    Blood Culture - Blood, Arm, Left [[id number]]  (Abnormal) Collected: 07/15/25 2248    Specimen: Blood from Arm, Left Updated: 07/16/25 1407     Blood Culture Abnormal Stain     Gram Stain Anaerobic Bottle Gram negative bacilli    Blood Culture ID, PCR - Blood, Arm, Left [[id number]]  (Abnormal) Collected: 07/15/25 2248    Specimen: Blood from Arm, Left Updated: 07/16/25 1406     BCID, PCR Proteus spp. Identification by BCID2 PCR.     BOTTLE TYPE Anaerobic Bottle    Narrative:      No resistance genes detected.    POC Glucose Finger Q6H [[id number]]  (Abnormal) Collected: 07/16/25 1205    Specimen: Blood from Finger Updated: 07/16/25 1207     Glucose 280 mg/dL      Comment: Serial Number: [id number]:  [id number]       Hemoglobin A1c [[id number]]  (Abnormal) Collected: 07/16/25 0601    Specimen: Blood Updated: 07/16/25 1143     Hemoglobin A1C 9.83 %     Narrative:      Hemoglobin A1C Ranges:    Increased Risk for Diabetes  5.7% to 6.4%  Diabetes                     >= 6.5%  Diabetic Goal                < 7.0%    POC Glucose  Once [[id number]]  (Abnormal) Collected: 07/16/25 0729    Specimen: Blood Updated: 07/16/25 0732     Glucose 182 mg/dL      Comment: Serial Number: [id number]:  [id number]       Phosphorus [[id number]]  (Normal) Collected: 07/16/25 0601    Specimen: Blood Updated: 07/16/25 0634     Phosphorus 4.4 mg/dL     Lipid Panel [[id number]]  (Abnormal) Collected: 07/16/25 0601    Specimen: Blood Updated: 07/16/25 0631     Total Cholesterol 112 mg/dL      Triglycerides 142 mg/dL      HDL Cholesterol 28 mg/dL      LDL Cholesterol  59 mg/dL      VLDL Cholesterol 25 mg/dL      LDL/HDL Ratio 1.99    Narrative:      Cholesterol Reference Ranges  (U.S. Department of Health and Human Services ATP III Classifications)    Desirable          <200 mg/dL  Borderline High    200-239 mg/dL  High Risk          >240 mg/dL      Triglyceride Reference Ranges  (U.S. Department of Health and Human Services ATP III Classifications)    Normal           <150 mg/dL  Borderline High  150-199 mg/dL  High             200-499 mg/dL  Very High        >500 mg/dL    HDL Reference Ranges  (U.S. Department of Health and Human Services ATP III Classifications)    Low     <40 mg/dl (major risk factor for CHD)  High    >60 mg/dl ('negative' risk factor for CHD)        LDL Reference Ranges  (U.S. Department of Health and Human Services ATP III Classifications)    Optimal          <100 mg/dL  Near Optimal     100-129 mg/dL  Borderline High  130-159 mg/dL  High             160-189 mg/dL  Very High        >189 mg/dL    LDL is calculated using the NIH LDL-C calculation.      Magnesium [[id number]]  (Normal) Collected: 07/16/25 0601    Specimen: Blood Updated: 07/16/25 0631     Magnesium 1.6 mg/dL     Comprehensive Metabolic Panel [[id number]]  (Abnormal) Collected: 07/16/25 0601    Specimen: Blood Updated: 07/16/25 0631     Glucose 188 mg/dL      BUN 15.7 mg/dL      Creatinine 1.22 mg/dL      Sodium 141 mmol/L      Potassium 3.3 mmol/L      Chloride 109 mmol/L       CO2 17.6 mmol/L      Calcium 7.7 mg/dL      Total Protein 5.2 g/dL      Albumin 3.2 g/dL      ALT (SGPT) 25 U/L      AST (SGOT) 29 U/L      Alkaline Phosphatase 65 U/L      Total Bilirubin 0.6 mg/dL      Globulin 2.0 gm/dL      A/G Ratio 1.6 g/dL      BUN/Creatinine Ratio 12.9     Anion Gap 14.4 mmol/L      eGFR 48.1 mL/min/1.73     Narrative:      GFR Categories in Chronic Kidney Disease (CKD)              GFR Category          GFR (mL/min/1.73)    Interpretation  G1                    90 or greater        Normal or high (1)  G2                    60-89                Mild decrease (1)  G3a                   45-59                Mild to moderate decrease  G3b                   30-44                Moderate to severe decrease  G4                    15-29                Severe decrease  G5                    14 or less           Kidney failure    (1)In the absence of evidence of kidney disease, neither GFR category G1 or G2 fulfill the criteria for CKD.    eGFR calculation 2021 CKD-EPI creatinine equation, which does not include race as a factor    CBC & Differential [[id number]]  (Abnormal) Collected: 07/16/25 0601    Specimen: Blood Updated: 07/16/25 0618    Narrative:      The following orders were created for panel order CBC & Differential.  Procedure                               Abnormality         Status                     ---------                               -----------         ------                     CBC Auto Differential[[id number]]        Abnormal            Final result                 Please view results for these tests on the individual orders.    CBC Auto Differential [[id number]]  (Abnormal) Collected: 07/16/25 0601    Specimen: Blood Updated: 07/16/25 0618     WBC 13.70 10*3/mm3      RBC 4.45 10*6/mm3      Hemoglobin 12.6 g/dL      Hematocrit 39.0 %      MCV 87.6 fL      MCH 28.3 pg      MCHC 32.3 g/dL      RDW 13.2 %      RDW-SD 42.3 fl      MPV 11.0 fL      Platelets 117 10*3/mm3       Neutrophil % 91.7 %      Lymphocyte % 2.6 %      Monocyte % 4.2 %      Eosinophil % 0.1 %      Basophil % 0.4 %      Immature Grans % 1.0 %      Neutrophils, Absolute 12.56 10*3/mm3      Lymphocytes, Absolute 0.35 10*3/mm3      Monocytes, Absolute 0.58 10*3/mm3      Eosinophils, Absolute 0.01 10*3/mm3      Basophils, Absolute 0.06 10*3/mm3      Immature Grans, Absolute 0.14 10*3/mm3      nRBC 0.0 /100 WBC     MRSA Screen, PCR (Inpatient) - Swab, Nares [[id number]]  (Normal) Collected: 07/16/25 0402    Specimen: Swab from Nares Updated: 07/16/25 0519     MRSA PCR No MRSA Detected    Narrative:      The negative predictive value of this diagnostic test is high and should only be used to consider de-escalating anti-MRSA therapy. A positive result may indicate colonization with MRSA and must be correlated clinically.    High Sensitivity Troponin T 1Hr [[id number]]  (Abnormal) Collected: 07/16/25 0101    Specimen: Blood from Arm, Left Updated: 07/16/25 0127     HS Troponin T 65 ng/L      Troponin T Numeric Delta 30 ng/L      Troponin T % Delta 86    Narrative:      High Sensitive Troponin T Reference Range:  <14.0 ng/L- Negative Female for AMI  <22.0 ng/L- Negative Male for AMI  >=14 - Abnormal Female indicating possible myocardial injury.  >=22 - Abnormal Male indicating possible myocardial injury.   Clinicians would have to utilize clinical acumen, EKG, Troponin, and serial changes to determine if it is an Acute Myocardial Infarction or myocardial injury due to an underlying chronic condition.         D-dimer, Quantitative [[id number]]  (Abnormal) Collected: 07/15/25 2251    Specimen: Blood from Arm, Right Updated: 07/16/25 0005     D-Dimer, Quantitative 9.88 MCGFEU/mL     Narrative:      According to the assay 's published package insert, a normal (<0.50 MCGFEU/mL) D-dimer result in conjunction with a non-high clinical probability assessment, excludes deep vein thrombosis (DVT) and pulmonary embolism (PE)  "with high sensitivity.    D-dimer values increase with age and this can make VTE exclusion of an older population difficult. To address this, the [employer], based on best available evidence and recent guidelines, recommends that clinicians use age-adjusted D-dimer thresholds in patients greater than 50 years of age with: a) a low probability of PE who do not meet all Pulmonary Embolism Rule Out Criteria, or b) in those with intermediate probability of PE.   The formula for an age-adjusted D-dimer cut-off is \"age/100\".  For example, a 60 year old patient would have an age-adjusted cut-off of 0.60 MCGFEU/mL and an 80 year old 0.80 MCGFEU/mL.    Protime-INR [[id number]]  (Abnormal) Collected: 07/15/25 2251    Specimen: Blood from Arm, Right Updated: 07/15/25 2348     Protime 15.0 Seconds      INR 1.19    aPTT [[id number]]  (Normal) Collected: 07/15/25 2251    Specimen: Blood from Arm, Right Updated: 07/15/25 2338     PTT 26.1 seconds     Comprehensive Metabolic Panel [[id number]]  (Abnormal) Collected: 07/15/25 2251    Specimen: Blood from Arm, Right Updated: 07/15/25 2334     Glucose 294 mg/dL      BUN 15.5 mg/dL      Creatinine 0.95 mg/dL      Sodium 138 mmol/L      Potassium 3.5 mmol/L      Chloride 102 mmol/L      CO2 18.4 mmol/L      Calcium 8.7 mg/dL      Total Protein 6.1 g/dL      Albumin 3.6 g/dL      ALT (SGPT) 28 U/L      AST (SGOT) 37 U/L      Alkaline Phosphatase 104 U/L      Total Bilirubin 0.8 mg/dL      Globulin 2.5 gm/dL      A/G Ratio 1.4 g/dL      BUN/Creatinine Ratio 16.3     Anion Gap 17.6 mmol/L      eGFR 65.0 mL/min/1.73     Narrative:      GFR Categories in Chronic Kidney Disease (CKD)              GFR Category          GFR (mL/min/1.73)    Interpretation  G1                    90 or greater        Normal or high (1)  G2                    60-89                Mild decrease (1)  G3a                   45-59                Mild to moderate decrease  G3b                   30-44    "             Moderate to severe decrease  G4                    15-29                Severe decrease  G5                    14 or less           Kidney failure    (1)In the absence of evidence of kidney disease, neither GFR category G1 or G2 fulfill the criteria for CKD.    eGFR calculation 2021 CKD-EPI creatinine equation, which does not include race as a factor    Lipase [[id number]]  (Normal) Collected: 07/15/25 2251    Specimen: Blood from Arm, Right Updated: 07/15/25 2334     Lipase 17 U/L     BNP [[id number]]  (Abnormal) Collected: 07/15/25 2251    Specimen: Blood from Arm, Right Updated: 07/15/25 2334     proBNP 1,087.0 pg/mL     Narrative:      This assay is used as an aid in the diagnosis of individuals suspected of having heart failure. It can be used as an aid in the diagnosis of acute decompensated heart failure (ADHF) in patients presenting with signs and symptoms of ADHF to the emergency department (ED). In addition, NT-proBNP of <300 pg/mL indicates ADHF is not likely.    Age Range Result Interpretation  NT-proBNP Concentration (pg/mL:      <50             Positive            >450                   Gray                 300-450                    Negative             <300    50-75           Positive            >900                  Gray                300-900                  Negative            <300      >75             Positive            >1800                  Gray                300-1800                  Negative            <300    High Sensitivity Troponin T [[id number]]  (Abnormal) Collected: 07/15/25 2251    Specimen: Blood from Arm, Right Updated: 07/15/25 2334     HS Troponin T 35 ng/L     Narrative:      High Sensitive Troponin T Reference Range:  <14.0 ng/L- Negative Female for AMI  <22.0 ng/L- Negative Male for AMI  >=14 - Abnormal Female indicating possible myocardial injury.  >=22 - Abnormal Male indicating possible myocardial injury.   Clinicians would have to utilize clinical acumen, EKG,  "Troponin, and serial changes to determine if it is an Acute Myocardial Infarction or myocardial injury due to an underlying chronic condition.         Procalcitonin [[id number]]  (Abnormal) Collected: 07/15/25 2251    Specimen: Blood from Arm, Right Updated: 07/15/25 2334     Procalcitonin 30.90 ng/mL     Narrative:      As a Marker for Sepsis (Non-Neonates):    1. <0.5 ng/mL represents a low risk of severe sepsis and/or septic shock.  2. >2 ng/mL represents a high risk of severe sepsis and/or septic shock.    As a Marker for Lower Respiratory Tract Infections that require antibiotic therapy:    PCT on Admission    Antibiotic Therapy       6-12 Hrs later    >0.5                Strongly Recommended  >0.25 - <0.5        Recommended   0.1 - 0.25          Discouraged              Remeasure/reassess PCT  <0.1                Strongly Discouraged     Remeasure/reassess PCT    As 28 day mortality risk marker: \"Change in Procalcitonin Result\" (>80% or <=80%) if Day 0 (or Day 1) and Day 4 values are available. Refer to http://www.[email or web address]-pct-[email or web address]    Change in PCT <=80%  A decrease of PCT levels below or equal to 80% defines a positive change in PCT test result representing a higher risk for 28-day all-cause mortality of patients diagnosed with severe sepsis for septic shock.    Change in PCT >80%  A decrease of PCT levels of more than 80% defines a negative change in PCT result representing a lower risk for 28-day all-cause mortality of patients diagnosed with severe sepsis or septic shock.       Ethanol [[id number]] Collected: 07/15/25 2251    Specimen: Blood from Arm, Right Updated: 07/15/25 2334     Ethanol % <0.010 %     Narrative:      Not for legal purposes.    Magnesium [[id number]]  (Normal) Collected: 07/15/25 2251    Specimen: Blood from Arm, Right Updated: 07/15/25 2334     Magnesium 1.7 mg/dL     Phosphorus [[id number]]  (Normal) Collected: 07/15/25 2251    Specimen: Blood from Arm, Right Updated: 07/15/25 " 2334     Phosphorus 2.7 mg/dL     TSH Rfx On Abnormal To Free T4 [[id number]]  (Normal) Collected: 07/15/25 2251    Specimen: Blood from Arm, Right Updated: 07/15/25 2334     TSH 2.850 uIU/mL     COVID-19, FLU A/B, RSV PCR 1 HR TAT - Swab, Nasopharynx [[id number]]  (Normal) Collected: 07/15/25 2237    Specimen: Swab from Nasopharynx Updated: 07/15/25 2322     COVID19 Not Detected     Influenza A PCR Not Detected     Influenza B PCR Not Detected     RSV, PCR Not Detected    Manual Differential [[id number]]  (Abnormal) Collected: 07/15/25 2251    Specimen: Blood from Arm, Right Updated: 07/15/25 2322     Neutrophil % 53.8 %      Lymphocyte % 6.6 %      Eosinophil % 1.1 %      Bands %  34.1 %      Metamyelocyte % 4.4 %      Neutrophils Absolute 2.44 10*3/mm3      Lymphocytes Absolute 0.18 10*3/mm3      Eosinophils Absolute 0.03 10*3/mm3      RBC Morphology Normal     WBC Morphology Normal     Platelet Estimate Decreased     Large Platelets Slight/1+    CBC & Differential [[id number]]  (Abnormal) Collected: 07/15/25 2251    Specimen: Blood from Arm, Right Updated: 07/15/25 2322    Narrative:      The following orders were created for panel order CBC & Differential.  Procedure                               Abnormality         Status                     ---------                               -----------         ------                     CBC Auto Differential[[id number]]        Abnormal            Final result               Scan Slide[[id number]]                                       Final result                 Please view results for these tests on the individual orders.    CBC Auto Differential [[id number]]  (Abnormal) Collected: 07/15/25 2251    Specimen: Blood from Arm, Right Updated: 07/15/25 2322     WBC 2.77 10*3/mm3      RBC 5.15 10*6/mm3      Hemoglobin 14.5 g/dL      Hematocrit 45.7 %      MCV 88.7 fL      MCH 28.2 pg      MCHC 31.7 g/dL      RDW 13.0 %      RDW-SD 42.0 fl      MPV 11.2 fL      Platelets 129 10*3/mm3      Narrative:      Instrument flags present, additional testing may be recommended.  The previously reported component NRBC is no longer being reported. Previous result was 0.0 /100 WBC (Reference Range: 0.0-0.2 /100 WBC) on 7/15/2025 at 2303 EDT.    Scan Slide [[id number]] Collected: 07/15/25 2251    Specimen: Blood from Arm, Right Updated: 07/15/25 2322     Scan Slide --     Comment: See Manual Differential Results       Urine Drug Screen - Straight Cath [[id number]]  (Normal) Collected: 07/15/25 2303    Specimen: Urine from Straight Cath Updated: 07/15/25 2317     THC, Screen, Urine Negative     Phencyclidine (PCP), Urine Negative     Cocaine Screen, Urine Negative     Methamphetamine, Ur Negative     Opiate Screen Negative     Amphetamine Screen, Urine Negative     Benzodiazepine Screen, Urine Negative     Tricyclic Antidepressants Screen Negative     Methadone Screen, Urine Negative     Barbiturates Screen, Urine Negative     Oxycodone Screen, Urine Negative     Buprenorphine, Screen, Urine Negative    Narrative:      Cutoff For Drugs Screened:    Amphetamines               500 ng/ml  Barbiturates               200 ng/ml  Benzodiazepines            150 ng/ml  Cocaine                    150 ng/ml  Methadone                  200 ng/ml  Opiates                    100 ng/ml  Phencyclidine               25 ng/ml  THC                         50 ng/ml  Methamphetamine            500 ng/ml  Tricyclic Antidepressants  300 ng/ml  Oxycodone                  100 ng/ml  Buprenorphine               10 ng/ml    The normal value for all drugs tested is negative. This report includes unconfirmed screening results, with the cutoff values listed, to be used for medical treatment purposes only.  Unconfirmed results must not be used for non-medical purposes such as employment or legal testing.  Clinical consideration should be applied to any drug of abuse test, particularly when unconfirmed results are used.    All urine drugs of  abuse requests without chain of custody are for medical screening purposes only.  False positives are possible.      Urinalysis With Culture If Indicated - Straight Cath [[id number]]  (Abnormal) Collected: 07/15/25 2304    Specimen: Urine from Straight Cath Updated: 07/15/25 2311     Color, UA Yellow     Appearance, UA Clear     pH, UA 5.5     Specific Gravity, UA 1.016     Glucose, UA >=1000 mg/dL (3+)     Ketones, UA Trace     Bilirubin, UA Negative     Blood, UA Trace     Protein, UA Trace     Leuk Esterase, UA Small (1+)     Nitrite, UA Negative     Urobilinogen, UA 1.0 E.U./dL    Narrative:      In absence of clinical symptoms, the presence of pyuria, bacteria, and/or nitrites on the urinalysis result does not correlate with infection.    Urinalysis, Microscopic Only - Straight Cath [[id number]]  (Abnormal) Collected: 07/15/25 2304    Specimen: Urine from Straight Cath Updated: 07/15/25 2311     RBC, UA 0-2 /HPF      WBC, UA 6-10 /HPF      Comment: Urine culture not indicated.        Bacteria, UA None Seen /HPF      Squamous Epithelial Cells, UA 0-2 /HPF      Hyaline Casts, UA 0-2 /LPF      Methodology Automated Microscopy    POC Glucose Once [[id number]]  (Abnormal) Collected: 07/15/25 2308    Specimen: Blood Updated: 07/15/25 2310     Glucose 291 mg/dL      Comment: Serial Number: [id number]:  [id number]       POC Lactate [[id number]]  (Normal) Collected: 07/15/25 2255    Specimen: Blood Updated: 07/15/25 2257     Lactate 1.7 mmol/L      Comment: Serial Number: [id number]:  [id number]                Imaging:  Imaging Results (Last 24 Hours)       Procedure Component Value Units Date/Time    CT Abdomen Pelvis Without Contrast [[id number]] Collected: 07/16/25 0553     Updated: 07/16/25 0600    Narrative:      CT ABDOMEN PELVIS WO CONTRAST    Date of Exam: 7/16/2025 3:46 AM EDT    Indication: incompletely visualized pyelo.    Comparison: None available.    Technique: Axial CT images were obtained of the  abdomen and pelvis without the administration of contrast. Sagittal and coronal reconstructions were performed.  Automated exposure control and iterative reconstruction methods were used.      Findings:  Lung Bases:     There is mild pulmonary edema.    Liver:  There is diffuse fatty infiltration of the liver.    Biliary/Gallbladder:    The gallbladder is normal without evidence of radiopaque stones. The biliary tree is nondilated.    Spleen:  Spleen is normal in size and CT density.    Pancreas:    Pancreas is normal. There is no evidence of pancreatic mass or peripancreatic fluid.    Kidneys:    The left kidney is normal. There is an abnormal appearance of the right kidney with a delayed patchy right-sided nephrogram. There is gas filling the collecting system with diffuse surrounding fat stranding. The finding is consistent with emphysematous   pyelonephritis. Urologic consultation is recommended.    Adrenals:    Adrenal glands are unremarkable.    Retroperitoneal/Lymph Nodes/Vasculature:    No retroperitoneal adenopathy is identified.    Gastrointestinal/Mesentery:    The bowel loops are non-dilated without wall thickening or mass. The appendix appears within normal limits. No evidence of obstruction. No free air. No mesenteric fluid collections identified.    Bladder:    Contrast and gas is seen within the urinary bladder.    Genital:     Unremarkable          Bony Structures:     Visualized bony structures are consistent with the patient's age.        Impression:      Impression:  Emphysematous pyelonephritis of the right kidney. Urologic consultation is recommended.                Electronically Signed: Francisco Aguilar MD    7/16/2025 5:58 AM EDT    Workstation ID: [id number]    CT Angiogram Chest Pulmonary Embolism [[id number]] Collected: 07/16/25 0217     Updated: 07/16/25 0224    Narrative:      CT ANGIOGRAM CHEST PULMONARY EMBOLISM    Date of Exam: 7/16/2025 12:58 AM EDT    Indication: elevated dimer,  chiki.    Comparison: None available.    Technique: Axial CT images were obtained of the chest after the uneventful intravenous administration of iodinated contrast utilizing pulmonary embolism protocol.  In addition, a 3-D volume rendered image was created for interpretation.  Sagittal and   coronal reconstructions were performed.  Automated exposure control and iterative reconstruction methods were used.    Findings:  Pulmonary arteries: Adequate opacification of the pulmonary arteries. No evidence of acute pulmonary embolism.    Lungs and Pleura: There is mild interlobular septal thickening with some subpleural nodularity suggestive of mild pulmonary edema. There is trace bilateral basilar atelectasis. There is no evidence of pleural effusion.    Mediastinum/Hallie: No mediastinal or hilar lymphadenopathy.    Lymph nodes: No axillary or supraclavicular adenopathy.    Cardiovascular: The cardiac chambers are within normal limits. The pericardium is normal. The aorta and its arch branch vessels are unremarkable. There is moderate coronary artery calcific atherosclerosis.    Upper Abdomen: There is diffuse fatty infiltration of the liver. There is gas within the upper pole of the right kidney with surrounding fat stranding. This could represent infection of the right kidney. This is incompletely visualized on this exam. The   upper abdominal contents are unremarkable.          Bones and Soft Tissue: No suspicious osseous lesion. There are old ununited right posterior lateral seventh and eighth ribs.        Impression:      Impression:  1.No evidence of pulmonary embolism.  2.Mild pulmonary edema.  3.Gas within the upper pole of the right kidney with surrounding fat stranding. This could represent infection of the right kidney such as pyonephrosis. This is incompletely visualized on this exam.            Electronically Signed: Francisco Aguilar MD    7/16/2025 2:22 AM EDT    Workstation ID: [id number]    XR Chest 1 View  [[id number]] Collected: 07/15/25 2259     Updated: 07/15/25 2302    Narrative:      XR CHEST 1 VW    Date of Exam: 7/15/2025 10:51 PM EDT    Indication: tachycardia    Comparison: Chest radiograph 10/2/2023    Findings:  There are no airspace consolidations. No pleural fluid. No pneumothorax. The pulmonary vasculature appears within normal limits. The cardiac and mediastinal silhouette appear unremarkable. No acute osseous abnormality identified.      Impression:      Impression:  No active disease.          Electronically Signed: Francisco Aguilar MD    7/15/2025 11:00 PM EDT    Workstation ID: [id number]               Assessment:       Septic shock      Emphysematous pyelitis  Sepsis    Plan:   -No acute urological interventions planned at this time, okay to resume previous diet from urology standpoint  -CT images reviewed showing right emphysematous pyelitis without hydronephrosis.  -Cr 1.23  - UA reviewed: +Trace blood, small Leuks, -Nitrites, -Bacteria  - Continue Abx per hospitalist  - Will follow, stent or neph tube if not better.    JONATHAN Logan  [facility]  [address], [address]  [place] [address]  Office: [phone]  07/16/25  14:13 EDT     Plan reviewed and discussed with Dr. Durand.

## 2025-07-17 LAB
ALBUMIN SERPL-MCNC: 2.5 G/DL (ref 3.5–5.2)
ALBUMIN/GLOB SERPL: 1.2 G/DL
ALP SERPL-CCNC: 50 U/L (ref 39–117)
ALT SERPL W P-5'-P-CCNC: 16 U/L (ref 1–33)
ANION GAP SERPL CALCULATED.3IONS-SCNC: 13.6 MMOL/L (ref 5–15)
AST SERPL-CCNC: 17 U/L (ref 1–32)
BILIRUB SERPL-MCNC: 0.4 MG/DL (ref 0–1.2)
BUN SERPL-MCNC: 17.4 MG/DL (ref 8–23)
BUN/CREAT SERPL: 17.9 (ref 7–25)
CALCIUM SPEC-SCNC: 7.4 MG/DL (ref 8.6–10.5)
CHLORIDE SERPL-SCNC: 104 MMOL/L (ref 98–107)
CO2 SERPL-SCNC: 17.4 MMOL/L (ref 22–29)
CREAT SERPL-MCNC: 0.97 MG/DL (ref 0.57–1)
DEPRECATED RDW RBC AUTO: 45.6 FL (ref 37–54)
EGFRCR SERPLBLD CKD-EPI 2021: 63.4 ML/MIN/1.73
EOSINOPHIL # BLD MANUAL: 0.1 10*3/MM3 (ref 0–0.4)
EOSINOPHIL NFR BLD MANUAL: 1 % (ref 0.3–6.2)
ERYTHROCYTE [DISTWIDTH] IN BLOOD BY AUTOMATED COUNT: 13.6 % (ref 12.3–15.4)
GLOBULIN UR ELPH-MCNC: 2.1 GM/DL
GLUCOSE BLDC GLUCOMTR-MCNC: 126 MG/DL (ref 70–105)
GLUCOSE BLDC GLUCOMTR-MCNC: 161 MG/DL (ref 70–105)
GLUCOSE BLDC GLUCOMTR-MCNC: 197 MG/DL (ref 70–105)
GLUCOSE BLDC GLUCOMTR-MCNC: 225 MG/DL (ref 70–105)
GLUCOSE SERPL-MCNC: 186 MG/DL (ref 65–99)
HCT VFR BLD AUTO: 31.3 % (ref 34–46.6)
HGB BLD-MCNC: 9.9 G/DL (ref 12–15.9)
LARGE PLATELETS: ABNORMAL
LYMPHOCYTES # BLD MANUAL: 0.62 10*3/MM3 (ref 0.7–3.1)
LYMPHOCYTES NFR BLD MANUAL: 2 % (ref 5–12)
MAGNESIUM SERPL-MCNC: 2.6 MG/DL (ref 1.6–2.4)
MCH RBC QN AUTO: 28.8 PG (ref 26.6–33)
MCHC RBC AUTO-ENTMCNC: 31.6 G/DL (ref 31.5–35.7)
MCV RBC AUTO: 91 FL (ref 79–97)
METAMYELOCYTES NFR BLD MANUAL: 3 % (ref 0–0)
MONOCYTES # BLD: 0.21 10*3/MM3 (ref 0.1–0.9)
NEUTROPHILS # BLD AUTO: 9.13 10*3/MM3 (ref 1.7–7)
NEUTROPHILS NFR BLD MANUAL: 62 % (ref 42.7–76)
NEUTS BAND NFR BLD MANUAL: 26 % (ref 0–5)
PHOSPHATE SERPL-MCNC: 2.6 MG/DL (ref 2.5–4.5)
PLATELET # BLD AUTO: 65 10*3/MM3 (ref 140–450)
PMV BLD AUTO: 12 FL (ref 6–12)
POTASSIUM SERPL-SCNC: 4.3 MMOL/L (ref 3.5–5.2)
PROT SERPL-MCNC: 4.6 G/DL (ref 6–8.5)
RBC # BLD AUTO: 3.44 10*6/MM3 (ref 3.77–5.28)
RBC MORPH BLD: NORMAL
SCAN SLIDE: NORMAL
SMALL PLATELETS BLD QL SMEAR: ABNORMAL
SODIUM SERPL-SCNC: 135 MMOL/L (ref 136–145)
VARIANT LYMPHS NFR BLD MANUAL: 6 % (ref 19.6–45.3)
WBC MORPH BLD: NORMAL
WBC NRBC COR # BLD AUTO: 10.38 10*3/MM3 (ref 3.4–10.8)

## 2025-07-17 PROCEDURE — 94799 UNLISTED PULMONARY SVC/PX: CPT

## 2025-07-17 PROCEDURE — 25010000002 CEFTRIAXONE PER 250 MG: Performed by: INTERNAL MEDICINE

## 2025-07-17 PROCEDURE — 82948 REAGENT STRIP/BLOOD GLUCOSE: CPT

## 2025-07-17 PROCEDURE — 63710000001 INSULIN LISPRO (HUMAN) PER 5 UNITS: Performed by: INTERNAL MEDICINE

## 2025-07-17 PROCEDURE — 25010000002 METRONIDAZOLE 500 MG/100ML SOLUTION: Performed by: INTERNAL MEDICINE

## 2025-07-17 PROCEDURE — 85025 COMPLETE CBC W/AUTO DIFF WBC: CPT

## 2025-07-17 PROCEDURE — 25010000002 ENOXAPARIN PER 10 MG: Performed by: INTERNAL MEDICINE

## 2025-07-17 PROCEDURE — 25010000002 HYDROMORPHONE 1 MG/ML SOLUTION: Performed by: INTERNAL MEDICINE

## 2025-07-17 PROCEDURE — 80053 COMPREHEN METABOLIC PANEL: CPT

## 2025-07-17 PROCEDURE — 82948 REAGENT STRIP/BLOOD GLUCOSE: CPT | Performed by: INTERNAL MEDICINE

## 2025-07-17 PROCEDURE — 94664 DEMO&/EVAL PT USE INHALER: CPT

## 2025-07-17 PROCEDURE — 85007 BL SMEAR W/DIFF WBC COUNT: CPT

## 2025-07-17 PROCEDURE — 94761 N-INVAS EAR/PLS OXIMETRY MLT: CPT

## 2025-07-17 PROCEDURE — 84100 ASSAY OF PHOSPHORUS: CPT

## 2025-07-17 PROCEDURE — 83735 ASSAY OF MAGNESIUM: CPT

## 2025-07-17 RX ORDER — ENOXAPARIN SODIUM 100 MG/ML
40 INJECTION SUBCUTANEOUS EVERY 24 HOURS
Status: DISCONTINUED | OUTPATIENT
Start: 2025-07-17 | End: 2025-07-24 | Stop reason: HOSPADM

## 2025-07-17 RX ORDER — INSULIN LISPRO 100 [IU]/ML
4-24 INJECTION, SOLUTION INTRAVENOUS; SUBCUTANEOUS
Status: DISCONTINUED | OUTPATIENT
Start: 2025-07-17 | End: 2025-07-24 | Stop reason: HOSPADM

## 2025-07-17 RX ADMIN — MUPIROCIN 1 APPLICATION: 20 OINTMENT TOPICAL at 08:27

## 2025-07-17 RX ADMIN — FAMOTIDINE 20 MG: 20 TABLET, FILM COATED ORAL at 21:11

## 2025-07-17 RX ADMIN — Medication 10 ML: at 08:27

## 2025-07-17 RX ADMIN — BUDESONIDE AND FORMOTEROL FUMARATE DIHYDRATE 2 PUFF: 160; 4.5 AEROSOL RESPIRATORY (INHALATION) at 20:03

## 2025-07-17 RX ADMIN — HYDROMORPHONE HYDROCHLORIDE 1 MG: 1 INJECTION, SOLUTION INTRAMUSCULAR; INTRAVENOUS; SUBCUTANEOUS at 12:40

## 2025-07-17 RX ADMIN — SENNOSIDES AND DOCUSATE SODIUM 2 TABLET: 50; 8.6 TABLET ORAL at 08:27

## 2025-07-17 RX ADMIN — HYDROXYZINE HYDROCHLORIDE 50 MG: 25 TABLET, FILM COATED ORAL at 21:11

## 2025-07-17 RX ADMIN — ENOXAPARIN SODIUM 40 MG: 100 INJECTION SUBCUTANEOUS at 16:07

## 2025-07-17 RX ADMIN — Medication 10 ML: at 21:12

## 2025-07-17 RX ADMIN — PRAMIPEXOLE DIHYDROCHLORIDE 0.5 MG: 0.5 TABLET ORAL at 21:11

## 2025-07-17 RX ADMIN — METRONIDAZOLE 500 MG: 500 INJECTION, SOLUTION INTRAVENOUS at 18:10

## 2025-07-17 RX ADMIN — HYDROMORPHONE HYDROCHLORIDE 1 MG: 1 INJECTION, SOLUTION INTRAMUSCULAR; INTRAVENOUS; SUBCUTANEOUS at 03:53

## 2025-07-17 RX ADMIN — HYDROMORPHONE HYDROCHLORIDE 1 MG: 1 INJECTION, SOLUTION INTRAMUSCULAR; INTRAVENOUS; SUBCUTANEOUS at 19:29

## 2025-07-17 RX ADMIN — METRONIDAZOLE 500 MG: 500 INJECTION, SOLUTION INTRAVENOUS at 11:05

## 2025-07-17 RX ADMIN — MUPIROCIN 1 APPLICATION: 20 OINTMENT TOPICAL at 21:11

## 2025-07-17 RX ADMIN — GABAPENTIN 300 MG: 300 CAPSULE ORAL at 21:11

## 2025-07-17 RX ADMIN — CETIRIZINE HYDROCHLORIDE 10 MG: 10 TABLET, FILM COATED ORAL at 21:11

## 2025-07-17 RX ADMIN — INSULIN LISPRO 2 UNITS: 100 INJECTION, SOLUTION INTRAVENOUS; SUBCUTANEOUS at 05:49

## 2025-07-17 RX ADMIN — BUDESONIDE AND FORMOTEROL FUMARATE DIHYDRATE 2 PUFF: 160; 4.5 AEROSOL RESPIRATORY (INHALATION) at 07:25

## 2025-07-17 RX ADMIN — INSULIN LISPRO 4 UNITS: 100 INJECTION, SOLUTION INTRAVENOUS; SUBCUTANEOUS at 17:40

## 2025-07-17 RX ADMIN — CEFTRIAXONE 2000 MG: 2 INJECTION, POWDER, FOR SOLUTION INTRAMUSCULAR; INTRAVENOUS at 11:05

## 2025-07-17 RX ADMIN — ACETAMINOPHEN 650 MG: 325 TABLET, FILM COATED ORAL at 03:53

## 2025-07-17 RX ADMIN — INSULIN LISPRO 8 UNITS: 100 INJECTION, SOLUTION INTRAVENOUS; SUBCUTANEOUS at 11:39

## 2025-07-17 RX ADMIN — METRONIDAZOLE 500 MG: 500 INJECTION, SOLUTION INTRAVENOUS at 02:41

## 2025-07-17 NOTE — CONSULTS
[facility] MEDICINE SERVICE  TRANSFER OF CARE/ACCEPTANCE NOTE    PATIENT NAME: Eugenia Madden  : 1955  MRN: [id number]     Active Hospital Problems    Diagnosis  POA    **Septic shock [A41.9, R65.21]  Yes      Resolved Hospital Problems   No resolved problems to display.       Patient seen and examined by me on 25 at 1:27 PM EDT .  Interim History: 69 y.o. female with PMH of COPD, emphysema, DM type II, hypertension, hyperlipidemia, CAD, and nephrolithiasis presented to the hospital for overall not feeling well and back pain, and was admitted with a principal diagnosis of Septic shock. She presents after severe back pain x1 day, and inability to walk and talk x1 hour at home, secondary to her level of pain. She denied dysuria. She stated it came on suddenly, and has a history of kidney stones but hasn't had for many years. Upon EMS arrival the patient's blood pressure was 80 systolic which briefly improved with 200 mL IVF. Upon arrival to the ED she was tachycardic and febrile at 100.9 F. She developed hypotension and received 2 L IVF with persistent hypotension for which Levophed was started. Labs remarkable for BNP 1087, CO2 18.4, anion gap 17.6, glucose 294 procalcitonin 30.9, D-dimer 9.88 INR 1.19, WBC 2.77, 34% bandemia. CTA chest was negative for PE showed mild pulmonary edema, and gas within the upper pole of the right kidney with surrounding fat stranding although it was incompletely visualized. She was sent for a CT abdomen pelvis with emphysematous pyelonephritis of the right kidney. Urologic consultation is recommended. She was given 5 units regular insulin for the hyperglycemia and started on Zosyn for suspected pyelonephritis.   Patient reports patient states that she feels okay, just feeling a little groggy due to medicine.  Patient denies any other complaints at this time.    I have noted the following changes since admission: WBC has improved. Levophed discontinued early this  morning around 0330.     I have reviewed the H&P, diagnostic data and plan of care for Eugenia Madden.  I will be taking over care of this patient during the current hospitalization.        Signature: Electronically signed by JONATHAN Ferrer, 07/17/25, 15:27 EDT.  [facility] Hospitalist Team

## 2025-07-17 NOTE — PROGRESS NOTES
Critical Care Progress Note     Eugenia Madden : 1955 MRN:[id number] LOS:1  Rm: 3119/1     Principal Problem: Septic shock     Reason for follow up: All the medical problems listed below    Summary     Eugenia Madden is a 69 y.o. female with PMH of COPD, emphysema, DM type II, hypertension, hyperlipidemia, CAD, and nephrolithiasis presented to the hospital for overall not feeling well and back pain, and was admitted with a principal diagnosis of Septic shock. She presents after severe back pain x1 day, and inability to walk and talk x1 hour at home, secondary to her level of pain. She denied dysuria. She stated it came on suddenly, and has a history of kidney stones but hasn't had for many years. Upon EMS arrival the patient's blood pressure was 80 systolic which briefly improved with 200 mL IVF.  Upon arrival to the ED she was tachycardic and febrile at 100.9 F.  She developed hypotension and received 2 L IVF with persistent hypotension for which Levophed was started.  Labs remarkable for BNP 1087, CO2 18.4, anion gap 17.6, glucose 294 procalcitonin 30.9, D-dimer 9.88 INR 1.19, WBC 2.77, 34% bandemia.  CTA chest was negative for PE showed mild pulmonary edema, and gas within the upper pole of the right kidney with surrounding fat stranding although it was incompletely visualized.  She was sent for a CT abdomen pelvis with emphysematous pyelonephritis of the right kidney. Urologic consultation is recommended. She was given 5 units regular insulin for the hyperglycemia and started on Zosyn for suspected pyelonephritis.     Patient is on Hospital Day: 3.    Significant Events / Subjective     25 : Levophed discontinued early this morning around 0330. No plans for urological interventions a this time. Continuing rocephin and flagyl. Patient doing well. Stable for downgrade.     Assessment / Plan     Septic Shock, secondary to emphysematous pyelonephritis --> Improving  Likely due to pyelonephritis, complicated  UTI  Work-up:   WBC 2.77, 34% bandemia, lactate 1.7, procalcitonin 30.9  Blood cultures pending  UA abnormal but did not reflex to culture  Elevated D Dimer, CT PE Protocol negative for PE in ED.  CT abdomen/pelvis (7/16): emphysematous pyelonephritis of the right kidney   Given Zosyn in ED, converted to ceftriaxone, Flagyl.  S/p 2L sepsis bolus, persistent hypotension in spite of adequate fluid resuscitation.   C/w additional fluids as ordered. Avoid fluid overload.   Levo off at 0330 this morning  Urology consulted --> no current plans for intervention     Acute Kidney Injury --> Improved   Remains nonoliguric. Baseline creatinine 0.6. Creatinine 1.22  Likely prerenal. Possible intrinsic component due to ATN from infection and hypotension.   C/w IV fluids as ordered.  Monitor Input/Output very closely.   Avoids NSAIDs, nephrotoxic medications, and hypotension.     Coronary artery disease involving native coronary artery of native heart without angina pectoris  Chronic and stable.   HS Troponin 35, with reflex of 65.  EKG independently reviewed with Sinus tachycardia without ST or T wave abnormalities, heart rate 130, QTc 468  c/w aspirin, beta blockers, nitrates and statins.     Essential hypertension: well controlled.   Holding Toprol, Cozaar,   Titrate medications as needed.     Emphysema  COPD, not in exacerbation  CXR no infiltrates or effusions  Continue Symbicort (hospital formulary) and duonebs as needed.  Continue Zyrtec     Diabetes mellitus Type 2, not insulin-dependent : well controlled.   Home regimen includes: Semaglutide, Jardiance, Actos.  Hold oral agents while in ICU.  A1c 6.7 (9/21/23), repeat-pending.  Accu checks ACHS or Every 6 Hours, based on diet, with sliding scale Admelog coverage as needed.      Dyslipidemia: On statin as OP.  GERD: On pepcid.  RLS: On Mirapex.  Insomnia: On hydroxyzine  Arthritis: On leflunomide as OP.  Neuropathy: On gabapentin    Disposition:  downgrade to PCU    Code  status:   Code Status (Patient has no pulse and is not breathing): CPR (Attempt to Resuscitate)  Medical Interventions (Patient has pulse or is breathing): Full Support       Nutrition:   Diet: Diabetic; Consistent Carbohydrate; Fluid Consistency: Thin (IDDSI 0)   Patient isn't on Tube Feeding     VTE Prophylaxis:  Mechanical VTE prophylaxis orders are present.    Objective / Physical Exam     Vital signs:  Temp: 98.3 °F (36.8 °C)  BP: 133/75  Heart Rate: 99  Resp: 26  SpO2: 96 %  Weight: 64 kg (141 lb 1.5 oz)    Admission Weight: Weight: 69.2 kg (152 lb 9.6 oz)  Current Weight: Weight: 64 kg (141 lb 1.5 oz)    Input/Output in last 24 hours:    Intake/Output Summary (Last 24 hours) at 7/17/2025 0725  Last data filed at 7/17/2025 0345  Gross per 24 hour   Intake 821 ml   Output 1350 ml   Net -529 ml      Net IO Since Admission: -671 mL [07/17/25 0745]     Physical Exam  Vitals and nursing note reviewed.   Constitutional:       General: She is not in acute distress.     Appearance: She is not ill-appearing.   HENT:      Head: Normocephalic.      Mouth/Throat:      Mouth: Mucous membranes are moist.      Pharynx: Oropharynx is clear.   Eyes:      Extraocular Movements: Extraocular movements intact.      Conjunctiva/sclera: Conjunctivae normal.      Pupils: Pupils are equal, round, and reactive to light.   Cardiovascular:      Rate and Rhythm: Normal rate and regular rhythm.      Pulses: Normal pulses.      Heart sounds: Normal heart sounds.   Pulmonary:      Effort: Pulmonary effort is normal.      Breath sounds: Normal breath sounds.   Abdominal:      General: Bowel sounds are normal.      Palpations: Abdomen is soft.   Musculoskeletal:      Right lower leg: No edema.      Left lower leg: No edema.   Skin:     General: Skin is warm and dry.      Findings: No rash.   Neurological:      Mental Status: She is alert and oriented to person, place, and time.   Psychiatric:         Mood and Affect: Mood normal.          Behavior: Behavior normal.          Radiology and Labs     Results from last 7 days   Lab Units 07/17/25  0537 07/16/25  0601 07/15/25  2251   WBC 10*3/mm3 10.38 13.70* 2.77*   HEMOGLOBIN g/dL 9.9* 12.6 14.5   HEMATOCRIT % 31.3* 39.0 45.7   PLATELETS 10*3/mm3 65* 117* 129*      Results from last 7 days   Lab Units 07/15/25  2251   PROTIME Seconds 15.0*   INR  1.19*   APTT seconds 26.1      Results from last 7 days   Lab Units 07/17/25  0537 07/16/25  1742 07/16/25  0601 07/15/25  2251   SODIUM mmol/L 135*  --  141 138   POTASSIUM mmol/L 4.3 4.2 3.3* 3.5   CHLORIDE mmol/L 104  --  109* 102   CO2 mmol/L 17.4*  --  17.6* 18.4*   ANION GAP mmol/L 13.6  --  14.4 17.6*   BUN mg/dL 17.4  --  15.7 15.5   CREATININE mg/dL 0.97  --  1.22* 0.95   GLUCOSE mg/dL 186*  --  188* 294*   PHOSPHORUS mg/dL 2.6  --  4.4 2.7   MAGNESIUM mg/dL 2.6*  --  1.6 1.7   ALT (SGPT) U/L 16  --  25 28   AST (SGOT) U/L 17  --  29 37*   ALK PHOS U/L 50  --  65 104      Results from last 7 days   Lab Units 07/17/25 0537 07/16/25  0601 07/15/25  2251   ALT (SGPT) U/L 16 25 28   AST (SGOT) U/L 17 29 37*   ALK PHOS U/L 50 65 104           CT Abdomen Pelvis Without Contrast  Result Date: 7/16/2025  Impression: Emphysematous pyelonephritis of the right kidney. Urologic consultation is recommended. Electronically Signed: Francisco Aguilar MD  7/16/2025 5:58 AM EDT  Workstation ID: [id number]    CT Angiogram Chest Pulmonary Embolism  Result Date: 7/16/2025  Impression: 1.No evidence of pulmonary embolism. 2.Mild pulmonary edema. 3.Gas within the upper pole of the right kidney with surrounding fat stranding. This could represent infection of the right kidney such as pyonephrosis. This is incompletely visualized on this exam. Electronically Signed: Francisco Aguilar MD  7/16/2025 2:22 AM EDT  Workstation ID: [id number]    XR Chest 1 View  Result Date: 7/15/2025  Impression: No active disease. Electronically Signed: Francisco Aguilar MD  7/15/2025 11:00 PM EDT  Workstation ID:  [id number]      Current medications     Scheduled Meds:   atorvastatin, 40 mg, Oral, Daily  budesonide-formoterol, 2 puff, Inhalation, BID - RT  cefTRIAXone, 2,000 mg, Intravenous, Q24H  cetirizine, 10 mg, Oral, Nightly  enoxaparin sodium, 40 mg, Subcutaneous, Q24H  famotidine, 20 mg, Oral, Nightly  gabapentin, 300 mg, Oral, Nightly  hydrOXYzine, 50 mg, Oral, Nightly  insulin lispro, 4-24 Units, Subcutaneous, 4x Daily AC & at Bedtime  [Held by provider] losartan, 25 mg, Oral, Daily  metoprolol succinate XL, 25 mg, Oral, Daily  metroNIDAZOLE, 500 mg, Intravenous, Q8H  mupirocin, 1 Application, Each Nare, BID  pramipexole, 0.5 mg, Oral, Nightly  senna-docusate sodium, 2 tablet, Oral, BID  sodium chloride, 10 mL, Intravenous, Q12H        Continuous Infusions:            Plan discussed with RN. Reviewed all other data in the last 24 hours, including but not limited to vitals, labs, microbiology, imaging and pertinent notes from other providers.  Plan also discussed with patient at the bedside.      JONATHAN Saavedra   Critical Care  07/17/25   07:45 EDT

## 2025-07-17 NOTE — CASE MANAGEMENT/SOCIAL WORK
Continued Stay Note  [facility]     Patient Name: Eugenia Madden  MRN: [id number]  Today's Date: 7/17/2025    Admit Date: 7/15/2025    Plan: DC Plan: Anticipate routine home with Spouse Braeden.   Discharge Plan       Row Name 07/17/25 1013       Plan    Plan DC Plan: Anticipate routine home with Spouse Braeden.    Provided Post Acute Provider List? N/A    Provided Post Acute Provider Quality & Resource List? N/A    Plan Comments CM spoke with intensivist, nursing, and NP to obtain clinical upates in morning rounds. Nursing reports no pressors since last night. Patient eating and drinking with adequate output. Patient downgraded to Telemetry level of care.CM will continue to follow for any additional needs. DC Barriers: Cardiac monitoring, IVF's, Urology treatment plan pending, IV abx/flagyl, and monitor labs.                 Expected Discharge Date and Time       Expected Discharge Date Expected Discharge Time    Jul 19, 2025               Robyn Carrasco RN    Office Phone: [phone]  Office Cell:     [phone]

## 2025-07-18 LAB
ALBUMIN SERPL-MCNC: 2.8 G/DL (ref 3.5–5.2)
ALBUMIN/GLOB SERPL: 1.2 G/DL
ALP SERPL-CCNC: 71 U/L (ref 39–117)
ALT SERPL W P-5'-P-CCNC: 17 U/L (ref 1–33)
ANION GAP SERPL CALCULATED.3IONS-SCNC: 12.6 MMOL/L (ref 5–15)
AST SERPL-CCNC: 19 U/L (ref 1–32)
BACTERIA SPEC AEROBE CULT: ABNORMAL
BACTERIA SPEC AEROBE CULT: ABNORMAL
BASOPHILS # BLD MANUAL: 0.11 10*3/MM3 (ref 0–0.2)
BASOPHILS NFR BLD MANUAL: 1 % (ref 0–1.5)
BILIRUB SERPL-MCNC: 0.4 MG/DL (ref 0–1.2)
BUN SERPL-MCNC: 18.6 MG/DL (ref 8–23)
BUN/CREAT SERPL: 21.4 (ref 7–25)
CALCIUM SPEC-SCNC: 7.7 MG/DL (ref 8.6–10.5)
CHLORIDE SERPL-SCNC: 105 MMOL/L (ref 98–107)
CO2 SERPL-SCNC: 16.4 MMOL/L (ref 22–29)
CREAT SERPL-MCNC: 0.87 MG/DL (ref 0.57–1)
DEPRECATED RDW RBC AUTO: 44.5 FL (ref 37–54)
EGFRCR SERPLBLD CKD-EPI 2021: 72.2 ML/MIN/1.73
ERYTHROCYTE [DISTWIDTH] IN BLOOD BY AUTOMATED COUNT: 13.7 % (ref 12.3–15.4)
GLOBULIN UR ELPH-MCNC: 2.4 GM/DL
GLUCOSE BLDC GLUCOMTR-MCNC: 117 MG/DL (ref 70–105)
GLUCOSE BLDC GLUCOMTR-MCNC: 120 MG/DL (ref 70–105)
GLUCOSE BLDC GLUCOMTR-MCNC: 126 MG/DL (ref 70–105)
GLUCOSE BLDC GLUCOMTR-MCNC: 185 MG/DL (ref 70–105)
GLUCOSE SERPL-MCNC: 126 MG/DL (ref 65–99)
GRAM STN SPEC: ABNORMAL
HCT VFR BLD AUTO: 36.8 % (ref 34–46.6)
HGB BLD-MCNC: 11.8 G/DL (ref 12–15.9)
ISOLATED FROM: ABNORMAL
ISOLATED FROM: ABNORMAL
LYMPHOCYTES # BLD MANUAL: 0.34 10*3/MM3 (ref 0.7–3.1)
LYMPHOCYTES NFR BLD MANUAL: 3 % (ref 5–12)
MAGNESIUM SERPL-MCNC: 2.6 MG/DL (ref 1.6–2.4)
MCH RBC QN AUTO: 28.4 PG (ref 26.6–33)
MCHC RBC AUTO-ENTMCNC: 32.1 G/DL (ref 31.5–35.7)
MCV RBC AUTO: 88.7 FL (ref 79–97)
MONOCYTES # BLD: 0.34 10*3/MM3 (ref 0.1–0.9)
NEUTROPHILS # BLD AUTO: 10.43 10*3/MM3 (ref 1.7–7)
NEUTROPHILS NFR BLD MANUAL: 78 % (ref 42.7–76)
NEUTS BAND NFR BLD MANUAL: 15 % (ref 0–5)
NRBC SPEC MANUAL: 1 /100 WBC (ref 0–0.2)
PHOSPHATE SERPL-MCNC: 2.1 MG/DL (ref 2.5–4.5)
PHOSPHATE SERPL-MCNC: 2.2 MG/DL (ref 2.5–4.5)
PLATELET # BLD AUTO: 88 10*3/MM3 (ref 140–450)
PMV BLD AUTO: 11.7 FL (ref 6–12)
POTASSIUM SERPL-SCNC: 3.8 MMOL/L (ref 3.5–5.2)
PROT SERPL-MCNC: 5.2 G/DL (ref 6–8.5)
RBC # BLD AUTO: 4.15 10*6/MM3 (ref 3.77–5.28)
RBC MORPH BLD: NORMAL
SCAN SLIDE: NORMAL
SMALL PLATELETS BLD QL SMEAR: ABNORMAL
SODIUM SERPL-SCNC: 134 MMOL/L (ref 136–145)
VARIANT LYMPHS NFR BLD MANUAL: 3 % (ref 19.6–45.3)
WBC MORPH BLD: NORMAL
WBC NRBC COR # BLD AUTO: 11.22 10*3/MM3 (ref 3.4–10.8)

## 2025-07-18 PROCEDURE — 94799 UNLISTED PULMONARY SVC/PX: CPT

## 2025-07-18 PROCEDURE — 84100 ASSAY OF PHOSPHORUS: CPT | Performed by: INTERNAL MEDICINE

## 2025-07-18 PROCEDURE — 85007 BL SMEAR W/DIFF WBC COUNT: CPT

## 2025-07-18 PROCEDURE — 80053 COMPREHEN METABOLIC PANEL: CPT

## 2025-07-18 PROCEDURE — 25010000002 ENOXAPARIN PER 10 MG: Performed by: INTERNAL MEDICINE

## 2025-07-18 PROCEDURE — 25010000002 HYDROMORPHONE 1 MG/ML SOLUTION: Performed by: INTERNAL MEDICINE

## 2025-07-18 PROCEDURE — 85025 COMPLETE CBC W/AUTO DIFF WBC: CPT

## 2025-07-18 PROCEDURE — 63710000001 INSULIN LISPRO (HUMAN) PER 5 UNITS: Performed by: INTERNAL MEDICINE

## 2025-07-18 PROCEDURE — 94664 DEMO&/EVAL PT USE INHALER: CPT

## 2025-07-18 PROCEDURE — 97162 PT EVAL MOD COMPLEX 30 MIN: CPT

## 2025-07-18 PROCEDURE — 82948 REAGENT STRIP/BLOOD GLUCOSE: CPT

## 2025-07-18 PROCEDURE — 83735 ASSAY OF MAGNESIUM: CPT

## 2025-07-18 PROCEDURE — 97166 OT EVAL MOD COMPLEX 45 MIN: CPT

## 2025-07-18 PROCEDURE — 94761 N-INVAS EAR/PLS OXIMETRY MLT: CPT

## 2025-07-18 PROCEDURE — 84100 ASSAY OF PHOSPHORUS: CPT

## 2025-07-18 PROCEDURE — 25010000002 METRONIDAZOLE 500 MG/100ML SOLUTION: Performed by: INTERNAL MEDICINE

## 2025-07-18 PROCEDURE — 82948 REAGENT STRIP/BLOOD GLUCOSE: CPT | Performed by: INTERNAL MEDICINE

## 2025-07-18 PROCEDURE — 25010000002 CEFTRIAXONE PER 250 MG: Performed by: INTERNAL MEDICINE

## 2025-07-18 RX ADMIN — PRAMIPEXOLE DIHYDROCHLORIDE 0.5 MG: 0.5 TABLET ORAL at 20:19

## 2025-07-18 RX ADMIN — ATORVASTATIN CALCIUM 40 MG: 40 TABLET, FILM COATED ORAL at 08:18

## 2025-07-18 RX ADMIN — MUPIROCIN 1 APPLICATION: 20 OINTMENT TOPICAL at 08:18

## 2025-07-18 RX ADMIN — HYDROMORPHONE HYDROCHLORIDE 1 MG: 1 INJECTION, SOLUTION INTRAMUSCULAR; INTRAVENOUS; SUBCUTANEOUS at 12:16

## 2025-07-18 RX ADMIN — Medication 2 PACKET: at 20:18

## 2025-07-18 RX ADMIN — Medication 10 ML: at 08:18

## 2025-07-18 RX ADMIN — HYDROXYZINE HYDROCHLORIDE 50 MG: 25 TABLET, FILM COATED ORAL at 20:19

## 2025-07-18 RX ADMIN — Medication 10 ML: at 20:20

## 2025-07-18 RX ADMIN — CEFTRIAXONE 2000 MG: 2 INJECTION, POWDER, FOR SOLUTION INTRAMUSCULAR; INTRAVENOUS at 12:15

## 2025-07-18 RX ADMIN — HYDROMORPHONE HYDROCHLORIDE 1 MG: 1 INJECTION, SOLUTION INTRAMUSCULAR; INTRAVENOUS; SUBCUTANEOUS at 04:04

## 2025-07-18 RX ADMIN — HYDROMORPHONE HYDROCHLORIDE 1 MG: 1 INJECTION, SOLUTION INTRAMUSCULAR; INTRAVENOUS; SUBCUTANEOUS at 20:28

## 2025-07-18 RX ADMIN — BUDESONIDE AND FORMOTEROL FUMARATE DIHYDRATE 2 PUFF: 160; 4.5 AEROSOL RESPIRATORY (INHALATION) at 07:01

## 2025-07-18 RX ADMIN — ENOXAPARIN SODIUM 40 MG: 100 INJECTION SUBCUTANEOUS at 17:37

## 2025-07-18 RX ADMIN — CETIRIZINE HYDROCHLORIDE 10 MG: 10 TABLET, FILM COATED ORAL at 20:20

## 2025-07-18 RX ADMIN — BUDESONIDE AND FORMOTEROL FUMARATE DIHYDRATE 2 PUFF: 160; 4.5 AEROSOL RESPIRATORY (INHALATION) at 19:05

## 2025-07-18 RX ADMIN — Medication 2 PACKET: at 06:02

## 2025-07-18 RX ADMIN — GABAPENTIN 300 MG: 300 CAPSULE ORAL at 20:20

## 2025-07-18 RX ADMIN — INSULIN LISPRO 4 UNITS: 100 INJECTION, SOLUTION INTRAVENOUS; SUBCUTANEOUS at 20:28

## 2025-07-18 RX ADMIN — METRONIDAZOLE 500 MG: 500 INJECTION, SOLUTION INTRAVENOUS at 03:58

## 2025-07-18 RX ADMIN — SENNOSIDES AND DOCUSATE SODIUM 2 TABLET: 50; 8.6 TABLET ORAL at 20:19

## 2025-07-18 RX ADMIN — METOPROLOL SUCCINATE 25 MG: 25 TABLET, EXTENDED RELEASE ORAL at 08:18

## 2025-07-18 RX ADMIN — MUPIROCIN 1 APPLICATION: 20 OINTMENT TOPICAL at 20:19

## 2025-07-18 RX ADMIN — FAMOTIDINE 20 MG: 20 TABLET, FILM COATED ORAL at 20:20

## 2025-07-18 NOTE — THERAPY EVALUATION
Patient Name: Eugenia Madden  : 1955    MRN: [id number]                              Today's Date: 2025       Admit Date: 7/15/2025    Visit Dx:     ICD-10-CM ICD-9-CM   1. Pyelonephritis  N12 590.80   2. Fever, unspecified fever cause  R50.9 780.60   3. Thrombocytopenia  D69.6 287.5   4. Dehydration  E86.0 276.51   5. Sepsis, due to unspecified organism, unspecified whether acute organ dysfunction present  A41.9 038.9     995.91   6. Elevated troponin  R79.89 790.6     Patient Active Problem List   Diagnosis    B12 deficiency    Cardiac murmur    Chronic coronary artery disease    Type 2 diabetes mellitus with hyperglycemia, without long-term current use of insulin    Family history of cerebrovascular accident (CVA)    Family history of malignant neoplasm of breast    Fatty liver    Hearing deficit    Mixed hyperlipidemia    Essential hypertension    Osteoporosis    Polycythemia    Polyp of colon    Pulmonary emphysema    Reduced libido    Restless leg    Vitamin D deficiency    Arthritis    Thoracic aortic aneurysm without rupture    Acute bilateral low back pain without sciatica    GERD without esophagitis    Former smoker    Bronchitis    Shingles    Encounter for annual general medical examination with abnormal findings in adult    Screening for cervical cancer    Cough    Recurrent UTI    COPD with acute exacerbation    Type 2 diabetes mellitus    Elevated troponin    Acute right-sided low back pain    COPD exacerbation    Acute respiratory failure with hypoxia    Septic shock     Past Medical History:   Diagnosis Date    Arthritis     COPD (chronic obstructive pulmonary disease) 2021    Coronary artery disease     Diabetes mellitus 2010    Heart murmur     Hypertension     Kidney stones     Restless leg      Past Surgical History:   Procedure Laterality Date    CERVIX SURGERY      Dysplasia removed from Cervix    COLONOSCOPY      KIDNEY STONE SURGERY  2016    Lithotripsy    OTHER SURGICAL  HISTORY  2007    Pinched nerve arm     TUBAL ABDOMINAL LIGATION Bilateral       General Information       Row Name 07/18/25 1619          OT Time and Intention    Document Type evaluation  -     Mode of Treatment occupational therapy  -       Row Name 07/18/25 1619          General Information    Patient Profile Reviewed yes  -LS     Prior Level of Function independent:;ADL's;driving;all household mobility;community mobility  -     Existing Precautions/Restrictions fall  -LS       Row Name 07/18/25 1619          Living Environment    Current Living Arrangements home  -     People in Home spouse  -LS       Row Name 07/18/25 1619          Home Main Entrance    Number of Stairs, Main Entrance none  -LS       Row Name 07/18/25 1619          Stairs Within Home, Primary    Number of Stairs, Within Home, Primary none  -LS       Row Name 07/18/25 1619          Cognition    Orientation Status (Cognition) oriented x 4  -       Row Name 07/18/25 1619          Safety Issues/Impairments Affecting Functional Mobility    Impairments Affecting Function (Mobility) endurance/activity tolerance;strength;balance  -               User Key  (r) = Recorded By, (t) = Taken By, (c) = Cosigned By      Initials Name Provider Type    LS Yeison Puentes, LATISHA Occupational Therapist                     Mobility/ADL's       Row Name 07/18/25 1619          Bed Mobility    Bed Mobility supine-sit  -LS     Supine-Sit [place] (Bed Mobility) contact guard  -     Assistive Device (Bed Mobility) head of bed elevated  -       Row Name 07/18/25 1619          Transfers    Transfers sit-stand transfer  -       Row Name 07/18/25 1619          Sit-Stand Transfer    Sit-Stand [place] (Transfers) contact guard;1 person assist  -     Assistive Device (Sit-Stand Transfers) walker, front-wheeled  -       Row Name 07/18/25 1619          Functional Mobility    Functional Mobility- Ind. Level contact guard assist  -LS     Functional  Mobility- Device walker, front-wheeled  -LS     Patient was able to Ambulate yes  -LS       Row Name 07/18/25 1619          Activities of Daily Living    BADL Assessment/Intervention lower body dressing  -LS       Row Name 07/18/25 1619          Lower Body Dressing Assessment/Training    [place] Level (Lower Body Dressing) lower body dressing skills;set up  -LS     Position (Lower Body Dressing) edge of bed sitting  -LS               User Key  (r) = Recorded By, (t) = Taken By, (c) = Cosigned By      Initials Name Provider Type    Yeison Guzman OT Occupational Therapist                   Obj/Interventions       Row Name 07/18/25 1620          Sensory Assessment (Somatosensory)    Sensory Assessment (Somatosensory) sensation intact  -LS       Row Name 07/18/25 1620          Vision Assessment/Intervention    Visual Impairment/Limitations WFL  -LS       Row Name 07/18/25 1620          Range of Motion Comprehensive    General Range of Motion bilateral upper extremity ROM WFL  -LS       Row Name 07/18/25 1620          Strength Comprehensive (MMT)    Comment, General Manual Muscle Testing (MMT) Assessment BUE grossly 4/5  -LS       Row Name 07/18/25 1620          Balance    Balance Assessment sitting static balance;sitting dynamic balance;standing static balance;standing dynamic balance  -LS     Static Sitting Balance standby assist  -LS     Dynamic Sitting Balance standby assist  -LS     Position, Sitting Balance unsupported;sitting edge of bed  -LS     Static Standing Balance contact guard  -LS     Dynamic Standing Balance contact guard  -LS     Position/Device Used, Standing Balance walker, front-wheeled;supported  -LS               User Key  (r) = Recorded By, (t) = Taken By, (c) = Cosigned By      Initials Name Provider Type    Yeison Guzman OT Occupational Therapist                   Goals/Plan       Row Name 07/18/25 1626          Bed Mobility Goal 1 (OT)    Activity/Assistive Device (Bed Mobility Goal  1, OT) bed mobility activities, all  -LS     [place] Level/Cues Needed (Bed Mobility Goal 1, OT) modified independence  -LS     Time Frame (Bed Mobility Goal 1, OT) long term goal (LTG);2 weeks  -LS       Row Name 07/18/25 1626          Transfer Goal 1 (OT)    Activity/Assistive Device (Transfer Goal 1, OT) transfers, all  -LS     [place] Level/Cues Needed (Transfer Goal 1, OT) modified independence  -LS     Time Frame (Transfer Goal 1, OT) long term goal (LTG);2 weeks  -LS       Row Name 07/18/25 1626          Dressing Goal 1 (OT)    Activity/Device (Dressing Goal 1, OT) dressing skills, all  -LS     [place]/Cues Needed (Dressing Goal 1, OT) modified independence  -LS     Time Frame (Dressing Goal 1, OT) long term goal (LTG);2 weeks  -LS       Row Name 07/18/25 1626          Toileting Goal 1 (OT)    Activity/Device (Toileting Goal 1, OT) toileting skills, all  -LS     [place] Level/Cues Needed (Toileting Goal 1, OT) modified independence  -LS     Time Frame (Toileting Goal 1, OT) long term goal (LTG);2 weeks  -       Row Name 07/18/25 1626          Therapy Assessment/Plan (OT)    Planned Therapy Interventions (OT) activity tolerance training;BADL retraining;functional balance retraining;IADL retraining;occupation/activity based interventions;ROM/therapeutic exercise;strengthening exercise;transfer/mobility retraining;patient/caregiver education/training;neuromuscular control/coordination retraining  -               User Key  (r) = Recorded By, (t) = Taken By, (c) = Cosigned By      Initials Name Provider Type    Yeison Guzman OT Occupational Therapist                   Clinical Impression       Row Name 07/18/25 1620          Pain Assessment    Pretreatment Pain Rating 7/10  -LS     Posttreatment Pain Rating 7/10  -LS     Pain Location flank  -LS     Pain Side/Orientation right;lower  -LS       Row Name 07/18/25 1620          Plan of Care Review    Plan of Care Reviewed With patient  -LS      Outcome Evaluation 69-year-old female with a past medical history significant for COPD, CAD, diabetes, heart murmur, hypertension, and kidney stones presenting to the ED on 7/15/25 for evaluation of hypotension. Urology following pt for Septic Shock, secondary to emphysematous pyelonephritis. Elevated D Dimer, CT PE Protocol negative for PE. At baseline, pt lives with spouse in a SSH with ramped entry. She is normally IND with ADL care without use of AD< and she ambulated independently as well. She is an active . Pt comes to bed with SBA and excess time. Lower body dressing completed with setup only. Pt able to stand and ambulate with RW, requiring CGA and excess time, moving slowly with all mobility. Pt is currently below baseline, but anticipate her to progress well while IP. Will follow, recommending home with assist and HHOT.  -       Row Name 07/18/25 1620          Therapy Assessment/Plan (OT)    Rehab Potential (OT) good  -     Criteria for Skilled Therapeutic Interventions Met (OT) yes;skilled treatment is necessary  -     Therapy Frequency (OT) 3 times/wk  -     Predicted Duration of Therapy Intervention (OT) until dc  -       Row Name 07/18/25 1620          Therapy Plan Review/Discharge Plan (OT)    Anticipated Discharge Disposition (OT) home with assist;home with home health  -       Row Name 07/18/25 1620          Vital Signs    Pre Systolic BP Rehab 139  -LS     Pre Treatment Diastolic BP 76  -LS     Intra Systolic BP Rehab 128  -LS     Intra Treatment Diastolic BP 75  -LS     Pre SpO2 (%) 94  -LS     O2 Delivery Pre Treatment nasal cannula  -LS     O2 Delivery Intra Treatment nasal cannula  -LS     Post SpO2 (%) 92  -LS     O2 Delivery Post Treatment nasal cannula  -LS     Pre Patient Position Supine  -LS     Intra Patient Position Standing  -LS     Post Patient Position Sitting  -LS       Row Name 07/18/25 1620          Positioning and Restraints    Post Treatment Position chair   -LS     In Chair notified nsg;reclined;call light within reach;encouraged to call for assist;exit alarm on  -LS               User Key  (r) = Recorded By, (t) = Taken By, (c) = Cosigned By      Initials Name Provider Type    Yeison Guzman OT Occupational Therapist                   Outcome Measures       Row Name 07/18/25 1626          How much help from another is currently needed...    Putting on and taking off regular lower body clothing? 3  -LS     Bathing (including washing, rinsing, and drying) 3  -LS     Toileting (which includes using toilet bed pan or urinal) 3  -LS     Putting on and taking off regular upper body clothing 3  -LS     Taking care of personal grooming (such as brushing teeth) 4  -LS     Eating meals 4  -LS     AM-PAC 6 Clicks Score (OT) 20  -LS       Row Name 07/18/25 1326          How much help from another person do you currently need...    Turning from your back to your side while in flat bed without using bedrails? 4  -BR     Moving from lying on back to sitting on the side of a flat bed without bedrails? 4  -BR     Moving to and from a bed to a chair (including a wheelchair)? 3  -BR     Standing up from a chair using your arms (e.g., wheelchair, bedside chair)? 3  -BR     Climbing 3-5 steps with a railing? 3  -BR     To walk in hospital room? 3  -BR     AM-PAC 6 Clicks Score (PT) 20  -BR     Highest Level of Mobility Goal Walk 10 Steps or More-6  -BR       Row Name 07/18/25 1626 07/18/25 1326       Functional Assessment    Outcome Measure Options AM-PAC 6 Clicks Daily Activity (OT)  -LS AM-PAC 6 Clicks Basic Mobility (PT)  -BR              User Key  (r) = Recorded By, (t) = Taken By, (c) = Cosigned By      Initials Name Provider Type    Gloria Barger, PT Physical Therapist    Yeison Guzman OT Occupational Therapist                    Occupational Therapy Education       Title: PT OT SLP Therapies (Done)       Topic: Occupational Therapy (Done)       Point: ADL training  (Done)       Learning Progress Summary            Patient Acceptance, E,TB, VU by  at 7/18/2025 1626                      Point: Precautions (Done)       Learning Progress Summary            Patient Acceptance, E,TB, VU by  at 7/18/2025 1626                      Point: Body mechanics (Done)       Learning Progress Summary            Patient Acceptance, E,TB, VU by  at 7/18/2025 1626                                      User Key       Initials Effective Dates Name Provider Type Discipline     09/22/22 -  Yeison Puentes OT Occupational Therapist OT                  OT Recommendation and Plan  Planned Therapy Interventions (OT): activity tolerance training, BADL retraining, functional balance retraining, IADL retraining, occupation/activity based interventions, ROM/therapeutic exercise, strengthening exercise, transfer/mobility retraining, patient/caregiver education/training, neuromuscular control/coordination retraining  Therapy Frequency (OT): 3 times/wk  Plan of Care Review  Plan of Care Reviewed With: patient  Outcome Evaluation: 69-year-old female with a past medical history significant for COPD, CAD, diabetes, heart murmur, hypertension, and kidney stones presenting to the ED on 7/15/25 for evaluation of hypotension. Urology following pt for Septic Shock, secondary to emphysematous pyelonephritis. Elevated D Dimer, CT PE Protocol negative for PE. At baseline, pt lives with spouse in a SSH with ramped entry. She is normally IND with ADL care without use of AD< and she ambulated independently as well. She is an active . Pt comes to bed with SBA and excess time. Lower body dressing completed with setup only. Pt able to stand and ambulate with RW, requiring CGA and excess time, moving slowly with all mobility. Pt is currently below baseline, but anticipate her to progress well while IP. Will follow, recommending home with assist and HHOT.     Time Calculation:         Time Calculation- OT       Row  Name 07/18/25 1627             Time Calculation- OT    OT Start Time 1113  -LS      OT Stop Time 1136  -LS      OT Time Calculation (min) 23 min  -LS      OT Received On 07/18/25  -      OT - Next Appointment 07/21/25  -      OT Goal Re-Cert Due Date 08/01/25  -         Untimed Charges    OT Eval/Re-eval Minutes 23  -LS         Total Minutes    Untimed Charges Total Minutes 23  -LS       Total Minutes 23  -LS                User Key  (r) = Recorded By, (t) = Taken By, (c) = Cosigned By      Initials Name Provider Type    Yeison Guzman OT Occupational Therapist                  Therapy Charges for Today       Code Description Service Date Service Provider Modifiers Qty    [id number] HC OT EVAL MOD COMPLEXITY 4 7/18/2025 Yeison Puentes OT GO 1                 Yeison Puentes OT  7/18/2025

## 2025-07-18 NOTE — DISCHARGE PLACEMENT REQUEST
"Eugenia Madden (69 y.o. Female)       Date of Birth   1955    Social Security Number       Address   [address] [facility] [place] [facility]    Home Phone   [phone]    MRN   [id number]       Voodoo   Catholic    Marital Status                               Admission Date   7/15/2025    Admission Type   Emergency    Admitting Provider   Aden Espinosa DO    Attending Provider   Mario Chew DO    Department, Room/Bed   [facility] CARDIOVASCULAR CARE UNIT, 3119/1       Discharge Date       Discharge Disposition       Discharge Destination                                 Attending Provider: Mario Chew DO    Allergies: Tuberculin, Ppd    Isolation: None   Infection: None   Code Status: CPR    Ht: 160 cm (63\")   Wt: 61.5 kg (135 lb 9.3 oz)    Admission Cmt: None   Principal Problem: Septic shock [A41.9,R65.21]                   Active Insurance as of 7/15/2025       Primary Coverage       Payor Plan Insurance Group Employer/Plan Group    [employer] MEDICARE REPLACEMENT [employer] PPO [id number]       Payor Plan Address Payor Plan Phone Number Payor Plan Fax Number Effective Dates    [address] [address] [phone]  1/1/2021 - None Entered    [facility] [id number]         Subscriber Name Subscriber Birth Date Member ID       EUGENIA MADDEN 1955 [id number]                     Emergency Contacts        (Rel.) Home Phone Work Phone Mobile Phone    [place] NENA MADDEN (Spouse) -- -- [phone]                 History & Physical        Love, JONATHAN Mayorga at 07/16/25 0349       Attestation signed by Aden Espinosa DO at 07/16/25 1228      I have reviewed this documentation and agree with NP note, with any exceptions noted below.  Pt seen and examined by me personally.  I have personally reviewed all overnight events, vitals, labs, chart notes, and imaging.  The NP and I have collaborated to design the stated plan.                   "     Critical Care History and Physical     Eugenia Madden : 1955 MRN:[id number] LOS:0 ROOM:      Reason for admission: Septic shock     Assessment / Plan     Septic Shock, secondary to emphysematous pyelonephritis  Likely due to pyelonephritis, complicated UTI  Work-up:   WBC 2.77, 34% bandemia, lactate 1.7, procalcitonin 30.9  Blood cultures pending  UA abnormal but did not reflex to culture  Elevated D Dimer, CT PE Protocol negative for PE in ED.  CT abdomen/pelvis (): emphysematous pyelonephritis of the right kidney   Given Zosyn in ED, converted to ceftriaxone, Flagyl.  S/p 2L sepsis bolus, persistent hypotension in spite of adequate fluid resuscitation.   C/w additional fluids as ordered. Avoid fluid overload.   On levophed, titrate vasopressors for a target MAP of 65  Urology consulted    Acute Kidney Injury  Remains nonoliguric. Baseline creatinine 0.6. Creatinine 1.22  Likely prerenal. Possible intrinsic component due to ATN from infection and hypotension.   C/w IV fluids as ordered.  Monitor Input/Output very closely.   Avoids NSAIDs, nephrotoxic medications, and hypotension.    Coronary artery disease involving native coronary artery of native heart without angina pectoris  Chronic and stable.   HS Troponin 35, with reflex of 65.  EKG independently reviewed with Sinus tachycardia without ST or T wave abnormalities, heart rate 130, QTc 468  c/w aspirin, beta blockers, nitrates and statins.    Essential hypertension: well controlled.   Holding Toprol, Cozaar,   Titrate medications as needed.    Emphysema  COPD, not in exacerbation  CXR no infiltrates or effusions  Continue Symbicort (hospital formulary) and duonebs as needed.  Continue Zyrtec    Diabetes mellitus Type 2, not insulin-dependent : well controlled.   Home regimen includes: Semaglutide, Jardiance, Actos.  Hold oral agents while in ICU.  A1c 6.7 (23), repeat-pending.  Accu checks ACHS or Every 6 Hours, based on diet, with  sliding scale Admelog coverage as needed.     Dyslipidemia: On statin as OP.  GERD: On pepcid.  RLS: On Mirapex.  Insomnia: On hydroxyzine  Arthritis: On leflunomide as OP.  Neuropathy: On gabapentin    Code Status (Patient has no pulse and is not breathing): CPR (Attempt to Resuscitate)  Medical Interventions (Patient has pulse or is breathing): Full Support       Nutrition:   NPO Diet NPO Type: Sips with Meds     VTE Prophylaxis:  Mechanical VTE prophylaxis orders are present.       History of Present illness     Eugenia Madden is a 69 y.o. female with PMH of COPD, emphysema, DM type II, hypertension, hyperlipidemia, CAD, and nephrolithiasis presented to the hospital for overall not feeling well and back pain, and was admitted with a principal diagnosis of Septic shock. She presents after severe back pain x1 day, and inability to walk and talk x1 hour at home, secondary to her level of pain. She denied dysuria. She stated it came on suddenly, and has a history of kidney stones but hasn't had for many years. Upon EMS arrival the patient's blood pressure was 80 systolic which briefly improved with 200 mL IVF.  Upon arrival to the ED she was tachycardic and febrile at 100.9 F.  She developed hypotension and received 2 L IVF with persistent hypotension for which Levophed was started.  Labs remarkable for BNP 1087, CO2 18.4, anion gap 17.6, glucose 294 procalcitonin 30.9, D-dimer 9.88 INR 1.19, WBC 2.77, 34% bandemia.  CTA chest was negative for PE showed mild pulmonary edema, and gas within the upper pole of the right kidney with surrounding fat stranding although it was incompletely visualized.  She was sent for a CT abdomen pelvis with emphysematous pyelonephritis of the right kidney. Urologic consultation is recommended. She was given 5 units regular insulin for the hyperglycemia and started on Zosyn for suspected pyelonephritis.    ACP: No advance care planning documentation on file, in the event patient is unable  to make her own decisions, her spouse to be next of kin and decision maker.    Patient was seen and examined on 25 at 03:49 EDT .      Past Medical/Surgical/Social/Family History & Allergies     Past Medical History:   Diagnosis Date    Arthritis     COPD (chronic obstructive pulmonary disease) 2021    Coronary artery disease     Diabetes mellitus 2010    Heart murmur     Hypertension     Kidney stones     Restless leg       Past Surgical History:   Procedure Laterality Date    CERVIX SURGERY      Dysplasia removed from Cervix    COLONOSCOPY      KIDNEY STONE SURGERY  2016    Lithotripsy    OTHER SURGICAL HISTORY  2007    Pinched nerve arm     TUBAL ABDOMINAL LIGATION Bilateral       Social History     Socioeconomic History    Marital status:      Spouse name: Braeden    Number of children: 4    Years of education: 12   Tobacco Use    Smoking status: Former     Current packs/day: 0.00     Average packs/day: 0.5 packs/day for 46.8 years (23.4 ttl pk-yrs)     Types: Cigarettes     Start date: 1974     Quit date: 2020     Years since quittin.7     Passive exposure: Past    Smokeless tobacco: Never    Tobacco comments:     Passive Smoke: Y   Vaping Use    Vaping status: Never Used   Substance and Sexual Activity    Alcohol use: Never    Drug use: No    Sexual activity: Yes     Partners: Male     Birth control/protection: Surgical, None      Family History   Problem Relation Age of Onset    Heart disease Mother     Cancer Mother         Breast Cancer    Breast cancer Mother 55    Diabetes Mother     Diabetes Father     Stroke Father     Heart disease Father     Hypertension Father     Cancer Father     Hypertension Sister     Diabetes Sister     Heart disease Sister     Diabetes Brother     Hypertension Brother     Heart disease Brother     Other Other         Abstraction from [employer] Cholesterol    Diabetes Brother     Hypertension Brother     No Known Problems Brother     Heart disease  Brother     Cancer Brother         colon    Other Brother       Allergies   Allergen Reactions    Tuberculin, Ppd Rash     40yrs ago      Social Drivers of Health     Tobacco Use: Medium Risk (4/4/2025)    Patient History     Smoking Tobacco Use: Former     Smokeless Tobacco Use: Never     Passive Exposure: Past   Alcohol Use: Not At Risk (10/3/2023)    AUDIT-C     Frequency of Alcohol Consumption: Never     Average Number of Drinks: Patient does not drink     Frequency of Binge Drinking: Never   Financial Resource Strain: Not on file   Food Insecurity: Not on file   Transportation Needs: Not on file   Physical Activity: Inactive (10/4/2023)    Exercise Vital Sign     Days of Exercise per Week: 0 days     Minutes of Exercise per Session: 0 min   Stress: Not on file   Social Connections: Not on file   Interpersonal Safety: Not At Risk (7/16/2025)    Abuse Screen     Unsafe at Home or Work/School: no     Feels Threatened by Someone?: no     Does Anyone Keep You from Contacting Others or Doint Things Outside the Home?: no     Physical Sign of Abuse Present: no   Depression: Not at risk (9/21/2023)    PHQ-2     PHQ-2 Score: 0   Housing Stability: Not At Risk (10/4/2023)    Housing Stability     Current Living Arrangements: home     Potentially Unsafe Housing Conditions: none   Utilities: Not on file   Health Literacy: Unknown (10/4/2023)    Education     Help with school or training?: Not on file     Preferred Language: English   Employment: Not on file   Disabilities: Not At Risk (10/3/2023)    Disabilities     Concentrating, Remembering, or Making Decisions Difficulty: no     Doing Errands Independently Difficulty: no        Home Medications     Prior to Admission medications    Medication Sig Start Date End Date Taking? Authorizing Provider   atorvastatin (LIPITOR) 40 MG tablet TAKE 1 TABLET BY MOUTH DAILY 12/26/23  Yes Melisa Taveras MD   Breztri Aerosphere 160-9-4.8 MCG/ACT aerosol inhaler Inhale 2 puffs 2  (Two) Times a Day. 1/21/25  Yes Anthony Tapia MD   cetirizine (zyrTEC) 10 MG tablet Take 1 tablet by mouth Every Night.   Yes Anthony Tapia MD   famotidine (PEPCID) 20 MG tablet Take 1 tablet by mouth Every Night. 9/23/19  Yes Anthony Tapia MD   gabapentin (NEURONTIN) 300 MG capsule Take 1 capsule by mouth Every Night.   Yes Anthony Tapia MD   hydrOXYzine (ATARAX) 25 MG tablet TAKE 2 TABLETS BY MOUTH EVERY NIGHT AT BEDTIME 1/29/24  Yes Melisa Taveras MD   ipratropium-albuterol (DUO-NEB) 0.5-2.5 mg/3 ml nebulizer Take 3 mL by nebulization Every 4 (Four) Hours As Needed for Wheezing. 4/20/23  Yes Melisa Taveras MD   Jardiance 25 MG tablet tablet Take 1 tablet by mouth Every Night. 7/2/25  Yes Anthony Tapia MD   leflunomide (ARAVA) 20 MG tablet Take 1 tablet by mouth Every Night. 11/29/22  Yes Anthony Tapia MD   losartan (COZAAR) 25 MG tablet TAKE 1 TABLET BY MOUTH EVERY DAY 7/14/25  Yes Donell Joyce MD   metoprolol succinate XL (TOPROL-XL) 25 MG 24 hr tablet Take 1 tablet by mouth Daily. 4/4/25  Yes Donell Joyce MD   pioglitazone (ACTOS) 15 MG tablet Take 1 tablet by mouth Every Night.   Yes Anthony Tapia MD   potassium chloride 10 MEQ CR tablet TAKE 1 TABLET BY MOUTH DAILY 12/26/23  Yes Melisa Taveras MD   pramipexole (MIRAPEX) 0.125 MG tablet TAKE 4 TABLETS BY MOUTH EVERY NIGHT AT BEDTIME 9/8/23  Yes Melisa Taveras MD   Semaglutide, 1 MG/DOSE, (Ozempic, 1 MG/DOSE,) 4 MG/3ML solution pen-injector INJECT 1 MG UNDER THE SKIN INTO THE APPROPRIATE AREA AS DIRECTED 1 (ONE) TIME PER WEEK. 9/18/23  Yes Lei Kim MD   nitroglycerin (NITROSTAT) 0.3 MG SL tablet 1 under the tongue as needed for angina, may repeat q5mins for up three doses 4/4/25   Donell Joyce MD   metFORMIN ER (GLUCOPHAGE-XR) 500 MG 24 hr tablet TAKE 2 TABLETS BY MOUTH DAILY 1/1/24 7/16/25  Lei Kim MD   naproxen (NAPROSYN) 500 MG tablet  1/6/23 7/16/25   Provider, MD Anthony        Objective / Physical Exam     Vital signs:  Temp: 99.1 °F (37.3 °C)  BP: (!) 88/50  Heart Rate: 104  Resp: 18  SpO2: 94 %  Weight: 69.2 kg (152 lb 9.6 oz)    Admission Weight: Weight: 69.2 kg (152 lb 9.6 oz)    Physical Exam  Vitals and nursing note reviewed.   Constitutional:       General: She is not in acute distress.     Appearance: She is not ill-appearing, toxic-appearing or diaphoretic.   HENT:      Head: Normocephalic and atraumatic.      Nose: Nose normal. No congestion.      Mouth/Throat:      Mouth: Mucous membranes are moist.      Pharynx: Oropharynx is clear.   Eyes:      Comments: Eyelids normal.   Cardiovascular:      Rate and Rhythm: Normal rate and regular rhythm.      Heart sounds: Normal heart sounds. No murmur heard.  Pulmonary:      Effort: Pulmonary effort is normal. No respiratory distress.      Breath sounds: Normal breath sounds.   Abdominal:      General: There is no distension.      Palpations: Abdomen is soft.      Tenderness: There is no abdominal tenderness.      Comments: Right sided CVA tenderness   Musculoskeletal:         General: No swelling.      Right lower leg: No edema.      Left lower leg: No edema.   Skin:     General: Skin is warm and dry.   Neurological:      Mental Status: She is alert and oriented to person, place, and time.   Psychiatric:      Comments: Calm, cooperative.          Labs     Results from last 7 days   Lab Units 07/15/25  2251   WBC 10*3/mm3 2.77*   HEMOGLOBIN g/dL 14.5   HEMATOCRIT % 45.7   PLATELETS 10*3/mm3 129*      Results from last 7 days   Lab Units 07/15/25  2251   SODIUM mmol/L 138   POTASSIUM mmol/L 3.5   CHLORIDE mmol/L 102   CO2 mmol/L 18.4*   ANION GAP mmol/L 17.6*   BUN mg/dL 15.5   CREATININE mg/dL 0.95   GLUCOSE mg/dL 294*   PHOSPHORUS mg/dL 2.7   MAGNESIUM mg/dL 1.7   ALT (SGPT) U/L 28   AST (SGOT) U/L 37*   ALK PHOS U/L 104            Imaging     CT Abdomen Pelvis Without Contrast  Result Date:  7/16/2025  Impression: Emphysematous pyelonephritis of the right kidney. Urologic consultation is recommended. Electronically Signed: Francisco Aguilar MD  7/16/2025 5:58 AM EDT  Workstation ID: [id number]    CT Angiogram Chest Pulmonary Embolism  Result Date: 7/16/2025  Impression: 1.No evidence of pulmonary embolism. 2.Mild pulmonary edema. 3.Gas within the upper pole of the right kidney with surrounding fat stranding. This could represent infection of the right kidney such as pyonephrosis. This is incompletely visualized on this exam. Electronically Signed: Francisco Aguilar MD  7/16/2025 2:22 AM EDT  Workstation ID: [id number]    XR Chest 1 View  Result Date: 7/15/2025  Impression: No active disease. Electronically Signed: Francisco Aguilar MD  7/15/2025 11:00 PM EDT  Workstation ID: [id number]      EKG: My independent evaluation showed sinus tachycardia without ST or T wave abnormalities, heart rate 130, QTc 468.    Current Medications     Scheduled Meds:  mupirocin, 1 Application, Each Nare, BID  piperacillin-tazobactam, 3.375 g, Intravenous, Once  senna-docusate sodium, 2 tablet, Oral, BID  sodium chloride, 10 mL, Intravenous, Q12H         Continuous Infusions:  norepinephrine, 0.02-0.3 mcg/kg/min       Total critical care time:  32 minutes    Due to a high probability of clinically significant, life threatening deterioration, the patient required my highest level of preparedness to intervene emergently and I personally spent this critical care time directly and personally managing the patient. This critical care time included obtaining a history; examining the patient; pulse oximetry; ordering and review of studies; arranging urgent treatment with development of a management plan; evaluation of patient's response to treatment; frequent reassessment; and, discussions with other providers.    This critical care time was performed to assess and manage the high probability of imminent, life-threatening deterioration that could  result in multi-organ failure. It was exclusive of separately billable procedures and treating other patients and teaching time.    JONATHAN Flynn   Critical Care  07/16/25   03:49 EDT       Electronically signed by Aden Espinosa DO at 07/16/25 7633

## 2025-07-18 NOTE — PLAN OF CARE
Goal Outcome Evaluation:  Plan of Care Reviewed With: patient   Patient is a 69-year-old female with a past medical history significant for COPD, CAD, diabetes, heart murmur, hypertension, and kidney stones presenting to the ED on 7/15/25 for evaluation of hypotension. Urology following pt for Septic Shock, secondary to emphysematous pyelonephritis. Elevated D Dimer, CT PE Protocol negative for PE. CT abdomen/pelvis : emphysematous pyelonephritis of the right kidney. CXR no infiltrates or effusions. Pt A and O x 4, c/o weakness. Pt resides with spouse in [facility] with ramp entry, she drives and uses no AD for independence with community mobility. Pt c/o R lower abdominal pain. Pt transfers to edge of bed with SBA of 1. Transfers with CGA of 1 and she ambulated 110 feet with rolling walker with CGA of 1 with slow kristen. Pt relies heavily on rolling walker . VSS. PT will follow pt for generalized weakness and unsteadiness up on feet. PT recommendation is Home with Assist and Home Health PT. Pt will need a rolling walker for home- CM was notified.              Anticipated Discharge Disposition (PT): home with assist, home with home health

## 2025-07-18 NOTE — PROGRESS NOTES
Hospitalist Service   Daily Progress Note      Patient Name: Eugenia Madden  : 1955  MRN: [id number]  Primary Care Physician:  Nahid Lam MD  Date of admission: 7/15/2025      Subjective        Patient seen and examined this morning.  Feeling better this morning, right flank/back pain has improved.  Denies any further dysuria or hematuria.  No fever or chills.  No other complaints.      ROS: Pertinent positives as noted in HPI/subjective.  All other systems were reviewed and are negative.      Objective      Vitals:   Temp:  [98 °F (36.7 °C)-99 °F (37.2 °C)] 98 °F (36.7 °C)  Heart Rate:  [] 99  Resp:  [15-27] 21  BP: (103-165)/(57-88) 124/73  Flow (L/min) (Oxygen Therapy):  [1-2] 2    Physical Exam:    General: Awake, alert, NAD  Eyes: PERRL, EOMI, conjunctivae are clear  Cardiovascular: Regular rate and rhythm, no murmurs  Respiratory: Clear to auscultation bilaterally, no wheezing or rales, unlabored breathing  Abdomen: Soft, nontender, positive bowel sounds, no guarding  Neurologic: A&O, CN grossly intact, moves all extremities spontaneously  Musculoskeletal: Normal range of motion, no other gross deformities  Skin: Warm, dry         Result Review    Result Review:  I have personally reviewed the results from the time of this admission to 2025 11:00 EDT and agree with these findings:  [x]  Laboratory  [x]  Microbiology  [x]  Radiology  [x]  EKG/Telemetry   [x]  Cardiology/Vascular   []  Pathology  [x]  Old records  []  Other:          Assessment & Plan      Brief Patient Summary:  Eugenia Madden is a 69 y.o. female who       atorvastatin, 40 mg, Oral, Daily  budesonide-formoterol, 2 puff, Inhalation, BID - RT  cefTRIAXone, 2,000 mg, Intravenous, Q24H  cetirizine, 10 mg, Oral, Nightly  enoxaparin sodium, 40 mg, Subcutaneous, Q24H  famotidine, 20 mg, Oral, Nightly  gabapentin, 300 mg, Oral, Nightly  hydrOXYzine, 50 mg, Oral, Nightly  insulin lispro, 4-24 Units, Subcutaneous, 4x Daily AC &  at Bedtime  [Held by provider] losartan, 25 mg, Oral, Daily  metoprolol succinate XL, 25 mg, Oral, Daily  mupirocin, 1 Application, Each Nare, BID  pramipexole, 0.5 mg, Oral, Nightly  senna-docusate sodium, 2 tablet, Oral, BID  sodium chloride, 10 mL, Intravenous, Q12H             I have utilized all available, immediate resources to obtain, update, or review the patient's current medications including all prescriptions, over-the-counter products, herbals, cannabis/cannabidiol products, and vitamin.mineral/dietary (nutritional) supplements.    Active Hospital Problems:  Active Hospital Problems    Diagnosis     **Septic shock        Assessment/Plan:     Proteus bacteremia  Emphysematous right-sided pyelonephritis  S/p septic shock  -Imaging reports reviewed  -Initially IV Zosyn, de-escalated to ceftriaxone and Flagyl in ICU  -Blood cultures positive for Proteus mirabilis  -DC Flagyl, continue IV ceftriaxone for total of 2 weeks  -Urology following, no intervention necessary at this time, continue IV antibiotics and outpatient follow-up  -Blood pressure is stable, home antihypertensives being resumed as able    HAVEN, improved  -Likely prerenal secondary to above  -Continue to monitor    COPD, not in exacerbation  Hypoxia, likely atelectasis related  -Encourage spirometry, OOB  -PT/OT eval    DM2 with peripheral neuropathy  -Hold home oral agents  -Continue SSI, Accu-Cheks    Hypertension  -Septic shock resolved  -Home metoprolol resumed  -Resume home losartan when able        VTE Prophylaxis:  Pharmacologic & mechanical VTE prophylaxis orders are present.        CODE STATUS:    Code Status (Patient has no pulse and is not breathing): CPR (Attempt to Resuscitate)  Medical Interventions (Patient has pulse or is breathing): Full Support      Disposition Planning:     Barriers to Discharge: PT/OT, outpatient IV antibiotics set up  Anticipated Date of Discharge: 7/19  Place of Discharge: Home    Electronically signed by  Mario Chew DO, 07/18/25, 11:00 EDT.  [facility] Hospitalist Team      Part of this note may be an electronic transcription/translation of spoken language to printed text using the Dragon Dictation System.

## 2025-07-18 NOTE — CASE MANAGEMENT/SOCIAL WORK
Continued Stay Note   [facility]     Patient Name: Eugenia Madden  MRN: [id number]  Today's Date: 7/18/2025    Admit Date: 7/15/2025    Plan: DC Plan: Home with [facility], accepted. [facility] for Home IV abx, accepted. [facility] for RW, accepted.   Discharge Plan       Row Name 07/18/25 1531       Plan    Plan DC Plan: Home with [facility], accepted. [facility] for Home IV abx, accepted. [facility] for RW, accepted.    Patient/Family in Agreement with Plan yes    Provided Post Acute Provider List? Yes    Post Acute Provider List Home Health;DME Supplier    N/A Provider List Comment Infusion    Provided Post Acute Provider Quality & Resource List? Yes    Post Acute Provider Quality and Resource List Home Health    Delivered To Patient    Method of Delivery In person    Plan Comments CM spoke with Hospitalist and nursing for morning rounds and reviewed chart for clinical updates. CM confirmed that IV abx will be needed for two weeks post hospital. Plan for DC home possibly tomorrow. Therapy recommending home health and rolling walker. CM spoke with patient at bedside to discuss options for services. For HH patient selected 1) [facility], and 2) [facility]. For DME needs patient had no preference and agreeable to [facility]. Patient selected [facility] for Infusion needs. CM placed referrals in all baskets. [facility], [facility], and [facility] all confirmed acceptance. HH and RW orders placed. Awaiting IV abx orders to be placed and then [facility] will have education provided to the patient at bedside. Dr. Chew to order midline placement.CM will continue to follow for any additional needs. DC Barriers: Cardiac monitoring, O2@2L nc, IV abx, pending midline placement, home abx education pending, and monitor labs.                 Expected Discharge Date and Time       Expected Discharge Date Expected Discharge Time    Jul 19, 2025               Robyn Carrasco RN    Office Phone: [phone]  Office Cell:     [phone]

## 2025-07-18 NOTE — PLAN OF CARE
Goal Outcome Evaluation:  Plan of Care Reviewed With: patient           Outcome Evaluation: 69-year-old female with a past medical history significant for COPD, CAD, diabetes, heart murmur, hypertension, and kidney stones presenting to the ED on 7/15/25 for evaluation of hypotension. Urology following pt for Septic Shock, secondary to emphysematous pyelonephritis. Elevated D Dimer, CT PE Protocol negative for PE. At baseline, pt lives with spouse in a H with ramped entry. She is normally IND with ADL care without use of AD< and she ambulated independently as well. She is an active . Pt comes to bed with SBA and excess time. Lower body dressing completed with setup only. Pt able to stand and ambulate with RW, requiring CGA and excess time, moving slowly with all mobility. Pt is currently below baseline, but anticipate her to progress well while IP. Will follow, recommending home with assist and HHOT.    Anticipated Discharge Disposition (OT): home with assist, home with home health

## 2025-07-18 NOTE — THERAPY EVALUATION
Patient Name: Eugenia Madden  : 1955    MRN: [id number]                              Today's Date: 2025       Admit Date: 7/15/2025    Visit Dx:     ICD-10-CM ICD-9-CM   1. Pyelonephritis  N12 590.80   2. Fever, unspecified fever cause  R50.9 780.60   3. Thrombocytopenia  D69.6 287.5   4. Dehydration  E86.0 276.51   5. Sepsis, due to unspecified organism, unspecified whether acute organ dysfunction present  A41.9 038.9     995.91   6. Elevated troponin  R79.89 790.6     Patient Active Problem List   Diagnosis    B12 deficiency    Cardiac murmur    Chronic coronary artery disease    Type 2 diabetes mellitus with hyperglycemia, without long-term current use of insulin    Family history of cerebrovascular accident (CVA)    Family history of malignant neoplasm of breast    Fatty liver    Hearing deficit    Mixed hyperlipidemia    Essential hypertension    Osteoporosis    Polycythemia    Polyp of colon    Pulmonary emphysema    Reduced libido    Restless leg    Vitamin D deficiency    Arthritis    Thoracic aortic aneurysm without rupture    Acute bilateral low back pain without sciatica    GERD without esophagitis    Former smoker    Bronchitis    Shingles    Encounter for annual general medical examination with abnormal findings in adult    Screening for cervical cancer    Cough    Recurrent UTI    COPD with acute exacerbation    Type 2 diabetes mellitus    Elevated troponin    Acute right-sided low back pain    COPD exacerbation    Acute respiratory failure with hypoxia    Septic shock     Past Medical History:   Diagnosis Date    Arthritis     COPD (chronic obstructive pulmonary disease) 2021    Coronary artery disease     Diabetes mellitus 2010    Heart murmur     Hypertension     Kidney stones     Restless leg      Past Surgical History:   Procedure Laterality Date    CERVIX SURGERY      Dysplasia removed from Cervix    COLONOSCOPY      KIDNEY STONE SURGERY  2016    Lithotripsy    OTHER SURGICAL  HISTORY  2007    Pinched nerve arm     TUBAL ABDOMINAL LIGATION Bilateral       General Information       Row Name 07/18/25 1311          Physical Therapy Time and Intention    Document Type evaluation  -BR     Mode of Treatment physical therapy  -BR       Row Name 07/18/25 1311          General Information    Patient Profile Reviewed yes  -BR     Prior Level of Function independent:;all household mobility;community mobility;gait;transfer;driving  -BR     Existing Precautions/Restrictions fall  -BR     Barriers to Rehab none identified  -BR       Row Name 07/18/25 1311          Living Environment    Current Living Arrangements home  -BR     People in Home spouse  -BR       Row Name 07/18/25 1311          Home Main Entrance    Number of Stairs, Main Entrance none  ramp entry  -BR       Row Name 07/18/25 1311          Stairs Within Home, Primary    Number of Stairs, Within Home, Primary none  -BR       Row Name 07/18/25 1311          Cognition    Orientation Status (Cognition) oriented x 4  -BR       Row Name 07/18/25 1311          Safety Issues/Impairments Affecting Functional Mobility    Impairments Affecting Function (Mobility) endurance/activity tolerance;strength;balance  -BR               User Key  (r) = Recorded By, (t) = Taken By, (c) = Cosigned By      Initials Name Provider Type    BR Gloria Brenner, PT Physical Therapist                   Mobility       Row Name 07/18/25 1312          Bed Mobility    Bed Mobility supine-sit  -BR     Supine-Sit [place] (Bed Mobility) contact guard  -BR     Assistive Device (Bed Mobility) head of bed elevated  -BR     Comment, (Bed Mobility) Pt required additional time.  -BR       Row Name 07/18/25 1312          Sit-Stand Transfer    Sit-Stand [place] (Transfers) contact guard;1 person assist  -BR     Assistive Device (Sit-Stand Transfers) walker, front-wheeled  -BR       Row Name 07/18/25 1312          Gait/Stairs (Locomotion)    [place] Level (Gait)  contact guard;1 person assist  -BR     Assistive Device (Gait) walker, front-wheeled  -BR     Patient was able to Ambulate yes  -BR     Distance in Feet (Gait) 110  -BR     Deviations/Abnormal Patterns (Gait) kristen decreased  -BR     Bilateral Gait Deviations forward flexed posture  -BR               User Key  (r) = Recorded By, (t) = Taken By, (c) = Cosigned By      Initials Name Provider Type    Gloria Barger PT Physical Therapist                   Obj/Interventions       Row Name 07/18/25 1315          Range of Motion Comprehensive    General Range of Motion bilateral lower extremity ROM WFL  -BR       Row Name 07/18/25 1315          Strength Comprehensive (MMT)    Comment, General Manual Muscle Testing (MMT) Assessment BLE strength grossly 3/5  -BR       Row Name 07/18/25 1315          Balance    Balance Assessment sitting static balance;sitting dynamic balance;standing static balance  -BR     Static Sitting Balance standby assist  -BR     Dynamic Sitting Balance supervision  -BR     Position, Sitting Balance unsupported;sitting edge of bed  -BR     Static Standing Balance contact guard  -BR     Position/Device Used, Standing Balance supported;walker, rolling  -BR       Row Name 07/18/25 1315          Sensory Assessment (Somatosensory)    Sensory Assessment (Somatosensory) sensation intact  -BR               User Key  (r) = Recorded By, (t) = Taken By, (c) = Cosigned By      Initials Name Provider Type    Gloria Barger PT Physical Therapist                   Goals/Plan       Row Name 07/18/25 1326          Bed Mobility Goal 1 (PT)    Activity/Assistive Device (Bed Mobility Goal 1, PT) bed mobility activities, all  -BR     [place] Level/Cues Needed (Bed Mobility Goal 1, PT) independent  -BR     Time Frame (Bed Mobility Goal 1, PT) long term goal (LTG);2 weeks  -BR       Row Name 07/18/25 1326          Transfer Goal 1 (PT)    Activity/Assistive Device (Transfer Goal 1, PT) transfers, all   -BR     [place] Level/Cues Needed (Transfer Goal 1, PT) independent  -BR     Time Frame (Transfer Goal 1, PT) long term goal (LTG);2 weeks  -BR       Row Name 07/18/25 1326          Gait Training Goal 1 (PT)    Activity/Assistive Device (Gait Training Goal 1, PT) gait (walking locomotion);assistive device use  -BR     [place] Level (Gait Training Goal 1, PT) modified independence  -BR     Distance (Gait Training Goal 1, PT) 200  -BR     Time Frame (Gait Training Goal 1, PT) long term goal (LTG);2 weeks  -BR       Row Name 07/18/25 1326          Therapy Assessment/Plan (PT)    Planned Therapy Interventions (PT) balance training;bed mobility training;gait training;patient/family education;stair training;strengthening;ROM (range of motion);transfer training  -BR               User Key  (r) = Recorded By, (t) = Taken By, (c) = Cosigned By      Initials Name Provider Type    BR Gloria Brenner, FRANCOISE Physical Therapist                   Clinical Impression       Row Name 07/18/25 1316          Pain    Pretreatment Pain Rating 7/10  -BR     Posttreatment Pain Rating 7/10  -BR     Pain Location abdomen  -BR     Pain Side/Orientation right;lower  -BR       Row Name 07/18/25 1325 07/18/25 1316       Plan of Care Review    Plan of Care Reviewed With -- patient  -BR    Outcome Evaluation Patient is a 69-year-old female with a past medical history significant for COPD, CAD, diabetes, heart murmur, hypertension, and kidney stones presenting to the ED on 7/15/25 for evaluation of hypotension. Urology following pt for Septic Shock, secondary to emphysematous pyelonephritis. Elevated D Dimer, CT PE Protocol negative for PE. CT abdomen/pelvis : emphysematous pyelonephritis of the right kidney. CXR no infiltrates or effusions. Pt A and O x 4, c/o weakness. Pt resides with spouse in [facility] with ramp entry, she drives and uses no AD for independence with community mobility. Pt c/o R lower abdominal pain. Pt transfers to [facility]  bed with SBA of 1. Transfers with CGA of 1 and she ambulated 110 feet with rolling walker with CGA of 1 with slow kristen. Pt relies heavily on rolling walker . VSS. PT will follow pt for generalized weakness and unsteadiness up on feet. PT recommendation is Home with Assist and Home Health PT. Pt will need a rolling walker for home- CM was notified.  -BR --      Row Name 07/18/25 1316          Therapy Assessment/Plan (PT)    Rehab Potential (PT) good  -BR     Criteria for Skilled Interventions Met (PT) yes;meets criteria;skilled treatment is necessary  -BR     Therapy Frequency (PT) 3 times/wk  -BR     Predicted Duration of Therapy Intervention (PT) until D/C  -BR       Row Name 07/18/25 1316          Vital Signs    Pre Systolic BP Rehab 139  -BR     Pre Treatment Diastolic BP 76  -BR     Intra Systolic BP Rehab 128  -BR     Intra Treatment Diastolic BP 75  -BR     Pretreatment Heart Rate (beats/min) 96  -BR     Posttreatment Heart Rate (beats/min) 103  -BR     Pre SpO2 (%) 94  -BR     O2 Delivery Pre Treatment nasal cannula  2L  -BR     O2 Delivery Intra Treatment room air  -BR     Post SpO2 (%) 92  -BR     O2 Delivery Post Treatment room air  -BR     Pre Patient Position Supine  -BR     Intra Patient Position Standing  -BR     Post Patient Position Sitting  -BR       Row Name 07/18/25 1316          Positioning and Restraints    Pre-Treatment Position in bed  -BR     Post Treatment Position chair  -BR     In Chair notified nsg;reclined;call light within reach;encouraged to call for assist;exit alarm on  -BR               User Key  (r) = Recorded By, (t) = Taken By, (c) = Cosigned By      Initials Name Provider Type    BR Gloria Brenner, PT Physical Therapist                   Outcome Measures       Row Name 07/18/25 1326          How much help from another person do you currently need...    Turning from your back to your side while in flat bed without using bedrails? 4  -BR     Moving from lying on back to  sitting on the side of a flat bed without bedrails? 4  -BR     Moving to and from a bed to a chair (including a wheelchair)? 3  -BR     Standing up from a chair using your arms (e.g., wheelchair, bedside chair)? 3  -BR     Climbing 3-5 steps with a railing? 3  -BR     To walk in hospital room? 3  -BR     AM-PAC 6 Clicks Score (PT) 20  -BR     Highest Level of Mobility Goal Walk 10 Steps or More-6  -BR       Row Name 07/18/25 1326          Functional Assessment    Outcome Measure Options AM-PAC 6 Clicks Basic Mobility (PT)  -BR               User Key  (r) = Recorded By, (t) = Taken By, (c) = Cosigned By      Initials Name Provider Type    Gloria Barger PT Physical Therapist                                 Physical Therapy Education       Title: PT OT SLP Therapies (Done)       Topic: Physical Therapy (Done)       Point: Mobility training (Done)       Learning Progress Summary            Patient Acceptance, E,D, VU,DU by BR at 7/18/2025 1327                      Point: Body mechanics (Done)       Learning Progress Summary            Patient Acceptance, E,D, VU,DU by BR at 7/18/2025 1327                      Point: Precautions (Done)       Learning Progress Summary            Patient Acceptance, E,D, VU,DU by BR at 7/18/2025 1327                                      User Key       Initials Effective Dates Name Provider Type Discipline     02/01/22 -  Gloria Brenner PT Physical Therapist PT                  PT Recommendation and Plan  Planned Therapy Interventions (PT): balance training, bed mobility training, gait training, patient/family education, stair training, strengthening, ROM (range of motion), transfer training  Outcome Evaluation: Patient is a 69-year-old female with a past medical history significant for COPD, CAD, diabetes, heart murmur, hypertension, and kidney stones presenting to the ED on 7/15/25 for evaluation of hypotension. Urology following pt for Septic Shock, secondary to  emphysematous pyelonephritis. Elevated D Dimer, CT PE Protocol negative for PE. CT abdomen/pelvis : emphysematous pyelonephritis of the right kidney. CXR no infiltrates or effusions. Pt A and O x 4, c/o weakness. Pt resides with spouse in [facility] with ramp entry, she drives and uses no AD for independence with community mobility. Pt c/o R lower abdominal pain. Pt transfers to edge of bed with SBA of 1. Transfers with CGA of 1 and she ambulated 110 feet with rolling walker with CGA of 1 with slow kristen. Pt relies heavily on rolling walker . VSS. PT will follow pt for generalized weakness and unsteadiness up on feet. PT recommendation is Home with Assist and Home Health PT. Pt will need a rolling walker for home- CM was notified.     Time Calculation:         PT Charges       Row Name 07/18/25 1327             Time Calculation    Start Time 1113  -BR      Stop Time 1136  -BR      Time Calculation (min) 23 min  -BR      PT Received On 07/18/25  -BR      PT - Next Appointment 07/20/25  -BR      PT Goal Re-Cert Due Date 08/01/25  -BR         Time Calculation- PT    Total Timed Code Minutes- PT 0 minute(s)  -BR                User Key  (r) = Recorded By, (t) = Taken By, (c) = Cosigned By      Initials Name Provider Type    Gloria Barger PT Physical Therapist                  Therapy Charges for Today       Code Description Service Date Service Provider Modifiers Qty    47442204804 HC PT EVAL MOD COMPLEXITY 4 7/18/2025 Gloria Brenner, PT GP 1            PT G-Codes  Outcome Measure Options: AM-PAC 6 Clicks Basic Mobility (PT)  AM-PAC 6 Clicks Score (PT): 20  PT Discharge Summary  Anticipated Discharge Disposition (PT): home with assist, home with home health    Gloria Brenner, FRANCOISE  7/18/2025

## 2025-07-18 NOTE — PROGRESS NOTES
[facility] DAILY PROGRESS NOTE    Patient Identification  Name: Eugenia Madden  Age: 69 y.o.  Sex: female  :  1955  MRN: [id number]    Date: 2025             Subjective:  Interval History: improved     Objective:    Scheduled Meds:atorvastatin, 40 mg, Oral, Daily  budesonide-formoterol, 2 puff, Inhalation, BID - RT  cefTRIAXone, 2,000 mg, Intravenous, Q24H  cetirizine, 10 mg, Oral, Nightly  enoxaparin sodium, 40 mg, Subcutaneous, Q24H  famotidine, 20 mg, Oral, Nightly  gabapentin, 300 mg, Oral, Nightly  hydrOXYzine, 50 mg, Oral, Nightly  insulin lispro, 4-24 Units, Subcutaneous, 4x Daily AC & at Bedtime  [Held by provider] losartan, 25 mg, Oral, Daily  metoprolol succinate XL, 25 mg, Oral, Daily  metroNIDAZOLE, 500 mg, Intravenous, Q8H  mupirocin, 1 Application, Each Nare, BID  pramipexole, 0.5 mg, Oral, Nightly  senna-docusate sodium, 2 tablet, Oral, BID  sodium chloride, 10 mL, Intravenous, Q12H      Continuous Infusions:   PRN Meds:  acetaminophen **OR** acetaminophen    aluminum-magnesium hydroxide-simethicone    senna-docusate sodium **AND** polyethylene glycol **AND** bisacodyl **AND** bisacodyl    Calcium Replacement - Follow Nurse / BPA Driven Protocol    dextrose    dextrose    glucagon (human recombinant)    HYDROmorphone    ipratropium-albuterol    Magnesium Standard Dose Replacement - Follow Nurse / BPA Driven Protocol    nitroglycerin    ondansetron ODT **OR** ondansetron    Phosphorus Replacement - Follow Nurse / BPA Driven Protocol    Potassium Replacement - Follow Nurse / BPA Driven Protocol    sodium chloride    sodium chloride    Vital signs in last 24 hours:  Temp:  [98 °F (36.7 °C)-99 °F (37.2 °C)] 98 °F (36.7 °C)  Heart Rate:  [] 99  Resp:  [15-27] 21  BP: (103-165)/(57-88) 124/73    Intake/Output:    Intake/Output Summary (Last 24 hours) at 2025 0758  Last data filed at 2025 0600  Gross per 24 hour   Intake 3471 ml   Output 1350 ml   Net 2121 ml       Exam:  BP  "124/73 (BP Location: Left arm, Patient Position: Lying)   Pulse 99   Temp 98 °F (36.7 °C) (Oral)   Resp 21   Ht 160 cm (63\")   Wt 61.5 kg (135 lb 9.3 oz)   LMP  (LMP Unknown)   SpO2 93%   BMI 24.02 kg/m²     General Appearance:    Alert, cooperative, NAD   Lungs:     Respirations unlabored, no audible wheezing    Heart:    No cyanosis   Abdomen:     Soft, ND    :    No suprapubic distention            Data Review:  All labs (24hrs):   Recent Results (from the past 24 hours)   POC Glucose Finger 4x Daily Before Meals & at Bedtime    Collection Time: 07/17/25 11:21 AM    Specimen: Finger; Blood   Result Value Ref Range    Glucose 225 (H) 70 - 105 mg/dL   POC Glucose Once    Collection Time: 07/17/25  5:11 PM    Specimen: Blood   Result Value Ref Range    Glucose 161 (H) 70 - 105 mg/dL   POC Glucose Once    Collection Time: 07/17/25  9:03 PM    Specimen: Blood   Result Value Ref Range    Glucose 126 (H) 70 - 105 mg/dL   Magnesium    Collection Time: 07/18/25  4:15 AM    Specimen: Blood   Result Value Ref Range    Magnesium 2.6 (H) 1.6 - 2.4 mg/dL   Phosphorus    Collection Time: 07/18/25  4:15 AM    Specimen: Blood   Result Value Ref Range    Phosphorus 2.1 (L) 2.5 - 4.5 mg/dL   Comprehensive Metabolic Panel    Collection Time: 07/18/25  4:15 AM    Specimen: Blood   Result Value Ref Range    Glucose 126 (H) 65 - 99 mg/dL    BUN 18.6 8.0 - 23.0 mg/dL    Creatinine 0.87 0.57 - 1.00 mg/dL    Sodium 134 (L) 136 - 145 mmol/L    Potassium 3.8 3.5 - 5.2 mmol/L    Chloride 105 98 - 107 mmol/L    CO2 16.4 (L) 22.0 - 29.0 mmol/L    Calcium 7.7 (L) 8.6 - 10.5 mg/dL    Total Protein 5.2 (L) 6.0 - 8.5 g/dL    Albumin 2.8 (L) 3.5 - 5.2 g/dL    ALT (SGPT) 17 1 - 33 U/L    AST (SGOT) 19 1 - 32 U/L    Alkaline Phosphatase 71 39 - 117 U/L    Total Bilirubin 0.4 0.0 - 1.2 mg/dL    Globulin 2.4 gm/dL    A/G Ratio 1.2 g/dL    BUN/Creatinine Ratio 21.4 7.0 - 25.0    Anion Gap 12.6 5.0 - 15.0 mmol/L    eGFR 72.2 >60.0 mL/min/1.73 "   CBC Auto Differential    Collection Time: 07/18/25  4:15 AM    Specimen: Blood   Result Value Ref Range    WBC 11.22 (H) 3.40 - 10.80 10*3/mm3    RBC 4.15 3.77 - 5.28 10*6/mm3    Hemoglobin 11.8 (L) 12.0 - 15.9 g/dL    Hematocrit 36.8 34.0 - 46.6 %    MCV 88.7 79.0 - 97.0 fL    MCH 28.4 26.6 - 33.0 pg    MCHC 32.1 31.5 - 35.7 g/dL    RDW 13.7 12.3 - 15.4 %    RDW-SD 44.5 37.0 - 54.0 fl    MPV 11.7 6.0 - 12.0 fL    Platelets 88 (L) 140 - 450 10*3/mm3   Scan Slide    Collection Time: 07/18/25  4:15 AM    Specimen: Blood   Result Value Ref Range    Scan Slide     Manual Differential    Collection Time: 07/18/25  4:15 AM    Specimen: Blood   Result Value Ref Range    Neutrophil % 78.0 (H) 42.7 - 76.0 %    Lymphocyte % 3.0 (L) 19.6 - 45.3 %    Monocyte % 3.0 (L) 5.0 - 12.0 %    Basophil % 1.0 0.0 - 1.5 %    Bands %  15.0 (H) 0.0 - 5.0 %    Neutrophils Absolute 10.43 (H) 1.70 - 7.00 10*3/mm3    Lymphocytes Absolute 0.34 (L) 0.70 - 3.10 10*3/mm3    Monocytes Absolute 0.34 0.10 - 0.90 10*3/mm3    Basophils Absolute 0.11 0.00 - 0.20 10*3/mm3    nRBC 1.0 (H) 0.0 - 0.2 /100 WBC    RBC Morphology Normal Normal    WBC Morphology Normal Normal    Platelet Estimate Decreased Normal   POC Glucose Finger 4x Daily Before Meals & at Bedtime    Collection Time: 07/18/25  7:34 AM    Specimen: Finger; Blood   Result Value Ref Range    Glucose 120 (H) 70 - 105 mg/dL      Imaging Results (Last 24 Hours)       ** No results found for the last 24 hours. **             Assessment:    Septic shock      Right emphysematous pyelitis    Plan:    CT images reviewed, no hydro  Improved with abx  Cultures proteus  Will need at least 2 weeks of antibiotics  Follow up in office 2-3 weeks, will sign off    Raheel Durand MD  [facility]  [address], [address]  [place] [address]  Office: [phone]  07/18/25  07:58 EDT

## 2025-07-19 LAB
ALBUMIN SERPL-MCNC: 2.7 G/DL (ref 3.5–5.2)
ALBUMIN/GLOB SERPL: 1.1 G/DL
ALP SERPL-CCNC: 224 U/L (ref 39–117)
ALT SERPL W P-5'-P-CCNC: 17 U/L (ref 1–33)
ANION GAP SERPL CALCULATED.3IONS-SCNC: 12.7 MMOL/L (ref 5–15)
AST SERPL-CCNC: 18 U/L (ref 1–32)
BASOPHILS # BLD AUTO: 0.05 10*3/MM3 (ref 0–0.2)
BASOPHILS NFR BLD AUTO: 0.5 % (ref 0–1.5)
BILIRUB SERPL-MCNC: 0.4 MG/DL (ref 0–1.2)
BUN SERPL-MCNC: 19.3 MG/DL (ref 8–23)
BUN/CREAT SERPL: 20.8 (ref 7–25)
CALCIUM SPEC-SCNC: 8.1 MG/DL (ref 8.6–10.5)
CHLORIDE SERPL-SCNC: 107 MMOL/L (ref 98–107)
CO2 SERPL-SCNC: 18.3 MMOL/L (ref 22–29)
CREAT SERPL-MCNC: 0.93 MG/DL (ref 0.57–1)
DEPRECATED RDW RBC AUTO: 45 FL (ref 37–54)
EGFRCR SERPLBLD CKD-EPI 2021: 66.7 ML/MIN/1.73
EOSINOPHIL # BLD AUTO: 0.2 10*3/MM3 (ref 0–0.4)
EOSINOPHIL NFR BLD AUTO: 1.9 % (ref 0.3–6.2)
ERYTHROCYTE [DISTWIDTH] IN BLOOD BY AUTOMATED COUNT: 13.7 % (ref 12.3–15.4)
GLOBULIN UR ELPH-MCNC: 2.5 GM/DL
GLUCOSE BLDC GLUCOMTR-MCNC: 132 MG/DL (ref 70–105)
GLUCOSE BLDC GLUCOMTR-MCNC: 133 MG/DL (ref 70–105)
GLUCOSE BLDC GLUCOMTR-MCNC: 153 MG/DL (ref 70–105)
GLUCOSE BLDC GLUCOMTR-MCNC: 179 MG/DL (ref 70–105)
GLUCOSE SERPL-MCNC: 130 MG/DL (ref 65–99)
HCT VFR BLD AUTO: 36.9 % (ref 34–46.6)
HGB BLD-MCNC: 11.7 G/DL (ref 12–15.9)
IMM GRANULOCYTES # BLD AUTO: 0.04 10*3/MM3 (ref 0–0.05)
IMM GRANULOCYTES NFR BLD AUTO: 0.4 % (ref 0–0.5)
LYMPHOCYTES # BLD AUTO: 0.82 10*3/MM3 (ref 0.7–3.1)
LYMPHOCYTES NFR BLD AUTO: 7.7 % (ref 19.6–45.3)
MAGNESIUM SERPL-MCNC: 2.4 MG/DL (ref 1.6–2.4)
MCH RBC QN AUTO: 28.2 PG (ref 26.6–33)
MCHC RBC AUTO-ENTMCNC: 31.7 G/DL (ref 31.5–35.7)
MCV RBC AUTO: 88.9 FL (ref 79–97)
MONOCYTES # BLD AUTO: 0.76 10*3/MM3 (ref 0.1–0.9)
MONOCYTES NFR BLD AUTO: 7.2 % (ref 5–12)
NEUTROPHILS NFR BLD AUTO: 8.74 10*3/MM3 (ref 1.7–7)
NEUTROPHILS NFR BLD AUTO: 82.3 % (ref 42.7–76)
NRBC BLD AUTO-RTO: 0 /100 WBC (ref 0–0.2)
PHOSPHATE SERPL-MCNC: 2.7 MG/DL (ref 2.5–4.5)
PLATELET # BLD AUTO: 100 10*3/MM3 (ref 140–450)
PMV BLD AUTO: 11.5 FL (ref 6–12)
POTASSIUM SERPL-SCNC: 4.1 MMOL/L (ref 3.5–5.2)
PROT SERPL-MCNC: 5.2 G/DL (ref 6–8.5)
RBC # BLD AUTO: 4.15 10*6/MM3 (ref 3.77–5.28)
SODIUM SERPL-SCNC: 138 MMOL/L (ref 136–145)
WBC NRBC COR # BLD AUTO: 10.61 10*3/MM3 (ref 3.4–10.8)

## 2025-07-19 PROCEDURE — 25010000002 ENOXAPARIN PER 10 MG: Performed by: INTERNAL MEDICINE

## 2025-07-19 PROCEDURE — 94799 UNLISTED PULMONARY SVC/PX: CPT

## 2025-07-19 PROCEDURE — 25010000002 CEFTRIAXONE PER 250 MG: Performed by: INTERNAL MEDICINE

## 2025-07-19 PROCEDURE — 25010000002 HYDROMORPHONE 1 MG/ML SOLUTION: Performed by: INTERNAL MEDICINE

## 2025-07-19 PROCEDURE — 85025 COMPLETE CBC W/AUTO DIFF WBC: CPT

## 2025-07-19 PROCEDURE — 80053 COMPREHEN METABOLIC PANEL: CPT

## 2025-07-19 PROCEDURE — 94761 N-INVAS EAR/PLS OXIMETRY MLT: CPT

## 2025-07-19 PROCEDURE — 94664 DEMO&/EVAL PT USE INHALER: CPT

## 2025-07-19 PROCEDURE — 36410 VNPNXR 3YR/> PHY/QHP DX/THER: CPT

## 2025-07-19 PROCEDURE — 82948 REAGENT STRIP/BLOOD GLUCOSE: CPT | Performed by: INTERNAL MEDICINE

## 2025-07-19 PROCEDURE — 84100 ASSAY OF PHOSPHORUS: CPT | Performed by: INTERNAL MEDICINE

## 2025-07-19 PROCEDURE — 63710000001 INSULIN LISPRO (HUMAN) PER 5 UNITS: Performed by: INTERNAL MEDICINE

## 2025-07-19 PROCEDURE — 82948 REAGENT STRIP/BLOOD GLUCOSE: CPT

## 2025-07-19 PROCEDURE — C1751 CATH, INF, PER/CENT/MIDLINE: HCPCS

## 2025-07-19 PROCEDURE — 83735 ASSAY OF MAGNESIUM: CPT

## 2025-07-19 RX ORDER — SODIUM CHLORIDE 0.9 % (FLUSH) 0.9 %
10 SYRINGE (ML) INJECTION EVERY 12 HOURS SCHEDULED
Status: DISCONTINUED | OUTPATIENT
Start: 2025-07-19 | End: 2025-07-24 | Stop reason: HOSPADM

## 2025-07-19 RX ORDER — SODIUM CHLORIDE 0.9 % (FLUSH) 0.9 %
10 SYRINGE (ML) INJECTION AS NEEDED
Status: DISCONTINUED | OUTPATIENT
Start: 2025-07-19 | End: 2025-07-24 | Stop reason: HOSPADM

## 2025-07-19 RX ORDER — SODIUM CHLORIDE 9 MG/ML
40 INJECTION, SOLUTION INTRAVENOUS AS NEEDED
Status: DISCONTINUED | OUTPATIENT
Start: 2025-07-19 | End: 2025-07-24 | Stop reason: HOSPADM

## 2025-07-19 RX ADMIN — PRAMIPEXOLE DIHYDROCHLORIDE 0.5 MG: 0.5 TABLET ORAL at 20:03

## 2025-07-19 RX ADMIN — Medication 10 ML: at 09:49

## 2025-07-19 RX ADMIN — HYDROXYZINE HYDROCHLORIDE 50 MG: 25 TABLET, FILM COATED ORAL at 20:02

## 2025-07-19 RX ADMIN — Medication 10 ML: at 20:04

## 2025-07-19 RX ADMIN — ENOXAPARIN SODIUM 40 MG: 100 INJECTION SUBCUTANEOUS at 16:53

## 2025-07-19 RX ADMIN — HYDROMORPHONE HYDROCHLORIDE 1 MG: 1 INJECTION, SOLUTION INTRAMUSCULAR; INTRAVENOUS; SUBCUTANEOUS at 10:28

## 2025-07-19 RX ADMIN — CEFTRIAXONE 2000 MG: 2 INJECTION, POWDER, FOR SOLUTION INTRAMUSCULAR; INTRAVENOUS at 10:08

## 2025-07-19 RX ADMIN — GABAPENTIN 300 MG: 300 CAPSULE ORAL at 20:02

## 2025-07-19 RX ADMIN — BUDESONIDE AND FORMOTEROL FUMARATE DIHYDRATE 2 PUFF: 160; 4.5 AEROSOL RESPIRATORY (INHALATION) at 07:09

## 2025-07-19 RX ADMIN — INSULIN LISPRO 4 UNITS: 100 INJECTION, SOLUTION INTRAVENOUS; SUBCUTANEOUS at 20:02

## 2025-07-19 RX ADMIN — SENNOSIDES AND DOCUSATE SODIUM 2 TABLET: 50; 8.6 TABLET ORAL at 09:48

## 2025-07-19 RX ADMIN — MUPIROCIN 1 APPLICATION: 20 OINTMENT TOPICAL at 09:48

## 2025-07-19 RX ADMIN — ATORVASTATIN CALCIUM 40 MG: 40 TABLET, FILM COATED ORAL at 09:47

## 2025-07-19 RX ADMIN — Medication 10 ML: at 20:03

## 2025-07-19 RX ADMIN — METOPROLOL SUCCINATE 25 MG: 25 TABLET, EXTENDED RELEASE ORAL at 09:47

## 2025-07-19 RX ADMIN — FAMOTIDINE 20 MG: 20 TABLET, FILM COATED ORAL at 20:03

## 2025-07-19 RX ADMIN — HYDROMORPHONE HYDROCHLORIDE 1 MG: 1 INJECTION, SOLUTION INTRAMUSCULAR; INTRAVENOUS; SUBCUTANEOUS at 04:21

## 2025-07-19 RX ADMIN — BUDESONIDE AND FORMOTEROL FUMARATE DIHYDRATE 2 PUFF: 160; 4.5 AEROSOL RESPIRATORY (INHALATION) at 18:51

## 2025-07-19 RX ADMIN — CETIRIZINE HYDROCHLORIDE 10 MG: 10 TABLET, FILM COATED ORAL at 20:02

## 2025-07-19 RX ADMIN — HYDROMORPHONE HYDROCHLORIDE 1 MG: 1 INJECTION, SOLUTION INTRAMUSCULAR; INTRAVENOUS; SUBCUTANEOUS at 17:04

## 2025-07-19 RX ADMIN — MUPIROCIN 1 APPLICATION: 20 OINTMENT TOPICAL at 20:03

## 2025-07-19 NOTE — CONSULTS
"Patient Name: Eugenia Madden  YOB: 1955  MRN: [id number]  Admission date: 7/15/2025  Reason for Encounter: MST 2-3 or Nursing Admission Screen    [facility] Clinical Nutrition Assessment     Subjective    Subjective Information     7/19: Admitted for hypotension and diagnosed with septic stock.  Urology consulted for emphysematous pyelitis.  Diabetes educator following - A1c 9.83% this admission.  Working with OT/PT.  Patient asleep at time of RD attempted visit and RD left patient to rest.         Objective   H&P and Current Problems      H&P  Past Medical History:   Diagnosis Date    Arthritis     COPD (chronic obstructive pulmonary disease) 07/2021    Coronary artery disease     Diabetes mellitus 2010    Heart murmur     Hypertension     Kidney stones     Restless leg       Past Surgical History:   Procedure Laterality Date    CERVIX SURGERY  1983    Dysplasia removed from Cervix    COLONOSCOPY      KIDNEY STONE SURGERY  2016    Lithotripsy    OTHER SURGICAL HISTORY  2007    Pinched nerve arm     TUBAL ABDOMINAL LIGATION Bilateral       Current Problems   Admission Diagnosis:  Dehydration [E86.0]  Pyelonephritis [N12]  Thrombocytopenia [D69.6]  Elevated troponin [R79.89]  Septic shock [A41.9, R65.21]  Fever, unspecified fever cause [R50.9]  Sepsis, due to unspecified organism, unspecified whether acute organ dysfunction present [A41.9]    Problem List:    Septic shock      Other Applicable Nutrition Information:        Anthropometrics       Height: 160 cm (63\")  Weight: 64.5 kg (142 lb 3.2 oz) (07/19/25 0400)  Weight Method: Standing scale  BMI (Calculated): 25.2       Trending Weight Changes 07/19/25: 142#, No significant changes       Weight History  Wt Readings from Last 20 Encounters:   07/19/25 0400 64.5 kg (142 lb 3.2 oz)   07/18/25 0400 61.5 kg (135 lb 9.3 oz)   07/17/25 0600 64 kg (141 lb 1.5 oz)   07/16/25 0442 61.1 kg (134 lb 11.2 oz)   07/16/25 0344 69.2 kg (152 lb 9.6 oz)   04/04/25 " 1139 56.7 kg (125 lb)   02/06/24 1031 56.2 kg (124 lb)   10/02/23 2209 56.1 kg (123 lb 10.9 oz)   09/21/23 0926 57.9 kg (127 lb 9.6 oz)   08/14/23 1124 57.7 kg (127 lb 3.2 oz)   07/31/23 1042 57.6 kg (127 lb)   04/18/23 1305 61.3 kg (135 lb 3.2 oz)   04/11/23 0103 63 kg (138 lb 14.2 oz)   03/20/23 1018 63.7 kg (140 lb 6.4 oz)   01/24/23 1301 63.5 kg (140 lb)   09/20/22 1010 63.1 kg (139 lb 3.2 oz)   06/21/22 0931 61.4 kg (135 lb 6.4 oz)   05/31/22 1250 60.8 kg (134 lb)   05/09/22 1024 60.8 kg (134 lb)   04/11/22 0901 60.8 kg (134 lb)   03/15/22 0906 60.9 kg (134 lb 3.2 oz)   12/16/21 0925 61.9 kg (136 lb 6.4 oz)   10/25/21 1145 61.7 kg (136 lb)   09/30/21 1450 61.7 kg (136 lb)        Labs      Comment: Reviewed, management per attending       Results from last 7 days   Lab Units 07/19/25  0252 07/18/25  1744 07/18/25  0415 07/17/25  0537 07/16/25  1742 07/16/25  0601 07/15/25  2255 07/15/25  2251   SODIUM mmol/L 138  --  134* 135*  --  141  --  138   POTASSIUM mmol/L 4.1  --  3.8 4.3   < > 3.3*  --  3.5   GLUCOSE mg/dL 130*  --  126* 186*  --  188*  --  294*   BUN mg/dL 19.3  --  18.6 17.4  --  15.7  --  15.5   CREATININE mg/dL 0.93  --  0.87 0.97  --  1.22*  --  0.95   CALCIUM mg/dL 8.1*  --  7.7* 7.4*  --  7.7*  --  8.7   PHOSPHORUS mg/dL 2.7 2.2* 2.1* 2.6  --  4.4  --  2.7   MAGNESIUM mg/dL 2.4  --  2.6* 2.6*  --  1.6  --  1.7   ALBUMIN g/dL 2.7*  --  2.8* 2.5*  --  3.2*  --  3.6   LACTATE mmol/L  --   --   --   --   --   --  1.7  --    BILIRUBIN mg/dL 0.4  --  0.4 0.4  --  0.6  --  0.8   ALK PHOS U/L 224*  --  71 50  --  65  --  104   AST (SGOT) U/L 18  --  19 17  --  29  --  37*   ALT (SGPT) U/L 17  --  17 16  --  25  --  28   TRIGLYCERIDES mg/dL  --   --   --   --   --  142  --   --    PROBNP pg/mL  --   --   --   --   --   --   --  1,087.0*    < > = values in this interval not displayed.     Results from last 7 days   Lab Units 07/19/25  0252 07/18/25  0415 07/17/25  0537   PLATELETS 10*3/mm3 100* 88* 65*    HEMOGLOBIN g/dL 11.7* 11.8* 9.9*   HEMATOCRIT % 36.9 36.8 31.3*     Lab Results   Component Value Date    HGBA1C 9.83 (H) 07/16/2025          Medications       Scheduled Medications atorvastatin, 40 mg, Oral, Daily  budesonide-formoterol, 2 puff, Inhalation, BID - RT  cefTRIAXone, 2,000 mg, Intravenous, Q24H  cetirizine, 10 mg, Oral, Nightly  enoxaparin sodium, 40 mg, Subcutaneous, Q24H  famotidine, 20 mg, Oral, Nightly  gabapentin, 300 mg, Oral, Nightly  hydrOXYzine, 50 mg, Oral, Nightly  insulin lispro, 4-24 Units, Subcutaneous, 4x Daily AC & at Bedtime  losartan, 25 mg, Oral, Daily  metoprolol succinate XL, 25 mg, Oral, Daily  mupirocin, 1 Application, Each Nare, BID  pramipexole, 0.5 mg, Oral, Nightly  senna-docusate sodium, 2 tablet, Oral, BID  sodium chloride, 10 mL, Intravenous, Q12H  sodium chloride, 10 mL, Intravenous, Q12H        Infusions      PRN Medications   acetaminophen **OR** acetaminophen    aluminum-magnesium hydroxide-simethicone    senna-docusate sodium **AND** polyethylene glycol **AND** bisacodyl **AND** bisacodyl    Calcium Replacement - Follow Nurse / BPA Driven Protocol    dextrose    dextrose    glucagon (human recombinant)    HYDROmorphone    ipratropium-albuterol    Magnesium Standard Dose Replacement - Follow Nurse / BPA Driven Protocol    nitroglycerin    ondansetron ODT **OR** ondansetron    Phosphorus Replacement - Follow Nurse / BPA Driven Protocol    Potassium Replacement - Follow Nurse / BPA Driven Protocol    sodium chloride    sodium chloride    sodium chloride    sodium chloride     Physical Findings      Chewing/Swallowing  Teeth Status: Mouth/Teeth WDL: .WDL except, teeth Teeth Symptoms: tooth/teeth missing  Chewing/Swallowing Issues: No issues identified at this time   Edema                            Bowel Function  Stool Output  Stool (mL): 0 mL (07/19/25 0600)  Stool Unmeasured Occurrence: 0 (07/19/25 0600)  Stool Consistency: loose (07/16/25 1300)  Perineal Care:  absorbent brief/pad changed, perineum cleansed (07/18/25 0000)  Last Bowel Movement: 07/16/25   I/Os  Intake & Output (last 3 days)         07/16 0701 07/17 0700 07/17 0701  07/18 0700 07/18 0701  07/19 0700 07/19 0701  07/20 0700    P.O. 444 1310 615     I.V. (mL/kg) 377 (5.9) 2161 (35.1) 421 (6.5)     Total Intake(mL/kg) 821 (12.8) 3471 (56.4) 1036 (16.1)     Urine (mL/kg/hr) 1350 (0.9) 1350 (0.9) 900 (0.6) 600 (1.5)    Emesis/NG output  0 0     Stool 0 0 0     Total Output 1350 1350 900 600    Net -529 +2121 +136 -600            Urine Unmeasured Occurrence 1 x  0 x     Stool Unmeasured Occurrence 1 x 0 x 0 x     Emesis Unmeasured Occurrence  0 x 0 x            Lines, Drains, Airways, & Wounds       Active LDAs       Name Placement date Placement time Site Days Last dressing change    Midline Catheter - Single Lumen 07/19/25 Right Basilic 07/19/25  1142  -- less than 1 07/19/25 1205 (1.18 hrs)    Peripheral IV 07/15/25 2327 20 G Anterior;Right Forearm 07/15/25  2327  Forearm  3                       Nutrition Focused Physical Exam     Trending NFPE 07/19/25: MYLES, patient sleeping and RD left patient to rest      Malnutrition Severity Assessment            (1)   Current Nutrition Orders & Evaluation of Intake      Oral Nutrition     Food Allergies  and Intolerances NKFA   Current PO Diet Diet: Diabetic; Consistent Carbohydrate; Fluid Consistency: Thin (IDDSI 0)   Oral Nutrition Supplement None     Trending % PO Intake 07/19/25: %   (2)  Assessment & Plan   Nutrition Diagnosis and Goals       Nutrition Diagnosis 1 Inadequate Oral Intake related to intake less than needs as evidenced by PO intakes less than 75% since admission       Nutrition Diagnosis 2         Goal(s) Increase PO Intake , Tolerates PO Diet , and Maintain Weight     Nutrition Intervention and Prescription       Intervention  Continue with current interventions      Diet Prescription Consistent CHO    Supplement Prescription None    Education  Provided  N/A   (3)  Monitoring/Evaluation       Monitor/Evaluation Per Protocol, PO Intake, Pertinent Labs, and Weight     RD Follow-Up Encounter 3-5 days     Electronically signed by:  Toña Myers RD  07/19/25 13:15 EDT

## 2025-07-19 NOTE — PROGRESS NOTES
[facility] MEDICINE SERVICE  DAILY PROGRESS NOTE    NAME: Eugenia Madden  : 1955  MRN: [id number]      LOS: 3 days     PROVIDER OF SERVICE: Mario Pritchard MD    Chief Complaint: Septic shock    Subjective:   Patient lying in bed.  H&P, consults, labs and imaging reviewed  Interval History:    Patient seen and evaluated at bedside.     Treatment plan discussed with patient. All questions addressed.   Review of Systems:   All 21 ROS were negative except mentioned above.    Objective:     Vital Signs  Temp:  [97.8 °F (36.6 °C)-98.3 °F (36.8 °C)] 97.8 °F (36.6 °C)  Heart Rate:  [] 102  Resp:  [15-34] 15  BP: (103-155)/(56-99) 154/78  Flow (L/min) (Oxygen Therapy):  [2] 2   Body mass index is 25.19 kg/m².    Physical Exam   General: No acute distress, appears stated age  Neuro: Awake and alert, oriented x3, no focal deficits appreciated  Head: Atraumatic, normocephalic  HEENT: EOMI, anicteric, normal sclerae and conjunctivae, moist mucus membranes  Neck: supple, no lymphadenopathy  CV: RRR, soft heart sounds, no murmurs appreciated, no peripheral edema  Pulm: Decreased breath sounds, no increased work of breathing, no adventitious sounds  Abd: Soft, nontender, nondistended  Skin: Warm, dry and intact  Psych: Appropriate mood and affect    Scheduled Meds   atorvastatin, 40 mg, Oral, Daily  budesonide-formoterol, 2 puff, Inhalation, BID - RT  cefTRIAXone, 2,000 mg, Intravenous, Q24H  cetirizine, 10 mg, Oral, Nightly  enoxaparin sodium, 40 mg, Subcutaneous, Q24H  famotidine, 20 mg, Oral, Nightly  gabapentin, 300 mg, Oral, Nightly  hydrOXYzine, 50 mg, Oral, Nightly  insulin lispro, 4-24 Units, Subcutaneous, 4x Daily AC & at Bedtime  [Held by provider] losartan, 25 mg, Oral, Daily  metoprolol succinate XL, 25 mg, Oral, Daily  mupirocin, 1 Application, Each Nare, BID  pramipexole, 0.5 mg, Oral, Nightly  senna-docusate sodium, 2 tablet, Oral, BID  sodium chloride, 10 mL, Intravenous, Q12H       PRN Meds      acetaminophen **OR** acetaminophen    aluminum-magnesium hydroxide-simethicone    senna-docusate sodium **AND** polyethylene glycol **AND** bisacodyl **AND** bisacodyl    Calcium Replacement - Follow Nurse / BPA Driven Protocol    dextrose    dextrose    glucagon (human recombinant)    HYDROmorphone    ipratropium-albuterol    Magnesium Standard Dose Replacement - Follow Nurse / BPA Driven Protocol    nitroglycerin    ondansetron ODT **OR** ondansetron    Phosphorus Replacement - Follow Nurse / BPA Driven Protocol    Potassium Replacement - Follow Nurse / BPA Driven Protocol    sodium chloride    sodium chloride   Infusions         Diagnostic Data    Results from last 7 days   Lab Units 07/19/25  0252   WBC 10*3/mm3 10.61   HEMOGLOBIN g/dL 11.7*   HEMATOCRIT % 36.9   PLATELETS 10*3/mm3 100*   GLUCOSE mg/dL 130*   CREATININE mg/dL 0.93   BUN mg/dL 19.3   SODIUM mmol/L 138   POTASSIUM mmol/L 4.1   AST (SGOT) U/L 18   ALT (SGPT) U/L 17   ALK PHOS U/L 224*   BILIRUBIN mg/dL 0.4   ANION GAP mmol/L 12.7       No radiology results for the last day    Interval results reviewed.    Assessment/Plan:   Bacteremia with Proteus  Emphysematous right-sided pyelonephritis  Sepsis  HAVEN on CKD 3  COPD not in exacerbation  Type 2 diabetes with peripheral neuropathy  Hypertension    Patient on ceftriaxone needs to take it for 2 weeks.  Urology evaluated the patient and no intervention necessary at this time follow-up with urology as an outpatient.  Reason for bacteremia most likely UTI.  Renal function improving and  back to normal  Blood sugar running okay  Consult ID      Treatment plan discussed with RN.     VTE Prophylaxis:  Pharmacologic & mechanical VTE prophylaxis orders are present.         Code status is   Code Status and Medical Interventions: CPR (Attempt to Resuscitate); Full Support   Ordered at: 07/16/25 0959     Code Status (Patient has no pulse and is not breathing):    CPR (Attempt to Resuscitate)     Medical  Interventions (Patient has pulse or is breathing):    Full Support       Plan for disposition:     Barriers to Discharge: IV antibiotics  Anticipated Date of Discharge: 7/20/2025  Place of Discharge: Home      Time: 40 minutes     Signature: Electronically signed by Mario Pritchard MD, 07/19/25, 10:44 EDT.  [facility]

## 2025-07-19 NOTE — CONSULTS
Midline Line Insertion Procedure Note    Procedure: Insertion of #18G/10CM PowerGlide    Indications:  Home IV therapy    Procedure Details:   Sterile technique was used including antiseptics, gloves, hand hygiene, mask, and sheet.    #18G/10CM PowerGlide inserted to the R Basilic vein per hospital protocol by Tanja Medina RN.     Non-pulsatile blood return: yes    Ultrasound Guidance: Yes    Lot #: [id number]  Expiration date: 05/31/2025    Complications:  none    Findings:  Catheter inserted to 10 cm, with 0 cm exposed.   Mid upper arm circumference is 27 cm.  Catheter was flushed with 20 cc NS and sterile dressing applied.   Patient tolerated procedure well.    Recommendations:  Verbal and/or written Care/Maintenance instructions provided to patient.   Primary nurse notified that midline is okay to use at this time.     Tanja Medina RN  07/19/25  12:07 EDT

## 2025-07-20 ENCOUNTER — APPOINTMENT (OUTPATIENT)
Dept: GENERAL RADIOLOGY | Facility: HOSPITAL | Age: 70
DRG: 871 | End: 2025-07-20
Payer: MEDICARE

## 2025-07-20 ENCOUNTER — APPOINTMENT (OUTPATIENT)
Dept: CARDIOLOGY | Facility: HOSPITAL | Age: 70
DRG: 871 | End: 2025-07-20
Payer: MEDICARE

## 2025-07-20 LAB
ALBUMIN SERPL-MCNC: 2.8 G/DL (ref 3.5–5.2)
ALBUMIN/GLOB SERPL: 1.1 G/DL
ALP SERPL-CCNC: 281 U/L (ref 39–117)
ALT SERPL W P-5'-P-CCNC: 19 U/L (ref 1–33)
ANION GAP SERPL CALCULATED.3IONS-SCNC: 12.4 MMOL/L (ref 5–15)
AORTIC DIMENSIONLESS INDEX: 0.77 (DI)
AST SERPL-CCNC: 32 U/L (ref 1–32)
AV MEAN PRESS GRAD SYS DOP V1V2: 6 MMHG
AV VMAX SYS DOP: 164 CM/SEC
BASOPHILS # BLD AUTO: 0.04 10*3/MM3 (ref 0–0.2)
BASOPHILS NFR BLD AUTO: 0.6 % (ref 0–1.5)
BH CV ECHO MEAS - AO MAX PG: 10.8 MMHG
BH CV ECHO MEAS - AO V2 VTI: 30.6 CM
BH CV ECHO MEAS - AVA(I,D): 2.18 CM2
BH CV ECHO MEAS - EDV(CUBED): 39.3 ML
BH CV ECHO MEAS - EDV(MOD-SP4): 101 ML
BH CV ECHO MEAS - EF(MOD-SP4): 51.6 %
BH CV ECHO MEAS - ESV(CUBED): 13.8 ML
BH CV ECHO MEAS - ESV(MOD-SP4): 48.9 ML
BH CV ECHO MEAS - FS: 29.4 %
BH CV ECHO MEAS - IVS/LVPW: 1.08 CM
BH CV ECHO MEAS - IVSD: 1.3 CM
BH CV ECHO MEAS - LA A2CS (ATRIAL LENGTH): 5.2 CM
BH CV ECHO MEAS - LA A4C LENGTH: 6.1 CM
BH CV ECHO MEAS - LA DIMENSION: 4.4 CM
BH CV ECHO MEAS - LAT PEAK E' VEL: 5.9 CM/SEC
BH CV ECHO MEAS - LV DIASTOLIC VOL/BSA (35-75): 60.4 CM2
BH CV ECHO MEAS - LV MASS(C)D: 138.8 GRAMS
BH CV ECHO MEAS - LV MAX PG: 6.5 MMHG
BH CV ECHO MEAS - LV MEAN PG: 3 MMHG
BH CV ECHO MEAS - LV SYSTOLIC VOL/BSA (12-30): 29.2 CM2
BH CV ECHO MEAS - LV V1 MAX: 127 CM/SEC
BH CV ECHO MEAS - LV V1 VTI: 23.5 CM
BH CV ECHO MEAS - LVIDD: 3.4 CM
BH CV ECHO MEAS - LVIDS: 2.4 CM
BH CV ECHO MEAS - LVOT AREA: 2.8 CM2
BH CV ECHO MEAS - LVOT DIAM: 1.9 CM
BH CV ECHO MEAS - LVPWD: 1.2 CM
BH CV ECHO MEAS - MED PEAK E' VEL: 4.9 CM/SEC
BH CV ECHO MEAS - MR MAX PG: 80.6 MMHG
BH CV ECHO MEAS - MR MAX VEL: 449 CM/SEC
BH CV ECHO MEAS - MV A MAX VEL: 190 CM/SEC
BH CV ECHO MEAS - MV DEC SLOPE: 248 CM/SEC2
BH CV ECHO MEAS - MV DEC TIME: 0.16 SEC
BH CV ECHO MEAS - MV E MAX VEL: 142 CM/SEC
BH CV ECHO MEAS - MV E/A: 0.75
BH CV ECHO MEAS - MV MAX PG: 17.8 MMHG
BH CV ECHO MEAS - MV MEAN PG: 7 MMHG
BH CV ECHO MEAS - MV P1/2T: 161.8 MSEC
BH CV ECHO MEAS - MV V2 VTI: 38.6 CM
BH CV ECHO MEAS - MVA(P1/2T): 1.36 CM2
BH CV ECHO MEAS - MVA(VTI): 1.73 CM2
BH CV ECHO MEAS - PA ACC TIME: 0.11 SEC
BH CV ECHO MEAS - PA V2 MAX: 113 CM/SEC
BH CV ECHO MEAS - PI END-D VEL: 143 CM/SEC
BH CV ECHO MEAS - RAP SYSTOLE: 8 MMHG
BH CV ECHO MEAS - RV MAX PG: 4.6 MMHG
BH CV ECHO MEAS - RV V1 MAX: 107 CM/SEC
BH CV ECHO MEAS - RV V1 VTI: 20.3 CM
BH CV ECHO MEAS - RVSP: 42.3 MMHG
BH CV ECHO MEAS - SV(LVOT): 66.6 ML
BH CV ECHO MEAS - SV(MOD-SP4): 52.1 ML
BH CV ECHO MEAS - SVI(LVOT): 39.9 ML/M2
BH CV ECHO MEAS - SVI(MOD-SP4): 31.2 ML/M2
BH CV ECHO MEAS - TAPSE (>1.6): 1.64 CM
BH CV ECHO MEAS - TR MAX PG: 34.3 MMHG
BH CV ECHO MEAS - TR MAX VEL: 293 CM/SEC
BH CV ECHO MEASUREMENTS AVERAGE E/E' RATIO: 26.3
BH CV XLRA - RV BASE: 3.3 CM
BH CV XLRA - RV LENGTH: 8.3 CM
BH CV XLRA - RV MID: 2.6 CM
BH CV XLRA - TDI S': 14.1 CM/SEC
BILIRUB SERPL-MCNC: 0.4 MG/DL (ref 0–1.2)
BUN SERPL-MCNC: 16.8 MG/DL (ref 8–23)
BUN/CREAT SERPL: 22.7 (ref 7–25)
CALCIUM SPEC-SCNC: 8.4 MG/DL (ref 8.6–10.5)
CHLORIDE SERPL-SCNC: 105 MMOL/L (ref 98–107)
CO2 SERPL-SCNC: 19.6 MMOL/L (ref 22–29)
CREAT SERPL-MCNC: 0.74 MG/DL (ref 0.57–1)
DEPRECATED RDW RBC AUTO: 44.7 FL (ref 37–54)
EGFRCR SERPLBLD CKD-EPI 2021: 87.7 ML/MIN/1.73
EOSINOPHIL # BLD AUTO: 0.16 10*3/MM3 (ref 0–0.4)
EOSINOPHIL NFR BLD AUTO: 2.5 % (ref 0.3–6.2)
ERYTHROCYTE [DISTWIDTH] IN BLOOD BY AUTOMATED COUNT: 13.8 % (ref 12.3–15.4)
GEN 5 1HR TROPONIN T REFLEX: 50 NG/L
GLOBULIN UR ELPH-MCNC: 2.5 GM/DL
GLUCOSE BLDC GLUCOMTR-MCNC: 133 MG/DL (ref 70–105)
GLUCOSE BLDC GLUCOMTR-MCNC: 173 MG/DL (ref 70–105)
GLUCOSE BLDC GLUCOMTR-MCNC: 202 MG/DL (ref 70–105)
GLUCOSE BLDC GLUCOMTR-MCNC: 98 MG/DL (ref 70–105)
GLUCOSE SERPL-MCNC: 129 MG/DL (ref 65–99)
HCT VFR BLD AUTO: 36.3 % (ref 34–46.6)
HGB BLD-MCNC: 11.6 G/DL (ref 12–15.9)
IMM GRANULOCYTES # BLD AUTO: 0.03 10*3/MM3 (ref 0–0.05)
IMM GRANULOCYTES NFR BLD AUTO: 0.5 % (ref 0–0.5)
LEFT ATRIUM VOLUME INDEX: 33.3 ML/M2
LV EF BIPLANE MOD: 52 %
LYMPHOCYTES # BLD AUTO: 0.68 10*3/MM3 (ref 0.7–3.1)
LYMPHOCYTES NFR BLD AUTO: 10.6 % (ref 19.6–45.3)
MAGNESIUM SERPL-MCNC: 2 MG/DL (ref 1.6–2.4)
MCH RBC QN AUTO: 28.2 PG (ref 26.6–33)
MCHC RBC AUTO-ENTMCNC: 32 G/DL (ref 31.5–35.7)
MCV RBC AUTO: 88.3 FL (ref 79–97)
MONOCYTES # BLD AUTO: 0.77 10*3/MM3 (ref 0.1–0.9)
MONOCYTES NFR BLD AUTO: 12.1 % (ref 5–12)
NEUTROPHILS NFR BLD AUTO: 4.71 10*3/MM3 (ref 1.7–7)
NEUTROPHILS NFR BLD AUTO: 73.7 % (ref 42.7–76)
NRBC BLD AUTO-RTO: 0 /100 WBC (ref 0–0.2)
NT-PROBNP SERPL-MCNC: 2698 PG/ML (ref 0–900)
PHOSPHATE SERPL-MCNC: 2.2 MG/DL (ref 2.5–4.5)
PHOSPHATE SERPL-MCNC: 2.6 MG/DL (ref 2.5–4.5)
PLATELET # BLD AUTO: 101 10*3/MM3 (ref 140–450)
PMV BLD AUTO: 11.2 FL (ref 6–12)
POTASSIUM SERPL-SCNC: 4 MMOL/L (ref 3.5–5.2)
PROT SERPL-MCNC: 5.3 G/DL (ref 6–8.5)
QT INTERVAL: 327 MS
QTC INTERVAL: 462 MS
RBC # BLD AUTO: 4.11 10*6/MM3 (ref 3.77–5.28)
SINUS: 2.9 CM
SODIUM SERPL-SCNC: 137 MMOL/L (ref 136–145)
STJ: 2.4 CM
TROPONIN T % DELTA: -2
TROPONIN T NUMERIC DELTA: -1 NG/L
TROPONIN T SERPL HS-MCNC: 51 NG/L
WBC NRBC COR # BLD AUTO: 6.39 10*3/MM3 (ref 3.4–10.8)

## 2025-07-20 PROCEDURE — 25010000002 HYDROMORPHONE 1 MG/ML SOLUTION: Performed by: INTERNAL MEDICINE

## 2025-07-20 PROCEDURE — 25010000002 FUROSEMIDE PER 20 MG: Performed by: INTERNAL MEDICINE

## 2025-07-20 PROCEDURE — 83880 ASSAY OF NATRIURETIC PEPTIDE: CPT | Performed by: STUDENT IN AN ORGANIZED HEALTH CARE EDUCATION/TRAINING PROGRAM

## 2025-07-20 PROCEDURE — 25010000002 HYDRALAZINE PER 20 MG: Performed by: STUDENT IN AN ORGANIZED HEALTH CARE EDUCATION/TRAINING PROGRAM

## 2025-07-20 PROCEDURE — 25010000002 ENOXAPARIN PER 10 MG: Performed by: INTERNAL MEDICINE

## 2025-07-20 PROCEDURE — 25010000002 CEFTRIAXONE PER 250 MG: Performed by: INTERNAL MEDICINE

## 2025-07-20 PROCEDURE — 80053 COMPREHEN METABOLIC PANEL: CPT

## 2025-07-20 PROCEDURE — 63710000001 INSULIN LISPRO (HUMAN) PER 5 UNITS: Performed by: INTERNAL MEDICINE

## 2025-07-20 PROCEDURE — 71045 X-RAY EXAM CHEST 1 VIEW: CPT

## 2025-07-20 PROCEDURE — 85025 COMPLETE CBC W/AUTO DIFF WBC: CPT

## 2025-07-20 PROCEDURE — 93306 TTE W/DOPPLER COMPLETE: CPT | Performed by: INTERNAL MEDICINE

## 2025-07-20 PROCEDURE — 94799 UNLISTED PULMONARY SVC/PX: CPT

## 2025-07-20 PROCEDURE — 83735 ASSAY OF MAGNESIUM: CPT

## 2025-07-20 PROCEDURE — 84484 ASSAY OF TROPONIN QUANT: CPT | Performed by: STUDENT IN AN ORGANIZED HEALTH CARE EDUCATION/TRAINING PROGRAM

## 2025-07-20 PROCEDURE — 93005 ELECTROCARDIOGRAM TRACING: CPT | Performed by: STUDENT IN AN ORGANIZED HEALTH CARE EDUCATION/TRAINING PROGRAM

## 2025-07-20 PROCEDURE — 93010 ELECTROCARDIOGRAM REPORT: CPT | Performed by: INTERNAL MEDICINE

## 2025-07-20 PROCEDURE — 84100 ASSAY OF PHOSPHORUS: CPT | Performed by: INTERNAL MEDICINE

## 2025-07-20 PROCEDURE — 84100 ASSAY OF PHOSPHORUS: CPT

## 2025-07-20 PROCEDURE — 25810000003 SODIUM CHLORIDE 0.9 % SOLUTION: Performed by: INTERNAL MEDICINE

## 2025-07-20 PROCEDURE — 93306 TTE W/DOPPLER COMPLETE: CPT

## 2025-07-20 PROCEDURE — 82948 REAGENT STRIP/BLOOD GLUCOSE: CPT

## 2025-07-20 PROCEDURE — 25010000002 FUROSEMIDE PER 20 MG: Performed by: STUDENT IN AN ORGANIZED HEALTH CARE EDUCATION/TRAINING PROGRAM

## 2025-07-20 PROCEDURE — 82948 REAGENT STRIP/BLOOD GLUCOSE: CPT | Performed by: INTERNAL MEDICINE

## 2025-07-20 RX ORDER — FUROSEMIDE 10 MG/ML
20 INJECTION INTRAMUSCULAR; INTRAVENOUS ONCE
Status: COMPLETED | OUTPATIENT
Start: 2025-07-20 | End: 2025-07-20

## 2025-07-20 RX ORDER — FUROSEMIDE 10 MG/ML
40 INJECTION INTRAMUSCULAR; INTRAVENOUS EVERY 8 HOURS
Status: DISCONTINUED | OUTPATIENT
Start: 2025-07-20 | End: 2025-07-23

## 2025-07-20 RX ORDER — HYDRALAZINE HYDROCHLORIDE 20 MG/ML
10 INJECTION INTRAMUSCULAR; INTRAVENOUS ONCE
Status: COMPLETED | OUTPATIENT
Start: 2025-07-20 | End: 2025-07-20

## 2025-07-20 RX ORDER — FENTANYL/ROPIVACAINE/NS/PF 2-625MCG/1
15 PLASTIC BAG, INJECTION (ML) EPIDURAL ONCE
Status: COMPLETED | OUTPATIENT
Start: 2025-07-20 | End: 2025-07-20

## 2025-07-20 RX ORDER — METOPROLOL TARTRATE 1 MG/ML
5 INJECTION, SOLUTION INTRAVENOUS ONCE
Status: DISCONTINUED | OUTPATIENT
Start: 2025-07-20 | End: 2025-07-22

## 2025-07-20 RX ADMIN — FUROSEMIDE 40 MG: 10 INJECTION, SOLUTION INTRAMUSCULAR; INTRAVENOUS at 20:37

## 2025-07-20 RX ADMIN — ATORVASTATIN CALCIUM 40 MG: 40 TABLET, FILM COATED ORAL at 08:35

## 2025-07-20 RX ADMIN — CEFTRIAXONE 2000 MG: 2 INJECTION, POWDER, FOR SOLUTION INTRAMUSCULAR; INTRAVENOUS at 11:59

## 2025-07-20 RX ADMIN — PRAMIPEXOLE DIHYDROCHLORIDE 0.5 MG: 0.5 TABLET ORAL at 20:36

## 2025-07-20 RX ADMIN — SENNOSIDES AND DOCUSATE SODIUM 2 TABLET: 50; 8.6 TABLET ORAL at 08:35

## 2025-07-20 RX ADMIN — Medication 10 ML: at 20:39

## 2025-07-20 RX ADMIN — HYDROXYZINE HYDROCHLORIDE 50 MG: 25 TABLET, FILM COATED ORAL at 20:35

## 2025-07-20 RX ADMIN — GABAPENTIN 300 MG: 300 CAPSULE ORAL at 20:36

## 2025-07-20 RX ADMIN — HYDROMORPHONE HYDROCHLORIDE 1 MG: 1 INJECTION, SOLUTION INTRAMUSCULAR; INTRAVENOUS; SUBCUTANEOUS at 08:35

## 2025-07-20 RX ADMIN — FAMOTIDINE 20 MG: 20 TABLET, FILM COATED ORAL at 20:36

## 2025-07-20 RX ADMIN — Medication 10 ML: at 08:37

## 2025-07-20 RX ADMIN — HYDROMORPHONE HYDROCHLORIDE 1 MG: 1 INJECTION, SOLUTION INTRAMUSCULAR; INTRAVENOUS; SUBCUTANEOUS at 20:39

## 2025-07-20 RX ADMIN — FUROSEMIDE 20 MG: 10 INJECTION, SOLUTION INTRAMUSCULAR; INTRAVENOUS at 03:20

## 2025-07-20 RX ADMIN — MUPIROCIN 1 APPLICATION: 20 OINTMENT TOPICAL at 08:36

## 2025-07-20 RX ADMIN — FUROSEMIDE 40 MG: 10 INJECTION, SOLUTION INTRAMUSCULAR; INTRAVENOUS at 13:07

## 2025-07-20 RX ADMIN — INSULIN LISPRO 4 UNITS: 100 INJECTION, SOLUTION INTRAVENOUS; SUBCUTANEOUS at 09:40

## 2025-07-20 RX ADMIN — ENOXAPARIN SODIUM 40 MG: 100 INJECTION SUBCUTANEOUS at 16:02

## 2025-07-20 RX ADMIN — SODIUM CHLORIDE 15 MMOL: 9 INJECTION, SOLUTION INTRAVENOUS at 03:19

## 2025-07-20 RX ADMIN — HYDRALAZINE HYDROCHLORIDE 10 MG: 20 INJECTION INTRAMUSCULAR; INTRAVENOUS at 01:56

## 2025-07-20 RX ADMIN — LOSARTAN POTASSIUM 25 MG: 25 TABLET, FILM COATED ORAL at 08:35

## 2025-07-20 RX ADMIN — HYDROMORPHONE HYDROCHLORIDE 1 MG: 1 INJECTION, SOLUTION INTRAMUSCULAR; INTRAVENOUS; SUBCUTANEOUS at 02:57

## 2025-07-20 RX ADMIN — HYDROMORPHONE HYDROCHLORIDE 1 MG: 1 INJECTION, SOLUTION INTRAMUSCULAR; INTRAVENOUS; SUBCUTANEOUS at 16:02

## 2025-07-20 RX ADMIN — INSULIN LISPRO 8 UNITS: 100 INJECTION, SOLUTION INTRAVENOUS; SUBCUTANEOUS at 18:22

## 2025-07-20 RX ADMIN — METOPROLOL SUCCINATE 25 MG: 25 TABLET, EXTENDED RELEASE ORAL at 08:36

## 2025-07-20 RX ADMIN — BUDESONIDE AND FORMOTEROL FUMARATE DIHYDRATE 2 PUFF: 160; 4.5 AEROSOL RESPIRATORY (INHALATION) at 19:03

## 2025-07-20 RX ADMIN — CETIRIZINE HYDROCHLORIDE 10 MG: 10 TABLET, FILM COATED ORAL at 20:36

## 2025-07-20 NOTE — PROGRESS NOTES
[facility] MEDICINE SERVICE  DAILY PROGRESS NOTE    NAME: Eugenia Madden  : 1955  MRN: [id number]      LOS: 4 days     PROVIDER OF SERVICE: Mario Pritchard MD    Chief Complaint: Septic shock    Subjective:   Patient lying in bed, feeling about the same  Interval History:    Patient seen and evaluated at bedside.   Denies any chest pain nausea vomiting or diarrhea.  On 4 L of oxygen per nasal cannula O2 sats 93%  Treatment plan discussed with patient. All questions addressed.     Review of Systems:   All 21 ROS were negative except mentioned above.    Objective:     Vital Signs  Temp:  [98 °F (36.7 °C)-98.7 °F (37.1 °C)] 98.4 °F (36.9 °C)  Heart Rate:  [] 102  Resp:  [18-33] 22  BP: (105-180)/() 135/77  Flow (L/min) (Oxygen Therapy):  [2-4] 4   Body mass index is 25.19 kg/m².    Physical Exam   General: No acute distress, appears stated age  Neuro: Awake and alert, oriented x3, no focal deficits appreciated  Head: Atraumatic, normocephalic  HEENT: EOMI, anicteric, normal sclerae and conjunctivae, moist mucus membranes  Neck: supple, no lymphadenopathy  CV: RRR, soft heart sounds, no murmurs appreciated, 1+ peripheral edema  Pulm: Decreased breath sounds, no increased work of breathing, no adventitious sounds  Abd: Soft, nontender, nondistended  Skin: Warm, dry and intact  Psych: Appropriate mood and affect    Scheduled Meds   atorvastatin, 40 mg, Oral, Daily  budesonide-formoterol, 2 puff, Inhalation, BID - RT  cefTRIAXone, 2,000 mg, Intravenous, Q24H  cetirizine, 10 mg, Oral, Nightly  enoxaparin sodium, 40 mg, Subcutaneous, Q24H  famotidine, 20 mg, Oral, Nightly  furosemide, 40 mg, Intravenous, Q8H  gabapentin, 300 mg, Oral, Nightly  hydrOXYzine, 50 mg, Oral, Nightly  insulin lispro, 4-24 Units, Subcutaneous, 4x Daily AC & at Bedtime  losartan, 25 mg, Oral, Daily  metoprolol succinate XL, 25 mg, Oral, Daily  metoprolol tartrate, 5 mg, Intravenous, Once  mupirocin, 1 Application, Each Nare,  BID  pramipexole, 0.5 mg, Oral, Nightly  senna-docusate sodium, 2 tablet, Oral, BID  sodium chloride, 10 mL, Intravenous, Q12H  sodium chloride, 10 mL, Intravenous, Q12H       PRN Meds     acetaminophen **OR** acetaminophen    aluminum-magnesium hydroxide-simethicone    senna-docusate sodium **AND** polyethylene glycol **AND** bisacodyl **AND** bisacodyl    Calcium Replacement - Follow Nurse / BPA Driven Protocol    dextrose    dextrose    glucagon (human recombinant)    HYDROmorphone    ipratropium-albuterol    Magnesium Standard Dose Replacement - Follow Nurse / BPA Driven Protocol    nitroglycerin    ondansetron ODT **OR** ondansetron    Phosphorus Replacement - Follow Nurse / BPA Driven Protocol    Potassium Replacement - Follow Nurse / BPA Driven Protocol    sodium chloride    sodium chloride    sodium chloride    sodium chloride   Infusions         Diagnostic Data    Results from last 7 days   Lab Units 07/20/25  0207   WBC 10*3/mm3 6.39   HEMOGLOBIN g/dL 11.6*   HEMATOCRIT % 36.3   PLATELETS 10*3/mm3 101*   GLUCOSE mg/dL 129*   CREATININE mg/dL 0.74   BUN mg/dL 16.8   SODIUM mmol/L 137   POTASSIUM mmol/L 4.0   AST (SGOT) U/L 32   ALT (SGPT) U/L 19   ALK PHOS U/L 281*   BILIRUBIN mg/dL 0.4   ANION GAP mmol/L 12.4       XR Chest 1 View  Result Date: 7/20/2025  Impression: Severe interstitial pulmonary edema. Electronically Signed: Raheel Bennett MD  7/20/2025 2:57 AM EDT  Workstation ID: [id number]      Interval results reviewed.    Assessment/Plan:   Bacteremia with Proteus  Emphysematous right-sided pyelonephritis  Sepsis  Pul edema  HAVEN on CKD 3  COPD not in exacerbation  Type 2 diabetes with peripheral neuropathy  Hypertension     Patient on ceftriaxone needs to take it for 2 weeks.  Urology evaluated the patient and no intervention necessary at this time follow-up with urology as an outpatient.  Reason for bacteremia most likely UTI.  Renal function improving and  back to normal.      Patient's BNP more than  2600 and chest x-ray with severe interstitial pleural pulmonary edema  Started on Lasix 40 mg IV every 8 hours  Obtain 2D echo  Strict intake output Daily weights and less than 2 g salt diet and fluid restriction of 1500 mL/day  Consult cardiology  2D echo from 2021 reviewed.    Blood sugar running okay    Awaiting ID input, midline was placed        Treatment plan discussed with RN.      VTE Prophylaxis:  Pharmacologic & mechanical VTE prophylaxis orders are present.           Code status is       Code Status and Medical Interventions: CPR (Attempt to Resuscitate); Full Support   Ordered at: 07/16/25 0959     Code Status (Patient has no pulse and is not breathing):     CPR (Attempt to Resuscitate)     Medical Interventions (Patient has pulse or is breathing):     Full Support         Plan for disposition:      Barriers to Discharge: IV antibiotics/Lasix  Anticipated Date of Discharge: 7/22/2025  Place of Discharge: Home           Treatment plan discussed with RN.       Signature: Electronically signed by Mario Pritchard MD, 07/20/25, 11:02 EDT.  [facility] Hospitalist Team

## 2025-07-20 NOTE — PLAN OF CARE
Problem: Sepsis/Septic Shock  Goal: Optimal Nutrition Delivery  Outcome: Progressing   Goal Outcome Evaluation:

## 2025-07-21 ENCOUNTER — APPOINTMENT (OUTPATIENT)
Dept: GENERAL RADIOLOGY | Facility: HOSPITAL | Age: 70
DRG: 871 | End: 2025-07-21
Payer: MEDICARE

## 2025-07-21 LAB
ALBUMIN SERPL-MCNC: 2.8 G/DL (ref 3.5–5.2)
ALBUMIN/GLOB SERPL: 0.8 G/DL
ALP SERPL-CCNC: 320 U/L (ref 39–117)
ALT SERPL W P-5'-P-CCNC: 17 U/L (ref 1–33)
ANION GAP SERPL CALCULATED.3IONS-SCNC: 16 MMOL/L (ref 5–15)
ARTERIAL PATENCY WRIST A: POSITIVE
AST SERPL-CCNC: 24 U/L (ref 1–32)
ATMOSPHERIC PRESS: ABNORMAL MM[HG]
BASE EXCESS BLDA CALC-SCNC: 5.8 MMOL/L (ref 0–3)
BASOPHILS # BLD AUTO: 0.06 10*3/MM3 (ref 0–0.2)
BASOPHILS NFR BLD AUTO: 0.9 % (ref 0–1.5)
BDY SITE: ABNORMAL
BILIRUB SERPL-MCNC: 0.5 MG/DL (ref 0–1.2)
BUN SERPL-MCNC: 17.9 MG/DL (ref 8–23)
BUN/CREAT SERPL: 20.8 (ref 7–25)
CALCIUM SPEC-SCNC: 8.1 MG/DL (ref 8.6–10.5)
CHLORIDE SERPL-SCNC: 96 MMOL/L (ref 98–107)
CO2 BLDA-SCNC: 30.5 MMOL/L (ref 22–29)
CO2 SERPL-SCNC: 24 MMOL/L (ref 22–29)
CREAT SERPL-MCNC: 0.86 MG/DL (ref 0.57–1)
DEPRECATED RDW RBC AUTO: 43.3 FL (ref 37–54)
EGFRCR SERPLBLD CKD-EPI 2021: 73.2 ML/MIN/1.73
EOSINOPHIL # BLD AUTO: 0.19 10*3/MM3 (ref 0–0.4)
EOSINOPHIL NFR BLD AUTO: 2.9 % (ref 0.3–6.2)
ERYTHROCYTE [DISTWIDTH] IN BLOOD BY AUTOMATED COUNT: 13.5 % (ref 12.3–15.4)
GLOBULIN UR ELPH-MCNC: 3.3 GM/DL
GLUCOSE BLDC GLUCOMTR-MCNC: 147 MG/DL (ref 70–105)
GLUCOSE BLDC GLUCOMTR-MCNC: 162 MG/DL (ref 70–105)
GLUCOSE BLDC GLUCOMTR-MCNC: 187 MG/DL (ref 70–105)
GLUCOSE BLDC GLUCOMTR-MCNC: 195 MG/DL (ref 70–105)
GLUCOSE BLDC GLUCOMTR-MCNC: 217 MG/DL (ref 70–105)
GLUCOSE BLDC GLUCOMTR-MCNC: 242 MG/DL (ref 70–105)
GLUCOSE SERPL-MCNC: 145 MG/DL (ref 65–99)
HCO3 BLDA-SCNC: 29.4 MMOL/L (ref 21–28)
HCT VFR BLD AUTO: 37.7 % (ref 34–46.6)
HEMODILUTION: NO
HGB BLD-MCNC: 12.2 G/DL (ref 12–15.9)
IMM GRANULOCYTES # BLD AUTO: 0.08 10*3/MM3 (ref 0–0.05)
IMM GRANULOCYTES NFR BLD AUTO: 1.2 % (ref 0–0.5)
INHALED O2 CONCENTRATION: 21 %
LYMPHOCYTES # BLD AUTO: 0.86 10*3/MM3 (ref 0.7–3.1)
LYMPHOCYTES NFR BLD AUTO: 13.1 % (ref 19.6–45.3)
MAGNESIUM SERPL-MCNC: 1.6 MG/DL (ref 1.6–2.4)
MCH RBC QN AUTO: 28.3 PG (ref 26.6–33)
MCHC RBC AUTO-ENTMCNC: 32.4 G/DL (ref 31.5–35.7)
MCV RBC AUTO: 87.5 FL (ref 79–97)
MODALITY: ABNORMAL
MONOCYTES # BLD AUTO: 0.92 10*3/MM3 (ref 0.1–0.9)
MONOCYTES NFR BLD AUTO: 14 % (ref 5–12)
NEUTROPHILS NFR BLD AUTO: 4.47 10*3/MM3 (ref 1.7–7)
NEUTROPHILS NFR BLD AUTO: 67.9 % (ref 42.7–76)
NRBC BLD AUTO-RTO: 0 /100 WBC (ref 0–0.2)
PCO2 BLDA: 38.2 MM HG (ref 35–48)
PH BLDA: 7.49 PH UNITS (ref 7.35–7.45)
PHOSPHATE SERPL-MCNC: 2.7 MG/DL (ref 2.5–4.5)
PLATELET # BLD AUTO: 127 10*3/MM3 (ref 140–450)
PMV BLD AUTO: 11.1 FL (ref 6–12)
PO2 BLD: 369 MM[HG] (ref 0–500)
PO2 BLDA: 77.5 MM HG (ref 83–108)
POTASSIUM SERPL-SCNC: 3.4 MMOL/L (ref 3.5–5.2)
POTASSIUM SERPL-SCNC: 3.9 MMOL/L (ref 3.5–5.2)
PROT SERPL-MCNC: 6.1 G/DL (ref 6–8.5)
RBC # BLD AUTO: 4.31 10*6/MM3 (ref 3.77–5.28)
SAO2 % BLDCOA: 96.4 % (ref 94–98)
SODIUM SERPL-SCNC: 136 MMOL/L (ref 136–145)
WBC NRBC COR # BLD AUTO: 6.58 10*3/MM3 (ref 3.4–10.8)

## 2025-07-21 PROCEDURE — 97110 THERAPEUTIC EXERCISES: CPT

## 2025-07-21 PROCEDURE — 25010000002 ENOXAPARIN PER 10 MG: Performed by: INTERNAL MEDICINE

## 2025-07-21 PROCEDURE — 82948 REAGENT STRIP/BLOOD GLUCOSE: CPT | Performed by: INTERNAL MEDICINE

## 2025-07-21 PROCEDURE — 97112 NEUROMUSCULAR REEDUCATION: CPT

## 2025-07-21 PROCEDURE — 97530 THERAPEUTIC ACTIVITIES: CPT

## 2025-07-21 PROCEDURE — 94761 N-INVAS EAR/PLS OXIMETRY MLT: CPT

## 2025-07-21 PROCEDURE — 82803 BLOOD GASES ANY COMBINATION: CPT | Performed by: INTERNAL MEDICINE

## 2025-07-21 PROCEDURE — 82948 REAGENT STRIP/BLOOD GLUCOSE: CPT

## 2025-07-21 PROCEDURE — 25010000002 FUROSEMIDE PER 20 MG: Performed by: INTERNAL MEDICINE

## 2025-07-21 PROCEDURE — 85025 COMPLETE CBC W/AUTO DIFF WBC: CPT

## 2025-07-21 PROCEDURE — 25010000002 CEFTRIAXONE PER 250 MG: Performed by: INTERNAL MEDICINE

## 2025-07-21 PROCEDURE — 80053 COMPREHEN METABOLIC PANEL: CPT

## 2025-07-21 PROCEDURE — 97116 GAIT TRAINING THERAPY: CPT

## 2025-07-21 PROCEDURE — 84132 ASSAY OF SERUM POTASSIUM: CPT | Performed by: INTERNAL MEDICINE

## 2025-07-21 PROCEDURE — 94799 UNLISTED PULMONARY SVC/PX: CPT

## 2025-07-21 PROCEDURE — 94664 DEMO&/EVAL PT USE INHALER: CPT

## 2025-07-21 PROCEDURE — 94618 PULMONARY STRESS TESTING: CPT

## 2025-07-21 PROCEDURE — 63710000001 INSULIN LISPRO (HUMAN) PER 5 UNITS: Performed by: INTERNAL MEDICINE

## 2025-07-21 PROCEDURE — 25010000002 HYDROMORPHONE 1 MG/ML SOLUTION: Performed by: INTERNAL MEDICINE

## 2025-07-21 PROCEDURE — 36600 WITHDRAWAL OF ARTERIAL BLOOD: CPT | Performed by: INTERNAL MEDICINE

## 2025-07-21 PROCEDURE — 84100 ASSAY OF PHOSPHORUS: CPT

## 2025-07-21 PROCEDURE — 83735 ASSAY OF MAGNESIUM: CPT

## 2025-07-21 PROCEDURE — 71045 X-RAY EXAM CHEST 1 VIEW: CPT

## 2025-07-21 PROCEDURE — 99222 1ST HOSP IP/OBS MODERATE 55: CPT | Performed by: INTERNAL MEDICINE

## 2025-07-21 RX ORDER — ACYCLOVIR 200 MG/1
400 CAPSULE ORAL 3 TIMES DAILY
Status: DISCONTINUED | OUTPATIENT
Start: 2025-07-21 | End: 2025-07-24 | Stop reason: HOSPADM

## 2025-07-21 RX ORDER — ACYCLOVIR 200 MG/1
200 CAPSULE ORAL
Status: DISCONTINUED | OUTPATIENT
Start: 2025-07-21 | End: 2025-07-21

## 2025-07-21 RX ORDER — HYDROCODONE BITARTRATE AND ACETAMINOPHEN 5; 325 MG/1; MG/1
1 TABLET ORAL EVERY 6 HOURS PRN
Refills: 0 | Status: DISCONTINUED | OUTPATIENT
Start: 2025-07-21 | End: 2025-07-24 | Stop reason: HOSPADM

## 2025-07-21 RX ORDER — GABAPENTIN 100 MG/1
200 CAPSULE ORAL NIGHTLY
Status: DISCONTINUED | OUTPATIENT
Start: 2025-07-21 | End: 2025-07-24 | Stop reason: HOSPADM

## 2025-07-21 RX ORDER — POTASSIUM CHLORIDE 1500 MG/1
40 TABLET, EXTENDED RELEASE ORAL EVERY 4 HOURS
Status: COMPLETED | OUTPATIENT
Start: 2025-07-21 | End: 2025-07-21

## 2025-07-21 RX ORDER — HYDROMORPHONE HYDROCHLORIDE 1 MG/ML
0.25 INJECTION, SOLUTION INTRAMUSCULAR; INTRAVENOUS; SUBCUTANEOUS EVERY 4 HOURS PRN
Status: ACTIVE | OUTPATIENT
Start: 2025-07-21 | End: 2025-07-21

## 2025-07-21 RX ADMIN — INSULIN LISPRO 8 UNITS: 100 INJECTION, SOLUTION INTRAVENOUS; SUBCUTANEOUS at 13:27

## 2025-07-21 RX ADMIN — ACYCLOVIR 400 MG: 200 CAPSULE ORAL at 17:11

## 2025-07-21 RX ADMIN — Medication 10 ML: at 09:08

## 2025-07-21 RX ADMIN — INSULIN LISPRO 4 UNITS: 100 INJECTION, SOLUTION INTRAVENOUS; SUBCUTANEOUS at 20:49

## 2025-07-21 RX ADMIN — HYDROXYZINE HYDROCHLORIDE 50 MG: 25 TABLET, FILM COATED ORAL at 20:49

## 2025-07-21 RX ADMIN — LOSARTAN POTASSIUM 25 MG: 25 TABLET, FILM COATED ORAL at 09:01

## 2025-07-21 RX ADMIN — BUDESONIDE AND FORMOTEROL FUMARATE DIHYDRATE 2 PUFF: 160; 4.5 AEROSOL RESPIRATORY (INHALATION) at 07:13

## 2025-07-21 RX ADMIN — FUROSEMIDE 40 MG: 10 INJECTION, SOLUTION INTRAMUSCULAR; INTRAVENOUS at 20:49

## 2025-07-21 RX ADMIN — BUDESONIDE AND FORMOTEROL FUMARATE DIHYDRATE 2 PUFF: 160; 4.5 AEROSOL RESPIRATORY (INHALATION) at 18:35

## 2025-07-21 RX ADMIN — METOPROLOL SUCCINATE 25 MG: 25 TABLET, EXTENDED RELEASE ORAL at 09:07

## 2025-07-21 RX ADMIN — FUROSEMIDE 40 MG: 10 INJECTION, SOLUTION INTRAMUSCULAR; INTRAVENOUS at 04:50

## 2025-07-21 RX ADMIN — CEFTRIAXONE 2000 MG: 2 INJECTION, POWDER, FOR SOLUTION INTRAMUSCULAR; INTRAVENOUS at 12:17

## 2025-07-21 RX ADMIN — INSULIN LISPRO 4 UNITS: 100 INJECTION, SOLUTION INTRAVENOUS; SUBCUTANEOUS at 17:11

## 2025-07-21 RX ADMIN — HYDROCODONE BITARTRATE AND ACETAMINOPHEN 1 TABLET: 5; 325 TABLET ORAL at 20:48

## 2025-07-21 RX ADMIN — GABAPENTIN 200 MG: 100 CAPSULE ORAL at 20:49

## 2025-07-21 RX ADMIN — INSULIN LISPRO 4 UNITS: 100 INJECTION, SOLUTION INTRAVENOUS; SUBCUTANEOUS at 09:19

## 2025-07-21 RX ADMIN — SENNOSIDES AND DOCUSATE SODIUM 2 TABLET: 50; 8.6 TABLET ORAL at 09:07

## 2025-07-21 RX ADMIN — FUROSEMIDE 40 MG: 10 INJECTION, SOLUTION INTRAMUSCULAR; INTRAVENOUS at 12:18

## 2025-07-21 RX ADMIN — PRAMIPEXOLE DIHYDROCHLORIDE 0.5 MG: 0.5 TABLET ORAL at 20:50

## 2025-07-21 RX ADMIN — ENOXAPARIN SODIUM 40 MG: 100 INJECTION SUBCUTANEOUS at 17:11

## 2025-07-21 RX ADMIN — MUPIROCIN 1 APPLICATION: 20 OINTMENT TOPICAL at 09:07

## 2025-07-21 RX ADMIN — ATORVASTATIN CALCIUM 40 MG: 40 TABLET, FILM COATED ORAL at 09:02

## 2025-07-21 RX ADMIN — ACYCLOVIR 400 MG: 200 CAPSULE ORAL at 20:53

## 2025-07-21 RX ADMIN — HYDROMORPHONE HYDROCHLORIDE 1 MG: 1 INJECTION, SOLUTION INTRAMUSCULAR; INTRAVENOUS; SUBCUTANEOUS at 09:07

## 2025-07-21 RX ADMIN — POTASSIUM CHLORIDE 40 MEQ: 1500 TABLET, EXTENDED RELEASE ORAL at 09:08

## 2025-07-21 RX ADMIN — POTASSIUM CHLORIDE 40 MEQ: 1500 TABLET, EXTENDED RELEASE ORAL at 06:17

## 2025-07-21 RX ADMIN — FAMOTIDINE 20 MG: 20 TABLET, FILM COATED ORAL at 20:50

## 2025-07-21 RX ADMIN — Medication 10 ML: at 20:50

## 2025-07-21 RX ADMIN — CETIRIZINE HYDROCHLORIDE 10 MG: 10 TABLET, FILM COATED ORAL at 20:50

## 2025-07-21 RX ADMIN — MUPIROCIN 1 APPLICATION: 20 OINTMENT TOPICAL at 20:49

## 2025-07-21 NOTE — PLAN OF CARE
"Goal Outcome Evaluation:  Plan of Care Reviewed With: patient  Assessment: Eugenia Madden presents with functional mobility impairments which indicate the need for skilled intervention. Pt is much weaker today with gait tolerance down to 25 feet. Pt ambulated with slow kristen and she needed cues for safe step sequencing. PT educated pt and nursing that pt needs to be out of bed for meals. Nursing discontinuing purwick. PT assisted pt with donning brief and gripper socks. Pt was very lethargic after pain meds which hindered her tolerance of exercises. Tolerating session today without incident. Will continue to follow and progress as tolerated.     Plan/Recommendations:   If medically appropriate, Low Intensity Therapy recommended post-acute care - This is recommended as therapy feels this patient would require 2-3 visits per week. OP or HH would be the best option depending on patient's home bound status. Consider, if the patient has other  \"skilled\" needs such as wounds, IV antibiotics, etc. Combined with \"low intensity\" could also equate to a SNF. If patient is medically complex, consider LTAC. Pt requires rolling walker at discharge.     Pt desires Home with Home Health and Home with family assist at discharge. Pt cooperative; agreeable to therapeutic recommendations and plan of care.       Anticipated Discharge Disposition (PT): home with assist, home with home health                        "

## 2025-07-21 NOTE — PLAN OF CARE
"Assessment: Eugenia Madden presents with ADL impairments affecting function including balance, endurance / activity tolerance, and strength. Demonstrated functioning below baseline abilities indicate the need for continued skilled intervention while inpatient. Pt appears lethargic, difficult to keep eyes open. Pt expresses new \"jerky\" movement since being admitted. Pt comes to standing with CGA, LE buckling requiring min A with RW to recover. PT orthostatic (+), returned to seated position. LE exercises completed to improve venous return and improve blood perfusion. CO2 retention flap screen (+), high risk for hypercapnia, nsg notified and notified hospitalist. Once pt medically stable, likely to improve and anticipate safe dc home with family assist. Will follow within acute setting and progress as appropriate, will change dc rec if needed for higher level of care if appropriate. Tolerating session today without incident. Will continue to follow and progress as tolerated.     Plan/Recommendations:   Low Intensity Therapy recommended post-acute care - This is recommended as therapy feels this patient would require 2-3 visits per week. OP or HH would be the best option depending on patient's home bound status. Consider, if the patient has other  \"skilled\" needs such as wounds, IV antibiotics, etc. Combined with \"low intensity\" could also equate to a SNF. If patient is medically complex, consider LTAC.. Pt requires no DME at discharge.     Pt desires Home with family assist and Home Health at discharge. Pt cooperative; agreeable to therapeutic recommendations and plan of care.   "

## 2025-07-21 NOTE — CASE MANAGEMENT/SOCIAL WORK
Continued Stay Note   [facility]     Patient Name: Eugenia Madden  MRN: [id number]  Today's Date: 7/21/2025    Admit Date: 7/15/2025    Plan: DC Plan: Home with [facility], accepted. [facility] for Home IV abx, accepted. [facility] for RW, accepted.   Discharge Plan       Row Name 07/21/25 1605       Plan    Plan DC Plan: Home with [facility], accepted. [facility] for Home IV abx, accepted. [facility] for RW, accepted.    Provided Post Acute Provider List? N/A    Provided Post Acute Provider Quality & Resource List? N/A    Plan Comments CM spoke with Hospitalist and nursing for morning rounds and reviewed chart for clinical updates. Cardiology and ID consults placed. DC plan TBD based on consult treatment plans, possibly tomorrow. MD agreeable to HF Navigator and HF clinic orders being placed for New HF diagnosis. Orders placed. CM updated liasion Ashley with [facility] of plan.CM will continue to follow for any additional needs. DC Barriers: Cardiac monitoring, pending consults, O2@3L nc, Midline, IV abx/lasix, and monitor labs.                 Expected Discharge Date and Time       Expected Discharge Date Expected Discharge Time    Jul 22, 2025               Robyn Carrasco RN    Office Phone: [phone]  Office Cell:     [phone]

## 2025-07-21 NOTE — PROGRESS NOTES
[facility] MEDICINE SERVICE  DAILY PROGRESS NOTE    NAME: Eugenia Madden  : 1955  MRN: [id number]      LOS: 5 days     PROVIDER OF SERVICE: Mario Pritchard MD    Chief Complaint: Septic shock    Subjective:   Patient lying in bed still stating that not feeling better from yesterday  Interval History:    Patient seen and evaluated at bedside.   Has had a bowel movement last night  Treatment plan discussed with patient. All questions addressed.     Review of Systems:   All 21 ROS were negative except mentioned above.    Objective:     Vital Signs  Temp:  [98.7 °F (37.1 °C)-99.6 °F (37.6 °C)] 98.7 °F (37.1 °C)  Heart Rate:  [] 105  Resp:  [16-29] 23  BP: (100-146)/() 142/82  Flow (L/min) (Oxygen Therapy):  [3] 3   Body mass index is 24.06 kg/m².    Physical Exam   General: No acute distress, appears stated age  Neuro: Awake and alert, oriented x3, no focal deficits appreciated  Head: Atraumatic, normocephalic  HEENT: EOMI, anicteric, normal sclerae and conjunctivae, moist mucus membranes  Neck: supple, no lymphadenopathy  CV: RRR, soft heart sounds, no murmurs appreciated, no peripheral edema  Pulm: Decreased breath sounds, no increased work of breathing, bilateral crackles  Abd: Soft, nontender, nondistended  Skin: Warm, dry and intact  Psych: Appropriate mood and affect    Scheduled Meds   atorvastatin, 40 mg, Oral, Daily  budesonide-formoterol, 2 puff, Inhalation, BID - RT  cefTRIAXone, 2,000 mg, Intravenous, Q24H  cetirizine, 10 mg, Oral, Nightly  enoxaparin sodium, 40 mg, Subcutaneous, Q24H  famotidine, 20 mg, Oral, Nightly  furosemide, 40 mg, Intravenous, Q8H  gabapentin, 300 mg, Oral, Nightly  hydrOXYzine, 50 mg, Oral, Nightly  insulin lispro, 4-24 Units, Subcutaneous, 4x Daily AC & at Bedtime  losartan, 25 mg, Oral, Daily  metoprolol succinate XL, 25 mg, Oral, Daily  metoprolol tartrate, 5 mg, Intravenous, Once  mupirocin, 1 Application, Each Nare, BID  pramipexole, 0.5 mg, Oral,  Nightly  senna-docusate sodium, 2 tablet, Oral, BID  sodium chloride, 10 mL, Intravenous, Q12H  sodium chloride, 10 mL, Intravenous, Q12H       PRN Meds     acetaminophen **OR** acetaminophen    aluminum-magnesium hydroxide-simethicone    senna-docusate sodium **AND** polyethylene glycol **AND** bisacodyl **AND** bisacodyl    Calcium Replacement - Follow Nurse / BPA Driven Protocol    dextrose    dextrose    glucagon (human recombinant)    HYDROmorphone    ipratropium-albuterol    Magnesium Standard Dose Replacement - Follow Nurse / BPA Driven Protocol    nitroglycerin    ondansetron ODT **OR** ondansetron    Phosphorus Replacement - Follow Nurse / BPA Driven Protocol    Potassium Replacement - Follow Nurse / BPA Driven Protocol    sodium chloride    sodium chloride    sodium chloride    sodium chloride   Infusions         Diagnostic Data    Results from last 7 days   Lab Units 07/21/25  0349   WBC 10*3/mm3 6.58   HEMOGLOBIN g/dL 12.2   HEMATOCRIT % 37.7   PLATELETS 10*3/mm3 127*   GLUCOSE mg/dL 145*   CREATININE mg/dL 0.86   BUN mg/dL 17.9   SODIUM mmol/L 136   POTASSIUM mmol/L 3.4*   AST (SGOT) U/L 24   ALT (SGPT) U/L 17   ALK PHOS U/L 320*   BILIRUBIN mg/dL 0.5   ANION GAP mmol/L 16.0*       XR Chest 1 View  Result Date: 7/20/2025  Impression: Severe interstitial pulmonary edema. Electronically Signed: Raheel Bennett MD  7/20/2025 2:57 AM EDT  Workstation ID: [id number]      Interval results reviewed.    Assessment/Plan:   Bacteremia with Proteus  Emphysematous right-sided pyelonephritis  Sepsis  Acute heart failure with preserved ejection fraction  HAVEN on CKD 3  COPD not in exacerbation  Type 2 diabetes with peripheral neuropathy  Hypertension     Patient on ceftriaxone needs to take it for 2 weeks.  Urology evaluated the patient and no intervention necessary at this time follow-up with urology as an outpatient.  Reason for bacteremia most likely UTI.  Renal function improving and  back to normal.       Patient's  BNP more than 2600 and chest x-ray with severe interstitial pleural pulmonary edema  Started on Lasix 40 mg IV every 8 hours.  2D echo with ejection fraction 61 to 65%, grade 1 diastolic dysfunction, left atrial cavity is severely dilated  Strict intake output Daily weights and less than 2 g salt diet and fluid restriction of 1500 mL/day  Awaiting cardiology input   2D echo from 2021 reviewed.     Blood sugar running okay     Awaiting ID input, midline was placed.  2 out of 2 blood cultures positive for Proteus mirabilis sensitive to ceftriaxone.  Continue ceftriaxone for 2 weeks        Treatment plan discussed with RN.     VTE Prophylaxis:  Pharmacologic & mechanical VTE prophylaxis orders are present.         Code status is   Code Status and Medical Interventions: CPR (Attempt to Resuscitate); Full Support   Ordered at: 07/16/25 0959     Code Status (Patient has no pulse and is not breathing):    CPR (Attempt to Resuscitate)     Medical Interventions (Patient has pulse or is breathing):    Full Support       Plan for disposition:     Barriers to Discharge: IV antibiotics/Lasix  Anticipated Date of Discharge: 7/22/2025  Place of Discharge: Home w HH         Time: 40 minutes     Signature: Electronically signed by Mario Pritchard MD, 07/21/25, 10:24 EDT.  [facility] Hospitalist Team

## 2025-07-21 NOTE — PROCEDURES
Exercise Oximetry    Patient Name:Eugenia Madden   MRN: [id number]   Date: 07/21/25             ROOM AIR BASELINE   SpO2% 95   Heart Rate 104   Blood Pressure      EXERCISE ON ROOM AIR SpO2% EXERCISE ON O2 @  LPM SpO2%   1 MINUTE 95 1 MINUTE    2 MINUTES 94 2 MINUTES    3 MINUTES 92 3 MINUTES    4 MINUTES 90 4 MINUTES    5 MINUTES 99 5 MINUTES    6 MINUTES 88 6 MINUTES               Distance Walked   Distance Walked   Dyspnea (Diana Scale)   Dyspnea (Diana Scale)   Fatigue (Diana Scale)   Fatigue (Diana Scale)   SpO2% Post Exercise   SpO2% Post Exercise   HR Post Exercise   HR Post Exercise   Time to Recovery   Time to Recovery     Comments: Patient does not need 02 for home.

## 2025-07-21 NOTE — CONSULTS
Cardiology [place]    Subjective:     Encounter Date:07/15/2025      Patient ID: Eugenia Madden is a 69 y.o. female     Referring Physician: Macho    Chief Complaint: back pain    Reason for Consult: heart failure    HPI:  Eugenia Madden is a 69 y.o. female with a history of hypertension, hyperlipidemia, diabetes mellitus, COPD/emphysema who presents with back pain and weakness.  She was brought to the ER by EMS who reported her blood pressure was in the 80s systolic, did improve with IV fluids.  She was also febrile 100.9 Fahrenheit and tachycardic.  She did become hypotensive again and Levophed was started.  They did CTA chest to rule out PE, no PE but mild pulmonary edema was noted.  She was admitted for suspected pyelonephritis.    Patient has been seen by Dr. Joyce in the past:  In 2023, patient underwent CT scan of the chest and it showed moderate to severe coronary calcification.  Patient underwent echocardiogram which showed normal left-ventricular size and function and no significant valvular heart disease noted.     In March 2025, patient reports that she had an episode of chest pain.  She described this as a dull ache.  She had few of those episodes.  No correlation with exertion patient does complain of shortness of breath on exertion.  Patient denies any orthopnea, PND, syncope or presyncope.  No leg edema noted.    Patient seen today as she is sitting up in recliner in no acute distress.  She states that she continues with  Shortness of breath and fatigue.  No chest pain, dizziness or palpitations.  She has no peripheral edema.  Chest x-ray yesterday showed severe pulmonary edema, he was started on Lasix and repeat chest x-ray today showed improvement.  She had elevated proBNP 2600.  Echo this admission shows grade 1 diastolic dysfunction, similar to echo in 2021.  However echo this admission showed elevated estimated RVSP 42 mmHg.     Past Medical History:   Diagnosis Date    Arthritis     COPD (chronic  obstructive pulmonary disease) 2021    Coronary artery disease     Diabetes mellitus 2010    Heart murmur     Hypertension     Kidney stones     Restless leg        Past Surgical History:   Procedure Laterality Date    CERVIX SURGERY      Dysplasia removed from Cervix    COLONOSCOPY      KIDNEY STONE SURGERY  2016    Lithotripsy    OTHER SURGICAL HISTORY  2007    Pinched nerve arm     TUBAL ABDOMINAL LIGATION Bilateral        Family History   Problem Relation Age of Onset    Heart disease Mother     Cancer Mother         Breast Cancer    Breast cancer Mother 55    Diabetes Mother     Diabetes Father     Stroke Father     Heart disease Father     Hypertension Father     Cancer Father     Hypertension Sister     Diabetes Sister     Heart disease Sister     Diabetes Brother     Hypertension Brother     Heart disease Brother     Other Other         Abstraction from [employer] Cholesterol    Diabetes Brother     Hypertension Brother     No Known Problems Brother     Heart disease Brother     Cancer Brother         colon    Other Brother        Social History     Socioeconomic History    Marital status:      Spouse name: Braeden    Number of children: 4    Years of education: 12   Tobacco Use    Smoking status: Former     Current packs/day: 0.00     Average packs/day: 0.5 packs/day for 46.8 years (23.4 ttl pk-yrs)     Types: Cigarettes     Start date: 1974     Quit date: 2020     Years since quittin.7     Passive exposure: Past    Smokeless tobacco: Never    Tobacco comments:     Passive Smoke: Y   Vaping Use    Vaping status: Never Used   Substance and Sexual Activity    Alcohol use: Never    Drug use: No    Sexual activity: Yes     Partners: Male     Birth control/protection: Surgical, None         Review of Systems   Constitutional: Positive for malaise/fatigue. Negative for chills and fever.   HENT:  Negative for ear discharge and nosebleeds.    Eyes:  Negative for discharge and redness.  "  Cardiovascular:  Positive for dyspnea on exertion, orthopnea and paroxysmal nocturnal dyspnea. Negative for chest pain, irregular heartbeat, leg swelling, palpitations and syncope.   Respiratory:  Positive for shortness of breath. Negative for cough and wheezing.    Endocrine: Negative for heat intolerance.   Hematologic/Lymphatic: Negative for bleeding problem.   Skin:  Negative for rash.   Musculoskeletal:  Positive for muscle weakness. Negative for arthritis, falls and myalgias.   Gastrointestinal:  Negative for abdominal pain, melena, nausea and vomiting.   Genitourinary:  Negative for dysuria and hematuria.   Neurological:  Positive for loss of balance and weakness. Negative for dizziness, focal weakness, light-headedness, numbness and tremors.   Psychiatric/Behavioral:  Negative for depression. The patient is not nervous/anxious.             Objective:         BP 96/57   Pulse 109   Temp 98.7 °F (37.1 °C) (Oral)   Resp 23   Ht 160 cm (63\")   Wt 61.6 kg (135 lb 12.9 oz)   LMP  (LMP Unknown)   SpO2 91%   BMI 24.06 kg/m²     Physical Exam    General Appearance:    Alert, cooperative, in no acute distress   Head:    Normocephalic, without obvious abnormality, atraumatic   Eyes:            PERRLA, EOM intact, conjunctivae and sclerae normal, no  icterus       Throat:   Oral mucosa moist, + oral lesions   Neck:   No carotid bruit, no JVD, supple, trachea midline, no thyromegaly    Lungs:     Clear to auscultation, respirations regular, even and   unlabored, supplemental O2 3 lpm    Heart:    Regular rhythm and normal rate, normal S1 and S2, no gallop, no rub, no click   Chest Wall:    No abnormalities observed   Abdomen:     Soft, nontender, nondistended, no guarding, no rebound  tenderness   Extremities:   No edema, no cyanosis or redness   Pulses:   Pulses palpable and equal bilaterally in all extremities   Skin:   No bleeding, bruising or rash       Neurologic:   Normal mood, thought content and " behavior           ASCVD Risk Score::  The ASCVD Risk score (Darya REES, et al., 2019) failed to calculate for the following reasons:    The valid total cholesterol range is 130 to 320 mg/dL      Lab Review:     Results from last 7 days   Lab Units 07/21/25  0349 07/20/25  0207   SODIUM mmol/L 136 137   POTASSIUM mmol/L 3.4* 4.0   CHLORIDE mmol/L 96* 105   CO2 mmol/L 24.0 19.6*   BUN mg/dL 17.9 16.8   CREATININE mg/dL 0.86 0.74   GLUCOSE mg/dL 145* 129*   CALCIUM mg/dL 8.1* 8.4*   AST (SGOT) U/L 24 32   ALT (SGPT) U/L 17 19     Results from last 7 days   Lab Units 07/20/25  0319 07/20/25  0207 07/16/25  0101 07/15/25  2251   HSTROP T ng/L 50* 51* 65* 35*     Results from last 7 days   Lab Units 07/21/25  0349 07/20/25  0207   WBC 10*3/mm3 6.58 6.39   HEMOGLOBIN g/dL 12.2 11.6*   HEMATOCRIT % 37.7 36.3   PLATELETS 10*3/mm3 127* 101*     Results from last 7 days   Lab Units 07/15/25  2251   INR  1.19*   APTT seconds 26.1     Results from last 7 days   Lab Units 07/21/25  0349 07/20/25  0207   MAGNESIUM mg/dL 1.6 2.0     Results from last 7 days   Lab Units 07/16/25  0601   CHOLESTEROL mg/dL 112   TRIGLYCERIDES mg/dL 142   HDL CHOL mg/dL 28*     Results from last 7 days   Lab Units 07/20/25  0207 07/15/25  2251   PROBNP pg/mL 2,698.0* 1,087.0*     Results from last 7 days   Lab Units 07/15/25  2251   TSH uIU/mL 2.850       Recent Radiology:  Imaging Results (Most Recent)       Procedure Component Value Units Date/Time    XR Chest 1 View [[id number]] Collected: 07/20/25 0257     Updated: 07/20/25 0259    Narrative:      XR CHEST 1 VW    Date of Exam: 7/20/2025 2:42 AM EDT    Indication: Rule out pulmonary edema    Comparison: CT chest 7/16/2025.    Findings:  There is severe interstitial pulmonary edema. Cardiac silhouette is enlarged. Pulmonary vasculature is indistinct. No pneumothorax or pleural effusion.      Impression:      Impression:  Severe interstitial pulmonary edema.          Electronically Signed: Raheel  MD Donald    7/20/2025 2:57 AM EDT    Workstation ID: [id number]    CT Abdomen Pelvis Without Contrast [[id number]] Collected: 07/16/25 0553     Updated: 07/16/25 0600    Narrative:      CT ABDOMEN PELVIS WO CONTRAST    Date of Exam: 7/16/2025 3:46 AM EDT    Indication: incompletely visualized pyelo.    Comparison: None available.    Technique: Axial CT images were obtained of the abdomen and pelvis without the administration of contrast. Sagittal and coronal reconstructions were performed.  Automated exposure control and iterative reconstruction methods were used.      Findings:  Lung Bases:     There is mild pulmonary edema.    Liver:  There is diffuse fatty infiltration of the liver.    Biliary/Gallbladder:    The gallbladder is normal without evidence of radiopaque stones. The biliary tree is nondilated.    Spleen:  Spleen is normal in size and CT density.    Pancreas:    Pancreas is normal. There is no evidence of pancreatic mass or peripancreatic fluid.    Kidneys:    The left kidney is normal. There is an abnormal appearance of the right kidney with a delayed patchy right-sided nephrogram. There is gas filling the collecting system with diffuse surrounding fat stranding. The finding is consistent with emphysematous   pyelonephritis. Urologic consultation is recommended.    Adrenals:    Adrenal glands are unremarkable.    Retroperitoneal/Lymph Nodes/Vasculature:    No retroperitoneal adenopathy is identified.    Gastrointestinal/Mesentery:    The bowel loops are non-dilated without wall thickening or mass. The appendix appears within normal limits. No evidence of obstruction. No free air. No mesenteric fluid collections identified.    Bladder:    Contrast and gas is seen within the urinary bladder.    Genital:     Unremarkable          Bony Structures:     Visualized bony structures are consistent with the patient's age.        Impression:      Impression:  Emphysematous pyelonephritis of the right kidney.  Urologic consultation is recommended.                Electronically Signed: Francisco Aguilar MD    7/16/2025 5:58 AM EDT    Workstation ID: [id number]    CT Angiogram Chest Pulmonary Embolism [[id number]] Collected: 07/16/25 0217     Updated: 07/16/25 0224    Narrative:      CT ANGIOGRAM CHEST PULMONARY EMBOLISM    Date of Exam: 7/16/2025 12:58 AM EDT    Indication: elevated dimer, tachy.    Comparison: None available.    Technique: Axial CT images were obtained of the chest after the uneventful intravenous administration of iodinated contrast utilizing pulmonary embolism protocol.  In addition, a 3-D volume rendered image was created for interpretation.  Sagittal and   coronal reconstructions were performed.  Automated exposure control and iterative reconstruction methods were used.    Findings:  Pulmonary arteries: Adequate opacification of the pulmonary arteries. No evidence of acute pulmonary embolism.    Lungs and Pleura: There is mild interlobular septal thickening with some subpleural nodularity suggestive of mild pulmonary edema. There is trace bilateral basilar atelectasis. There is no evidence of pleural effusion.    Mediastinum/Hallie: No mediastinal or hilar lymphadenopathy.    Lymph nodes: No axillary or supraclavicular adenopathy.    Cardiovascular: The cardiac chambers are within normal limits. The pericardium is normal. The aorta and its arch branch vessels are unremarkable. There is moderate coronary artery calcific atherosclerosis.    Upper Abdomen: There is diffuse fatty infiltration of the liver. There is gas within the upper pole of the right kidney with surrounding fat stranding. This could represent infection of the right kidney. This is incompletely visualized on this exam. The   upper abdominal contents are unremarkable.          Bones and Soft Tissue: No suspicious osseous lesion. There are old ununited right posterior lateral seventh and eighth ribs.        Impression:      Impression:  1.No  evidence of pulmonary embolism.  2.Mild pulmonary edema.  3.Gas within the upper pole of the right kidney with surrounding fat stranding. This could represent infection of the right kidney such as pyonephrosis. This is incompletely visualized on this exam.            Electronically Signed: Francisco Aguilar MD    7/16/2025 2:22 AM EDT    Workstation ID: [id number]    XR Chest 1 View [[id number]] Collected: 07/15/25 2259     Updated: 07/15/25 2302    Narrative:      XR CHEST 1 VW    Date of Exam: 7/15/2025 10:51 PM EDT    Indication: tachycardia    Comparison: Chest radiograph 10/2/2023    Findings:  There are no airspace consolidations. No pleural fluid. No pneumothorax. The pulmonary vasculature appears within normal limits. The cardiac and mediastinal silhouette appear unremarkable. No acute osseous abnormality identified.      Impression:      Impression:  No active disease.          Electronically Signed: Francisco Aguilar MD    7/15/2025 11:00 PM EDT    Workstation ID: [id number]              ECHOCARDIOGRAM:  Results for orders placed during the hospital encounter of 07/15/25    Adult Transthoracic Echo Complete w/ Color, Spectral and Contrast if Necessary Per Protocol 07/20/2025  7:42 PM    Interpretation Summary    Left ventricular systolic function is normal. Left ventricular ejection fraction appears to be 61 - 65%.    Left ventricular diastolic function is consistent with (grade I) impaired relaxation.    The left atrial cavity is severely dilated.    Estimated right ventricular systolic pressure from tricuspid regurgitation is mildly elevated (35-45 mmHg). Calculated right ventricular systolic pressure from tricuspid regurgitation is 42 mmHg.    The study is technically difficult for diagnosis. The quality of the study is limited with poor acoustic windows.      Stress Test:  Results for orders placed during the hospital encounter of 05/06/25    Stress Test With Myocardial Perfusion One Day 05/09/2025  1:14  PM    Interpretation Summary    Myocardial perfusion imaging indicates a normal myocardial perfusion study with no evidence of ischemia. Impressions are consistent with a low risk study.    Left ventricular ejection fraction is normal (Calculated EF = 68%).    Findings consistent with a normal ECG stress test.        Cardiac Catheterization:  No results found for this or any previous visit.      Results Review:  I have personally reviewed the results from the time of this admission to 7/21/2025 11:27 EDT and agree with these findings:  []  Laboratory  []  Microbiology  []  Radiology  []  EKG/Telemetry   []  Cardiology/Vascular   []  Pathology  []  Old records  []  Other:    Most notable findings include:     Allergies   Allergen Reactions    Tuberculin, Ppd Rash     40yrs ago       Current Medications:   Scheduled Meds:atorvastatin, 40 mg, Oral, Daily  budesonide-formoterol, 2 puff, Inhalation, BID - RT  cefTRIAXone, 2,000 mg, Intravenous, Q24H  cetirizine, 10 mg, Oral, Nightly  enoxaparin sodium, 40 mg, Subcutaneous, Q24H  famotidine, 20 mg, Oral, Nightly  furosemide, 40 mg, Intravenous, Q8H  gabapentin, 300 mg, Oral, Nightly  hydrOXYzine, 50 mg, Oral, Nightly  insulin lispro, 4-24 Units, Subcutaneous, 4x Daily AC & at Bedtime  [Held by provider] losartan, 25 mg, Oral, Daily  metoprolol succinate XL, 25 mg, Oral, Daily  metoprolol tartrate, 5 mg, Intravenous, Once  mupirocin, 1 Application, Each Nare, BID  pramipexole, 0.5 mg, Oral, Nightly  senna-docusate sodium, 2 tablet, Oral, BID  sodium chloride, 10 mL, Intravenous, Q12H  sodium chloride, 10 mL, Intravenous, Q12H      Continuous Infusions:         Assessment:         Active Hospital Problems    Diagnosis  POA    **Septic shock [A41.9, R65.21]  Yes          Plan:   Bacteremia with Proteus  Emphysematous right-sided pyelonephritis  Sepsis  Antibiotics per primary    Diastolic heart failure with preserved ejection fraction  Tricuspid valve regurgitation / Pulm  Htn  yesterday BNP 2698   yesterday chest x-ray with severe interstitial pleural pulmonary edema   Chest x-ray today suggest resolving pulmonary edema, mild bibasilar atelectasis small bilateral pleural effusions  Grade 1 DD  estimated RVSP 42 mmHg  Currently on Lasix 40 mg IV Q8  Net -1840  Today sodium 136, CO2 24, potassium 3.4, magnesium 1.6  Replace to goal potassium 4.0 magnesium 2.0  Strict I&O and standing daily weights  On metoprolol 25 mg daily    HTN  Has been hypotensive this admission in the setting of sepsis, currently stable 107/68  Losartan was held     HAVEN on CKD 3  Losartan was held   Cr was 1.22, improved to 0.86 today    COPD   3 L nasal cannula    Type 2 diabetes  On sliding scale insulin  Jardiance per home meds    Plan  Home meds Lipitor 40 mg Jardiance 25 mg losartan 25 mg daily Toprol-XL 25 mg daily    Echo 2021 EF 60-65%, normal systolic function, grade 1 diastolic dysfunction, trace mitral valve regurgitation, mild aortic and tricuspid regurgitation    Echo this admission LV EF 61-65%, grade 1 diastolic dysfunction, trace mitral valve, presence of tricuspid regurgitation with calculated RVSP 42 mmHg    Hypotension in setting of sepsis now improved, off pressors, continue to hold antihypertensives    Negative stress test May 2025      Patient is seen and examined and findings are verified.  All data is reviewed by me personally.  Assessment and plan formulated by APC was done after discussion with attending.  I spent more than 50% of time in taking care of the patient.    Patient is admitted with bacteremia and sepsis.    Hemodynamics are stable    Normal S1 and S2.  No pericardial rub or murmur abdominal exam is benign no leg edema noted.    Patient recently had stress test which was negative for ischemia or myocardial infarction.  At present  patient is stable no sign and symptom of coronary insufficiency.    Current treatment would be continued.      Electronically signed by Donell QUEVEDO  MD Isiah, 07/21/25, 4:08 PM EDT.         JONATHAN Roberto  07/21/25  11:27 EDT

## 2025-07-21 NOTE — THERAPY TREATMENT NOTE
"Subjective: Pt agreeable to therapeutic plan of care. Pt sitting up in chair upon arrival. Pt c/o \"jerky movement\" since admission, not baseline function.     Pt appears lethargic, difficult to keep eyes open during session.     Cognition: oriented to Person, Place, Time, and Situation    Objective:     Precautions - high falls, 3L O2, orthostatic     Bed Mobility: N/A or Not attempted.   Functional Transfers: CGA and with rolling walker     Balance: supported, static, with UE support, and standing CGA, Min-A, and with rolling walker  Functional Ambulation: N/A or Not attempted.    Therapeutic Exercise - 10 Reps B UE and B LE AROM supported sitting / chair  LE exercises completed to increase venous return and improve blood perfusion, decrease dizziness and risk for falls     Vitals: Orthostatic  Sitting in chair: 115/62  Standin/60  Sitting in chair with LE elevated: 108/62    LE buckling in standing, jerky movement noted in UE     CO2 retention flap screen: (+), high risk for hypercapnia, nsg notified     Pain: 0, pt noted to have received pain medication ~1 hour prior   Interventions for pain: N/A  Education: Provided education on the importance of mobility in the acute care setting, Verbal/Tactile Cues, ADL training, and Transfer Training    Assessment: Eugenia Madden presents with ADL impairments affecting function including balance, endurance / activity tolerance, and strength. Demonstrated functioning below baseline abilities indicate the need for continued skilled intervention while inpatient. Pt appears lethargic, difficult to keep eyes open. Pt expresses new \"jerky\" movement since being admitted. Pt comes to standing with CGA, LE buckling requiring min A with RW to recover. PT orthostatic (+), returned to seated position. LE exercises completed to improve venous return and improve blood perfusion. CO2 retention flap screen (+), high risk for hypercapnia, nsg notified and notified hospitalist. Once pt " "medically stable, likely to improve and anticipate safe dc home with family assist. Will follow within acute setting and progress as appropriate, will change dc rec if needed for higher level of care if appropriate. Tolerating session today without incident. Will continue to follow and progress as tolerated.     Plan/Recommendations:   Low Intensity Therapy recommended post-acute care - This is recommended as therapy feels this patient would require 2-3 visits per week. OP or HH would be the best option depending on patient's home bound status. Consider, if the patient has other  \"skilled\" needs such as wounds, IV antibiotics, etc. Combined with \"low intensity\" could also equate to a SNF. If patient is medically complex, consider LTAC.. Pt requires no DME at discharge.     Pt desires Home with family assist and Home Health at discharge. Pt cooperative; agreeable to therapeutic recommendations and plan of care.     Modified [place]: N/A = No pre-op stroke/TIA    Post-Tx Position: Up in Chair, Alarms activated, and Call light and personal items within reach  PPE: gloves    Therapy Charges for Today       Code Description Service Date Service Provider Modifiers Qty    65948250740  OT THERAPEUTIC ACT EA 15 MIN 7/21/2025 Renetta Medina OT GO 1           Time Calculation- OT       Row Name 07/21/25 1200             Time Calculation- OT    OT Start Time 1106  -MS      OT Stop Time 1119  -MS      OT Time Calculation (min) 13 min  -MS      Total Timed Code Minutes- OT 13 minute(s)  -MS      OT Received On 07/21/25  -MS      OT - Next Appointment 07/23/25  -MS                User Key  (r) = Recorded By, (t) = Taken By, (c) = Cosigned By      Initials Name Provider Type    Renetta Benton OT Occupational Therapist                    "

## 2025-07-21 NOTE — THERAPY TREATMENT NOTE
"Subjective: Pt agreeable to therapeutic plan of care. Pt reports being in bed all weekend.     Objective:     Precautions - desaturates with activity    Bed mobility - SBA with additional time  Transfers - CGA and with rolling walker with cues  Ambulation - 30 feet Min-A and with rolling walker    Therapeutic Exercise - 5 Reps B LE AROM supported sitting / chair    Vitals: Desaturates to 89% with O2 titrated to 4 L  HR to 120 at highest    Pain: 8 VAS Location: low back and Right side  Intervention for pain: RN provided medication    Education: Provided education on the importance of mobility in the acute care setting, Verbal/Tactile Cues, Transfer Training, Gait Training, and Energy conservation strategies    Assessment: Eugenia Madden presents with functional mobility impairments which indicate the need for skilled intervention. Pt is much weaker today with gait tolerance down to 25 feet. Pt ambulated with slow kristen and she needed cues for safe step sequencing. PT educated pt and nursing that pt needs to be out of bed for meals. Nursing discontinuing purwick. PT assisted pt with donning brief and gripper socks. Pt was very lethargic after pain meds which hindered her tolerance of exercises. Tolerating session today without incident. Will continue to follow and progress as tolerated.     Plan/Recommendations:   If medically appropriate, Low Intensity Therapy recommended post-acute care - This is recommended as therapy feels this patient would require 2-3 visits per week. OP or HH would be the best option depending on patient's home bound status. Consider, if the patient has other  \"skilled\" needs such as wounds, IV antibiotics, etc. Combined with \"low intensity\" could also equate to a SNF. If patient is medically complex, consider LTAC. Pt requires rolling walker at discharge.     Pt desires Home with Home Health and Home with family assist at discharge. Pt cooperative; agreeable to therapeutic recommendations and " plan of care.         Basic Mobility 6-click:  Rollin = Total, A lot = 2, A little = 3; 4 = None  Supine>Sit:   1 = Total, A lot = 2, A little = 3; 4 = None   Sit>Stand with arms:  1 = Total, A lot = 2, A little = 3; 4 = None  Bed>Chair:   1 = Total, A lot = 2, A little = 3; 4 = None  Ambulate in room:  1 = Total, A lot = 2, A little = 3; 4 = None  3-5 Steps with railin = Total, A lot = 2, A little = 3; 4 = None  Score: 18    Modified [place]: N/A = No pre-op stroke/TIA    Post-Tx Position: Up in Chair, Alarms activated, and Call light and personal items within reach  PPE: gloves    Therapy Charges for Today       Code Description Service Date Service Provider Modifiers Qty    87565203792 HC GAIT TRAINING EA 15 MIN 2025 Gloria Brenner, PT GP 1    55462154454 HC PT THER PROC EA 15 MIN 2025 Gloria Brenner, PT GP 1    44642991941 HC PT NEUROMUSC RE EDUCATION EA 15 MIN 2025 Gloria Brenner, PT GP 1           PT Charges       Row Name 25 1012             Time Calculation    Start Time 09  -BR      Stop Time 0957  -BR      Time Calculation (min) 35 min  -BR      PT Received On 25  -BR      PT - Next Appointment 25  -BR         Time Calculation- PT    Total Timed Code Minutes- PT 35 minute(s)  -BR                User Key  (r) = Recorded By, (t) = Taken By, (c) = Cosigned By      Initials Name Provider Type    BR Gloria Brenner, PT Physical Therapist

## 2025-07-22 LAB
ALBUMIN SERPL-MCNC: 2.9 G/DL (ref 3.5–5.2)
ALBUMIN/GLOB SERPL: 1 G/DL
ALP SERPL-CCNC: 216 U/L (ref 39–117)
ALT SERPL W P-5'-P-CCNC: 17 U/L (ref 1–33)
ANION GAP SERPL CALCULATED.3IONS-SCNC: 13.9 MMOL/L (ref 5–15)
AST SERPL-CCNC: 20 U/L (ref 1–32)
BASOPHILS # BLD AUTO: 0.05 10*3/MM3 (ref 0–0.2)
BASOPHILS NFR BLD AUTO: 0.7 % (ref 0–1.5)
BILIRUB SERPL-MCNC: 0.4 MG/DL (ref 0–1.2)
BUN SERPL-MCNC: 20.7 MG/DL (ref 8–23)
BUN/CREAT SERPL: 22.7 (ref 7–25)
CALCIUM SPEC-SCNC: 8.2 MG/DL (ref 8.6–10.5)
CHLORIDE SERPL-SCNC: 92 MMOL/L (ref 98–107)
CO2 SERPL-SCNC: 26.1 MMOL/L (ref 22–29)
CREAT SERPL-MCNC: 0.91 MG/DL (ref 0.57–1)
DEPRECATED RDW RBC AUTO: 43.3 FL (ref 37–54)
EGFRCR SERPLBLD CKD-EPI 2021: 68.4 ML/MIN/1.73
EOSINOPHIL # BLD AUTO: 0.17 10*3/MM3 (ref 0–0.4)
EOSINOPHIL NFR BLD AUTO: 2.3 % (ref 0.3–6.2)
ERYTHROCYTE [DISTWIDTH] IN BLOOD BY AUTOMATED COUNT: 13.5 % (ref 12.3–15.4)
GLOBULIN UR ELPH-MCNC: 2.9 GM/DL
GLUCOSE BLDC GLUCOMTR-MCNC: 122 MG/DL (ref 70–105)
GLUCOSE BLDC GLUCOMTR-MCNC: 154 MG/DL (ref 70–105)
GLUCOSE BLDC GLUCOMTR-MCNC: 163 MG/DL (ref 70–105)
GLUCOSE BLDC GLUCOMTR-MCNC: 200 MG/DL (ref 70–105)
GLUCOSE SERPL-MCNC: 175 MG/DL (ref 65–99)
HCT VFR BLD AUTO: 43.5 % (ref 34–46.6)
HGB BLD-MCNC: 13.9 G/DL (ref 12–15.9)
IMM GRANULOCYTES # BLD AUTO: 0.08 10*3/MM3 (ref 0–0.05)
IMM GRANULOCYTES NFR BLD AUTO: 1.1 % (ref 0–0.5)
LYMPHOCYTES # BLD AUTO: 0.79 10*3/MM3 (ref 0.7–3.1)
LYMPHOCYTES NFR BLD AUTO: 10.7 % (ref 19.6–45.3)
MAGNESIUM SERPL-MCNC: 1.7 MG/DL (ref 1.6–2.4)
MCH RBC QN AUTO: 27.7 PG (ref 26.6–33)
MCHC RBC AUTO-ENTMCNC: 32 G/DL (ref 31.5–35.7)
MCV RBC AUTO: 86.8 FL (ref 79–97)
MONOCYTES # BLD AUTO: 0.73 10*3/MM3 (ref 0.1–0.9)
MONOCYTES NFR BLD AUTO: 9.9 % (ref 5–12)
NEUTROPHILS NFR BLD AUTO: 5.59 10*3/MM3 (ref 1.7–7)
NEUTROPHILS NFR BLD AUTO: 75.3 % (ref 42.7–76)
NRBC BLD AUTO-RTO: 0 /100 WBC (ref 0–0.2)
PHOSPHATE SERPL-MCNC: 2.9 MG/DL (ref 2.5–4.5)
PLATELET # BLD AUTO: 166 10*3/MM3 (ref 140–450)
PMV BLD AUTO: 11.3 FL (ref 6–12)
POTASSIUM SERPL-SCNC: 3.2 MMOL/L (ref 3.5–5.2)
POTASSIUM SERPL-SCNC: 4.3 MMOL/L (ref 3.5–5.2)
PROT SERPL-MCNC: 5.8 G/DL (ref 6–8.5)
RBC # BLD AUTO: 5.01 10*6/MM3 (ref 3.77–5.28)
SODIUM SERPL-SCNC: 132 MMOL/L (ref 136–145)
WBC NRBC COR # BLD AUTO: 7.41 10*3/MM3 (ref 3.4–10.8)

## 2025-07-22 PROCEDURE — 97116 GAIT TRAINING THERAPY: CPT

## 2025-07-22 PROCEDURE — 83735 ASSAY OF MAGNESIUM: CPT

## 2025-07-22 PROCEDURE — 97110 THERAPEUTIC EXERCISES: CPT

## 2025-07-22 PROCEDURE — 80053 COMPREHEN METABOLIC PANEL: CPT

## 2025-07-22 PROCEDURE — 99232 SBSQ HOSP IP/OBS MODERATE 35: CPT | Performed by: INTERNAL MEDICINE

## 2025-07-22 PROCEDURE — 25010000002 ENOXAPARIN PER 10 MG: Performed by: INTERNAL MEDICINE

## 2025-07-22 PROCEDURE — 84132 ASSAY OF SERUM POTASSIUM: CPT | Performed by: INTERNAL MEDICINE

## 2025-07-22 PROCEDURE — 85025 COMPLETE CBC W/AUTO DIFF WBC: CPT | Performed by: INTERNAL MEDICINE

## 2025-07-22 PROCEDURE — 94799 UNLISTED PULMONARY SVC/PX: CPT

## 2025-07-22 PROCEDURE — 25010000002 CEFTRIAXONE PER 250 MG: Performed by: INTERNAL MEDICINE

## 2025-07-22 PROCEDURE — 82948 REAGENT STRIP/BLOOD GLUCOSE: CPT | Performed by: INTERNAL MEDICINE

## 2025-07-22 PROCEDURE — 97112 NEUROMUSCULAR REEDUCATION: CPT

## 2025-07-22 PROCEDURE — 25010000002 FUROSEMIDE PER 20 MG: Performed by: INTERNAL MEDICINE

## 2025-07-22 PROCEDURE — 94664 DEMO&/EVAL PT USE INHALER: CPT

## 2025-07-22 PROCEDURE — 84100 ASSAY OF PHOSPHORUS: CPT

## 2025-07-22 PROCEDURE — 82948 REAGENT STRIP/BLOOD GLUCOSE: CPT

## 2025-07-22 PROCEDURE — 97530 THERAPEUTIC ACTIVITIES: CPT

## 2025-07-22 PROCEDURE — 63710000001 INSULIN LISPRO (HUMAN) PER 5 UNITS: Performed by: INTERNAL MEDICINE

## 2025-07-22 RX ORDER — METOPROLOL TARTRATE 25 MG/1
25 TABLET, FILM COATED ORAL ONCE
Status: DISCONTINUED | OUTPATIENT
Start: 2025-07-22 | End: 2025-07-22

## 2025-07-22 RX ORDER — METOPROLOL SUCCINATE 50 MG/1
50 TABLET, EXTENDED RELEASE ORAL EVERY 12 HOURS SCHEDULED
Status: DISCONTINUED | OUTPATIENT
Start: 2025-07-22 | End: 2025-07-24 | Stop reason: HOSPADM

## 2025-07-22 RX ORDER — POTASSIUM CHLORIDE 1500 MG/1
40 TABLET, EXTENDED RELEASE ORAL EVERY 4 HOURS
Status: COMPLETED | OUTPATIENT
Start: 2025-07-22 | End: 2025-07-22

## 2025-07-22 RX ADMIN — ENOXAPARIN SODIUM 40 MG: 100 INJECTION SUBCUTANEOUS at 17:09

## 2025-07-22 RX ADMIN — HYDROCODONE BITARTRATE AND ACETAMINOPHEN 1 TABLET: 5; 325 TABLET ORAL at 20:56

## 2025-07-22 RX ADMIN — BUDESONIDE AND FORMOTEROL FUMARATE DIHYDRATE 2 PUFF: 160; 4.5 AEROSOL RESPIRATORY (INHALATION) at 19:32

## 2025-07-22 RX ADMIN — Medication 10 ML: at 08:14

## 2025-07-22 RX ADMIN — PRAMIPEXOLE DIHYDROCHLORIDE 0.5 MG: 0.5 TABLET ORAL at 20:49

## 2025-07-22 RX ADMIN — ACYCLOVIR 400 MG: 200 CAPSULE ORAL at 10:29

## 2025-07-22 RX ADMIN — MUPIROCIN 1 APPLICATION: 20 OINTMENT TOPICAL at 22:05

## 2025-07-22 RX ADMIN — FUROSEMIDE 40 MG: 10 INJECTION, SOLUTION INTRAMUSCULAR; INTRAVENOUS at 20:49

## 2025-07-22 RX ADMIN — METOPROLOL SUCCINATE 50 MG: 50 TABLET, EXTENDED RELEASE ORAL at 20:49

## 2025-07-22 RX ADMIN — INSULIN LISPRO 8 UNITS: 100 INJECTION, SOLUTION INTRAVENOUS; SUBCUTANEOUS at 13:20

## 2025-07-22 RX ADMIN — CETIRIZINE HYDROCHLORIDE 10 MG: 10 TABLET, FILM COATED ORAL at 20:49

## 2025-07-22 RX ADMIN — INSULIN LISPRO 4 UNITS: 100 INJECTION, SOLUTION INTRAVENOUS; SUBCUTANEOUS at 17:10

## 2025-07-22 RX ADMIN — ACYCLOVIR 400 MG: 200 CAPSULE ORAL at 17:10

## 2025-07-22 RX ADMIN — CEFTRIAXONE 2000 MG: 2 INJECTION, POWDER, FOR SOLUTION INTRAMUSCULAR; INTRAVENOUS at 10:28

## 2025-07-22 RX ADMIN — Medication 10 ML: at 22:03

## 2025-07-22 RX ADMIN — MUPIROCIN 1 APPLICATION: 20 OINTMENT TOPICAL at 08:15

## 2025-07-22 RX ADMIN — HYDROXYZINE HYDROCHLORIDE 50 MG: 25 TABLET, FILM COATED ORAL at 20:49

## 2025-07-22 RX ADMIN — ACYCLOVIR 400 MG: 200 CAPSULE ORAL at 20:49

## 2025-07-22 RX ADMIN — FAMOTIDINE 20 MG: 20 TABLET, FILM COATED ORAL at 20:49

## 2025-07-22 RX ADMIN — POTASSIUM CHLORIDE 40 MEQ: 1500 TABLET, EXTENDED RELEASE ORAL at 08:15

## 2025-07-22 RX ADMIN — INSULIN LISPRO 4 UNITS: 100 INJECTION, SOLUTION INTRAVENOUS; SUBCUTANEOUS at 08:14

## 2025-07-22 RX ADMIN — BUDESONIDE AND FORMOTEROL FUMARATE DIHYDRATE 2 PUFF: 160; 4.5 AEROSOL RESPIRATORY (INHALATION) at 08:20

## 2025-07-22 RX ADMIN — HYDROCODONE BITARTRATE AND ACETAMINOPHEN 1 TABLET: 5; 325 TABLET ORAL at 04:20

## 2025-07-22 RX ADMIN — Medication 10 ML: at 08:15

## 2025-07-22 RX ADMIN — POTASSIUM CHLORIDE 40 MEQ: 1500 TABLET, EXTENDED RELEASE ORAL at 04:21

## 2025-07-22 RX ADMIN — FUROSEMIDE 40 MG: 10 INJECTION, SOLUTION INTRAMUSCULAR; INTRAVENOUS at 12:12

## 2025-07-22 RX ADMIN — FUROSEMIDE 40 MG: 10 INJECTION, SOLUTION INTRAMUSCULAR; INTRAVENOUS at 04:21

## 2025-07-22 RX ADMIN — GABAPENTIN 200 MG: 100 CAPSULE ORAL at 20:49

## 2025-07-22 RX ADMIN — ATORVASTATIN CALCIUM 40 MG: 40 TABLET, FILM COATED ORAL at 08:14

## 2025-07-22 RX ADMIN — HYDROCODONE BITARTRATE AND ACETAMINOPHEN 1 TABLET: 5; 325 TABLET ORAL at 10:29

## 2025-07-22 RX ADMIN — METOPROLOL SUCCINATE 25 MG: 25 TABLET, EXTENDED RELEASE ORAL at 08:14

## 2025-07-22 NOTE — CONSULTS
"Heart Failure Program  Nurse Navigator  Discharge Planning    Patient Name:Eugenia Madden  :1955  Cardiologist:Isiah  Current Admission Date: 7/15/2025   Previous Admission:    Admission frequency: 1 admissions in 6 months    Heart Failure history per record:    Symptoms on admission:c/o low BP, rash face, SOA. Pt states felling better today. Denies SOA.       Admission Weight:  Flowsheet Rows      Flowsheet Row First Filed Value   Admission Height 160 cm (63\") Documented at 07/15/2025 2224   Admission Weight 69.2 kg (152 lb 9.6 oz) Documented at 2025 0344              Current Home Medications:  Prior to Admission medications    Medication Sig Start Date End Date Taking? Authorizing Provider   atorvastatin (LIPITOR) 40 MG tablet TAKE 1 TABLET BY MOUTH DAILY 23  Yes Melisa Taveras MD   Breztri Aerosphere 160-9-4.8 MCG/ACT aerosol inhaler Inhale 2 puffs 2 (Two) Times a Day. 25  Yes Anthony Tapia MD   cetirizine (zyrTEC) 10 MG tablet Take 1 tablet by mouth Every Night.   Yes ProviderAnthony MD   famotidine (PEPCID) 20 MG tablet Take 1 tablet by mouth Every Night. 19  Yes ProviderAnthony MD   gabapentin (NEURONTIN) 300 MG capsule Take 1 capsule by mouth Every Night.   Yes ProviderAnthony MD   hydrOXYzine (ATARAX) 25 MG tablet TAKE 2 TABLETS BY MOUTH EVERY NIGHT AT BEDTIME 24  Yes Melisa Taveras MD   ipratropium-albuterol (DUO-NEB) 0.5-2.5 mg/3 ml nebulizer Take 3 mL by nebulization Every 4 (Four) Hours As Needed for Wheezing. 23  Yes Melisa Taveras MD   Jardiance 25 MG tablet tablet Take 1 tablet by mouth Every Night. 25  Yes Anthony Tapia MD   leflunomide (ARAVA) 20 MG tablet Take 1 tablet by mouth Every Night. 22  Yes Anthony Tapia MD   losartan (COZAAR) 25 MG tablet TAKE 1 TABLET BY MOUTH EVERY DAY 25  Yes Donell Joyce MD   metoprolol succinate XL (TOPROL-XL) 25 MG 24 hr tablet Take 1 tablet by mouth " Daily. 4/4/25  Yes Donell Joyce MD   pioglitazone (ACTOS) 15 MG tablet Take 1 tablet by mouth Every Night.   Yes Provider, MD Anthony   potassium chloride 10 MEQ CR tablet TAKE 1 TABLET BY MOUTH DAILY 12/26/23  Yes Melisa Taveras MD   pramipexole (MIRAPEX) 0.125 MG tablet TAKE 4 TABLETS BY MOUTH EVERY NIGHT AT BEDTIME 9/8/23  Yes Melisa Taveras MD   Semaglutide, 1 MG/DOSE, (Ozempic, 1 MG/DOSE,) 4 MG/3ML solution pen-injector INJECT 1 MG UNDER THE SKIN INTO THE APPROPRIATE AREA AS DIRECTED 1 (ONE) TIME PER WEEK. 9/18/23  Yes Lei Kim MD   nitroglycerin (NITROSTAT) 0.3 MG SL tablet 1 under the tongue as needed for angina, may repeat q5mins for up three doses 4/4/25   Donell Joyce MD       Social history:   Pt from home. Lives with significant other. No medication issues noted    Smoking status:     Diagnostics Testing:  proBNP level: 2698    Echocardiogram:Results for orders placed during the hospital encounter of 07/15/25    Adult Transthoracic Echo Complete w/ Color, Spectral and Contrast if Necessary Per Protocol 07/20/2025 1942    Interpretation Summary    Left ventricular systolic function is normal. Left ventricular ejection fraction appears to be 61 - 65%.    Left ventricular diastolic function is consistent with (grade I) impaired relaxation.    The left atrial cavity is severely dilated.    Estimated right ventricular systolic pressure from tricuspid regurgitation is mildly elevated (35-45 mmHg). Calculated right ventricular systolic pressure from tricuspid regurgitation is 42 mmHg.    The study is technically difficult for diagnosis. The quality of the study is limited with poor acoustic windows.        Patient Assessment:   Pt sitting up in bed, resp even and unlabored. No soa with conversation, no edema     Current O2:    Home O2:      Education provided to patient:  yes- Heart Failure disease education  yes -Symptom identification/management  yes -Daily Weights  yes- Diet  education   - Fluid restriction (if ordered)  yes- Activity education  yes- Medication education   - Smoking cessation  yes- Follow-up Appointments   -Provided information on how to access [facility] My HF Guide/Heart Failure Interactive workbook    Acceptance of learning: acceptance, cooperative    Heart Failure education interactive teaching session time: 20 minutes    GWTG: EF 61-65%    Identified needs/barriers:   Pending discharge plan, volume status, needs 7 day follow up and cardiology follow up    Intervention:   Education, HF Clinic apt per hosptialist order

## 2025-07-22 NOTE — THERAPY TREATMENT NOTE
"Subjective: Pt agreeable to therapeutic plan of care.  Patient is cleared for therapy by nursing and she is found resting in bed; agreeable to PT.    Objective:     Precautions - falls, desaturates, hypotension    Bed mobility - SBA  Transfers - CGA SPS, sit<>stand x5 reps   Ambulation - 160 feet CGA and Min-A/HHA no AD with decreased kristen and step through pattern; trialed x60ft with RW with CGA for balance, intermittent prompts for AD management.     Therapeutic Exercise -supine: heel slides, AP, hip abd seated: MIP, LAQ x25-30 reps    High level balance: high knee gait, retro gait 2x10 feet, lateral gait x10 feet each direction with HHA/Parviz    Vitals:   129/73mmHG, 95% RA, 99bpm  132/72mmHG, 93%, 98bpm    Pain: 7 VAS Location: back  Intervention for pain: Repositioned, Increased Activity, and Therapeutic Presence    Education: Verbal/Tactile Cues, Transfer Training, and Gait Training    Assessment: Eugenia Madden presents with functional mobility impairments which indicate the need for skilled intervention.  Patient demos progressing gait distance to 160 feet which is markedly improved from previous session.  Requires up to Parviz without AD; advised in use of RW upon discharge and she reports spouse obtained device.  Vitals stable with mobilization this date.  Continue to recommend home with spouse and HHPT once medically appropriate. Tolerating session today without incident. Will continue to follow and progress as tolerated.     Plan/Recommendations:   If medically appropriate, Low Intensity Therapy recommended post-acute care - This is recommended as therapy feels this patient would require 2-3 visits per week. OP or HH would be the best option depending on patient's home bound status. Consider, if the patient has other  \"skilled\" needs such as wounds, IV antibiotics, etc. Combined with \"low intensity\" could also equate to a SNF. If patient is medically complex, consider LTAC. Pt requires no DME at discharge. "     Pt desires Home, Home with Home Health, Home with family assist, and Home Health at discharge. Pt cooperative; agreeable to therapeutic recommendations and plan of care.         Basic Mobility 6-click:  Rollin = Total, A lot = 2, A little = 3; 4 = None  Supine>Sit:   1 = Total, A lot = 2, A little = 3; 4 = None   Sit>Stand with arms:  1 = Total, A lot = 2, A little = 3; 4 = None  Bed>Chair:   1 = Total, A lot = 2, A little = 3; 4 = None  Ambulate in room:  1 = Total, A lot = 2, A little = 3; 4 = None  3-5 Steps with railin = Total, A lot = 2, A little = 3; 4 = None  Score: 18    Modified [place]: N/A = No pre-op stroke/TIA    Post-Tx Position: Up in Chair, Alarms activated, and Call light and personal items within reach  PPE: gloves    Therapy Charges for Today       Code Description Service Date Service Provider Modifiers Qty    52082653452 HC PT THERAPEUTIC ACT EA 15 MIN 2025 Lety Brown, PT GP 1    15360684161 HC PT THER PROC EA 15 MIN 2025 Lety Brown, PT GP 1    57463978320 HC PT NEUROMUSC RE EDUCATION EA 15 MIN 2025 Lety Brown, PT GP 1    36246574236 HC GAIT TRAINING EA 15 MIN 2025 Lety Brown, PT GP 1           PT Charges       Row Name 25 1523             Time Calculation    Start Time 1424  -RR      Stop Time 1459  -RR      Time Calculation (min) 35 min  -RR      PT Received On 25  -RR      PT - Next Appointment 25  -RR         Time Calculation- PT    Total Timed Code Minutes- PT 35 minute(s)  -RR                User Key  (r) = Recorded By, (t) = Taken By, (c) = Cosigned By      Initials Name Provider Type    RR Lety Brown PT Physical Therapist

## 2025-07-22 NOTE — PROGRESS NOTES
Patient Name: Eugenia Madden  YOB: 1955  MRN: [id number]  Admission date: 7/15/2025  Reason for Encounter: Follow-up/Progress Note      [facility] Clinical Nutrition Progress Note       Nutrition Intervention Updates: Continue current diet    Consider nutrition supplements if intake continues to be variable       Subjective: Progress note to check on PO intake.        PO Diet: Diet: Fluid Restriction (240 mL/tray), Cardiac, Diabetic; Low Sodium (2g); Consistent Carbohydrate; 1500 mL/day; Texture: Regular (IDDSI 7); Fluid Consistency: Thin (IDDSI 0)   PO Supplements: None    PO Intake:  % - variable        Current nutrition support: -   Nutrition support review: -       Labs: Hyperglycemia management per MD (pt is already on a consistent carbohydrate diet)         GI Function:  BM 7/21         Brief Weight Review:    Wt Readings from Last 1 Encounters:   07/22/25 0440 61.3 kg (135 lb 2.3 oz)   07/21/25 2345 60.2 kg (132 lb 11.5 oz)   07/21/25 0453 61.6 kg (135 lb 12.9 oz)   07/19/25 0400 64.5 kg (142 lb 3.2 oz)   07/18/25 0400 61.5 kg (135 lb 9.3 oz)   07/17/25 0600 64 kg (141 lb 1.5 oz)   07/16/25 0442 61.1 kg (134 lb 11.2 oz)   07/16/25 0344 69.2 kg (152 lb 9.6 oz)        Results from last 7 days   Lab Units 07/22/25  1319 07/22/25  0254 07/21/25  1546 07/21/25  0349 07/20/25  0207   SODIUM mmol/L  --  132*  --  136 137   POTASSIUM mmol/L 4.3 3.2* 3.9 3.4* 4.0   CHLORIDE mmol/L  --  92*  --  96* 105   CO2 mmol/L  --  26.1  --  24.0 19.6*   BUN mg/dL  --  20.7  --  17.9 16.8   CREATININE mg/dL  --  0.91  --  0.86 0.74   CALCIUM mg/dL  --  8.2*  --  8.1* 8.4*   BILIRUBIN mg/dL  --  0.4  --  0.5 0.4   ALK PHOS U/L  --  216*  --  320* 281*   ALT (SGPT) U/L  --  17  --  17 19   AST (SGOT) U/L  --  20  --  24 32   GLUCOSE mg/dL  --  175*  --  145* 129*     Results from last 7 days   Lab Units 07/22/25  0459 07/22/25  0254 07/21/25  0349 07/20/25  1503 07/20/25  0207 07/17/25  0537 07/16/25  0601    MAGNESIUM mg/dL  --  1.7 1.6  --  2.0   < > 1.6   PHOSPHORUS mg/dL  --  2.9 2.7   < > 2.2*   < > 4.4   PLATELETS 10*3/mm3 166  --  127*  --  101*   < > 117*   HEMOGLOBIN g/dL 13.9  --  12.2  --  11.6*   < > 12.6   HEMATOCRIT % 43.5  --  37.7  --  36.3   < > 39.0   TRIGLYCERIDES mg/dL  --   --   --   --   --   --  142    < > = values in this interval not displayed.     Lab Results   Component Value Date    HGBA1C 9.83 (H) 07/16/2025       Electronically signed by:  Micki Medina RD  07/22/25 15:02 EDT

## 2025-07-22 NOTE — PLAN OF CARE
Goal Outcome Evaluation:  Plan of Care Reviewed With: patient        Progress: improving        Pt c/o back and right flank pain relieved with prn hydrocodone. Midline in place for IV antibiotics at discharge. Replacing potassium. IV lasix. Will continue to monitor...

## 2025-07-22 NOTE — PROGRESS NOTES
CARDIOLOGY FOLLOW-UP PROGRESS NOTE      Reason for follow-up:    Heart failure     Attending: Royce Reeves MD      Subjective .     Patient sitting in bed today with family at bedside. Her main conern is the new rash on her face that is being treated with antivirals. She denies chest pain, palpitations and shortness of breath    Interval HPI  Patient is minorly tachycardic low 100s and hypertensive 130s-140s systolic on exam this afternoon     Review of Systems   Constitutional: Negative for chills and fever.   HENT:  Negative for ear discharge and nosebleeds.    Eyes:  Negative for discharge and redness.   Cardiovascular:  Negative for chest pain, orthopnea, palpitations, paroxysmal nocturnal dyspnea and syncope.   Respiratory:  Negative for cough, shortness of breath and wheezing.    Endocrine: Negative for heat intolerance.   Skin:  Negative for rash.   Musculoskeletal:  Negative for arthritis and myalgias.   Gastrointestinal:  Negative for abdominal pain, melena, nausea and vomiting.   Genitourinary:  Negative for dysuria and hematuria.   Neurological:  Negative for dizziness, light-headedness, numbness and tremors.   Psychiatric/Behavioral:  Negative for depression. The patient is not nervous/anxious.      Pertinent items are noted in HPI, all other systems reviewed and negative  Allergies: Tuberculin, ppd    Scheduled Meds:acyclovir, 400 mg, Oral, TID  atorvastatin, 40 mg, Oral, Daily  budesonide-formoterol, 2 puff, Inhalation, BID - RT  cefTRIAXone, 2,000 mg, Intravenous, Q24H  cetirizine, 10 mg, Oral, Nightly  enoxaparin sodium, 40 mg, Subcutaneous, Q24H  famotidine, 20 mg, Oral, Nightly  furosemide, 40 mg, Intravenous, Q8H  gabapentin, 200 mg, Oral, Nightly  hydrOXYzine, 50 mg, Oral, Nightly  insulin lispro, 4-24 Units, Subcutaneous, 4x Daily AC & at Bedtime  [Held by provider] losartan, 25 mg, Oral, Daily  metoprolol succinate XL, 25 mg, Oral, Daily  metoprolol tartrate, 25 mg, Oral, Once  mupirocin,  "1 Application, Each Nare, BID  pramipexole, 0.5 mg, Oral, Nightly  senna-docusate sodium, 2 tablet, Oral, BID  sodium chloride, 10 mL, Intravenous, Q12H  sodium chloride, 10 mL, Intravenous, Q12H        Continuous Infusions:     PRN Meds:.  acetaminophen **OR** acetaminophen    aluminum-magnesium hydroxide-simethicone    senna-docusate sodium **AND** polyethylene glycol **AND** bisacodyl **AND** bisacodyl    Calcium Replacement - Follow Nurse / BPA Driven Protocol    dextrose    dextrose    glucagon (human recombinant)    HYDROcodone-acetaminophen    ipratropium-albuterol    Magnesium Standard Dose Replacement - Follow Nurse / BPA Driven Protocol    nitroglycerin    ondansetron ODT **OR** ondansetron    Phosphorus Replacement - Follow Nurse / BPA Driven Protocol    Potassium Replacement - Follow Nurse / BPA Driven Protocol    sodium chloride    sodium chloride    sodium chloride    sodium chloride    Objective     VITAL SIGNS  Patient Vitals for the past 24 hrs:   BP Temp Temp src Pulse Resp SpO2 Height Weight   07/22/25 1135 133/79 97.4 °F (36.3 °C) Oral 99 16 93 % -- --   07/22/25 0821 -- -- -- 92 16 90 % -- --   07/22/25 0820 -- -- -- 91 16 94 % -- --   07/22/25 0810 125/67 97.6 °F (36.4 °C) Oral 92 16 95 % -- --   07/22/25 0440 -- -- -- -- -- -- -- 61.3 kg (135 lb 2.3 oz)   07/22/25 0342 123/68 97.1 °F (36.2 °C) Temporal 89 18 99 % -- --   07/21/25 2345 98/55 97.1 °F (36.2 °C) Temporal 89 18 93 % 160 cm (63\") 60.2 kg (132 lb 11.5 oz)   07/21/25 2300 99/57 -- -- 91 -- 90 % -- --   07/21/25 2201 90/48 -- -- 97 -- 95 % -- --   07/21/25 2101 106/61 -- -- 109 -- 91 % -- --   07/21/25 2100 -- -- -- 109 -- 92 % -- --   07/21/25 2015 109/63 98.5 °F (36.9 °C) Oral 112 24 92 % -- --   07/21/25 1800 -- -- -- 107 -- 94 % -- --   07/21/25 1725 -- -- -- 110 -- 90 % -- --   07/21/25 1700 122/61 -- -- 98 -- 93 % -- --   07/21/25 1647 114/65 100.3 °F (37.9 °C) Oral 98 22 95 % -- --   07/21/25 1616 -- -- -- 100 -- 94 % -- -- " "  07/21/25 1600 114/65 -- -- 105 -- 94 % -- --   07/21/25 1500 109/75 -- -- 98 -- 92 % -- --        Flowsheet Rows      Flowsheet Row First Filed Value   Admission Height 160 cm (63\") Documented at 07/15/2025 2224   Admission Weight 69.2 kg (152 lb 9.6 oz) Documented at 07/16/2025 0344            Body mass index is 23.94 kg/m².      Intake/Output Summary (Last 24 hours) at 7/22/2025 1436  Last data filed at 7/22/2025 1135  Gross per 24 hour   Intake 410 ml   Output 375 ml   Net 35 ml        TELEMETRY:     Sinus tachycardia    Physical Exam:  Constitutional:       Appearance: Well-developed.   Eyes:      General: No scleral icterus.        Right eye: No discharge.   HENT:      Head: Normocephalic and atraumatic.   Neck:      Thyroid: No thyromegaly.      Lymphadenopathy: No cervical adenopathy.   Pulmonary:      Effort: Pulmonary effort is normal. No respiratory distress.      Breath sounds: Normal breath sounds. No wheezing. No rales.   Cardiovascular:      Normal rate. Regular rhythm.      No gallop.    Edema:     Peripheral edema absent.   Abdominal:      Tenderness: There is no abdominal tenderness.   Skin:     Findings: No erythema or rash.   Neurological:      Mental Status: Alert and oriented to person, place, and time.          Constitutional:       General: she is not in acute distress.     Appearance: Normal appearance. .   HENT:      Head: Normocephalic and atraumatic.   Eyes:      Extraocular Movements: Extraocular movements intact.      Pupils: Pupils are equal, round, and reactive to light.   Cardiovascular:      Rate and Rhythm: irregular rate and regular rhythm.   Pulmonary:      Effort: Pulmonary effort is normal.      Breath sounds: Normal breath sounds.   Abdominal:      General: Abdomen not distended. Bowel sounds are normal.      Palpations: Abdomen is soft.   Musculoskeletal:      Cervical back: Normal range of motion.   Skin:     General: Skin is warm and dry.      Scattered red ulcerated " lesions across mouth and nose  Neurological:      General: No focal deficit present.      Mental Status: she is alert and oriented to person, place, and time. Mental status is at baseline.   Psychiatric:         Mood and Affect: Mood normal.         Behavior: Behavior normal.         Thought Content: Thought content normal.     Results Review:   I reviewed the patient's new clinical results.    CBC    Results from last 7 days   Lab Units 07/22/25  0459 07/21/25  0349 07/20/25  0207 07/19/25  0252 07/18/25  0415 07/17/25  0537 07/16/25  0601   WBC 10*3/mm3 7.41 6.58 6.39 10.61 11.22* 10.38 13.70*   HEMOGLOBIN g/dL 13.9 12.2 11.6* 11.7* 11.8* 9.9* 12.6   PLATELETS 10*3/mm3 166 127* 101* 100* 88* 65* 117*     BMP   Results from last 7 days   Lab Units 07/22/25  1319 07/22/25  0254 07/21/25  1546 07/21/25  0349 07/20/25  1503 07/20/25  0207 07/19/25  0252 07/18/25  1744 07/18/25  0415 07/17/25  0537 07/16/25  1742 07/16/25  0601   SODIUM mmol/L  --  132*  --  136  --  137 138  --  134* 135*  --  141   POTASSIUM mmol/L 4.3 3.2* 3.9 3.4*  --  4.0 4.1  --  3.8 4.3   < > 3.3*   CHLORIDE mmol/L  --  92*  --  96*  --  105 107  --  105 104  --  109*   CO2 mmol/L  --  26.1  --  24.0  --  19.6* 18.3*  --  16.4* 17.4*  --  17.6*   BUN mg/dL  --  20.7  --  17.9  --  16.8 19.3  --  18.6 17.4  --  15.7   CREATININE mg/dL  --  0.91  --  0.86  --  0.74 0.93  --  0.87 0.97  --  1.22*   GLUCOSE mg/dL  --  175*  --  145*  --  129* 130*  --  126* 186*  --  188*   MAGNESIUM mg/dL  --  1.7  --  1.6  --  2.0 2.4  --  2.6* 2.6*  --  1.6   PHOSPHORUS mg/dL  --  2.9  --  2.7 2.6 2.2* 2.7 2.2* 2.1* 2.6  --  4.4    < > = values in this interval not displayed.     Cr Clearance Estimated Creatinine Clearance (by C-G formula based on SCr of 0.91 mg/dL)  Female: 56.5 mL/min  Male: 66.4 mL/min  Coag   Results from last 7 days   Lab Units 07/15/25  2251   INR  1.19*   APTT seconds 26.1     HbA1C   Lab Results   Component Value Date    HGBA1C 9.83 (H)  07/16/2025    HGBA1C 6.70 (H) 09/21/2023    HGBA1C 8.00 (H) 06/21/2023     Blood Glucose   Glucose   Date/Time Value Ref Range Status   07/22/2025 1133 200 (H) 70 - 105 mg/dL Final     Comment:     Serial Number: [id number]:  [id number]   07/22/2025 0738 163 (H) 70 - 105 mg/dL Final     Comment:     Serial Number: [id number]:  [id number]   07/21/2025 2207 217 (H) 70 - 105 mg/dL Final     Comment:     Serial Number: [id number]:  [id number]   07/21/2025 2038 187 (H) 70 - 105 mg/dL Final     Comment:     Serial Number: [id number]:  [id number]   07/21/2025 1649 162 (H) 70 - 105 mg/dL Final     Comment:     Serial Number: [id number]:  [id number]   07/21/2025 1536 147 (H) 70 - 105 mg/dL Final     Comment:     Serial Number: [id number]:  [id number]   07/21/2025 1219 242 (H) 70 - 105 mg/dL Final     Comment:     Serial Number: [id number]:  [id number]   07/21/2025 0748 195 (H) 70 - 105 mg/dL Final     Comment:     Serial Number: [id number]:  [id number]     Infection   Results from last 7 days   Lab Units 07/15/25  2251 07/15/25  2248   BLOODCX  Proteus mirabilis* Proteus mirabilis*   BCIDPCR   --  Proteus spp. Identification by BCID2 PCR.*   PROCALCITONIN ng/mL 30.90*  --      CMP   Results from last 7 days   Lab Units 07/22/25  1319 07/22/25  0254 07/21/25  1546 07/21/25  0349 07/20/25  0207 07/19/25  0252 07/18/25  0415 07/17/25  0537 07/16/25  1742 07/16/25  0601 07/15/25  2251   SODIUM mmol/L  --  132*  --  136 137 138 134* 135*  --  141 138   POTASSIUM mmol/L 4.3 3.2* 3.9 3.4* 4.0 4.1 3.8 4.3   < > 3.3* 3.5   CHLORIDE mmol/L  --  92*  --  96* 105 107 105 104  --  109* 102   CO2 mmol/L  --  26.1  --  24.0 19.6* 18.3* 16.4* 17.4*  --  17.6* 18.4*   BUN mg/dL  --  20.7  --  17.9 16.8 19.3 18.6 17.4  --  15.7 15.5   CREATININE mg/dL  --  0.91  --  0.86 0.74 0.93 0.87 0.97  --  1.22* 0.95   GLUCOSE mg/dL  --  175*  --  145* 129* 130* 126* 186*  --  188* 294*   ALBUMIN g/dL  --   "2.9*  --  2.8* 2.8* 2.7* 2.8* 2.5*  --  3.2* 3.6   BILIRUBIN mg/dL  --  0.4  --  0.5 0.4 0.4 0.4 0.4  --  0.6 0.8   ALK PHOS U/L  --  216*  --  320* 281* 224* 71 50  --  65 104   AST (SGOT) U/L  --  20  --  24 32 18 19 17  --  29 37*   ALT (SGPT) U/L  --  17  --  17 19 17 17 16  --  25 28   LIPASE U/L  --   --   --   --   --   --   --   --   --   --  17    < > = values in this interval not displayed.     ABG    Results from last 7 days   Lab Units 07/21/25  1149   PH, ARTERIAL pH units 7.494*   PCO2, ARTERIAL mm Hg 38.2   PO2 ART mm Hg 77.5*   O2 SATURATION ART % 96.4   BASE EXCESS ART mmol/L 5.8*     UA    Results from last 7 days   Lab Units 07/15/25  2304   NITRITE UA  Negative   WBC UA /HPF 6-10*   BACTERIA UA /HPF None Seen   SQUAM EPITHEL UA /HPF 0-2     ANITHA  No results found for: \"POCMETH\", \"POCAMPHET\", \"POCBARBITUR\", \"POCBENZO\", \"POCCOCAINE\", \"POCOPIATES\", \"POCOXYCODO\", \"POCPHENCYC\", \"POCPROPOXY\", \"POCTHC\", \"POCTRICYC\"  Lysis Labs   Results from last 7 days   Lab Units 07/22/25  0459 07/22/25  0254 07/21/25  0349 07/20/25  0207 07/19/25  0252 07/18/25  0415 07/17/25  0537 07/16/25  0601 07/15/25  2251   INR   --   --   --   --   --   --   --   --  1.19*   APTT seconds  --   --   --   --   --   --   --   --  26.1   HEMOGLOBIN g/dL 13.9  --  12.2 11.6* 11.7* 11.8* 9.9* 12.6 14.5   PLATELETS 10*3/mm3 166  --  127* 101* 100* 88* 65* 117* 129*   CREATININE mg/dL  --  0.91 0.86 0.74 0.93 0.87 0.97 1.22* 0.95     Radiology(recent) XR Chest 1 View  Result Date: 7/21/2025  Impression: 1. Interstitial thickening improved from prior study suggesting resolving pulmonary edema. 2. Mild bibasilar airspace disease/atelectasis with small bilateral pleural effusions. Electronically Signed: Austyn Henson MD  7/21/2025 12:07 PM EDT  Workstation ID: [id number]      Imaging Results (Last 24 Hours)       ** No results found for the last 24 hours. **            Results from last 7 days   Lab Units 07/20/25  0319   HSTROP T ng/L 50* "       EKG       I personally viewed and interpreted the patient's EKG/Telemetry data:        ECHOCARDIOGRAM:     Results for orders placed during the hospital encounter of 07/15/25    Adult Transthoracic Echo Complete w/ Color, Spectral and Contrast if Necessary Per Protocol 07/20/2025 1942    Interpretation Summary    Left ventricular systolic function is normal. Left ventricular ejection fraction appears to be 61 - 65%.    Left ventricular diastolic function is consistent with (grade I) impaired relaxation.    The left atrial cavity is severely dilated.    Estimated right ventricular systolic pressure from tricuspid regurgitation is mildly elevated (35-45 mmHg). Calculated right ventricular systolic pressure from tricuspid regurgitation is 42 mmHg.    The study is technically difficult for diagnosis. The quality of the study is limited with poor acoustic windows.              Assessment & Plan            Septic shock        ASSESSMENT:    Bacteremia with Proteus  Emphysematous right-sided pyelonephritis  Sepsis  Antibiotics per primary     Diastolic heart failure with preserved ejection fraction  Tricuspid valve regurgitation / Pulm Htn  yesterday BNP 2698   yesterday chest x-ray with severe interstitial pleural pulmonary edema   Chest x-ray today suggest resolving pulmonary edema, mild bibasilar atelectasis small bilateral pleural effusions  Grade 1 DD  estimated RVSP 42 mmHg  Currently on Lasix 40 mg IV Q8  Net -1840  Today sodium 136, CO2 24, potassium 3.4, magnesium 1.6  Replace to goal potassium 4.0 magnesium 2.0  Strict I&O and standing daily weights  On metoprolol 25 mg daily     HTN  Has been hypotensive this admission in the setting of sepsis, currently stable 107/68  Losartan was held      HAVEN on CKD 3  Losartan was held   Cr was 1.22, improved to 0.86 today     COPD   3 L nasal cannula     Type 2 diabetes  On sliding scale insulin  Jardiance per home meds    PLAN:    Patient only getting 25 mg Toprol  once daily- tachycardic rates currently increased to 50 BID   Patient is negative for ischemia or infarction with no signs of ACS  Current medical treatment satisfactory once BB is restarted consistently tomorrow  ID seeing for facial rash  K low this morning- replacement ongoing    Patient is seen and examined and findings are verified.  All data is reviewed by me personally.  Assessment and plan formulated by APC was done after discussion with attending.  I spent more than 50% of time in taking care of the patient.    Patient is sitting up in bed.  Patient denies any symptom of chest pain or tightness or heaviness.  No leg edema noted.    I will change Lasix to p.o.  Patient had recent stress test and echocardiogram which was unremarkable.  Continue current treatment.  Patient is stable from cardiac standpoint.    Electronically signed by Donell Joyce MD, 07/23/25, 7:39 AM EDT.              Electronically signed by JONATHAN Vasquez, 07/22/25, 2:36 PM EDT.          JONATHAN Vasquez  07/22/25  14:36 EDT

## 2025-07-22 NOTE — PROGRESS NOTES
[facility] MEDICINE SERVICE  DAILY PROGRESS NOTE    NAME: Eugenia Madden  : 1955  MRN: [id number]      LOS: 6 days     PROVIDER OF SERVICE: Royce Reeves MD    Chief Complaint: Septic shock    Subjective:     Interval History:  History taken from: patient    Patient was seen and evaluated at bedside. No complaints. Treatment plan was communicated to the patient. All questioned answered.          Review of Systems:   Review of Systems   Constitutional:  Negative for chills and fever.   Respiratory:  Negative for shortness of breath.    Cardiovascular:  Negative for chest pain.   Gastrointestinal:  Negative for abdominal pain.       Objective:     Vital Signs  Temp:  [97.1 °F (36.2 °C)-98.5 °F (36.9 °C)] 97.6 °F (36.4 °C)  Heart Rate:  [] 99  Resp:  [16-24] 17  BP: ()/(48-79) 119/68  Flow (L/min) (Oxygen Therapy):  [2] 2   Body mass index is 23.94 kg/m².    Physical Exam  Physical Exam  Constitutional:       General: Eugenia is not in acute distress.  Cardiovascular:      Rate and Rhythm: Normal rate.      Heart sounds: No murmur heard.  Pulmonary:      Effort: Pulmonary effort is normal. No respiratory distress.   Abdominal:      General: Bowel sounds are normal.      Palpations: Abdomen is soft.   Neurological:      Mental Status: Eugneia is alert.            Diagnostic Data    Results from last 7 days   Lab Units 25  1319 25  0459 25  0254   WBC 10*3/mm3  --  7.41  --    HEMOGLOBIN g/dL  --  13.9  --    HEMATOCRIT %  --  43.5  --    PLATELETS 10*3/mm3  --  166  --    GLUCOSE mg/dL  --   --  175*   CREATININE mg/dL  --   --  0.91   BUN mg/dL  --   --  20.7   SODIUM mmol/L  --   --  132*   POTASSIUM mmol/L 4.3  --  3.2*   AST (SGOT) U/L  --   --  20   ALT (SGPT) U/L  --   --  17   ALK PHOS U/L  --   --  216*   BILIRUBIN mg/dL  --   --  0.4   ANION GAP mmol/L  --   --  13.9       XR Chest 1 View  Result Date: 2025  Impression: 1. Interstitial thickening improved from  prior study suggesting resolving pulmonary edema. 2. Mild bibasilar airspace disease/atelectasis with small bilateral pleural effusions. Electronically Signed: Austyn Henson MD  7/21/2025 12:07 PM EDT  Workstation ID: [id number]        I reviewed the patient's new clinical results.    Assessment/Plan:     Active and Resolved Problems  Active Hospital Problems    Diagnosis  POA    **Septic shock [A41.9, R65.21]  Yes      Resolved Hospital Problems   No resolved problems to display.     Diagnoses  Bacteremia with Proteus  Emphysematous right-sided pyelonephritis  Sepsis  Acute heart failure with preserved ejection fraction  HAVEN on CKD2  COPD not in exacerbation  Type 2 diabetes with peripheral neuropathy  Hypertension    PLAN  Patient on ceftriaxone needs to take it for 2 weeks.  Urology evaluated the patient and no intervention necessary at this time follow-up with urology as an outpatient.  Reason for bacteremia most likely UTI.  Renal function improving and back to normal.      ID following, 2 out of 2 blood cultures positive for Proteus mirabilis sensitive to ceftriaxone. ID recs including Continue IV ceftriaxone 2 g daily. Try to set up 4 weeks of treatment. However, duration can be shortened to 2 weeks if repeat CT scan showed resolution of the right kidney abnormality, R arm midline was placed. ID to Recommend repeat CT scan of abdomen and pelvis in 2 weeks as outpatient that can be done at the patient's primary doctor's office. Continue p.o. acyclovir 400 mg 3 times daily for total 5 days     Patient's BNP more than 2600 and chest x-ray with severe interstitial pleural pulmonary edema  On Lasix 40 mg IV every 8 hours.  2D echo with ejection fraction 61 to 65%, grade 1 diastolic dysfunction, left atrial cavity is severely dilated  Strict intake output Daily weights and less than 2 g salt diet and fluid restriction of 1500 mL/day  Cardiology following, recs appreciated.    Hx of HTN, ARB held due to  normotension.      VTE Prophylaxis:  Pharmacologic & mechanical VTE prophylaxis orders are present.             Disposition Planning:     Barriers to Discharge:Not medically cleared, consultation clearance, IV diuretics, IV Abx  Anticipated Date of Discharge: 24-48 hours  Place of Discharge: Home with [facility]       Time: 35 minutes     Code Status and Medical Interventions: CPR (Attempt to Resuscitate); Full Support   Ordered at: 07/16/25 0959     Code Status (Patient has no pulse and is not breathing):    CPR (Attempt to Resuscitate)     Medical Interventions (Patient has pulse or is breathing):    Full Support       Signature: Electronically signed by Royce Reeves MD, 07/22/25, 19:13 EDT.  [facility] Hospitalist Team

## 2025-07-22 NOTE — PLAN OF CARE
Goal Outcome Evaluation:      Patient alert and oriented. Voices needs. Pain treated per MAR. IV antibiotics continued.

## 2025-07-22 NOTE — NURSING NOTE
Pt resting comfortably throughout shift, prior to transferring to different unit. No new issues and tolerated new pain medication. Called report to Shaq in 2D.     Family notified of pt transport to different unit, he's aware of new room and plans to be in house prior to lunch.

## 2025-07-22 NOTE — CONSULTS
Infectious Diseases Consult Note    Referring Provider: Royce Reeves MD    Reason for Consultation: Bacteremia    Patient Care Team:  Nahid Lam MD as PCP - General (Family Medicine)  Sheryl Khan MD as Consulting Physician (Rheumatology)    Chief complaint right flank pain    Subjective     History of present illness:      This is a 69-year-old female who was hospitalized [facility] on July 15, 2025.  She presented to hospital with confusion and was found to be bacteremic with Proteus.  She was complaining of right flank pain.  CT scan of the abdomen pelvis showed signs of right sided emphysematous pyelonephritis.  Patient is currently on IV ceftriaxone.  Patient was noted to have oral ulcers started 2 days ago.  She was started on p.o. acyclovir.    Review of Systems   Review of Systems   Constitutional: Negative.    HENT: Negative.     Eyes: Negative.    Respiratory: Negative.     Cardiovascular: Negative.    Gastrointestinal: Negative.    Genitourinary: Negative.    Musculoskeletal: Negative.    Skin: Negative.    Neurological: Negative.    Hematological: Negative.    Psychiatric/Behavioral: Negative.         Medications  Medications Prior to Admission   Medication Sig Dispense Refill Last Dose/Taking    atorvastatin (LIPITOR) 40 MG tablet TAKE 1 TABLET BY MOUTH DAILY 90 tablet 0 7/14/2025 Evening    Breztri Aerosphere 160-9-4.8 MCG/ACT aerosol inhaler Inhale 2 puffs 2 (Two) Times a Day.   7/15/2025 Morning    cetirizine (zyrTEC) 10 MG tablet Take 1 tablet by mouth Every Night.   7/14/2025 Evening    famotidine (PEPCID) 20 MG tablet Take 1 tablet by mouth Every Night.   7/14/2025 Evening    gabapentin (NEURONTIN) 300 MG capsule Take 1 capsule by mouth Every Night.   7/14/2025 Evening    hydrOXYzine (ATARAX) 25 MG tablet TAKE 2 TABLETS BY MOUTH EVERY NIGHT AT BEDTIME 180 tablet 1 7/14/2025 Bedtime    ipratropium-albuterol (DUO-NEB) 0.5-2.5 mg/3 ml nebulizer Take 3 mL by  nebulization Every 4 (Four) Hours As Needed for Wheezing. 360 mL 1 Taking As Needed    Jardiance 25 MG tablet tablet Take 1 tablet by mouth Every Night.   7/14/2025 Evening    leflunomide (ARAVA) 20 MG tablet Take 1 tablet by mouth Every Night.   7/14/2025 Evening    losartan (COZAAR) 25 MG tablet TAKE 1 TABLET BY MOUTH EVERY DAY 90 tablet 1 7/14/2025 Evening    metoprolol succinate XL (TOPROL-XL) 25 MG 24 hr tablet Take 1 tablet by mouth Daily. 90 tablet 3 7/14/2025 Evening    pioglitazone (ACTOS) 15 MG tablet Take 1 tablet by mouth Every Night.   7/14/2025 Evening    potassium chloride 10 MEQ CR tablet TAKE 1 TABLET BY MOUTH DAILY 90 tablet 0 7/14/2025 Evening    pramipexole (MIRAPEX) 0.125 MG tablet TAKE 4 TABLETS BY MOUTH EVERY NIGHT AT BEDTIME 120 tablet 0 7/14/2025 Bedtime    Semaglutide, 1 MG/DOSE, (Ozempic, 1 MG/DOSE,) 4 MG/3ML solution pen-injector INJECT 1 MG UNDER THE SKIN INTO THE APPROPRIATE AREA AS DIRECTED 1 (ONE) TIME PER WEEK. 3 mL 2 7/14/2025 Morning    nitroglycerin (NITROSTAT) 0.3 MG SL tablet 1 under the tongue as needed for angina, may repeat q5mins for up three doses 100 tablet 11        History  Past Medical History:   Diagnosis Date    Arthritis     COPD (chronic obstructive pulmonary disease) 07/2021    Coronary artery disease     Diabetes mellitus 2010    Heart murmur     Hypertension     Kidney stones     Restless leg      Past Surgical History:   Procedure Laterality Date    CERVIX SURGERY  1983    Dysplasia removed from Cervix    COLONOSCOPY      KIDNEY STONE SURGERY  2016    Lithotripsy    OTHER SURGICAL HISTORY  2007    Pinched nerve arm     TUBAL ABDOMINAL LIGATION Bilateral        Family History  Family History   Problem Relation Age of Onset    Heart disease Mother     Cancer Mother         Breast Cancer    Breast cancer Mother 55    Diabetes Mother     Diabetes Father     Stroke Father     Heart disease Father     Hypertension Father     Cancer Father     Hypertension Sister      Diabetes Sister     Heart disease Sister     Diabetes Brother     Hypertension Brother     Heart disease Brother     Other Other         [facility]    Diabetes Brother     Hypertension Brother     No Known Problems Brother     Heart disease Brother     Cancer Brother         colon    Other Brother        Social History   reports that Eugenia quit smoking about 4 years ago. Eugenia's smoking use included cigarettes. Eugenia started smoking about 51 years ago. Eugenia has a 23.4 pack-year smoking history. Eugenia has been exposed to tobacco smoke. Eugenia has never used smokeless tobacco. Eugenia reports that Eugenia does not drink alcohol and does not use drugs.    Allergies  Tuberculin, ppd    Objective     Vital Signs   Vital Signs (last 24 hours)         07/21 0700  07/22 0659 07/22 0700  07/22 1410   Most Recent      Temp (°F) 97.1 -  100.3    97.4 -  97.6     97.4 (36.3) 07/22 1135    Heart Rate 89 -  112    91 -  99     99 07/22 1135    Resp 18 -  24      16     16 07/22 1135    BP 90/48 -  142/82    125/67 -  133/79     133/79 07/22 1135    SpO2 (%) 90 -  99    90 -  95     93 07/22 1135    Flow (L/min) (Oxygen Therapy) 2 -  3       2 07/22 0420            Physical Exam:  Physical Exam  Vitals and nursing note reviewed.   Constitutional:       Appearance: Eugenia is well-developed.   HENT:      Head: Normocephalic and atraumatic.   Eyes:      Pupils: Pupils are equal, round, and reactive to light.   Cardiovascular:      Rate and Rhythm: Normal rate and regular rhythm.      Heart sounds: Normal heart sounds.   Pulmonary:      Effort: Pulmonary effort is normal. No respiratory distress.      Breath sounds: Normal breath sounds. No wheezing or rales.   Abdominal:      General: Bowel sounds are normal. There is no distension.      Palpations: Abdomen is soft. There is no mass.      Tenderness: There is no abdominal tenderness. There is no guarding or rebound.   Musculoskeletal:         General: No  deformity. Normal range of motion.      Cervical back: Normal range of motion and neck supple.   Skin:     General: Skin is warm.      Findings: No erythema or rash.      Comments: Perioral lesions consistent with herpetic lesions   Neurological:      Mental Status: Eugenia is alert and oriented to person, place, and time.      Cranial Nerves: No cranial nerve deficit.         Microbiology  Microbiology Results (last 10 days)       Procedure Component Value - Date/Time    MRSA Screen, PCR (Inpatient) - Swab, Nares [[id number]]  (Normal) Collected: 07/16/25 0402    Lab Status: Final result Specimen: Swab from Nares Updated: 07/16/25 0519     MRSA PCR No MRSA Detected    Narrative:      The negative predictive value of this diagnostic test is high and should only be used to consider de-escalating anti-MRSA therapy. A positive result may indicate colonization with MRSA and must be correlated clinically.    Blood Culture - Blood, Arm, Right [[id number]]  (Abnormal) Collected: 07/15/25 2251    Lab Status: Final result Specimen: Blood from Arm, Right Updated: 07/18/25 0622     Blood Culture Proteus mirabilis     Isolated from Aerobic and Anaerobic Bottles     Gram Stain Anaerobic Bottle Gram negative bacilli      Aerobic Bottle Gram negative bacilli    Narrative:      Refer to previous blood culture collected on 07/15/2025 2248 for MICs      Blood Culture - Blood, Arm, Left [[id number]]  (Abnormal)  (Susceptibility) Collected: 07/15/25 2248    Lab Status: Final result Specimen: Blood from Arm, Left Updated: 07/18/25 0622     Blood Culture Proteus mirabilis     Isolated from Aerobic and Anaerobic Bottles     Gram Stain Anaerobic Bottle Gram negative bacilli      Aerobic Bottle Gram negative bacilli    Susceptibility        Proteus mirabilis      SOFÍA      Amoxicillin + Clavulanate Susceptible      Ampicillin Susceptible      Ampicillin + Sulbactam Susceptible      Cefazolin (Non Urine) Intermediate      Cefepime Susceptible       Ceftazidime Susceptible      Ceftriaxone Susceptible      Cefuroxime axetil Susceptible      Gentamicin Susceptible      Levofloxacin Susceptible      Piperacillin + Tazobactam Susceptible      Trimethoprim + Sulfamethoxazole Susceptible                       Susceptibility Comments       Proteus mirabilis    With the exception of urinary-sourced infections, aminoglycosides should not be used as monotherapy.               Blood Culture ID, PCR - Blood, Arm, Left [[id number]]  (Abnormal) Collected: 07/15/25 2248    Lab Status: Final result Specimen: Blood from Arm, Left Updated: 07/16/25 1406     BCID, PCR Proteus spp. Identification by BCID2 PCR.     BOTTLE TYPE Anaerobic Bottle    Narrative:      No resistance genes detected.    COVID-19, FLU A/B, RSV PCR 1 HR TAT - Swab, Nasopharynx [[id number]]  (Normal) Collected: 07/15/25 2237    Lab Status: Final result Specimen: Swab from Nasopharynx Updated: 07/15/25 2322     COVID19 Not Detected     Influenza A PCR Not Detected     Influenza B PCR Not Detected     RSV, PCR Not Detected            Laboratory  Results from last 7 days   Lab Units 07/22/25  0459   WBC 10*3/mm3 7.41   HEMOGLOBIN g/dL 13.9   HEMATOCRIT % 43.5   PLATELETS 10*3/mm3 166     Results from last 7 days   Lab Units 07/22/25  1319 07/22/25  0254   SODIUM mmol/L  --  132*   POTASSIUM mmol/L 4.3 3.2*   CHLORIDE mmol/L  --  92*   CO2 mmol/L  --  26.1   BUN mg/dL  --  20.7   CREATININE mg/dL  --  0.91   GLUCOSE mg/dL  --  175*   CALCIUM mg/dL  --  8.2*     Results from last 7 days   Lab Units 07/22/25  1319 07/22/25  0254   SODIUM mmol/L  --  132*   POTASSIUM mmol/L 4.3 3.2*   CHLORIDE mmol/L  --  92*   CO2 mmol/L  --  26.1   BUN mg/dL  --  20.7   CREATININE mg/dL  --  0.91   GLUCOSE mg/dL  --  175*   CALCIUM mg/dL  --  8.2*                   Radiology  Imaging Results (Last 72 Hours)       Procedure Component Value Units Date/Time    XR Chest 1 View [[id number]] Collected: 07/21/25 1202     Updated: 07/21/25  1209    Narrative:      XR CHEST 1 VW    Date of Exam: 7/21/2025 12:00 PM EDT    Indication: Follow-up on pulmonary edema    Comparison: 7/20/2025    Findings:  Heart mildly enlarged, unchanged. Interstitial thickening improved in the prior study suggesting resolving pulmonary edema. Mild bibasilar airspace disease/atelectasis. Suspect small bilateral pleural effusions. Negative for pneumothorax. Remote right   posterior rib fractures at the sixth and ninth ribs. Aortic arch atherosclerosis.      Impression:      Impression:  1. Interstitial thickening improved from prior study suggesting resolving pulmonary edema.  2. Mild bibasilar airspace disease/atelectasis with small bilateral pleural effusions.          Electronically Signed: Austyn Henson MD    7/21/2025 12:07 PM EDT    Workstation ID: [id number]    XR Chest 1 View [[id number]] Collected: 07/20/25 0257     Updated: 07/20/25 0259    Narrative:      XR CHEST 1 VW    Date of Exam: 7/20/2025 2:42 AM EDT    Indication: Rule out pulmonary edema    Comparison: CT chest 7/16/2025.    Findings:  There is severe interstitial pulmonary edema. Cardiac silhouette is enlarged. Pulmonary vasculature is indistinct. No pneumothorax or pleural effusion.      Impression:      Impression:  Severe interstitial pulmonary edema.          Electronically Signed: Raheel Bennett MD    7/20/2025 2:57 AM EDT    Workstation ID: [id number]            Cardiology      Results Review:  I have reviewed all clinical data, test, lab, and imaging results.       Schedule Meds  acyclovir, 400 mg, Oral, TID  atorvastatin, 40 mg, Oral, Daily  budesonide-formoterol, 2 puff, Inhalation, BID - RT  cefTRIAXone, 2,000 mg, Intravenous, Q24H  cetirizine, 10 mg, Oral, Nightly  enoxaparin sodium, 40 mg, Subcutaneous, Q24H  famotidine, 20 mg, Oral, Nightly  furosemide, 40 mg, Intravenous, Q8H  gabapentin, 200 mg, Oral, Nightly  hydrOXYzine, 50 mg, Oral, Nightly  insulin lispro, 4-24 Units, Subcutaneous, 4x Daily AC  & at Bedtime  [Held by provider] losartan, 25 mg, Oral, Daily  metoprolol succinate XL, 25 mg, Oral, Daily  metoprolol tartrate, 5 mg, Intravenous, Once  mupirocin, 1 Application, Each Nare, BID  pramipexole, 0.5 mg, Oral, Nightly  senna-docusate sodium, 2 tablet, Oral, BID  sodium chloride, 10 mL, Intravenous, Q12H  sodium chloride, 10 mL, Intravenous, Q12H        Infusion Meds       PRN Meds    acetaminophen **OR** acetaminophen    aluminum-magnesium hydroxide-simethicone    senna-docusate sodium **AND** polyethylene glycol **AND** bisacodyl **AND** bisacodyl    Calcium Replacement - Follow Nurse / BPA Driven Protocol    dextrose    dextrose    glucagon (human recombinant)    HYDROcodone-acetaminophen    ipratropium-albuterol    Magnesium Standard Dose Replacement - Follow Nurse / BPA Driven Protocol    nitroglycerin    ondansetron ODT **OR** ondansetron    Phosphorus Replacement - Follow Nurse / BPA Driven Protocol    Potassium Replacement - Follow Nurse / BPA Driven Protocol    sodium chloride    sodium chloride    sodium chloride    sodium chloride      Assessment & Plan       Assessment    Proteus mirabilis bacteremia.  Blood culture was positive in 2 out of 2 sets on admission on July 15, 2025.  Secondary to complicated UTI.    Complicated UTI with right-sided emphysematous pyelonephritis.    Oral herpes simplex lesions    Diabetes mellitus type 2    Plan    Continue IV ceftriaxone 2 g daily.  Try to set up 4 weeks of treatment.  However, duration can be shortened to 2 weeks if repeat CT scan showed resolution of the right kidney abnormality  Recommend to repeat CT scan of abdomen and pelvis in 2 weeks as outpatient that can be done at the patient's primary doctor's office  Continue p.o. acyclovir 400 mg 3 times daily for total 5 days  Probable discharge home in the next day or 2  The patient currently has right arm midline    Sophie Hogan MD  07/22/25  14:10 EDT    Note is dictated utilizing voice  recognition software/Dragon

## 2025-07-22 NOTE — PLAN OF CARE
Goal Outcome Evaluation:      Assessment: Eugenia Madden presents with functional mobility impairments which indicate the need for skilled intervention.  Patient demos progressing gait distance to 160 feet which is markedly improved from previous session.  Requires up to [place] without AD; advised in use of RW upon discharge and she reports spouse obtained device.  Vitals stable with mobilization this date.  Continue to recommend home with spouse and HHPT once medically appropriate. Tolerating session today without incident. Will continue to follow and progress as tolerated.

## 2025-07-23 LAB
ALBUMIN SERPL-MCNC: 3.2 G/DL (ref 3.5–5.2)
ALBUMIN/GLOB SERPL: 0.9 G/DL
ALP SERPL-CCNC: 218 U/L (ref 39–117)
ALT SERPL W P-5'-P-CCNC: 15 U/L (ref 1–33)
ANION GAP SERPL CALCULATED.3IONS-SCNC: 15.9 MMOL/L (ref 5–15)
AST SERPL-CCNC: 23 U/L (ref 1–32)
BASOPHILS # BLD AUTO: 0.06 10*3/MM3 (ref 0–0.2)
BASOPHILS NFR BLD AUTO: 0.7 % (ref 0–1.5)
BILIRUB SERPL-MCNC: 0.4 MG/DL (ref 0–1.2)
BUN SERPL-MCNC: 21.2 MG/DL (ref 8–23)
BUN/CREAT SERPL: 20.6 (ref 7–25)
CALCIUM SPEC-SCNC: 8.3 MG/DL (ref 8.6–10.5)
CHLORIDE SERPL-SCNC: 96 MMOL/L (ref 98–107)
CO2 SERPL-SCNC: 26.1 MMOL/L (ref 22–29)
CREAT SERPL-MCNC: 1.03 MG/DL (ref 0.57–1)
DEPRECATED RDW RBC AUTO: 43.7 FL (ref 37–54)
EGFRCR SERPLBLD CKD-EPI 2021: 59 ML/MIN/1.73
EOSINOPHIL # BLD AUTO: 0.19 10*3/MM3 (ref 0–0.4)
EOSINOPHIL NFR BLD AUTO: 2.2 % (ref 0.3–6.2)
ERYTHROCYTE [DISTWIDTH] IN BLOOD BY AUTOMATED COUNT: 13.5 % (ref 12.3–15.4)
GLOBULIN UR ELPH-MCNC: 3.5 GM/DL
GLUCOSE BLDC GLUCOMTR-MCNC: 129 MG/DL (ref 70–105)
GLUCOSE BLDC GLUCOMTR-MCNC: 142 MG/DL (ref 70–105)
GLUCOSE BLDC GLUCOMTR-MCNC: 231 MG/DL (ref 70–105)
GLUCOSE BLDC GLUCOMTR-MCNC: 300 MG/DL (ref 70–105)
GLUCOSE SERPL-MCNC: 129 MG/DL (ref 65–99)
HCT VFR BLD AUTO: 42.8 % (ref 34–46.6)
HGB BLD-MCNC: 13.3 G/DL (ref 12–15.9)
IMM GRANULOCYTES # BLD AUTO: 0.09 10*3/MM3 (ref 0–0.05)
IMM GRANULOCYTES NFR BLD AUTO: 1.1 % (ref 0–0.5)
LYMPHOCYTES # BLD AUTO: 1.07 10*3/MM3 (ref 0.7–3.1)
LYMPHOCYTES NFR BLD AUTO: 12.7 % (ref 19.6–45.3)
MAGNESIUM SERPL-MCNC: 2 MG/DL (ref 1.6–2.4)
MCH RBC QN AUTO: 27.5 PG (ref 26.6–33)
MCHC RBC AUTO-ENTMCNC: 31.1 G/DL (ref 31.5–35.7)
MCV RBC AUTO: 88.6 FL (ref 79–97)
MONOCYTES # BLD AUTO: 0.76 10*3/MM3 (ref 0.1–0.9)
MONOCYTES NFR BLD AUTO: 9 % (ref 5–12)
NEUTROPHILS NFR BLD AUTO: 6.28 10*3/MM3 (ref 1.7–7)
NEUTROPHILS NFR BLD AUTO: 74.3 % (ref 42.7–76)
NRBC BLD AUTO-RTO: 0 /100 WBC (ref 0–0.2)
PHOSPHATE SERPL-MCNC: 2.7 MG/DL (ref 2.5–4.5)
PLATELET # BLD AUTO: 215 10*3/MM3 (ref 140–450)
PMV BLD AUTO: 10.9 FL (ref 6–12)
POTASSIUM SERPL-SCNC: 4.5 MMOL/L (ref 3.5–5.2)
PROT SERPL-MCNC: 6.7 G/DL (ref 6–8.5)
RBC # BLD AUTO: 4.83 10*6/MM3 (ref 3.77–5.28)
SODIUM SERPL-SCNC: 138 MMOL/L (ref 136–145)
WBC NRBC COR # BLD AUTO: 8.45 10*3/MM3 (ref 3.4–10.8)

## 2025-07-23 PROCEDURE — 94799 UNLISTED PULMONARY SVC/PX: CPT

## 2025-07-23 PROCEDURE — 80053 COMPREHEN METABOLIC PANEL: CPT

## 2025-07-23 PROCEDURE — 83735 ASSAY OF MAGNESIUM: CPT

## 2025-07-23 PROCEDURE — 25010000002 FUROSEMIDE PER 20 MG: Performed by: INTERNAL MEDICINE

## 2025-07-23 PROCEDURE — 87324 CLOSTRIDIUM AG IA: CPT | Performed by: INTERNAL MEDICINE

## 2025-07-23 PROCEDURE — 84100 ASSAY OF PHOSPHORUS: CPT

## 2025-07-23 PROCEDURE — 82948 REAGENT STRIP/BLOOD GLUCOSE: CPT | Performed by: INTERNAL MEDICINE

## 2025-07-23 PROCEDURE — 94761 N-INVAS EAR/PLS OXIMETRY MLT: CPT

## 2025-07-23 PROCEDURE — 94664 DEMO&/EVAL PT USE INHALER: CPT

## 2025-07-23 PROCEDURE — 63710000001 INSULIN LISPRO (HUMAN) PER 5 UNITS: Performed by: INTERNAL MEDICINE

## 2025-07-23 PROCEDURE — 82948 REAGENT STRIP/BLOOD GLUCOSE: CPT

## 2025-07-23 PROCEDURE — 87449 NOS EACH ORGANISM AG IA: CPT | Performed by: INTERNAL MEDICINE

## 2025-07-23 PROCEDURE — 99232 SBSQ HOSP IP/OBS MODERATE 35: CPT | Performed by: INTERNAL MEDICINE

## 2025-07-23 PROCEDURE — 25010000002 ONDANSETRON PER 1 MG

## 2025-07-23 PROCEDURE — 25010000002 CEFTRIAXONE PER 250 MG: Performed by: INTERNAL MEDICINE

## 2025-07-23 PROCEDURE — 25010000002 ENOXAPARIN PER 10 MG: Performed by: INTERNAL MEDICINE

## 2025-07-23 PROCEDURE — 85025 COMPLETE CBC W/AUTO DIFF WBC: CPT

## 2025-07-23 RX ORDER — FUROSEMIDE 40 MG/1
40 TABLET ORAL DAILY
Status: DISCONTINUED | OUTPATIENT
Start: 2025-07-23 | End: 2025-07-24 | Stop reason: HOSPADM

## 2025-07-23 RX ADMIN — ACYCLOVIR 400 MG: 200 CAPSULE ORAL at 17:27

## 2025-07-23 RX ADMIN — Medication 10 ML: at 21:01

## 2025-07-23 RX ADMIN — METOPROLOL SUCCINATE 50 MG: 50 TABLET, EXTENDED RELEASE ORAL at 08:45

## 2025-07-23 RX ADMIN — METOPROLOL SUCCINATE 50 MG: 50 TABLET, EXTENDED RELEASE ORAL at 21:02

## 2025-07-23 RX ADMIN — Medication 10 ML: at 21:02

## 2025-07-23 RX ADMIN — FAMOTIDINE 20 MG: 20 TABLET, FILM COATED ORAL at 21:02

## 2025-07-23 RX ADMIN — CEFTRIAXONE 2000 MG: 2 INJECTION, POWDER, FOR SOLUTION INTRAMUSCULAR; INTRAVENOUS at 11:56

## 2025-07-23 RX ADMIN — ONDANSETRON 4 MG: 2 INJECTION, SOLUTION INTRAMUSCULAR; INTRAVENOUS at 19:48

## 2025-07-23 RX ADMIN — BUDESONIDE AND FORMOTEROL FUMARATE DIHYDRATE 2 PUFF: 160; 4.5 AEROSOL RESPIRATORY (INHALATION) at 19:29

## 2025-07-23 RX ADMIN — HYDROCODONE BITARTRATE AND ACETAMINOPHEN 1 TABLET: 5; 325 TABLET ORAL at 19:48

## 2025-07-23 RX ADMIN — ACYCLOVIR 400 MG: 200 CAPSULE ORAL at 08:45

## 2025-07-23 RX ADMIN — Medication 10 ML: at 08:46

## 2025-07-23 RX ADMIN — FUROSEMIDE 40 MG: 10 INJECTION, SOLUTION INTRAMUSCULAR; INTRAVENOUS at 04:22

## 2025-07-23 RX ADMIN — BUDESONIDE AND FORMOTEROL FUMARATE DIHYDRATE 2 PUFF: 160; 4.5 AEROSOL RESPIRATORY (INHALATION) at 08:27

## 2025-07-23 RX ADMIN — ACYCLOVIR 400 MG: 200 CAPSULE ORAL at 21:02

## 2025-07-23 RX ADMIN — INSULIN LISPRO 8 UNITS: 100 INJECTION, SOLUTION INTRAVENOUS; SUBCUTANEOUS at 12:57

## 2025-07-23 RX ADMIN — HYDROCODONE BITARTRATE AND ACETAMINOPHEN 1 TABLET: 5; 325 TABLET ORAL at 13:00

## 2025-07-23 RX ADMIN — HYDROCODONE BITARTRATE AND ACETAMINOPHEN 1 TABLET: 5; 325 TABLET ORAL at 04:32

## 2025-07-23 RX ADMIN — HYDROXYZINE HYDROCHLORIDE 50 MG: 25 TABLET, FILM COATED ORAL at 21:02

## 2025-07-23 RX ADMIN — ATORVASTATIN CALCIUM 40 MG: 40 TABLET, FILM COATED ORAL at 08:45

## 2025-07-23 RX ADMIN — INSULIN LISPRO 16 UNITS: 100 INJECTION, SOLUTION INTRAVENOUS; SUBCUTANEOUS at 21:02

## 2025-07-23 RX ADMIN — MUPIROCIN 1 APPLICATION: 20 OINTMENT TOPICAL at 08:46

## 2025-07-23 RX ADMIN — FUROSEMIDE 40 MG: 40 TABLET ORAL at 11:56

## 2025-07-23 RX ADMIN — CETIRIZINE HYDROCHLORIDE 10 MG: 10 TABLET, FILM COATED ORAL at 21:02

## 2025-07-23 RX ADMIN — PRAMIPEXOLE DIHYDROCHLORIDE 0.5 MG: 0.5 TABLET ORAL at 21:02

## 2025-07-23 RX ADMIN — GABAPENTIN 200 MG: 100 CAPSULE ORAL at 21:02

## 2025-07-23 RX ADMIN — ENOXAPARIN SODIUM 40 MG: 100 INJECTION SUBCUTANEOUS at 17:27

## 2025-07-23 NOTE — PROGRESS NOTES
[facility] MEDICINE SERVICE  DAILY PROGRESS NOTE    NAME: Eugenia Madden  : 1955  MRN: [id number]      LOS: 7 days     PROVIDER OF SERVICE: Royce Reeves MD    Chief Complaint: Septic shock    Subjective:     Interval History:  History taken from: patient    Patient was seen and evaluated at bedside. No complaints. Treatment plan was communicated to the patient. All questioned answered.          Review of Systems:   Review of Systems   Constitutional:  Positive for fatigue. Negative for chills and fever.   Respiratory:  Negative for shortness of breath.    Cardiovascular:  Negative for chest pain.   Gastrointestinal:  Negative for abdominal pain.       Objective:     Vital Signs  Temp:  [96.6 °F (35.9 °C)-97.7 °F (36.5 °C)] 96.7 °F (35.9 °C)  Heart Rate:  [82-99] 82  Resp:  [16-24] 17  BP: ()/(54-73) 115/62   Body mass index is 23.39 kg/m².    Physical Exam  Physical Exam  Constitutional:       General: Eugenia is not in acute distress.  Cardiovascular:      Rate and Rhythm: Normal rate.      Heart sounds: No murmur heard.  Pulmonary:      Effort: Pulmonary effort is normal. No respiratory distress.   Abdominal:      General: Bowel sounds are normal.      Palpations: Abdomen is soft.   Neurological:      Mental Status: Eugenia is alert.            Diagnostic Data    Results from last 7 days   Lab Units 25  0445   WBC 10*3/mm3 8.45   HEMOGLOBIN g/dL 13.3   HEMATOCRIT % 42.8   PLATELETS 10*3/mm3 215   GLUCOSE mg/dL 129*   CREATININE mg/dL 1.03*   BUN mg/dL 21.2   SODIUM mmol/L 138   POTASSIUM mmol/L 4.5   AST (SGOT) U/L 23   ALT (SGPT) U/L 15   ALK PHOS U/L 218*   BILIRUBIN mg/dL 0.4   ANION GAP mmol/L 15.9*       No radiology results for the last day      I reviewed the patient's new clinical results.    Assessment/Plan:     Active and Resolved Problems  Active Hospital Problems    Diagnosis  POA    **Septic shock [A41.9, R65.21]  Yes      Resolved Hospital Problems   No resolved problems  to display.       Diagnoses  Bacteremia with Proteus  Emphysematous right-sided pyelonephritis  Sepsis  Acute heart failure with preserved ejection fraction  HAVEN on CKD2  COPD not in exacerbation  Type 2 diabetes with peripheral neuropathy  Hypertension     PLAN  Patient on ceftriaxone needs to take it for 2 weeks.  Urology evaluated the patient and no intervention necessary at this time follow-up with urology as an outpatient.  Reason for bacteremia most likely UTI.  Renal function improving and back to normal.       ID following, 2 out of 2 blood cultures positive for Proteus mirabilis sensitive to ceftriaxone. ID recs including Continue IV ceftriaxone 2 g daily. Try to set up 4 weeks of treatment. However, duration can be shortened to 2 weeks if repeat CT scan showed resolution of the right kidney abnormality, R arm midline was placed. ID to Recommend repeat CT scan of abdomen and pelvis in 2 weeks as outpatient that can be done at the patient's primary doctor's office. Continue p.o. acyclovir 400 mg 3 times daily for total 5 days. Last day on 8/12/25. Recommend a repeat CT around 7/29/25. If scan shows resolution of the emphysematous pyelonephritis then antibiotics on 7/29/25 per ID recs. Ok to DC per ID recs.      Patient's BNP more than 2600 and chest x-ray with severe interstitial pleural pulmonary edema  On Lasix 40 mg IV every 8 hours > transition to maintenance dose lasix PO per Cardiology.  2D echo with ejection fraction 61 to 65%, grade 1 diastolic dysfunction, left atrial cavity is severely dilated  Strict intake output Daily weights and less than 2 g salt diet and fluid restriction of 1500 mL/day  Cardiology following, recs appreciated.     Hx of HTN, ARB held due to normotension.    VTE Prophylaxis:  Pharmacologic & mechanical VTE prophylaxis orders are present.             Disposition Planning:     Barriers to Discharge:not medically cleared, consultation clearance.  Anticipated Date of Discharge:  Home with   Place of Discharge: 7/24      Time: 35 minutes     Code Status and Medical Interventions: CPR (Attempt to Resuscitate); Full Support   Ordered at: 07/16/25 0959     Code Status (Patient has no pulse and is not breathing):    CPR (Attempt to Resuscitate)     Medical Interventions (Patient has pulse or is breathing):    Full Support       Signature: Electronically signed by Royce Reeves MD, 07/23/25, 18:12 EDT.  [facility]

## 2025-07-23 NOTE — PROGRESS NOTES
Infectious Diseases Progress Note      LOS: 7 days   Patient Care Team:  Nahid Lam MD as PCP - General (Family Medicine)  Sheryl Khan MD as Consulting Physician (Rheumatology)    Chief Complaint:  right flank pain     Subjective       The patient has been afebrile for the last 24 hours.  The patient is on room air, hemodynamically stable, and is tolerating antimicrobial therapy.  Patient is feeling better today but still having some abdominal and flank pain      Review of Systems:   Review of Systems   Constitutional: Negative.    HENT: Negative.     Eyes: Negative.    Respiratory: Negative.     Cardiovascular: Negative.    Gastrointestinal: Negative.    Endocrine: Negative.    Genitourinary:  Positive for flank pain.   Skin: Negative.    Neurological: Negative.    Psychiatric/Behavioral: Negative.     All other systems reviewed and are negative.       Objective     Vital Signs  Temp:  [96.6 °F (35.9 °C)-97.7 °F (36.5 °C)] 96.7 °F (35.9 °C)  Heart Rate:  [82-99] 82  Resp:  [16-24] 17  BP: ()/(54-73) 115/62    Physical Exam:  Physical Exam  Vitals and nursing note reviewed.   Constitutional:       General: Eugenia is not in acute distress.     Appearance: Normal appearance. Eugenia is well-developed and normal weight. Eugenia is not diaphoretic.   HENT:      Head: Normocephalic and atraumatic.   Eyes:      General: No scleral icterus.     Extraocular Movements: Extraocular movements intact.      Conjunctiva/sclera: Conjunctivae normal.      Pupils: Pupils are equal, round, and reactive to light.   Cardiovascular:      Rate and Rhythm: Normal rate and regular rhythm.      Heart sounds: Normal heart sounds, S1 normal and S2 normal. No murmur heard.  Pulmonary:      Effort: Pulmonary effort is normal. No respiratory distress.      Breath sounds: Normal breath sounds. No stridor. No wheezing or rales.   Chest:      Chest wall: No tenderness.   Abdominal:      General: Bowel sounds are normal.  There is no distension.      Palpations: Abdomen is soft. There is no mass.      Tenderness: There is abdominal tenderness. There is right CVA tenderness. There is no guarding.   Musculoskeletal:         General: No swelling, tenderness or deformity. Normal range of motion.      Cervical back: Neck supple.   Skin:     General: Skin is warm and dry.      Coloration: Skin is not pale.      Findings: No bruising, erythema or rash.      Comments: Perioral lesions consistent with herpetic lesions    Neurological:      General: No focal deficit present.      Mental Status: Eugenia is alert and oriented to person, place, and time. Mental status is at baseline.      Cranial Nerves: No cranial nerve deficit.   Psychiatric:         Mood and Affect: Mood normal.          Results Review:    I have reviewed all clinical data, test, lab, and imaging results.     Radiology  No Radiology Exams Resulted Within Past 24 Hours    Cardiology    Laboratory    Results from last 7 days   Lab Units 07/23/25  0445 07/22/25  0459 07/21/25  0349 07/20/25  0207 07/19/25  0252 07/18/25  0415 07/17/25  0537   WBC 10*3/mm3 8.45 7.41 6.58 6.39 10.61 11.22* 10.38   HEMOGLOBIN g/dL 13.3 13.9 12.2 11.6* 11.7* 11.8* 9.9*   HEMATOCRIT % 42.8 43.5 37.7 36.3 36.9 36.8 31.3*   PLATELETS 10*3/mm3 215 166 127* 101* 100* 88* 65*     Results from last 7 days   Lab Units 07/23/25  0445 07/22/25  1319 07/22/25  0254 07/21/25  1546 07/21/25  0349 07/20/25  0207 07/19/25  0252 07/18/25  0415 07/17/25  0537   SODIUM mmol/L 138  --  132*  --  136 137 138 134* 135*   POTASSIUM mmol/L 4.5 4.3 3.2* 3.9 3.4* 4.0 4.1 3.8 4.3   CHLORIDE mmol/L 96*  --  92*  --  96* 105 107 105 104   CO2 mmol/L 26.1  --  26.1  --  24.0 19.6* 18.3* 16.4* 17.4*   BUN mg/dL 21.2  --  20.7  --  17.9 16.8 19.3 18.6 17.4   CREATININE mg/dL 1.03*  --  0.91  --  0.86 0.74 0.93 0.87 0.97   GLUCOSE mg/dL 129*  --  175*  --  145* 129* 130* 126* 186*   ALBUMIN g/dL 3.2*  --  2.9*  --  2.8* 2.8* 2.7*  2.8* 2.5*   BILIRUBIN mg/dL 0.4  --  0.4  --  0.5 0.4 0.4 0.4 0.4   ALK PHOS U/L 218*  --  216*  --  320* 281* 224* 71 50   AST (SGOT) U/L 23  --  20  --  24 32 18 19 17   ALT (SGPT) U/L 15  --  17  --  17 19 17 17 16   CALCIUM mg/dL 8.3*  --  8.2*  --  8.1* 8.4* 8.1* 7.7* 7.4*                 Microbiology   Microbiology Results (last 10 days)       Procedure Component Value - Date/Time    MRSA Screen, PCR (Inpatient) - Swab, Nares [[id number]]  (Normal) Collected: 07/16/25 0402    Lab Status: Final result Specimen: Swab from Nares Updated: 07/16/25 0519     MRSA PCR No MRSA Detected    Narrative:      The negative predictive value of this diagnostic test is high and should only be used to consider de-escalating anti-MRSA therapy. A positive result may indicate colonization with MRSA and must be correlated clinically.    Blood Culture - Blood, Arm, Right [[id number]]  (Abnormal) Collected: 07/15/25 2251    Lab Status: Final result Specimen: Blood from Arm, Right Updated: 07/18/25 0622     Blood Culture Proteus mirabilis     Isolated from Aerobic and Anaerobic Bottles     Gram Stain Anaerobic Bottle Gram negative bacilli      Aerobic Bottle Gram negative bacilli    Narrative:      Refer to previous blood culture collected on 07/15/2025 2248 for MICs      Blood Culture - Blood, Arm, Left [[id number]]  (Abnormal)  (Susceptibility) Collected: 07/15/25 2248    Lab Status: Final result Specimen: Blood from Arm, Left Updated: 07/18/25 0622     Blood Culture Proteus mirabilis     Isolated from Aerobic and Anaerobic Bottles     Gram Stain Anaerobic Bottle Gram negative bacilli      Aerobic Bottle Gram negative bacilli    Susceptibility        Proteus mirabilis      SOFÍA      Amoxicillin + Clavulanate Susceptible      Ampicillin Susceptible      Ampicillin + Sulbactam Susceptible      Cefazolin (Non Urine) Intermediate      Cefepime Susceptible      Ceftazidime Susceptible      Ceftriaxone Susceptible      Cefuroxime axetil  Susceptible      Gentamicin Susceptible      Levofloxacin Susceptible      Piperacillin + Tazobactam Susceptible      Trimethoprim + Sulfamethoxazole Susceptible                       Susceptibility Comments       Proteus mirabilis    With the exception of urinary-sourced infections, aminoglycosides should not be used as monotherapy.               Blood Culture ID, PCR - Blood, Arm, Left [[id number]]  (Abnormal) Collected: 07/15/25 2248    Lab Status: Final result Specimen: Blood from Arm, Left Updated: 07/16/25 1406     BCID, PCR Proteus spp. Identification by BCID2 PCR.     BOTTLE TYPE Anaerobic Bottle    Narrative:      No resistance genes detected.    COVID-19, FLU A/B, RSV PCR 1 HR TAT - Swab, Nasopharynx [[id number]]  (Normal) Collected: 07/15/25 2237    Lab Status: Final result Specimen: Swab from Nasopharynx Updated: 07/15/25 2322     COVID19 Not Detected     Influenza A PCR Not Detected     Influenza B PCR Not Detected     RSV, PCR Not Detected            Medication Review:       Schedule Meds  acyclovir, 400 mg, Oral, TID  atorvastatin, 40 mg, Oral, Daily  budesonide-formoterol, 2 puff, Inhalation, BID - RT  cefTRIAXone, 2,000 mg, Intravenous, Q24H  cetirizine, 10 mg, Oral, Nightly  enoxaparin sodium, 40 mg, Subcutaneous, Q24H  famotidine, 20 mg, Oral, Nightly  furosemide, 40 mg, Oral, Daily  gabapentin, 200 mg, Oral, Nightly  hydrOXYzine, 50 mg, Oral, Nightly  insulin lispro, 4-24 Units, Subcutaneous, 4x Daily AC & at Bedtime  [Held by provider] losartan, 25 mg, Oral, Daily  metoprolol succinate XL, 50 mg, Oral, Q12H  mupirocin, 1 Application, Each Nare, BID  pramipexole, 0.5 mg, Oral, Nightly  senna-docusate sodium, 2 tablet, Oral, BID  sodium chloride, 10 mL, Intravenous, Q12H  sodium chloride, 10 mL, Intravenous, Q12H        Infusion Meds       PRN Meds    acetaminophen **OR** acetaminophen    aluminum-magnesium hydroxide-simethicone    senna-docusate sodium **AND** polyethylene glycol **AND** bisacodyl  **AND** bisacodyl    Calcium Replacement - Follow Nurse / BPA Driven Protocol    dextrose    dextrose    glucagon (human recombinant)    HYDROcodone-acetaminophen    ipratropium-albuterol    Magnesium Standard Dose Replacement - Follow Nurse / BPA Driven Protocol    nitroglycerin    ondansetron ODT **OR** ondansetron    Phosphorus Replacement - Follow Nurse / BPA Driven Protocol    Potassium Replacement - Follow Nurse / BPA Driven Protocol    sodium chloride    sodium chloride    sodium chloride    sodium chloride        Assessment & Plan       Antimicrobial Therapy   1.  IV ceftriaxone        2.        3.        4.        5.          Assessment     Proteus mirabilis bacteremia.  Blood culture was positive in 2 out of 2 sets on admission on July 15, 2025.  Secondary to complicated UTI.     Complicated UTI with right-sided emphysematous pyelonephritis.     Oral herpes simplex lesions     Diabetes mellitus type 2     Plan     Continue IV ceftriaxone 2 g daily.    Will schedule IV ceftriaxone for total of 4 weeks the last day on August 12, 2025.  Recommend to repeat CT scan of the abdomen pelvis in 2 weeks and if CT scan showed resolution of the right kidney emphysematous pyelonephritis then antibiotics can be discontinued sooner.      Continue p.o. acyclovir 400 mg 3 times daily for total 5 days  The patient currently has right arm midline- standard line care. Please remove once IV antibiotics are completed  Continue supportive care  Okay to discharge from Infectious Disease standpoint  Not much more to add from infectious disease standpoint-we will sign off at this time-please call with any questions.      Jennifer eBar, APRN  07/23/25  16:48 EDT    Note is dictated utilizing voice recognition software/Dragon

## 2025-07-23 NOTE — CASE MANAGEMENT/SOCIAL WORK
Continued Stay Note  TASIA Vickers     Patient Name: Eugenia Madden  MRN: [id number]  Today's Date: 7/23/2025    Admit Date: 7/15/2025    Plan: DC PLAN; Routine home with [facility] (accepted) [facility] following for home IV abx. Rolling walker thru [employer].       Discharge Plan       Row Name 07/23/25 1603       Plan    Plan DC PLAN; Routine home with [facility] (accepted) [facility] following for home IV abx. Rolling walker thru [employer].    Patient/Family in Agreement with Plan yes    Plan Comments Reached out to [facility] regarding home IV abx. verbalized understanding. Updated that possible discharge tomorrow. HAVEN, monitor labs, Anticipate discharge tomorrow.                      Expected Discharge Date and Time       Expected Discharge Date Expected Discharge Time    Jul 24, 2025             Veena Saucedo RN   Case Management  [phone]

## 2025-07-23 NOTE — PROGRESS NOTES
LOS: 7 days   Admitting Physician- Royce Reeves MD    Reason For Followup:    UTI  Sepsis/bacteremia with Proteus  Pyelonephritis  Hypertension  CKD  Diabetes mellitus  Sinus tachycardia      Subjective     Patient is laying in the bed.  No complaints.    Objective     Hemodynamics are stable    Review of Systems:   Review of Systems   Constitutional: Negative for chills and fever.   HENT:  Negative for ear discharge and nosebleeds.    Eyes:  Negative for discharge and redness.   Cardiovascular:  Negative for chest pain, orthopnea, palpitations, paroxysmal nocturnal dyspnea and syncope.   Respiratory:  Negative for cough, shortness of breath and wheezing.    Endocrine: Negative for heat intolerance.   Skin:  Negative for rash.   Musculoskeletal:  Negative for arthritis and myalgias.   Gastrointestinal:  Negative for abdominal pain, melena, nausea and vomiting.   Genitourinary:  Negative for dysuria and hematuria.   Neurological:  Negative for dizziness, light-headedness, numbness and tremors.   Psychiatric/Behavioral:  Negative for depression. The patient is not nervous/anxious.          Vital Signs  Vitals:    07/23/25 0513 07/23/25 0827 07/23/25 0829 07/23/25 1135   BP: 113/67   120/70   BP Location: Left arm   Left arm   Patient Position: Lying   Lying   Pulse: 85 83 83    Resp: 24 18 18 17   Temp: 97.5 °F (36.4 °C)   96.6 °F (35.9 °C)   TempSrc: Temporal   Temporal   SpO2: 93% 100% 94%    Weight: 59.9 kg (132 lb 0.9 oz)      Height:         Wt Readings from Last 1 Encounters:   07/23/25 59.9 kg (132 lb 0.9 oz)       Intake/Output Summary (Last 24 hours) at 7/23/2025 1243  Last data filed at 7/23/2025 1000  Gross per 24 hour   Intake 360 ml   Output --   Net 360 ml     Physical Exam:  Constitutional:       Appearance: Well-developed.   Eyes:      General: No scleral icterus.        Right eye: No discharge.   HENT:      Head: Normocephalic and atraumatic.   Neck:      Thyroid: No thyromegaly.       Lymphadenopathy: No cervical adenopathy.   Pulmonary:      Effort: Pulmonary effort is normal. No respiratory distress.      Breath sounds: Normal breath sounds. No wheezing. No rales.   Cardiovascular:      Normal rate. Regular rhythm.      No gallop.    Edema:     Peripheral edema absent.   Abdominal:      Tenderness: There is no abdominal tenderness.   Skin:     Findings: No erythema or rash.   Neurological:      Mental Status: Alert and oriented to person, place, and time.         Results Review:   Lab Results (last 24 hours)       Procedure Component Value Units Date/Time    POC Glucose Finger 4x Daily Before Meals & at Bedtime [[id number]]  (Abnormal) Collected: 07/23/25 1237    Specimen: Blood from Finger Updated: 07/23/25 1239     Glucose 231 mg/dL      Comment: Serial Number: [id number]:  [id number]       POC Glucose Finger 4x Daily Before Meals & at Bedtime [[id number]]  (Abnormal) Collected: 07/23/25 0744    Specimen: Blood from Finger Updated: 07/23/25 0746     Glucose 142 mg/dL      Comment: Serial Number: [id number]:  [id number]       Magnesium [[id number]]  (Normal) Collected: 07/23/25 0445    Specimen: Blood from Hand, Left Updated: 07/23/25 0555     Magnesium 2.0 mg/dL     Phosphorus [[id number]]  (Normal) Collected: 07/23/25 0445    Specimen: Blood from Hand, Left Updated: 07/23/25 0555     Phosphorus 2.7 mg/dL     Comprehensive Metabolic Panel [[id number]]  (Abnormal) Collected: 07/23/25 0445    Specimen: Blood from Hand, Left Updated: 07/23/25 0555     Glucose 129 mg/dL      BUN 21.2 mg/dL      Creatinine 1.03 mg/dL      Sodium 138 mmol/L      Potassium 4.5 mmol/L      Chloride 96 mmol/L      CO2 26.1 mmol/L      Calcium 8.3 mg/dL      Total Protein 6.7 g/dL      Albumin 3.2 g/dL      ALT (SGPT) 15 U/L      AST (SGOT) 23 U/L      Alkaline Phosphatase 218 U/L      Total Bilirubin 0.4 mg/dL      Globulin 3.5 gm/dL      A/G Ratio 0.9 g/dL      BUN/Creatinine Ratio 20.6     Anion Gap 15.9  mmol/L      eGFR 59.0 mL/min/1.73     Narrative:      GFR Categories in Chronic Kidney Disease (CKD)              GFR Category          GFR (mL/min/1.73)    Interpretation  G1                    90 or greater        Normal or high (1)  G2                    60-89                Mild decrease (1)  G3a                   45-59                Mild to moderate decrease  G3b                   30-44                Moderate to severe decrease  G4                    15-29                Severe decrease  G5                    14 or less           Kidney failure    (1)In the absence of evidence of kidney disease, neither GFR category G1 or G2 fulfill the criteria for CKD.    eGFR calculation 2021 CKD-EPI creatinine equation, which does not include race as a factor    CBC & Differential [[id number]]  (Abnormal) Collected: 07/23/25 0445    Specimen: Blood from Hand, Left Updated: 07/23/25 0527    Narrative:      The following orders were created for panel order CBC & Differential.  Procedure                               Abnormality         Status                     ---------                               -----------         ------                     CBC Auto Differential[[id number]]        Abnormal            Final result                 Please view results for these tests on the individual orders.    CBC Auto Differential [[id number]]  (Abnormal) Collected: 07/23/25 0445    Specimen: Blood from Hand, Left Updated: 07/23/25 0527     WBC 8.45 10*3/mm3      RBC 4.83 10*6/mm3      Hemoglobin 13.3 g/dL      Hematocrit 42.8 %      MCV 88.6 fL      MCH 27.5 pg      MCHC 31.1 g/dL      RDW 13.5 %      RDW-SD 43.7 fl      MPV 10.9 fL      Platelets 215 10*3/mm3      Neutrophil % 74.3 %      Lymphocyte % 12.7 %      Monocyte % 9.0 %      Eosinophil % 2.2 %      Basophil % 0.7 %      Immature Grans % 1.1 %      Neutrophils, Absolute 6.28 10*3/mm3      Lymphocytes, Absolute 1.07 10*3/mm3      Monocytes, Absolute 0.76 10*3/mm3       Eosinophils, Absolute 0.19 10*3/mm3      Basophils, Absolute 0.06 10*3/mm3      Immature Grans, Absolute 0.09 10*3/mm3      nRBC 0.0 /100 WBC     POC Glucose Once [[id number]]  (Abnormal) Collected: 07/22/25 2025    Specimen: Blood Updated: 07/22/25 2030     Glucose 122 mg/dL      Comment: Serial Number: [id number]:  [id number]       POC Glucose Once [[id number]]  (Abnormal) Collected: 07/22/25 1624    Specimen: Blood Updated: 07/22/25 1626     Glucose 154 mg/dL      Comment: Serial Number: [id number]:  [id number]       Potassium [[id number]]  (Normal) Collected: 07/22/25 1319    Specimen: Blood Updated: 07/22/25 1357     Potassium 4.3 mmol/L      Comment: Result checked               Imaging Results (Last 72 Hours)       Procedure Component Value Units Date/Time    XR Chest 1 View [[id number]] Collected: 07/21/25 1202     Updated: 07/21/25 1209    Narrative:      XR CHEST 1 VW    Date of Exam: 7/21/2025 12:00 PM EDT    Indication: Follow-up on pulmonary edema    Comparison: 7/20/2025    Findings:  Heart mildly enlarged, unchanged. Interstitial thickening improved in the prior study suggesting resolving pulmonary edema. Mild bibasilar airspace disease/atelectasis. Suspect small bilateral pleural effusions. Negative for pneumothorax. Remote right   posterior rib fractures at the sixth and ninth ribs. Aortic arch atherosclerosis.      Impression:      Impression:  1. Interstitial thickening improved from prior study suggesting resolving pulmonary edema.  2. Mild bibasilar airspace disease/atelectasis with small bilateral pleural effusions.          Electronically Signed: Austyn Henson MD    7/21/2025 12:07 PM EDT    Workstation ID: [id number]          ECG/EMG Results (most recent)       Procedure Component Value Units Date/Time    Telemetry Scan [[id number]] Resulted: 07/15/25 2236     Updated: 07/15/25 2248    Telemetry Scan [[id number]] Resulted: 07/16/25 0440     Updated: 07/16/25 0519    ECG 12 Lead Tachycardia  [[id number]] Collected: 07/15/25 2239     Updated: 07/16/25 0632     QT Interval 319 ms      QTC Interval 468 ms     Narrative:      HEART ASVT=169  bpm  RR Rlnxtaff=246  ms  CA Qezqdbdz=293  ms  P Horizontal Axis=-1  deg  P Front Axis=70  deg  QRSD Interval=75  ms  QT Dphkeplm=464  ms  ADhG=015  ms  QRS Axis=7  deg  T Wave Axis=41  deg  - BORDERLINE ECG -  Sinus tachycardia  Probable  left atrial enlargement  When compared with ECG of 02-Oct-2023 23:37:23,  Significant rate increase  Electronically Signed By: Juliano Forbes ([facility]) 2025-07-16 06:30:29  Date and Time of Study:2025-07-15 22:39:00    Telemetry Scan [[id number]] Resulted: 07/16/25 0807     Updated: 07/16/25 0920    Telemetry Scan [[id number]] Resulted: 07/16/25 1152     Updated: 07/16/25 1208    Telemetry Scan [[id number]] Resulted: 07/16/25 2001     Updated: 07/16/25 2019    Telemetry Scan [[id number]] Resulted: 07/16/25 2345     Updated: 07/17/25 0007    Telemetry Scan [[id number]] Resulted: 07/17/25 0346     Updated: 07/17/25 0413    Telemetry Scan [[id number]] Resulted: 07/17/25 0712     Updated: 07/17/25 0735    Telemetry Scan [[id number]] Resulted: 07/17/25 1110     Updated: 07/17/25 1120    Telemetry Scan [[id number]] Resulted: 07/17/25 1509     Updated: 07/17/25 1519    Telemetry Scan [[id number]] Resulted: 07/17/25 2008     Updated: 07/17/25 2120    Telemetry Scan [[id number]] Resulted: 07/18/25 0041     Updated: 07/18/25 0107    Telemetry Scan [[id number]] Resulted: 07/18/25 0413     Updated: 07/18/25 0549    Telemetry Scan [[id number]] Resulted: 07/18/25 0719     Updated: 07/18/25 0738    Telemetry Scan [[id number]] Resulted: 07/17/25 0346     Updated: 07/18/25 1205    Telemetry Scan [[id number]] Resulted: 07/18/25 1148     Updated: 07/18/25 1209    Telemetry Scan [[id number]] Resulted: 07/18/25 1528     Updated: 07/18/25 1621    Telemetry Scan [[id number]] Resulted: 07/19/25 0012     Updated: 07/19/25 0225    Telemetry Scan [[id number]] Resulted: 07/18/25 2012      Updated: 07/19/25 0225    Telemetry Scan [[id number]] Resulted: 07/19/25 0408     Updated: 07/19/25 0450    Telemetry Scan [[id number]] Resulted: 07/19/25 0705     Updated: 07/19/25 0737    Telemetry Scan [[id number]] Resulted: 07/19/25 1111     Updated: 07/19/25 1118    Telemetry Scan [[id number]] Resulted: 07/19/25 1503     Updated: 07/19/25 1519    Telemetry Scan [[id number]] Resulted: 07/19/25 2024     Updated: 07/19/25 2106    Telemetry Scan [[id number]] Resulted: 07/20/25 0005     Updated: 07/20/25 0310    Telemetry Scan [[id number]] Resulted: 07/20/25 0416     Updated: 07/20/25 0436    Telemetry Scan [[id number]] Resulted: 07/20/25 0719     Updated: 07/20/25 0736    Telemetry Scan [[id number]] Resulted: 07/20/25 1111     Updated: 07/20/25 1121    Telemetry Scan [[id number]] Resulted: 07/20/25 1540     Updated: 07/20/25 1550    Adult Transthoracic Echo Complete w/ Color, Spectral and Contrast if Necessary Per Protocol [[id number]] Resulted: 07/20/25 1942     Updated: 07/20/25 1942     LVIDd 3.4 cm      LVIDs 2.40 cm      IVSd 1.30 cm      LVPWd 1.20 cm      FS 29.4 %      IVS/LVPW 1.08 cm      ESV(cubed) 13.8 ml      LV Sys Vol (BSA corrected) 29.2 cm2      EDV(cubed) 39.3 ml      LV Arreola Vol (BSA corrected) 60.4 cm2      LV mass(C)d 138.8 grams      LVOT area 2.8 cm2      LVOT diam 1.90 cm      EDV(MOD-sp4) 101.0 ml      ESV(MOD-sp4) 48.9 ml      SV(MOD-sp4) 52.1 ml      SVi(MOD-SP4) 31.2 ml/m2      SVi (LVOT) 39.9 ml/m2      EF(MOD-sp4) 51.6 %      MV E max dane 142.0 cm/sec      MV A max dane 190.0 cm/sec      MV dec time 0.16 sec      MV E/A 0.75     LA ESV Index (BP) 33.3 ml/m2      Med Peak E' Dane 4.9 cm/sec      Lat Peak E' Dane 5.9 cm/sec      TR max dane 293.0 cm/sec      Avg E/e' ratio 26.30     SV(LVOT) 66.6 ml      RV Base 3.3 cm      RV Mid 2.6 cm      RV Length 8.3 cm      TAPSE (>1.6) 1.64 cm      RV S' 14.1 cm/sec      LA dimension (2D)  4.4 cm      LV V1 max 127.0 cm/sec      LV V1 max PG 6.5 mmHg       LV V1 mean PG 3.0 mmHg      LV V1 VTI 23.5 cm      Ao pk chelsea 164.0 cm/sec      Ao max PG 10.8 mmHg      Ao mean PG 6.0 mmHg      Ao V2 VTI 30.6 cm      KAMARI(I,D) 2.18 cm2      Dimensionless Index 0.77 (DI)      MV max PG 17.8 mmHg      MV mean PG 7.0 mmHg      MV V2 VTI 38.6 cm      MV P1/2t 161.8 msec      MVA(P1/2t) 1.36 cm2      MVA(VTI) 1.73 cm2      MV dec slope 248.0 cm/sec2      MR max chelsea 449.0 cm/sec      MR max PG 80.6 mmHg      TR max PG 34.3 mmHg      RVSP(TR) 42.3 mmHg      RAP systole 8.0 mmHg      RV V1 max PG 4.6 mmHg      RV V1 max 107.0 cm/sec      RV V1 VTI 20.3 cm      PA V2 max 113.0 cm/sec      PA acc time 0.11 sec      PI end-d chelsea 143.0 cm/sec      Sinus 2.9 cm      STJ 2.40 cm      EF(MOD-bp) 52.0 %      LA length (A2C) 5.2 cm      LA length (A4C) 6.1 cm     Narrative:        Left ventricular systolic function is normal. Left ventricular ejection   fraction appears to be 61 - 65%.    Left ventricular diastolic function is consistent with (grade I)   impaired relaxation.    The left atrial cavity is severely dilated.    Estimated right ventricular systolic pressure from tricuspid   regurgitation is mildly elevated (35-45 mmHg). Calculated right   ventricular systolic pressure from tricuspid regurgitation is 42 mmHg.    The study is technically difficult for diagnosis. The quality of the   study is limited with poor acoustic windows.      ECG 12 Lead Chest Pain [[id number]] Collected: 07/20/25 0241     Updated: 07/20/25 2005     QT Interval 327 ms      QTC Interval 462 ms     Narrative:      HEART JLKP=583  bpm  RR Iguyjawl=537  ms  CA Aguytaqi=954  ms  P Horizontal Axis=-15  deg  P Front Axis=49  deg  QRSD Interval=77  ms  QT Ygfmmell=714  ms  MLvK=731  ms  QRS Axis=3  deg  T Wave Axis=29  deg  - BORDERLINE ECG -  Sinus tachycardia  Probable  left atrial enlargement  When compared with ECG of 15-Jul-2025 22:39:00,  No significant change  Electronically Signed By: Aden Muhammad ([facility])  2025-07-20 20:04:34  Date and Time of Study:2025-07-20 02:41:25    Telemetry Scan [[id number]] Resulted: 07/21/25 0030     Updated: 07/21/25 0109    Telemetry Scan [[id number]] Resulted: 07/20/25 2015     Updated: 07/21/25 0109    Telemetry Scan [[id number]] Resulted: 07/21/25 0426     Updated: 07/21/25 0450    Telemetry Scan [[id number]] Resulted: 07/21/25 0722     Updated: 07/21/25 0737    Telemetry Scan [[id number]] Resulted: 07/21/25 1118     Updated: 07/21/25 1149    Telemetry Scan [[id number]] Resulted: 07/21/25 1528     Updated: 07/21/25 1549    Telemetry Scan [[id number]] Resulted: 07/21/25 2001     Updated: 07/21/25 2020    Telemetry Scan [[id number]] Resulted: 07/22/25 0445     Updated: 07/22/25 0506    Telemetry Scan [[id number]] Resulted: 07/22/25 0721     Updated: 07/22/25 0736    Telemetry Scan [[id number]] Resulted: 07/22/25 1132     Updated: 07/22/25 1150    Telemetry Scan [[id number]] Resulted: 07/22/25 1532     Updated: 07/22/25 1551    Telemetry Scan [[id number]] Resulted: 07/22/25 2021     Updated: 07/22/25 2105    Telemetry Scan [[id number]] Resulted: 07/23/25 0009     Updated: 07/23/25 0104    Telemetry Scan [[id number]] Resulted: 07/23/25 0420     Updated: 07/23/25 0535    Telemetry Scan [[id number]] Resulted: 07/23/25 0748     Updated: 07/23/25 0807          CBC    Results from last 7 days   Lab Units 07/23/25  0445 07/22/25  0459 07/21/25  0349 07/20/25  0207 07/19/25  0252 07/18/25  0415 07/17/25  0537   WBC 10*3/mm3 8.45 7.41 6.58 6.39 10.61 11.22* 10.38   HEMOGLOBIN g/dL 13.3 13.9 12.2 11.6* 11.7* 11.8* 9.9*   PLATELETS 10*3/mm3 215 166 127* 101* 100* 88* 65*     BMP   Results from last 7 days   Lab Units 07/23/25  0445 07/22/25  1319 07/22/25  0254 07/21/25  1546 07/21/25  0349 07/20/25  1503 07/20/25  0207 07/19/25  0252 07/18/25  1744 07/18/25  0415 07/17/25  0537   SODIUM mmol/L 138  --  132*  --  136  --  137 138  --  134* 135*   POTASSIUM mmol/L 4.5 4.3 3.2* 3.9 3.4*  --  4.0 4.1  --  3.8 4.3    CHLORIDE mmol/L 96*  --  92*  --  96*  --  105 107  --  105 104   CO2 mmol/L 26.1  --  26.1  --  24.0  --  19.6* 18.3*  --  16.4* 17.4*   BUN mg/dL 21.2  --  20.7  --  17.9  --  16.8 19.3  --  18.6 17.4   CREATININE mg/dL 1.03*  --  0.91  --  0.86  --  0.74 0.93  --  0.87 0.97   GLUCOSE mg/dL 129*  --  175*  --  145*  --  129* 130*  --  126* 186*   MAGNESIUM mg/dL 2.0  --  1.7  --  1.6  --  2.0 2.4  --  2.6* 2.6*   PHOSPHORUS mg/dL 2.7  --  2.9  --  2.7 2.6 2.2* 2.7 2.2* 2.1* 2.6     CMP   Results from last 7 days   Lab Units 07/23/25  0445 07/22/25  1319 07/22/25  0254 07/21/25  1546 07/21/25  0349 07/20/25  0207 07/19/25  0252 07/18/25  0415 07/17/25  0537   SODIUM mmol/L 138  --  132*  --  136 137 138 134* 135*   POTASSIUM mmol/L 4.5 4.3 3.2* 3.9 3.4* 4.0 4.1 3.8 4.3   CHLORIDE mmol/L 96*  --  92*  --  96* 105 107 105 104   CO2 mmol/L 26.1  --  26.1  --  24.0 19.6* 18.3* 16.4* 17.4*   BUN mg/dL 21.2  --  20.7  --  17.9 16.8 19.3 18.6 17.4   CREATININE mg/dL 1.03*  --  0.91  --  0.86 0.74 0.93 0.87 0.97   GLUCOSE mg/dL 129*  --  175*  --  145* 129* 130* 126* 186*   ALBUMIN g/dL 3.2*  --  2.9*  --  2.8* 2.8* 2.7* 2.8* 2.5*   BILIRUBIN mg/dL 0.4  --  0.4  --  0.5 0.4 0.4 0.4 0.4   ALK PHOS U/L 218*  --  216*  --  320* 281* 224* 71 50   AST (SGOT) U/L 23  --  20  --  24 32 18 19 17   ALT (SGPT) U/L 15  --  17  --  17 19 17 17 16     Cardiac Studies:  Echo- Results for orders placed during the hospital encounter of 07/15/25    Adult Transthoracic Echo Complete w/ Color, Spectral and Contrast if Necessary Per Protocol 07/20/2025  7:42 PM    Interpretation Summary    Left ventricular systolic function is normal. Left ventricular ejection fraction appears to be 61 - 65%.    Left ventricular diastolic function is consistent with (grade I) impaired relaxation.    The left atrial cavity is severely dilated.    Estimated right ventricular systolic pressure from tricuspid regurgitation is mildly elevated (35-45 mmHg).  Calculated right ventricular systolic pressure from tricuspid regurgitation is 42 mmHg.    The study is technically difficult for diagnosis. The quality of the study is limited with poor acoustic windows.    Stress Myoview-  Cath-      Medication Review:   Scheduled Meds:acyclovir, 400 mg, Oral, TID  atorvastatin, 40 mg, Oral, Daily  budesonide-formoterol, 2 puff, Inhalation, BID - RT  cefTRIAXone, 2,000 mg, Intravenous, Q24H  cetirizine, 10 mg, Oral, Nightly  enoxaparin sodium, 40 mg, Subcutaneous, Q24H  famotidine, 20 mg, Oral, Nightly  furosemide, 40 mg, Oral, Daily  gabapentin, 200 mg, Oral, Nightly  hydrOXYzine, 50 mg, Oral, Nightly  insulin lispro, 4-24 Units, Subcutaneous, 4x Daily AC & at Bedtime  [Held by provider] losartan, 25 mg, Oral, Daily  metoprolol succinate XL, 50 mg, Oral, Q12H  mupirocin, 1 Application, Each Nare, BID  pramipexole, 0.5 mg, Oral, Nightly  senna-docusate sodium, 2 tablet, Oral, BID  sodium chloride, 10 mL, Intravenous, Q12H  sodium chloride, 10 mL, Intravenous, Q12H      Continuous Infusions:   PRN Meds:.  acetaminophen **OR** acetaminophen    aluminum-magnesium hydroxide-simethicone    senna-docusate sodium **AND** polyethylene glycol **AND** bisacodyl **AND** bisacodyl    Calcium Replacement - Follow Nurse / BPA Driven Protocol    dextrose    dextrose    glucagon (human recombinant)    HYDROcodone-acetaminophen    ipratropium-albuterol    Magnesium Standard Dose Replacement - Follow Nurse / BPA Driven Protocol    nitroglycerin    ondansetron ODT **OR** ondansetron    Phosphorus Replacement - Follow Nurse / BPA Driven Protocol    Potassium Replacement - Follow Nurse / BPA Driven Protocol    sodium chloride    sodium chloride    sodium chloride    sodium chloride      Assessment & Plan     Septic shock    MDM:    1.  Volume overload/bilateral leg edema:    Patient has received euvolemic status.  I would change intravenous diuretics to p.o.  Lasix 40 mg daily will be started    2.   Pyelonephritis/sepsis:    Patient is on antibiotic    3.  Diabetes mellitus:    Patient is on insulin.  Patient was on Jardiance at home.  I would reconsider the treatment strategy for diabetes mellitus in this patient.    4.  Mixed hyperlipidemia:    Patient is on Lipitor.    5.  Cardiac workup:    Patient had recent stress test and echocardiogram which has been unremarkable    Donell Joyce MD  07/23/25  12:43 EDT

## 2025-07-24 ENCOUNTER — READMISSION MANAGEMENT (OUTPATIENT)
Dept: CALL CENTER | Facility: HOSPITAL | Age: 70
End: 2025-07-24
Payer: MEDICARE

## 2025-07-24 VITALS
WEIGHT: 130.73 LBS | BODY MASS INDEX: 23.16 KG/M2 | TEMPERATURE: 97.4 F | OXYGEN SATURATION: 93 % | HEIGHT: 63 IN | DIASTOLIC BLOOD PRESSURE: 72 MMHG | SYSTOLIC BLOOD PRESSURE: 118 MMHG | HEART RATE: 87 BPM | RESPIRATION RATE: 23 BRPM

## 2025-07-24 LAB
ALBUMIN SERPL-MCNC: 3.1 G/DL (ref 3.5–5.2)
ALBUMIN/GLOB SERPL: 0.9 G/DL
ALP SERPL-CCNC: 210 U/L (ref 39–117)
ALT SERPL W P-5'-P-CCNC: 21 U/L (ref 1–33)
ANION GAP SERPL CALCULATED.3IONS-SCNC: 10.5 MMOL/L (ref 5–15)
AST SERPL-CCNC: 22 U/L (ref 1–32)
BASOPHILS # BLD AUTO: 0.06 10*3/MM3 (ref 0–0.2)
BASOPHILS NFR BLD AUTO: 0.7 % (ref 0–1.5)
BILIRUB SERPL-MCNC: 0.4 MG/DL (ref 0–1.2)
BUN SERPL-MCNC: 21.3 MG/DL (ref 8–23)
BUN/CREAT SERPL: 19 (ref 7–25)
C DIFF GDH + TOXINS A+B STL QL IA.RAPID: NEGATIVE
C DIFF GDH + TOXINS A+B STL QL IA.RAPID: NEGATIVE
CALCIUM SPEC-SCNC: 8.4 MG/DL (ref 8.6–10.5)
CHLORIDE SERPL-SCNC: 91 MMOL/L (ref 98–107)
CO2 SERPL-SCNC: 30.5 MMOL/L (ref 22–29)
CREAT SERPL-MCNC: 1.12 MG/DL (ref 0.57–1)
DEPRECATED RDW RBC AUTO: 43.4 FL (ref 37–54)
EGFRCR SERPLBLD CKD-EPI 2021: 53.3 ML/MIN/1.73
EOSINOPHIL # BLD AUTO: 0.14 10*3/MM3 (ref 0–0.4)
EOSINOPHIL NFR BLD AUTO: 1.7 % (ref 0.3–6.2)
ERYTHROCYTE [DISTWIDTH] IN BLOOD BY AUTOMATED COUNT: 13.2 % (ref 12.3–15.4)
GLOBULIN UR ELPH-MCNC: 3.6 GM/DL
GLUCOSE BLDC GLUCOMTR-MCNC: 182 MG/DL (ref 70–105)
GLUCOSE SERPL-MCNC: 141 MG/DL (ref 65–99)
HCT VFR BLD AUTO: 42.9 % (ref 34–46.6)
HGB BLD-MCNC: 13.5 G/DL (ref 12–15.9)
IMM GRANULOCYTES # BLD AUTO: 0.07 10*3/MM3 (ref 0–0.05)
IMM GRANULOCYTES NFR BLD AUTO: 0.9 % (ref 0–0.5)
LYMPHOCYTES # BLD AUTO: 0.95 10*3/MM3 (ref 0.7–3.1)
LYMPHOCYTES NFR BLD AUTO: 11.6 % (ref 19.6–45.3)
MAGNESIUM SERPL-MCNC: 2.2 MG/DL (ref 1.6–2.4)
MCH RBC QN AUTO: 27.8 PG (ref 26.6–33)
MCHC RBC AUTO-ENTMCNC: 31.5 G/DL (ref 31.5–35.7)
MCV RBC AUTO: 88.3 FL (ref 79–97)
MONOCYTES # BLD AUTO: 0.54 10*3/MM3 (ref 0.1–0.9)
MONOCYTES NFR BLD AUTO: 6.6 % (ref 5–12)
NEUTROPHILS NFR BLD AUTO: 6.41 10*3/MM3 (ref 1.7–7)
NEUTROPHILS NFR BLD AUTO: 78.5 % (ref 42.7–76)
NRBC BLD AUTO-RTO: 0 /100 WBC (ref 0–0.2)
PHOSPHATE SERPL-MCNC: 3.3 MG/DL (ref 2.5–4.5)
PLATELET # BLD AUTO: 245 10*3/MM3 (ref 140–450)
PMV BLD AUTO: 10.8 FL (ref 6–12)
POTASSIUM SERPL-SCNC: 4 MMOL/L (ref 3.5–5.2)
PROT SERPL-MCNC: 6.7 G/DL (ref 6–8.5)
RBC # BLD AUTO: 4.86 10*6/MM3 (ref 3.77–5.28)
SODIUM SERPL-SCNC: 132 MMOL/L (ref 136–145)
WBC NRBC COR # BLD AUTO: 8.17 10*3/MM3 (ref 3.4–10.8)

## 2025-07-24 PROCEDURE — 94799 UNLISTED PULMONARY SVC/PX: CPT

## 2025-07-24 PROCEDURE — 82948 REAGENT STRIP/BLOOD GLUCOSE: CPT | Performed by: INTERNAL MEDICINE

## 2025-07-24 PROCEDURE — 99231 SBSQ HOSP IP/OBS SF/LOW 25: CPT | Performed by: NURSE PRACTITIONER

## 2025-07-24 PROCEDURE — 94664 DEMO&/EVAL PT USE INHALER: CPT

## 2025-07-24 PROCEDURE — 80053 COMPREHEN METABOLIC PANEL: CPT

## 2025-07-24 PROCEDURE — 84100 ASSAY OF PHOSPHORUS: CPT

## 2025-07-24 PROCEDURE — 63710000001 INSULIN LISPRO (HUMAN) PER 5 UNITS: Performed by: INTERNAL MEDICINE

## 2025-07-24 PROCEDURE — 83735 ASSAY OF MAGNESIUM: CPT

## 2025-07-24 PROCEDURE — 85025 COMPLETE CBC W/AUTO DIFF WBC: CPT

## 2025-07-24 PROCEDURE — 25010000002 CEFTRIAXONE PER 250 MG: Performed by: NURSE PRACTITIONER

## 2025-07-24 RX ORDER — ACYCLOVIR 200 MG/1
400 CAPSULE ORAL 3 TIMES DAILY
Qty: 12 CAPSULE | Refills: 0 | Status: SHIPPED | OUTPATIENT
Start: 2025-07-24 | End: 2025-07-26

## 2025-07-24 RX ORDER — FUROSEMIDE 40 MG/1
40 TABLET ORAL DAILY
Qty: 60 TABLET | Refills: 0 | Status: SHIPPED | OUTPATIENT
Start: 2025-07-25 | End: 2025-09-23

## 2025-07-24 RX ADMIN — Medication 10 ML: at 07:58

## 2025-07-24 RX ADMIN — LOSARTAN POTASSIUM 25 MG: 25 TABLET, FILM COATED ORAL at 07:59

## 2025-07-24 RX ADMIN — FUROSEMIDE 40 MG: 40 TABLET ORAL at 07:58

## 2025-07-24 RX ADMIN — INSULIN LISPRO 4 UNITS: 100 INJECTION, SOLUTION INTRAVENOUS; SUBCUTANEOUS at 07:57

## 2025-07-24 RX ADMIN — CEFTRIAXONE 2000 MG: 2 INJECTION, POWDER, FOR SOLUTION INTRAMUSCULAR; INTRAVENOUS at 11:13

## 2025-07-24 RX ADMIN — HYDROCODONE BITARTRATE AND ACETAMINOPHEN 1 TABLET: 5; 325 TABLET ORAL at 03:59

## 2025-07-24 RX ADMIN — HYDROCODONE BITARTRATE AND ACETAMINOPHEN 1 TABLET: 5; 325 TABLET ORAL at 11:07

## 2025-07-24 RX ADMIN — ACYCLOVIR 400 MG: 200 CAPSULE ORAL at 07:59

## 2025-07-24 RX ADMIN — MUPIROCIN 1 APPLICATION: 20 OINTMENT TOPICAL at 07:58

## 2025-07-24 RX ADMIN — BUDESONIDE AND FORMOTEROL FUMARATE DIHYDRATE 2 PUFF: 160; 4.5 AEROSOL RESPIRATORY (INHALATION) at 09:05

## 2025-07-24 RX ADMIN — ATORVASTATIN CALCIUM 40 MG: 40 TABLET, FILM COATED ORAL at 07:58

## 2025-07-24 RX ADMIN — METOPROLOL SUCCINATE 50 MG: 50 TABLET, EXTENDED RELEASE ORAL at 07:59

## 2025-07-24 NOTE — PROGRESS NOTES
LOS: 8 days   Admitting Physician- Cory Bradshaw MD    Reason For Followup:    UTI  Sepsis/bacteremia with Proteus  Pyelonephritis  Hypertension  CKD  Diabetes mellitus  Sinus tachycardia      Subjective     Patient is laying in the bed.  No complaints.  She denies chest pain palpitation shortness of breath.  Patient is pending discharge today    Objective     Hemodynamics are stable    Review of Systems:   Review of Systems   Constitutional: Negative for chills and fever.   HENT:  Negative for ear discharge and nosebleeds.    Eyes:  Negative for discharge and redness.   Cardiovascular:  Negative for chest pain, orthopnea, palpitations, paroxysmal nocturnal dyspnea and syncope.   Respiratory:  Negative for cough, shortness of breath and wheezing.    Endocrine: Negative for heat intolerance.   Skin:  Negative for rash.   Musculoskeletal:  Negative for arthritis and myalgias.   Gastrointestinal:  Negative for abdominal pain, melena, nausea and vomiting.   Genitourinary:  Negative for dysuria and hematuria.   Neurological:  Negative for dizziness, light-headedness, numbness and tremors.   Psychiatric/Behavioral:  Negative for depression. The patient is not nervous/anxious.          Vital Signs  Vitals:    07/24/25 0300 07/24/25 0736 07/24/25 0905 07/24/25 0907   BP: 109/63 118/72     BP Location: Left arm      Patient Position: Lying      Pulse:  82 84 87   Resp: 24 24 12 23   Temp: 97.1 °F (36.2 °C) 97.4 °F (36.3 °C)     TempSrc: Temporal      SpO2:  91% 96% 93%   Weight: 59.3 kg (130 lb 11.7 oz)      Height:         Wt Readings from Last 1 Encounters:   07/24/25 59.3 kg (130 lb 11.7 oz)       Intake/Output Summary (Last 24 hours) at 7/24/2025 1020  Last data filed at 7/23/2025 2000  Gross per 24 hour   Intake 420 ml   Output --   Net 420 ml     Physical Exam:  Constitutional:       Appearance: Well-developed.   Eyes:      General: No scleral icterus.        Right eye: No discharge.   HENT:      Head: Normocephalic  and atraumatic.   Neck:      Thyroid: No thyromegaly.      Lymphadenopathy: No cervical adenopathy.   Pulmonary:      Effort: Pulmonary effort is normal. No respiratory distress.      Breath sounds: Normal breath sounds. No wheezing. No rales.   Cardiovascular:      Normal rate. Regular rhythm.      No gallop.    Edema:     Peripheral edema absent.   Abdominal:      Tenderness: There is no abdominal tenderness.   Skin:     Findings: Rash present. No erythema.      Comments: Herpes simplex rash around nose and mouth   Neurological:      Mental Status: Alert and oriented to person, place, and time.         Results Review:   Lab Results (last 24 hours)       Procedure Component Value Units Date/Time    POC Glucose Finger 4x Daily Before Meals & at Bedtime [[id number]]  (Abnormal) Collected: 07/24/25 0733    Specimen: Blood from Finger Updated: 07/24/25 0738     Glucose 182 mg/dL      Comment: Serial Number: [id number]:  [id number]       Clostridioides difficile Toxin - Stool, Per Rectum [[id number]]  (Normal) Collected: 07/23/25 2122    Specimen: Stool from Per Rectum Updated: 07/24/25 0705    Narrative:      The following orders were created for panel order Clostridioides difficile Toxin - Stool, Per Rectum.  Procedure                               Abnormality         Status                     ---------                               -----------         ------                     Clostridioides difficile...[[id number]]  Normal              Final result                 Please view results for these tests on the individual orders.    Clostridioides difficile EIA - Stool, Per Rectum [[id number]]  (Normal) Collected: 07/23/25 2122    Specimen: Stool from Per Rectum Updated: 07/24/25 0705     C Diff GDH Ag Negative     C.diff Toxin Ag Negative    Narrative:      The result indicates the absence of toxigenic C.difficile from stool specimen.    Magnesium [[id number]]  (Normal) Collected: 07/24/25 0355    Specimen: Blood  from Arm, Right Updated: 07/24/25 0441     Magnesium 2.2 mg/dL     Phosphorus [[id number]]  (Normal) Collected: 07/24/25 0355    Specimen: Blood from Arm, Right Updated: 07/24/25 0441     Phosphorus 3.3 mg/dL     Comprehensive Metabolic Panel [[id number]]  (Abnormal) Collected: 07/24/25 0355    Specimen: Blood from Arm, Right Updated: 07/24/25 0441     Glucose 141 mg/dL      BUN 21.3 mg/dL      Creatinine 1.12 mg/dL      Sodium 132 mmol/L      Potassium 4.0 mmol/L      Chloride 91 mmol/L      CO2 30.5 mmol/L      Calcium 8.4 mg/dL      Total Protein 6.7 g/dL      Albumin 3.1 g/dL      ALT (SGPT) 21 U/L      AST (SGOT) 22 U/L      Alkaline Phosphatase 210 U/L      Total Bilirubin 0.4 mg/dL      Globulin 3.6 gm/dL      A/G Ratio 0.9 g/dL      BUN/Creatinine Ratio 19.0     Anion Gap 10.5 mmol/L      eGFR 53.3 mL/min/1.73     Narrative:      GFR Categories in Chronic Kidney Disease (CKD)              GFR Category          GFR (mL/min/1.73)    Interpretation  G1                    90 or greater        Normal or high (1)  G2                    60-89                Mild decrease (1)  G3a                   45-59                Mild to moderate decrease  G3b                   30-44                Moderate to severe decrease  G4                    15-29                Severe decrease  G5                    14 or less           Kidney failure    (1)In the absence of evidence of kidney disease, neither GFR category G1 or G2 fulfill the criteria for CKD.    eGFR calculation 2021 CKD-EPI creatinine equation, which does not include race as a factor    CBC & Differential [[id number]]  (Abnormal) Collected: 07/24/25 0355    Specimen: Blood from Arm, Right Updated: 07/24/25 0408    Narrative:      The following orders were created for panel order CBC & Differential.  Procedure                               Abnormality         Status                     ---------                               -----------         ------                      CBC Auto Differential[[id number]]        Abnormal            Final result                 Please view results for these tests on the individual orders.    CBC Auto Differential [[id number]]  (Abnormal) Collected: 07/24/25 0355    Specimen: Blood from Arm, Right Updated: 07/24/25 0408     WBC 8.17 10*3/mm3      RBC 4.86 10*6/mm3      Hemoglobin 13.5 g/dL      Hematocrit 42.9 %      MCV 88.3 fL      MCH 27.8 pg      MCHC 31.5 g/dL      RDW 13.2 %      RDW-SD 43.4 fl      MPV 10.8 fL      Platelets 245 10*3/mm3      Neutrophil % 78.5 %      Lymphocyte % 11.6 %      Monocyte % 6.6 %      Eosinophil % 1.7 %      Basophil % 0.7 %      Immature Grans % 0.9 %      Neutrophils, Absolute 6.41 10*3/mm3      Lymphocytes, Absolute 0.95 10*3/mm3      Monocytes, Absolute 0.54 10*3/mm3      Eosinophils, Absolute 0.14 10*3/mm3      Basophils, Absolute 0.06 10*3/mm3      Immature Grans, Absolute 0.07 10*3/mm3      nRBC 0.0 /100 WBC     POC Glucose Once [[id number]]  (Abnormal) Collected: 07/23/25 2051    Specimen: Blood Updated: 07/23/25 2053     Glucose 300 mg/dL      Comment: Serial Number: [id number]:  [id number]       POC Glucose Finger 4x Daily Before Meals & at Bedtime [[id number]]  (Abnormal) Collected: 07/23/25 1720    Specimen: Blood from Finger Updated: 07/23/25 1723     Glucose 129 mg/dL      Comment: Serial Number: [id number]:  [id number]       POC Glucose Finger 4x Daily Before Meals & at Bedtime [[id number]]  (Abnormal) Collected: 07/23/25 1237    Specimen: Blood from Finger Updated: 07/23/25 1239     Glucose 231 mg/dL      Comment: Serial Number: [id number]:  [id number]             Imaging Results (Last 72 Hours)       Procedure Component Value Units Date/Time    XR Chest 1 View [[id number]] Collected: 07/21/25 1202     Updated: 07/21/25 1209    Narrative:      XR CHEST 1 VW    Date of Exam: 7/21/2025 12:00 PM EDT    Indication: Follow-up on pulmonary edema    Comparison: 7/20/2025    Findings:  Heart  mildly enlarged, unchanged. Interstitial thickening improved in the prior study suggesting resolving pulmonary edema. Mild bibasilar airspace disease/atelectasis. Suspect small bilateral pleural effusions. Negative for pneumothorax. Remote right   posterior rib fractures at the sixth and ninth ribs. Aortic arch atherosclerosis.      Impression:      Impression:  1. Interstitial thickening improved from prior study suggesting resolving pulmonary edema.  2. Mild bibasilar airspace disease/atelectasis with small bilateral pleural effusions.          Electronically Signed: Austyn Henson MD    7/21/2025 12:07 PM EDT    Workstation ID: [id number]          ECG/EMG Results (most recent)       Procedure Component Value Units Date/Time    Telemetry Scan [[id number]] Resulted: 07/15/25 2236     Updated: 07/15/25 2248    Telemetry Scan [[id number]] Resulted: 07/16/25 0440     Updated: 07/16/25 0519    ECG 12 Lead Tachycardia [[id number]] Collected: 07/15/25 2239     Updated: 07/16/25 0632     QT Interval 319 ms      QTC Interval 468 ms     Narrative:      HEART IJNI=140  bpm  RR Syvlzarg=263  ms  MD Lontsncu=008  ms  P Horizontal Axis=-1  deg  P Front Axis=70  deg  QRSD Interval=75  ms  QT Olyuhngv=300  ms  THlI=480  ms  QRS Axis=7  deg  T Wave Axis=41  deg  - BORDERLINE ECG -  Sinus tachycardia  Probable  left atrial enlargement  When compared with ECG of 02-Oct-2023 23:37:23,  Significant rate increase  Electronically Signed By: Juliano Forbes ([facility]) 2025-07-16 06:30:29  Date and Time of Study:2025-07-15 22:39:00    Telemetry Scan [[id number]] Resulted: 07/16/25 0807     Updated: 07/16/25 0920    Telemetry Scan [[id number]] Resulted: 07/16/25 1152     Updated: 07/16/25 1208    Telemetry Scan [[id number]] Resulted: 07/16/25 2001     Updated: 07/16/25 2019    Telemetry Scan [[id number]] Resulted: 07/16/25 2345     Updated: 07/17/25 0007    Telemetry Scan [[id number]] Resulted: 07/17/25 0346     Updated: 07/17/25 0413    Telemetry Scan  [[id number]] Resulted: 07/17/25 0712     Updated: 07/17/25 0735    Telemetry Scan [[id number]] Resulted: 07/17/25 1110     Updated: 07/17/25 1120    Telemetry Scan [[id number]] Resulted: 07/17/25 1509     Updated: 07/17/25 1519    Telemetry Scan [[id number]] Resulted: 07/17/25 2008     Updated: 07/17/25 2120    Telemetry Scan [[id number]] Resulted: 07/18/25 0041     Updated: 07/18/25 0107    Telemetry Scan [[id number]] Resulted: 07/18/25 0413     Updated: 07/18/25 0549    Telemetry Scan [[id number]] Resulted: 07/18/25 0719     Updated: 07/18/25 0738    Telemetry Scan [[id number]] Resulted: 07/17/25 0346     Updated: 07/18/25 1205    Telemetry Scan [[id number]] Resulted: 07/18/25 1148     Updated: 07/18/25 1209    Telemetry Scan [[id number]] Resulted: 07/18/25 1528     Updated: 07/18/25 1621    Telemetry Scan [[id number]] Resulted: 07/19/25 0012     Updated: 07/19/25 0225    Telemetry Scan [[id number]] Resulted: 07/18/25 2012     Updated: 07/19/25 0225    Telemetry Scan [[id number]] Resulted: 07/19/25 0408     Updated: 07/19/25 0450    Telemetry Scan [[id number]] Resulted: 07/19/25 0705     Updated: 07/19/25 0737    Telemetry Scan [[id number]] Resulted: 07/19/25 1111     Updated: 07/19/25 1118    Telemetry Scan [[id number]] Resulted: 07/19/25 1503     Updated: 07/19/25 1519    Telemetry Scan [[id number]] Resulted: 07/19/25 2024     Updated: 07/19/25 2106    Telemetry Scan [[id number]] Resulted: 07/20/25 0005     Updated: 07/20/25 0310    Telemetry Scan [[id number]] Resulted: 07/20/25 0416     Updated: 07/20/25 0436    Telemetry Scan [[id number]] Resulted: 07/20/25 0719     Updated: 07/20/25 0736    Telemetry Scan [[id number]] Resulted: 07/20/25 1111     Updated: 07/20/25 1121    Telemetry Scan [[id number]] Resulted: 07/20/25 1540     Updated: 07/20/25 1550    Adult Transthoracic Echo Complete w/ Color, Spectral and Contrast if Necessary Per Protocol [[id number]] Resulted: 07/20/25 1942     Updated: 07/20/25 1942     LVIDd 3.4 cm      LVIDs  2.40 cm      IVSd 1.30 cm      LVPWd 1.20 cm      FS 29.4 %      IVS/LVPW 1.08 cm      ESV(cubed) 13.8 ml      LV Sys Vol (BSA corrected) 29.2 cm2      EDV(cubed) 39.3 ml      LV Arreola Vol (BSA corrected) 60.4 cm2      LV mass(C)d 138.8 grams      LVOT area 2.8 cm2      LVOT diam 1.90 cm      EDV(MOD-sp4) 101.0 ml      ESV(MOD-sp4) 48.9 ml      SV(MOD-sp4) 52.1 ml      SVi(MOD-SP4) 31.2 ml/m2      SVi (LVOT) 39.9 ml/m2      EF(MOD-sp4) 51.6 %      MV E max dane 142.0 cm/sec      MV A max dane 190.0 cm/sec      MV dec time 0.16 sec      MV E/A 0.75     LA ESV Index (BP) 33.3 ml/m2      Med Peak E' Dane 4.9 cm/sec      Lat Peak E' Dane 5.9 cm/sec      TR max dane 293.0 cm/sec      Avg E/e' ratio 26.30     SV(LVOT) 66.6 ml      RV Base 3.3 cm      RV Mid 2.6 cm      RV Length 8.3 cm      TAPSE (>1.6) 1.64 cm      RV S' 14.1 cm/sec      LA dimension (2D)  4.4 cm      LV V1 max 127.0 cm/sec      LV V1 max PG 6.5 mmHg      LV V1 mean PG 3.0 mmHg      LV V1 VTI 23.5 cm      Ao pk dane 164.0 cm/sec      Ao max PG 10.8 mmHg      Ao mean PG 6.0 mmHg      Ao V2 VTI 30.6 cm      KAMARI(I,D) 2.18 cm2      Dimensionless Index 0.77 (DI)      MV max PG 17.8 mmHg      MV mean PG 7.0 mmHg      MV V2 VTI 38.6 cm      MV P1/2t 161.8 msec      MVA(P1/2t) 1.36 cm2      MVA(VTI) 1.73 cm2      MV dec slope 248.0 cm/sec2      MR max dane 449.0 cm/sec      MR max PG 80.6 mmHg      TR max PG 34.3 mmHg      RVSP(TR) 42.3 mmHg      RAP systole 8.0 mmHg      RV V1 max PG 4.6 mmHg      RV V1 max 107.0 cm/sec      RV V1 VTI 20.3 cm      PA V2 max 113.0 cm/sec      PA acc time 0.11 sec      PI end-d dane 143.0 cm/sec      Sinus 2.9 cm      STJ 2.40 cm      EF(MOD-bp) 52.0 %      LA length (A2C) 5.2 cm      LA length (A4C) 6.1 cm     Narrative:        Left ventricular systolic function is normal. Left ventricular ejection   fraction appears to be 61 - 65%.    Left ventricular diastolic function is consistent with (grade I)   impaired relaxation.    The left  atrial cavity is severely dilated.    Estimated right ventricular systolic pressure from tricuspid   regurgitation is mildly elevated (35-45 mmHg). Calculated right   ventricular systolic pressure from tricuspid regurgitation is 42 mmHg.    The study is technically difficult for diagnosis. The quality of the   study is limited with poor acoustic windows.      ECG 12 Lead Chest Pain [[id number]] Collected: 07/20/25 0241     Updated: 07/20/25 2005     QT Interval 327 ms      QTC Interval 462 ms     Narrative:      HEART DFXW=962  bpm  RR Nqzhbund=208  ms  GA Rpzbwvng=303  ms  P Horizontal Axis=-15  deg  P Front Axis=49  deg  QRSD Interval=77  ms  QT Pyjjlstt=193  ms  TJgY=427  ms  QRS Axis=3  deg  T Wave Axis=29  deg  - BORDERLINE ECG -  Sinus tachycardia  Probable  left atrial enlargement  When compared with ECG of 15-Jul-2025 22:39:00,  No significant change  Electronically Signed By: Aden Muhammad ([facility]) 2025-07-20 20:04:34  Date and Time of Study:2025-07-20 02:41:25    Telemetry Scan [[id number]] Resulted: 07/21/25 0030     Updated: 07/21/25 0109    Telemetry Scan [[id number]] Resulted: 07/20/25 2015     Updated: 07/21/25 0109    Telemetry Scan [[id number]] Resulted: 07/21/25 0426     Updated: 07/21/25 0450    Telemetry Scan [[id number]] Resulted: 07/21/25 0722     Updated: 07/21/25 0737    Telemetry Scan [[id number]] Resulted: 07/21/25 1118     Updated: 07/21/25 1149    Telemetry Scan [[id number]] Resulted: 07/21/25 1528     Updated: 07/21/25 1549    Telemetry Scan [[id number]] Resulted: 07/21/25 2001     Updated: 07/21/25 2020    Telemetry Scan [[id number]] Resulted: 07/22/25 0445     Updated: 07/22/25 0506    Telemetry Scan [[id number]] Resulted: 07/22/25 0721     Updated: 07/22/25 0736    Telemetry Scan [[id number]] Resulted: 07/22/25 1132     Updated: 07/22/25 1150    Telemetry Scan [[id number]] Resulted: 07/22/25 1532     Updated: 07/22/25 1551    Telemetry Scan [[id number]] Resulted: 07/22/25 2021     Updated:  07/22/25 2105    Telemetry Scan [[id number]] Resulted: 07/23/25 0009     Updated: 07/23/25 0104    Telemetry Scan [[id number]] Resulted: 07/23/25 0420     Updated: 07/23/25 0535    Telemetry Scan [[id number]] Resulted: 07/23/25 0748     Updated: 07/23/25 0807    Telemetry Scan [[id number]] Resulted: 07/23/25 1148     Updated: 07/23/25 1356    Telemetry Scan [[id number]] Resulted: 07/23/25 1518     Updated: 07/23/25 1621    Telemetry Scan [[id number]] Resulted: 07/23/25 2036     Updated: 07/23/25 2050    Telemetry Scan [[id number]] Resulted: 07/24/25 0053     Updated: 07/24/25 0119    Telemetry Scan [[id number]] Resulted: 07/24/25 0435     Updated: 07/24/25 0451    Telemetry Scan [[id number]] Resulted: 07/24/25 0727     Updated: 07/24/25 0738          CBC    Results from last 7 days   Lab Units 07/24/25  0355 07/23/25  0445 07/22/25  0459 07/21/25  0349 07/20/25  0207 07/19/25  0252 07/18/25  0415   WBC 10*3/mm3 8.17 8.45 7.41 6.58 6.39 10.61 11.22*   HEMOGLOBIN g/dL 13.5 13.3 13.9 12.2 11.6* 11.7* 11.8*   PLATELETS 10*3/mm3 245 215 166 127* 101* 100* 88*     BMP   Results from last 7 days   Lab Units 07/24/25  0355 07/23/25  0445 07/22/25  1319 07/22/25  0254 07/21/25  1546 07/21/25  0349 07/20/25  1503 07/20/25  0207 07/19/25  0252 07/18/25  1744 07/18/25  0415   SODIUM mmol/L 132* 138  --  132*  --  136  --  137 138  --  134*   POTASSIUM mmol/L 4.0 4.5 4.3 3.2* 3.9 3.4*  --  4.0 4.1  --  3.8   CHLORIDE mmol/L 91* 96*  --  92*  --  96*  --  105 107  --  105   CO2 mmol/L 30.5* 26.1  --  26.1  --  24.0  --  19.6* 18.3*  --  16.4*   BUN mg/dL 21.3 21.2  --  20.7  --  17.9  --  16.8 19.3  --  18.6   CREATININE mg/dL 1.12* 1.03*  --  0.91  --  0.86  --  0.74 0.93  --  0.87   GLUCOSE mg/dL 141* 129*  --  175*  --  145*  --  129* 130*  --  126*   MAGNESIUM mg/dL 2.2 2.0  --  1.7  --  1.6  --  2.0 2.4  --  2.6*   PHOSPHORUS mg/dL 3.3 2.7  --  2.9  --  2.7 2.6 2.2* 2.7   < > 2.1*    < > = values in this interval not displayed.      CMP   Results from last 7 days   Lab Units 07/24/25  0355 07/23/25  0445 07/22/25  1319 07/22/25  0254 07/21/25  1546 07/21/25  0349 07/20/25  0207 07/19/25  0252 07/18/25  0415   SODIUM mmol/L 132* 138  --  132*  --  136 137 138 134*   POTASSIUM mmol/L 4.0 4.5 4.3 3.2* 3.9 3.4* 4.0 4.1 3.8   CHLORIDE mmol/L 91* 96*  --  92*  --  96* 105 107 105   CO2 mmol/L 30.5* 26.1  --  26.1  --  24.0 19.6* 18.3* 16.4*   BUN mg/dL 21.3 21.2  --  20.7  --  17.9 16.8 19.3 18.6   CREATININE mg/dL 1.12* 1.03*  --  0.91  --  0.86 0.74 0.93 0.87   GLUCOSE mg/dL 141* 129*  --  175*  --  145* 129* 130* 126*   ALBUMIN g/dL 3.1* 3.2*  --  2.9*  --  2.8* 2.8* 2.7* 2.8*   BILIRUBIN mg/dL 0.4 0.4  --  0.4  --  0.5 0.4 0.4 0.4   ALK PHOS U/L 210* 218*  --  216*  --  320* 281* 224* 71   AST (SGOT) U/L 22 23  --  20  --  24 32 18 19   ALT (SGPT) U/L 21 15  --  17  --  17 19 17 17     Cardiac Studies:  Echo- Results for orders placed during the hospital encounter of 07/15/25    Adult Transthoracic Echo Complete w/ Color, Spectral and Contrast if Necessary Per Protocol 07/20/2025  7:42 PM    Interpretation Summary    Left ventricular systolic function is normal. Left ventricular ejection fraction appears to be 61 - 65%.    Left ventricular diastolic function is consistent with (grade I) impaired relaxation.    The left atrial cavity is severely dilated.    Estimated right ventricular systolic pressure from tricuspid regurgitation is mildly elevated (35-45 mmHg). Calculated right ventricular systolic pressure from tricuspid regurgitation is 42 mmHg.    The study is technically difficult for diagnosis. The quality of the study is limited with poor acoustic windows.    Stress Myoview-  Cath-      Medication Review:   Scheduled Meds:acyclovir, 400 mg, Oral, TID  atorvastatin, 40 mg, Oral, Daily  budesonide-formoterol, 2 puff, Inhalation, BID - RT  cefTRIAXone, 2,000 mg, Intravenous, Q24H  cetirizine, 10 mg, Oral, Nightly  enoxaparin sodium, 40  mg, Subcutaneous, Q24H  famotidine, 20 mg, Oral, Nightly  furosemide, 40 mg, Oral, Daily  gabapentin, 200 mg, Oral, Nightly  hydrOXYzine, 50 mg, Oral, Nightly  insulin lispro, 4-24 Units, Subcutaneous, 4x Daily AC & at Bedtime  losartan, 25 mg, Oral, Daily  metoprolol succinate XL, 50 mg, Oral, Q12H  mupirocin, 1 Application, Each Nare, BID  pramipexole, 0.5 mg, Oral, Nightly  senna-docusate sodium, 2 tablet, Oral, BID  sodium chloride, 10 mL, Intravenous, Q12H  sodium chloride, 10 mL, Intravenous, Q12H      Continuous Infusions:   PRN Meds:.  acetaminophen **OR** acetaminophen    aluminum-magnesium hydroxide-simethicone    senna-docusate sodium **AND** polyethylene glycol **AND** bisacodyl **AND** bisacodyl    Calcium Replacement - Follow Nurse / BPA Driven Protocol    dextrose    dextrose    glucagon (human recombinant)    HYDROcodone-acetaminophen    ipratropium-albuterol    Magnesium Standard Dose Replacement - Follow Nurse / BPA Driven Protocol    nitroglycerin    ondansetron ODT **OR** ondansetron    Phosphorus Replacement - Follow Nurse / BPA Driven Protocol    Potassium Replacement - Follow Nurse / BPA Driven Protocol    sodium chloride    sodium chloride    sodium chloride    sodium chloride      Assessment & Plan     Septic shock    MDM:    1.  Volume overload/bilateral leg edema:    Euvolemic  Creatinine is stable at 1.12 after transition to oral diuresis    2.  Pyelonephritis/sepsis:    Patient is on antibiotic    3.  Diabetes mellitus:    Will have to be managed by primary with changes to outpatient medications    4.  Mixed hyperlipidemia:    Patient is on Lipitor.    5.  Cardiac workup:    Patient had recent stress test and echocardiogram which has been unremarkable      Patient is pending discharge today  Hemodynamic stable  Euvolemic status  Electrolytes stable today  Continue current blood pressure medications outpatient including oral diuretics, losartan  Continue metoprolol 50 mg twice  daily      Jolanta Luther, JONATHAN  07/24/25  10:20 EDT

## 2025-07-24 NOTE — SIGNIFICANT NOTE
07/24/25 1127   OTHER   Discipline occupational therapist   Rehab Time/Intention   Session Not Performed   (pt discharging from hospital this date. She is in room awaiting transport for d/c.)   Recommendation   OT - Next Appointment 07/25/25

## 2025-07-24 NOTE — OUTREACH NOTE
Prep Survey      Flowsheet Row Responses   [facility] facility patient discharged from? [facility]   Is LACE score < 7 ? No   Eligibility Readm Mgmt   Discharge diagnosis Septic shock   Does the patient have one of the following disease processes/diagnoses(primary or secondary)? Sepsis   Does the patient have Home health ordered? Yes   What is the Home health agency?  [facility]   Is there a DME ordered? Yes   What DME was ordered? Rolling walker thru [employer].   Medication alerts for this patient home IV abx   Prep survey completed? Yes            Abby WILLIS - Registered Nurse

## 2025-07-24 NOTE — DISCHARGE SUMMARY
[facility] Medicine Services  Discharge Summary    Date of Service: 2025  Patient Name: Eugenia Madden  : 1955  MRN: [id number]    Date of Admission: 7/15/2025  Discharge Diagnosis:     Bacteremia with Proteus  Emphysematous right-sided pyelonephritis  Complicated UTI  Oral herpes simplex lesions  Septic shock   Acute heart failure with preserved ejection fraction  HAVEN on CKD2  COPD not in exacerbation  Type 2 diabetes with peripheral neuropathy  Hypertension    Date of Discharge:  2025  Primary Care Physician: Nahid Lam MD      Presenting Problem:   Dehydration [E86.0]  Pyelonephritis [N12]  Thrombocytopenia [D69.6]  Elevated troponin [R79.89]  Septic shock [A41.9, R65.21]  Fever, unspecified fever cause [R50.9]  Sepsis, due to unspecified organism, unspecified whether acute organ dysfunction present [A41.9]    Active and Resolved Hospital Problems:  Active Hospital Problems    Diagnosis POA    **Septic shock [A41.9, R65.21] Yes      Resolved Hospital Problems   No resolved problems to display.         Hospital Course     Brief HPI:  69 y.o. female with PMH of COPD, emphysema, DM type II, hypertension, hyperlipidemia, CAD, and nephrolithiasis presented to the hospital for overall not feeling well and back pain, and was admitted with a principal diagnosis of Septic shock. She presents after severe back pain x1 day, and inability to walk and talk x1 hour at home, secondary to her level of pain. She denied dysuria. She stated it came on suddenly, and has a history of kidney stones but hasn't had for many years. Upon EMS arrival the patient's blood pressure was 80 systolic which briefly improved with 200 mL IVF. Upon arrival to the ED she was tachycardic and febrile at 100.9 F. She developed hypotension and received 2 L IVF with persistent hypotension for which Levophed was started. Labs remarkable for BNP 1087, CO2 18.4, anion gap 17.6, glucose 294 procalcitonin 30.9,  D-dimer 9.88 INR 1.19, WBC 2.77, 34% bandemia. CTA chest was negative for PE showed mild pulmonary edema, and gas within the upper pole of the right kidney with surrounding fat stranding although it was incompletely visualized. She was sent for a CT abdomen pelvis with emphysematous pyelonephritis of the right kidney     Hospital Course:    - Initially patient admitted to the ICU resuscitated with IV fluid and required to be on pressors evaluated by infectious disease, urology, cardiology  - No intervention planned by urology discontinue antibiotics, later on evaluated by ID, blood culture came back also positive for Proteus mirabilis  - Infectious disease recommended the following:  Continue IV ceftriaxone 2 g daily.    Plan on 4 weeks of treatment at first - Last day on 8/12/25. Recommend a repeat CT around 7/29/25.  If scan shows resolution of the emphysematous pyelonephritis then antibiotics on 7/29/25  - Midline was placed, home health were consulted, patient will be discharged with the plan to hopefully do CT scan with primary care physician in a week from now I will be sending copy of this summary to primary care physician, depends on CT scan results as above either can stop the antibiotics or continue till mid August        DISCHARGE Follow Up Recommendations for labs and diagnostics:   PCP in 5-6 days       Reasons For Change In Medications and Indications for New Medications:      Day of Discharge     Vital Signs:  Temp:  [96.6 °F (35.9 °C)-98.9 °F (37.2 °C)] 97.4 °F (36.3 °C)  Heart Rate:  [82-95] 87  Resp:  [12-24] 23  BP: ()/(47-72) 118/72    Physical Exam:    General Appearance:    Alert, cooperative, in no acute distress   Head:    Normocephalic, without obvious abnormality, atraumatic   Eyes:            conjunctivae and sclerae normal, no icterus, no pallor, corneas  clear, PERRLA   Neck:   No adenopathy, supple, trachea midline, no thyromegaly, no   carotid bruit, no JVD   Lungs:     Clear to  auscultation,respirations regular, even and                  unlabored    Heart:    Regular rhythm and normal rate, normal S1 and S2, no            murmur, no gallop, no rub, no click   Abdomen:     Normal bowel sounds, no masses, no organomegaly, soft        nontender, nondistended, no guarding, no rebound                No tenderness   Extremities:   Moves all extremities well, no edema, no cyanosis, no             redness   Lymph nodes:   No palpable adenopathy   Neurologic:   Cranial nerves 2 - 12 grossly intact, sensation intact, DTR       present and equal bilaterally          Pertinent  and/or Most Recent Results     LAB RESULTS:      Lab 07/24/25  0355 07/23/25  0445 07/22/25  0459 07/21/25  0349 07/20/25  0207   WBC 8.17 8.45 7.41 6.58 6.39   HEMOGLOBIN 13.5 13.3 13.9 12.2 11.6*   HEMATOCRIT 42.9 42.8 43.5 37.7 36.3   PLATELETS 245 215 166 127* 101*   NEUTROS ABS 6.41 6.28 5.59 4.47 4.71   IMMATURE GRANS (ABS) 0.07* 0.09* 0.08* 0.08* 0.03   LYMPHS ABS 0.95 1.07 0.79 0.86 0.68*   MONOS ABS 0.54 0.76 0.73 0.92* 0.77   EOS ABS 0.14 0.19 0.17 0.19 0.16   MCV 88.3 88.6 86.8 87.5 88.3         Lab 07/24/25  0355 07/23/25  0445 07/22/25  1319 07/22/25  0254 07/21/25  1546 07/21/25  0349 07/20/25  1503 07/20/25  0207   SODIUM 132* 138  --  132*  --  136  --  137   POTASSIUM 4.0 4.5 4.3 3.2* 3.9 3.4*  --  4.0   CHLORIDE 91* 96*  --  92*  --  96*  --  105   CO2 30.5* 26.1  --  26.1  --  24.0  --  19.6*   ANION GAP 10.5 15.9*  --  13.9  --  16.0*  --  12.4   BUN 21.3 21.2  --  20.7  --  17.9  --  16.8   CREATININE 1.12* 1.03*  --  0.91  --  0.86  --  0.74   EGFR 53.3* 59.0*  --  68.4  --  73.2  --  87.7   GLUCOSE 141* 129*  --  175*  --  145*  --  129*   CALCIUM 8.4* 8.3*  --  8.2*  --  8.1*  --  8.4*   MAGNESIUM 2.2 2.0  --  1.7  --  1.6  --  2.0   PHOSPHORUS 3.3 2.7  --  2.9  --  2.7 2.6 2.2*         Lab 07/24/25  0355 07/23/25  0445 07/22/25  0254 07/21/25  0349 07/20/25  0207   TOTAL PROTEIN 6.7 6.7 5.8* 6.1 5.3*    ALBUMIN 3.1* 3.2* 2.9* 2.8* 2.8*   GLOBULIN 3.6 3.5 2.9 3.3 2.5   ALT (SGPT) 21 15 17 17 19   AST (SGOT) 22 23 20 24 32   BILIRUBIN 0.4 0.4 0.4 0.5 0.4   ALK PHOS 210* 218* 216* 320* 281*         Lab 07/20/25  0319 07/20/25  0207   PROBNP  --  2,698.0*   HSTROP T 50* 51*                 Lab 07/21/25  1149   PH, ARTERIAL 7.494*   PCO2, ARTERIAL 38.2   PO2 ART 77.5*   O2 SATURATION ART 96.4   FIO2 21   HCO3 ART 29.4*   BASE EXCESS ART 5.8*     Brief Urine Lab Results  (Last result in the past 365 days)        Color   Clarity   Blood   Leuk Est   Nitrite   Protein   CREAT   Urine HCG        07/15/25 2304 Yellow   Clear   Trace   Small (1+)   Negative   Trace                 Microbiology Results (last 10 days)       Procedure Component Value - Date/Time    Clostridioides difficile Toxin - Stool, Per Rectum [[id number]]  (Normal) Collected: 07/23/25 2122    Lab Status: Final result Specimen: Stool from Per Rectum Updated: 07/24/25 0705    Narrative:      The following orders were created for panel order Clostridioides difficile Toxin - Stool, Per Rectum.  Procedure                               Abnormality         Status                     ---------                               -----------         ------                     Clostridioides difficile...[[id number]]  Normal              Final result                 Please view results for these tests on the individual orders.    Clostridioides difficile EIA - Stool, Per Rectum [[id number]]  (Normal) Collected: 07/23/25 2122    Lab Status: Final result Specimen: Stool from Per Rectum Updated: 07/24/25 0705     C Diff GDH Ag Negative     C.diff Toxin Ag Negative    Narrative:      The result indicates the absence of toxigenic C.difficile from stool specimen.    MRSA Screen, PCR (Inpatient) - Swab, Nares [[id number]]  (Normal) Collected: 07/16/25 0402    Lab Status: Final result Specimen: Swab from Nares Updated: 07/16/25 0519     MRSA PCR No MRSA Detected    Narrative:       The negative predictive value of this diagnostic test is high and should only be used to consider de-escalating anti-MRSA therapy. A positive result may indicate colonization with MRSA and must be correlated clinically.    Blood Culture - Blood, Arm, Right [[id number]]  (Abnormal) Collected: 07/15/25 2251    Lab Status: Final result Specimen: Blood from Arm, Right Updated: 07/18/25 0622     Blood Culture Proteus mirabilis     Isolated from Aerobic and Anaerobic Bottles     Gram Stain Anaerobic Bottle Gram negative bacilli      Aerobic Bottle Gram negative bacilli    Narrative:      Refer to previous blood culture collected on 07/15/2025 2248 for MICs      Blood Culture - Blood, Arm, Left [[id number]]  (Abnormal)  (Susceptibility) Collected: 07/15/25 2248    Lab Status: Final result Specimen: Blood from Arm, Left Updated: 07/18/25 0622     Blood Culture Proteus mirabilis     Isolated from Aerobic and Anaerobic Bottles     Gram Stain Anaerobic Bottle Gram negative bacilli      Aerobic Bottle Gram negative bacilli    Susceptibility        Proteus mirabilis      SOFÍA      Amoxicillin + Clavulanate Susceptible      Ampicillin Susceptible      Ampicillin + Sulbactam Susceptible      Cefazolin (Non Urine) Intermediate      Cefepime Susceptible      Ceftazidime Susceptible      Ceftriaxone Susceptible      Cefuroxime axetil Susceptible      Gentamicin Susceptible      Levofloxacin Susceptible      Piperacillin + Tazobactam Susceptible      Trimethoprim + Sulfamethoxazole Susceptible                       Susceptibility Comments       Proteus mirabilis    With the exception of urinary-sourced infections, aminoglycosides should not be used as monotherapy.               Blood Culture ID, PCR - Blood, Arm, Left [[id number]]  (Abnormal) Collected: 07/15/25 2248    Lab Status: Final result Specimen: Blood from Arm, Left Updated: 07/16/25 1406     BCID, PCR Proteus spp. Identification by BCID2 PCR.     BOTTLE TYPE Anaerobic Bottle     Narrative:      No resistance genes detected.    COVID-19, FLU A/B, RSV PCR 1 HR TAT - Swab, Nasopharynx [[id number]]  (Normal) Collected: 07/15/25 2237    Lab Status: Final result Specimen: Swab from Nasopharynx Updated: 07/15/25 2322     COVID19 Not Detected     Influenza A PCR Not Detected     Influenza B PCR Not Detected     RSV, PCR Not Detected            XR Chest 1 View  Result Date: 7/21/2025  Impression: Impression: 1. Interstitial thickening improved from prior study suggesting resolving pulmonary edema. 2. Mild bibasilar airspace disease/atelectasis with small bilateral pleural effusions. Electronically Signed: Austyn Henson MD  7/21/2025 12:07 PM EDT  Workstation ID: [id number]    XR Chest 1 View  Result Date: 7/20/2025  Impression: Impression: Severe interstitial pulmonary edema. Electronically Signed: Raheel Bennett MD  7/20/2025 2:57 AM EDT  Workstation ID: [id number]    CT Abdomen Pelvis Without Contrast  Result Date: 7/16/2025  Impression: Impression: Emphysematous pyelonephritis of the right kidney. Urologic consultation is recommended. Electronically Signed: Francisco Aguilar MD  7/16/2025 5:58 AM EDT  Workstation ID: [id number]    CT Angiogram Chest Pulmonary Embolism  Result Date: 7/16/2025  Impression: Impression: 1.No evidence of pulmonary embolism. 2.Mild pulmonary edema. 3.Gas within the upper pole of the right kidney with surrounding fat stranding. This could represent infection of the right kidney such as pyonephrosis. This is incompletely visualized on this exam. Electronically Signed: Francisco Aguilar MD  7/16/2025 2:22 AM EDT  Workstation ID: [id number]    XR Chest 1 View  Result Date: 7/15/2025  Impression: Impression: No active disease. Electronically Signed: Francisco Aguilar MD  7/15/2025 11:00 PM EDT  Workstation ID: [id number]              Results for orders placed during the hospital encounter of 07/15/25    Adult Transthoracic Echo Complete w/ Color, Spectral and Contrast if Necessary Per Protocol  07/20/2025  7:42 PM    Interpretation Summary    Left ventricular systolic function is normal. Left ventricular ejection fraction appears to be 61 - 65%.    Left ventricular diastolic function is consistent with (grade I) impaired relaxation.    The left atrial cavity is severely dilated.    Estimated right ventricular systolic pressure from tricuspid regurgitation is mildly elevated (35-45 mmHg). Calculated right ventricular systolic pressure from tricuspid regurgitation is 42 mmHg.    The study is technically difficult for diagnosis. The quality of the study is limited with poor acoustic windows.      Labs Pending at Discharge:      Procedures Performed    07/21 1717 Note By: Radha Caballero, RRT      Consults:   Consults       Date and Time Order Name Status Description    7/20/2025 11:10 AM Inpatient Cardiology Consult Completed     7/19/2025 10:52 AM Inpatient Infectious Diseases Consult Completed     7/17/2025 11:31 AM Inpatient Hospitalist Consult Completed     7/16/2025  6:47 AM Inpatient Urology Consult Completed     7/16/2025  3:38 AM Intensivist (on-call MD unless specified)                Discharge Details        Discharge Medications        New Medications        Instructions Start Date   acyclovir 200 MG capsule  Commonly known as: ZOVIRAX   400 mg, Oral, 3 Times Daily      cefTRIAXone 2,000 mg in sodium chloride 0.9 % 100 mL IVPB   2,000 mg, Intravenous, Every 24 Hours      furosemide 40 MG tablet  Commonly known as: LASIX   40 mg, Oral, Daily   Start Date: July 25, 2025            Continue These Medications        Instructions Start Date   atorvastatin 40 MG tablet  Commonly known as: LIPITOR   40 mg, Oral, Daily      Breztri Aerosphere 160-9-4.8 MCG/ACT aerosol inhaler  Generic drug: Budeson-Glycopyrrol-Formoterol   2 puffs, 2 Times Daily      cetirizine 10 MG tablet  Commonly known as: zyrTEC   10 mg, Nightly      famotidine 20 MG tablet  Commonly known as: PEPCID   20 mg, Nightly       gabapentin 300 MG capsule  Commonly known as: NEURONTIN   300 mg, Nightly      hydrOXYzine 25 MG tablet  Commonly known as: ATARAX   50 mg, Oral, Every Night at Bedtime      ipratropium-albuterol 0.5-2.5 mg/3 ml nebulizer  Commonly known as: DUO-NEB   3 mL, Nebulization, Every 4 Hours PRN      leflunomide 20 MG tablet  Commonly known as: ARAVA   20 mg, Oral, Nightly      losartan 25 MG tablet  Commonly known as: COZAAR   25 mg, Oral, Daily      metoprolol succinate XL 25 MG 24 hr tablet  Commonly known as: TOPROL-XL   25 mg, Oral, Daily      nitroglycerin 0.3 MG SL tablet  Commonly known as: NITROSTAT   1 under the tongue as needed for angina, may repeat q5mins for up three doses      Ozempic (1 MG/DOSE) 4 MG/3ML solution pen-injector  Generic drug: Semaglutide (1 MG/DOSE)   INJECT 1 MG UNDER THE SKIN INTO THE APPROPRIATE AREA AS DIRECTED 1 (ONE) TIME PER WEEK.      pioglitazone 15 MG tablet  Commonly known as: ACTOS   15 mg, Nightly      potassium chloride 10 MEQ CR tablet   10 mEq, Oral, Daily      pramipexole 0.125 MG tablet  Commonly known as: MIRAPEX   0.5 mg, Oral, Every Night at Bedtime             Stop These Medications      Jardiance 25 MG tablet tablet  Generic drug: empagliflozin              Allergies   Allergen Reactions    Tuberculin, Ppd Rash     40yrs ago         Discharge Disposition:   Home-Health Care [facility]    Diet:  Hospital:  Diet Order   Procedures    Diet: Fluid Restriction (240 mL/tray), Cardiac, Diabetic; Low Sodium (2g); Consistent Carbohydrate; 1500 mL/day; Texture: Regular (IDDSI 7); Fluid Consistency: Thin (IDDSI 0)         Discharge Activity:   as tolerated       CODE STATUS:  Code Status and Medical Interventions: CPR (Attempt to Resuscitate); Full Support   Ordered at: 07/16/25 0959     Code Status (Patient has no pulse and is not breathing):    CPR (Attempt to Resuscitate)     Medical Interventions (Patient has pulse or is breathing):    Full Support         Future Appointments    Date Time Provider Department Center   8/4/2025  2:00 PM Mike Ramos APRN  [facility] None   8/15/2025  1:45 PM Donell Joyce MD [facility]       Additional Instructions for the Follow-ups that You Need to Schedule       Ambulatory Referral to Home Health   As directed      Face to Face Visit Date: 7/18/2025   Follow-up provider for Plan of Care?: I treated the patient in an acute care facility and will not continue treatment after discharge.   Follow-up provider: KATERIN HUMMEL [[id number]]   Reason/Clinical Findings: Home IV abx/ Weakness   Describe mobility limitations that make leaving home difficult: generalized weakness   Nursing/Therapeutic Services Requested: Skilled Nursing Physical Therapy Occupational Therapy   Skilled nursing orders: Infusion therapy   PT orders: Strengthening   Occupational orders: Strengthening   Frequency: 1 Week 1                Time spent on Discharge including face to face service:  >30 minutes    Signature: Electronically signed by Cory Bradshaw MD, 07/24/25, 10:10 EDT.  [facility]ist [facility]

## 2025-07-24 NOTE — PLAN OF CARE
Goal Outcome Evaluation:  Plan of Care Reviewed With: patient     Problem: Adult Inpatient Plan of Care  Goal: Plan of Care Review  Outcome: Progressing  Flowsheets (Taken 7/24/2025 1341)  Progress: improving  Plan of Care Reviewed With: patient        Progress: improving

## 2025-07-25 NOTE — CASE MANAGEMENT/SOCIAL WORK
Case Management Discharge Note      Final Note: Home with Home Helath    Provided Post Acute Provider List?: N/A  Post Acute Provider List: Home Health, DME Supplier  N/A Provider List Comment: Infusion  Provided Post Acute Provider Quality & Resource List?: N/A  Post Acute Provider Quality and Resource List: Home Health  Delivered To: Patient  Method of Delivery: In person    Selected Continued Care - Discharged on 7/24/2025 Admission date: 7/15/2025 - Discharge disposition: Home-Health Care Svc              Durable Medical Equipment Patient indicates having no preference.      Service Provider Services Address Phone Fax Patient Preferred    [facility] [address], [place] [id number] [phone] --              Dialysis/Infusion       Service Provider Services Address Phone Fax Patient Preferred    [facility] Infusion and IV Therapy [address] [address] [address] [phone] --              Home Medical Care Coordination complete.      Service Provider Services Address Phone Fax Patient Preferred    [facility] Home Nursing, Home Rehabilitation [address] [facility] [address] [phone] --                      Transportation Services  Transportation: Private Transportation  Private: Car    Final Discharge Disposition Code: 06 - home with home health care

## 2025-07-29 NOTE — CASE MANAGEMENT/SOCIAL WORK
Continued Stay Note  [facility]     Patient Name: Eugenia Madden  MRN: [id number]  Today's Date: 7/29/2025    Admit Date: 7/15/2025     Discharge Plan       Row Name 07/29/25 1608       Plan    Plan Comments CVU CM recieved a voicemail from [employer]  stating the patient would not let them in the home and stated her  was managing the abx fine, and said her surgeon was going to manage her line care and lab draws. CM attempted to call patient x3, no answer, CM LVM. CM attempted to call spouse x2, no answer CM LVM. CM will attempt to contact again tomorrow, 7/30.             FRED Snow, RN, [facility]  Care Coordination Supervisor   [address] [address]  Phone: [phone]  Fax: [phone]       Diabetes Mellitus    Dyslipidemia    S/P Hernia Repair    S/P Hernia Repair

## 2025-07-30 NOTE — CASE MANAGEMENT/SOCIAL WORK
Continued Stay Note  [facility]     Patient Name: Eugenia Madden  MRN: [id number]  Today's Date: 7/30/2025    Admit Date: 7/15/2025       Discharge Plan       Row Name 07/30/25 0944       Plan    Plan Comments CM attempted to call patient again on 7/30 x2, no answer, CM LVM. CM attempted to call spouse on 7/30 x2, no answer, CM LVM. CM sent text to liaison Crystal with [employer] to inform of issue, pending response and if [facility] is aware.             CHEN SnowN, RN, [facility]  Care Coordination Supervisor   [address] [address]  Phone: [phone]  Fax: [phone]

## 2025-07-30 NOTE — CASE MANAGEMENT/SOCIAL WORK
Continued Stay Note  [facility]     Patient Name: Eugenia Madden  MRN: [id number]  Today's Date: 7/30/2025    Admit Date: 7/15/2025    Plan: Home with [facility]   Discharge Plan       Row Name 07/30/25 1729       Plan    Plan Home with [facility]     The patient called and voiced she only has one dose left for tomorrow 7/31 and does not have enough doses to get her through until her physician appointment.  Pt voiced the best phone number to reach her at is [phone]. I provided her the number of [facility]. The patient was going to call them to see if they could help her with this.   The patient called back informing me she had gotten in touch with the nurse from [facility].  The patient voiced the nurse is helping her obtain more antibiotics.        Anastasia Foster RN    [address] [address]  Phone: [phone]  Fax: [phone]

## 2025-08-01 NOTE — CASE MANAGEMENT/SOCIAL WORK
Continued Stay Note  [facility]     Patient Name: Eugenia Madden  MRN: [id number]  Today's Date: 8/1/2025    Admit Date: 7/15/2025   Discharge Plan       Row Name 08/01/25 1002       Plan    Plan Comments CM called patient to discuss HHC and IV abx. Patient stated [facility], and didn't realize they needed to complete her labs/line care. CM explained to the patient that she will need weeky dressing changes and labs while on IV abx. Patient verbalized understanding, and agreeable to [facility]. Per patient, she is out of her IV abx and is supposed to have them until 8/12. CM called Lynette with [employer], Lynette stated [facility] will deliver her remaining IV abx today, 8/1. CM called [facility] and spoke to Ashley, CM explained the situation and asked if [facility] would reconsider, per Ashley, they will and will reach out to the patient. CM called patient to inform about [facility] and [facility], patient v/u.             FRED Snow, RN, [facility]  Care Coordination Supervisor   [address] [address]  Phone: [phone]  Fax: [phone]

## 2025-08-06 ENCOUNTER — READMISSION MANAGEMENT (OUTPATIENT)
Dept: CALL CENTER | Facility: HOSPITAL | Age: 70
End: 2025-08-06
Payer: MEDICARE

## 2025-08-08 ENCOUNTER — TRANSCRIBE ORDERS (OUTPATIENT)
Dept: ADMINISTRATIVE | Facility: HOSPITAL | Age: 70
End: 2025-08-08
Payer: MEDICARE

## 2025-08-08 DIAGNOSIS — N12 EMPHYSEMATOUS PYELONEPHRITIS: Primary | ICD-10-CM

## 2025-08-14 ENCOUNTER — HOSPITAL ENCOUNTER (OUTPATIENT)
Dept: CT IMAGING | Facility: HOSPITAL | Age: 70
Discharge: HOME | End: 2025-08-14
Admitting: FAMILY MEDICINE
Payer: MEDICARE

## 2025-08-14 DIAGNOSIS — N12 EMPHYSEMATOUS PYELONEPHRITIS: ICD-10-CM

## 2025-08-14 PROCEDURE — 25510000001 IOPAMIDOL PER 1 ML: Performed by: FAMILY MEDICINE

## 2025-08-14 PROCEDURE — 74170 CT ABD WO CNTRST FLWD CNTRST: CPT

## 2025-08-14 RX ORDER — IOPAMIDOL 755 MG/ML
100 INJECTION, SOLUTION INTRAVASCULAR
Status: COMPLETED | OUTPATIENT
Start: 2025-08-14 | End: 2025-08-14

## 2025-08-14 RX ADMIN — IOPAMIDOL 100 ML: 755 INJECTION, SOLUTION INTRAVENOUS at 10:21

## 2025-08-15 ENCOUNTER — OFFICE VISIT (OUTPATIENT)
Dept: CARDIOLOGY | Facility: CLINIC | Age: 70
End: 2025-08-15
Payer: MEDICARE

## 2025-08-15 VITALS
WEIGHT: 130 LBS | RESPIRATION RATE: 18 BRPM | BODY MASS INDEX: 23.04 KG/M2 | DIASTOLIC BLOOD PRESSURE: 81 MMHG | HEART RATE: 71 BPM | OXYGEN SATURATION: 96 % | SYSTOLIC BLOOD PRESSURE: 123 MMHG | HEIGHT: 63 IN

## 2025-08-15 DIAGNOSIS — I10 ESSENTIAL HYPERTENSION: Chronic | ICD-10-CM

## 2025-08-15 DIAGNOSIS — E11.9 TYPE 2 DIABETES MELLITUS WITHOUT COMPLICATION, WITH LONG-TERM CURRENT USE OF INSULIN: Chronic | ICD-10-CM

## 2025-08-15 DIAGNOSIS — Z79.4 TYPE 2 DIABETES MELLITUS WITHOUT COMPLICATION, WITH LONG-TERM CURRENT USE OF INSULIN: Chronic | ICD-10-CM

## 2025-08-15 DIAGNOSIS — E78.2 MIXED HYPERLIPIDEMIA: Primary | Chronic | ICD-10-CM
